# Patient Record
Sex: MALE | Race: WHITE | NOT HISPANIC OR LATINO | Employment: OTHER | URBAN - METROPOLITAN AREA
[De-identification: names, ages, dates, MRNs, and addresses within clinical notes are randomized per-mention and may not be internally consistent; named-entity substitution may affect disease eponyms.]

---

## 2023-08-31 ENCOUNTER — HOSPITAL ENCOUNTER (INPATIENT)
Facility: CLINIC | Age: 68
LOS: 1 days | Discharge: ANOTHER HOSPITAL | DRG: 841 | End: 2023-09-01
Attending: HOSPITALIST | Admitting: INTERNAL MEDICINE
Payer: COMMERCIAL

## 2023-08-31 DIAGNOSIS — D61.818 PANCYTOPENIA (H): Primary | ICD-10-CM

## 2023-08-31 DIAGNOSIS — D69.6 THROMBOCYTOPENIA (H): ICD-10-CM

## 2023-08-31 LAB
ABO/RH TYPE: NORMAL
ANTIBODY SCREEN: NEGATIVE
ERYTHROCYTE [DISTWIDTH] IN BLOOD BY AUTOMATED COUNT: 14.8 % (ref 10–15)
FERRITIN SERPL-MCNC: 703 NG/ML (ref 31–409)
FOLATE SERPL-MCNC: 10.8 NG/ML (ref 4.6–34.8)
HCT VFR BLD AUTO: 26.3 % (ref 40–53)
HGB BLD-MCNC: 7.7 G/DL (ref 13.3–17.7)
IRON BINDING CAPACITY (ROCHE): 241 UG/DL (ref 240–430)
IRON SATN MFR SERPL: 32 % (ref 15–46)
IRON SERPL-MCNC: 76 UG/DL (ref 61–157)
LDH SERPL L TO P-CCNC: 262 U/L (ref 0–250)
MCH RBC QN AUTO: 27.5 PG (ref 26.5–33)
MCHC RBC AUTO-ENTMCNC: 29.3 G/DL (ref 31.5–36.5)
MCV RBC AUTO: 94 FL (ref 78–100)
PLATELET # BLD AUTO: 16 10E3/UL (ref 150–450)
RBC # BLD AUTO: 2.8 10E6/UL (ref 4.4–5.9)
RETIC HEMOGLOBIN: 22.1 PG (ref 28.2–35.7)
RETICS # AUTO: 0.19 10E6/UL (ref 0.03–0.1)
RETICS # AUTO: 0.19 10E6/UL (ref 0.03–0.1)
RETICS/RBC NFR AUTO: 6.5 % (ref 0.5–2)
RETICS/RBC NFR AUTO: 6.8 % (ref 0.5–2)
SPECIMEN EXPIRATION DATE: NORMAL
SPECIMEN EXPIRATION DATE: NORMAL
TOTAL PROTEIN SERUM FOR ELP: 7.4 G/DL (ref 6.4–8.3)
URATE SERPL-MCNC: 5.1 MG/DL (ref 3.4–7)
VIT B12 SERPL-MCNC: >4000 PG/ML (ref 232–1245)
WBC # BLD AUTO: 2.9 10E3/UL (ref 4–11)

## 2023-08-31 PROCEDURE — 86334 IMMUNOFIX E-PHORESIS SERUM: CPT | Performed by: PATHOLOGY

## 2023-08-31 PROCEDURE — 86803 HEPATITIS C AB TEST: CPT | Performed by: INTERNAL MEDICINE

## 2023-08-31 PROCEDURE — 83550 IRON BINDING TEST: CPT | Performed by: INTERNAL MEDICINE

## 2023-08-31 PROCEDURE — 87040 BLOOD CULTURE FOR BACTERIA: CPT | Performed by: INTERNAL MEDICINE

## 2023-08-31 PROCEDURE — 81403 MOPATH PROCEDURE LEVEL 4: CPT | Performed by: HOSPITALIST

## 2023-08-31 PROCEDURE — 84155 ASSAY OF PROTEIN SERUM: CPT | Performed by: INTERNAL MEDICINE

## 2023-08-31 PROCEDURE — 258N000003 HC RX IP 258 OP 636: Performed by: INTERNAL MEDICINE

## 2023-08-31 PROCEDURE — 82607 VITAMIN B-12: CPT | Performed by: INTERNAL MEDICINE

## 2023-08-31 PROCEDURE — 250N000011 HC RX IP 250 OP 636: Performed by: INTERNAL MEDICINE

## 2023-08-31 PROCEDURE — 99223 1ST HOSP IP/OBS HIGH 75: CPT | Performed by: INTERNAL MEDICINE

## 2023-08-31 PROCEDURE — 85045 AUTOMATED RETICULOCYTE COUNT: CPT | Performed by: INTERNAL MEDICINE

## 2023-08-31 PROCEDURE — 86431 RHEUMATOID FACTOR QUANT: CPT | Performed by: INTERNAL MEDICINE

## 2023-08-31 PROCEDURE — 86704 HEP B CORE ANTIBODY TOTAL: CPT | Performed by: INTERNAL MEDICINE

## 2023-08-31 PROCEDURE — 36415 COLL VENOUS BLD VENIPUNCTURE: CPT | Performed by: HOSPITALIST

## 2023-08-31 PROCEDURE — 86850 RBC ANTIBODY SCREEN: CPT | Performed by: INTERNAL MEDICINE

## 2023-08-31 PROCEDURE — 84999 UNLISTED CHEMISTRY PROCEDURE: CPT | Performed by: HOSPITALIST

## 2023-08-31 PROCEDURE — 86901 BLOOD TYPING SEROLOGIC RH(D): CPT | Performed by: INTERNAL MEDICINE

## 2023-08-31 PROCEDURE — 86038 ANTINUCLEAR ANTIBODIES: CPT | Performed by: INTERNAL MEDICINE

## 2023-08-31 PROCEDURE — 83521 IG LIGHT CHAINS FREE EACH: CPT | Performed by: INTERNAL MEDICINE

## 2023-08-31 PROCEDURE — 86900 BLOOD TYPING SEROLOGIC ABO: CPT | Performed by: HOSPITALIST

## 2023-08-31 PROCEDURE — 36415 COLL VENOUS BLD VENIPUNCTURE: CPT | Performed by: INTERNAL MEDICINE

## 2023-08-31 PROCEDURE — 83615 LACTATE (LD) (LDH) ENZYME: CPT | Performed by: INTERNAL MEDICINE

## 2023-08-31 PROCEDURE — 82746 ASSAY OF FOLIC ACID SERUM: CPT | Performed by: INTERNAL MEDICINE

## 2023-08-31 PROCEDURE — 85046 RETICYTE/HGB CONCENTRATE: CPT | Performed by: INTERNAL MEDICINE

## 2023-08-31 PROCEDURE — 85007 BL SMEAR W/DIFF WBC COUNT: CPT | Performed by: INTERNAL MEDICINE

## 2023-08-31 PROCEDURE — 85027 COMPLETE CBC AUTOMATED: CPT | Performed by: INTERNAL MEDICINE

## 2023-08-31 PROCEDURE — 86901 BLOOD TYPING SEROLOGIC RH(D): CPT | Performed by: HOSPITALIST

## 2023-08-31 PROCEDURE — 84550 ASSAY OF BLOOD/URIC ACID: CPT | Performed by: INTERNAL MEDICINE

## 2023-08-31 PROCEDURE — 120N000001 HC R&B MED SURG/OB

## 2023-08-31 PROCEDURE — 87389 HIV-1 AG W/HIV-1&-2 AB AG IA: CPT | Performed by: INTERNAL MEDICINE

## 2023-08-31 PROCEDURE — 82728 ASSAY OF FERRITIN: CPT | Performed by: INTERNAL MEDICINE

## 2023-08-31 PROCEDURE — 86334 IMMUNOFIX E-PHORESIS SERUM: CPT | Mod: 26

## 2023-08-31 PROCEDURE — 82668 ASSAY OF ERYTHROPOIETIN: CPT | Performed by: INTERNAL MEDICINE

## 2023-08-31 PROCEDURE — 84165 PROTEIN E-PHORESIS SERUM: CPT | Mod: TC | Performed by: PATHOLOGY

## 2023-08-31 PROCEDURE — 84165 PROTEIN E-PHORESIS SERUM: CPT | Mod: 26

## 2023-08-31 PROCEDURE — 86706 HEP B SURFACE ANTIBODY: CPT | Performed by: INTERNAL MEDICINE

## 2023-08-31 PROCEDURE — 250N000013 HC RX MED GY IP 250 OP 250 PS 637: Performed by: INTERNAL MEDICINE

## 2023-08-31 RX ORDER — LIDOCAINE 40 MG/G
CREAM TOPICAL
Status: DISCONTINUED | OUTPATIENT
Start: 2023-08-31 | End: 2023-09-01 | Stop reason: HOSPADM

## 2023-08-31 RX ORDER — ACETAMINOPHEN 325 MG/1
325-650 TABLET ORAL EVERY 6 HOURS PRN
Status: ON HOLD | COMMUNITY
End: 2023-09-01

## 2023-08-31 RX ORDER — ONDANSETRON 2 MG/ML
4 INJECTION INTRAMUSCULAR; INTRAVENOUS EVERY 6 HOURS PRN
Status: DISCONTINUED | OUTPATIENT
Start: 2023-08-31 | End: 2023-09-01 | Stop reason: HOSPADM

## 2023-08-31 RX ORDER — ONDANSETRON 4 MG/1
4 TABLET, ORALLY DISINTEGRATING ORAL EVERY 6 HOURS PRN
Status: DISCONTINUED | OUTPATIENT
Start: 2023-08-31 | End: 2023-09-01 | Stop reason: HOSPADM

## 2023-08-31 RX ORDER — AMOXICILLIN 250 MG
1 CAPSULE ORAL 2 TIMES DAILY PRN
Status: DISCONTINUED | OUTPATIENT
Start: 2023-08-31 | End: 2023-09-01 | Stop reason: HOSPADM

## 2023-08-31 RX ORDER — BISACODYL 10 MG
10 SUPPOSITORY, RECTAL RECTAL DAILY PRN
Status: DISCONTINUED | OUTPATIENT
Start: 2023-08-31 | End: 2023-09-01 | Stop reason: HOSPADM

## 2023-08-31 RX ORDER — AMOXICILLIN 250 MG
2 CAPSULE ORAL 2 TIMES DAILY PRN
Status: DISCONTINUED | OUTPATIENT
Start: 2023-08-31 | End: 2023-09-01 | Stop reason: HOSPADM

## 2023-08-31 RX ORDER — POLYETHYLENE GLYCOL 3350 17 G/17G
17 POWDER, FOR SOLUTION ORAL DAILY PRN
Status: DISCONTINUED | OUTPATIENT
Start: 2023-08-31 | End: 2023-09-01 | Stop reason: HOSPADM

## 2023-08-31 RX ORDER — ACETAMINOPHEN 325 MG/1
650 TABLET ORAL EVERY 6 HOURS PRN
Status: DISCONTINUED | OUTPATIENT
Start: 2023-08-31 | End: 2023-09-01 | Stop reason: HOSPADM

## 2023-08-31 RX ORDER — SODIUM CHLORIDE 9 MG/ML
INJECTION, SOLUTION INTRAVENOUS CONTINUOUS
Status: DISCONTINUED | OUTPATIENT
Start: 2023-08-31 | End: 2023-09-01 | Stop reason: HOSPADM

## 2023-08-31 RX ORDER — ACETAMINOPHEN 650 MG/1
650 SUPPOSITORY RECTAL EVERY 6 HOURS PRN
Status: DISCONTINUED | OUTPATIENT
Start: 2023-08-31 | End: 2023-09-01 | Stop reason: HOSPADM

## 2023-08-31 RX ADMIN — SODIUM CHLORIDE: 9 INJECTION, SOLUTION INTRAVENOUS at 20:17

## 2023-08-31 RX ADMIN — SODIUM CHLORIDE 1000 MG: 9 INJECTION, SOLUTION INTRAVENOUS at 17:51

## 2023-08-31 RX ADMIN — ACETAMINOPHEN 650 MG: 325 TABLET, FILM COATED ORAL at 19:26

## 2023-08-31 ASSESSMENT — ACTIVITIES OF DAILY LIVING (ADL)
ADLS_ACUITY_SCORE: 22
CHANGE_IN_FUNCTIONAL_STATUS_SINCE_ONSET_OF_CURRENT_ILLNESS/INJURY: NO
ADLS_ACUITY_SCORE: 22
ADLS_ACUITY_SCORE: 22
ADLS_ACUITY_SCORE: 23
ADLS_ACUITY_SCORE: 22
CONCENTRATING,_REMEMBERING_OR_MAKING_DECISIONS_DIFFICULTY: NO

## 2023-08-31 NOTE — PLAN OF CARE
Shift Note 1400 - 1930    Orientation: A&Ox4  Activity: SBA  Diet/BS Checks: Regular - NPO at midnight  Tele: N/A  IV Access/Drains: PIV SL  Pain Management:   Abnormal VS/Results: Plt 16, ANC 0.2  Bowel/Bladder: Continent, urinary frequency. Bladder scan PVR 31cc this shift  Skin/Wounds: Scattered bruising and petechiae   Consults: Hem/onc  D/C Disposition: Pending  Other Info: Received 1 unit of platelets prior to transfer from [place]. Blood consent completed this shift with MD. Currently no plan for transfusions this evening. Will have bone marrow biopsy done 9/1.

## 2023-08-31 NOTE — H&P
[facility]    History and Physical - Hospitalist Service       Date of Admission:  8/31/2023    Assessment & Plan      Artie Fine is a 68 year old male admitted on 8/31/2023. He was transferred here from [facility] in [facility] for Oncology evaluation of pancytopenia.  History of brain abscess about 2 years ago, prior diagnosis of HTN and BPH, for which she is no longer taking medications.    He presented as he has he has noted petechiae and easy bruisability starting in June with episodes of mucosal bleeding in the mouth.  He is also felt headache very similar to his previous brain abscess that is generalized and going down the back of his neck.  He is also noted he feels feverish in the afternoon without documented fevers.    Head CT done in outside hospital revealed no intracranial hemorrhage midline shift or mass effect.  Postsurgical changes noted.  Previous area of infection demonstrates trace amount of encephalomalacia malacia but no mass effect or inflammatory changes to suggest new or acute infection.  Otherwise unremarkable.    Outside labs reviewed:  WBC 2.7 (ANC 0.4), Hgb 8.1 (MCV 92.5), PLT 1.   Lyme serology pending  , LY LELE normal, CR 0.97.  Liver panel unremarkable.  Protein and albumin normal globulin mildly elevated at 3.5.  INR 1.3, prothrombin 14.1 (both mildly elevated  APTT normal  CRP 1.46  Pro-Armand normal at 0.1    Other than headache, no history suggest suggest localized acute infection. No fever documented. On no new medications only takes Tylenol.  Denies any significant toxin exposure.  Only occasionally sprays trees and otherwise organic .     Pancytopenia with profound thrombocytopenia.   Associated fatigue, subjective fevers and headache.   Mildly Elevated INR, PTT    S/p platelets in the ED  Discussed with Dr. Urena suspects for ITP given how profoundly low his platelet count is.  Per Dr. Urena if platelets are not responding to  transfusion would not continue to transfuse as this is likely ITP and transfuse platelets will just be destroyed by auto-antibodies. Therefore further transfusions per hematology or if actively bleeding  IV Solu-Medrol ordered as empiric therapy for ITP  Peripheral smear, retic count, erythropoietin, flow cytometry, SPEP, UPEP with kappa and lambda light chains, iron panel, ferritin, B12 folate  Blood cultures x2  HIV HBV HCV serology sent  RF and KIM  Bone marrow ordered for tomorrow.     Blasts seen on smear, likely leukemia or high risk MDS   Dr. Urena called the [facility], no beds available tonight. Hopeful transfer tomorrow to [facility] for acute treatment. Will start fluids, and blood transfusion consent signed but otherwise Dr. Urena recommended no further acute treatment.   I returned to discussed with patient.  He states he is actually feeling much better after IV steroids.  He expressed understanding diagnosis of possible leukemia, with plan to transfer to the [facility] for treatment. States his wife (who is a nurse) will be here tomorrow with more questions.  He was thankful for our attention.     Headaches, subjective fevers at home  H/o brain abscess in 2021 (hospitalized in [place], attributed to occult oral infection)  Neutropenic  * Head CT unremarkable.  Pro-Armand normal CRP mildly elevated    Monitor headache.  If not resolving would get MRI prior to discharge.  Monitor for fevers, signs of infection.    Not a candidate for lumbar puncture at this point given low platelets  Blood cultures x2 drawn above as well as hepatitis and HIV serology and RF, KIM.     Hypertension noted during prior hospitalization for brain abscess in 2021.  Previously on lisinopril.  No longer taking  Noted    BPH notes difficulty with complete emptying of bladder.  Previously on Flomax but states it did nothing.  He therefore stopped it.  He does note frequent urination and difficulty emptying his bladder  Postvoid residuals  "ordered.  Notify MD if over 300 and start straight caths       Diet:  Regular  DVT Prophylaxis: ambulate  Huizar Catheter: Not present  Lines: None     Cardiac Monitoring: None  Code Status:   Full code.    Clinically Significant Risk Factors Present on Admission                       # Overweight: Estimated body mass index is 28.12 kg/m  as calculated from the following:    Height as of this encounter: 1.753 m (5' 9\").    Weight as of this encounter: 86.4 kg (190 lb 6.4 oz).              Disposition Plan   Admit as inpatient anticipate he will be here more than 2 days until he has improvement in his blood counts and symptoms, and evaluation is complete, treatment started.        Enma Nguyen MD  Hospitalist Service  [facility]  Securely message with LP Amina (more info)  Text page via Tango Card Paging/Directory     ______________________________________________________________________    Chief Complaint   Easy bruising, fatigue, headaches    History is obtained from the patient    History of Present Illness   Patient states that around June he started noting spots on his legs.  In the about 2 weeks ago he had had multiple red spots on his scrotum and noted blood in his in his underwear when he woke up.  He is also noted more fatigue.  He states that they have 5 gardens at home and when he walks with the holds across the garden he can usually do this without any difficulty.  However over the last 2 weeks has been very difficult for him just due to profound fatigue and some shortness of breath.  He is also noted bruising on his shoulder where the hose was.  He has a chronic cough denies any change in this is nonproductive no recent blood.  He has noted some blood blisters in his mouth which have blood in the morning.  This is occurred over the last 2 weeks.  He had no black or bloody stools.  He has not noted any lymphadenopathy.  He has felt feverish in the afternoons he states he has a mild " "headache when he wakes up and this progresses throughout the day until he feels more feverish and his wife has noted that he is \"burning up\".  No documented fever.  He describes his headache is similar to the one that he had with brain abscess its in the back of his head and goes down his neck.  It is mild in the morning and then progresses throughout the day.         Outside labs reviewed:  WBC 2.7 (ANC 0.4), Hgb 8.1 (MCV 92.5), PLT 1.   Lyme serology pending  , LY LELE normal, CR 0.97.  Liver panel unremarkable.  Protein and albumin normal globulin mildly elevated at 3.5.  INR 1.3, prothrombin 14.1 (both mildly elevated  APTT normal  CRP 1.46  Pro-Armand normal at 0.1    Past Medical History    H/oh brain abscess hospitalized in [place] in    HTN, on lisinopril  GERD  BPH  Actinic keratosis, with history of skin cancer  Bunion of right foot    Past Surgical History   S/p craniotomy, with I and D of brain abscess on 2021  S/p P rotator cuff repair on     Prior to Admission Medications   None      Per Care Everywhere:  Acetaminophen  Ibuprofen  Lisinopril 20 mg daily  Omeprazole  Tamsulosin  Social History   I have reviewed this patient's social history and updated it with pertinent information if needed.      with 3 children  He grew up working his whole life and believes in the value of hard work.  He believes less is more and therefore, avoids doctors as much as possible.   He and his wife currently do a lot of organic gardening. They have 5 guardians.  He rarely sprays with pesticides that he gets at the garden store.  Minimal exposure history.  He states he is currently at home .  Wife is a nurse  Quit smoking in .  But now he continues to smoke about 2 to 3 cigarettes a day   He drinks about 1-2 brandies for to 5 times a week.    Family History     There is a lot of cancer in the family.  His brother had lung cancer.  His sister had colon cancer and then  of a brain " tumor.  His other sister had breast cancer.  He has a brother who  of a heart attack at age 42.  Another brother recently had a heart attack in his 60s.         Physical Exam   Vital Signs:     BP: 132/84 Pulse: 99   Resp: 16 SpO2: 99 % O2 Device: None (Room air)    Weight: 190 lbs 6.4 oz    Constitutional:   awake, alert, cooperative, no apparent distress, and appears stated age     Eyes:   Lids and lashes normal, extra ocular muscles intact, sclera clear, conjunctiva normal     ENT:   Normocephalic, without obvious abnormality, atramatic, oral mucosa reveals one blood blister R buccal mucosa.       Neck:   Supple, symmetrical, trachea midline, no adenopathy, thyroid symmetric, not enlarged and no tenderness, skin normal     Lungs:   No increased work of breathing, good air exchange, clear to auscultation bilaterally, no crackles or wheezing     Cardiovascular:   Regular rate and rhythm, normal S1 and S2, no S3 or S4, and no murmur noted. Extremities are warm. No edema.      Abdomen:   Normal bowel sounds, soft, non-distended, non-tender, no masses palpated, no hepatosplenomegally     Musculoskeletal:   There is no redness, warmth, or swelling of the joints. Normal build     Neurologic:   Awake, alert, oriented to name, place and time.    Speech intact. Follows commands  Face symmetric   Horizontal gaze normal.  Motor strength equal and intact in all 4 extremities.  Finger-nose-finger and heel shin normal bilaterally.     Neuropsychiatric:   General: normal, calm and normal eye contact     Skin:   Petechiae is noted over the lower extremities.  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 No bleeding, redness, warmth, or swelling and no rashes         Medical Decision Making       Over 75 MINUTES SPENT BY ME on the date of service doing chart review, history, exam, documentation & further activities per the note.      Data     I have personally reviewed the following data over the  past 24 hrs:    2.9 (L)  \   7.7 (L)   / N/A     N/A N/A N/A /  N/A   N/A N/A N/A \     Ferritin:  N/A % Retic:  6.8 (H) LDH:  N/A       Imaging results reviewed over the past 24 hrs:   Recent Results (from the past 24 hour(s))   CT Head w/o Contrast    Narrative    -------------------------------- Original Report  -------------------------------    EXAM:  CT BRAIN WITHOUT CONTRAST    CLINICAL: Age: 68 years; Gender: Male; Indication: Patient states that headache  is similar to previous inflammatory and intracranial infection.  Currently  pancytopenic.  Past history includes brain abscess, this was well imaged on CT  from 8/3/2021 and MRI from 10/4/2021 demonstrating abscess in the right temporal  lobe.  -          Information from request: Requested Date/Time: 08/31/2023 08:29 CT HEAD  ROUTINE WITHOUT IV CONTRAST  Reason For Study: head ache hx of brain abscesses   .  -   TECHNIQUE: Volumetric imaging of the head was obtained with routine axial,  sagittal and coronal reconstructions submitted to workstation for review.      COMPARISON: None Available.    INTERPRETATION:  No hemorrhage, midline shift or mass effect.   Postsurgical changes are seen along the right temporal bone with surgical  hardware in place repairing calvarium defect.  No inflammatory changes around  the hardware.  Minimal encephalomalacia in the right temporal lobe is seen at the site of  previous infection.  No current intracranial hemorrhage, or mass effect.    Cerebrum has no other focal lesions.    Normal gray-white matter differentiation.    Basal ganglia are unremarkable.    Cerebellum and posterior fossa are unremarkable.   No tonsillar herniation.   Pituitary and brainstem are normal.    Meningeal structures are unremarkable.     The calvarium is intact with the exception of the postsurgical changes described  above.  No fractures or lesions.  Visualized paranasal sinuses and mastoid air cells are clear  Bilateral orbits and optic  pathways are unremarkable.   No abnormal soft tissue findings.    IMPRESSION:   1. No intracranial hemorrhage, midline shift or mass effect.   2. Postsurgical changes at the right temporal lobe.  Previous area of infection  demonstrates trace amount of encephalomalacia but no mass effect or inflammatory  changes to suggest new or acute infection.  3.  Remainder the exam is unremarkable.    [facility] is committed to maintaining high-quality CT images while improving  patient safety.  We minimize radiation dose by adjusting the mA and/or kV  according to each patient's size.    The results were discussed with the office of Darwin Chaves M.D. on  8/31/2023 9:15 AM CDT and have been documented in [employer]Loop. Message ID  [id number].  Read by: Armando Elizabeth M.D.  Reviewed and Electronically Signed by: Armando Elizabeth M.D.

## 2023-08-31 NOTE — PHARMACY-ADMISSION MEDICATION HISTORY
Pharmacist Admission Medication History    Admission medication history is complete. The information provided in this note is only as accurate as the sources available at the time of the update.    Medication reconciliation/reorder completed by provider prior to medication history? No    Information Source(s): Patient via in-person      Changes made to PTA medication list:  Added: All entries  Deleted: None  Changed: None           Allergies reviewed with patient and updates made in EHR: yes    Medication History Completed By: Sanjeev Carrera [facility] 8/31/2023 4:31 PM    Prior to Admission medications    Medication Sig Last Dose Taking? Auth Provider Long Term End Date   acetaminophen (TYLENOL) 325 MG tablet Take 325-650 mg by mouth every 6 hours as needed for mild pain  at PRN Yes Unknown, Entered By History     diphenhydrAMINE-acetaminophen (TYLENOL PM)  MG tablet Take 1 tablet by mouth nightly as needed for sleep Past Week at PRN Yes Unknown, Entered By History

## 2023-08-31 NOTE — CONSULTS
[facility]    Hematology / Oncology Consultation     Date of Admission:  8/31/2023  Date of Consult (When I saw the patient): 08/31/23    Assessment & Plan   Artie Fine is a 68 year old male who was admitted on 8/31/2023. I was asked to see the patient for pancytopenia.    - severe thrombocytopenia with platelet of 1  - moderate to severe anemia with hemoglobin of 8.1 g/dl, ANC of 0.4  - homa Alva was alone in the room. He has noticed petechial rash in his legs for the last couple of months.  He gets a ecchymotic rash whenever he carries the garden hose.  He has some blood in his stools and bleeding in his mouth.    He had a brain abscess about a year or 2 ago.  He is always worried about it.  He has headaches and is concerned that this could be a brain abscess.  He does have mild to moderate fatigue.  He denies any weight loss.  He denies any pain at this time.  He feels febrile at that time but does not have definitive fever.  He denies weight loss.    I reviewed the differential diagnosis.  We are concerned about primary hematologic malignancy given the pancytopenia.  However the severely low platelet counts are usually associated with ITP.  I will initiate treatment for ITP and plan to get a bone marrow biopsy done to further evaluate his severe pancytopenia and rule out other primary hematology conditions.    I reviewed a number of causes of thrombocytopenia including allergic reaction to new medications, infections, alcohol abuse, liver disease with hypersplenism, deficiencies of vitamin B12/folate, rheumatologic disorders like SLE, RA, primary hematologic disorder and consumptive coagulopathy as in DIC/TTP.  Occasionally this could be spurious thrombocytopenia with an erroneous measurement of platelet due to platelet clumping because of EDTA anticoagulant. Most of the above circumstances do not apply for him.   His baseline platelet count runs over 200k and this has been  the first drop per charts in Care Everywhere.    The list of medications that can cause thrombocytopenia is long and virtually most have been associated with low PLT. We reviewed the most common and serious offending agent- heparin.   I reviewed infections as a cause for low platelet count. Often viral infections can lead to low PLT count. The most notorious of these are the HIV, hepatitis C, EBV, H pylori, sepsis. Melissa not have any known liver disease.He denies any risk factors for HIV or hepatitis.   I have extensively reviewed the procedure for bone marrow biopsy withhim. This is performed as an outpatient under sedation and with local anaesethetic. Bone marrow is the soft tissue inside bones that helps form blood cells. It is found in the hollow part of most bones. The procedure involves collection of bone marrow and a small amount of marrow fluid aspirate for analysis with a biopsy/aspirate needle. Only the finished cells are released in the circulation and bone marrow biopsy/aspirate helps assess precursor cells for abnormalities. It helps establish primary hematologic disorders like myelodysplastic/ myeloproliferative disorders, leukemia, lymphoma and multiple myeloma.   Recommendations:  -Monitor CBC after platelet transfusion  -I have initiated work-up for pancytopenia  -I will have a stat peripheral smear read done today  -I will give him 1 dose of Solu-Medrol 1 g intravenous today  -I will keep him n.p.o. past midnight for possible bone marrow biopsy in the a.m.    Over 90 min spent on day of visit including review of tests, obtaining/reviewing separately obtained history/physical exam, counseling patient, ordering medications/tests/procedures, communicating with PCP/consultants, and documenting in electronic medical record.    Misael Urena  Hematologist and Medical Oncologist  [facility]     Misael Urena MD, MD    Code Status    Full Code    Reason for Consult   Reason for consult: I was  asked by Dr. Nguyen to evaluate this patient for pancytopenia.    Primary Care Physician   No primary care provider on file.    Chief Complaint   Headaches, fatigue, petechial rash, bleeding.     History is obtained from the patient    History of Present Illness   Artie Fine is a 68 year old male who presents with Headaches, fatigue, petechial rash, bleeding as above.     He is a minimalist and does not seek medical care until he has to.  He has noticed petechial rash in both his legs for the last couple of months.  He has noticed increasing fatigue over the past few weeks.  He has had blood in his stools and some bleeding in his mouth.     Past Medical History   I have reviewed this patient's medical history and updated it with pertinent information if needed.   No past medical history on file.    Past Surgical History   I have reviewed this patient's surgical history and updated it with pertinent information if needed.  No past surgical history on file.    Prior to Admission Medications   None     Allergies   Not on File    Social History   I have reviewed this patient's social history and updated it with pertinent information if needed. Artie Fine   He is  and lives with his wife who is a nurse.  He is a home .  He has multiple gardens that he tends to all during the day.    Family History   I have reviewed this patient's family history and updated it with pertinent information if needed.   He is 1 of 8 siblings.  3 of his siblings have had cancer.  His sister had breast cancer, another sister had colon cancer.  Brother with lung cancer.  One of his brother  early at age of 42 with coronary artery disease.  Both of his parents were heavy smokers.    He smokes about 3 to 4 cigarettes a day.  He denies any alcohol or drug abuse.  He has not changed his diet in the recent past.    Review of Systems   The 10 point Review of Systems is negative other than noted in the HPI or here.     Physical  Exam       BP: 132/84 Pulse: 99   Resp: 16 SpO2: 99 % O2 Device: None (Room air)    Vital Signs with Ranges  Pulse:  [99] 99  Resp:  [16] 16  BP: (132)/(84) 132/84  SpO2:  [99 %] 99 %  190 lbs 6.4 oz    Data

## 2023-09-01 ENCOUNTER — DOCUMENTATION ONLY (OUTPATIENT)
Dept: ONCOLOGY | Facility: CLINIC | Age: 68
End: 2023-09-01

## 2023-09-01 ENCOUNTER — HOSPITAL ENCOUNTER (INPATIENT)
Facility: CLINIC | Age: 68
LOS: 32 days | Discharge: HOME | DRG: 835 | End: 2023-10-03
Attending: INTERNAL MEDICINE | Admitting: INTERNAL MEDICINE
Payer: COMMERCIAL

## 2023-09-01 VITALS
RESPIRATION RATE: 16 BRPM | HEART RATE: 89 BPM | DIASTOLIC BLOOD PRESSURE: 79 MMHG | WEIGHT: 190.1 LBS | HEIGHT: 69 IN | OXYGEN SATURATION: 96 % | TEMPERATURE: 97.4 F | SYSTOLIC BLOOD PRESSURE: 136 MMHG | BODY MASS INDEX: 28.16 KG/M2

## 2023-09-01 DIAGNOSIS — K22.89 ESOPHAGEAL THICKENING: ICD-10-CM

## 2023-09-01 DIAGNOSIS — L27.0 DRUG RASH: ICD-10-CM

## 2023-09-01 DIAGNOSIS — R53.81 PHYSICAL DECONDITIONING: ICD-10-CM

## 2023-09-01 DIAGNOSIS — C95.00 ACUTE LEUKEMIA NOT HAVING ACHIEVED REMISSION (H): Primary | ICD-10-CM

## 2023-09-01 LAB
ABO/RH TYPE: NORMAL
ALBUMIN SERPL BCG-MCNC: 4.6 G/DL (ref 3.5–5.2)
ALBUMIN SERPL ELPH-MCNC: 4.1 G/DL (ref 3.7–5.1)
ALP SERPL-CCNC: 68 U/L (ref 40–129)
ALPHA1 GLOB SERPL ELPH-MCNC: 0.4 G/DL (ref 0.2–0.4)
ALPHA2 GLOB SERPL ELPH-MCNC: 0.6 G/DL (ref 0.5–0.9)
ALT SERPL W P-5'-P-CCNC: 8 U/L (ref 0–70)
ANA PAT SER IF-IMP: ABNORMAL
ANA SER QL IF: POSITIVE
ANA TITR SER IF: ABNORMAL {TITER}
ANION GAP SERPL CALCULATED.3IONS-SCNC: 14 MMOL/L (ref 7–15)
ANION GAP SERPL CALCULATED.3IONS-SCNC: 14 MMOL/L (ref 7–15)
ANTIBODY SCREEN: NEGATIVE
APTT PPP: 26 SECONDS (ref 22–38)
AST SERPL W P-5'-P-CCNC: 19 U/L (ref 0–45)
B-GLOBULIN SERPL ELPH-MCNC: 0.9 G/DL (ref 0.6–1)
BILIRUB SERPL-MCNC: 0.9 MG/DL
BUN SERPL-MCNC: 13.4 MG/DL (ref 8–23)
BUN SERPL-MCNC: 14.7 MG/DL (ref 8–23)
CALCIUM SERPL-MCNC: 8.9 MG/DL (ref 8.8–10.2)
CALCIUM SERPL-MCNC: 9 MG/DL (ref 8.8–10.2)
CHLORIDE SERPL-SCNC: 105 MMOL/L (ref 98–107)
CHLORIDE SERPL-SCNC: 107 MMOL/L (ref 98–107)
CREAT SERPL-MCNC: 0.73 MG/DL (ref 0.67–1.17)
CREAT SERPL-MCNC: 0.79 MG/DL (ref 0.67–1.17)
DEPRECATED HCO3 PLAS-SCNC: 18 MMOL/L (ref 22–29)
DEPRECATED HCO3 PLAS-SCNC: 18 MMOL/L (ref 22–29)
EPO SERPL-ACNC: 64 MU/ML
ERYTHROCYTE [DISTWIDTH] IN BLOOD BY AUTOMATED COUNT: 14.8 % (ref 10–15)
ERYTHROCYTE [DISTWIDTH] IN BLOOD BY AUTOMATED COUNT: 15.8 % (ref 10–15)
FIBRINOGEN PPP-MCNC: 445 MG/DL (ref 170–490)
GAMMA GLOB SERPL ELPH-MCNC: 1.4 G/DL (ref 0.7–1.6)
GFR SERPL CREATININE-BSD FRML MDRD: >90 ML/MIN/1.73M2
GFR SERPL CREATININE-BSD FRML MDRD: >90 ML/MIN/1.73M2
GLUCOSE SERPL-MCNC: 111 MG/DL (ref 70–99)
GLUCOSE SERPL-MCNC: 149 MG/DL (ref 70–99)
HBV CORE AB SERPL QL IA: NONREACTIVE
HBV SURFACE AB SERPL IA-ACNC: 48.37 M[IU]/ML
HBV SURFACE AB SERPL IA-ACNC: REACTIVE M[IU]/ML
HCT VFR BLD AUTO: 26 % (ref 40–53)
HCT VFR BLD AUTO: 27 % (ref 40–53)
HCV AB SERPL QL IA: NONREACTIVE
HGB BLD-MCNC: 7.6 G/DL (ref 13.3–17.7)
HGB BLD-MCNC: 7.8 G/DL (ref 13.3–17.7)
HIV 1+2 AB+HIV1 P24 AG SERPL QL IA: NONREACTIVE
INR PPP: 1.17 (ref 0.85–1.15)
INR PPP: 1.18 (ref 0.85–1.15)
INTERPRETATION: NORMAL
KAPPA LC FREE SER-MCNC: 3.86 MG/DL (ref 0.33–1.94)
KAPPA LC FREE/LAMBDA FREE SER NEPH: 1.51 {RATIO} (ref 0.26–1.65)
LAB DIRECTOR COMMENTS: NORMAL
LAB DIRECTOR DISCLAIMER: NORMAL
LAB DIRECTOR INTERPRETATION: NORMAL
LAB DIRECTOR METHODOLOGY: NORMAL
LAB DIRECTOR RESULTS: NORMAL
LAMBDA LC FREE SERPL-MCNC: 2.56 MG/DL (ref 0.57–2.63)
LDH SERPL L TO P-CCNC: 289 U/L (ref 0–250)
M PROTEIN SERPL ELPH-MCNC: 0 G/DL
MAGNESIUM SERPL-MCNC: 2.3 MG/DL (ref 1.7–2.3)
MCH RBC QN AUTO: 26.9 PG (ref 26.5–33)
MCH RBC QN AUTO: 27.5 PG (ref 26.5–33)
MCHC RBC AUTO-ENTMCNC: 28.9 G/DL (ref 31.5–36.5)
MCHC RBC AUTO-ENTMCNC: 29.2 G/DL (ref 31.5–36.5)
MCV RBC AUTO: 93 FL (ref 78–100)
MCV RBC AUTO: 94 FL (ref 78–100)
PATH REPORT.COMMENTS IMP SPEC: ABNORMAL
PATH REPORT.COMMENTS IMP SPEC: NORMAL
PATH REPORT.COMMENTS IMP SPEC: NORMAL
PATH REPORT.COMMENTS IMP SPEC: YES
PATH REPORT.FINAL DX SPEC: ABNORMAL
PATH REPORT.FINAL DX SPEC: NORMAL
PATH REPORT.MICROSCOPIC SPEC OTHER STN: ABNORMAL
PATH REPORT.MICROSCOPIC SPEC OTHER STN: NORMAL
PATH REPORT.MICROSCOPIC SPEC OTHER STN: NORMAL
PATH REPORT.RELEVANT HX SPEC: ABNORMAL
PHOSPHATE SERPL-MCNC: 3.5 MG/DL (ref 2.5–4.5)
PLATELET # BLD AUTO: 10 10E3/UL (ref 150–450)
PLATELET # BLD AUTO: 16 10E3/UL (ref 150–450)
POTASSIUM SERPL-SCNC: 3.6 MMOL/L (ref 3.4–5.3)
POTASSIUM SERPL-SCNC: 3.9 MMOL/L (ref 3.4–5.3)
PROT PATTERN SERPL ELPH-IMP: NORMAL
PROT PATTERN SERPL IFE-IMP: NORMAL
PROT SERPL-MCNC: 8 G/DL (ref 6.4–8.3)
RBC # BLD AUTO: 2.76 10E6/UL (ref 4.4–5.9)
RBC # BLD AUTO: 2.9 10E6/UL (ref 4.4–5.9)
RHEUMATOID FACT SER NEPH-ACNC: 27 IU/ML
SIGNIFICANT RESULTS: NORMAL
SODIUM SERPL-SCNC: 137 MMOL/L (ref 136–145)
SODIUM SERPL-SCNC: 139 MMOL/L (ref 136–145)
SPECIMEN DESCRIPTION: NORMAL
SPECIMEN DESCRIPTION: NORMAL
SPECIMEN EXPIRATION DATE: NORMAL
SPECIMEN EXPIRATION DATE: NORMAL
TEST DETAILS, MDL: NORMAL
URATE SERPL-MCNC: 5.2 MG/DL (ref 3.4–7)
WBC # BLD AUTO: 2.2 10E3/UL (ref 4–11)
WBC # BLD AUTO: 3.4 10E3/UL (ref 4–11)

## 2023-09-01 PROCEDURE — 86850 RBC ANTIBODY SCREEN: CPT | Performed by: PHYSICIAN ASSISTANT

## 2023-09-01 PROCEDURE — 86665 EPSTEIN-BARR CAPSID VCA: CPT | Performed by: PHYSICIAN ASSISTANT

## 2023-09-01 PROCEDURE — 83615 LACTATE (LD) (LDH) ENZYME: CPT | Performed by: PHYSICIAN ASSISTANT

## 2023-09-01 PROCEDURE — 85060 BLOOD SMEAR INTERPRETATION: CPT | Mod: GC | Performed by: STUDENT IN AN ORGANIZED HEALTH CARE EDUCATION/TRAINING PROGRAM

## 2023-09-01 PROCEDURE — G0452 MOLECULAR PATHOLOGY INTERPR: HCPCS | Mod: 26 | Performed by: STUDENT IN AN ORGANIZED HEALTH CARE EDUCATION/TRAINING PROGRAM

## 2023-09-01 PROCEDURE — 84550 ASSAY OF BLOOD/URIC ACID: CPT | Performed by: PHYSICIAN ASSISTANT

## 2023-09-01 PROCEDURE — 85027 COMPLETE CBC AUTOMATED: CPT | Performed by: INTERNAL MEDICINE

## 2023-09-01 PROCEDURE — 88313 SPECIAL STAINS GROUP 2: CPT | Mod: 26 | Performed by: STUDENT IN AN ORGANIZED HEALTH CARE EDUCATION/TRAINING PROGRAM

## 2023-09-01 PROCEDURE — 88271 CYTOGENETICS DNA PROBE: CPT | Performed by: PHYSICIAN ASSISTANT

## 2023-09-01 PROCEDURE — 85060 BLOOD SMEAR INTERPRETATION: CPT | Performed by: PATHOLOGY

## 2023-09-01 PROCEDURE — 86901 BLOOD TYPING SEROLOGIC RH(D): CPT | Performed by: PHYSICIAN ASSISTANT

## 2023-09-01 PROCEDURE — 88184 FLOWCYTOMETRY/ TC 1 MARKER: CPT | Performed by: STUDENT IN AN ORGANIZED HEALTH CARE EDUCATION/TRAINING PROGRAM

## 2023-09-01 PROCEDURE — 81382 HLA II TYPING 1 LOC HR: CPT | Performed by: PHYSICIAN ASSISTANT

## 2023-09-01 PROCEDURE — 88184 FLOWCYTOMETRY/ TC 1 MARKER: CPT | Performed by: PHYSICIAN ASSISTANT

## 2023-09-01 PROCEDURE — 85610 PROTHROMBIN TIME: CPT | Performed by: PHYSICIAN ASSISTANT

## 2023-09-01 PROCEDURE — 120N000002 HC R&B MED SURG/OB UMMC

## 2023-09-01 PROCEDURE — 84100 ASSAY OF PHOSPHORUS: CPT | Performed by: PHYSICIAN ASSISTANT

## 2023-09-01 PROCEDURE — 88342 IMHCHEM/IMCYTCHM 1ST ANTB: CPT | Mod: 26 | Performed by: STUDENT IN AN ORGANIZED HEALTH CARE EDUCATION/TRAINING PROGRAM

## 2023-09-01 PROCEDURE — 86644 CMV ANTIBODY: CPT | Performed by: PHYSICIAN ASSISTANT

## 2023-09-01 PROCEDURE — 258N000003 HC RX IP 258 OP 636: Performed by: PHYSICIAN ASSISTANT

## 2023-09-01 PROCEDURE — 87340 HEPATITIS B SURFACE AG IA: CPT | Performed by: PHYSICIAN ASSISTANT

## 2023-09-01 PROCEDURE — 250N000013 HC RX MED GY IP 250 OP 250 PS 637: Performed by: PHYSICIAN ASSISTANT

## 2023-09-01 PROCEDURE — 38222 DX BONE MARROW BX & ASPIR: CPT | Performed by: PHYSICIAN ASSISTANT

## 2023-09-01 PROCEDURE — 85097 BONE MARROW INTERPRETATION: CPT | Mod: GC | Performed by: STUDENT IN AN ORGANIZED HEALTH CARE EDUCATION/TRAINING PROGRAM

## 2023-09-01 PROCEDURE — 88368 INSITU HYBRIDIZATION MANUAL: CPT | Mod: 26 | Performed by: MEDICAL GENETICS

## 2023-09-01 PROCEDURE — 85730 THROMBOPLASTIN TIME PARTIAL: CPT | Performed by: INTERNAL MEDICINE

## 2023-09-01 PROCEDURE — 88291 CYTO/MOLECULAR REPORT: CPT | Performed by: MEDICAL GENETICS

## 2023-09-01 PROCEDURE — 85007 BL SMEAR W/DIFF WBC COUNT: CPT | Performed by: PHYSICIAN ASSISTANT

## 2023-09-01 PROCEDURE — 82310 ASSAY OF CALCIUM: CPT | Performed by: INTERNAL MEDICINE

## 2023-09-01 PROCEDURE — 86920 COMPATIBILITY TEST SPIN: CPT

## 2023-09-01 PROCEDURE — 88319 ENZYME HISTOCHEMISTRY: CPT | Mod: 26 | Performed by: STUDENT IN AN ORGANIZED HEALTH CARE EDUCATION/TRAINING PROGRAM

## 2023-09-01 PROCEDURE — 88311 DECALCIFY TISSUE: CPT | Mod: 26 | Performed by: STUDENT IN AN ORGANIZED HEALTH CARE EDUCATION/TRAINING PROGRAM

## 2023-09-01 PROCEDURE — 07DR3ZX EXTRACTION OF ILIAC BONE MARROW, PERCUTANEOUS APPROACH, DIAGNOSTIC: ICD-10-PCS | Performed by: PHYSICIAN ASSISTANT

## 2023-09-01 PROCEDURE — 250N000011 HC RX IP 250 OP 636: Mod: JZ | Performed by: PHYSICIAN ASSISTANT

## 2023-09-01 PROCEDURE — 86696 HERPES SIMPLEX TYPE 2 TEST: CPT | Performed by: PHYSICIAN ASSISTANT

## 2023-09-01 PROCEDURE — 88369 M/PHMTRC ALYSISHQUANT/SEMIQ: CPT | Mod: 26 | Performed by: MEDICAL GENETICS

## 2023-09-01 PROCEDURE — 81450 HL NEO GSAP 5-50DNA/DNA&RNA: CPT | Performed by: PHYSICIAN ASSISTANT

## 2023-09-01 PROCEDURE — 88313 SPECIAL STAINS GROUP 2: CPT | Mod: TC | Performed by: PHYSICIAN ASSISTANT

## 2023-09-01 PROCEDURE — 99223 1ST HOSP IP/OBS HIGH 75: CPT | Mod: 25 | Performed by: PHYSICIAN ASSISTANT

## 2023-09-01 PROCEDURE — 85384 FIBRINOGEN ACTIVITY: CPT | Performed by: PHYSICIAN ASSISTANT

## 2023-09-01 PROCEDURE — 88264 CHROMOSOME ANALYSIS 20-25: CPT | Performed by: PHYSICIAN ASSISTANT

## 2023-09-01 PROCEDURE — 36415 COLL VENOUS BLD VENIPUNCTURE: CPT | Performed by: PHYSICIAN ASSISTANT

## 2023-09-01 PROCEDURE — G0452 MOLECULAR PATHOLOGY INTERPR: HCPCS | Mod: 26 | Performed by: PATHOLOGY

## 2023-09-01 PROCEDURE — 88305 TISSUE EXAM BY PATHOLOGIST: CPT | Mod: 26 | Performed by: STUDENT IN AN ORGANIZED HEALTH CARE EDUCATION/TRAINING PROGRAM

## 2023-09-01 PROCEDURE — 85610 PROTHROMBIN TIME: CPT | Performed by: INTERNAL MEDICINE

## 2023-09-01 PROCEDURE — 85027 COMPLETE CBC AUTOMATED: CPT | Performed by: PHYSICIAN ASSISTANT

## 2023-09-01 PROCEDURE — 88341 IMHCHEM/IMCYTCHM EA ADD ANTB: CPT | Mod: 26 | Performed by: STUDENT IN AN ORGANIZED HEALTH CARE EDUCATION/TRAINING PROGRAM

## 2023-09-01 PROCEDURE — 36415 COLL VENOUS BLD VENIPUNCTURE: CPT | Performed by: INTERNAL MEDICINE

## 2023-09-01 PROCEDURE — 83735 ASSAY OF MAGNESIUM: CPT | Performed by: PHYSICIAN ASSISTANT

## 2023-09-01 PROCEDURE — 88319 ENZYME HISTOCHEMISTRY: CPT | Mod: TC | Performed by: PHYSICIAN ASSISTANT

## 2023-09-01 PROCEDURE — 88237 TISSUE CULTURE BONE MARROW: CPT | Performed by: PHYSICIAN ASSISTANT

## 2023-09-01 PROCEDURE — 88185 FLOWCYTOMETRY/TC ADD-ON: CPT | Performed by: PHYSICIAN ASSISTANT

## 2023-09-01 PROCEDURE — 88189 FLOWCYTOMETRY/READ 16 & >: CPT | Performed by: STUDENT IN AN ORGANIZED HEALTH CARE EDUCATION/TRAINING PROGRAM

## 2023-09-01 PROCEDURE — 258N000003 HC RX IP 258 OP 636: Performed by: INTERNAL MEDICINE

## 2023-09-01 RX ORDER — ACETAMINOPHEN 325 MG/1
650 TABLET ORAL EVERY 4 HOURS PRN
Status: DISCONTINUED | OUTPATIENT
Start: 2023-09-01 | End: 2023-10-03 | Stop reason: HOSPADM

## 2023-09-01 RX ORDER — LANOLIN ALCOHOL/MO/W.PET/CERES
3-6 CREAM (GRAM) TOPICAL
Status: DISCONTINUED | OUTPATIENT
Start: 2023-09-01 | End: 2023-10-03 | Stop reason: HOSPADM

## 2023-09-01 RX ORDER — LEVOFLOXACIN 250 MG/1
250 TABLET, FILM COATED ORAL DAILY
Status: DISCONTINUED | OUTPATIENT
Start: 2023-09-01 | End: 2023-10-03 | Stop reason: HOSPADM

## 2023-09-01 RX ORDER — ALLOPURINOL 300 MG/1
300 TABLET ORAL DAILY
Status: DISCONTINUED | OUTPATIENT
Start: 2023-09-01 | End: 2023-09-10

## 2023-09-01 RX ORDER — AMOXICILLIN 250 MG
1 CAPSULE ORAL 2 TIMES DAILY PRN
Status: DISCONTINUED | OUTPATIENT
Start: 2023-09-01 | End: 2023-10-03 | Stop reason: HOSPADM

## 2023-09-01 RX ORDER — CALCIUM CARBONATE 500 MG/1
500-1000 TABLET, CHEWABLE ORAL 3 TIMES DAILY PRN
Status: DISCONTINUED | OUTPATIENT
Start: 2023-09-01 | End: 2023-10-03 | Stop reason: HOSPADM

## 2023-09-01 RX ORDER — ONDANSETRON 4 MG/1
4 TABLET, FILM COATED ORAL EVERY 8 HOURS PRN
Status: DISCONTINUED | OUTPATIENT
Start: 2023-09-01 | End: 2023-10-03 | Stop reason: HOSPADM

## 2023-09-01 RX ORDER — PROCHLORPERAZINE MALEATE 5 MG
5 TABLET ORAL EVERY 6 HOURS PRN
Status: DISCONTINUED | OUTPATIENT
Start: 2023-09-01 | End: 2023-10-03 | Stop reason: HOSPADM

## 2023-09-01 RX ORDER — AMOXICILLIN 250 MG
2 CAPSULE ORAL 2 TIMES DAILY PRN
Status: DISCONTINUED | OUTPATIENT
Start: 2023-09-01 | End: 2023-10-03 | Stop reason: HOSPADM

## 2023-09-01 RX ORDER — ACYCLOVIR 400 MG/1
400 TABLET ORAL 2 TIMES DAILY
Status: DISCONTINUED | OUTPATIENT
Start: 2023-09-01 | End: 2023-10-03 | Stop reason: HOSPADM

## 2023-09-01 RX ORDER — POLYETHYLENE GLYCOL 3350 17 G/17G
17 POWDER, FOR SOLUTION ORAL DAILY PRN
Status: DISCONTINUED | OUTPATIENT
Start: 2023-09-01 | End: 2023-10-03 | Stop reason: HOSPADM

## 2023-09-01 RX ORDER — ONDANSETRON 4 MG/1
4 TABLET, ORALLY DISINTEGRATING ORAL EVERY 8 HOURS PRN
Status: DISCONTINUED | OUTPATIENT
Start: 2023-09-01 | End: 2023-10-03 | Stop reason: HOSPADM

## 2023-09-01 RX ORDER — ONDANSETRON 2 MG/ML
4 INJECTION INTRAMUSCULAR; INTRAVENOUS EVERY 8 HOURS PRN
Status: DISCONTINUED | OUTPATIENT
Start: 2023-09-01 | End: 2023-10-03 | Stop reason: HOSPADM

## 2023-09-01 RX ADMIN — LEVOFLOXACIN 250 MG: 250 TABLET, FILM COATED ORAL at 17:11

## 2023-09-01 RX ADMIN — ALLOPURINOL 300 MG: 300 TABLET ORAL at 17:17

## 2023-09-01 RX ADMIN — MICAFUNGIN SODIUM 50 MG: 50 INJECTION, POWDER, LYOPHILIZED, FOR SOLUTION INTRAVENOUS at 20:25

## 2023-09-01 RX ADMIN — ACYCLOVIR 400 MG: 400 TABLET ORAL at 20:26

## 2023-09-01 RX ADMIN — MIDAZOLAM HYDROCHLORIDE 2 MG: 1 INJECTION, SOLUTION INTRAMUSCULAR; INTRAVENOUS at 15:48

## 2023-09-01 RX ADMIN — SODIUM CHLORIDE: 9 INJECTION, SOLUTION INTRAVENOUS at 07:39

## 2023-09-01 ASSESSMENT — ACTIVITIES OF DAILY LIVING (ADL)
ADLS_ACUITY_SCORE: 22
ADLS_ACUITY_SCORE: 35
ADLS_ACUITY_SCORE: 22

## 2023-09-01 NOTE — PHARMACY-ADMISSION MEDICATION HISTORY
Pharmacist Admission Medication History    Admission medication history is complete. The information provided in this note is only as accurate as the sources available at the time of the update.    Medication reconciliation/reorder completed by provider prior to medication history? No    Information Source(s): Patient transferred from [facility]. Please see Medication History completed by Pharmacy at [facility] on 8/31 prior to transfer.     Changes made to PTA medication list:  Added: None  Deleted: None  Changed: None    Prior to Admission medications    Not on File     Medication History Completed By: aMtthew Serrano RPH 9/1/2023 2:23 PM

## 2023-09-01 NOTE — PLAN OF CARE
Orientation: A&Ox4, very pleasant  Activity: SBA  Diet/BS Checks: Regular - NPO ex meds and ice chips  Tele: N/A  IV Access/Drains: PIV infusing NS @ 100m/hr  Pain Management: c/o of mild headache, PRN Tylenol requested and administered x1  Abnormal VS/Results: VSS on RA, no fevers this shift. Plt 16, ANC 0.2 hgb 7.7, blood cultures pending  Bowel/Bladder: Continent of B/B, Pt reports urinary frequency. Bladder scan  cc this shift, pt voiding adequately  Skin/Wounds: Scattered bruising and petechiae on back, shoulder, BLE.  Consults: Hem/onc following  D/C Disposition: possible transfer to the [facility] TODAY if beds available  Other Info: Received 1 unit of platelets prior to transfer from [place]. Blood consent form completed with MD. Pt's ABO unknown, type and screen results pending from [facility] blood centers. Blast seen on smear, likely leukemia or high risk MDS per MD note, oncology recommended no further acute treatment overnight, plan to transfer for [facility] for acute treatment. NPO status initiated at midnight, Plan for bone marrow biopsy today.

## 2023-09-01 NOTE — CONSULTS
SPIRITUAL HEALTH SERVICES Consult Note   Advance Care Planning  [facility]. [address] oncology    Responded to a consult order for Advance Care Planning (ACP) education for Artie Fine.      Artie clarified his interest is more about planning a will; declined ACP discussion or paperwork. RN arrived to discuss transfer to [facility].   No other needs indicated at this time.    Plan: This author and other chaplains remain available per pt's request.    Izabela Hall M.Div.  Staff     [facility] routine referrals *[phone]  [facility] available 24/7 for emergent requests/referrals, either by paging the on-call  or by entering an ASAP/STAT consult in Epic (this will also page the on-call ).

## 2023-09-01 NOTE — PROGRESS NOTES
Prior Authorization Approval    Medication: POSACONAZOLE 100 MG PO TBEC  PA Initiated: 9/1/2023  PA Type: Clinical    Insurance: [employer] Non-Specialty PA's - Phone [phone] Fax [phone]  [facility] / Reference #: [id number] / [id number]   Authorization Effective Dates: 8/2/2023 - 8/31/2024    Expected CoPay: 8416.74     CoPay Card Eligible: No      Filling Pharmacy: [facility] [place], [place] - [address] [place] [address]  Pharmacy Notified: Yes  Patient Notified: Yes      Tawana Jansen  [facility] Liaison  Ph: [phone] Pager: [phone]   Securely message with the Vocera Web Console (learn more here)

## 2023-09-01 NOTE — DISCHARGE SUMMARY
"[facility]    Discharge Summary  Hospitalist    Date of Admission:  8/31/2023  Date of Discharge:  9/1/23 transfer to [facility]  Discharging Provider: Fermin Melissa MD, MD    Discharge Diagnoses   Pancytopenia with profound thrombocytopenia.   Associated fatigue, subjective fevers and headache.   Mildly Elevated INR, PTT   Blasts seen on smear, likely leukemia or high risk MDS      History of Present Illness     Patient states that around June he started noting spots on his legs.  In the about 2 weeks ago he had had multiple red spots on his scrotum and noted blood in his in his underwear when he woke up.  He is also noted more fatigue.  He states that they have 5 gardens at home and when he walks with the holds across the garden he can usually do this without any difficulty.  However over the last 2 weeks has been very difficult for him just due to profound fatigue and some shortness of breath.  He is also noted bruising on his shoulder where the hose was.  He has a chronic cough denies any change in this is nonproductive no recent blood.  He has noted some blood blisters in his mouth which have blood in the morning.  This is occurred over the last 2 weeks.  He had no black or bloody stools.  He has not noted any lymphadenopathy.  He has felt feverish in the afternoons he states he has a mild headache when he wakes up and this progresses throughout the day until he feels more feverish and his wife has noted that he is \"burning up\".  No documented fever.  He describes his headache is similar to the one that he had with brain abscess its in the back of his head and goes down his neck.  It is mild in the morning and then progresses throughout the day.            Outside labs reviewed:  WBC 2.7 (ANC 0.4), Hgb 8.1 (MCV 92.5), PLT 1.   Lyme serology pending  , LY LELE normal, CR 0.97.  Liver panel unremarkable.  Protein and albumin normal globulin mildly elevated at 3.5.  INR " 1.3, prothrombin 14.1 (both mildly elevated  APTT normal  CRP 1.46  Pro-Armand normal at 0.1     Hospital Course   Artie Fine was admitted on 8/31/2023.  The following problems were addressed during his hospitalization:    Principal Problem:    Pancytopenia (H)  Date of Admission:  8/31/2023        Assessment & Plan  Artie Fine is a 68 year old male admitted on 8/31/2023. He was transferred here from [facility] in [facility] for Oncology evaluation of pancytopenia.  History of brain abscess about 2 years ago, prior diagnosis of HTN and BPH, for which she is no longer taking medications.     He presented as he has he has noted petechiae and easy bruisability starting in June with episodes of mucosal bleeding in the mouth.  He is also felt headache very similar to his previous brain abscess that is generalized and going down the back of his neck.  He is also noted he feels feverish in the afternoon without documented fevers.     Head CT done in outside hospital revealed no intracranial hemorrhage midline shift or mass effect.  Postsurgical changes noted.  Previous area of infection demonstrates trace amount of encephalomalacia malacia but no mass effect or inflammatory changes to suggest new or acute infection.  Otherwise unremarkable.     Outside labs reviewed:  WBC 2.7 (ANC 0.4), Hgb 8.1 (MCV 92.5), PLT 1.   Lyme serology pending  , LY LELE normal, CR 0.97.  Liver panel unremarkable.  Protein and albumin normal globulin mildly elevated at 3.5.  INR 1.3, prothrombin 14.1 (both mildly elevated  APTT normal  CRP 1.46  Pro-Armand normal at 0.1     Other than headache, no history suggest suggest localized acute infection. No fever documented. On no new medications only takes Tylenol.  Denies any significant toxin exposure.  Only occasionally sprays trees and otherwise organic .      Pancytopenia with profound thrombocytopenia.   Associated fatigue, subjective fevers and headache.   Mildly Elevated INR,  PTT     S/p platelets in the ED  Discussed with Dr. Urena suspects for ITP given how profoundly low his platelet count is.  Per Dr. Urena if platelets are not responding to transfusion would not continue to transfuse as this is likely ITP and transfuse platelets will just be destroyed by auto-antibodies. Therefore further transfusions per hematology or if actively bleeding  IV Solu-Medrol ordered as empiric therapy for ITP  Peripheral smear, retic count, erythropoietin, flow cytometry, SPEP, UPEP with kappa and lambda light chains, iron panel, ferritin, B12 folate  Blood cultures x2- ngtd  HIV HBV HCV serology sent and pending  RF and KIM  Bone marrow ordered for tomorrow.      -Blasts seen on smear, likely leukemia or high risk MDS   Dr. Urena of oncology following. Spoke with [facility] oncology service and transfer to [facility] for acute treatment arranged for 9/1. No beds on night of admission.   overnight start fluids, and blood transfusion consent signed but otherwise Dr. Urena recommended no further acute treatment.   Pt stated feeling much better after IV steroids on night of admisison   pt expressed understanding diagnosis of possible leukemia,    -NS overnight.   -wbc 2.2 this am. Hb stable 7 range. Plat ct 16---> 10 following plat transfussion on admission. No bleeding. INR 1.1  -feels well this am. No complaints. Oral blood blister seems resolved.   -nonfocal exam.       Plan;   transfer to the [facility] for treatment. Pt agrees. Oncology here spoke with [facility] oncology per charge RN  -bone marrow bx at noon at [facility] hospital     Headaches, subjective fevers at home  H/o brain abscess in 2021 (hospitalized in [place], attributed to occult oral infection)  Neutropenic  * Head CT unremarkable.  Pro-Armand normal CRP mildly elevated  -nonfocal neuro exam.   -bld cx ngtd     Monitor headache.  If not resolving would get MRI prior to discharge.  Monitor for fevers, signs of infection.    Not a candidate for lumbar puncture  "at this point given low platelets  Blood cultures x2 drawn above as well as hepatitis and HIV serology and RF, KIM.      Hypertension noted during prior hospitalization for brain abscess in 2021.  Previously on lisinopril.  No longer taking  Noted  Sbp wnl overnight.      BPH notes difficulty with complete emptying of bladder.  Previously on Flomax but states it did nothing.  He therefore stopped it.  He does note frequent urination and difficulty emptying his bladder  Postvoid residuals ordere--> have been nl. Good UO at time of transfer  Notify MD if over 300 and start straight caths           Diet:  Regular  DVT Prophylaxis: ambulate  Huizar Catheter: Not present  Lines: None     Cardiac Monitoring: None  Code Status:   Full code.        Clinically Significant Risk Factors Present on Admission []Expand by Default                       # Overweight: Estimated body mass index is 28.12 kg/m  as calculated from the following:    Height as of this encounter: 1.753 m (5' 9\").    Weight as of this encounter: 86.4 kg (190 lb 6.4 oz).                  Disposition Plan-transfer to [facility] in patient oncology service    Fermin Melissa MD, MD    Significant Results and Procedures   See hospital course    Pending Results   These results will be followed up by oncology  Unresulted Labs Ordered in the Past 30 Days of this Admission       Date and Time Order Name Status Description    9/1/2023 12:03 AM Partial thromboplastin time In process     9/1/2023 12:03 AM INR In process     8/31/2023  8:21 PM Other Laboratory; [facility] Blood [facility]; Reference Lab Workup (Laboratory Miscellaneous Order) In process     8/31/2023  5:15 PM Manual Differential In process     8/31/2023  4:02 PM Protein Electrophoresis, Serum In process     8/31/2023  4:02 PM Bld morphology pathology review In process     8/31/2023  3:55 PM Protein Immunofixation Serum In process     8/31/2023  3:55 PM Erythropoietin In process     8/31/2023  3:55 PM " Flow Cytometry Blood In process     8/31/2023  3:55 PM Anti Nuclear Arlen IgG by IFA with Reflex In process     8/31/2023  3:55 PM HIV Antigen Antibody Combo Cascade In process     8/31/2023  3:55 PM Hepatitis B core antibody In process     8/31/2023  3:55 PM Hepatitis C antibody In process     8/31/2023  3:55 PM Hepatitis B Surface Antibody In process     8/31/2023  2:51 PM Blood Culture Hand, Right Preliminary     8/31/2023  2:51 PM Blood Culture Hand, Left Preliminary             Code Status   Full Code       Primary Care Physician   No primary care provider on file.    Physical Exam   Temp: 97.4  F (36.3  C) Temp src: Oral BP: 136/79 Pulse: 89   Resp: 16 SpO2: 96 % O2 Device: None (Room air)    Vitals:    08/31/23 1400 09/01/23 0635   Weight: 86.4 kg (190 lb 6.4 oz) 86.2 kg (190 lb 1.6 oz)     Vital Signs with Ranges  Temp:  [97.4  F (36.3  C)-98.8  F (37.1  C)] 97.4  F (36.3  C)  Pulse:  [80-99] 89  Resp:  [16-18] 16  BP: (123-140)/(74-84) 136/79  SpO2:  [95 %-99 %] 96 %  I/O last 3 completed shifts:  In: 600 [P.O.:600]  Out: 1450 [Urine:1450]    Constitutional: nad, up in bed, nontoxic appearing   Eyes: nl sclera  ENT: nl op  Respiratory: CTAB  Cardiovascular: RRR no r/g/m  GI: soft, nt, nd  Lymph/Hematologic: nl cervical lad  Skin: petechiae BLE, no edema  No clear  blood blister in buccal mucosa on right noted on admission  Musculoskeletal: no focal jt swelling or redness  Neurologic: nl speech and mentation, alert fully oriented, grossly nonfocal  Neuropsychiatric: nl affect    Discharge Disposition   Transferred to [facility] oncology unit  Condition at discharge: Guarded    Consultations This Hospital Stay   ADVANCE DIRECTIVE IP CONSULT  HEMATOLOGY & ONCOLOGY IP CONSULT    Time Spent on this Encounter   I, Fermin Melissa MD, personally saw the patient today and spent greater than 30 minutes discharging this patient.    Discharge Orders   - no discharge meds at time of transfer , holding PTA prn  tylenol and benadryl  - transfer to [facility] Oncology unit  - full code  -     Discharge Medications       Allergies   Allergies   Allergen Reactions    Iodinated Contrast Media Rash    Ragweeds Other (See Comments)     Congestion      Data   Most Recent 3 CBC's:  Recent Labs   Lab Test 09/01/23  0803 08/31/23  1603   WBC 2.2* 2.9*   HGB 7.8* 7.7*   MCV 93 94   PLT 10* 16*      Most Recent 3 BMP's:  Recent Labs   Lab Test 09/01/23  0803      POTASSIUM 3.9   CHLORIDE 105   CO2 18*   BUN 13.4   CR 0.73   ANIONGAP 14   VENANCIO 8.9   *     Most Recent 2 LFT's:No lab results found.  Most Recent INR's and Anticoagulation Dosing History:  Anticoagulation Dose History           No data to display              Most Recent 3 Troponin's:No lab results found.  Most Recent Cholesterol Panel:No lab results found.  Most Recent 6 Bacteria Isolates From Any Culture (See EPIC Reports for Culture Details):No lab results found.  Most Recent TSH, T4 and A1c Labs:No lab results found.  No results found for this or any previous visit.

## 2023-09-01 NOTE — PROGRESS NOTES
Prior Authorization Approval    Medication: VORICONAZOLE 200 MG PO TABS  PA Initiated: 9/1/2023  PA Type: Clinical    Insurance: [employer] Non-Specialty PA's - Phone [phone] Fax [phone]  [facility] Key / Reference #: [id number] / [id number]   Authorization Effective Dates: 8/2/2023 - 8/31/2024    Expected CoPay: 447.29     CoPay Card Eligible: No      Filling Pharmacy: [facility] [address]  Pharmacy Notified: Yes  Patient Notified: Yes      Tawana Jansen  [facility] Pharmacy Liaison  Ph: [phone] Pager: [phone]   Securely message with the Vocera Web Console (learn more here)

## 2023-09-01 NOTE — PROCEDURES
Bone Marrow Biopsy Procedure Note  Patient's Name: Artie Fine  Date of Procedure: 9/1/23     PROCEDURE:  Unilateral bone marrow biopsy and unilateral aspirate      INDICATION:  Concern for acute leukemia       PERFORMED BY:  Yolis Sherman PA-C     CONSENT:  Informed consent was obtained from the patient. The risks and benefits of the procedure were explained. The patient agreed to undergo the procedure. The consent form was signed and placed in the paper chart.      PREMEDICATION:  Versed 2 mg IV     PROCEDURE SUMMARY:  The patient's identification was positively verified by ID band. Patient was laid in the prone position. Premedication with Versed 2 mg IV. The left posterior iliac crest (LPIC) was prepped and draped in a sterile manner. The skin, deeper tissues, and periosteum of the LPIC were anesthetized with approximately 10 mL 1% lidocaine. Following this, a 3mm incision was made. The trephine needle was advanced into the LPIC bone cavity and a 20 mm core biopsy was obtained. Next, bone marrow aspirates were obtained from the LPIC; approximately 25 ml of marrow were aspirated. Following the procedure, a sterile pressure dressing was applied to the bone marrow biopsy site.      COMPLICATIONS:  None. The patient tolerated the procedure very well with minimal discomfort.      RECOMMENDATIONS:  The patient was placed in the supine position to maintain pressure on the biopsy site.   The patient was instructed to lie flat for 45-60 minutes and not to remove the dressing or get it wet for 24 hours post-procedure.      TESTS ORDERED:  Morphology  Flow cytometry  Chromosomes  FISH   Molecular ([facility])    Yolis Sherman PA-C  Hematology/Oncology  Pager: [phone]

## 2023-09-01 NOTE — PLAN OF CARE
Goal Outcome Evaluation:         Orientation: A&Ox4  Activity: Independent, SBA due to IV pole  Diet/BS Checks: NPO for bone marrow biopsy  Tele: N/A  IV Access/Drains: R PIV SL, discharging to [facility] with PIV in place  Pain Management: Denies  Abnormal VS/Results: Plts 10, INR 1.17  Bowel/Bladder: Continent of B/B  Skin/Wounds: Scattered petechiae  Consults: Hem/Onc  D/C Disposition: Pt discharging to [facility] by transport service  Other Info: Called report to staff at [facility], transport to be here between [phone]

## 2023-09-01 NOTE — H&P
[facility]    History and Physical  Hematology / Oncology     Date of Admission: 9/1/23  Date of Service (when I saw the patient): 9/1/23    Assessment & Plan   Artie Fine is a 68 year old male with a past medical history of brain abscess (2021), HTN, and BPH. He was transferred from [facility] in [place], [place] to [facility] for work up of pancytopenia. At [facility], was noted to have circulating blasts concerning for acute leukemia. He was transferred to [facility] for work up of acute leukemia.     HEME/ONC  # Pancytopenia with circulating blasts concerning for acute leukemia  Patient initially presented to [facility] in [place], [place] as he noted progressive symptoms of easy bruising/bleeding, weakness, fatigue, loss of appetite, and night sweats. He also noted headache similar to his previous brain abscess. CBC noted WBC 2.7 (), Hgb 8.1, and plt 1k. He was transferred to [facility] for work up of pancytopenia with profound thrombocytopenia. He was seen by Dr. Urena at [facility], who initiated work up, during which he was found to have 11% circulating myeloid blasts. He was transferred to [facility] for work up of acute leukemia.   - Transfuse if Hgb <7 and plt <10k  - Viral serologies: HepB/C-, HIV-. HSV, EBV, CMV ordered.   - HLA typing has not been seen, await diagnosis  - At [facility], received IV SoluMedrol for treatment of ITP, now discontinued  - Hold on PICC placement awaiting chemo plan.  - Echo ordered   - Peripheral flow pending  - BMBx performed 9/1. Morph, flow, cytogenetics, molecular pending.     # Risk for TLS/DIC  - Monitor TLS/DIC labs daily  - Allopurinol 300 mg daily  - For TLS:   - If e/o TLS, bolus + start mIVF   - If uric acid >8 despite IVF, give rasburicase 6 mg x1   - If phos persistently >5, start PhosLo TID   - For DIC: Transfuse cryo if fibrinogen <100 and FFP if INR >1.8    ID  # PPX  -  mg BID  - Levofloxacin 250 mg  "daily  - Micafungin 50 mg IV daily     # H/o brain abscess (2021)  # Headache  Treated in [place]. S/p craniotomy for drainage of abscess July 2021. It is unclear what culture speciated to, but he completed a course of antibiotics with ceftriaxone then meropenem with resolution on imaging. Head CT 8/31 at OSH revealed no intracranial hemorrhage midline shift or mass effect. Postsurgical changes noted. Previous area of infection demonstrates trace amount of encephalomalacia malacia but no mass effect or inflammatory changes to suggest new or acute infection. Otherwise unremarkable.  - Tylenol PRN for headache  - Pending course, consider LP for work up     CHRONIC  # Hypertension   Noted during prior hospitalization for brain abscess in 2021. Previously on lisinopril, which he is no longer taking.     # BPH  Notes difficulty with complete emptying of bladder. Previously on Flomax but states it did nothing, so he stopped it. He does note frequent urination at baseline, unchanged from baseline.  - Monitor clinically    MISC  # Weakness  2/2 new diagnosis of malignancy. Overall mild, still independent.   - Plan for PT/OT consults this admission    # Loss of appetite  - RD consult to evaluate for malnutrition    Clinically Significant Risk Factors Present on Admission               # Coagulation Defect: INR = 1.17 (Ref range: 0.85 - 1.15) and/or PTT = 26 Seconds (Ref range: 22 - 38 Seconds), will monitor for bleeding  # Thrombocytopenia: Lowest platelets = 10 in last 2 days, will monitor for bleeding        # Overweight: Estimated body mass index is 28.07 kg/m  as calculated from the following:    Height as of 8/31/23: 1.753 m (5' 9\").    Weight as of an earlier encounter on 9/1/23: 86.2 kg (190 lb 1.6 oz).            FEN  Diet: Regular Diet Adult   IVF: Bolus PRN   Lytes: Replete per protocol    PPX  VTE: None given thrombocytopenia  PUD: Not currently indicated  Bowels: Senna/MiraLax PRN    MISC  Code Status: Full " Code   Lines/Drains: PIV  Dispo: Timing TBD pending results of work up    Patient was staffed with attending physician, Dr. Byrd.    I spent 90 minutes in the care of this patient today, which included time necessary for review of interval events, obtaining history and physical exam, ordering medications/tests/procedures as medically indicated, review of pertinent medical literature, counseling of the patient, coordination of care, and documentation time. Over 50% of time was spent counseling the patient and/or coordinating care.    Yolis Sherman PA-C  Hematology/Oncology   Pager: [phone]    Code Status   Full Code    History of Present Illness   Artie Fine is a 68 year old male with a past medical history of brain abscess (2021), HTN, and BPH. He was transferred from [facility] in [place], [place] to [facility] for work up of pancytopenia. At [facility], was noted to have circulating blasts concerning for acute leukemia. He was transferred to [facility] for work up of acute leukemia.      Artie reports a 1-2 month progressive history of easy bruising/bleeding, petechiae, weakness/fatigue, loss of appetite, and night sweats. At one point he woke up in the middle of the night and was bleeding from scratching his scrotum. He has also had a headache that was reminiscent of when he had the brain abscesses.     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 [place]. They have three kids - a son and twin daughters. He expresses a strong Ep[employer] tatiana.     Past Medical History    I have reviewed this patient's medical history and updated it with pertinent information if  needed.   No past medical history on file.    Past Surgical History   I have reviewed this patient's surgical history and updated it with pertinent information if needed.  No past surgical history on file.    Prior to Admission Medications   Cannot display prior to admission medications because the patient has not been admitted in this contact.     Allergies   Allergies   Allergen Reactions    Iodinated Contrast Media Rash    Ragweeds Other (See Comments)     Congestion      Social History   I have reviewed this patient's social history and updated it with pertinent information if needed. Artie Fine      Family History   I have reviewed this patient's family history and updated it with pertinent information if needed.   No family history on file.    Review of Systems   See above    Vital Signs with Ranges  Temp:  [97.4  F (36.3  C)-98.8  F (37.1  C)] 97.4  F (36.3  C)  Pulse:  [80-99] 89  Resp:  [16-18] 16  BP: (123-140)/(74-84) 136/79  SpO2:  [95 %-99 %] 96 %  0 lbs 0 oz    General: Sitting up in bed, alert, NAD. Pleasant and conversational.  Skin: No concerning lesions, rash, jaundice, cyanosis, erythema, or ecchymoses on exposed surfaces.   HEENT: NCAT. Anicteric sclera. MMM.   Respiratory: Non-labored breathing, good air exchange, lungs clear to auscultation bilaterally.  Cardiovascular: RRR. No murmur or rub.   Gastrointestinal: Normoactive BS. Abdomen soft, ND, NT. No palpable masses.  Extremities: No LE edema.   Neurologic: A&O x 3, speech normal, no deficits grossly.    Data   Results for orders placed or performed during the hospital encounter of 08/31/23 (from the past 24 hour(s))   CBC with platelets   Result Value Ref Range    WBC Count 2.2 (L) 4.0 - 11.0 10e3/uL    RBC Count 2.90 (L) 4.40 - 5.90 10e6/uL    Hemoglobin 7.8 (L) 13.3 - 17.7 g/dL    Hematocrit 27.0 (L) 40.0 - 53.0 %    MCV 93 78 - 100 fL    MCH 26.9 26.5 - 33.0 pg    MCHC 28.9 (L) 31.5 - 36.5 g/dL    RDW 14.8 10.0 - 15.0 %    Platelet  Count 10 (LL) 150 - 450 10e3/uL   Basic metabolic panel   Result Value Ref Range    Sodium 137 136 - 145 mmol/L    Potassium 3.9 3.4 - 5.3 mmol/L    Chloride 105 98 - 107 mmol/L    Carbon Dioxide (CO2) 18 (L) 22 - 29 mmol/L    Anion Gap 14 7 - 15 mmol/L    Urea Nitrogen 13.4 8.0 - 23.0 mg/dL    Creatinine 0.73 0.67 - 1.17 mg/dL    Calcium 8.9 8.8 - 10.2 mg/dL    Glucose 149 (H) 70 - 99 mg/dL    GFR Estimate >90 >60 mL/min/1.73m2   INR   Result Value Ref Range    INR 1.17 (H) 0.85 - 1.15   Partial thromboplastin time   Result Value Ref Range    aPTT 26 22 - 38 Seconds

## 2023-09-01 NOTE — CONSULTS
Discharge Pharmacy Test Claim    Posaconazole and voriconazole require prior authorization through patient's [employer] Medicare advantage plan. Pharmacy liaison submitted PA requests.    Addendum 3:49 pm: posaconazole PA approved with a copay of $1695.74. Voriconazole still pending.    Addendum 9/5: voriconazole prior authorization approved with a copay of $447.29.    Test Claim Copay   posaconazole 1695.74   voriconazole 447.29     Tawana Jansen  [facility] Pharmacy Liaison  Ph: [phone] Pager: [phone]   Securely message with the Vocera Web Console (learn more here)

## 2023-09-02 ENCOUNTER — APPOINTMENT (OUTPATIENT)
Dept: CARDIOLOGY | Facility: CLINIC | Age: 68
DRG: 835 | End: 2023-09-02
Attending: PHYSICIAN ASSISTANT
Payer: COMMERCIAL

## 2023-09-02 ENCOUNTER — APPOINTMENT (OUTPATIENT)
Dept: GENERAL RADIOLOGY | Facility: CLINIC | Age: 68
DRG: 835 | End: 2023-09-02
Attending: STUDENT IN AN ORGANIZED HEALTH CARE EDUCATION/TRAINING PROGRAM
Payer: COMMERCIAL

## 2023-09-02 LAB
3D LVEF ECHO: NORMAL
ABO/RH(D): NORMAL
ALBUMIN SERPL BCG-MCNC: 4.3 G/DL (ref 3.5–5.2)
ALBUMIN SERPL BCG-MCNC: 4.3 G/DL (ref 3.5–5.2)
ALP SERPL-CCNC: 62 U/L (ref 40–129)
ALP SERPL-CCNC: 64 U/L (ref 40–129)
ALT SERPL W P-5'-P-CCNC: <5 U/L (ref 0–70)
ALT SERPL W P-5'-P-CCNC: <5 U/L (ref 0–70)
ANION GAP SERPL CALCULATED.3IONS-SCNC: 11 MMOL/L (ref 7–15)
ANION GAP SERPL CALCULATED.3IONS-SCNC: 12 MMOL/L (ref 7–15)
ANION GAP SERPL CALCULATED.3IONS-SCNC: 12 MMOL/L (ref 7–15)
ANTIBODY SCREEN: NEGATIVE
APTT PPP: 22 SECONDS (ref 22–38)
AST SERPL W P-5'-P-CCNC: 20 U/L (ref 0–45)
AST SERPL W P-5'-P-CCNC: 21 U/L (ref 0–45)
BILIRUB SERPL-MCNC: 0.5 MG/DL
BILIRUB SERPL-MCNC: 0.6 MG/DL
BLD PROD TYP BPU: NORMAL
BLOOD COMPONENT TYPE: NORMAL
BUN SERPL-MCNC: 14.5 MG/DL (ref 8–23)
BUN SERPL-MCNC: 14.5 MG/DL (ref 8–23)
BUN SERPL-MCNC: 16.1 MG/DL (ref 8–23)
CALCIUM SERPL-MCNC: 8.6 MG/DL (ref 8.8–10.2)
CALCIUM SERPL-MCNC: 8.7 MG/DL (ref 8.8–10.2)
CALCIUM SERPL-MCNC: 8.7 MG/DL (ref 8.8–10.2)
CHLORIDE SERPL-SCNC: 102 MMOL/L (ref 98–107)
CHLORIDE SERPL-SCNC: 102 MMOL/L (ref 98–107)
CHLORIDE SERPL-SCNC: 106 MMOL/L (ref 98–107)
CODING SYSTEM: NORMAL
CREAT SERPL-MCNC: 0.86 MG/DL (ref 0.67–1.17)
CREAT SERPL-MCNC: 0.86 MG/DL (ref 0.67–1.17)
CREAT SERPL-MCNC: 0.88 MG/DL (ref 0.67–1.17)
CROSSMATCH: NORMAL
CROSSMATCH: NORMAL
DEPRECATED HCO3 PLAS-SCNC: 20 MMOL/L (ref 22–29)
DEPRECATED HCO3 PLAS-SCNC: 20 MMOL/L (ref 22–29)
DEPRECATED HCO3 PLAS-SCNC: 21 MMOL/L (ref 22–29)
FIBRINOGEN PPP-MCNC: 354 MG/DL (ref 170–490)
FIBRINOGEN PPP-MCNC: 364 MG/DL (ref 170–490)
FIBRINOGEN PPP-MCNC: 378 MG/DL (ref 170–490)
GFR SERPL CREATININE-BSD FRML MDRD: >90 ML/MIN/1.73M2
GLUCOSE SERPL-MCNC: 104 MG/DL (ref 70–99)
GLUCOSE SERPL-MCNC: 104 MG/DL (ref 70–99)
GLUCOSE SERPL-MCNC: 99 MG/DL (ref 70–99)
HBV SURFACE AG SERPL QL IA: NONREACTIVE
INR PPP: 1.17 (ref 0.85–1.15)
INR PPP: 1.18 (ref 0.85–1.15)
INR PPP: 1.19 (ref 0.85–1.15)
ISSUE DATE AND TIME: NORMAL
LDH SERPL L TO P-CCNC: 292 U/L (ref 0–250)
LVEF ECHO: NORMAL
MAGNESIUM SERPL-MCNC: 2.3 MG/DL (ref 1.7–2.3)
PHOSPHATE SERPL-MCNC: 2.2 MG/DL (ref 2.5–4.5)
PHOSPHATE SERPL-MCNC: 3.2 MG/DL (ref 2.5–4.5)
POTASSIUM SERPL-SCNC: 3.7 MMOL/L (ref 3.4–5.3)
POTASSIUM SERPL-SCNC: 3.7 MMOL/L (ref 3.4–5.3)
POTASSIUM SERPL-SCNC: 3.8 MMOL/L (ref 3.4–5.3)
PROT SERPL-MCNC: 7.3 G/DL (ref 6.4–8.3)
PROT SERPL-MCNC: 7.5 G/DL (ref 6.4–8.3)
SODIUM SERPL-SCNC: 134 MMOL/L (ref 136–145)
SODIUM SERPL-SCNC: 134 MMOL/L (ref 136–145)
SODIUM SERPL-SCNC: 138 MMOL/L (ref 136–145)
SPECIMEN EXPIRATION DATE: NORMAL
UNIT ABO/RH: NORMAL
UNIT NUMBER: NORMAL
UNIT STATUS: NORMAL
UNIT TYPE ISBT: 5100
URATE SERPL-MCNC: 4 MG/DL (ref 3.4–7)
URATE SERPL-MCNC: 4.5 MG/DL (ref 3.4–7)

## 2023-09-02 PROCEDURE — 86923 COMPATIBILITY TEST ELECTRIC: CPT | Performed by: PHYSICIAN ASSISTANT

## 2023-09-02 PROCEDURE — P9016 RBC LEUKOCYTES REDUCED: HCPCS

## 2023-09-02 PROCEDURE — P9037 PLATE PHERES LEUKOREDU IRRAD: HCPCS

## 2023-09-02 PROCEDURE — 85007 BL SMEAR W/DIFF WBC COUNT: CPT | Performed by: PHYSICIAN ASSISTANT

## 2023-09-02 PROCEDURE — 83735 ASSAY OF MAGNESIUM: CPT | Performed by: INTERNAL MEDICINE

## 2023-09-02 PROCEDURE — 86696 HERPES SIMPLEX TYPE 2 TEST: CPT | Performed by: INTERNAL MEDICINE

## 2023-09-02 PROCEDURE — 86644 CMV ANTIBODY: CPT | Performed by: INTERNAL MEDICINE

## 2023-09-02 PROCEDURE — 86920 COMPATIBILITY TEST SPIN: CPT | Performed by: PHYSICIAN ASSISTANT

## 2023-09-02 PROCEDURE — 36415 COLL VENOUS BLD VENIPUNCTURE: CPT | Performed by: PHYSICIAN ASSISTANT

## 2023-09-02 PROCEDURE — 85027 COMPLETE CBC AUTOMATED: CPT | Performed by: INTERNAL MEDICINE

## 2023-09-02 PROCEDURE — 80053 COMPREHEN METABOLIC PANEL: CPT | Performed by: INTERNAL MEDICINE

## 2023-09-02 PROCEDURE — 250N000009 HC RX 250: Performed by: STUDENT IN AN ORGANIZED HEALTH CARE EDUCATION/TRAINING PROGRAM

## 2023-09-02 PROCEDURE — 250N000013 HC RX MED GY IP 250 OP 250 PS 637: Performed by: INTERNAL MEDICINE

## 2023-09-02 PROCEDURE — 258N000003 HC RX IP 258 OP 636: Performed by: PHYSICIAN ASSISTANT

## 2023-09-02 PROCEDURE — 258N000003 HC RX IP 258 OP 636: Performed by: STUDENT IN AN ORGANIZED HEALTH CARE EDUCATION/TRAINING PROGRAM

## 2023-09-02 PROCEDURE — 85610 PROTHROMBIN TIME: CPT | Performed by: STUDENT IN AN ORGANIZED HEALTH CARE EDUCATION/TRAINING PROGRAM

## 2023-09-02 PROCEDURE — 84550 ASSAY OF BLOOD/URIC ACID: CPT | Performed by: STUDENT IN AN ORGANIZED HEALTH CARE EDUCATION/TRAINING PROGRAM

## 2023-09-02 PROCEDURE — 86850 RBC ANTIBODY SCREEN: CPT | Performed by: INTERNAL MEDICINE

## 2023-09-02 PROCEDURE — 120N000002 HC R&B MED SURG/OB UMMC

## 2023-09-02 PROCEDURE — 258N000003 HC RX IP 258 OP 636: Performed by: INTERNAL MEDICINE

## 2023-09-02 PROCEDURE — 250N000012 HC RX MED GY IP 250 OP 636 PS 637: Performed by: INTERNAL MEDICINE

## 2023-09-02 PROCEDURE — 85730 THROMBOPLASTIN TIME PARTIAL: CPT | Performed by: INTERNAL MEDICINE

## 2023-09-02 PROCEDURE — 250N000013 HC RX MED GY IP 250 OP 250 PS 637: Performed by: PHYSICIAN ASSISTANT

## 2023-09-02 PROCEDURE — 84100 ASSAY OF PHOSPHORUS: CPT | Performed by: INTERNAL MEDICINE

## 2023-09-02 PROCEDURE — 250N000011 HC RX IP 250 OP 636: Mod: JZ | Performed by: PHYSICIAN ASSISTANT

## 2023-09-02 PROCEDURE — 85027 COMPLETE CBC AUTOMATED: CPT | Performed by: PHYSICIAN ASSISTANT

## 2023-09-02 PROCEDURE — 71045 X-RAY EXAM CHEST 1 VIEW: CPT | Mod: 26 | Performed by: RADIOLOGY

## 2023-09-02 PROCEDURE — 999N000065 XR CHEST PORT 1 VIEW

## 2023-09-02 PROCEDURE — 85007 BL SMEAR W/DIFF WBC COUNT: CPT | Performed by: INTERNAL MEDICINE

## 2023-09-02 PROCEDURE — 85610 PROTHROMBIN TIME: CPT | Performed by: PHYSICIAN ASSISTANT

## 2023-09-02 PROCEDURE — 76376 3D RENDER W/INTRP POSTPROCES: CPT | Mod: 26 | Performed by: INTERNAL MEDICINE

## 2023-09-02 PROCEDURE — 85384 FIBRINOGEN ACTIVITY: CPT | Performed by: STUDENT IN AN ORGANIZED HEALTH CARE EDUCATION/TRAINING PROGRAM

## 2023-09-02 PROCEDURE — 250N000011 HC RX IP 250 OP 636: Performed by: INTERNAL MEDICINE

## 2023-09-02 PROCEDURE — 3E04305 INTRODUCTION OF OTHER ANTINEOPLASTIC INTO CENTRAL VEIN, PERCUTANEOUS APPROACH: ICD-10-PCS | Performed by: INTERNAL MEDICINE

## 2023-09-02 PROCEDURE — 36569 INSJ PICC 5 YR+ W/O IMAGING: CPT

## 2023-09-02 PROCEDURE — 85384 FIBRINOGEN ACTIVITY: CPT | Performed by: PHYSICIAN ASSISTANT

## 2023-09-02 PROCEDURE — 86901 BLOOD TYPING SEROLOGIC RH(D): CPT | Performed by: INTERNAL MEDICINE

## 2023-09-02 PROCEDURE — 99233 SBSQ HOSP IP/OBS HIGH 50: CPT | Mod: GC | Performed by: INTERNAL MEDICINE

## 2023-09-02 PROCEDURE — 93306 TTE W/DOPPLER COMPLETE: CPT

## 2023-09-02 PROCEDURE — 83615 LACTATE (LD) (LDH) ENZYME: CPT | Performed by: INTERNAL MEDICINE

## 2023-09-02 PROCEDURE — 84550 ASSAY OF BLOOD/URIC ACID: CPT | Performed by: PHYSICIAN ASSISTANT

## 2023-09-02 PROCEDURE — 272N000451 HC KIT SHRLOCK 5FR POWER PICC DOUBLE LUMEN

## 2023-09-02 PROCEDURE — 80053 COMPREHEN METABOLIC PANEL: CPT | Performed by: PHYSICIAN ASSISTANT

## 2023-09-02 PROCEDURE — 93306 TTE W/DOPPLER COMPLETE: CPT | Mod: 26 | Performed by: INTERNAL MEDICINE

## 2023-09-02 RX ORDER — PROCHLORPERAZINE MALEATE 5 MG
5-10 TABLET ORAL EVERY 6 HOURS PRN
Status: DISCONTINUED | OUTPATIENT
Start: 2023-09-02 | End: 2023-09-10

## 2023-09-02 RX ORDER — LORAZEPAM 2 MG/ML
.5-1 INJECTION INTRAMUSCULAR EVERY 6 HOURS PRN
Status: DISCONTINUED | OUTPATIENT
Start: 2023-09-02 | End: 2023-10-03 | Stop reason: HOSPADM

## 2023-09-02 RX ORDER — SODIUM CHLORIDE 9 MG/ML
INJECTION, SOLUTION INTRAVENOUS CONTINUOUS
Status: ACTIVE | OUTPATIENT
Start: 2023-09-02 | End: 2023-09-04

## 2023-09-02 RX ORDER — DEXAMETHASONE 4 MG/1
12 TABLET ORAL EVERY 24 HOURS
Status: COMPLETED | OUTPATIENT
Start: 2023-09-02 | End: 2023-09-04

## 2023-09-02 RX ORDER — ALBUTEROL SULFATE 90 UG/1
1-2 AEROSOL, METERED RESPIRATORY (INHALATION)
Status: DISCONTINUED | OUTPATIENT
Start: 2023-09-02 | End: 2023-09-11

## 2023-09-02 RX ORDER — ALBUTEROL SULFATE 0.83 MG/ML
2.5 SOLUTION RESPIRATORY (INHALATION)
Status: DISCONTINUED | OUTPATIENT
Start: 2023-09-02 | End: 2023-09-11

## 2023-09-02 RX ORDER — DIPHENHYDRAMINE HYDROCHLORIDE 50 MG/ML
50 INJECTION INTRAMUSCULAR; INTRAVENOUS
Status: DISCONTINUED | OUTPATIENT
Start: 2023-09-02 | End: 2023-09-11

## 2023-09-02 RX ORDER — METHYLPREDNISOLONE SODIUM SUCCINATE 125 MG/2ML
125 INJECTION, POWDER, LYOPHILIZED, FOR SOLUTION INTRAMUSCULAR; INTRAVENOUS
Status: DISCONTINUED | OUTPATIENT
Start: 2023-09-02 | End: 2023-09-11

## 2023-09-02 RX ORDER — ALLOPURINOL 300 MG/1
300 TABLET ORAL DAILY
Status: DISCONTINUED | OUTPATIENT
Start: 2023-09-02 | End: 2023-09-02

## 2023-09-02 RX ORDER — ONDANSETRON 8 MG/1
8 TABLET, FILM COATED ORAL EVERY 12 HOURS
Status: COMPLETED | OUTPATIENT
Start: 2023-09-02 | End: 2023-09-09

## 2023-09-02 RX ORDER — LIDOCAINE 40 MG/G
CREAM TOPICAL
Status: ACTIVE | OUTPATIENT
Start: 2023-09-02 | End: 2023-09-05

## 2023-09-02 RX ORDER — EPINEPHRINE 1 MG/ML
0.3 INJECTION, SOLUTION, CONCENTRATE INTRAVENOUS EVERY 5 MIN PRN
Status: DISCONTINUED | OUTPATIENT
Start: 2023-09-02 | End: 2023-09-11

## 2023-09-02 RX ORDER — MEPERIDINE HYDROCHLORIDE 25 MG/ML
25 INJECTION INTRAMUSCULAR; INTRAVENOUS; SUBCUTANEOUS EVERY 30 MIN PRN
Status: DISCONTINUED | OUTPATIENT
Start: 2023-09-02 | End: 2023-09-11

## 2023-09-02 RX ORDER — BUTALBITAL, ACETAMINOPHEN AND CAFFEINE 50; 325; 40 MG/1; MG/1; MG/1
1 TABLET ORAL EVERY 4 HOURS PRN
Status: DISCONTINUED | OUTPATIENT
Start: 2023-09-02 | End: 2023-10-03 | Stop reason: HOSPADM

## 2023-09-02 RX ORDER — LORAZEPAM 0.5 MG/1
.5-1 TABLET ORAL EVERY 6 HOURS PRN
Status: DISCONTINUED | OUTPATIENT
Start: 2023-09-02 | End: 2023-10-03 | Stop reason: HOSPADM

## 2023-09-02 RX ADMIN — LIDOCAINE HYDROCHLORIDE ANHYDROUS 3 ML: 10 INJECTION, SOLUTION INFILTRATION at 12:09

## 2023-09-02 RX ADMIN — ACYCLOVIR 400 MG: 400 TABLET ORAL at 20:35

## 2023-09-02 RX ADMIN — CYTARABINE 200 MG: 100 INJECTION, SOLUTION INTRATHECAL; INTRAVENOUS; SUBCUTANEOUS at 19:00

## 2023-09-02 RX ADMIN — DEXAMETHASONE 12 MG: 4 TABLET ORAL at 17:24

## 2023-09-02 RX ADMIN — ALLOPURINOL 300 MG: 300 TABLET ORAL at 08:45

## 2023-09-02 RX ADMIN — LEVOFLOXACIN 250 MG: 250 TABLET, FILM COATED ORAL at 08:45

## 2023-09-02 RX ADMIN — POTASSIUM & SODIUM PHOSPHATES POWDER PACK 280-160-250 MG 1 PACKET: 280-160-250 PACK at 13:01

## 2023-09-02 RX ADMIN — POTASSIUM & SODIUM PHOSPHATES POWDER PACK 280-160-250 MG 1 PACKET: 280-160-250 PACK at 08:46

## 2023-09-02 RX ADMIN — ONDANSETRON 8 MG: 8 TABLET, FILM COATED ORAL at 17:24

## 2023-09-02 RX ADMIN — SODIUM CHLORIDE 120 MG: 9 INJECTION, SOLUTION INTRAVENOUS at 18:24

## 2023-09-02 RX ADMIN — SODIUM CHLORIDE: 9 INJECTION, SOLUTION INTRAVENOUS at 14:29

## 2023-09-02 RX ADMIN — ACYCLOVIR 400 MG: 400 TABLET ORAL at 08:45

## 2023-09-02 RX ADMIN — MICAFUNGIN SODIUM 50 MG: 50 INJECTION, POWDER, LYOPHILIZED, FOR SOLUTION INTRAVENOUS at 20:36

## 2023-09-02 RX ADMIN — POTASSIUM & SODIUM PHOSPHATES POWDER PACK 280-160-250 MG 1 PACKET: 280-160-250 PACK at 17:24

## 2023-09-02 ASSESSMENT — ACTIVITIES OF DAILY LIVING (ADL)
ADLS_ACUITY_SCORE: 22

## 2023-09-02 NOTE — PROCEDURES
[facility]    Double Lumen PICC Placement    Date/Time: 9/2/2023 12:24 PM    Performed by: RANGEL Savage  Authorized by: Yolis Sherman PA-C  Indications: vascular access and central pressure monitoring      UNIVERSAL PROTOCOL   Site Marked: Yes  Prior Images Obtained and Reviewed:  Yes  Required items: Required blood products, implants, devices and special equipment available    Patient identity confirmed:  Arm band, provided demographic data, hospital-assigned identification number and anonymous protocol, patient vented/unresponsive  Patient was reevaluated immediately before administering moderate or deep sedation or anesthesia  Confirmation Checklist:  Patient's identity using two indicators, relevant allergies, procedure was appropriate and matched the consent or emergent situation and correct equipment/implants were available  Time out: Immediately prior to the procedure a time out was called    Universal Protocol: the Joint Commission Universal Protocol was followed    Preparation: Patient was prepped and draped in usual sterile fashion       ANESTHESIA    Anesthesia:  See MAR for details  Local Anesthetic:  Lidocaine 1% without epinephrine  Anesthetic Total (mL):  3      SEDATION    Patient Sedated: No        Preparation: skin prepped with ChloraPrep  Skin prep agent: skin prep agent completely dried prior to procedure  Sterile barriers: maximum sterile barriers were used: cap, mask, sterile gown, sterile gloves, and large sterile sheet  Hand hygiene: hand hygiene performed prior to central venous catheter insertion  Type of line used: PICC  Catheter type: double lumen  Lumen type: non-valved  Lumen Identification: Purple and Red  Catheter size: 5 Fr  Brand: Bard  Lot number: [id number]  Placement method: venipuncture, MST, ultrasound and tip navigation system  Number of attempts: 1  Difficulty threading catheter: no  Successful placement:  yes  Orientation: right  Catheter to Vein (%): 42  Location: basilic vein  Tip Location: SVC  Arm circumference: adults 10 cm  Extremity circumference: 33  Visible catheter length: 1  Total catheter length: 46  Dressing and securement: chlorhexidine patch applied, dressing applied, line secured, statlock, sterile dressing applied and transparent securement dressing  Post procedure assessment: blood return through all ports, free fluid flow and placement verified by x-ray  PROCEDURE   Patient Tolerance:  Patient tolerated the procedure well with no immediate complicationsDescribe Procedure: Picc ok to use  Disposal: sharps and needle count correct at the end of procedure, needles and guidewire disposed in sharps container

## 2023-09-02 NOTE — PROGRESS NOTES
Hematology  Daily Progress Note   Date of Service: 09/02/2023    Patient: Artie Fine  MRN: [id number]  Admission Date: 9/1/2023  Hospital Day # 1    Assessment & Plan:   Artie Fine is a 68 year old male with a past medical history of brain abscess (2021), HTN, and BPH. He was transferred from [facility] in [place], [place] to [facility] for work up of pancytopenia. At [facility], was noted to have circulating blasts concerning for acute leukemia, transferred to [facility]. 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     HEME/ONC  # Pancytopenia with circulating blasts, flow cytometry confirmed AML  Patient initially presented to [facility] in [place], [place] as he noted progressive symptoms of easy bruising/bleeding, weakness, fatigue, loss of appetite, and night sweats since 4-6 weeks. He also noted headache similar to his previous brain abscess. CBC noted WBC 2.7 (), Hgb 8.1, and plt 1k. He was transferred to [facility] for work up of pancytopenia with profound thrombocytopenia. He was seen by Dr. Urena at [facility], who initiated work up, during which he was found to have 11% circulating myeloid blasts. He was transferred to [facility] for work up of acute leukemia.   - BMBx performed 9/1. Morph,cytogenetics, molecular pending. 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 1U platelets ordered today due to plt 11, minor nosebleed (resolved)  - Viral serologies: HepB/C-, HIV-. HSV, EBV, CMV ordered (pending).   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  We discussed possible side effects and toxicities including but not limited to fatigue, alopecia, bone marrow suppression, allergic reaction, GI issues, infection, bleeding, damage to heart/lung/liver/kidneys, rash, and while complications can be life threatening, the greatest risk is the AML  We discussed the risk of no therapy vs therapy for this disease. We also discussed the possibility of poor response or relapse of disease despite the planned therapy.  They asked good questions and agree with the plan.      # Risk for TLS/DIC  - Monitor TLS/DIC labs q8h  - Allopurinol 300 mg daily  - Started on IV fluids NS 75 ml/hr for 2 days for prevention  - For TLS:               - If e/o TLS, bolus + start mIVF               - If uric acid >8 despite IVF, give rasburicase 6 mg x1               - If phos persistently >5, start PhosLo TID   - For DIC: Transfuse cryo if fibrinogen <100 and FFP if INR >1.8     ID  # PPX  -  mg BID  - Levofloxacin 250 mg daily  - Micafungin 50 mg IV daily     # H/o brain abscess (2021)  # Headache  Treated in [place]. S/p craniotomy for drainage of abscess July 2021. It is unclear what culture speciated to, but he completed a course of antibiotics with ceftriaxone then meropenem with resolution on imaging. Head CT 8/31 at OSH revealed no intracranial hemorrhage midline shift or mass effect. Postsurgical changes noted. Previous area of infection demonstrates trace amount of encephalomalacia but no mass effect or inflammatory changes to suggest new or acute infection. Otherwise unremarkable.  - Tylenol PRN for headache  - Pending course, consider LP for work up      CHRONIC  # Hypertension   Noted during prior hospitalization for brain abscess in 2021. Previously on lisinopril, which  "he is no longer taking. Currently normotensive     # BPH  Notes difficulty with complete emptying of bladder. Previously on Flomax but states it did nothing, so he stopped it. He does note frequent urination at baseline, unchanged from baseline.  - Monitor clinically     MISC  # Weakness  2/2 new diagnosis of malignancy. Overall mild, still independent.   - Plan for PT/OT consults this admission     # Loss of appetite  - RD consult to evaluate for malnutrition    Patient was seen and plan of care discussed with Dr. Carson Neville MD  Hematology fellow, PGY4  Pager: [phone]  ___________________________________________________________________    Subjective & Interval History:    No acute events noted overnight. He reports having slept well. Denies any complaints. Headache has significantly improved.   On further questioning, he reports minimal nosebleed on blowing his nose hard earlier today. No hematochezia, melena, hemoptysis, hematemesis or gum bleeding. No dyspnea, chest pain.     Physical Exam:    /61 (BP Location: Left arm)   Pulse 83   Temp 98.1  F (36.7  C) (Oral)   Resp 18   Ht 1.74 m (5' 8.5\")   Wt 86.8 kg (191 lb 4.8 oz)   SpO2 99%   BMI 28.66 kg/m    Gen: Well appearing, in NAD  HEENT: EOMI, PERRL, mmm, oropharynx clear, no lesions. No blood around nose  CV: Normal rate, regular rhythm. No m/r/g  Pulm: CTAB, no wheezing, normal work of breathing  Abd: Soft, nt/nd, no rebound/guarding. No palpable organomegaly  Ext: Warm and well perfused. No lower extremity edema  Skin: scattered petechiae on arms and legs  Neuro: Alert and answering questions appropriately. No focal deficits    Labs & Studies: I personally reviewed the following studies:  ROUTINE LABS   CMP  Recent Labs   Lab 09/02/23  0616 09/01/23  1646 09/01/23  0803    139 137   POTASSIUM 3.8 3.6 3.9   CHLORIDE 106 107 105   CO2 21* 18* 18*   ANIONGAP 11 14 14   GLC 99 111* 149*   BUN 16.1 14.7 13.4   CR " 0.88 0.79 0.73   GFRESTIMATED >90 >90 >90   VENANCIO 8.6* 9.0 8.9   MAG 2.3 2.3  --    PHOS 2.2* 3.5  --    PROTTOTAL 7.3 8.0  --    ALBUMIN 4.3 4.6  --    BILITOTAL 0.5 0.9  --    ALKPHOS 62 68  --    AST 20 19  --    ALT <5 8  --      CBC  Recent Labs   Lab 09/02/23  0616 09/01/23  1646 09/01/23  0803 08/31/23  1603   WBC 4.1 3.4* 2.2* 2.9*   RBC 2.67* 2.76* 2.90* 2.80*   HGB 7.2* 7.6* 7.8* 7.7*   HCT 25.7* 26.0* 27.0* 26.3*   MCV 96 94 93 94   MCH 27.0 27.5 26.9 27.5   MCHC 28.0* 29.2* 28.9* 29.3*   RDW 16.1* 15.8* 14.8 14.8   PLT 11* 16* 10* 16*      Latest Reference Range & Units 09/01/23 08:03 09/01/23 16:46 09/02/23 06:16   INR 0.85 - 1.15  1.17 (H) 1.18 (H) 1.17 (H)   PTT 22 - 38 Seconds 26     Fibrinogen 170 - 490 mg/dL  445 378        Latest Reference Range & Units 09/01/23 16:46 09/02/23 06:16   Lactate Dehydrogenase 0 - 250 U/L 289 (H)    Uric Acid 3.4 - 7.0 mg/dL 5.2 4.5      Latest Reference Range & Units 08/31/23 16:03 09/01/23 16:46   Hep B Surface Agn Nonreactive   Nonreactive   Hepatitis B Core Arlen Nonreactive  Nonreactive    Hepatitis B Surface Antibody Instrument Value <8.00 m[IU]/mL 48.37    Hepatitis B Surface Antibody  Reactive    Hepatitis C Antibody Nonreactive  Nonreactive       Latest Reference Range & Units 08/31/23 16:03   HIV Antigen Antibody Combo Nonreactive  Nonreactive     No M protein on serum DARCY    Flow cytometry bm aspirate 9/1  A. Iliac Crest, Bone Marrow Aspirate, Left:  -Increased, abnormal myeloid blast population (47%)  The immunophenotypic findings support acute myeloid leukemia. A blast count by flow cytometry may differ from a morphologic blast count for various reasons including lysis of erythroid precursors, the presence of other cells in the blast gate, and sampling differences. For classification and clinical decision making purposes, the morphologic blast count should be used. Note - a marrow aspirate smear was reviewed alongside this flow cytometry case and demonstrated  greater than 20% blasts. Final diagnosis and classification requires correlation with the results of other ancillary studies and the morphologic and clinical features.     Medications list for reference:  Current Facility-Administered Medications   Medication    0.9% sodium chloride BOLUS    acetaminophen (TYLENOL) tablet 650 mg    acyclovir (ZOVIRAX) tablet 400 mg    albuterol (PROVENTIL HFA/VENTOLIN HFA) inhaler    albuterol (PROVENTIL) neb solution 2.5 mg    allopurinol (ZYLOPRIM) tablet 300 mg    butalbital-acetaminophen-caffeine (ESGIC) per tablet 1 tablet    calcium carbonate (TUMS) chewable tablet 500-1,000 mg    Chemotherapy Infusing-Continuous Infusion    cytarabine (PF) (CYTOSAR) 200 mg in D5W 1,052 mL infusion    DAUNOrubicin HCl (CERUBIDINE) 120 mg in sodium chloride 0.9 % 79 mL infusion    dexAMETHasone (DECADRON) tablet 12 mg    diphenhydrAMINE (BENADRYL) injection 50 mg    EPINEPHrine PF (ADRENALIN) injection 0.3 mg    famotidine (PEPCID) injection 20 mg    levofloxacin (LEVAQUIN) tablet 250 mg    lidocaine (LMX4) cream    lidocaine 1 % 0.1-5 mL    LORazepam (ATIVAN) injection 0.5-1 mg    LORazepam (ATIVAN) tablet 0.5-1 mg    melatonin tablet 3-6 mg    meperidine (DEMEROL) injection 25 mg    methylPREDNISolone sodium succinate (solu-MEDROL) injection 125 mg    micafungin (MYCAMINE) 50 mg in sodium chloride 0.9 % 100 mL intermittent infusion    No rectal suppositories if WBC less than 1000/microliters or platelets less than 50,000/ L    ondansetron (ZOFRAN) injection 4 mg    Or    ondansetron (ZOFRAN ODT) ODT tab 4 mg    Or    ondansetron (ZOFRAN) tablet 4 mg    ondansetron (ZOFRAN) tablet 8 mg    polyethylene glycol (MIRALAX) Packet 17 g    potassium & sodium phosphates (NEUTRA-PHOS) Packet 1 packet    prochlorperazine (COMPAZINE) tablet 5 mg    Or    prochlorperazine (COMPAZINE) injection 5 mg    prochlorperazine (COMPAZINE) injection 5-10 mg    prochlorperazine (COMPAZINE) tablet 5-10 mg     senna-docusate (SENOKOT-S/PERICOLACE) 8.6-50 MG per tablet 1 tablet    Or    senna-docusate (SENOKOT-S/PERICOLACE) 8.6-50 MG per tablet 2 tablet    sodium chloride 0.9% infusion

## 2023-09-02 NOTE — PROGRESS NOTES
"CLINICAL NUTRITION SERVICES - ASSESSMENT NOTE     Nutrition Prescription    Malnutrition Status:    Patient does not meet two of the established criteria necessary for diagnosing malnutrition but is at risk for malnutrition    Recommendations already ordered by Registered Dietitian (RD):  Recommend small frequent meals/snacks, encourage consumption of high protein foods first at meal times. Reviewed and encouraged protein-containing foods with pt.   --> Setting up high protein snack once daily between meals. Pt declines supplements at this time   2 PM snack: 1% milk every day (protein source) + alternate 2 chocolate chip cookies E/O day or vicenta ice cream    Future/Additional Recommendations:  Monitor nutrition-related findings and follow pt per protocol  Monitor PO intake trends, meal frequency, weight trends, snack intake     REASON FOR ASSESSMENT  Artie Fine is a/an 68 year old male assessed by the dietitian for Provider Order - \"Work up for new leukemia. Recent loss of appetite\"    CLINICAL HISTORY  Hx of brain abscess, HTN, and BPH, admitted to [facility] from OSH for work-up of pancytopenia, concern for acute leukemia.      NUTRITION HISTORY  Pt reports having a decline to his appetite/intake for ~1 month PTA. Used to have a robust appetite with an intake of 3 large meals per day. Likes to graze on snacks throughout the day as well with foods such as ice cream. Breakfast used to consist of eggs, hash browns, toast or pancakes, etc, and now consists of a bowl of cereal with milk. Lunch is typically a sandwich (still is); dinner used to be a large meal prepared by his wife, or grilled by pt but now eating ~half of foods provided. Pt reports losing ~5 lbs in the past month. Less physically active than he used to be, and pt thinks this is contributing to his decrease in appetite.     Artie  reports that his appetite is starting to return, since admission to the hospital. He still is interested in food and is planning " "to order meals regularly. Provided education regarding the importance of adequate protein with each meal/snack in order to prevent losses of muscle mass. Pt verbalized an understanding to this. Reviewed high protein foods with pt. Encouraged pt to set up a high protein snack between meals, reviewed options. Also reviewed ONS options, if preferred. Pt is not interested in supplements at this time. Has a sweet tooth and would like cookies and ice cream sent with a carton of milk for the protein source. Will set up as requested. Encourage intake and consumption of high protein foods first at meal times.     CURRENT NUTRITION ORDERS  Diet: Regular  Supplements: None at present   Intake/Tolerance: 100% intake x1 meal documented so far per I/O. Monitor trends as available.     GI  No BM documented since admission (LOS day 1)    LABS:  Reviewed      MEDICATIONS  Chemotherapy noted  Decadron  Zofran  Abx  Neutra-Phos Q4hrs   IVF - NS at 75 mL/hr     SKIN  No deficits noted per RN documentation      ANTHROPOMETRICS  Height: 174 cm (5' 8.5\")  Most Recent Weight: 86.8 kg (191 lb 4.8 oz)  IBW: 71.4 kg  122%IBW   BMI: Overweight BMI 25-29.9    Weight History:   No significant losses noted, but limited recent measures.   Wt Readings from Last 15 Encounters:   09/02/23 86.8 kg (191 lb 4.8 oz)   09/01/23 86.2 kg (190 lb 1.6 oz)   Care Everywhere:   Weight 84.9 kg (187 lb 2.7 oz) 07/16/2021 6:16 AM CDT     Weight 92.7 kg (204 lb 4.8 oz) 05/26/2021 9:41 AM CDT       Dosing Weight: 75 kg (Adj per admit wt and IBW 71.4 kg)     ASSESSED NUTRITION NEEDS  Estimated Energy Needs: 6385-2370 kcals/day (25 - 30 kcals/kg)  Justification: Maintenance  Estimated Protein Needs:  grams protein/day (1.2 - 1.5 grams of pro/kg)  Justification: Lean body mass preservation   Estimated Fluid Needs: (1 mL/kcal)   Justification: Maintenance    PHYSICAL FINDINGS  See malnutrition section below.    MALNUTRITION  % Intake: < 75% for >/= 1 month " (moderate)  % Weight Loss: Weight loss does not meet criteria  Subcutaneous Fat Loss: None observed  Muscle Loss: None observed  Fluid Accumulation/Edema: None noted  Malnutrition Diagnosis: Patient does not meet two of the established criteria necessary for diagnosing malnutrition but is at risk for malnutrition    NUTRITION DIAGNOSIS  Inadequate oral intake related to decreased appetite as evidenced by pt self-report/diet hx      INTERVENTIONS  Implementation  Nutrition education for recommended modifications   Modify composition of meals/snacks - high protein snack between meals    Goals  Patient to consume % of nutritionally adequate meal trays TID, or the equivalent with supplements/snacks.     Monitoring/Evaluation  Progress toward goals will be monitored and evaluated per protocol.  Thomas Bailey RDN, LD, [facility]  Weekend/Holiday RD pager [phone]

## 2023-09-02 NOTE — PLAN OF CARE
Goal Outcome Evaluation: [id number]      Plan of Care Reviewed With: patient    Overall Patient Progress: no change    Outcome Evaluation: VSS on RA. A&Ox4. Denies pain, N/V, SOB. Pt had epistaxis x1, 1unit plt transfusing for bleeding. Will need 1unit of blood for Hgb 7. DL PICC placed, good blood return. Phos was replaced for level of 2.2, one more dose due at 1600 and recheck in AM. 2 RN skin assessment was completed, second RN was April STEWARD. Findings include generalized petechiae and bruising on right upper arm. Plan to begin chemo tonight. Pt in good spirits and hopeful about treatment. Continue w/ POC.

## 2023-09-02 NOTE — PLAN OF CARE
Goal Outcome Evaluation:      Plan of Care Reviewed With: patient               Goal Outcome Evaluation    Time: 0585-3422    Reason for admission: Admitted this afternoon, new acute leukemia dx  Activity: UAL  Pain: denies  Neuro: word finding noted often, pt states this is baseline. Otherwise WDL  Cardiac: WDL  Respiratory: WDL  GI/: WDL  Diet: reg diet  Lines: PIV x1  Labs/imaging: Reviewed, no replacements indicated this shift.   Vitals: WDL  Max Temp: 97.9      New changes this shift: No acute events this shift.       Continue to follow POC..

## 2023-09-02 NOTE — PLAN OF CARE
Reason for admission: Here from [facility] for new leukemia workup.  Patient received bone marrow biopsy this afternoon.  NEURO: A&Ox4.  RESPIRATORY: Lungs clear. O2 sats mid-90s on RA.  CARDIO: VSS, HR 59 on admission. Afebrile.  GI/: No BM today. Urine output adequate.  SKIN: 2-nurse skin assessment needed. No deficits noted.  ACTIVITY: Up independently in room.  PAIN: Denies  LINES: L PIV SL'd.  Labs: Lab reports that differential will be delayed, being sent to pathology.  See Epic for other lab results.  Platelets 16 at 1646 this afternoon. Patient did receive platelets at [facility] this morning.  PLAN: Continue plan of care. 2-nurse skin assessment still needed. Notify team with changes/concerns.

## 2023-09-03 LAB
ALBUMIN SERPL BCG-MCNC: 4.3 G/DL (ref 3.5–5.2)
ALP SERPL-CCNC: 66 U/L (ref 40–129)
ALT SERPL W P-5'-P-CCNC: 8 U/L (ref 0–70)
ANION GAP SERPL CALCULATED.3IONS-SCNC: 11 MMOL/L (ref 7–15)
ANION GAP SERPL CALCULATED.3IONS-SCNC: 12 MMOL/L (ref 7–15)
ANION GAP SERPL CALCULATED.3IONS-SCNC: 12 MMOL/L (ref 7–15)
ANION GAP SERPL CALCULATED.3IONS-SCNC: 14 MMOL/L (ref 7–15)
ANION GAP SERPL CALCULATED.3IONS-SCNC: 9 MMOL/L (ref 7–15)
AST SERPL W P-5'-P-CCNC: 19 U/L (ref 0–45)
BILIRUB SERPL-MCNC: 0.8 MG/DL
BUN SERPL-MCNC: 14.7 MG/DL (ref 8–23)
BUN SERPL-MCNC: 15.9 MG/DL (ref 8–23)
BUN SERPL-MCNC: 16.1 MG/DL (ref 8–23)
BUN SERPL-MCNC: 21 MG/DL (ref 8–23)
BUN SERPL-MCNC: 21.3 MG/DL (ref 8–23)
CALCIUM SERPL-MCNC: 8.3 MG/DL (ref 8.8–10.2)
CALCIUM SERPL-MCNC: 8.5 MG/DL (ref 8.8–10.2)
CALCIUM SERPL-MCNC: 8.6 MG/DL (ref 8.8–10.2)
CALCIUM SERPL-MCNC: 8.6 MG/DL (ref 8.8–10.2)
CALCIUM SERPL-MCNC: 8.8 MG/DL (ref 8.8–10.2)
CHLORIDE SERPL-SCNC: 105 MMOL/L (ref 98–107)
CHLORIDE SERPL-SCNC: 105 MMOL/L (ref 98–107)
CHLORIDE SERPL-SCNC: 106 MMOL/L (ref 98–107)
CHLORIDE SERPL-SCNC: 106 MMOL/L (ref 98–107)
CHLORIDE SERPL-SCNC: 108 MMOL/L (ref 98–107)
CREAT SERPL-MCNC: 0.74 MG/DL (ref 0.67–1.17)
CREAT SERPL-MCNC: 0.75 MG/DL (ref 0.67–1.17)
CREAT SERPL-MCNC: 0.8 MG/DL (ref 0.67–1.17)
CREAT SERPL-MCNC: 0.81 MG/DL (ref 0.67–1.17)
CREAT SERPL-MCNC: 0.94 MG/DL (ref 0.67–1.17)
DEPRECATED HCO3 PLAS-SCNC: 19 MMOL/L (ref 22–29)
DEPRECATED HCO3 PLAS-SCNC: 19 MMOL/L (ref 22–29)
DEPRECATED HCO3 PLAS-SCNC: 20 MMOL/L (ref 22–29)
DEPRECATED HCO3 PLAS-SCNC: 20 MMOL/L (ref 22–29)
DEPRECATED HCO3 PLAS-SCNC: 21 MMOL/L (ref 22–29)
FIBRINOGEN PPP-MCNC: 286 MG/DL (ref 170–490)
FIBRINOGEN PPP-MCNC: 296 MG/DL (ref 170–490)
FIBRINOGEN PPP-MCNC: 369 MG/DL (ref 170–490)
GFR SERPL CREATININE-BSD FRML MDRD: 88 ML/MIN/1.73M2
GFR SERPL CREATININE-BSD FRML MDRD: >90 ML/MIN/1.73M2
GLUCOSE SERPL-MCNC: 117 MG/DL (ref 70–99)
GLUCOSE SERPL-MCNC: 117 MG/DL (ref 70–99)
GLUCOSE SERPL-MCNC: 120 MG/DL (ref 70–99)
GLUCOSE SERPL-MCNC: 124 MG/DL (ref 70–99)
GLUCOSE SERPL-MCNC: 129 MG/DL (ref 70–99)
INR PPP: 1.19 (ref 0.85–1.15)
INR PPP: 1.21 (ref 0.85–1.15)
INR PPP: 1.22 (ref 0.85–1.15)
LDH SERPL L TO P-CCNC: 246 U/L (ref 0–250)
LDH SERPL L TO P-CCNC: 273 U/L (ref 0–250)
MAGNESIUM SERPL-MCNC: 2.4 MG/DL (ref 1.7–2.3)
PHOSPHATE SERPL-MCNC: 4.4 MG/DL (ref 2.5–4.5)
POTASSIUM SERPL-SCNC: 3.5 MMOL/L (ref 3.4–5.3)
POTASSIUM SERPL-SCNC: 3.9 MMOL/L (ref 3.4–5.3)
POTASSIUM SERPL-SCNC: 4.4 MMOL/L (ref 3.4–5.3)
PROT SERPL-MCNC: 7.3 G/DL (ref 6.4–8.3)
SODIUM SERPL-SCNC: 136 MMOL/L (ref 136–145)
SODIUM SERPL-SCNC: 137 MMOL/L (ref 136–145)
SODIUM SERPL-SCNC: 138 MMOL/L (ref 136–145)
URATE SERPL-MCNC: 4 MG/DL (ref 3.4–7)
URATE SERPL-MCNC: 4.1 MG/DL (ref 3.4–7)
URATE SERPL-MCNC: 4.2 MG/DL (ref 3.4–7)
URATE SERPL-MCNC: 4.3 MG/DL (ref 3.4–7)

## 2023-09-03 PROCEDURE — 83735 ASSAY OF MAGNESIUM: CPT | Performed by: INTERNAL MEDICINE

## 2023-09-03 PROCEDURE — 85384 FIBRINOGEN ACTIVITY: CPT | Performed by: STUDENT IN AN ORGANIZED HEALTH CARE EDUCATION/TRAINING PROGRAM

## 2023-09-03 PROCEDURE — 258N000003 HC RX IP 258 OP 636: Performed by: PHYSICIAN ASSISTANT

## 2023-09-03 PROCEDURE — 250N000011 HC RX IP 250 OP 636: Mod: JZ | Performed by: PHYSICIAN ASSISTANT

## 2023-09-03 PROCEDURE — 250N000013 HC RX MED GY IP 250 OP 250 PS 637: Performed by: PHYSICIAN ASSISTANT

## 2023-09-03 PROCEDURE — 120N000002 HC R&B MED SURG/OB UMMC

## 2023-09-03 PROCEDURE — 85027 COMPLETE CBC AUTOMATED: CPT | Performed by: PHYSICIAN ASSISTANT

## 2023-09-03 PROCEDURE — 84550 ASSAY OF BLOOD/URIC ACID: CPT | Performed by: STUDENT IN AN ORGANIZED HEALTH CARE EDUCATION/TRAINING PROGRAM

## 2023-09-03 PROCEDURE — 80053 COMPREHEN METABOLIC PANEL: CPT | Performed by: STUDENT IN AN ORGANIZED HEALTH CARE EDUCATION/TRAINING PROGRAM

## 2023-09-03 PROCEDURE — 99233 SBSQ HOSP IP/OBS HIGH 50: CPT | Mod: GC | Performed by: INTERNAL MEDICINE

## 2023-09-03 PROCEDURE — 250N000011 HC RX IP 250 OP 636: Performed by: INTERNAL MEDICINE

## 2023-09-03 PROCEDURE — 258N000003 HC RX IP 258 OP 636: Performed by: STUDENT IN AN ORGANIZED HEALTH CARE EDUCATION/TRAINING PROGRAM

## 2023-09-03 PROCEDURE — 250N000013 HC RX MED GY IP 250 OP 250 PS 637: Performed by: STUDENT IN AN ORGANIZED HEALTH CARE EDUCATION/TRAINING PROGRAM

## 2023-09-03 PROCEDURE — 83615 LACTATE (LD) (LDH) ENZYME: CPT | Performed by: STUDENT IN AN ORGANIZED HEALTH CARE EDUCATION/TRAINING PROGRAM

## 2023-09-03 PROCEDURE — 85610 PROTHROMBIN TIME: CPT | Performed by: STUDENT IN AN ORGANIZED HEALTH CARE EDUCATION/TRAINING PROGRAM

## 2023-09-03 PROCEDURE — 84100 ASSAY OF PHOSPHORUS: CPT | Performed by: INTERNAL MEDICINE

## 2023-09-03 PROCEDURE — 258N000003 HC RX IP 258 OP 636: Performed by: INTERNAL MEDICINE

## 2023-09-03 PROCEDURE — 250N000012 HC RX MED GY IP 250 OP 636 PS 637: Performed by: INTERNAL MEDICINE

## 2023-09-03 PROCEDURE — 85007 BL SMEAR W/DIFF WBC COUNT: CPT | Performed by: PHYSICIAN ASSISTANT

## 2023-09-03 RX ADMIN — DEXAMETHASONE 12 MG: 4 TABLET ORAL at 17:44

## 2023-09-03 RX ADMIN — LEVOFLOXACIN 250 MG: 250 TABLET, FILM COATED ORAL at 08:07

## 2023-09-03 RX ADMIN — SODIUM CHLORIDE 120 MG: 9 INJECTION, SOLUTION INTRAVENOUS at 18:21

## 2023-09-03 RX ADMIN — ONDANSETRON 8 MG: 8 TABLET, FILM COATED ORAL at 05:29

## 2023-09-03 RX ADMIN — ACYCLOVIR 400 MG: 400 TABLET ORAL at 08:07

## 2023-09-03 RX ADMIN — POLYETHYLENE GLYCOL 3350 17 G: 17 POWDER, FOR SOLUTION ORAL at 12:47

## 2023-09-03 RX ADMIN — MICAFUNGIN SODIUM 50 MG: 50 INJECTION, POWDER, LYOPHILIZED, FOR SOLUTION INTRAVENOUS at 16:34

## 2023-09-03 RX ADMIN — SODIUM CHLORIDE 75 ML/HR: 9 INJECTION, SOLUTION INTRAVENOUS at 05:28

## 2023-09-03 RX ADMIN — ONDANSETRON 8 MG: 8 TABLET, FILM COATED ORAL at 17:44

## 2023-09-03 RX ADMIN — SALINE NASAL SPRAY 1 SPRAY: 1.5 SOLUTION NASAL at 17:47

## 2023-09-03 RX ADMIN — SALINE NASAL SPRAY 1 SPRAY: 1.5 SOLUTION NASAL at 14:51

## 2023-09-03 RX ADMIN — SODIUM CHLORIDE: 9 INJECTION, SOLUTION INTRAVENOUS at 18:18

## 2023-09-03 RX ADMIN — CYTARABINE 200 MG: 100 INJECTION, SOLUTION INTRATHECAL; INTRAVENOUS; SUBCUTANEOUS at 18:20

## 2023-09-03 RX ADMIN — ALLOPURINOL 300 MG: 300 TABLET ORAL at 08:07

## 2023-09-03 RX ADMIN — ACYCLOVIR 400 MG: 400 TABLET ORAL at 20:10

## 2023-09-03 ASSESSMENT — ACTIVITIES OF DAILY LIVING (ADL)
ADLS_ACUITY_SCORE: 22

## 2023-09-03 NOTE — PROGRESS NOTES
Nursing Focus: Chemotherapy  D: Positive brisk bright red blood return via PICC. Insertion site is clean/dry/intact, dressing intact with no complaints of pain.  Urine output is recorded in intake Doc Flowsheet.    I: Gave premedications given per order (see electronic medical administration record). Dose #2 of Daunorubicin started to infuse over 15 minutes. Dose#2 Cytarabine started to infuse over 24 hour. Reviewed pt teaching on chemotherapy side effects.  Pt denies need for further teaching. Chemotherapy double checked per protocol by two chemotherapy competent RN's.   A: Tolerating chemo well. Denies nausea.   P: Continue to monitor urine output and symptoms of nausea. Screen for symptoms of toxicity.

## 2023-09-03 NOTE — PROGRESS NOTES
Hematology  Daily Progress Note   Date of Service: 09/03/2023    Patient: Artie Fine  MRN: [id number]  Admission Date: 9/1/2023  Hospital Day # 2    Assessment & Plan:   Artie Fine is a 68 year old male with a past medical history of brain abscess (2021), HTN, and BPH. He was transferred from [facility] in [place], [place] to [facility] for work up of pancytopenia. At [facility], was noted to have circulating blasts concerning for acute leukemia, transferred to [facility]. 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     Today  Bowel regimen for constipation  Saline nose spray for dry nose  TLS and DIC labs q8h    HEME/ONC  # Pancytopenia with circulating blasts, flow cytometry confirmed AML  Patient initially presented to [facility] in [place], [place] as he noted progressive symptoms of easy bruising/bleeding, weakness, fatigue, loss of appetite, and night sweats since 4-6 weeks. He also noted headache similar to his previous brain abscess. CBC noted WBC 2.7 (), Hgb 8.1, and plt 1k. He was transferred to [facility] for work up of pancytopenia with profound thrombocytopenia. He was seen by Dr. Urena at [facility], who initiated work up, during which he was found to have 11% circulating myeloid blasts. He was transferred to [facility] for work up of acute leukemia.   - BMBx performed 9/1. Morph, cytogenetics, molecular pending. 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 1U platelets ordered on 9/2 due to plt 11, minor nosebleed (resolved)  - Viral serologies: HepB/C-, HIV-. HSV, EBV, CMV ordered (pending).   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, now discontinued  - Echo showed normal left ventricular function  - PICC placed 9/1 and started on induction chemotherapy with 7+3 (Cytarabine+Daunorubicin)  C1D1 9/2/23        Treatment Plan: 7+3 (C1D1=9/2/23)               - Cytarabine 100 mg/m2 D1-D7               - Daunorubicin 60 mg/m2 D1-D3               - Supportive meds: Decadron 12 mg tab D1-D3, Zofran and Compazine, Ativan     # Risk for TLS/DIC  - Monitor TLS/DIC labs q8h  - Allopurinol 300 mg daily  - Started on IV fluids NS 75 ml/hr for 2 days for prevention  - For TLS:               - If e/o TLS, bolus + start mIVF               - If uric acid >8 despite IVF, give rasburicase 6 mg x1               - If phos persistently >5, start PhosLo TID   - For DIC: Transfuse cryo if fibrinogen <100 and FFP if INR >1.8     ID  # PPX  -  mg BID  - Levofloxacin 250 mg daily  - Micafungin 50 mg IV daily     # H/o brain abscess (2021)  # Headache  Treated in [place]. S/p craniotomy for drainage of abscess July 2021. It is unclear what culture speciated to, but he completed a course of antibiotics with ceftriaxone then meropenem with resolution on imaging. Head CT 8/31 at OSH revealed no intracranial hemorrhage midline shift or mass effect. Postsurgical changes noted. Previous area of infection demonstrates trace amount of encephalomalacia but no mass effect or inflammatory changes to suggest new or acute infection. Otherwise unremarkable.  - Tylenol PRN for headache  - Pending course, consider LP for work up      CHRONIC  # Hypertension   Noted during prior hospitalization for brain abscess in 2021. Previously on lisinopril, which he is no longer taking. Currently normotensive     # BPH  Notes difficulty with complete emptying of bladder. Previously on Flomax but states it did nothing, so he stopped it. He does note frequent urination at baseline, unchanged from baseline.  - Monitor clinically     MISC  # Weakness  2/2 new diagnosis of malignancy. Overall mild, still independent.   - Plan for PT/OT consults this admission     # Loss of appetite- improved per pt  - RD consult to evaluate for malnutrition    Patient  "was seen and plan of care discussed with Dr. Carson Neville MD  Hematology fellow, PGY4  Pager: [phone]  ___________________________________________________________________    Subjective & Interval History:    No acute events noted overnight. He reports having slept well, walked around 3-4 times with no difficulty. Uses walker/IV pole for support due to baseline balance issues. Dry nose, no more nosebleeds. No headache. No BM since 2 days, will take bowel regimen.   No hematochezia, melena, hemoptysis, hematemesis or gum bleeding. No dyspnea, chest pain.     Physical Exam:    BP (!) 149/74 (BP Location: Left arm)   Pulse 79   Temp 97.5  F (36.4  C) (Oral)   Resp 20   Ht 1.74 m (5' 8.5\")   Wt 87.5 kg (193 lb)   SpO2 97%   BMI 28.92 kg/m    Gen: Well appearing, in NAD  HEENT: EOMI, PERRL, mmm, oropharynx clear, no lesions. No blood around nose  CV: Normal rate, regular rhythm. No m/r/g  Pulm: CTAB, no wheezing, normal work of breathing  Abd: Soft, nt/nd, no rebound/guarding. No palpable organomegaly  Ext: Warm and well perfused. No lower extremity edema  Skin: scattered petechiae on arms and legs  Neuro: Alert and answering questions appropriately. No focal deficits    Labs & Studies: I personally reviewed the following studies:  ROUTINE LABS   CMP  Recent Labs   Lab 09/03/23  1443 09/03/23  0511 09/02/23  2210 09/02/23  1420 09/02/23  0616 09/01/23  1646    138  137 138 134*  134* 138 139   POTASSIUM 3.5 3.9  3.9 3.9 3.7  3.7 3.8 3.6   CHLORIDE 105 106  106 105 102  102 106 107   CO2 19* 20*  20* 19* 20*  20* 21* 18*   ANIONGAP 12 12  11 14 12  12 11 14   * 117*  117* 120* 104*  104* 99 111*   BUN 21.0 15.9  16.1 14.7 14.5  14.5 16.1 14.7   CR 0.94 0.74  0.75 0.80 0.86  0.86 0.88 0.79   GFRESTIMATED 88 >90  >90 >90 >90  >90 >90 >90   VENANCIO 8.6* 8.6*  8.5* 8.8 8.7*  8.7* 8.6* 9.0   MAG  --  2.4*  --   --  2.3 2.3   PHOS  --  4.4  --  3.2 2.2* 3.5 "   PROTTOTAL  --  7.3  --  7.5 7.3 8.0   ALBUMIN  --  4.3  --  4.3 4.3 4.6   BILITOTAL  --  0.8  --  0.6 0.5 0.9   ALKPHOS  --  66  --  64 62 68   AST  --  19  --  21 20 19   ALT  --  8  --  <5 <5 8       CBC  Recent Labs   Lab 09/03/23  0511 09/02/23  1420 09/02/23  0616 09/01/23  1646   WBC 1.6* 3.7* 4.1 3.4*   RBC 2.62* 2.63* 2.67* 2.76*   HGB 7.2* 7.0* 7.2* 7.6*   HCT 24.8* 25.5* 25.7* 26.0*   MCV 95 97 96 94   MCH 27.5 26.6 27.0 27.5   MCHC 29.0* 27.5* 28.0* 29.2*   RDW 15.6* 16.2* 16.1* 15.8*   PLT 38* 8* 11* 16*        Latest Reference Range & Units 09/02/23 14:20 09/02/23 22:10 09/03/23 05:11 09/03/23 14:43   INR 0.85 - 1.15  1.19 (H) 1.18 (H) 1.19 (H) 1.21 (H)   PTT 22 - 38 Seconds 22      Fibrinogen 170 - 490 mg/dL 354 364 369 296          Latest Reference Range & Units 09/01/23 16:46 09/02/23 06:16 09/02/23 14:20 09/02/23 22:10 09/03/23 05:11 09/03/23 14:43   Lactate Dehydrogenase 0 - 250 U/L 289 (H)  292 (H)      Uric Acid 3.4 - 7.0 mg/dL 5.2 4.5 4.0 4.0 4.2 4.3        Latest Reference Range & Units 08/31/23 16:03 09/01/23 16:46   Hep B Surface Agn Nonreactive   Nonreactive   Hepatitis B Core Arlen Nonreactive  Nonreactive    Hepatitis B Surface Antibody Instrument Value <8.00 m[IU]/mL 48.37    Hepatitis B Surface Antibody  Reactive    Hepatitis C Antibody Nonreactive  Nonreactive       Latest Reference Range & Units 08/31/23 16:03   HIV Antigen Antibody Combo Nonreactive  Nonreactive     No M protein on serum DARCY    Flow cytometry bm aspirate 9/1  A. Iliac Crest, Bone Marrow Aspirate, Left:  -Increased, abnormal myeloid blast population (47%)  The immunophenotypic findings support acute myeloid leukemia. A blast count by flow cytometry may differ from a morphologic blast count for various reasons including lysis of erythroid precursors, the presence of other cells in the blast gate, and sampling differences. For classification and clinical decision making purposes, the morphologic blast count should be  used. Note - a marrow aspirate smear was reviewed alongside this flow cytometry case and demonstrated greater than 20% blasts. Final diagnosis and classification requires correlation with the results of other ancillary studies and the morphologic and clinical features.     Medications list for reference:  Current Facility-Administered Medications   Medication    0.9% sodium chloride BOLUS    acetaminophen (TYLENOL) tablet 650 mg    acyclovir (ZOVIRAX) tablet 400 mg    albuterol (PROVENTIL HFA/VENTOLIN HFA) inhaler    albuterol (PROVENTIL) neb solution 2.5 mg    allopurinol (ZYLOPRIM) tablet 300 mg    butalbital-acetaminophen-caffeine (ESGIC) per tablet 1 tablet    calcium carbonate (TUMS) chewable tablet 500-1,000 mg    Chemotherapy Infusing-Continuous Infusion    cytarabine (PF) (CYTOSAR) 200 mg in D5W 1,052 mL infusion    DAUNOrubicin HCl (CERUBIDINE) 120 mg in sodium chloride 0.9 % 79 mL infusion    dexAMETHasone (DECADRON) tablet 12 mg    diphenhydrAMINE (BENADRYL) injection 50 mg    EPINEPHrine PF (ADRENALIN) injection 0.3 mg    famotidine (PEPCID) injection 20 mg    levofloxacin (LEVAQUIN) tablet 250 mg    lidocaine (LMX4) cream    lidocaine 1 % 0.1-5 mL    LORazepam (ATIVAN) injection 0.5-1 mg    LORazepam (ATIVAN) tablet 0.5-1 mg    melatonin tablet 3-6 mg    meperidine (DEMEROL) injection 25 mg    methylPREDNISolone sodium succinate (solu-MEDROL) injection 125 mg    micafungin (MYCAMINE) 50 mg in sodium chloride 0.9 % 100 mL intermittent infusion    No rectal suppositories if WBC less than 1000/microliters or platelets less than 50,000/ L    ondansetron (ZOFRAN) injection 4 mg    Or    ondansetron (ZOFRAN ODT) ODT tab 4 mg    Or    ondansetron (ZOFRAN) tablet 4 mg    ondansetron (ZOFRAN) tablet 8 mg    polyethylene glycol (MIRALAX) Packet 17 g    prochlorperazine (COMPAZINE) tablet 5 mg    Or    prochlorperazine (COMPAZINE) injection 5 mg    prochlorperazine (COMPAZINE) injection 5-10 mg    prochlorperazine  (COMPAZINE) tablet 5-10 mg    senna-docusate (SENOKOT-S/PERICOLACE) 8.6-50 MG per tablet 1 tablet    Or    senna-docusate (SENOKOT-S/PERICOLACE) 8.6-50 MG per tablet 2 tablet    sodium chloride (OCEAN) 0.65 % nasal spray 1 spray    sodium chloride 0.9% infusion

## 2023-09-03 NOTE — PLAN OF CARE
Goal Outcome Evaluation:      Plan of Care Reviewed With: patient                     Time: 6007-5041     Reason for admission: new acute leukemia dx, C1D1 7+3  Activity: UAL  Pain: denies  Neuro: word finding noted often, pt states this is baseline. Otherwise WDL  Cardiac: WDL  Respiratory: WDL  GI/: WDL  Diet: reg diet  Lines: PIV x1, right PICC- infusing chemo and NS  Labs/imaging: Reviewed, no replacements indicated this shift.   Vitals: WDL  Max Temp: 97.8      New changes this shift: No acute events this shift.       Continue to follow POC..

## 2023-09-03 NOTE — PLAN OF CARE
"4934-0430  BP (!) 146/70   Pulse 62   Temp 97.9  F (36.6  C) (Oral)   Resp 16   Ht 1.74 m (5' 8.5\")   Wt 86.8 kg (191 lb 4.8 oz)   SpO2 96%   BMI 28.66 kg/m      Afebrile, VSS, No acute event.   Chemo 7+3 (Cytarabine and Daunorubicin) started today, pt tolerated well.   Plts transfused for plt level 8.     NEURO: Alert and oriented x4.      RESPIRATORY: Room Air, denies dizziness, and SOB. Lungs sound, clear, equal bilateral.     CARDIO: VSS, denies dizziness, and extremity pain.      GI/: void urine spontaneously without saving.       SKIN: Intact.      ACTIVITY: independent.      PAIN: Denies pain.    DLA: PICC double lumen, both infusing.     BG: No      PLAN:       Treatment Plan: 7+3 (C1D1=9/2/23)               - Cytarabine 100 mg/m2 D1-D7               - Daunorubicin 60 mg/m2 D1-D3               - Supportive meds: Decadron 12 mg tab D1-D3, Zofran and Compazine, Ativan    Continue monitoring.     * Italic, from provider's note.            "

## 2023-09-03 NOTE — PROGRESS NOTES
Nursing Focus: Chemotherapy  D: Positive blood return via PICC. Insertion site is clean/dry/intact, dressing intact with no complaints of pain.  Urine output is recorded in intake in Doc Flowsheet.    I: Premedications given per order (see electronic medical administration record). Dose #1 of Daunorubicin infused over 15 minutes, and Dose #1 of Cytarabine started to infuse over 24 hours.   Reviewed pt teaching on chemotherapy side effects.  Pt denies need for further teaching. Chemotherapy double checked per protocol by two chemotherapy competent RN's.   A: Tolerating procedure well. Denies nausea and or pain.   P: Continue to monitor urine output and symptoms of nausea. Screen for symptoms of toxicity.

## 2023-09-04 LAB
ALBUMIN SERPL BCG-MCNC: 3.9 G/DL (ref 3.5–5.2)
ALP SERPL-CCNC: 57 U/L (ref 40–129)
ALT SERPL W P-5'-P-CCNC: <5 U/L (ref 0–70)
ANION GAP SERPL CALCULATED.3IONS-SCNC: 10 MMOL/L (ref 7–15)
ANION GAP SERPL CALCULATED.3IONS-SCNC: 10 MMOL/L (ref 7–15)
ANION GAP SERPL CALCULATED.3IONS-SCNC: 11 MMOL/L (ref 7–15)
AST SERPL W P-5'-P-CCNC: 16 U/L (ref 0–45)
BILIRUB SERPL-MCNC: 1 MG/DL
BUN SERPL-MCNC: 19.4 MG/DL (ref 8–23)
BUN SERPL-MCNC: 19.5 MG/DL (ref 8–23)
BUN SERPL-MCNC: 19.7 MG/DL (ref 8–23)
CALCIUM SERPL-MCNC: 8.1 MG/DL (ref 8.8–10.2)
CALCIUM SERPL-MCNC: 8.2 MG/DL (ref 8.8–10.2)
CALCIUM SERPL-MCNC: 9.1 MG/DL (ref 8.8–10.2)
CHLORIDE SERPL-SCNC: 105 MMOL/L (ref 98–107)
CHLORIDE SERPL-SCNC: 107 MMOL/L (ref 98–107)
CHLORIDE SERPL-SCNC: 108 MMOL/L (ref 98–107)
CMV IGG SERPL IA-ACNC: 5.6 U/ML
CMV IGG SERPL IA-ACNC: ABNORMAL
CREAT SERPL-MCNC: 0.67 MG/DL (ref 0.67–1.17)
CREAT SERPL-MCNC: 0.68 MG/DL (ref 0.67–1.17)
CREAT SERPL-MCNC: 0.69 MG/DL (ref 0.67–1.17)
DEPRECATED HCO3 PLAS-SCNC: 19 MMOL/L (ref 22–29)
DEPRECATED HCO3 PLAS-SCNC: 19 MMOL/L (ref 22–29)
DEPRECATED HCO3 PLAS-SCNC: 21 MMOL/L (ref 22–29)
FIBRINOGEN PPP-MCNC: 275 MG/DL (ref 170–490)
GFR SERPL CREATININE-BSD FRML MDRD: >90 ML/MIN/1.73M2
GLUCOSE SERPL-MCNC: 105 MG/DL (ref 70–99)
GLUCOSE SERPL-MCNC: 135 MG/DL (ref 70–99)
GLUCOSE SERPL-MCNC: 136 MG/DL (ref 70–99)
HSV1 IGG SERPL QL IA: 22.3 INDEX
HSV1 IGG SERPL QL IA: ABNORMAL
HSV2 IGG SERPL QL IA: 8.39 INDEX
HSV2 IGG SERPL QL IA: ABNORMAL
INR PPP: 1.14 (ref 0.85–1.15)
LDH SERPL L TO P-CCNC: 252 U/L (ref 0–250)
MAGNESIUM SERPL-MCNC: 2.4 MG/DL (ref 1.7–2.3)
MAGNESIUM SERPL-MCNC: 2.4 MG/DL (ref 1.7–2.3)
PHOSPHATE SERPL-MCNC: 3.7 MG/DL (ref 2.5–4.5)
PHOSPHATE SERPL-MCNC: 3.7 MG/DL (ref 2.5–4.5)
POTASSIUM SERPL-SCNC: 3.9 MMOL/L (ref 3.4–5.3)
POTASSIUM SERPL-SCNC: 4.2 MMOL/L (ref 3.4–5.3)
POTASSIUM SERPL-SCNC: 4.2 MMOL/L (ref 3.4–5.3)
PROT SERPL-MCNC: 6.6 G/DL (ref 6.4–8.3)
SODIUM SERPL-SCNC: 136 MMOL/L (ref 136–145)
SODIUM SERPL-SCNC: 137 MMOL/L (ref 136–145)
SODIUM SERPL-SCNC: 137 MMOL/L (ref 136–145)
URATE SERPL-MCNC: 4 MG/DL (ref 3.4–7)
URATE SERPL-MCNC: 4.2 MG/DL (ref 3.4–7)

## 2023-09-04 PROCEDURE — 250N000012 HC RX MED GY IP 250 OP 636 PS 637: Performed by: INTERNAL MEDICINE

## 2023-09-04 PROCEDURE — 258N000003 HC RX IP 258 OP 636: Performed by: PHYSICIAN ASSISTANT

## 2023-09-04 PROCEDURE — 83735 ASSAY OF MAGNESIUM: CPT | Performed by: INTERNAL MEDICINE

## 2023-09-04 PROCEDURE — 93010 ELECTROCARDIOGRAM REPORT: CPT | Performed by: INTERNAL MEDICINE

## 2023-09-04 PROCEDURE — 84100 ASSAY OF PHOSPHORUS: CPT | Performed by: INTERNAL MEDICINE

## 2023-09-04 PROCEDURE — 93005 ELECTROCARDIOGRAM TRACING: CPT

## 2023-09-04 PROCEDURE — 250N000011 HC RX IP 250 OP 636: Mod: JZ | Performed by: INTERNAL MEDICINE

## 2023-09-04 PROCEDURE — 85027 COMPLETE CBC AUTOMATED: CPT | Performed by: PHYSICIAN ASSISTANT

## 2023-09-04 PROCEDURE — 99233 SBSQ HOSP IP/OBS HIGH 50: CPT | Mod: FS

## 2023-09-04 PROCEDURE — 80053 COMPREHEN METABOLIC PANEL: CPT | Performed by: PHYSICIAN ASSISTANT

## 2023-09-04 PROCEDURE — 85007 BL SMEAR W/DIFF WBC COUNT: CPT | Performed by: PHYSICIAN ASSISTANT

## 2023-09-04 PROCEDURE — 84550 ASSAY OF BLOOD/URIC ACID: CPT

## 2023-09-04 PROCEDURE — 250N000013 HC RX MED GY IP 250 OP 250 PS 637: Performed by: PHYSICIAN ASSISTANT

## 2023-09-04 PROCEDURE — 250N000011 HC RX IP 250 OP 636: Mod: JZ | Performed by: PHYSICIAN ASSISTANT

## 2023-09-04 PROCEDURE — 120N000002 HC R&B MED SURG/OB UMMC

## 2023-09-04 PROCEDURE — 83735 ASSAY OF MAGNESIUM: CPT

## 2023-09-04 PROCEDURE — 85384 FIBRINOGEN ACTIVITY: CPT | Performed by: STUDENT IN AN ORGANIZED HEALTH CARE EDUCATION/TRAINING PROGRAM

## 2023-09-04 PROCEDURE — 85610 PROTHROMBIN TIME: CPT | Performed by: STUDENT IN AN ORGANIZED HEALTH CARE EDUCATION/TRAINING PROGRAM

## 2023-09-04 PROCEDURE — 83615 LACTATE (LD) (LDH) ENZYME: CPT | Performed by: STUDENT IN AN ORGANIZED HEALTH CARE EDUCATION/TRAINING PROGRAM

## 2023-09-04 PROCEDURE — 258N000003 HC RX IP 258 OP 636: Performed by: INTERNAL MEDICINE

## 2023-09-04 PROCEDURE — 80048 BASIC METABOLIC PNL TOTAL CA: CPT

## 2023-09-04 PROCEDURE — 84550 ASSAY OF BLOOD/URIC ACID: CPT | Performed by: STUDENT IN AN ORGANIZED HEALTH CARE EDUCATION/TRAINING PROGRAM

## 2023-09-04 PROCEDURE — 84100 ASSAY OF PHOSPHORUS: CPT

## 2023-09-04 RX ORDER — HEPARIN SODIUM,PORCINE 10 UNIT/ML
5-20 VIAL (ML) INTRAVENOUS
Status: DISCONTINUED | OUTPATIENT
Start: 2023-09-04 | End: 2023-10-03 | Stop reason: HOSPADM

## 2023-09-04 RX ORDER — HEPARIN SODIUM,PORCINE 10 UNIT/ML
5-20 VIAL (ML) INTRAVENOUS EVERY 24 HOURS
Status: DISCONTINUED | OUTPATIENT
Start: 2023-09-04 | End: 2023-09-04

## 2023-09-04 RX ADMIN — CYTARABINE 200 MG: 100 INJECTION, SOLUTION INTRATHECAL; INTRAVENOUS; SUBCUTANEOUS at 18:07

## 2023-09-04 RX ADMIN — ONDANSETRON 8 MG: 8 TABLET, FILM COATED ORAL at 16:52

## 2023-09-04 RX ADMIN — DEXAMETHASONE 12 MG: 4 TABLET ORAL at 16:52

## 2023-09-04 RX ADMIN — ONDANSETRON 8 MG: 8 TABLET, FILM COATED ORAL at 06:33

## 2023-09-04 RX ADMIN — LEVOFLOXACIN 250 MG: 250 TABLET, FILM COATED ORAL at 10:03

## 2023-09-04 RX ADMIN — MICAFUNGIN SODIUM 50 MG: 50 INJECTION, POWDER, LYOPHILIZED, FOR SOLUTION INTRAVENOUS at 16:43

## 2023-09-04 RX ADMIN — SALINE NASAL SPRAY 1 SPRAY: 1.5 SOLUTION NASAL at 20:39

## 2023-09-04 RX ADMIN — ALLOPURINOL 300 MG: 300 TABLET ORAL at 10:03

## 2023-09-04 RX ADMIN — SODIUM CHLORIDE 120 MG: 9 INJECTION, SOLUTION INTRAVENOUS at 18:04

## 2023-09-04 RX ADMIN — SALINE NASAL SPRAY 1 SPRAY: 1.5 SOLUTION NASAL at 06:34

## 2023-09-04 RX ADMIN — SALINE NASAL SPRAY 1 SPRAY: 1.5 SOLUTION NASAL at 13:32

## 2023-09-04 RX ADMIN — POLYETHYLENE GLYCOL 3350 17 G: 17 POWDER, FOR SOLUTION ORAL at 06:44

## 2023-09-04 RX ADMIN — ACYCLOVIR 400 MG: 400 TABLET ORAL at 20:25

## 2023-09-04 RX ADMIN — ACYCLOVIR 400 MG: 400 TABLET ORAL at 10:03

## 2023-09-04 ASSESSMENT — ACTIVITIES OF DAILY LIVING (ADL)
ADLS_ACUITY_SCORE: 22

## 2023-09-04 NOTE — PLAN OF CARE
Goal Outcome Evaluation:  1105-0911  Denied pain or nausea. Afebrile. Day # 3 of chemotherapy hung per protocol without problems. Good blood return from PICC line. He had a hard stool today. Up independently.

## 2023-09-04 NOTE — PLAN OF CARE
Goal Outcome Evaluation:    8541-5748    VSS. Alert and oriented. Denies pain overnight. Tolerating chemo well, continue with POC.

## 2023-09-04 NOTE — PLAN OF CARE
"Goal Outcome Evaluation:      Plan of Care Reviewed With: patient    Overall Patient Progress: no changeOverall Patient Progress: no change    1500 - 2330:   BP (!) 149/74 (BP Location: Left arm)   Pulse 79   Temp 97.5  F (36.4  C) (Oral)   Resp 20   Ht 1.74 m (5' 8.5\")   Wt 87.5 kg (193 lb)   SpO2 97%   BMI 28.92 kg/m       A&O x 4, AVSS, denies pain/nausea/sob on RA.  Day#2 Cytarabine infusing via purple lumen with good blood return & been tolerating well thus far.  Up independently, ambulate in hallway this evening, voiding spontaneously, last bm 9/1, took miralax AM, offered senna but decline & want to wait tomorrow.  Continue with poc....        "

## 2023-09-04 NOTE — PROGRESS NOTES
[facility]    Progress Note  Hematology / Oncology     Date of Admission:  9/1/2023  Hospital Day #: 3   Date of Service (when I saw the patient): 09/04/2023    Assessment & Plan   Artie Fine is a 69yo M w/ a PMH of brain abscess (2021), HTN, and BPH. He was transferred from [facility] in [place], [place] to [facility] for work up of pancytopenia; was noted to have circulating blasts concerning for acute leukemia, and was subsequently transferred to [facility]. 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 He was started on induction with 7+3 C1D1 9/2/23.    Today: Day 3 of 7+3 & Midostaurin  - PT consult placed today to help assess patient baseline and in setting of reported imbalance issues when ambulating. RN notified patient expresses balance issues she will continue to observe, not requiring fall precautions at time as patient ambulated for 20 mins today. He denies needing to use his walker for assistance.  - RN to remove R P IV as patient has R PICC placed which is sufficient.  - CTM and provide best supportive cares.    HEME/ONC  # AML  Patient initially presented to [facility] in [place], [place] with 4-6 weeks of progressive bruising, weakness, fatigue, loss of appetite, and night sweats. He also noted a headache similar to his previous brain abscess. CBC notable for pancytopenia; WBC 2.7 (), Hgb 8.1, and plt 1k. He was transferred to [facility] found on peripheral flow w/ 11% circulating myeloid blasts. He was then transferred to [facility] for further work up and treatment of AML. Diagnostic BMBx performed 9/1 w/ morph, cytogenetics, molecular results pending. 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 47% blasts with the following immunophenotype: Positive for CD7, CD13, CD33, CD34, CD38, CD45 (dim), CD58, CD64 (partial), , HLA-DR, cytoplasmic myeloperoxidase, and nuclear terminal deoxynucleotidyl  transferase (TdT) (dim partial, predominantly negative). 98% of the blasts are positive for CD33 (clone P67.6 in APC-R700, relative to background lymphocytes).   - HLA typing ordered. Baseline TTE showing normal left ventricular function with EF of 55-60%. EKG ordered 9/4/23 results pending. PICC placed 9/1 upon admission to [facility] and induction chemotherapy of 7+3 Cytarabine+Daunorubicin was started; C1D1 9/2/23. Patient continues to tolerate this chemotherapy cycle well.  - Consider CNS leukemia work up when clinically appropriate as patient endorses headache upon admission for new AML diagnosis.         Treatment Plan: 7+3 (C1D1=9/2/23)               - Cytarabine 100 mg/m2 D1-D7               - Daunorubicin 60 mg/m2 D1-D3               - Supportive meds: Decadron 12 mg tab D1-D3, Zofran and Compazine, Ativan     # Risk for TLS/DIC  - Monitor TLS/DIC labs daily.  - Allopurinol 300 mg daily.  - Started on IV fluids NS 75 ml/hr for 2 days for prevention; will not continue mIVF after this as not clinically indicated at this time on 9/4/23.  - For TLS:               - If e/o TLS, bolus + start mIVF               - If uric acid >8 despite IVF, give rasburicase 6 mg x1               - If phos persistently >5, start PhosLo TID   - For DIC: Transfuse cryo if fibrinogen <100 and FFP if INR >1.8     ID  # Immunosuppressed  2/2 malignancy and chemotherapy  # ID PPX  - Viral serologies: HepB/C-, HIV-. HSV 1 & 2 IgG positive, EBV, CMV IgG positive  -  mg BID  - Levofloxacin 250 mg daily  - Micafungin 50 mg IV daily   - Need to assess for vori/posa coverage.     # H/o brain abscess (2021)  # Headache  Treated in [place]. S/p craniotomy for drainage of abscess July 2021. It is unclear what culture speciated to, but he completed a course of antibiotics with ceftriaxone then meropenem with resolution on imaging. Head CT 8/31 at OSH revealed no intracranial hemorrhage midline shift or mass effect. Postsurgical changes noted.  Previous area of infection demonstrates trace amount of encephalomalacia but no mass effect or inflammatory changes to suggest new or acute infection. Otherwise unremarkable with no acute inpatient requirements at this time.  - Tylenol PRN for headache  - If clinically indicated; consider LP to assess for CNS leukemia although risk with AML is low.     CARDS  # HTN  Noted during prior hospitalization for brain abscess in 2021. Previously on lisinopril, which he is no longer taking. Remains normotensive this admission.    URINARY  # BPH  Notes difficulty with complete emptying of bladder. He notes frequent urination at baseline.  He is not experiencing dysuria and there are no acute inpatient requirements at this time.     MISC  # Weakness  # Deconditioning  # Possible balance issues when ambulating  2/2 malignancy. Patient ambulates independently however endorses issues w/ balance after his brain abscess in 2021, exacerbated when ambulating.  - PT consult placed 9/4 to assess for baseline and help with dispo requirements.     # Loss of appetite  # Risk of malnutrition  RD consult placed upon admission; patient does not meet two of the established criteria necessary for diagnosing malnutrition but is at risk for malnutrition. Recc's are: small frequent meals/snacks, encourage consumption of high protein foods first at meal times, high protein snack once daily between meals. Pt declines supplements at this time. 2 PM snack: 1% milk every day (protein source) + alternate 2 chocolate chip cookies E/O day or vicenta ice cream.   % Intake: < 75% for >/= 1 month (moderate)  % Weight Loss: Weight loss does not meet criteria  Subcutaneous Fat Loss: None observed  Muscle Loss: None observed  Fluid Accumulation/Edema: None noted  Malnutrition Diagnosis: Patient does not meet two of the established criteria necessary for diagnosing malnutrition but is at risk for malnutrition.  - Monitor PO intake trends, meal frequency, weight  "trends, snack intake.      Clinically Significant Risk Factors          # Hypocalcemia: Lowest Ca = 8.1 mg/dL in last 2 days, will monitor and replace as appropriate         # Thrombocytopenia: Lowest platelets = 8 in last 2 days, will monitor for bleeding          # Overweight: Estimated body mass index is 29.32 kg/m  as calculated from the following:    Height as of this encounter: 1.74 m (5' 8.5\").    Weight as of this encounter: 88.8 kg (195 lb 11.2 oz)., PRESENT ON ADMISSION               Code Status: FULL  Disposition: Likely 2-4 weeks for new AML treatment and BMBx to assess for MRD.  Follow up: Will require new patient hematology and BMT referrals.      This plan of care has been discussed with the staff physician, Dr. Byrd.    Hilda Gamble PA-C  Hematology/Oncology  Pager: #[phone]    I spent 55 minutes in the care of this patient today, which included time necessary for review of interval events, obtaining history and physical exam, ordering medication(s)/test(s) as medically indicated, discussion with interdisciplinary/consult team(s), and documentation time. Over 50% of time was spent face-to-face and/or coordinating care.     Interval History   Thorough chart review and RN notes overnight showing no acute events, patient is afebrile, and hemodynamically stable. Upon my visit today patient is sitting down in his chair, in NAD, and conversational. He endorses intermittent night sweats that he has been experiencing for 4-6 weeks, and he states he does sometimes experience dizziness and balance issues when ambulating, which is his baseline ever since his hx of brain abscess in 2021. Otherwise he denies full ROS today such as N/V/D, F/C, headache, vision changes, muscle weakness, chest/abdominal pain, SOB, cough, lesions, rashes, or wounds. Patient was agreeable to a PT consult today given his continued balance issues. He denied using his walker when ambulating. He denied feeling light headed or that he " was going to fall. He reports a BM this morning on miralax and the saline nasal spray has worked well given his dry sinuses. He denied any further questions or concerns for the team today.    A comprehensive review of symptoms was performed and was negative except as detailed in the interval history above.    Physical Exam   Vital Signs with Ranges  Temp:  [97.4  F (36.3  C)-97.9  F (36.6  C)] 97.4  F (36.3  C)  Pulse:  [75-78] 78  Resp:  [18-20] 20  BP: (127-160)/(64-77) 160/77  SpO2:  [96 %-97 %] 97 %    I/O last 3 completed shifts:  In: 3118.75 [P.O.:720; I.V.:2398.75]  Out: -     Vitals:    09/02/23 0820 09/03/23 0827 09/04/23 0850   Weight: 86.8 kg (191 lb 4.8 oz) 87.5 kg (193 lb) 88.8 kg (195 lb 11.2 oz)       Constitutional: Pleasant and cooperative male sitting up, NAD, awake, alert, conversational.  HEENT: NC/AT, EOMI, sclera clear, conjunctiva normal, OP with MMM  Respiratory: 96% O2 on RA, w/ no increased work of breathing, CTAB w/ no crackles or wheezing.  Cardiovascular: RRR, no M/R/G. No BLE edema.  GI: Non distended, normoactive bowel sounds, soft, not TTP.  Skin: Warm, dry, well-perfused. No bruising, bleeding, rashes, or lesions on limited exam.  Neurologic: A&Ox3. Answers questions appropriately. Moves all extremities spontaneously.  Psych: Calm, appropriate affect.  Vascular access:  R PICC; CDI, non-tender, no surrounding erythema.    Medications    - MEDICATION INSTRUCTIONS -      sodium chloride 75 mL/hr at 09/03/23 1818        acyclovir  400 mg Oral BID    allopurinol  300 mg Oral Daily    Chemotherapy Infusing-Continuous Infusion   Does not apply Q8H    cytarabine (PF) (CYTOSAR) 200 mg in D5W 1,052 mL infusion  200 mg Intravenous Q24H    DAUNOrubicin  120 mg Intravenous Q24H    dexAMETHasone  12 mg Oral Q24H    levofloxacin  250 mg Oral Daily    micafungin  50 mg Intravenous Q24H    ondansetron  8 mg Oral Q12H       Antiinfectives  Anti-infectives (From now, onward)      Start     Dose/Rate  Route Frequency Ordered Stop    09/01/23 2000  acyclovir (ZOVIRAX) tablet 400 mg         400 mg Oral 2 TIMES DAILY 09/01/23 1513      09/01/23 1630  micafungin (MYCAMINE) 50 mg in sodium chloride 0.9 % 100 mL intermittent infusion         50 mg  100 mL/hr over 60 Minutes Intravenous EVERY 24 HOURS 09/01/23 1513      09/01/23 1530  levofloxacin (LEVAQUIN) tablet 250 mg         250 mg Oral DAILY 09/01/23 1513              Data   CBC   Recent Labs   Lab 09/04/23  0429 09/03/23  0511 09/02/23  1420 09/02/23  0616   WBC 1.0* 1.6* 3.7* 4.1   RBC 2.72* 2.62* 2.63* 2.67*   HGB 7.5* 7.2* 7.0* 7.2*   HCT 26.1* 24.8* 25.5* 25.7*   MCV 96 95 97 96   MCH 27.6 27.5 26.6 27.0   MCHC 28.7* 29.0* 27.5* 28.0*   RDW 15.3* 15.6* 16.2* 16.1*   PLT 22* 38* 8* 11*       CMP   Recent Labs   Lab 09/04/23  0429 09/03/23  2154 09/03/23  1443 09/03/23  0511 09/02/23  2210 09/02/23  1420 09/02/23  0616 09/01/23  1646     137 138 136 138  137   < > 134*  134* 138 139   POTASSIUM 4.2  4.2 4.4 3.5 3.9  3.9   < > 3.7  3.7 3.8 3.6   CHLORIDE 107  108* 108* 105 106  106   < > 102  102 106 107   CO2 19*  19* 21* 19* 20*  20*   < > 20*  20* 21* 18*   ANIONGAP 11  10 9 12 12  11   < > 12  12 11 14   *  135* 129* 124* 117*  117*   < > 104*  104* 99 111*   BUN 19.4  19.5 21.3 21.0 15.9  16.1   < > 14.5  14.5 16.1 14.7   CR 0.69  0.68 0.81 0.94 0.74  0.75   < > 0.86  0.86 0.88 0.79   GFRESTIMATED >90  >90 >90 88 >90  >90   < > >90  >90 >90 >90   VEANNCIO 8.1*  8.2* 8.3* 8.6* 8.6*  8.5*   < > 8.7*  8.7* 8.6* 9.0   MAG 2.4*  2.4*  --   --  2.4*  --   --  2.3 2.3   PHOS 3.7  3.7  --   --  4.4  --  3.2 2.2* 3.5   PROTTOTAL 6.6  --   --  7.3  --  7.5 7.3 8.0   ALBUMIN 3.9  --   --  4.3  --  4.3 4.3 4.6   BILITOTAL 1.0  --   --  0.8  --  0.6 0.5 0.9   ALKPHOS 57  --   --  66  --  64 62 68   AST 16  --   --  19  --  21 20 19   ALT <5  --   --  8  --  <5 <5 8    < > = values in this interval not displayed.       LFTs    Recent Labs   Lab 09/04/23  0429   PROTTOTAL 6.6   ALBUMIN 3.9   BILITOTAL 1.0   ALKPHOS 57   AST 16   ALT <5       Coagulation Studies   Recent Labs   Lab 09/04/23  0429 09/02/23  2210 09/02/23  1420   INR 1.14   < > 1.19*   PTT  --   --  22    < > = values in this interval not displayed.

## 2023-09-05 ENCOUNTER — APPOINTMENT (OUTPATIENT)
Dept: PHYSICAL THERAPY | Facility: CLINIC | Age: 68
DRG: 835 | End: 2023-09-05
Payer: COMMERCIAL

## 2023-09-05 LAB
ABO/RH TYPE: NORMAL
ALBUMIN SERPL BCG-MCNC: 4.1 G/DL (ref 3.5–5.2)
ALP SERPL-CCNC: 54 U/L (ref 40–129)
ALT SERPL W P-5'-P-CCNC: 9 U/L (ref 0–70)
ANION GAP SERPL CALCULATED.3IONS-SCNC: 12 MMOL/L (ref 7–15)
ANION GAP SERPL CALCULATED.3IONS-SCNC: 9 MMOL/L (ref 7–15)
APTT PPP: 23 SECONDS (ref 22–38)
AST SERPL W P-5'-P-CCNC: 16 U/L (ref 0–45)
ATRIAL RATE - MUSE: 81 BPM
BACTERIA BLD CULT: NO GROWTH
BACTERIA BLD CULT: NO GROWTH
BASOPHILS # BLD MANUAL: 0 10E3/UL (ref 0–0.2)
BASOPHILS NFR BLD MANUAL: 0 %
BILIRUB DIRECT SERPL-MCNC: 0.34 MG/DL (ref 0–0.3)
BILIRUB SERPL-MCNC: 1.2 MG/DL
BLD PROD TYP BPU: NORMAL
BLOOD COMPONENT TYPE: NORMAL
BUN SERPL-MCNC: 18.4 MG/DL (ref 8–23)
BUN SERPL-MCNC: 22.8 MG/DL (ref 8–23)
CALCIUM SERPL-MCNC: 8.8 MG/DL (ref 8.8–10.2)
CALCIUM SERPL-MCNC: 9.2 MG/DL (ref 8.8–10.2)
CHLORIDE SERPL-SCNC: 104 MMOL/L (ref 98–107)
CHLORIDE SERPL-SCNC: 107 MMOL/L (ref 98–107)
CMV IGG SERPL IA-ACNC: 6.5 U/ML
CMV IGG SERPL IA-ACNC: ABNORMAL
CODING SYSTEM: NORMAL
CREAT SERPL-MCNC: 0.63 MG/DL (ref 0.67–1.17)
CREAT SERPL-MCNC: 0.72 MG/DL (ref 0.67–1.17)
CROSSMATCH: NORMAL
CULTURE HARVEST COMPLETE DATE: NORMAL
DEPRECATED HCO3 PLAS-SCNC: 21 MMOL/L (ref 22–29)
DEPRECATED HCO3 PLAS-SCNC: 22 MMOL/L (ref 22–29)
DIASTOLIC BLOOD PRESSURE - MUSE: NORMAL MMHG
EBV VCA IGG SER IA-ACNC: 678 U/ML
EBV VCA IGG SER IA-ACNC: POSITIVE
EOSINOPHIL # BLD MANUAL: 0 10E3/UL (ref 0–0.7)
EOSINOPHIL NFR BLD MANUAL: 0 %
ERYTHROCYTE [DISTWIDTH] IN BLOOD BY AUTOMATED COUNT: 15 % (ref 10–15)
FIBRINOGEN PPP-MCNC: 259 MG/DL (ref 170–490)
GFR SERPL CREATININE-BSD FRML MDRD: >90 ML/MIN/1.73M2
GFR SERPL CREATININE-BSD FRML MDRD: >90 ML/MIN/1.73M2
GLUCOSE SERPL-MCNC: 115 MG/DL (ref 70–99)
GLUCOSE SERPL-MCNC: 88 MG/DL (ref 70–99)
HCT VFR BLD AUTO: 23.8 % (ref 40–53)
HGB BLD-MCNC: 7 G/DL (ref 13.3–17.7)
HSV1 IGG SERPL QL IA: 24.5 INDEX
HSV1 IGG SERPL QL IA: ABNORMAL
HSV2 IGG SERPL QL IA: 7.61 INDEX
HSV2 IGG SERPL QL IA: ABNORMAL
INR PPP: 1.15 (ref 0.85–1.15)
INTERPRETATION ECG - MUSE: NORMAL
ISSUE DATE AND TIME: NORMAL
LDH SERPL L TO P-CCNC: 234 U/L (ref 0–250)
LYMPHOCYTES # BLD MANUAL: 0.7 10E3/UL (ref 0.8–5.3)
LYMPHOCYTES NFR BLD MANUAL: 46 %
MAGNESIUM SERPL-MCNC: 2.4 MG/DL (ref 1.7–2.3)
MCH RBC QN AUTO: 27.7 PG (ref 26.5–33)
MCHC RBC AUTO-ENTMCNC: 29.4 G/DL (ref 31.5–36.5)
MCV RBC AUTO: 94 FL (ref 78–100)
MONOCYTES # BLD MANUAL: 0 10E3/UL (ref 0–1.3)
MONOCYTES NFR BLD MANUAL: 1 %
NEUTROPHILS # BLD MANUAL: 0.8 10E3/UL (ref 1.6–8.3)
NEUTROPHILS NFR BLD MANUAL: 53 %
NRBC # BLD AUTO: 0 10E3/UL
NRBC BLD MANUAL-RTO: 1 %
P AXIS - MUSE: 57 DEGREES
PHOSPHATE SERPL-MCNC: 3.7 MG/DL (ref 2.5–4.5)
PLAT MORPH BLD: ABNORMAL
PLATELET # BLD AUTO: 11 10E3/UL (ref 150–450)
POTASSIUM SERPL-SCNC: 3.6 MMOL/L (ref 3.4–5.3)
POTASSIUM SERPL-SCNC: 4.1 MMOL/L (ref 3.4–5.3)
PR INTERVAL - MUSE: 202 MS
PROT SERPL-MCNC: 6.6 G/DL (ref 6.4–8.3)
QRS DURATION - MUSE: 106 MS
QT - MUSE: 374 MS
QTC - MUSE: 434 MS
R AXIS - MUSE: -28 DEGREES
RBC # BLD AUTO: 2.53 10E6/UL (ref 4.4–5.9)
RBC MORPH BLD: ABNORMAL
SODIUM SERPL-SCNC: 137 MMOL/L (ref 136–145)
SODIUM SERPL-SCNC: 138 MMOL/L (ref 136–145)
SPECIMEN EXPIRATION DATE: NORMAL
SYSTOLIC BLOOD PRESSURE - MUSE: NORMAL MMHG
T AXIS - MUSE: -1 DEGREES
UNIT ABO/RH: NORMAL
UNIT NUMBER: NORMAL
UNIT STATUS: NORMAL
UNIT TYPE ISBT: 5100
URATE SERPL-MCNC: 4.2 MG/DL (ref 3.4–7)
URATE SERPL-MCNC: 4.2 MG/DL (ref 3.4–7)
VENTRICULAR RATE- MUSE: 81 BPM
WBC # BLD AUTO: 1.5 10E3/UL (ref 4–11)

## 2023-09-05 PROCEDURE — 258N000003 HC RX IP 258 OP 636: Performed by: INTERNAL MEDICINE

## 2023-09-05 PROCEDURE — P9016 RBC LEUKOCYTES REDUCED: HCPCS | Performed by: PHYSICIAN ASSISTANT

## 2023-09-05 PROCEDURE — 85007 BL SMEAR W/DIFF WBC COUNT: CPT | Performed by: PHYSICIAN ASSISTANT

## 2023-09-05 PROCEDURE — 84100 ASSAY OF PHOSPHORUS: CPT

## 2023-09-05 PROCEDURE — 97110 THERAPEUTIC EXERCISES: CPT | Mod: GP

## 2023-09-05 PROCEDURE — 120N000002 HC R&B MED SURG/OB UMMC

## 2023-09-05 PROCEDURE — 85610 PROTHROMBIN TIME: CPT

## 2023-09-05 PROCEDURE — 82310 ASSAY OF CALCIUM: CPT

## 2023-09-05 PROCEDURE — 250N000013 HC RX MED GY IP 250 OP 250 PS 637: Performed by: PHYSICIAN ASSISTANT

## 2023-09-05 PROCEDURE — 250N000011 HC RX IP 250 OP 636: Mod: JZ

## 2023-09-05 PROCEDURE — 85018 HEMOGLOBIN: CPT | Performed by: PHYSICIAN ASSISTANT

## 2023-09-05 PROCEDURE — 250N000011 HC RX IP 250 OP 636: Mod: JZ | Performed by: INTERNAL MEDICINE

## 2023-09-05 PROCEDURE — 97116 GAIT TRAINING THERAPY: CPT | Mod: GP

## 2023-09-05 PROCEDURE — 85384 FIBRINOGEN ACTIVITY: CPT

## 2023-09-05 PROCEDURE — 83615 LACTATE (LD) (LDH) ENZYME: CPT

## 2023-09-05 PROCEDURE — 97161 PT EVAL LOW COMPLEX 20 MIN: CPT | Mod: GP

## 2023-09-05 PROCEDURE — 97530 THERAPEUTIC ACTIVITIES: CPT | Mod: GP

## 2023-09-05 PROCEDURE — 250N000011 HC RX IP 250 OP 636: Mod: JZ | Performed by: PHYSICIAN ASSISTANT

## 2023-09-05 PROCEDURE — 99233 SBSQ HOSP IP/OBS HIGH 50: CPT | Mod: FS

## 2023-09-05 PROCEDURE — 82248 BILIRUBIN DIRECT: CPT

## 2023-09-05 PROCEDURE — 84550 ASSAY OF BLOOD/URIC ACID: CPT

## 2023-09-05 PROCEDURE — 80053 COMPREHEN METABOLIC PANEL: CPT

## 2023-09-05 PROCEDURE — 258N000003 HC RX IP 258 OP 636: Performed by: PHYSICIAN ASSISTANT

## 2023-09-05 PROCEDURE — 85730 THROMBOPLASTIN TIME PARTIAL: CPT

## 2023-09-05 PROCEDURE — 83735 ASSAY OF MAGNESIUM: CPT

## 2023-09-05 RX ORDER — FUROSEMIDE 10 MG/ML
20 INJECTION INTRAMUSCULAR; INTRAVENOUS ONCE
Status: DISCONTINUED | OUTPATIENT
Start: 2023-09-05 | End: 2023-09-05

## 2023-09-05 RX ORDER — FUROSEMIDE 10 MG/ML
20 INJECTION INTRAMUSCULAR; INTRAVENOUS DAILY
Status: DISCONTINUED | OUTPATIENT
Start: 2023-09-05 | End: 2023-09-07

## 2023-09-05 RX ADMIN — FUROSEMIDE 20 MG: 10 INJECTION, SOLUTION INTRAMUSCULAR; INTRAVENOUS at 13:48

## 2023-09-05 RX ADMIN — SALINE NASAL SPRAY 1 SPRAY: 1.5 SOLUTION NASAL at 00:48

## 2023-09-05 RX ADMIN — ONDANSETRON 8 MG: 8 TABLET, FILM COATED ORAL at 17:52

## 2023-09-05 RX ADMIN — SODIUM CHLORIDE, PRESERVATIVE FREE 5 ML: 5 INJECTION INTRAVENOUS at 13:49

## 2023-09-05 RX ADMIN — ONDANSETRON 8 MG: 8 TABLET, FILM COATED ORAL at 05:28

## 2023-09-05 RX ADMIN — ALLOPURINOL 300 MG: 300 TABLET ORAL at 07:35

## 2023-09-05 RX ADMIN — MICAFUNGIN SODIUM 50 MG: 50 INJECTION, POWDER, LYOPHILIZED, FOR SOLUTION INTRAVENOUS at 16:36

## 2023-09-05 RX ADMIN — SODIUM CHLORIDE, PRESERVATIVE FREE 5 ML: 5 INJECTION INTRAVENOUS at 18:24

## 2023-09-05 RX ADMIN — LEVOFLOXACIN 250 MG: 250 TABLET, FILM COATED ORAL at 07:34

## 2023-09-05 RX ADMIN — ACYCLOVIR 400 MG: 400 TABLET ORAL at 07:35

## 2023-09-05 RX ADMIN — SALINE NASAL SPRAY 1 SPRAY: 1.5 SOLUTION NASAL at 13:49

## 2023-09-05 RX ADMIN — SODIUM CHLORIDE, PRESERVATIVE FREE 5 ML: 5 INJECTION INTRAVENOUS at 05:28

## 2023-09-05 RX ADMIN — CYTARABINE 200 MG: 100 INJECTION, SOLUTION INTRATHECAL; INTRAVENOUS; SUBCUTANEOUS at 17:52

## 2023-09-05 RX ADMIN — POLYETHYLENE GLYCOL 3350 17 G: 17 POWDER, FOR SOLUTION ORAL at 07:34

## 2023-09-05 RX ADMIN — ACYCLOVIR 400 MG: 400 TABLET ORAL at 19:30

## 2023-09-05 ASSESSMENT — ACTIVITIES OF DAILY LIVING (ADL)
ADLS_ACUITY_SCORE: 22

## 2023-09-05 NOTE — PROGRESS NOTES
09/05/23 0911   Appointment Info   Signing Clinician's Name / Credentials (PT) Karuna Mcknight, PT, DPT   Living Environment   People in Home spouse   Current Living Arrangements house   Home Accessibility stairs to enter home;stairs within home   Number of Stairs, Main Entrance 4   Stair Railings, Main Entrance railings safe and in good condition;railings on both sides of stairs   Number of Stairs, Within Home, Primary greater than 10 stairs  (15)   Stair Railings, Within Home, Primary railings safe and in good condition;railings on both sides of stairs   Transportation Anticipated family or friend will provide   Living Environment Comments Pt reports he lives with his spouse in a multi-level home 4 [address] through garage with bilateral railings, all needs met on first floor with ~ 15 stairs down to basement (bilateral railings) where he does various activities/projects. Pt denies AD/AE at home, reports standard height bed/toilet and shower tub with no grab bars though he plans to install grab bars at discharge. Pt's wife works full time an is available to assist before/after work. Pt drives but plans to have his wife transport to/from hospital.   Self-Care   Usual Activity Tolerance moderate   Current Activity Tolerance moderate   Regular Exercise Yes   Activity/Exercise Type walking   Exercise Amount/Frequency daily   Equipment Currently Used at Home none   Fall history within last six months no   Activity/Exercise/Self-Care Comment Pt reports IND at baseline with all functional mobility, ADLs, and IADLs. Does not use AD for mobility. Pt is retired, drives, works on work throughout house and yard. Enjoyed hiking, kayaking, walking, and taking care of land he lives on but has recently been limited to yard/housework with frequent breaks. Reports he has been in/out of hospital for various things including hernias, brain absecess, and now with new diagnosis of AML on chemotherapy leading to increased fatigue and  "decreased activity tolerance limiting ability to participate in IADLs. Also reports proximal muscle weakness impairing muscular endurance.   General Information   Onset of Illness/Injury or Date of Surgery 09/01/23   Referring Physician Hilda Gamble PA-C   Patient/Family Therapy Goals Statement (PT) return to PLOF, increase strength and endurance for yardwork, walking/running, and normal ADLs/IADLs   Pertinent History of Current Problem (include personal factors and/or comorbidities that impact the POC) Per EMR: \"Artie Fine is a 67yo M w/ a PMH of brain abscess (2021), HTN, and BPH. He was transferred from [facility] in [place], [place] to [facility] for work up of pancytopenia; was noted to have circulating blasts concerning for acute leukemia, and was subsequently transferred to [facility]. 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 He was started on induction with 7+3 C1D1 9/2/23.\"   Existing Precautions/Restrictions immunosuppressed   Weight-Bearing Status - LUE full weight-bearing   Weight-Bearing Status - RUE full weight-bearing   Weight-Bearing Status - LLE full weight-bearing   Weight-Bearing Status - RLE full weight-bearing   General Observations Activity orders: nursing to determine, per RN ok for mobility/ambulation.   Cognition   Affect/Mental Status (Cognition) WNL   Orientation Status (Cognition) oriented x 4   Follows Commands (Cognition) WNL   Pain Assessment   Patient Currently in Pain No   Integumentary/Edema   Integumentary/Edema no deficits were identifed   Posture    Posture Protracted shoulders   Range of Motion (ROM)   Range of Motion ROM is WFL   Strength (Manual Muscle Testing)   Strength (Manual Muscle Testing) Deficits observed during functional mobility   Strength Comments generalized deconditioning and weakness, grossly > 3+/5 BLE strength per functional assessment   Bed Mobility   Comment, (Bed Mobility) IND   Transfers "   Comment, (Transfers) IND   Gait/Stairs (Locomotion)   Comment, (Gait/Stairs) IND   Balance   Balance other (describe)   Balance Comments grossly intact though pt reports balance impairments at baseline primarily d/t weakness, mildly unsteady during dynamic gait though no gross LOB   Sensory Examination   Sensory Perception patient reports no sensory changes   Coordination   Coordination no deficits were identified   Muscle Tone   Muscle Tone no deficits were identified   Clinical Impression   Criteria for Skilled Therapeutic Intervention Yes, treatment indicated   PT Diagnosis (PT) at risk for deconditioning 2/2 course of treatment and prolonged hospitalization; decreased IND with functional mobility due to impaired muscular endurance and activity tolerance   Influenced by the following impairments generalized weakness and deconditioning, balance   Functional limitations due to impairments gait, balance, activity tolerance   Clinical Presentation (PT Evaluation Complexity) Evolving/Changing   Clinical Presentation Rationale clinical judgement, diagnosis, PMH   Clinical Decision Making (Complexity) low complexity   Planned Therapy Interventions (PT) balance training;bed mobility training;gait training;home exercise program;lumbar stabilization;neuromuscular re-education;patient/family education;postural re-education;ROM (range of motion);stair training;strengthening;stretching;transfer training;progressive activity/exercise;risk factor education;home program guidelines   Anticipated Equipment Needs at Discharge (PT)   (tbd)   Risk & Benefits of therapy have been explained evaluation/treatment results reviewed;care plan/treatment goals reviewed;risks/benefits reviewed;current/potential barriers reviewed;participants voiced agreement with care plan;participants included;patient   PT Total Evaluation Time   PT [place], Low Complexity Minutes (55842) 8   Physical Therapy Goals   PT Frequency 3x/week   PT Predicted  Duration/Target Date for Goal Attainment 10/03/23   PT Goals Stairs;Aerobic Activity;PT Goal 1;PT Goal 2;PT Goal 3   PT: Stairs Modified independent;Greater than 10 stairs;Rail on both sides  (15 per home set-up)   PT: Perform aerobic activity with stable cardiovascular response intermittent activity;30 minutes;continuous activity;20 minutes;ambulation;NuStep   PT: Goal 1 Pt will be IND with LE and UE strengthening/ROM HEP for daily performance to increase proximal strength, decrease LUE pain, and increase stability during transfers and ambulation.   PT: Goal 2 Pt will be IND with lab value education -platelets, hemoglobin and white blood cells including the role of lab values in the body, normal lab value parameters and implications of low values on exercise and activity.   PT: Goal 3 Pt will demonstrate low falls risk with a balance assessment score of < 13.5 seconds on TUG,  > 19/24 on Dynamic Gait Index, > 9/12 on 4-Item Dynamic Gait Index, >22/30 on Functional Gait Assessment, or > 45/56 on Og Balance Assessment.      PT Discharge Planning   PT Plan review proximal strengthening HEP, initiate UE ROM/strength, dynamic gait, review lab values   PT Discharge Recommendation (DC Rec) home with assist;home with outpatient physical therapy  (OP cancer rehab)   PT Rationale for DC Rec Pt near baseline level of IND, currently transferring and ambulating IND. At baseline pt has balance deficits and decreased muscular strength/endurance impairing participation in ADLs/IADLs and higher level mobility. At this time pt safe for discharge home once medically stable with prn assist from spouse. Recommend OP cancer rehab to promote return to PLOF. Pt will benefit from continued PT during IP stay to prevent deconditioning and promote proximal strengthening and balance to decrease risk for falls.   PT Brief overview of current status IND   Total Session Time   Timed Code Treatment Minutes 55   Total Session Time (sum of timed  and untimed services) 63

## 2023-09-05 NOTE — PLAN OF CARE
Goal Outcome Evaluation:      Plan of Care Reviewed With: patient    Overall Patient Progress: no change         Pt A&Ox4, VSS on RA. Denies pain/N/V. Good intake with dinner despite poor appetite. CIVI cytarabine continued via PICC. UAL, walked x1 in halls. Good UOP, soft BM this shift. No acute events overnight. Sleep/rest promoted overnight with clustered cares. Continue to monitor and with POC.

## 2023-09-05 NOTE — PROGRESS NOTES
Nursing Focus: Chemotherapy  D: Positive blood return via PICC. Insertion site is clean/dry/intact, dressing intact with no complaints of pain. Urine output is recorded in intake in Doc Flowsheet.    I: Premedications given per order (see electronic medical administration record). Dose #4 of Cytarabine started to infuse over 24 hours. Reviewed pt teaching on chemotherapy side effects. Pt denies need for further teaching. Chemotherapy double checked per protocol by two chemotherapy competent RN's.   A: Tolerating procedure well. Denies nausea and or pain.   P: Continue to monitor urine output and symptoms of nausea. Screen for symptoms of toxicity.

## 2023-09-05 NOTE — PLAN OF CARE
Goal Outcome Evaluation:  Afebrile. Denied pain or nausea. Given red blood cells without problems. Chemotherapy infusing without problems. Good blood return from PICC line. Given Lasix. Up independently.

## 2023-09-05 NOTE — PROGRESS NOTES
[facility]    Progress Note  Hematology / Oncology     Date of Admission:  9/1/2023  Hospital Day #: 4   Date of Service (when I saw the patient): 09/05/2023    Assessment & Plan   Artie Fine is a 67yo M w/ a PMH of brain abscess (2021), HTN, and BPH. He was transferred from [facility] in [place], [place] to [facility] for work up of pancytopenia; was noted to have circulating blasts concerning for acute leukemia, and was subsequently transferred to [facility]. 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 He was started on induction with 7+3 C1D1 9/2/23. NGS, FISH, molecular, and morph results pending.    Today: Day 4 of 7+3 & [place]  - Inpatient PT eval today; personally saw patient ambulating and do not see obvious imbalance or ambulatory issues at this time.  - Pharmacy liaison assessing for vori/posa coverage. Vori PA pending.  - Requested new heme/onc patient appointment.  - Will pursue CNS leukemia work up (MRI and diagnostic LP) as patient endorses headache upon admission w/ new AML diagnosis.  - 20 mg IV lasix given today, continue daily weights, and I&O's  - Will require D14 BMBx to assess for MRD.  - CTM and provide best supportive cares.    HEME/ONC  # AML  Patient initially presented to [facility] in [place], [place] with 4-6 weeks of progressive bruising, weakness, fatigue, loss of appetite, and night sweats. He also noted a headache similar to his previous brain abscess. CBC notable for pancytopenia; WBC 2.7 (), Hgb 8.1, and plt 1k. He was transferred to [facility] found on peripheral flow w/ 11% circulating myeloid blasts. He was then transferred to [facility] for further work up and treatment of AML. Diagnostic BMBx performed 9/1 w/ morph, cytogenetics, molecular results pending. 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 47% blasts with the following immunophenotype: Positive for CD7, CD13,  CD33, CD34, CD38, CD45 (dim), CD58, CD64 (partial), , HLA-DR, cytoplasmic myeloperoxidase, and nuclear terminal deoxynucleotidyl transferase (TdT) (dim partial, predominantly negative). 98% of the blasts are positive for CD33 (clone P67.6 in APC-R700, relative to background lymphocytes).   - HLA typing ordered. Baseline TTE showing normal left ventricular function with EF of 55-60%. EKG ordered 9/4/23 results pending. PICC placed 9/1 upon admission to [facility] and induction chemotherapy of 7+3 Cytarabine+Daunorubicin was started; C1D1 9/2/23. Patient continues to tolerate this chemotherapy cycle well.  - Will pursue MRI and diagnostic LP for CNS leukemia work up as patient endorses headache upon admission w/ new AML diagnosis.         Treatment Plan: 7+3 (C1D1=9/2/23)               - Cytarabine 100 mg/m2 D1-D7               - Daunorubicin 60 mg/m2 D1-D3               - Supportive meds: Decadron 12 mg tab D1-D3, Zofran and Compazine, Ativan     # Risk for TLS/DIC  - Monitor TLS/DIC labs daily.  - Allopurinol 300 mg daily  - S/p mIVF NS 75 ml/hr for 2 days for prevention  - For TLS:               - If e/o TLS, bolus + start mIVF               - If uric acid >8 despite IVF, give rasburicase 6 mg x1               - If phos persistently >5, start PhosLo TID      ID  # Immunosuppressed  2/2 malignancy and chemotherapy  # ID PPX  - Viral serologies: HepB/C-, HIV-. HSV 1 & 2 IgG positive, EBV, CMV IgG positive  -  mg BID  - Levofloxacin 250 mg daily  - Micafungin 50 mg IV daily   - PA for voriconazole pending.     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 S/p craniotomy for drainage of abscess July 2021. It is unclear what culture speciated to, but he completed a course of antibiotics with ceftriaxone then meropenem with resolution on imaging. Head CT 8/31 at OSH revealed no intracranial hemorrhage midline shift or mass effect. Postsurgical changes noted.  Previous area of infection demonstrates trace amount of encephalomalacia but no mass effect or inflammatory changes to suggest new or acute infection. Otherwise unremarkable with no acute inpatient requirements at this time. Will pursue CNS AML work up of MRI and diagnostic LP this week.  - Tylenol PRN for headache     CARDS  # HTN  Noted during prior hospitalization for brain abscess in 2021. Previously on lisinopril, which he is no longer taking. Remains normotensive this admission, will CTM BP's.    URINARY  # BPH  Notes difficulty with complete emptying of bladder. He notes frequent urination at baseline.  He is not experiencing dysuria and there are no acute inpatient requirements at this time.     MISC  # Weakness  # Deconditioning  # Possible balance issues when ambulating  2/2 malignancy. Patient ambulates independently however endorses issues w/ balance after his brain abscess in 2021, exacerbated when ambulating.  - PT consult placed 9/4 to assess for baseline and help with dispo requirements. Inpatient PT eval on 9/5/23. PT notes: generalized deconditioning and weakness w/ decreased strength BLE, decreased IND w/ functional mobility d/t impaired muscular endurance and activity tolerance. Anticipated equipment needs at discharge TBD. PT will work with patient 3x weekly and [facility]'s home with assist; home with outpatient physical therapy (OP cancer rehab).    # Increased weight from admission  # Risk of hypervolemia  Patient is receiving IVF's w/ chemotherapy administration and is +5lbs from admission. S/p mIVF for TLS nephrotoxicity prevention. As such, diuresed with 20 mg IV lasix 9/5/23, will continue to assess volume status and diurese daily PRN. If patient appears refractory to lasix will rotate to different diuretic.    # Loss of appetite  # Risk of malnutrition  RD consult placed upon admission; patient does not meet two of the established criteria necessary for diagnosing malnutrition but is at risk  "for malnutrition. Recc's are: small frequent meals/snacks, encourage consumption of high protein foods first at meal times, high protein snack once daily between meals. Pt declines supplements at this time. 2 PM snack: 1% milk every day (protein source) + alternate 2 chocolate chip cookies E/O day or vicenta ice cream.   % Intake: < 75% for >/= 1 month (moderate)  % Weight Loss: Weight loss does not meet criteria  Subcutaneous Fat Loss: None observed  Muscle Loss: None observed  Fluid Accumulation/Edema: None noted  Malnutrition Diagnosis: Patient does not meet two of the established criteria necessary for diagnosing malnutrition but is at risk for malnutrition.  - Monitor PO intake trends, meal frequency, weight trends, snack intake.      Clinically Significant Risk Factors          # Hypocalcemia: Lowest Ca = 8.1 mg/dL in last 2 days, will monitor and replace as appropriate         # Thrombocytopenia: Lowest platelets = 11 in last 2 days, will monitor for bleeding          # Overweight: Estimated body mass index is 29.08 kg/m  as calculated from the following:    Height as of this encounter: 1.74 m (5' 8.5\").    Weight as of this encounter: 88 kg (194 lb 1.6 oz).                  Code Status: FULL  Disposition: Likely 2-4 weeks for new AML treatment and BMBx to assess for MRD.  Follow up: Will require an appointment to establish w/ primary hematologist and BMT consult. Will require D14 BMBx.    This plan of care has been discussed with the staff physician, Dr. Byrd.    Hilda Gamble PA-C  Hematology/Oncology  Pager: #[phone]    I spent 50 minutes in the care of this patient today, which included time necessary for review of interval events, obtaining history and physical exam, ordering medication(s)/test(s) as medically indicated, discussion with interdisciplinary/consult team(s), and documentation time. Over 50% of time was spent face-to-face and/or coordinating care.     Interval History   Thorough chart review " and RN notes overnight showing no acute events, patient remains afebrile, and hemodynamically stable. Upon my visit today patient is sitting down in his chair, in NAD, and conversational. He reports he is looking forward to his wife Aundrea visiting this weekend. His last BM was this morning and he is going to work with PT today during his initial eval. Overall he reports he is feeling well today, and had the best sleep so far last night d/t clustered cares. He has had decreased appetite in which RD is following, he is at risk for malnutrition. We discussed continuing to choose protein rich foods. Apart from those described above patient denies full ROS today such as N/V/D, F/C/NS, headache, vision changes, muscle weakness, chest/abdominal pain, SOB, cough, lesions, rashes, or wounds. He denied having any questions or concerns for the team today. He continues to tolerate this chemotherapy cycle well. He appears euvolemic today however given that he has increased 5lbs from admission and is receiving fluids w/ his chemotherapy, started IV lasix 20 mg today, will CTM closesly.  A comprehensive review of symptoms was performed and was negative except as detailed in the interval history above.    Physical Exam   Vital Signs with Ranges  Temp:  [97.3  F (36.3  C)-97.9  F (36.6  C)] 97.3  F (36.3  C)  Pulse:  [73-89] 89  Resp:  [16-18] 16  BP: (121-142)/(67-83) 135/71  SpO2:  [95 %-99 %] 97 %    I/O last 3 completed shifts:  In: 1189.8 [I.V.:110; IV Piggyback:779.8]  Out: 2900 [Urine:2900]    Vitals:    09/03/23 0827 09/04/23 0850 09/05/23 0820   Weight: 87.5 kg (193 lb) 88.8 kg (195 lb 11.2 oz) 88 kg (194 lb 1.6 oz)     Constitutional: Pleasant and cooperative male sitting up in his chair, NAD, awake, alert, conversational, ambulating hallway at end of appointment.  HEENT: Wearing a mask, otherwise NC/AT, EOMI, sclera clear, conjunctiva normal, unable to assess oral MM.  Respiratory: 96% O2 breathing comfortably on RA, w/ no  increased work of breathing, CTAB w/ no crackles or wheezing.  Cardiovascular: RRR, no M/R/G. No BLE edema.  GI: No rashes/bruises/obvious abnormalities upon inspection, not distended, normoactive bowel sounds, soft, not-TTP.  Skin: No obvious lesions, wounds, abnormalities, Warm, dry, well-perfused. No bruising, bleeding, rashes, or lesions on limited exam.  Neurologic: A&Ox3. Answers questions appropriately. Moves all extremities spontaneously.  Psych: Calm, appropriate affect.  Vascular access:  R PICC; CDI, non-tender, no surrounding erythema.    Medications    - MEDICATION INSTRUCTIONS -          acyclovir  400 mg Oral BID    allopurinol  300 mg Oral Daily    Chemotherapy Infusing-Continuous Infusion   Does not apply Q8H    cytarabine (PF) (CYTOSAR) 200 mg in D5W 1,052 mL infusion  200 mg Intravenous Q24H    furosemide  20 mg Intravenous Daily    levofloxacin  250 mg Oral Daily    micafungin  50 mg Intravenous Q24H    ondansetron  8 mg Oral Q12H       Antiinfectives  Anti-infectives (From now, onward)      Start     Dose/Rate Route Frequency Ordered Stop    09/01/23 2000  acyclovir (ZOVIRAX) tablet 400 mg         400 mg Oral 2 TIMES DAILY 09/01/23 1513      09/01/23 1630  micafungin (MYCAMINE) 50 mg in sodium chloride 0.9 % 100 mL intermittent infusion         50 mg  100 mL/hr over 60 Minutes Intravenous EVERY 24 HOURS 09/01/23 1513      09/01/23 1530  levofloxacin (LEVAQUIN) tablet 250 mg         250 mg Oral DAILY 09/01/23 1513              Data   CBC   Recent Labs   Lab 09/05/23  0529 09/04/23  0429 09/03/23  0511 09/02/23  1420   WBC 1.5* 1.0* 1.6* 3.7*   RBC 2.53* 2.72* 2.62* 2.63*   HGB 7.0* 7.5* 7.2* 7.0*   HCT 23.8* 26.1* 24.8* 25.5*   MCV 94 96 95 97   MCH 27.7 27.6 27.5 26.6   MCHC 29.4* 28.7* 29.0* 27.5*   RDW 15.0 15.3* 15.6* 16.2*   PLT 11* 22* 38* 8*       CMP   Recent Labs   Lab 09/05/23  0529 09/04/23  1643 09/04/23  0429 09/03/23  2154 09/03/23  1443 09/03/23  0511 09/02/23  2210 09/02/23  1420  09/02/23  0616    136 137  137 138   < > 138  137   < > 134*  134* 138   POTASSIUM 4.1 3.9 4.2  4.2 4.4   < > 3.9  3.9   < > 3.7  3.7 3.8   CHLORIDE 107 105 107  108* 108*   < > 106  106   < > 102  102 106   CO2 21* 21* 19*  19* 21*   < > 20*  20*   < > 20*  20* 21*   ANIONGAP 9 10 11  10 9   < > 12  11   < > 12  12 11   * 105* 136*  135* 129*   < > 117*  117*   < > 104*  104* 99   BUN 18.4 19.7 19.4  19.5 21.3   < > 15.9  16.1   < > 14.5  14.5 16.1   CR 0.63* 0.67 0.69  0.68 0.81   < > 0.74  0.75   < > 0.86  0.86 0.88   GFRESTIMATED >90 >90 >90  >90 >90   < > >90  >90   < > >90  >90 >90   VENANCIO 8.8 9.1 8.1*  8.2* 8.3*   < > 8.6*  8.5*   < > 8.7*  8.7* 8.6*   MAG 2.4*  --  2.4*  2.4*  --   --  2.4*  --   --  2.3   PHOS 3.7  --  3.7  3.7  --   --  4.4  --  3.2 2.2*   PROTTOTAL 6.6  --  6.6  --   --  7.3  --  7.5 7.3   ALBUMIN 4.1  --  3.9  --   --  4.3  --  4.3 4.3   BILITOTAL 1.2  --  1.0  --   --  0.8  --  0.6 0.5   ALKPHOS 54  --  57  --   --  66  --  64 62   AST 16  --  16  --   --  19  --  21 20   ALT 9  --  <5  --   --  8  --  <5 <5    < > = values in this interval not displayed.       LFTs   Recent Labs   Lab 09/05/23  0529   PROTTOTAL 6.6   ALBUMIN 4.1   BILITOTAL 1.2   ALKPHOS 54   AST 16   ALT 9       Coagulation Studies   Recent Labs   Lab 09/05/23  0529   INR 1.15   PTT 23

## 2023-09-06 LAB
ABO/RH TYPE: NORMAL
ALBUMIN SERPL BCG-MCNC: 4.2 G/DL (ref 3.5–5.2)
ALP SERPL-CCNC: 52 U/L (ref 40–129)
ALT SERPL W P-5'-P-CCNC: 9 U/L (ref 0–70)
ANION GAP SERPL CALCULATED.3IONS-SCNC: 10 MMOL/L (ref 7–15)
ANTIBODY SCREEN: NEGATIVE
APTT PPP: 24 SECONDS (ref 22–38)
AST SERPL W P-5'-P-CCNC: 16 U/L (ref 0–45)
BASOPHILS # BLD MANUAL: 0 10E3/UL (ref 0–0.2)
BASOPHILS NFR BLD MANUAL: 0 %
BILIRUB DIRECT SERPL-MCNC: 0.38 MG/DL (ref 0–0.3)
BILIRUB SERPL-MCNC: 1.5 MG/DL
BLASTS # BLD MANUAL: 0.1 10E3/UL
BLASTS # BLD MANUAL: 0.2 10E3/UL
BLASTS # BLD MANUAL: 0.4 10E3/UL
BLASTS # BLD MANUAL: 0.6 10E3/UL
BLASTS NFR BLD MANUAL: 10 %
BLASTS NFR BLD MANUAL: 14 %
BLASTS NFR BLD MANUAL: 17 %
BLASTS NFR BLD MANUAL: 7 %
BLD PROD TYP BPU: NORMAL
BLOOD COMPONENT TYPE: NORMAL
BUN SERPL-MCNC: 17.8 MG/DL (ref 8–23)
CALCIUM SERPL-MCNC: 8.7 MG/DL (ref 8.8–10.2)
CHLORIDE SERPL-SCNC: 105 MMOL/L (ref 98–107)
CODING SYSTEM: NORMAL
CREAT SERPL-MCNC: 0.72 MG/DL (ref 0.67–1.17)
DACRYOCYTES BLD QL SMEAR: SLIGHT
DEPRECATED HCO3 PLAS-SCNC: 24 MMOL/L (ref 22–29)
EOSINOPHIL # BLD MANUAL: 0 10E3/UL (ref 0–0.7)
EOSINOPHIL NFR BLD MANUAL: 0 %
ERYTHROCYTE [DISTWIDTH] IN BLOOD BY AUTOMATED COUNT: 14.8 % (ref 10–15)
ERYTHROCYTE [DISTWIDTH] IN BLOOD BY AUTOMATED COUNT: 15.3 % (ref 10–15)
ERYTHROCYTE [DISTWIDTH] IN BLOOD BY AUTOMATED COUNT: 15.6 % (ref 10–15)
ERYTHROCYTE [DISTWIDTH] IN BLOOD BY AUTOMATED COUNT: 16.1 % (ref 10–15)
ERYTHROCYTE [DISTWIDTH] IN BLOOD BY AUTOMATED COUNT: 16.2 % (ref 10–15)
FIBRINOGEN PPP-MCNC: 255 MG/DL (ref 170–490)
GFR SERPL CREATININE-BSD FRML MDRD: >90 ML/MIN/1.73M2
GLUCOSE SERPL-MCNC: 84 MG/DL (ref 70–99)
HCT VFR BLD AUTO: 24.8 % (ref 40–53)
HCT VFR BLD AUTO: 25.5 % (ref 40–53)
HCT VFR BLD AUTO: 25.7 % (ref 40–53)
HCT VFR BLD AUTO: 26.1 % (ref 40–53)
HCT VFR BLD AUTO: 26.6 % (ref 40–53)
HGB BLD-MCNC: 7 G/DL (ref 13.3–17.7)
HGB BLD-MCNC: 7.2 G/DL (ref 13.3–17.7)
HGB BLD-MCNC: 7.2 G/DL (ref 13.3–17.7)
HGB BLD-MCNC: 7.5 G/DL (ref 13.3–17.7)
HGB BLD-MCNC: 7.9 G/DL (ref 13.3–17.7)
INR PPP: 1.19 (ref 0.85–1.15)
INTERPRETATION: NORMAL
INTERPRETATION: NORMAL
ISCN: NORMAL
ISSUE DATE AND TIME: NORMAL
LDH SERPL L TO P-CCNC: 235 U/L (ref 0–250)
LYMPHOCYTES # BLD MANUAL: 0.3 10E3/UL (ref 0.8–5.3)
LYMPHOCYTES # BLD MANUAL: 0.6 10E3/UL (ref 0.8–5.3)
LYMPHOCYTES # BLD MANUAL: 0.9 10E3/UL (ref 0.8–5.3)
LYMPHOCYTES # BLD MANUAL: 1.5 10E3/UL (ref 0.8–5.3)
LYMPHOCYTES # BLD MANUAL: 1.7 10E3/UL (ref 0.8–5.3)
LYMPHOCYTES # BLD MANUAL: 2 10E3/UL (ref 0.8–5.3)
LYMPHOCYTES NFR BLD MANUAL: 25 %
LYMPHOCYTES NFR BLD MANUAL: 33 %
LYMPHOCYTES NFR BLD MANUAL: 36 %
LYMPHOCYTES NFR BLD MANUAL: 46 %
LYMPHOCYTES NFR BLD MANUAL: 49 %
LYMPHOCYTES NFR BLD MANUAL: 77 %
MAGNESIUM SERPL-MCNC: 2.3 MG/DL (ref 1.7–2.3)
MCH RBC QN AUTO: 26.6 PG (ref 26.5–33)
MCH RBC QN AUTO: 27 PG (ref 26.5–33)
MCH RBC QN AUTO: 27.5 PG (ref 26.5–33)
MCH RBC QN AUTO: 27.6 PG (ref 26.5–33)
MCH RBC QN AUTO: 27.9 PG (ref 26.5–33)
MCHC RBC AUTO-ENTMCNC: 27.5 G/DL (ref 31.5–36.5)
MCHC RBC AUTO-ENTMCNC: 28 G/DL (ref 31.5–36.5)
MCHC RBC AUTO-ENTMCNC: 28.7 G/DL (ref 31.5–36.5)
MCHC RBC AUTO-ENTMCNC: 29 G/DL (ref 31.5–36.5)
MCHC RBC AUTO-ENTMCNC: 29.7 G/DL (ref 31.5–36.5)
MCV RBC AUTO: 94 FL (ref 78–100)
MCV RBC AUTO: 95 FL (ref 78–100)
MCV RBC AUTO: 96 FL (ref 78–100)
MCV RBC AUTO: 96 FL (ref 78–100)
MCV RBC AUTO: 97 FL (ref 78–100)
METHODS: NORMAL
MONOCYTES # BLD MANUAL: 0 10E3/UL (ref 0–1.3)
MONOCYTES # BLD MANUAL: 0.2 10E3/UL (ref 0–1.3)
MONOCYTES # BLD MANUAL: 0.2 10E3/UL (ref 0–1.3)
MONOCYTES # BLD MANUAL: 0.3 10E3/UL (ref 0–1.3)
MONOCYTES NFR BLD MANUAL: 0 %
MONOCYTES NFR BLD MANUAL: 3 %
MONOCYTES NFR BLD MANUAL: 3 %
MONOCYTES NFR BLD MANUAL: 6 %
MONOCYTES NFR BLD MANUAL: 7 %
MONOCYTES NFR BLD MANUAL: 9 %
MYELOCYTES # BLD MANUAL: 0 10E3/UL
MYELOCYTES NFR BLD MANUAL: 1 %
NEUTROPHILS # BLD MANUAL: 0.5 10E3/UL (ref 1.6–8.3)
NEUTROPHILS # BLD MANUAL: 0.6 10E3/UL (ref 1.6–8.3)
NEUTROPHILS # BLD MANUAL: 0.8 10E3/UL (ref 1.6–8.3)
NEUTROPHILS # BLD MANUAL: 1 10E3/UL (ref 1.6–8.3)
NEUTROPHILS # BLD MANUAL: 1.4 10E3/UL (ref 1.6–8.3)
NEUTROPHILS # BLD MANUAL: 2 10E3/UL (ref 1.6–8.3)
NEUTROPHILS NFR BLD MANUAL: 23 %
NEUTROPHILS NFR BLD MANUAL: 28 %
NEUTROPHILS NFR BLD MANUAL: 34 %
NEUTROPHILS NFR BLD MANUAL: 47 %
NEUTROPHILS NFR BLD MANUAL: 57 %
NEUTROPHILS NFR BLD MANUAL: 59 %
NRBC # BLD AUTO: 0 10E3/UL
NRBC BLD MANUAL-RTO: 1 %
OTHER CELLS # BLD MANUAL: 0.3 10E3/UL
OTHER CELLS NFR BLD MANUAL: 9 %
PATH REV: ABNORMAL
PHOSPHATE SERPL-MCNC: 3.8 MG/DL (ref 2.5–4.5)
PLAT MORPH BLD: ABNORMAL
PLATELET # BLD AUTO: 11 10E3/UL (ref 150–450)
PLATELET # BLD AUTO: 22 10E3/UL (ref 150–450)
PLATELET # BLD AUTO: 38 10E3/UL (ref 150–450)
PLATELET # BLD AUTO: 8 10E3/UL (ref 150–450)
PLATELET # BLD AUTO: 9 10E3/UL (ref 150–450)
POLYCHROMASIA BLD QL SMEAR: ABNORMAL
POLYCHROMASIA BLD QL SMEAR: SLIGHT
POTASSIUM SERPL-SCNC: 3.6 MMOL/L (ref 3.4–5.3)
PROT SERPL-MCNC: 6.8 G/DL (ref 6.4–8.3)
RBC # BLD AUTO: 2.62 10E6/UL (ref 4.4–5.9)
RBC # BLD AUTO: 2.63 10E6/UL (ref 4.4–5.9)
RBC # BLD AUTO: 2.67 10E6/UL (ref 4.4–5.9)
RBC # BLD AUTO: 2.72 10E6/UL (ref 4.4–5.9)
RBC # BLD AUTO: 2.83 10E6/UL (ref 4.4–5.9)
RBC MORPH BLD: ABNORMAL
SODIUM SERPL-SCNC: 139 MMOL/L (ref 136–145)
SPECIMEN EXPIRATION DATE: NORMAL
SPECIMEN EXPIRATION DATE: NORMAL
UNIT ABO/RH: NORMAL
UNIT NUMBER: NORMAL
UNIT STATUS: NORMAL
UNIT TYPE ISBT: 8400
URATE SERPL-MCNC: 4.8 MG/DL (ref 3.4–7)
WBC # BLD AUTO: 1 10E3/UL (ref 4–11)
WBC # BLD AUTO: 1.6 10E3/UL (ref 4–11)
WBC # BLD AUTO: 2 10E3/UL (ref 4–11)
WBC # BLD AUTO: 3.7 10E3/UL (ref 4–11)
WBC # BLD AUTO: 4.1 10E3/UL (ref 4–11)

## 2023-09-06 PROCEDURE — 85610 PROTHROMBIN TIME: CPT

## 2023-09-06 PROCEDURE — 82248 BILIRUBIN DIRECT: CPT

## 2023-09-06 PROCEDURE — 80053 COMPREHEN METABOLIC PANEL: CPT

## 2023-09-06 PROCEDURE — 84100 ASSAY OF PHOSPHORUS: CPT

## 2023-09-06 PROCEDURE — 250N000011 HC RX IP 250 OP 636: Performed by: INTERNAL MEDICINE

## 2023-09-06 PROCEDURE — 85007 BL SMEAR W/DIFF WBC COUNT: CPT | Performed by: PHYSICIAN ASSISTANT

## 2023-09-06 PROCEDURE — 83615 LACTATE (LD) (LDH) ENZYME: CPT

## 2023-09-06 PROCEDURE — 83735 ASSAY OF MAGNESIUM: CPT

## 2023-09-06 PROCEDURE — 86901 BLOOD TYPING SEROLOGIC RH(D): CPT | Performed by: PHYSICIAN ASSISTANT

## 2023-09-06 PROCEDURE — 86920 COMPATIBILITY TEST SPIN: CPT | Performed by: PHYSICIAN ASSISTANT

## 2023-09-06 PROCEDURE — 85730 THROMBOPLASTIN TIME PARTIAL: CPT

## 2023-09-06 PROCEDURE — 84550 ASSAY OF BLOOD/URIC ACID: CPT

## 2023-09-06 PROCEDURE — P9037 PLATE PHERES LEUKOREDU IRRAD: HCPCS | Performed by: PHYSICIAN ASSISTANT

## 2023-09-06 PROCEDURE — 258N000003 HC RX IP 258 OP 636: Performed by: INTERNAL MEDICINE

## 2023-09-06 PROCEDURE — 250N000011 HC RX IP 250 OP 636: Mod: JZ

## 2023-09-06 PROCEDURE — 85384 FIBRINOGEN ACTIVITY: CPT

## 2023-09-06 PROCEDURE — 85027 COMPLETE CBC AUTOMATED: CPT | Performed by: PHYSICIAN ASSISTANT

## 2023-09-06 PROCEDURE — 99233 SBSQ HOSP IP/OBS HIGH 50: CPT | Mod: FS | Performed by: STUDENT IN AN ORGANIZED HEALTH CARE EDUCATION/TRAINING PROGRAM

## 2023-09-06 PROCEDURE — 250N000011 HC RX IP 250 OP 636: Mod: JZ | Performed by: PHYSICIAN ASSISTANT

## 2023-09-06 PROCEDURE — 258N000003 HC RX IP 258 OP 636: Performed by: PHYSICIAN ASSISTANT

## 2023-09-06 PROCEDURE — 120N000002 HC R&B MED SURG/OB UMMC

## 2023-09-06 PROCEDURE — 86850 RBC ANTIBODY SCREEN: CPT | Performed by: PHYSICIAN ASSISTANT

## 2023-09-06 PROCEDURE — 99418 PROLNG IP/OBS E/M EA 15 MIN: CPT | Performed by: STUDENT IN AN ORGANIZED HEALTH CARE EDUCATION/TRAINING PROGRAM

## 2023-09-06 PROCEDURE — 250N000013 HC RX MED GY IP 250 OP 250 PS 637: Performed by: PHYSICIAN ASSISTANT

## 2023-09-06 RX ADMIN — ACYCLOVIR 400 MG: 400 TABLET ORAL at 09:20

## 2023-09-06 RX ADMIN — MICAFUNGIN SODIUM 50 MG: 50 INJECTION, POWDER, LYOPHILIZED, FOR SOLUTION INTRAVENOUS at 16:08

## 2023-09-06 RX ADMIN — CYTARABINE 200 MG: 100 INJECTION, SOLUTION INTRATHECAL; INTRAVENOUS; SUBCUTANEOUS at 18:19

## 2023-09-06 RX ADMIN — ONDANSETRON 8 MG: 8 TABLET, FILM COATED ORAL at 06:17

## 2023-09-06 RX ADMIN — ALLOPURINOL 300 MG: 300 TABLET ORAL at 09:21

## 2023-09-06 RX ADMIN — FUROSEMIDE 20 MG: 10 INJECTION, SOLUTION INTRAMUSCULAR; INTRAVENOUS at 12:11

## 2023-09-06 RX ADMIN — SODIUM CHLORIDE, PRESERVATIVE FREE 5 ML: 5 INJECTION INTRAVENOUS at 12:11

## 2023-09-06 RX ADMIN — POLYETHYLENE GLYCOL 3350 17 G: 17 POWDER, FOR SOLUTION ORAL at 09:20

## 2023-09-06 RX ADMIN — ACETAMINOPHEN 650 MG: 325 TABLET, FILM COATED ORAL at 09:21

## 2023-09-06 RX ADMIN — SODIUM CHLORIDE, PRESERVATIVE FREE 5 ML: 5 INJECTION INTRAVENOUS at 18:14

## 2023-09-06 RX ADMIN — ACYCLOVIR 400 MG: 400 TABLET ORAL at 19:55

## 2023-09-06 RX ADMIN — LEVOFLOXACIN 250 MG: 250 TABLET, FILM COATED ORAL at 09:20

## 2023-09-06 RX ADMIN — ONDANSETRON 8 MG: 8 TABLET, FILM COATED ORAL at 18:07

## 2023-09-06 ASSESSMENT — ACTIVITIES OF DAILY LIVING (ADL)
ADLS_ACUITY_SCORE: 22

## 2023-09-06 NOTE — PROGRESS NOTES
Pt expressing that he is very upset with his care here. He reports that upon working with PT today, he returned to his bed to find his phone missing which was never found. He reports being suspicious of somebody taking his phone. He also expressed frustration with beeping pumps and that because of this he lacks confidence in the care he is receiving here. He reports wanting to go to a different hospital. This writer actively listened to patient concerns, educated on reasons why pumps may beep, provided emotional support and passed along patient relations phone number as an outlet for any type of feedback or frustrations he may have with his care here. Pt declined offer to have provider come to speak with him at this time and reported that he will be speaking to the doctors tomorrow in regards to his concerns.

## 2023-09-06 NOTE — PROGRESS NOTES
"Care Management Follow Up    Length of Stay (days): 5    Expected Discharge Date: 09/29/2023     Concerns to be Addressed:     [facility] request  Patient plan of care discussed at interdisciplinary rounds: Yes    Anticipated Discharge Disposition:  home     Anticipated Discharge Services:  TBD  Anticipated Discharge DME:  TBD    Patient/family educated on Medicare website which has current facility and service quality ratings:  n/a  Education Provided on the Discharge Plan:  n/a  Patient/Family in Agreement with the Plan:  n/a    Referrals Placed by CM/SW:  n/a  Private pay costs discussed: Not applicable    Additional Information:  MARIEL Gamble, asked SW to complete a [facility] request for pt's wife to stay this weekend. MARIEL also asked if SW could talk with pt for awhile. She said that pt is feeling down since his diagnosis and isolated. His phone went missing yesterday and he was unable to call his wife.   SW met with pt at bedside. SW introduced self and explained the reason for the visit. Pt agreed to speak with SW  During the visit SW provided emotional support via active and reflective listening. Pt talked about his diagnosis and that he told his wife yesterday that she could tell their kids \"dad has cancer\". He states he doesn't want them to come to the hospital, He wants to spend time with them during the holidays not here. They line in the [facility]. Pt talked about staying positive because that is how he will get through this. SW  responded to feelings; normalized and validated feelings and experiences; provided  positive feedback and encouragement; provided a supportive, non-anxious, and non-judgmental presence; and encouraged ongoing self-care and continued attention to self-care. No additional questions or concerns were reported. SW provided availability and contact information and excused self from pt's bedside.     SW to follow and assist with any other discharge needs that may arise.  Kirsty Lowery, " GUY   5B    Phone: [phone]  Pager: [phone]

## 2023-09-06 NOTE — PLAN OF CARE
Goal Outcome Evaluation:  6705-1255  Pt is admitted on induction with 7+3 C1D1 9/2/23   AVSS, alert and oriented x 4, denies pain/nausea/sob.Today is Dose # 5 of Cytarabine is infusing via purple lumen @ 43.8 ml/hr.Tolerating chemo good, good blood return.  Up independent, voiding adequately, LBM 9/6 Pt needs cluster care.  Continue to monitor care.

## 2023-09-06 NOTE — PLAN OF CARE
Goal Outcome Evaluation:  Denied pain or nausea. Afebrile. Given platelets without problems. Given Lasix. Up independently.

## 2023-09-06 NOTE — PLAN OF CARE
Goal Outcome Evaluation:      Plan of Care Reviewed With: patient    Overall Patient Progress: no changeOverall Patient Progress: no change         Pt A&Ox4, VSS on RA. Denies pain/N/V. CIVI chemo via PICC continued. Sleep/rest promoted with clustered cares and minimized interruptions. Continue to monitor and with POC.

## 2023-09-06 NOTE — PLAN OF CARE
Goal Outcome Evaluation:  9831-4511  Afebrile, BP max 167/79 and pt is very frustrated.Alert and oriented x 4, denies pain/nausea/sob  Pt is very frustrated due to phone lost, interruption in room and some point pt wants to go to different hospital. Pt is going to talk to his primary team tomorrow morning. Nurses are trying to clustered care. Pt ambulate in [address] once for the shift.Up independently, voiding adequately.  Dose #4 of Cytarabine is infusing via PICC, tolerating good, and have good blood return.  Continue to monitor care.

## 2023-09-06 NOTE — PROGRESS NOTES
[facility]    Progress Note  Hematology / Oncology     Date of Admission:  9/1/2023  Hospital Day #: 5   Date of Service (when I saw the patient): 09/06/2023    Assessment & Plan   Artie Fine is a 69yo M w/ a PMH of brain abscess (2021), HTN, and BPH. He was transferred from [facility] in [place], [place] to [facility] for work up of pancytopenia; was noted to have circulating blasts concerning for acute leukemia, and was subsequently transferred to [facility]. 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 He was started on induction with 7+3 C1D1 9/2/23. NGS, FISH, molecular, and morph results pending.     Today: Day 5 of 7+3  - Talked with the molecular lab today; they are actively working on the AML NGS and FLT3 PCR; results should update today or tomorrow; will assess for the need to add midostaurin by day 8. This patient is not currently taking midostaurin.  - Patient expressed frustrations with cares today. His cell phone is missing from his room as of 9/5/23, he reports not feeling safe, and that staff has been entering his room without a mask. S/p validation given and discussion with the charge nurse today.  - Patient requested referral to [facility] for his wife Aundrea that will be visiting this weekend,  Kirsty notified and she is working on submitting the application today. We will notify patient by end of day on status.  - Voriconazole approved 9/5/23 will start vori after chemotherapy cycle is completed.  - Will pursue CNS leukemia work up (MRI and diagnostic LP) as patient endorses headache upon admission w/ new AML diagnosis.  - Patient appears euvolemic today, he is +1lb from his admission weight today. S/p 20 mg IV lasix on 9/5 that patient responded well too w/ good UOP.  - Will require D14 BMBx to assess for MRD; orders not yet placed.  - CTM and provide best supportive  cares.    HEME/ONC  # AML  Patient initially presented to [facility] in [place], [place] with 4-6 weeks of progressive bruising, weakness, fatigue, loss of appetite, and night sweats. He also noted a headache similar to his previous brain abscess. CBC notable for pancytopenia; WBC 2.7 (), Hgb 8.1, and plt 1k. He was transferred to [facility] found on peripheral flow w/ 11% circulating myeloid blasts. He was then transferred to [facility] for further work up and treatment of AML. Diagnostic BMBx performed 9/1 w/ morph, cytogenetics, molecular results pending. 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 47% blasts with the following immunophenotype: Positive for CD7, CD13, CD33, CD34, CD38, CD45 (dim), CD58, CD64 (partial), , HLA-DR, cytoplasmic myeloperoxidase, and nuclear terminal deoxynucleotidyl transferase (TdT) (dim partial, predominantly negative). 98% of the blasts are positive for CD33 (clone P67.6 in APC-R700, relative to background lymphocytes).   - HLA typing ordered. Baseline TTE showing normal left ventricular function with EF of 55-60%. EKG ordered 9/4/23 results pending. PICC placed 9/1 upon admission to [facility] and induction chemotherapy of 7+3 Cytarabine+Daunorubicin was started; C1D1 9/2/23. Patient continues to tolerate this chemotherapy cycle well.  - Will pursue MRI and diagnostic LP for CNS leukemia work up as patient endorses headache upon admission w/ new AML diagnosis.  - Talked with the molecular lab 9/6/23; they are actively working on the AML NGS and FLT3 PCR; results should update today or tomorrow; will assess for the need to add midostaurin by day 8. This patient is not currently taking midostaurin.         Treatment Plan: 7+3 (C1D1=9/2/23)               - Cytarabine 100 mg/m2 D1-D7               - Daunorubicin 60 mg/m2 D1-D3               - Supportive meds: Decadron 12 mg tab D1-D3, Zofran and Compazine, Ativan     # Low risk for TLS/DIC  - Monitoring TLS/DIC labs daily as TLS  risk decreased at this time.  - Allopurinol 300 mg daily  - S/p mIVF NS 75 ml/hr for 2 days for prevention.  - For TLS:               - If e/o TLS, bolus + start mIVF               - If uric acid >8 despite IVF, give rasburicase 6 mg x1               - If phos persistently >5, start PhosLo TID      ID  # Immunosuppressed  2/2 malignancy and chemotherapy  # ID PPX  - Viral serologies: HepB/C-, HIV-. HSV 1 & 2 IgG positive, EBV, CMV IgG positive  -  mg BID  - Levofloxacin 250 mg daily  - Micafungin 50 mg IV daily   - PA for voriconazole approved, will rotate to vori upon completion of chemotherapy.     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 S/p craniotomy for drainage of abscess July 2021. It is unclear what culture speciated to, but he completed a course of antibiotics with ceftriaxone then meropenem with resolution on imaging. Head CT 8/31 at OSH revealed no intracranial hemorrhage midline shift or mass effect. Postsurgical changes noted. Previous area of infection demonstrates trace amount of encephalomalacia but no mass effect or inflammatory changes to suggest new or acute infection. Otherwise unremarkable with no acute inpatient requirements at this time. Will pursue CNS AML work up of MRI and diagnostic LP this week.  - Tylenol PRN for headache     CARDS  # HTN  Noted during prior hospitalization for brain abscess in 2021. Previously on lisinopril, which he is no longer taking. Remains normotensive this admission, will CTM BP's daily.    URINARY  # BPH  Patient's baseline is he experiences difficulty completely emptying bladder. He notes frequent urination. No dysuria; no acute inpatient requirements at this time.     MISC  # Weakness  # Deconditioning  # Possible balance issues when ambulating  2/2 malignancy. Patient ambulates independently however endorses issues w/ balance after his brain abscess in 2021, exacerbated when ambulating.  - PT  consult placed 9/4 to assess for baseline and help with dispo requirements. Inpatient PT eval on 9/5/23. PT notes: generalized deconditioning and weakness w/ decreased strength BLE, decreased IND w/ functional mobility d/t impaired muscular endurance and activity tolerance. Anticipated equipment needs at discharge TBD. PT will work with patient 3x weekly and recc's home with assist; home with outpatient physical therapy ([facility] cancer rehab).    # Increased weight from admission, improving  # Risk of hypervolemia, improving  On 9/5/23, patient +5lbs 2/2 mIVF's and fluids w/ chemotherapy administration. S/p diuresed with 20 mg IV lasix, will continue to assess volume status and diurese daily PRN. Patient responded well to 20 mg IV lasix and on 9/6 appears euvolemic with 4lb weight decrease from 9/5. Will not diurese today.    # Loss of appetite  # Risk of malnutrition  RD consult placed upon admission; patient does not meet two of the established criteria necessary for diagnosing malnutrition but is at risk for malnutrition. Recc's are: small frequent meals/snacks, encourage consumption of high protein foods first at meal times, high protein snack once daily between meals. Pt declines supplements at this time. 2 PM snack: 1% milk every day (protein source) + alternate 2 chocolate chip cookies E/O day or vicenta ice cream.   % Intake: < 75% for >/= 1 month (moderate)  % Weight Loss: Weight loss does not meet criteria  Subcutaneous Fat Loss: None observed  Muscle Loss: None observed  Fluid Accumulation/Edema: None noted  Malnutrition Diagnosis: Patient does not meet two of the established criteria necessary for diagnosing malnutrition but is at risk for malnutrition.  - Monitor PO intake trends, meal frequency, weight trends, snack intake.      Clinically Significant Risk Factors               # Coagulation Defect: INR = 1.19 (Ref range: 0.85 - 1.15) and/or PTT = 24 Seconds (Ref range: 22 - 38 Seconds), will monitor for  "bleeding    # Thrombocytopenia: Lowest platelets = 9 in last 2 days, will monitor for bleeding          # Overweight: Estimated body mass index is 28.74 kg/m  as calculated from the following:    Height as of this encounter: 1.74 m (5' 8.5\").    Weight as of this encounter: 87 kg (191 lb 12.8 oz).                Code Status: FULL  Disposition: Likely 2-4 weeks for new AML treatment and BMBx to assess for MRD.  Follow up: Will require an appointment to establish w/ primary hematologist and BMT consult. Will require D14 BMBx.    This plan of care has been discussed with the staff physician, Dr. Velázquez.    Hilda Gamble PA-C  Hematology/Oncology  Pager: #[phone]    I spent 55 minutes in the care of this patient today, which included time necessary for review of interval events, obtaining history and physical exam, ordering medication(s)/test(s) as medically indicated, discussion with interdisciplinary/consult team(s), and documentation time. Over 50% of time was spent face-to-face and/or coordinating care.     Interval History   Thorough chart review and RN notes overnight showing patient is expressing frustration with his care on 9/5/23 and his phone went missing from his bed while he was ambulating with PT, otherwise no acute medical events and patient remains afebrile, & hemodynamically stable. Upon my visit today patient is sitting up in his chair, in mild emotional distress, alert, and conversational. He expresses frustration with a RN overnight that he reports told him his IV pump was bad and had a tone of voice that he felt was challenging him and makes him not feel safe. Much empathetic listening was given today and acknowledgement. Patient also reports staff entering his room with masks not on their face and/or masks on their chin not covering mouth and nose, this is frustrating to him as he does not want them to bring in flu or COVID. He is also upset as his cell phone was sitting on his bed and after he came " "back from ambulating with PT his cell phone was gone and his linens on his bed were changed. He believes the phone was taken with the linens and now he has no access to the out side world. He reports feeling like \"all my control is taken away\". And he expresses missing his wife Aundrea and his home/scenery very much. 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 He does report sleeping better the past two nights when cares are clustered, he stated before this he was getting woken up every hour and describes it as \"this is harrassment\". Otherwise he reports feeling fine today and denies full ROS today such as N/V/D, F/C/NS, headache, vision changes, muscle weakness, chest/abdominal pain, SOB, cough, lesions, rashes, or wounds.  Not experiencing any difficulties ambulating or with imbalance. He had a BM today of regular consistency. He denied having any questions or concerns for the team today.       A comprehensive review of symptoms was performed and was negative except as detailed in the interval history above.    Physical Exam   Vital Signs with Ranges  Temp:  [97.3  F (36.3  C)-97.7  F (36.5  C)] 97.6  F (36.4  C)  Pulse:  [70-89] 74  Resp:  [16-18] 18  BP: (123-167)/(63-79) 123/63  SpO2:  [96 %-99 %] 98 %    I/O last 3 completed shifts:  In: 1351.2 [IV Piggyback:1051.2]  Out: 3925 [Urine:3925]    Vitals:    09/04/23 0850 09/05/23 0820 09/06/23 0754   Weight: 88.8 kg (195 lb 11.2 oz) 88 kg (194 lb 1.6 oz) 87 kg (191 lb 12.8 oz)     Constitutional: Mildly frustrated, cooperative male sitting up in his chair, NAD, awake, alert, conversational.  HEENT: NC/AT, EOMI, sclera clear, conjunctiva normal, MMM.  Respiratory: 98% O2 breathing comfortably on RA, w/ no increased work of breathing, CTAB w/ no crackles or wheezing.  Cardiovascular: RRR, no M/R/G. " Euvolemic.  GI: No rashes/bruises/obvious abnormalities upon inspection, not distended, normoactive bowel sounds, soft, not-TTP.  Skin: No obvious lesions, wounds, abnormalities, Warm, dry, well-perfused. No bruising, bleeding, rashes, or lesions on limited exam.  Neurologic: A&Ox3. Answers questions appropriately. Moves all extremities spontaneously.  Psych: Normal speech pattern. Appropriate affect.  Vascular access:  R PICC; CDI, non-tender, no surrounding erythema.    Medications    - MEDICATION INSTRUCTIONS -          acyclovir  400 mg Oral BID    allopurinol  300 mg Oral Daily    Chemotherapy Infusing-Continuous Infusion   Does not apply Q8H    cytarabine (PF) (CYTOSAR) 200 mg in D5W 1,052 mL infusion  200 mg Intravenous Q24H    furosemide  20 mg Intravenous Daily    levofloxacin  250 mg Oral Daily    micafungin  50 mg Intravenous Q24H    ondansetron  8 mg Oral Q12H       Antiinfectives  Anti-infectives (From now, onward)      Start     Dose/Rate Route Frequency Ordered Stop    09/01/23 2000  acyclovir (ZOVIRAX) tablet 400 mg         400 mg Oral 2 TIMES DAILY 09/01/23 1513      09/01/23 1630  micafungin (MYCAMINE) 50 mg in sodium chloride 0.9 % 100 mL intermittent infusion         50 mg  100 mL/hr over 60 Minutes Intravenous EVERY 24 HOURS 09/01/23 1513      09/01/23 1530  levofloxacin (LEVAQUIN) tablet 250 mg         250 mg Oral DAILY 09/01/23 1513              Data   CBC   Recent Labs   Lab 09/06/23 0618 09/05/23  0529 09/04/23  0429 09/03/23  0511   WBC 2.0* 1.5* 1.0* 1.6*   RBC 2.83* 2.53* 2.72* 2.62*   HGB 7.9* 7.0* 7.5* 7.2*   HCT 26.6* 23.8* 26.1* 24.8*   MCV 94 94 96 95   MCH 27.9 27.7 27.6 27.5   MCHC 29.7* 29.4* 28.7* 29.0*   RDW 14.8 15.0 15.3* 15.6*   PLT 9* 11* 22* 38*       CMP   Recent Labs   Lab 09/06/23 0618 09/05/23  1824 09/05/23  0529 09/04/23  1643 09/04/23  0429 09/03/23  1443 09/03/23  0511    138 137 136 137  137   < > 138  137   POTASSIUM 3.6 3.6 4.1 3.9 4.2  4.2   < > 3.9   3.9   CHLORIDE 105 104 107 105 107  108*   < > 106  106   CO2 24 22 21* 21* 19*  19*   < > 20*  20*   ANIONGAP 10 12 9 10 11  10   < > 12  11   GLC 84 88 115* 105* 136*  135*   < > 117*  117*   BUN 17.8 22.8 18.4 19.7 19.4  19.5   < > 15.9  16.1   CR 0.72 0.72 0.63* 0.67 0.69  0.68   < > 0.74  0.75   GFRESTIMATED >90 >90 >90 >90 >90  >90   < > >90  >90   VENANCIO 8.7* 9.2 8.8 9.1 8.1*  8.2*   < > 8.6*  8.5*   MAG 2.3  --  2.4*  --  2.4*  2.4*  --  2.4*   PHOS 3.8  --  3.7  --  3.7  3.7  --  4.4   PROTTOTAL 6.8  --  6.6  --  6.6  --  7.3   ALBUMIN 4.2  --  4.1  --  3.9  --  4.3   BILITOTAL 1.5*  --  1.2  --  1.0  --  0.8   ALKPHOS 52  --  54  --  57  --  66   AST 16  --  16  --  16  --  19   ALT 9  --  9  --  <5  --  8    < > = values in this interval not displayed.       LFTs   Recent Labs   Lab 09/06/23 0618   PROTTOTAL 6.8   ALBUMIN 4.2   BILITOTAL 1.5*   ALKPHOS 52   AST 16   ALT 9       Coagulation Studies   Recent Labs   Lab 09/06/23 0618   INR 1.19*   PTT 24

## 2023-09-06 NOTE — PLAN OF CARE
Nursing Focus: Chemotherapy  D: Positive blood return via PICC. Insertion site is clean/dry/intact, dressing intact with no complaints of pain.  Urine output is recorded in intake in Doc Flowsheet.    I: Premedications given per order (see electronic medical administration record). Dose # 5 of Cytarabine started to infuse over 24 hours/minutes. Reviewed pt teaching on chemotherapy side effects.  Pt denies need for further teaching. Chemotherapy double checked per protocol by two chemotherapy competent RN's.   A: Tolerating procedure well. Denies nausea and or pain.   P: Continue to monitor urine output and symptoms of nausea. Screen for symptoms of toxicity.

## 2023-09-07 ENCOUNTER — APPOINTMENT (OUTPATIENT)
Dept: PHYSICAL THERAPY | Facility: CLINIC | Age: 68
DRG: 835 | End: 2023-09-07
Attending: INTERNAL MEDICINE
Payer: COMMERCIAL

## 2023-09-07 LAB
ALBUMIN SERPL BCG-MCNC: 3.9 G/DL (ref 3.5–5.2)
ALP SERPL-CCNC: 49 U/L (ref 40–129)
ALT SERPL W P-5'-P-CCNC: 7 U/L (ref 0–70)
ANION GAP SERPL CALCULATED.3IONS-SCNC: 9 MMOL/L (ref 7–15)
APTT PPP: 25 SECONDS (ref 22–38)
AST SERPL W P-5'-P-CCNC: 16 U/L (ref 0–45)
BASOPHILS # BLD MANUAL: 0 10E3/UL (ref 0–0.2)
BASOPHILS # BLD MANUAL: 0 10E3/UL (ref 0–0.2)
BASOPHILS NFR BLD MANUAL: 0 %
BASOPHILS NFR BLD MANUAL: 0 %
BILIRUB DIRECT SERPL-MCNC: 0.27 MG/DL (ref 0–0.3)
BILIRUB SERPL-MCNC: 0.9 MG/DL
BLASTS # BLD MANUAL: 0.3 10E3/UL
BLASTS NFR BLD MANUAL: 11 %
BLD PROD TYP BPU: NORMAL
BLOOD COMPONENT TYPE: NORMAL
BUN SERPL-MCNC: 15.3 MG/DL (ref 8–23)
CALCIUM SERPL-MCNC: 8.7 MG/DL (ref 8.8–10.2)
CHLORIDE SERPL-SCNC: 104 MMOL/L (ref 98–107)
CODING SYSTEM: NORMAL
CREAT SERPL-MCNC: 0.68 MG/DL (ref 0.67–1.17)
CROSSMATCH: NORMAL
DEPRECATED HCO3 PLAS-SCNC: 24 MMOL/L (ref 22–29)
EGFRCR SERPLBLD CKD-EPI 2021: >90 ML/MIN/1.73M2
EOSINOPHIL # BLD MANUAL: 0 10E3/UL (ref 0–0.7)
EOSINOPHIL # BLD MANUAL: 0.1 10E3/UL (ref 0–0.7)
EOSINOPHIL NFR BLD MANUAL: 0 %
EOSINOPHIL NFR BLD MANUAL: 5 %
ERYTHROCYTE [DISTWIDTH] IN BLOOD BY AUTOMATED COUNT: 14.5 % (ref 10–15)
FIBRINOGEN PPP-MCNC: 250 MG/DL (ref 170–490)
GLUCOSE SERPL-MCNC: 89 MG/DL (ref 70–99)
HCT VFR BLD AUTO: 23.5 % (ref 40–53)
HGB BLD-MCNC: 7 G/DL (ref 13.3–17.7)
INR PPP: 1.15 (ref 0.85–1.15)
ISSUE DATE AND TIME: NORMAL
LDH SERPL L TO P-CCNC: 208 U/L (ref 0–250)
LYMPHOCYTES # BLD MANUAL: 0.9 10E3/UL (ref 0.8–5.3)
LYMPHOCYTES # BLD MANUAL: 1.9 10E3/UL (ref 0.8–5.3)
LYMPHOCYTES NFR BLD MANUAL: 61 %
LYMPHOCYTES NFR BLD MANUAL: 65 %
MAGNESIUM SERPL-MCNC: 2.2 MG/DL (ref 1.7–2.3)
MCH RBC QN AUTO: 28 PG (ref 26.5–33)
MCHC RBC AUTO-ENTMCNC: 29.8 G/DL (ref 31.5–36.5)
MCV RBC AUTO: 94 FL (ref 78–100)
MONOCYTES # BLD MANUAL: 0 10E3/UL (ref 0–1.3)
MONOCYTES # BLD MANUAL: 0.3 10E3/UL (ref 0–1.3)
MONOCYTES NFR BLD MANUAL: 11 %
MONOCYTES NFR BLD MANUAL: 3 %
NEUTROPHILS # BLD MANUAL: 0.2 10E3/UL (ref 1.6–8.3)
NEUTROPHILS # BLD MANUAL: 0.5 10E3/UL (ref 1.6–8.3)
NEUTROPHILS NFR BLD MANUAL: 36 %
NEUTROPHILS NFR BLD MANUAL: 8 %
PHOSPHATE SERPL-MCNC: 3.7 MG/DL (ref 2.5–4.5)
PLAT MORPH BLD: ABNORMAL
PLAT MORPH BLD: ABNORMAL
PLATELET # BLD AUTO: 31 10E3/UL (ref 150–450)
POLYCHROMASIA BLD QL SMEAR: SLIGHT
POTASSIUM SERPL-SCNC: 3.8 MMOL/L (ref 3.4–5.3)
PROT SERPL-MCNC: 6.4 G/DL (ref 6.4–8.3)
RBC # BLD AUTO: 2.5 10E6/UL (ref 4.4–5.9)
RBC MORPH BLD: ABNORMAL
RBC MORPH BLD: ABNORMAL
SODIUM SERPL-SCNC: 137 MMOL/L (ref 136–145)
UNIT ABO/RH: NORMAL
UNIT NUMBER: NORMAL
UNIT STATUS: NORMAL
UNIT TYPE ISBT: 9500
URATE SERPL-MCNC: 4.6 MG/DL (ref 3.4–7)
WBC # BLD AUTO: 1.4 10E3/UL (ref 4–11)

## 2023-09-07 PROCEDURE — 258N000003 HC RX IP 258 OP 636: Performed by: INTERNAL MEDICINE

## 2023-09-07 PROCEDURE — 85014 HEMATOCRIT: CPT | Performed by: PHYSICIAN ASSISTANT

## 2023-09-07 PROCEDURE — 85730 THROMBOPLASTIN TIME PARTIAL: CPT

## 2023-09-07 PROCEDURE — 83735 ASSAY OF MAGNESIUM: CPT

## 2023-09-07 PROCEDURE — 258N000003 HC RX IP 258 OP 636: Performed by: PHYSICIAN ASSISTANT

## 2023-09-07 PROCEDURE — 250N000013 HC RX MED GY IP 250 OP 250 PS 637: Performed by: PHYSICIAN ASSISTANT

## 2023-09-07 PROCEDURE — 84550 ASSAY OF BLOOD/URIC ACID: CPT

## 2023-09-07 PROCEDURE — 82248 BILIRUBIN DIRECT: CPT

## 2023-09-07 PROCEDURE — 84100 ASSAY OF PHOSPHORUS: CPT

## 2023-09-07 PROCEDURE — 250N000011 HC RX IP 250 OP 636: Mod: JZ

## 2023-09-07 PROCEDURE — 99233 SBSQ HOSP IP/OBS HIGH 50: CPT | Mod: FS | Performed by: STUDENT IN AN ORGANIZED HEALTH CARE EDUCATION/TRAINING PROGRAM

## 2023-09-07 PROCEDURE — 250N000011 HC RX IP 250 OP 636: Mod: JZ | Performed by: INTERNAL MEDICINE

## 2023-09-07 PROCEDURE — 97116 GAIT TRAINING THERAPY: CPT | Mod: GP

## 2023-09-07 PROCEDURE — 83615 LACTATE (LD) (LDH) ENZYME: CPT

## 2023-09-07 PROCEDURE — 250N000011 HC RX IP 250 OP 636: Mod: JZ | Performed by: PHYSICIAN ASSISTANT

## 2023-09-07 PROCEDURE — 120N000002 HC R&B MED SURG/OB UMMC

## 2023-09-07 PROCEDURE — 85610 PROTHROMBIN TIME: CPT

## 2023-09-07 PROCEDURE — 97750 PHYSICAL PERFORMANCE TEST: CPT | Mod: GP

## 2023-09-07 PROCEDURE — 85007 BL SMEAR W/DIFF WBC COUNT: CPT | Performed by: PHYSICIAN ASSISTANT

## 2023-09-07 PROCEDURE — P9016 RBC LEUKOCYTES REDUCED: HCPCS | Performed by: PHYSICIAN ASSISTANT

## 2023-09-07 PROCEDURE — 85384 FIBRINOGEN ACTIVITY: CPT

## 2023-09-07 RX ADMIN — SODIUM CHLORIDE, PRESERVATIVE FREE 5 ML: 5 INJECTION INTRAVENOUS at 13:22

## 2023-09-07 RX ADMIN — MICAFUNGIN SODIUM 50 MG: 50 INJECTION, POWDER, LYOPHILIZED, FOR SOLUTION INTRAVENOUS at 15:36

## 2023-09-07 RX ADMIN — SODIUM CHLORIDE, PRESERVATIVE FREE 5 ML: 5 INJECTION INTRAVENOUS at 17:51

## 2023-09-07 RX ADMIN — POLYETHYLENE GLYCOL 3350 17 G: 17 POWDER, FOR SOLUTION ORAL at 08:12

## 2023-09-07 RX ADMIN — ONDANSETRON 8 MG: 8 TABLET, FILM COATED ORAL at 05:38

## 2023-09-07 RX ADMIN — ALLOPURINOL 300 MG: 300 TABLET ORAL at 08:08

## 2023-09-07 RX ADMIN — ACYCLOVIR 400 MG: 400 TABLET ORAL at 08:08

## 2023-09-07 RX ADMIN — LEVOFLOXACIN 250 MG: 250 TABLET, FILM COATED ORAL at 08:08

## 2023-09-07 RX ADMIN — SODIUM CHLORIDE, PRESERVATIVE FREE 5 ML: 5 INJECTION INTRAVENOUS at 05:38

## 2023-09-07 RX ADMIN — CYTARABINE 200 MG: 100 INJECTION, SOLUTION INTRATHECAL; INTRAVENOUS; SUBCUTANEOUS at 19:33

## 2023-09-07 RX ADMIN — ACYCLOVIR 400 MG: 400 TABLET ORAL at 22:48

## 2023-09-07 RX ADMIN — ONDANSETRON 8 MG: 8 TABLET, FILM COATED ORAL at 17:51

## 2023-09-07 ASSESSMENT — ACTIVITIES OF DAILY LIVING (ADL)
ADLS_ACUITY_SCORE: 22

## 2023-09-07 NOTE — PROGRESS NOTES
[facility]    Progress Note  Hematology / Oncology     Date of Admission:  9/1/2023  Hospital Day #: 6   Date of Service (when I saw the patient): 09/07/2023    Assessment & Plan   Artie Fine is a 69yo M w/ a PMH of brain abscess (2021), HTN, and BPH. He was transferred from [facility] in [place], [place] to [facility] for work up of pancytopenia; was noted to have circulating blasts concerning for acute leukemia, and was subsequently transferred to [facility]. 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 He was started on induction with 7+3 C1D1 9/2/23. NGS, FISH, molecular, and morph results pending.     Today: Day 6 of 7+3  - Talked with the molecular lab today; verbal confirmation FLT3 PCR negative, report to publish today.  - [facility]erral to [facility] for his wife Aundrea was approved for this weekend, information given to patient.  - Voriconazole approved, will start vori after chemotherapy cycle is completed.  - Will pursue CNS leukemia work up this week (MRI and diagnostic LP) as patient endorses new headache upon admission.  - Patient appears euvolemic today, he remains +1lb from his admission weight today. S/p short course 20 mg IV lasix, patient experiencing mild dizziness requested not to diurese today. Lasix discontinued. Can re-eval another course if clinically indicated as patient is at risk for hypervolemia with chemotherapy fluids.  - Will require D14 BMBx to assess for MRD; orders not yet placed.  - CTM and provide best supportive cares.    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 Normal karyotype. Patient initially presented to [facility] in [place], [place] with 4-6 weeks of progressive bruising, weakness, fatigue, loss of appetite, and night sweats. He also noted a headache similar to his previous brain abscess. CBC notable for pancytopenia; WBC 2.7 (), Hgb 8.1, and plt 1k. He was transferred to  [facility] found on peripheral flow w/ 11% circulating myeloid blasts. He was then transferred to [facility] for further work up and treatment of AML. Diagnostic BMBx performed 9/1 w/ morph, cytogenetics, molecular results pending. 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 47% blasts with the following immunophenotype: Positive for CD7, CD13, CD33, CD34, CD38, CD45 (dim), CD58, CD64 (partial), , HLA-DR, cytoplasmic myeloperoxidase, and nuclear terminal deoxynucleotidyl transferase (TdT) (dim partial, predominantly negative). 98% of the blasts are positive for CD33 (clone P67.6 in APC-R700, relative to background lymphocytes).   - HLA typing ordered. Baseline TTE showing normal left ventricular function with EF of 55-60%. EKG ordered 9/4/23 results pending. PICC placed 9/1 upon admission to [facility] and induction chemotherapy of 7+3 Cytarabine+Daunorubicin was started; C1D1 9/2/23. Patient continues to tolerate this chemotherapy cycle well.  - Will pursue MRI and diagnostic LP for CNS leukemia work up as patient endorses headache upon admission w/ new AML diagnosis.  - Talked with the molecular lab 9/7/23; they are actively working on the AML NGS and FLT3 PCR; verbal confirmation given that FLT3 ITD negative, report will publish today.         Treatment Plan: 7+3 (C1D1=9/2/23)               - Cytarabine 100 mg/m2 D1-D7               - Daunorubicin 60 mg/m2 D1-D3               - Supportive meds: Decadron 12 mg tab D1-D3, Zofran and Compazine, Ativan     # Low risk for TLS/DIC  - Monitoring TLS/DIC labs daily as TLS risk decreased at this time.  - Allopurinol 300 mg daily  - For TLS:               - If e/o TLS, bolus + start mIVF               - If uric acid >8 despite IVF, give rasburicase 6 mg x1               - If phos persistently >5, start PhosLo TID      ID  # Immunosuppressed  2/2 malignancy and chemotherapy  # ID PPX  - Viral serologies: HepB/C-, HIV-. HSV 1 & 2 IgG positive, EBV, CMV IgG positive  -  mg  BID  - Levofloxacin 250 mg daily  - Micafungin 50 mg IV daily   - PA for voriconazole approved, will rotate to vori upon completion of chemotherapy.     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 S/p craniotomy for drainage of abscess July 2021. It is unclear what culture speciated to, but he completed a course of antibiotics with ceftriaxone then meropenem with resolution on imaging. Head CT 8/31 at OSH revealed no intracranial hemorrhage midline shift or mass effect. Postsurgical changes noted. Previous area of infection demonstrates trace amount of encephalomalacia but no mass effect or inflammatory changes to suggest new or acute infection. Otherwise unremarkable with no acute inpatient requirements at this time. Will pursue CNS AML work up of MRI and diagnostic LP this week.  - Tylenol PRN for headache     CARDS  # HTN  Noted during prior hospitalization for brain abscess in 2021. Previously on lisinopril, which he is no longer taking. Remains normotensive this admission, will CTM BP's daily.    URINARY  # BPH  Patient's baseline is he experiences difficulty completely emptying bladder. He notes frequent urination. No dysuria; no acute inpatient requirements at this time.     MISC  # Weakness  # Deconditioning  # Possible balance issues when ambulating  2/2 malignancy. Patient ambulates independently however endorses issues w/ balance after his brain abscess in 2021, exacerbated when ambulating.  - PT consult placed 9/4 to assess for baseline and help with dispo requirements. Inpatient PT eval on 9/5/23. PT notes: generalized deconditioning and weakness w/ decreased strength BLE, decreased IND w/ functional mobility d/t impaired muscular endurance and activity tolerance. Anticipated equipment needs at discharge TBD. PT will work with patient 3x weekly and recc's home with assist; home with outpatient physical therapy ([facility] cancer rehab).    # Increased weight  "from admission, improving  # Risk of hypervolemia, improving  On 9/5/23, patient +5lbs 2/2 mIVF's and fluids w/ chemotherapy administration. S/p diuresed with 20 mg IV lasix, will continue to assess volume status and diurese daily PRN. Patient responded well to 20 mg IV lasix and on 9/6 appears euvolemic with 4lb weight decrease from 9/5. Diuretic discontinued on 9/7 per patients request as he feels mildly dizzy. He is +1lb up from admission. Will continue to monitor volume status daily.    # Loss of appetite  # Risk of malnutrition  RD consult placed upon admission; patient does not meet two of the established criteria necessary for diagnosing malnutrition but is at risk for malnutrition. Recc's are: small frequent meals/snacks, encourage consumption of high protein foods first at meal times, high protein snack once daily between meals. Pt declines supplements at this time. 2 PM snack: 1% milk every day (protein source) + alternate 2 chocolate chip cookies E/O day or vicenta ice cream.   % Intake: < 75% for >/= 1 month (moderate)  % Weight Loss: Weight loss does not meet criteria  Subcutaneous Fat Loss: None observed  Muscle Loss: None observed  Fluid Accumulation/Edema: None noted  Malnutrition Diagnosis: Patient does not meet two of the established criteria necessary for diagnosing malnutrition but is at risk for malnutrition.  - Monitor PO intake trends, meal frequency, weight trends, snack intake.      Clinically Significant Risk Factors                  # Thrombocytopenia: Lowest platelets = 9 in last 2 days, will monitor for bleeding          # Overweight: Estimated body mass index is 28.75 kg/m  as calculated from the following:    Height as of this encounter: 1.74 m (5' 8.5\").    Weight as of this encounter: 87 kg (191 lb 14.4 oz).                Code Status: FULL  Disposition: Likely 2-4 weeks for new AML treatment and BMBx to assess for MRD.  Follow up: Will require D14 BMBx to assess for MRD.  - Patient " will follow with Dr. Garcia, appointment scheduled for end of September.    This plan of care has been discussed with the staff physician, Dr. Velázquez.    Hilda Gamble PA-C  Hematology/Oncology  Pager: #[phone]    I spent 55 minutes in the care of this patient today, which included time necessary for review of interval events, obtaining history and physical exam, ordering medication(s)/test(s) as medically indicated, discussion with interdisciplinary/consult team(s), and documentation time. Over 50% of time was spent face-to-face and/or coordinating care.     Interval History   Thorough chart review and RN notes overnight show no acute events, patient remains afebrile and hemodynamically stable. He reports he is feeling well overall and denied full ROS today such as N/V/D, F/C/NS, headache, vision changes, muscle weakness, chest/abdominal pain, SOB, cough, lesions, rashes, or wounds.  He is still not experiencing any difficulties ambulating or with imbalance. He has been having regular BM's. He remains upset by his feeling of isolation here in the hospital exacerbated when his phone was taken from his room on 9/5 and remains to be missing. He is looking forward to his afternoon call with his wife and was happy to hear [facility] visit this weekend. He is very much looking forward to seeing her. We discussed the need for CNS leukemia work up which consists of an MRI and and diagnostic LP, given his headaches and dizziness during admission. He will also need a unit of RBC's today and platelets overnight in preparation of the LP. Patient agreeable to this plan and inquired when he may possibly be able to go home. He is missing being home and much empathetic listening and reassurance was provided today. He lives near [place] and requested that future appointments be done as close to that location as possible. He denied any further questions or concerns.    A comprehensive review of  symptoms was performed and was negative except as detailed in the interval history above.    Physical Exam   Vital Signs with Ranges  Temp:  [97.2  F (36.2  C)-97.9  F (36.6  C)] 97.8  F (36.6  C)  Pulse:  [77-90] 87  Resp:  [16-18] 16  BP: (105-127)/(59-69) 123/69  SpO2:  [96 %-99 %] 99 %    I/O last 3 completed shifts:  In: 300   Out: 2760 [Urine:2760]    Vitals:    09/05/23 0820 09/06/23 0754 09/07/23 0841   Weight: 88 kg (194 lb 1.6 oz) 87 kg (191 lb 12.8 oz) 87 kg (191 lb 14.4 oz)     Constitutional: Cooperative male, sitting up in his chair, NAD, awake, alert, conversational.  HEENT: NC/AT, EOMI, sclera clear, conjunctiva normal, MMM.  Respiratory: 98% O2 breathing comfortably on RA, w/ no increased work of breathing, CTAB w/ no crackles or wheezing.  Cardiovascular: RRR, no M/R/G. Euvolemic.  GI: No rashes/bruises/obvious abnormalities upon inspection, not distended, normoactive bowel sounds, soft, not-TTP.  Skin: No obvious lesions, wounds, abnormalities, Warm, dry, well-perfused. No bruising, bleeding, rashes, or lesions on limited exam.  Neurologic: A&Ox3. Answers questions appropriately. Moves all extremities spontaneously.  Psych: Normal speech pattern. Appropriate affect.  Vascular access:  R PICC; CDI, non-tender, no surrounding erythema.    Medications    - MEDICATION INSTRUCTIONS -          acyclovir  400 mg Oral BID    allopurinol  300 mg Oral Daily    Chemotherapy Infusing-Continuous Infusion   Does not apply Q8H    cytarabine (PF) (CYTOSAR) 200 mg in D5W 1,052 mL infusion  200 mg Intravenous Q24H    levofloxacin  250 mg Oral Daily    micafungin  50 mg Intravenous Q24H    ondansetron  8 mg Oral Q12H       Antiinfectives  Anti-infectives (From now, onward)      Start     Dose/Rate Route Frequency Ordered Stop    09/01/23 2000  acyclovir (ZOVIRAX) tablet 400 mg         400 mg Oral 2 TIMES DAILY 09/01/23 1513      09/01/23 1630  micafungin (MYCAMINE) 50 mg in sodium chloride 0.9 % 100 mL intermittent  infusion         50 mg  100 mL/hr over 60 Minutes Intravenous EVERY 24 HOURS 09/01/23 1513      09/01/23 1530  levofloxacin (LEVAQUIN) tablet 250 mg         250 mg Oral DAILY 09/01/23 1513              Data   CBC   Recent Labs   Lab 09/07/23 0536 09/06/23 0618 09/05/23 0529 09/04/23  0429   WBC 1.4* 2.0* 1.5* 1.0*   RBC 2.50* 2.83* 2.53* 2.72*   HGB 7.0* 7.9* 7.0* 7.5*   HCT 23.5* 26.6* 23.8* 26.1*   MCV 94 94 94 96   MCH 28.0 27.9 27.7 27.6   MCHC 29.8* 29.7* 29.4* 28.7*   RDW 14.5 14.8 15.0 15.3*   PLT 31* 9* 11* 22*       CMP   Recent Labs   Lab 09/07/23 0536 09/06/23 0618 09/05/23  1824 09/05/23 0529 09/04/23  1643 09/04/23  0429    139 138 137   < > 137  137   POTASSIUM 3.8 3.6 3.6 4.1   < > 4.2  4.2   CHLORIDE 104 105 104 107   < > 107  108*   CO2 24 24 22 21*   < > 19*  19*   ANIONGAP 9 10 12 9   < > 11  10   GLC 89 84 88 115*   < > 136*  135*   BUN 15.3 17.8 22.8 18.4   < > 19.4  19.5   CR 0.68 0.72 0.72 0.63*   < > 0.69  0.68   GFRESTIMATED >90 >90 >90 >90   < > >90  >90   VENANCIO 8.7* 8.7* 9.2 8.8   < > 8.1*  8.2*   MAG 2.2 2.3  --  2.4*  --  2.4*  2.4*   PHOS 3.7 3.8  --  3.7  --  3.7  3.7   PROTTOTAL 6.4 6.8  --  6.6  --  6.6   ALBUMIN 3.9 4.2  --  4.1  --  3.9   BILITOTAL 0.9 1.5*  --  1.2  --  1.0   ALKPHOS 49 52  --  54  --  57   AST 16 16  --  16  --  16   ALT 7 9  --  9  --  <5    < > = values in this interval not displayed.       LFTs   Recent Labs   Lab 09/07/23  0536   PROTTOTAL 6.4   ALBUMIN 3.9   BILITOTAL 0.9   ALKPHOS 49   AST 16   ALT 7       Coagulation Studies   Recent Labs   Lab 09/07/23  0536   INR 1.15   PTT 25

## 2023-09-07 NOTE — PLAN OF CARE
Goal Outcome Evaluation:  1900-2330: Pt A&Ox4 with VSS on RA. No complaints of pain, N/V or SOB. CIVI Cytarabine infusing with good blood return via PICC. Clustered cares per pt request. Able to make needs known. Continue with clustered care and with POC.

## 2023-09-07 NOTE — PLAN OF CARE
Goal Outcome Evaluation:      Plan of Care Reviewed With: patient      6923-7725:     Afebrile, VSS. 1 unit of PRBC's transfused for HGB of 7.0. Day # 5 of Cytarabine infusing via PICC with excellent blood return. LP planned for tomorrow. 1 dose of platelets ordered to start at 0200 9/8/23 and then re-check level with am labs.

## 2023-09-07 NOTE — PROGRESS NOTES
Care Management Follow Up    Length of Stay (days): 6    Expected Discharge Date: 09/29/2023     Concerns to be Addressed: Community resources      Patient plan of care discussed at interdisciplinary rounds: Yes    Anticipated Discharge Disposition: n/a     Anticipated Discharge Services:  (TBD)    Anticipated Discharge DME:  (TBD)    Patient/family educated on Medicare website which has current facility and service quality ratings: n/a    Education Provided on the Discharge Plan: Yes    Patient/Family in Agreement with the Plan:  (n/a)    Referrals Placed by CM/SW:  (N/A)    Private pay costs discussed: Not applicable    Additional Information: SW spoke with Kandice at [facility] (P: [phone]) who reported there is a room assignment for pt's wife to stay at [facility]. Arrival date is 9/9/23, and departure date is 9/11/23.    KATY Lloyd  [facility]   Pager: [phone]

## 2023-09-07 NOTE — PLAN OF CARE
"Goal Outcome Evaluation:  Time 5347-6333    /59 (BP Location: Left arm)   Pulse 77   Temp 97.9  F (36.6  C) (Oral)   Resp 16   Ht 1.74 m (5' 8.5\")   Wt 87 kg (191 lb 12.8 oz)   SpO2 96%   BMI 28.74 kg/m      Reason for admission: AML  Activity: UAL  Pain: denies  Neuro: WNL  Cardiac: WNL  Respiratory: WNL  GI/: LBM 9/6. Voiding large amounts after IV lasix  Diet: Regular  Lines: RUE double-lumen PICC - chemo running in purple, red - HL  Wounds: skin intact  Labs/imaging: Electrolytes WDL - next AM draw. Hgb 7 - 1 unit prepared to transfuse      New changes this shift: No complaints. Pt stable. Reports restful sleep overnight.     Plan: Fall prevention. Pain control. Ongoing treatment      Continue to monitor and follow POC      Plan of Care Reviewed With: patient    Overall Patient Progress: improvingOverall Patient Progress: improving    Outcome Evaluation: Pt reports adequate sleep d/t clustered cares overnight. Pleasant and appreciative to staff      "

## 2023-09-07 NOTE — PROGRESS NOTES
09/07/23 1130   Signing Clinician's Name / Credentials   Signing clinician's name / credentials Sharath Knapp DPT   Functional Gait Assessment (NEHEMIAH Caceres, WILLY Dill, et al. (2004))   1. GAIT LEVEL SURFACE 2   2. CHANGE IN GAIT SPEED 3   3. GAIT WITH HORIZONTAL HEAD TURNS 3   4. GAIT WITH VERTICAL HEAD TURNS 3   5. GAIT AND PIVOT TURN 2   6. STEP OVER OBSTACLE 2   7. GAIT WITH NARROW BASE OF SUPPORT 2   8. GAIT WITH EYES CLOSED 2   9. AMBULATING BACKWARDS 3   10. STEPS 2   Total Functional Gait Assessment Score   TOTAL SCORE: (MAXIMUM SCORE 30) 24     Functional Gait Assessment (FGA): The FGA assesses postural stability during various walking tasks.   Gait assistive device used: None    Scores of <22 /30 have been correlated with predicting falls in community-dwelling older adults according to Nicki & Judson 2010.   Scores of <18 /30 have been correlated with increased risk for falls in patients with Parkinsons Disease according to Catarino Nunez Zhou et al 2014.  Minimal Detectable Change for patients with acute/chronic stroke = 4.2 according to Thiaaron & Ritschel 2009  Minimal Detectable Change for patients with vestibular disorder = 8 according to Nicki & Judson 2010    Assessment (rationale for performing, application to patient s function & care plan): FGA performed to assess pt's current balance function and risk for falls. Pt currently at no increased risk for falls evidenced by score of 24/30, but still below baseline and would continue to benefit from skilled balance training to improve safety. Edu provided on scoring and safety.  (Minutes billed as physical performance test):  23

## 2023-09-08 LAB
ALBUMIN SERPL BCG-MCNC: 4.3 G/DL (ref 3.5–5.2)
ALP SERPL-CCNC: 56 U/L (ref 40–129)
ALT SERPL W P-5'-P-CCNC: 9 U/L (ref 0–70)
ANION GAP SERPL CALCULATED.3IONS-SCNC: 9 MMOL/L (ref 7–15)
APTT PPP: 24 SECONDS (ref 22–38)
AST SERPL W P-5'-P-CCNC: 15 U/L (ref 0–45)
BASOPHILS # BLD MANUAL: 0 10E3/UL (ref 0–0.2)
BASOPHILS NFR BLD MANUAL: 0 %
BILIRUB DIRECT SERPL-MCNC: 0.38 MG/DL (ref 0–0.3)
BILIRUB SERPL-MCNC: 1.3 MG/DL
BLASTS # BLD MANUAL: 0.1 10E3/UL
BLASTS NFR BLD MANUAL: 4 %
BLD PROD TYP BPU: NORMAL
BLOOD COMPONENT TYPE: NORMAL
BUN SERPL-MCNC: 13.1 MG/DL (ref 8–23)
CALCIUM SERPL-MCNC: 8.8 MG/DL (ref 8.8–10.2)
CHLORIDE SERPL-SCNC: 103 MMOL/L (ref 98–107)
CODING SYSTEM: NORMAL
CREAT SERPL-MCNC: 0.66 MG/DL (ref 0.67–1.17)
DEPRECATED HCO3 PLAS-SCNC: 25 MMOL/L (ref 22–29)
EGFRCR SERPLBLD CKD-EPI 2021: >90 ML/MIN/1.73M2
EOSINOPHIL # BLD MANUAL: 0 10E3/UL (ref 0–0.7)
EOSINOPHIL NFR BLD MANUAL: 0 %
ERYTHROCYTE [DISTWIDTH] IN BLOOD BY AUTOMATED COUNT: 14.5 % (ref 10–15)
FIBRINOGEN PPP-MCNC: 310 MG/DL (ref 170–490)
GLUCOSE CSF-MCNC: 51 MG/DL (ref 40–70)
GLUCOSE SERPL-MCNC: 92 MG/DL (ref 70–99)
HCT VFR BLD AUTO: 25.6 % (ref 40–53)
HGB BLD-MCNC: 7.8 G/DL (ref 13.3–17.7)
HOLD SPECIMEN: NORMAL
HOLD SPECIMEN: NORMAL
INR PPP: 1.09 (ref 0.85–1.15)
ISSUE DATE AND TIME: NORMAL
LDH SERPL L TO P-CCNC: 226 U/L (ref 0–250)
LYMPHOCYTES # BLD MANUAL: 0.9 10E3/UL (ref 0.8–5.3)
LYMPHOCYTES NFR BLD MANUAL: 60 %
MAGNESIUM SERPL-MCNC: 2.3 MG/DL (ref 1.7–2.3)
MCH RBC QN AUTO: 28.1 PG (ref 26.5–33)
MCHC RBC AUTO-ENTMCNC: 30.5 G/DL (ref 31.5–36.5)
MCV RBC AUTO: 92 FL (ref 78–100)
MONOCYTES # BLD MANUAL: 0 10E3/UL (ref 0–1.3)
MONOCYTES NFR BLD MANUAL: 1 %
NEUTROPHILS # BLD MANUAL: 0.5 10E3/UL (ref 1.6–8.3)
NEUTROPHILS NFR BLD MANUAL: 35 %
PHOSPHATE SERPL-MCNC: 3 MG/DL (ref 2.5–4.5)
PLAT MORPH BLD: ABNORMAL
PLATELET # BLD AUTO: 51 10E3/UL (ref 150–450)
POTASSIUM SERPL-SCNC: 3.9 MMOL/L (ref 3.4–5.3)
PROT CSF-MCNC: 46.4 MG/DL (ref 15–45)
PROT SERPL-MCNC: 6.9 G/DL (ref 6.4–8.3)
RBC # BLD AUTO: 2.78 10E6/UL (ref 4.4–5.9)
RBC MORPH BLD: ABNORMAL
SODIUM SERPL-SCNC: 137 MMOL/L (ref 136–145)
UNIT ABO/RH: NORMAL
UNIT NUMBER: NORMAL
UNIT STATUS: NORMAL
UNIT TYPE ISBT: 8400
URATE SERPL-MCNC: 3.9 MG/DL (ref 3.4–7)
WBC # BLD AUTO: 1.5 10E3/UL (ref 4–11)

## 2023-09-08 PROCEDURE — 85730 THROMBOPLASTIN TIME PARTIAL: CPT

## 2023-09-08 PROCEDURE — 258N000003 HC RX IP 258 OP 636: Performed by: INTERNAL MEDICINE

## 2023-09-08 PROCEDURE — 82248 BILIRUBIN DIRECT: CPT

## 2023-09-08 PROCEDURE — 99418 PROLNG IP/OBS E/M EA 15 MIN: CPT | Performed by: STUDENT IN AN ORGANIZED HEALTH CARE EDUCATION/TRAINING PROGRAM

## 2023-09-08 PROCEDURE — 87015 SPECIMEN INFECT AGNT CONCNTJ: CPT

## 2023-09-08 PROCEDURE — 83615 LACTATE (LD) (LDH) ENZYME: CPT

## 2023-09-08 PROCEDURE — 250N000011 HC RX IP 250 OP 636: Mod: JZ | Performed by: PHYSICIAN ASSISTANT

## 2023-09-08 PROCEDURE — P9037 PLATE PHERES LEUKOREDU IRRAD: HCPCS | Performed by: PHYSICIAN ASSISTANT

## 2023-09-08 PROCEDURE — 62270 DX LMBR SPI PNXR: CPT | Performed by: INTERNAL MEDICINE

## 2023-09-08 PROCEDURE — 84100 ASSAY OF PHOSPHORUS: CPT

## 2023-09-08 PROCEDURE — 88188 FLOWCYTOMETRY/READ 9-15: CPT | Mod: GC | Performed by: STUDENT IN AN ORGANIZED HEALTH CARE EDUCATION/TRAINING PROGRAM

## 2023-09-08 PROCEDURE — 85007 BL SMEAR W/DIFF WBC COUNT: CPT | Performed by: PHYSICIAN ASSISTANT

## 2023-09-08 PROCEDURE — 83735 ASSAY OF MAGNESIUM: CPT

## 2023-09-08 PROCEDURE — 89050 BODY FLUID CELL COUNT: CPT

## 2023-09-08 PROCEDURE — 84550 ASSAY OF BLOOD/URIC ACID: CPT

## 2023-09-08 PROCEDURE — 84157 ASSAY OF PROTEIN OTHER: CPT

## 2023-09-08 PROCEDURE — 85384 FIBRINOGEN ACTIVITY: CPT

## 2023-09-08 PROCEDURE — 82945 GLUCOSE OTHER FLUID: CPT

## 2023-09-08 PROCEDURE — 85610 PROTHROMBIN TIME: CPT

## 2023-09-08 PROCEDURE — 258N000003 HC RX IP 258 OP 636: Performed by: PHYSICIAN ASSISTANT

## 2023-09-08 PROCEDURE — 120N000002 HC R&B MED SURG/OB UMMC

## 2023-09-08 PROCEDURE — 250N000011 HC RX IP 250 OP 636: Performed by: INTERNAL MEDICINE

## 2023-09-08 PROCEDURE — 250N000011 HC RX IP 250 OP 636: Mod: JZ | Performed by: STUDENT IN AN ORGANIZED HEALTH CARE EDUCATION/TRAINING PROGRAM

## 2023-09-08 PROCEDURE — 250N000011 HC RX IP 250 OP 636: Mod: JZ

## 2023-09-08 PROCEDURE — 009U3ZX DRAINAGE OF SPINAL CANAL, PERCUTANEOUS APPROACH, DIAGNOSTIC: ICD-10-PCS | Performed by: INTERNAL MEDICINE

## 2023-09-08 PROCEDURE — 99233 SBSQ HOSP IP/OBS HIGH 50: CPT | Mod: FS | Performed by: STUDENT IN AN ORGANIZED HEALTH CARE EDUCATION/TRAINING PROGRAM

## 2023-09-08 PROCEDURE — 85014 HEMATOCRIT: CPT | Performed by: PHYSICIAN ASSISTANT

## 2023-09-08 PROCEDURE — 250N000013 HC RX MED GY IP 250 OP 250 PS 637: Performed by: PHYSICIAN ASSISTANT

## 2023-09-08 PROCEDURE — 88185 FLOWCYTOMETRY/TC ADD-ON: CPT

## 2023-09-08 RX ADMIN — ALLOPURINOL 300 MG: 300 TABLET ORAL at 08:39

## 2023-09-08 RX ADMIN — LEVOFLOXACIN 250 MG: 250 TABLET, FILM COATED ORAL at 08:39

## 2023-09-08 RX ADMIN — CYTARABINE 200 MG: 100 INJECTION, SOLUTION INTRATHECAL; INTRAVENOUS; SUBCUTANEOUS at 21:14

## 2023-09-08 RX ADMIN — MICAFUNGIN SODIUM 50 MG: 50 INJECTION, POWDER, LYOPHILIZED, FOR SOLUTION INTRAVENOUS at 16:59

## 2023-09-08 RX ADMIN — SODIUM CHLORIDE, PRESERVATIVE FREE 5 ML: 5 INJECTION INTRAVENOUS at 04:21

## 2023-09-08 RX ADMIN — ACYCLOVIR 400 MG: 400 TABLET ORAL at 08:39

## 2023-09-08 RX ADMIN — ONDANSETRON 8 MG: 8 TABLET, FILM COATED ORAL at 05:30

## 2023-09-08 RX ADMIN — ACYCLOVIR 400 MG: 400 TABLET ORAL at 21:13

## 2023-09-08 RX ADMIN — ALTEPLASE 2 MG: 2.2 INJECTION, POWDER, LYOPHILIZED, FOR SOLUTION INTRAVENOUS at 23:55

## 2023-09-08 RX ADMIN — ONDANSETRON 8 MG: 8 TABLET, FILM COATED ORAL at 16:59

## 2023-09-08 ASSESSMENT — ACTIVITIES OF DAILY LIVING (ADL)
ADLS_ACUITY_SCORE: 22

## 2023-09-08 NOTE — PROGRESS NOTES
CLINICAL NUTRITION SERVICES - REASSESSMENT NOTE     Nutrition Prescription    RECOMMENDATIONS FOR MDs/PROVIDERS TO ORDER:   None at this time.     Malnutrition Status:   Moderate malnutrition in the context of acute illness.     Recommendations already ordered by Registered Dietitian (RD):   Provided snacks/supplements list  placed PRN snacks/supplements order    Future/Additional Recommendations:   Monitor weight and intake trends.      EVALUATION OF THE PROGRESS TOWARD GOALS   Diet: Regular    Nutrition Support: 2 pm snack of either 1% milk + 2 Chocolate Chips OR 1% milk + chocolate ice cream.     Intake: Per flowsheets, intake documentation likely missing some meals/snacks, but on average 100% of meals consumed. Per 6 day average, pt is ordering 1470 kcal and ~60 g protein per [employer].      NEW FINDINGS   Visited with patient in room. Pt reports having an improving appetite and feels that he finally enjoys eating again, rather than forcing himself to eat. Pt open to trialing nutrition supplements. Provided snacks/supplements list and placed PRN snacks/supplements order. Pt reports anticipation that wife will be bringing snacks/meals from home and is excited to have foods that he enjoys more.     Skin: Trace edema noted in flowsheets. Crow score 22 with nutrition marked as adequate.     LABS   Labs Reviewed   09/07/23 05:36 09/08/23 05:23   Sodium 137 137   Potassium 3.8 3.9   Creatinine 0.68 0.66 (L)   GFR Estimate >90 >90   Calcium 8.7 (L) 8.8   Magnesium 2.2 2.3   Phosphorus 3.7 3.0   Glucose 89 92   WBC 1.4 (L) 1.5 (L)   Hemoglobin 7.0 (L) 7.8 (L)   MCV 94 92     MEDICATIONS   Medications Reviewed  - Mycamine   - Zofran   - Miralax PRN    ANTHROPOMETRICS   Weight Trends:   Pt with 2.6 lb (1.4 %) weight loss over 1 week.      Weight trends this admission:   09/08/23 85.3 kg (188 lb) Standing scale   09/07/23 87 kg (191 lb 14.4 oz) Standing scale   09/06/23 87 kg (191 lb 12.8 oz) --   09/05/23 88 kg (194  lb 1.6 oz) Standing scale   09/04/23 88.8 kg (195 lb 11.2 oz) Standing scale   09/03/23 87.5 kg (193 lb) Standing scale   09/02/23 86.8 kg (191 lb 4.8 oz) Standing scale   09/01/23 86.5 kg (190 lb 9.6 oz) Standing scale     Wt Readings from Last Encounters:   09/08/23 85.3 kg (188 lb)   09/01/23 86.2 kg (190 lb 1.6 oz)     MALNUTRITION   % Intake: </=75% for >/= 1 month (severe)  % Weight Loss: 1-2% in 1 week (moderate)  Subcutaneous Fat Loss: None observed  Muscle Loss: Thoracic region (clavicle, acromium bone, deltoid, trapezius, pectoral): Mild  Fluid Accumulation/Edema: Does not meet criteria - Trace  Malnutrition Diagnosis: Moderate malnutrition in the context of acute illness.     Previous Goals   Patient to consume % of nutritionally adequate meal trays TID, or the equivalent with supplements/snacks.  Evaluation: Not met    Previous Nutrition Diagnosis   Inadequate oral intake related to decreased appetite as evidenced by pt self-report/diet hx   Evaluation: No change    CURRENT NUTRITION DIAGNOSIS   Inadequate oral intake related to decreased appetite as evidenced by pt self-report/diet hx     INTERVENTIONS   Implementation   Provided snacks/supplements list  placed PRN snacks/supplements order    Goals   Patient to consume % of nutritionally adequate meal trays TID, or the equivalent with supplements/snacks.    Monitoring/Evaluation   Progress toward goals will be monitored and evaluated per protocol.    Swati Wall RD, LD   5B/6A pager [phone]  Weekend pager [phone]

## 2023-09-08 NOTE — PROCEDURES
[facility]  CAPS PROCEDURE NOTE  Date of Admission:  9/1/2023  Consult Requested by: Heme/onc  Reason for Consult: diagnostic lumbar puncture    Indication/HPI: headache, dizziness, in setting of new AML dx    Pre-Procedure Diagnosis: Headache    Post-Procedure Diagnosis: Headache    Risk Assessment: Higher bleeding risk. Plat 31 yesterday, transfusion this am, 51 pre-procedure    Procedure Outcome:  Successful lumbar puncture at L5 - S1 for 10 mL of CSF. Clear, sample sent to lab  See additional procedure details below.    The primary covering service should follow up and address any lab results as appropriate.    Madeline Dennis MD  [facility]  Securely message with [email or web address] (more info)  Text page via [employer] Paging/Directory   See signed in provider for up to date coverage information      [facility]    Lumbar puncture    Date/Time: 9/8/2023 12:37 PM    Performed by: Madeline Dennis MD  Authorized by: Madeline Dennis MD  Indications: eval for CNS involvement of AML.  Preparation: Patient was prepped and draped in the usual sterile fashion.      UNIVERSAL PROTOCOL   Site Marked: Yes  Prior Images Obtained and Reviewed:  Yes  Required items: Required blood products, implants, devices and special equipment available    Patient identity confirmed:  Arm band  Patient was reevaluated immediately before administering moderate or deep sedation or anesthesia  Confirmation Checklist:  Patient's identity using two indicators  Time out: Immediately prior to the procedure a time out was called    Universal Protocol: the Joint Commission Universal Protocol was followed    Preparation: Patient was prepped and draped in usual sterile fashion       ANESTHESIA    Anesthesia:  Local infiltration  Local Anesthetic:  Lidocaine 1% without epinephrine      SEDATION    Patient Sedated: No       PROCEDURE DETAILS  Lumbar space: L4-L5 interspace  Patient's position: left lateral decubitus  Needle gauge: 22  Needle type: spinal needle - Quincke tip  Needle length: 3.5 in  Number of attempts: 1  Fluid appearance: clear  Post-procedure: site cleaned      PROCEDURE  Describe Procedure: 10 mL removed and sent to lab  Patient Tolerance:  Patient tolerated the procedure well with no immediate complications  Length of time physician/provider present for 1:1 monitoring during sedation: 0        No results found for this or any previous visit from the past 1 day.

## 2023-09-08 NOTE — PROGRESS NOTES
Nursing Focus: Chemotherapy  D: Positive blood return via PICC. Insertion site is clean/dry/intact, dressing intact with no complaints of pain.  Urine output is recorded in intake in Doc Flowsheet.    I: Premedications given per order (see electronic medical administration record). Dose #6 of Cytarabine started to infuse over 24 hours. Reviewed pt teaching on chemotherapy side effects.  Pt denies need for further teaching. Chemotherapy double checked per protocol by two chemotherapy competent RN's.   A: Tolerating procedure well. Denies nausea and or pain.   P: Continue to monitor urine output and symptoms of nausea. Screen for symptoms of toxicity.

## 2023-09-08 NOTE — PLAN OF CARE
"Goal Outcome Evaluation:      Plan of Care Reviewed With: patient    Overall Patient Progress: no changeOverall Patient Progress: no change       Goal Outcome Evaluation    Time: 7332-6167    Reason for admission: AML  Activity: UAL  Pain: denies  Neuro: WDL  Cardiac: WDL  Respiratory: WDL  GI/: WDL, LBM 9/8/2023  Diet: regular diet  Lines: right PICC- infusing chemo and HL  Labs/imaging: Reviewed, no replacements indicated this shift.   Vitals: elevated BP at 147/83 and 143/86. This was post an angry rant about nursing aids \"not wearing masks\".       New changes this shift: Plts x1 given for LP in the morning. Pt expressed anger over \"certain nursing assistants not wearing masks\" in his room. Nurse reassured pt it will be escalated to the charge nurse.       Continue to follow POC..      "

## 2023-09-08 NOTE — PROGRESS NOTES
[facility]    Progress Note  Hematology / Oncology     Date of Admission:  9/1/2023  Hospital Day #: 7   Date of Service (when I saw the patient): 09/08/2023    Assessment & Plan   Artie Fine is a 69yo M w/ a PMH of brain abscess (2021), HTN, and BPH. He was transferred from [facility] in [place], [place] to [facility] for work up of pancytopenia; was noted to have circulating blasts concerning for acute leukemia, and was subsequently transferred to [facility]. He underwent a diagnostic bone marrow biopsy on 9/1/23 w/ flow showing 47% blasts and morph showing hypercellular marrow (70-80% cellularity) with dysplastic granulocytes, atypical megakaryocytes, and 30% blasts. He was started on induction with 7+3 C1D1 9/2/23 and is tolerating this regimen very well.    Today: Day 7 of 7+3  - Referral to [facility] for his wife Aundrea was approved for this weekend, information given to patient.  - Voriconazole approved, will start vori after chemotherapy cycle is completed.  - CNS leukemia work up this week s/p diagnostic LP today, will order MRI after cytarabine course is completed.  - Patient appears euvolemic today; s/p short course 20 mg IV lasix. Can re-eval another course if clinically indicated as patient is at risk for hypervolemia with chemotherapy fluids.  - Will remain inpatient through D14 BMBx at which point we will assess for MRD; orders not yet placed.  - CTM and provide best supportive cares.    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 Normal karyotype. FISH shows no evidence of MLLT10, NUP98, or KMT2A rearrangement.   - Patient initially presented to [facility] in [place], [place] with 4-6 weeks of progressive bruising, weakness, fatigue, loss of appetite, and night sweats. He also noted a headache similar to his previous brain abscess. CBC notable for pancytopenia; WBC 2.7 (), Hgb 8.1, and plt 1k. He was transferred to [facility] on peripheral flow  w/ 11% circulating myeloid blasts. He was then transferred to [facility] for further work up and treatment of AML. Diagnostic BMBx performed 9/1:  - NGS results pending.   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 47% blasts with the following immunophenotype: Positive for CD7, CD13, CD33, CD34, CD38, CD45 (dim), CD58, CD64 (partial), , HLA-DR, cytoplasmic myeloperoxidase, and nuclear terminal deoxynucleotidyl transferase (TdT) (dim partial, predominantly negative). 98% of the blasts are positive for CD33 (clone P67.6 in APC-R700, relative to background lymphocytes). Morph showing hypercellular marrow (70-80% cellularity) with dysplastic granulocytes, atypical megakaryocytes, and 30% blasts.  - HLA typing ordered. Baseline TTE showing normal left ventricular function with EF of 55-60%. EKG ordered 9/4 showing NSR and Qtc of 434. PICC placed 9/1 upon admission to [facility] and induction chemotherapy of 7+3 (Daunorubicin) was started; C1D1 9/2/23. Patient continues to tolerate this chemotherapy cycle well.  - S/p diagnostic LP 9/8/23 for CNS leukemia work up as patient endorses headache upon admission w/ new AML diagnosis. Will obtain brain MRI w/ w/o contrast after chemotherapy completion.         Treatment Plan: 7+3 (C1D1=9/2/23)               - Cytarabine 100 mg/m2 D1-D7               - Daunorubicin 60 mg/m2 D1-D3               - Supportive meds: Decadron 12 mg tab D1-D3, Zofran and Compazine, Ativan     # Low risk for TLS/DIC  - Monitoring TLS/DIC labs daily as TLS risk decreased at this time.  - Allopurinol 300 mg daily  - For TLS:               - If e/o TLS, bolus + start mIVF               - If uric acid >8 despite IVF, give rasburicase 6 mg x1               - If phos persistently >5, start PhosLo TID      ID  # Immunosuppressed  2/2 malignancy and chemotherapy  # ID PPX  - Viral serologies: HepB/C-, HIV-. HSV 1 & 2 IgG positive, EBV, CMV IgG positive  -  mg BID  - Levofloxacin 250 mg daily  - Micafungin  50 mg IV daily   - PA for voriconazole approved, will rotate to vori upon completion of chemotherapy.     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. S/p craniotomy for drainage of abscess July 2021. It is unclear what culture speciated to, but he completed a course of antibiotics with ceftriaxone then meropenem with resolution on imaging. Head CT 8/31 at OSH revealed no intracranial hemorrhage midline shift or mass effect. Postsurgical changes noted. Previous area of infection demonstrates trace amount of encephalomalacia but no mass effect or inflammatory changes to suggest new or acute infection. Otherwise unremarkable with no acute inpatient requirements at this time. S/p diagnostic LP on 9/8/23, will pursue brain MRI after chemotherapy course is completed.  - Tylenol PRN for headache     CARDS  # HTN  Noted during prior hospitalization for brain abscess in 2021. Previously on lisinopril, which he is no longer taking. Remains normotensive this admission, will CTM BP's daily.    URINARY  # BPH  Patient's baseline is he experiences difficulty completely emptying bladder. He notes frequent urination. No dysuria; no acute inpatient requirements at this time.     MISC  # Weakness  # Deconditioning  # Possible balance issues when ambulating  2/2 malignancy. Patient ambulates independently however endorses issues w/ balance after his brain abscess in 2021, exacerbated when ambulating.  - PT consult placed 9/4 to assess for baseline and help with dispo requirements. Inpatient PT eval on 9/5/23. PT notes: generalized deconditioning and weakness w/ decreased strength BLE, decreased IND w/ functional mobility d/t impaired muscular endurance and activity tolerance. Anticipated equipment needs at discharge TBD. PT will work with patient 3x weekly and recc's home with assist; home with outpatient physical therapy ([facility] cancer rehab).    # Increased weight from admission,  "improving  # Risk of hypervolemia, improving  On 9/5/23, patient +5lbs 2/2 mIVF's and fluids w/ chemotherapy administration. S/p diuresed with 20 mg IV lasix, will continue to assess volume status and diurese daily PRN. Patient responded well to 20 mg IV lasix and on 9/6 appears euvolemic with 4lb weight decrease from 9/5. Diuretic discontinued on 9/7 per patients request as he feels mildly dizzy. Will continue to monitor volume status daily and diurese as required.    # Loss of appetite  # Risk of malnutrition  RD consult placed upon admission; patient does not meet two of the established criteria necessary for diagnosing malnutrition but is at risk for malnutrition. Recc's are: small frequent meals/snacks, encourage consumption of high protein foods first at meal times, high protein snack once daily between meals. Pt declines supplements at this time. 2 PM snack: 1% milk every day (protein source) + alternate 2 chocolate chip cookies E/O day or vicenta ice cream.   % Intake: < 75% for >/= 1 month (moderate)  % Weight Loss: Weight loss does not meet criteria  Subcutaneous Fat Loss: None observed  Muscle Loss: None observed  Fluid Accumulation/Edema: None noted  Malnutrition Diagnosis: Patient does not meet two of the established criteria necessary for diagnosing malnutrition but is at risk for malnutrition.  - Monitor PO intake trends, meal frequency, weight trends, snack intake.      Clinically Significant Risk Factors                  # Thrombocytopenia: Lowest platelets = 31 in last 2 days, will monitor for bleeding          # Overweight: Estimated body mass index is 28.17 kg/m  as calculated from the following:    Height as of this encounter: 1.74 m (5' 8.5\").    Weight as of this encounter: 85.3 kg (188 lb).     # Moderate Malnutrition: based on nutrition assessment            Code Status: FULL  Disposition: Likely 2-4 weeks for new AML treatment and BMBx to assess for MRD.  Follow up: Will require D14 BMBx to " assess for MRD.  - Patient will follow with Dr. Garcia, appointment scheduled for end of September.    This plan of care has been discussed with the staff physician, Dr. Velázquez.    Hilda Gamble PA-C  Hematology/Oncology  Pager: #[phone]    I spent 60 minutes in the care of this patient today, which included time necessary for review of interval events, obtaining history and physical exam, ordering medication(s)/test(s) as medically indicated, discussion with interdisciplinary/consult team(s), and documentation time. Over 50% of time was spent face-to-face and/or coordinating care.     Interval History   Thorough chart review and RN notes overnight show no acute events, patient remains afebrile and hemodynamically stable. Patient reports that he didn't sleep well last night d/t multiple staff coming into his room overnight. He remains frustrated because staff entered his room overnight not wearing masks. He reports feeling unsafe and disrespected. I provided empathetic listening today and acknowledgement. I again talked with staff today about trying to improve in this area. Otherwise patient reports he is doing well and feeling good today. He denied full ROS such as N/V/D, F/C/NS, headache, vision changes, muscle weakness, chest/abdominal pain, SOB, cough, lesions, rashes, or wounds. We discussed his lumbar puncture scheduled for today and that his platelets were above 50,000 so he would be able to get this procedure done today. He was also happy to hear that his [id number] was negative so we would not be pursuing the other chemotherapy medication. He was inquisitive about how long he would be admitted, we talked about the length of stay would be at least another week or so. We need the D14 bone marrow biopsy to assess for MRD. We will also be pursuing brain MRI after he finishes this chemotherapy course. Patient was agreeable to plan and expressed understanding. He denied any further questions or concerns for the team  today.    A comprehensive review of symptoms was performed and was negative except as detailed in the interval history above.    Physical Exam   Vital Signs with Ranges  Temp:  [97.6  F (36.4  C)-98.3  F (36.8  C)] 97.6  F (36.4  C)  Pulse:  [75-84] 77  Resp:  [16-18] 16  BP: (112-147)/(70-86) 137/72  SpO2:  [96 %-100 %] 98 %    I/O last 3 completed shifts:  In: 750 [P.O.:450]  Out: -     Vitals:    09/06/23 0754 09/07/23 0841 09/08/23 0857   Weight: 87 kg (191 lb 12.8 oz) 87 kg (191 lb 14.4 oz) 85.3 kg (188 lb)     Constitutional: Cooperative male, sitting up in his chair, NAD, awake, alert, conversational.  HEENT: NC/AT, EOMI, sclera clear, conjunctiva normal, MMM.  Respiratory: 98% O2 breathing comfortably on RA, w/ no increased work of breathing, CTAB w/ no crackles or wheezing.  Cardiovascular: RRR, no M/R/G. Euvolemic.  GI: No rashes/bruises/obvious abnormalities upon inspection, not distended, normoactive bowel sounds, soft, not-TTP.  Skin: No obvious lesions, wounds, abnormalities, Warm, dry, well-perfused. No bruising, bleeding, rashes, or lesions on limited exam.  Neurologic: A&Ox3. Answers questions appropriately. Moves all extremities spontaneously.  Psych: Normal speech pattern. Appropriate affect.  Vascular access:  R PICC; CDI, non-tender, no surrounding erythema.    Medications    - MEDICATION INSTRUCTIONS -          acyclovir  400 mg Oral BID    allopurinol  300 mg Oral Daily    Chemotherapy Infusing-Continuous Infusion   Does not apply Q8H    cytarabine (PF) (CYTOSAR) 200 mg in D5W 1,052 mL infusion  200 mg Intravenous Q24H    levofloxacin  250 mg Oral Daily    micafungin  50 mg Intravenous Q24H    ondansetron  8 mg Oral Q12H       Antiinfectives  Anti-infectives (From now, onward)      Start     Dose/Rate Route Frequency Ordered Stop    09/01/23 2000  acyclovir (ZOVIRAX) tablet 400 mg         400 mg Oral 2 TIMES DAILY 09/01/23 1513      09/01/23 1630  micafungin (MYCAMINE) 50 mg in sodium chloride  0.9 % 100 mL intermittent infusion         50 mg  100 mL/hr over 60 Minutes Intravenous EVERY 24 HOURS 09/01/23 1513      09/01/23 1530  levofloxacin (LEVAQUIN) tablet 250 mg         250 mg Oral DAILY 09/01/23 1513              Data   CBC   Recent Labs   Lab 09/08/23 0523 09/07/23  0536 09/06/23  0618 09/05/23  0529   WBC 1.5* 1.4* 2.0* 1.5*   RBC 2.78* 2.50* 2.83* 2.53*   HGB 7.8* 7.0* 7.9* 7.0*   HCT 25.6* 23.5* 26.6* 23.8*   MCV 92 94 94 94   MCH 28.1 28.0 27.9 27.7   MCHC 30.5* 29.8* 29.7* 29.4*   RDW 14.5 14.5 14.8 15.0   PLT 51* 31* 9* 11*       CMP   Recent Labs   Lab 09/08/23 0523 09/07/23  0536 09/06/23 0618 09/05/23  1824 09/05/23  0529    137 139 138 137   POTASSIUM 3.9 3.8 3.6 3.6 4.1   CHLORIDE 103 104 105 104 107   CO2 25 24 24 22 21*   ANIONGAP 9 9 10 12 9   GLC 92 89 84 88 115*   BUN 13.1 15.3 17.8 22.8 18.4   CR 0.66* 0.68 0.72 0.72 0.63*   GFRESTIMATED >90 >90 >90 >90 >90   VENANCIO 8.8 8.7* 8.7* 9.2 8.8   MAG 2.3 2.2 2.3  --  2.4*   PHOS 3.0 3.7 3.8  --  3.7   PROTTOTAL 6.9 6.4 6.8  --  6.6   ALBUMIN 4.3 3.9 4.2  --  4.1   BILITOTAL 1.3* 0.9 1.5*  --  1.2   ALKPHOS 56 49 52  --  54   AST 15 16 16  --  16   ALT 9 7 9  --  9       LFTs   Recent Labs   Lab 09/08/23 0523   PROTTOTAL 6.9   ALBUMIN 4.3   BILITOTAL 1.3*   ALKPHOS 56   AST 15   ALT 9       Coagulation Studies   Recent Labs   Lab 09/08/23  0523   INR 1.09   PTT 24

## 2023-09-08 NOTE — PLAN OF CARE
"[id number]    /72 (BP Location: Left arm)   Pulse 77   Temp 97.6  F (36.4  C) (Oral)   Resp 16   Ht 1.74 m (5' 8.5\")   Wt 85.3 kg (188 lb)   SpO2 98%   BMI 28.17 kg/m      Day 6 CIVI Cytarabine infusing via PICC, good blood return. Day 7 due this evening.    Afebrile. Denies pain, nausea and SOB. Bedside LP performed, site intact, band-aid covering. Appetite fair, nutritional supplements started today. Trace edema noted in ankles, but improving, pillow provided to elevate. Voiding without saving, report good UOP. Able to make needs and concerns known. Patient would like to sleep by 2000, but aware nurses will come in for 0000 and 0400 VS/checks. Continue with POC.     "

## 2023-09-09 LAB
ALBUMIN SERPL BCG-MCNC: 4 G/DL (ref 3.5–5.2)
ALP SERPL-CCNC: 58 U/L (ref 40–129)
ALT SERPL W P-5'-P-CCNC: 6 U/L (ref 0–70)
ANION GAP SERPL CALCULATED.3IONS-SCNC: 8 MMOL/L (ref 7–15)
APTT PPP: 32 SECONDS (ref 22–38)
AST SERPL W P-5'-P-CCNC: 14 U/L (ref 0–45)
BASOPHILS # BLD MANUAL: 0 10E3/UL (ref 0–0.2)
BASOPHILS NFR BLD MANUAL: 0 %
BILIRUB DIRECT SERPL-MCNC: 0.22 MG/DL (ref 0–0.3)
BILIRUB SERPL-MCNC: 0.6 MG/DL
BLASTS # BLD MANUAL: 0 10E3/UL
BLASTS NFR BLD MANUAL: 2 %
BUN SERPL-MCNC: 15.7 MG/DL (ref 8–23)
CALCIUM SERPL-MCNC: 9 MG/DL (ref 8.8–10.2)
CHLORIDE SERPL-SCNC: 105 MMOL/L (ref 98–107)
CREAT SERPL-MCNC: 0.7 MG/DL (ref 0.67–1.17)
DEPRECATED HCO3 PLAS-SCNC: 24 MMOL/L (ref 22–29)
EGFRCR SERPLBLD CKD-EPI 2021: >90 ML/MIN/1.73M2
EOSINOPHIL # BLD MANUAL: 0 10E3/UL (ref 0–0.7)
EOSINOPHIL NFR BLD MANUAL: 0 %
ERYTHROCYTE [DISTWIDTH] IN BLOOD BY AUTOMATED COUNT: 14.1 % (ref 10–15)
FIBRINOGEN PPP-MCNC: 312 MG/DL (ref 170–490)
GLUCOSE SERPL-MCNC: 93 MG/DL (ref 70–99)
HCT VFR BLD AUTO: 24.7 % (ref 40–53)
HGB BLD-MCNC: 7.5 G/DL (ref 13.3–17.7)
INR PPP: 1.11 (ref 0.85–1.15)
LDH SERPL L TO P-CCNC: 204 U/L (ref 0–250)
LYMPHOCYTES # BLD MANUAL: 0.8 10E3/UL (ref 0.8–5.3)
LYMPHOCYTES NFR BLD MANUAL: 88 %
MAGNESIUM SERPL-MCNC: 2.3 MG/DL (ref 1.7–2.3)
MCH RBC QN AUTO: 28 PG (ref 26.5–33)
MCHC RBC AUTO-ENTMCNC: 30.4 G/DL (ref 31.5–36.5)
MCV RBC AUTO: 92 FL (ref 78–100)
MONOCYTES # BLD MANUAL: 0 10E3/UL (ref 0–1.3)
MONOCYTES NFR BLD MANUAL: 0 %
NEUTROPHILS # BLD MANUAL: 0.1 10E3/UL (ref 1.6–8.3)
NEUTROPHILS NFR BLD MANUAL: 10 %
PHOSPHATE SERPL-MCNC: 2.9 MG/DL (ref 2.5–4.5)
PLAT MORPH BLD: ABNORMAL
PLATELET # BLD AUTO: 33 10E3/UL (ref 150–450)
POTASSIUM SERPL-SCNC: 4.1 MMOL/L (ref 3.4–5.3)
PROT SERPL-MCNC: 6.8 G/DL (ref 6.4–8.3)
RBC # BLD AUTO: 2.68 10E6/UL (ref 4.4–5.9)
RBC MORPH BLD: ABNORMAL
SODIUM SERPL-SCNC: 137 MMOL/L (ref 136–145)
URATE SERPL-MCNC: 3.4 MG/DL (ref 3.4–7)
WBC # BLD AUTO: 0.9 10E3/UL (ref 4–11)

## 2023-09-09 PROCEDURE — 85610 PROTHROMBIN TIME: CPT

## 2023-09-09 PROCEDURE — 250N000013 HC RX MED GY IP 250 OP 250 PS 637: Performed by: PHYSICIAN ASSISTANT

## 2023-09-09 PROCEDURE — 99233 SBSQ HOSP IP/OBS HIGH 50: CPT | Mod: FS | Performed by: STUDENT IN AN ORGANIZED HEALTH CARE EDUCATION/TRAINING PROGRAM

## 2023-09-09 PROCEDURE — 120N000002 HC R&B MED SURG/OB UMMC

## 2023-09-09 PROCEDURE — 82248 BILIRUBIN DIRECT: CPT

## 2023-09-09 PROCEDURE — 85384 FIBRINOGEN ACTIVITY: CPT

## 2023-09-09 PROCEDURE — 83735 ASSAY OF MAGNESIUM: CPT

## 2023-09-09 PROCEDURE — 250N000011 HC RX IP 250 OP 636: Mod: JZ

## 2023-09-09 PROCEDURE — 85007 BL SMEAR W/DIFF WBC COUNT: CPT | Performed by: PHYSICIAN ASSISTANT

## 2023-09-09 PROCEDURE — 83615 LACTATE (LD) (LDH) ENZYME: CPT

## 2023-09-09 PROCEDURE — 250N000011 HC RX IP 250 OP 636: Mod: JZ | Performed by: PHYSICIAN ASSISTANT

## 2023-09-09 PROCEDURE — 99418 PROLNG IP/OBS E/M EA 15 MIN: CPT | Performed by: STUDENT IN AN ORGANIZED HEALTH CARE EDUCATION/TRAINING PROGRAM

## 2023-09-09 PROCEDURE — 999N000044 HC STATISTIC CVC DRESSING CHANGE

## 2023-09-09 PROCEDURE — 250N000011 HC RX IP 250 OP 636: Performed by: INTERNAL MEDICINE

## 2023-09-09 PROCEDURE — 84550 ASSAY OF BLOOD/URIC ACID: CPT

## 2023-09-09 PROCEDURE — 85014 HEMATOCRIT: CPT | Performed by: PHYSICIAN ASSISTANT

## 2023-09-09 PROCEDURE — 84100 ASSAY OF PHOSPHORUS: CPT

## 2023-09-09 PROCEDURE — 258N000003 HC RX IP 258 OP 636: Performed by: PHYSICIAN ASSISTANT

## 2023-09-09 PROCEDURE — 85730 THROMBOPLASTIN TIME PARTIAL: CPT

## 2023-09-09 RX ADMIN — SODIUM CHLORIDE, PRESERVATIVE FREE 5 ML: 5 INJECTION INTRAVENOUS at 17:41

## 2023-09-09 RX ADMIN — SODIUM CHLORIDE, PRESERVATIVE FREE 5 ML: 5 INJECTION INTRAVENOUS at 04:52

## 2023-09-09 RX ADMIN — ONDANSETRON 8 MG: 8 TABLET, FILM COATED ORAL at 04:43

## 2023-09-09 RX ADMIN — MICAFUNGIN SODIUM 50 MG: 50 INJECTION, POWDER, LYOPHILIZED, FOR SOLUTION INTRAVENOUS at 16:05

## 2023-09-09 RX ADMIN — SODIUM CHLORIDE, PRESERVATIVE FREE 5 ML: 5 INJECTION INTRAVENOUS at 23:55

## 2023-09-09 RX ADMIN — ACYCLOVIR 400 MG: 400 TABLET ORAL at 20:08

## 2023-09-09 RX ADMIN — ACYCLOVIR 400 MG: 400 TABLET ORAL at 08:18

## 2023-09-09 RX ADMIN — LEVOFLOXACIN 250 MG: 250 TABLET, FILM COATED ORAL at 08:18

## 2023-09-09 RX ADMIN — ALLOPURINOL 300 MG: 300 TABLET ORAL at 08:18

## 2023-09-09 ASSESSMENT — ACTIVITIES OF DAILY LIVING (ADL)
ADLS_ACUITY_SCORE: 22

## 2023-09-09 NOTE — PROGRESS NOTES
Nursing Focus: Chemotherapy    D: Positive blood return via PICC. Insertion site is clean/dry/intact, dressing intact with no complaints of pain.  Urine output is recorded in intake in Doc Flowsheet.      I: Premedications given per order (see electronic medical administration record). Dose #7 of cytarabine started to infuse over 24 hours. Reviewed pt teaching on chemotherapy side effects.  Pt denies need for further teaching. Chemotherapy double checked per protocol by two chemotherapy competent RN's.     A: Tolerating infusion well. Denies nausea and or pain.     P: Continue to monitor urine output and symptoms of nausea. Screen for symptoms of toxicity.

## 2023-09-09 NOTE — PROGRESS NOTES
[facility]    Progress Note  Hematology / Oncology     Date of Admission:  9/1/2023  Hospital Day #: 8   Date of Service (when I saw the patient): 09/09/2023    Assessment & Plan   Artie Fine is a 67yo M w/ a PMH of brain abscess (2021), HTN, and BPH. He was transferred from [facility] in [place], [place] to [facility] for work up of pancytopenia; was noted to have circulating blasts concerning for acute leukemia, and was subsequently transferred to [facility]. He underwent a diagnostic bone marrow biopsy on 9/1/23 w/ flow showing 47% blasts and morph showing hypercellular marrow (70-80% cellularity) with dysplastic granulocytes, atypical megakaryocytes, and 30% blasts. He was started on induction with 7+3 C1D1 9/2/23 and is tolerating this regimen very well.    Today: Day 8 of 7+3  - Referral to [facility] for his wife Aundrea was approved for this weekend, patient's wife Aundrea to arrive today.  - Voriconazole approved, will start vori after chemotherapy cycle is completed.  - CNS leukemia work up this week s/p diagnostic LP which patient tolerated very well; on Sunday will order MRI (waited until completion of cytarabine).  - Patient appears euvolemic today; s/p short course 20 mg IV lasix last week which patient responded to well. Can re-eval another course if clinically indicated as patient is at risk for hypervolemia with chemotherapy fluids however will finish chemotherapy course this weekend.  - Will remain inpatient through D14 BMBx at which point we will assess for MRD; orders not yet placed. Schedule BMBx for D14 which is 9/15/23.  - CTM and provide best supportive cares.    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 Normal karyotype. FISH shows no evidence of MLLT10, NUP98, or KMT2A rearrangement.   - Patient initially presented to [facility] in [place], [place] with 4-6 weeks of progressive bruising, weakness, fatigue, loss of appetite, and night sweats. He also  noted a headache similar to his previous brain abscess. CBC notable for pancytopenia; WBC 2.7 (), Hgb 8.1, and plt 1k. He was transferred to [facility] found on peripheral flow w/ 11% circulating myeloid blasts. He was then transferred to [facility] for further work up and treatment of AML. Diagnostic BMBx performed 9/1:  - NGS results pending.   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 47% blasts with the following immunophenotype: Positive for CD7, CD13, CD33, CD34, CD38, CD45 (dim), CD58, CD64 (partial), , HLA-DR, cytoplasmic myeloperoxidase, and nuclear terminal deoxynucleotidyl transferase (TdT) (dim partial, predominantly negative). 98% of the blasts are positive for CD33 (clone P67.6 in APC-R700, relative to background lymphocytes). Morph showing hypercellular marrow (70-80% cellularity) with dysplastic granulocytes, atypical megakaryocytes, and 30% blasts.  - HLA typing ordered. Baseline TTE showing normal left ventricular function with EF of 55-60%. EKG ordered 9/4 showing NSR and Qtc of 434. PICC placed 9/1 upon admission to [facility] and induction chemotherapy of 7+3 (Daunorubicin) was started; C1D1 9/2/23. Patient continues to tolerate this chemotherapy cycle well.  - S/p diagnostic LP 9/8/23 for CNS leukemia work up as patient endorses headache upon admission w/ new AML diagnosis. Will obtain brain MRI w/ w/o contrast after chemotherapy completion.         Treatment Plan: 7+3 (C1D1=9/2/23)               - Cytarabine 100 mg/m2 D1-D7               - Daunorubicin 60 mg/m2 D1-D3               - Supportive meds: Decadron 12 mg tab D1-D3, Zofran and Compazine, Ativan     # Low risk for TLS/DIC  - Monitoring TLS/DIC labs daily as TLS risk decreased at this time.  - Allopurinol 300 mg daily  - For TLS:               - If e/o TLS, bolus + start mIVF               - If uric acid >8 despite IVF, give rasburicase 6 mg x1               - If phos persistently >5, start PhosLo TID      ID  #  Immunosuppressed  2/2 malignancy and chemotherapy  # ID PPX  - Viral serologies: HepB/C-, HIV-. HSV 1 & 2 IgG positive, EBV, CMV IgG positive  -  mg BID  - Levofloxacin 250 mg daily  - Micafungin 50 mg IV daily   - PA for voriconazole approved, will rotate to vori upon completion of chemotherapy.     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 S/p craniotomy for drainage of abscess July 2021. It is unclear what culture speciated to, but he completed a course of antibiotics with ceftriaxone then meropenem with resolution on imaging. Head CT 8/31 at OSH revealed no intracranial hemorrhage midline shift or mass effect. Postsurgical changes noted. Previous area of infection demonstrates trace amount of encephalomalacia but no mass effect or inflammatory changes to suggest new or acute infection. Otherwise unremarkable with no acute inpatient requirements at this time. S/p diagnostic LP on 9/8/23, will pursue brain MRI after chemotherapy course is completed.  - Tylenol PRN for headache     CARDS  # HTN  Noted during prior hospitalization for brain abscess in 2021. Previously on lisinopril, which he is no longer taking. Remains normotensive this admission, will CTM BP's daily.    URINARY  # BPH  Patient's baseline is he experiences difficulty completely emptying bladder. He notes frequent urination. No dysuria; no acute inpatient requirements at this time.     MISC  # Weakness  # Deconditioning  # Possible balance issues when ambulating  2/2 malignancy. Patient ambulates independently however endorses issues w/ balance after his brain abscess in 2021, exacerbated when ambulating.  - PT consult placed 9/4 to assess for baseline and help with dispo requirements. Inpatient PT eval on 9/5/23. PT notes: generalized deconditioning and weakness w/ decreased strength BLE, decreased IND w/ functional mobility d/t impaired muscular endurance and activity tolerance.  "Anticipated equipment needs at discharge TBD. PT will work with patient 3x weekly and recc's home with assist; home with outpatient physical therapy ([facility] cancer rehab).    # Increased weight from admission, rersolved  # Risk of hypervolemia, improving  On 9/5/23, patient +5lbs 2/2 mIVF's and fluids w/ chemotherapy administration. S/p diuresed with 20 mg IV lasix, will continue to assess volume status and diurese daily PRN. Patient responded well to 20 mg IV lasix and on 9/6 appears euvolemic with 4lb weight decrease from 9/5. Diuretic discontinued on 9/7 per patients request as he feels mildly dizzy. Will continue to monitor volume status daily and diurese as required.    # Weight loss  # Loss of appetite  # Moderate malnutrition  2/2 malignancy  RD consult placed upon admission and following.  % Intake: </=75% for >/= 1 month (severe)  % Weight Loss: 1-2% in 1 week (moderate)  Subcutaneous Fat Loss: None observed  Muscle Loss: Thoracic region (clavicle, acromium bone, deltoid, trapezius, pectoral): Mild  Fluid Accumulation/Edema: Does not meet criteria - Trace  Malnutrition Diagnosis: Moderate malnutrition in the context of acute illness  - Plan to continue nutrition support and supplementation.   - 2 pm snack of either 1% milk + 2 Chocolate chip cookies? OR 1% milk + chocolate ice cream     Clinically Significant Risk Factors                  # Thrombocytopenia: Lowest platelets = 33 in last 2 days, will monitor for bleeding          # Overweight: Estimated body mass index is 27.99 kg/m  as calculated from the following:    Height as of this encounter: 1.74 m (5' 8.5\").    Weight as of this encounter: 84.7 kg (186 lb 12.8 oz).     # Moderate Malnutrition: based on nutrition assessment              Code Status: FULL  Disposition: Likely 2-4 weeks for new AML treatment and BMBx to assess for MRD.  Follow up: Will require D14 BMBx to assess for MRD.  - Patient will follow with Dr. Garcia, appointment scheduled for " end of September.    This plan of care has been discussed with the staff physician, Dr. Velázquez.    Hilda Gamble PA-C  Hematology/Oncology  Pager: #[phone]    I spent 60 minutes in the care of this patient today, which included time necessary for review of interval events, obtaining history and physical exam, ordering medication(s)/test(s) as medically indicated, discussion with interdisciplinary/consult team(s), and documentation time. Over 50% of time was spent face-to-face and/or coordinating care.     Interval History   Chart and nursing notes overnight reviewed, showing no acute events, patient remains afebrile, and hemodynamically stable.Upon my visit today patient is sitting up in his chair, he is conversational and in NAD. He reports sleeping well last night and he is looking forward to a visit today from his wife Aundrea and her sister Stephy. He denies having any symptoms today. Specifically denied full ROS such as N/V/D, F/C/NS, headache, vision changes, muscle weakness, chest/abdominal pain, SOB, cough, lesions, rashes, or wounds. We discussed that his lumbar puncture flow results have not yet come back and that I will follow up with him once they are back. We also discussed that he will remain inpatient through D14 as we will need a BMBx and to assess for MRD.  Patient was agreeable to plan and expressed understanding. He denied any further questions or concerns for the team today. We did discuss things he can do to keep himself busy during the next week. His wife will be bringing him a new phone (as his went missing from his room) and also bringing an Ipad for him. He ambulates in the halls daily.    A comprehensive review of symptoms was performed and was negative except as detailed in the interval history above.    Physical Exam   Vital Signs with Ranges  Temp:  [97.5  F (36.4  C)-98.4  F (36.9  C)] 97.5  F (36.4  C)  Pulse:  [74-87] 87  Resp:  [16-20] 20  BP: ()/(58-72) 118/67  SpO2:  [96 %-100 %]  97 %    I/O last 3 completed shifts:  In: 1215.6 [P.O.:690; IV Piggyback:525.6]  Out: -     Vitals:    09/07/23 0841 09/08/23 0857 09/09/23 0826   Weight: 87 kg (191 lb 14.4 oz) 85.3 kg (188 lb) 84.7 kg (186 lb 12.8 oz)     Constitutional: Cooperative male, sitting up in his chair, NAD, awake, alert, conversational.  HEENT: NC/AT, EOMI, sclera clear, conjunctiva normal, oral MMM w/ limited exam.  Respiratory: 97% O2 breathing comfortably on RA, w/ no increased work of breathing, CTAB w/ no crackles or wheezing.  Cardiovascular: RRR, no M/R/G. Euvolemic appearing.  GI: No rashes/bruises/obvious abnormalities upon inspection, not distended, normoactive bowel sounds, soft, not-TTP.  Skin: No obvious lesions, wounds, abnormalities, Warm, dry, well-perfused. No bruising, bleeding, rashes, or lesions on limited exam.  Neurologic: A&Ox3. Answers questions appropriately. Moves all extremities spontaneously.  Psych: Normal speech pattern. Appropriate affect.  Vascular access:  R PICC; CDI, non-tender, no surrounding erythema.    Medications    - MEDICATION INSTRUCTIONS -          acyclovir  400 mg Oral BID    allopurinol  300 mg Oral Daily    cytarabine (PF) (CYTOSAR) 200 mg in D5W 1,052 mL infusion  200 mg Intravenous Q24H    levofloxacin  250 mg Oral Daily    micafungin  50 mg Intravenous Q24H       Antiinfectives  Anti-infectives (From now, onward)      Start     Dose/Rate Route Frequency Ordered Stop    09/01/23 2000  acyclovir (ZOVIRAX) tablet 400 mg         400 mg Oral 2 TIMES DAILY 09/01/23 1513      09/01/23 1630  micafungin (MYCAMINE) 50 mg in sodium chloride 0.9 % 100 mL intermittent infusion         50 mg  100 mL/hr over 60 Minutes Intravenous EVERY 24 HOURS 09/01/23 1513      09/01/23 1530  levofloxacin (LEVAQUIN) tablet 250 mg         250 mg Oral DAILY 09/01/23 1513              Data   CBC   Recent Labs   Lab 09/09/23  0445 09/08/23  0523 09/07/23  0536 09/06/23  0618   WBC 0.9* 1.5* 1.4* 2.0*   RBC 2.68* 2.78*  2.50* 2.83*   HGB 7.5* 7.8* 7.0* 7.9*   HCT 24.7* 25.6* 23.5* 26.6*   MCV 92 92 94 94   MCH 28.0 28.1 28.0 27.9   MCHC 30.4* 30.5* 29.8* 29.7*   RDW 14.1 14.5 14.5 14.8   PLT 33* 51* 31* 9*         CMP   Recent Labs   Lab 09/09/23  0445 09/08/23  0523 09/07/23  0536 09/06/23  0618    137 137 139   POTASSIUM 4.1 3.9 3.8 3.6   CHLORIDE 105 103 104 105   CO2 24 25 24 24   ANIONGAP 8 9 9 10   GLC 93 92 89 84   BUN 15.7 13.1 15.3 17.8   CR 0.70 0.66* 0.68 0.72   GFRESTIMATED >90 >90 >90 >90   VENANCIO 9.0 8.8 8.7* 8.7*   MAG 2.3 2.3 2.2 2.3   PHOS 2.9 3.0 3.7 3.8   PROTTOTAL 6.8 6.9 6.4 6.8   ALBUMIN 4.0 4.3 3.9 4.2   BILITOTAL 0.6 1.3* 0.9 1.5*   ALKPHOS 58 56 49 52   AST 14 15 16 16   ALT 6 9 7 9         LFTs   Recent Labs   Lab 09/09/23 0445   PROTTOTAL 6.8   ALBUMIN 4.0   BILITOTAL 0.6   ALKPHOS 58   AST 14   ALT 6         Coagulation Studies   Recent Labs   Lab 09/09/23 0445   INR 1.11   PTT 32

## 2023-09-09 NOTE — PLAN OF CARE
[id number]  Goal Outcome Evaluation:    Plan of Care Reviewed With: patient. Overall Patient Progress: no change. Outcome Evaluation: Last bag chemo hung, tolerating well. No pain/nausea.     Soft BP 99/63, asymptomatic. Denies pain/nausea/sob. Independent. Last BM 9/8. Cares clustered as much as possible, only RN's in room overnight. Pt reported being able to sleep well overnight between cares and was very appreciative of care given. Calm/ cooperative overnight. Unable to get blood return from purple lumen, TPA ordered and given w/ success after 2 hrs. Bag 7 of cytarabine hung at 2100, running in red lumen w/ good blood return. Plan for D14 bone marrow.

## 2023-09-09 NOTE — PROVIDER NOTIFICATION
Provider Brad Jonas notified via [employer] [id number].     Unable to get blood return from purple lumen in PICC, meeting resistance w/ flushing. Can you order TPA for line?     Jelly MULTANI     Plan: TPA ordered

## 2023-09-09 NOTE — PLAN OF CARE
Goal Outcome Evaluation:  [id number]    Pt is admitted for  induction with 7+3 C1D1 9/2/23.  AVSS, alert and oriented x  4, denies pain/nausea/sob.On chemo cytarabine is infusing via PICC at 43.8 ml/hr, its the last dose.  Up independent, voiding adequately, LBM 9/8 and walked in the hallway once.  Did LP yesterday, and planned for MRI after Cytarabine is done.  Continue to monitor care.

## 2023-09-10 LAB
ABO/RH TYPE: NORMAL
ALBUMIN SERPL BCG-MCNC: 4.4 G/DL (ref 3.5–5.2)
ALP SERPL-CCNC: 64 U/L (ref 40–129)
ALT SERPL W P-5'-P-CCNC: 8 U/L (ref 0–70)
ANION GAP SERPL CALCULATED.3IONS-SCNC: 11 MMOL/L (ref 7–15)
ANTIBODY SCREEN: NEGATIVE
APTT PPP: 28 SECONDS (ref 22–38)
AST SERPL W P-5'-P-CCNC: 16 U/L (ref 0–45)
BASOPHILS # BLD MANUAL: 0 10E3/UL (ref 0–0.2)
BASOPHILS NFR BLD MANUAL: 0 %
BILIRUB DIRECT SERPL-MCNC: 0.21 MG/DL (ref 0–0.3)
BILIRUB SERPL-MCNC: 0.6 MG/DL
BLASTS # BLD MANUAL: 0 10E3/UL
BLASTS NFR BLD MANUAL: 3 %
BUN SERPL-MCNC: 15.5 MG/DL (ref 8–23)
CALCIUM SERPL-MCNC: 9 MG/DL (ref 8.8–10.2)
CHLORIDE SERPL-SCNC: 103 MMOL/L (ref 98–107)
CREAT SERPL-MCNC: 0.73 MG/DL (ref 0.67–1.17)
DEPRECATED HCO3 PLAS-SCNC: 23 MMOL/L (ref 22–29)
EGFRCR SERPLBLD CKD-EPI 2021: >90 ML/MIN/1.73M2
EOSINOPHIL # BLD MANUAL: 0 10E3/UL (ref 0–0.7)
EOSINOPHIL NFR BLD MANUAL: 0 %
ERYTHROCYTE [DISTWIDTH] IN BLOOD BY AUTOMATED COUNT: 13.6 % (ref 10–15)
FIBRINOGEN PPP-MCNC: 376 MG/DL (ref 170–490)
GLUCOSE SERPL-MCNC: 103 MG/DL (ref 70–99)
HCT VFR BLD AUTO: 25.6 % (ref 40–53)
HGB BLD-MCNC: 8.1 G/DL (ref 13.3–17.7)
INR PPP: 1.05 (ref 0.85–1.15)
LDH SERPL L TO P-CCNC: 211 U/L (ref 0–250)
LYMPHOCYTES # BLD MANUAL: 0.7 10E3/UL (ref 0.8–5.3)
LYMPHOCYTES NFR BLD MANUAL: 91 %
MAGNESIUM SERPL-MCNC: 2.2 MG/DL (ref 1.7–2.3)
MCH RBC QN AUTO: 28 PG (ref 26.5–33)
MCHC RBC AUTO-ENTMCNC: 31.6 G/DL (ref 31.5–36.5)
MCV RBC AUTO: 89 FL (ref 78–100)
MONOCYTES # BLD MANUAL: 0 10E3/UL (ref 0–1.3)
MONOCYTES NFR BLD MANUAL: 1 %
NEUTROPHILS # BLD MANUAL: 0 10E3/UL (ref 1.6–8.3)
NEUTROPHILS NFR BLD MANUAL: 5 %
PHOSPHATE SERPL-MCNC: 3.3 MG/DL (ref 2.5–4.5)
PLAT MORPH BLD: ABNORMAL
PLATELET # BLD AUTO: 26 10E3/UL (ref 150–450)
POTASSIUM SERPL-SCNC: 3.9 MMOL/L (ref 3.4–5.3)
PROT SERPL-MCNC: 7.3 G/DL (ref 6.4–8.3)
RBC # BLD AUTO: 2.89 10E6/UL (ref 4.4–5.9)
RBC MORPH BLD: ABNORMAL
SODIUM SERPL-SCNC: 137 MMOL/L (ref 136–145)
SPECIMEN EXPIRATION DATE: NORMAL
SPECIMEN EXPIRATION DATE: NORMAL
URATE SERPL-MCNC: 3.7 MG/DL (ref 3.4–7)
WBC # BLD AUTO: 0.8 10E3/UL (ref 4–11)

## 2023-09-10 PROCEDURE — 99418 PROLNG IP/OBS E/M EA 15 MIN: CPT | Performed by: STUDENT IN AN ORGANIZED HEALTH CARE EDUCATION/TRAINING PROGRAM

## 2023-09-10 PROCEDURE — 83735 ASSAY OF MAGNESIUM: CPT

## 2023-09-10 PROCEDURE — 80053 COMPREHEN METABOLIC PANEL: CPT

## 2023-09-10 PROCEDURE — 250N000012 HC RX MED GY IP 250 OP 636 PS 637

## 2023-09-10 PROCEDURE — 86850 RBC ANTIBODY SCREEN: CPT | Performed by: PHYSICIAN ASSISTANT

## 2023-09-10 PROCEDURE — 250N000013 HC RX MED GY IP 250 OP 250 PS 637

## 2023-09-10 PROCEDURE — 85384 FIBRINOGEN ACTIVITY: CPT

## 2023-09-10 PROCEDURE — 85730 THROMBOPLASTIN TIME PARTIAL: CPT

## 2023-09-10 PROCEDURE — 85610 PROTHROMBIN TIME: CPT

## 2023-09-10 PROCEDURE — 85007 BL SMEAR W/DIFF WBC COUNT: CPT | Performed by: PHYSICIAN ASSISTANT

## 2023-09-10 PROCEDURE — 83615 LACTATE (LD) (LDH) ENZYME: CPT

## 2023-09-10 PROCEDURE — 120N000002 HC R&B MED SURG/OB UMMC

## 2023-09-10 PROCEDURE — 84100 ASSAY OF PHOSPHORUS: CPT

## 2023-09-10 PROCEDURE — 250N000013 HC RX MED GY IP 250 OP 250 PS 637: Performed by: PHYSICIAN ASSISTANT

## 2023-09-10 PROCEDURE — 84550 ASSAY OF BLOOD/URIC ACID: CPT

## 2023-09-10 PROCEDURE — 86901 BLOOD TYPING SEROLOGIC RH(D): CPT | Performed by: PHYSICIAN ASSISTANT

## 2023-09-10 PROCEDURE — 82248 BILIRUBIN DIRECT: CPT

## 2023-09-10 PROCEDURE — 99233 SBSQ HOSP IP/OBS HIGH 50: CPT | Mod: FS | Performed by: STUDENT IN AN ORGANIZED HEALTH CARE EDUCATION/TRAINING PROGRAM

## 2023-09-10 PROCEDURE — 85027 COMPLETE CBC AUTOMATED: CPT | Performed by: PHYSICIAN ASSISTANT

## 2023-09-10 RX ORDER — HYDROXYZINE HYDROCHLORIDE 25 MG/1
50 TABLET, FILM COATED ORAL AT BEDTIME
Status: DISCONTINUED | OUTPATIENT
Start: 2023-09-10 | End: 2023-09-10

## 2023-09-10 RX ORDER — HYDROXYZINE HYDROCHLORIDE 25 MG/1
50 TABLET, FILM COATED ORAL EVERY 6 HOURS PRN
Status: DISCONTINUED | OUTPATIENT
Start: 2023-09-10 | End: 2023-10-03 | Stop reason: HOSPADM

## 2023-09-10 RX ORDER — METHYLPREDNISOLONE 32 MG/1
32 TABLET ORAL ONCE
Status: COMPLETED | OUTPATIENT
Start: 2023-09-11 | End: 2023-09-11

## 2023-09-10 RX ORDER — HYDROXYZINE HYDROCHLORIDE 25 MG/1
50 TABLET, FILM COATED ORAL AT BEDTIME
Status: DISCONTINUED | OUTPATIENT
Start: 2023-09-11 | End: 2023-10-03 | Stop reason: HOSPADM

## 2023-09-10 RX ORDER — METHYLPREDNISOLONE 32 MG/1
32 TABLET ORAL ONCE
Status: COMPLETED | OUTPATIENT
Start: 2023-09-10 | End: 2023-09-10

## 2023-09-10 RX ORDER — HYDROXYZINE HYDROCHLORIDE 25 MG/1
25 TABLET, FILM COATED ORAL EVERY 6 HOURS PRN
Status: DISCONTINUED | OUTPATIENT
Start: 2023-09-10 | End: 2023-10-03 | Stop reason: HOSPADM

## 2023-09-10 RX ORDER — METHYLPREDNISOLONE 32 MG/1
32 TABLET ORAL ONCE
Status: CANCELLED | OUTPATIENT
Start: 2023-09-11

## 2023-09-10 RX ORDER — PIPERACILLIN SODIUM, TAZOBACTAM SODIUM 3; .375 G/15ML; G/15ML
3.38 INJECTION, POWDER, LYOPHILIZED, FOR SOLUTION INTRAVENOUS EVERY 6 HOURS
Status: DISCONTINUED | OUTPATIENT
Start: 2023-09-10 | End: 2023-09-10

## 2023-09-10 RX ORDER — METHYLPREDNISOLONE 32 MG/1
32 TABLET ORAL ONCE
Status: CANCELLED | OUTPATIENT
Start: 2023-09-10

## 2023-09-10 RX ORDER — HYDROXYZINE HYDROCHLORIDE 25 MG/1
25 TABLET, FILM COATED ORAL ONCE
Status: COMPLETED | OUTPATIENT
Start: 2023-09-10 | End: 2023-09-10

## 2023-09-10 RX ORDER — VORICONAZOLE 200 MG/1
200 TABLET, FILM COATED ORAL EVERY 12 HOURS SCHEDULED
Status: DISCONTINUED | OUTPATIENT
Start: 2023-09-10 | End: 2023-10-03 | Stop reason: HOSPADM

## 2023-09-10 RX ADMIN — METHYLPREDNISOLONE 32 MG: 32 TABLET ORAL at 21:15

## 2023-09-10 RX ADMIN — ACYCLOVIR 400 MG: 400 TABLET ORAL at 19:47

## 2023-09-10 RX ADMIN — LEVOFLOXACIN 250 MG: 250 TABLET, FILM COATED ORAL at 08:05

## 2023-09-10 RX ADMIN — HYDROXYZINE HYDROCHLORIDE 25 MG: 25 TABLET, FILM COATED ORAL at 21:15

## 2023-09-10 RX ADMIN — VORICONAZOLE 200 MG: 200 TABLET ORAL at 19:47

## 2023-09-10 RX ADMIN — VORICONAZOLE 200 MG: 200 TABLET ORAL at 08:09

## 2023-09-10 RX ADMIN — ACYCLOVIR 400 MG: 400 TABLET ORAL at 08:05

## 2023-09-10 ASSESSMENT — ACTIVITIES OF DAILY LIVING (ADL)
ADLS_ACUITY_SCORE: 22

## 2023-09-10 NOTE — PLAN OF CARE
"Goal Outcome Evaluation:      Plan of Care Reviewed With: patient, spouse    Overall Patient Progress: no changeOverall Patient Progress: no change    0700 - 1930:   /73 (BP Location: Left arm)   Pulse 98   Temp 97.5  F (36.4  C) (Oral)   Resp 16   Ht 1.74 m (5' 8.5\")   Wt 84.2 kg (185 lb 9.6 oz)   SpO2 95%   BMI 27.81 kg/m         A&O, calm & pleasant, AVSS, denies pain/headache/dizziness/nausea/sob on RA.  PICC on HL.  Appetite good, ate 100%.  Pt wants to try prn atarax at 8 PM per pt request.  Up independently, ambulated in hallway with his wife, voiding spontaneously, had a bm this AM per pt & showered.  Plan to do MRI tomorrow afternoon after second dose of methylprednisolone. 1st dose tonight at 2100 d/t allergic reaction from contrast dye.  Continue with poc...      "

## 2023-09-10 NOTE — PROGRESS NOTES
"  Assumed care of patient at 1500.  VSS. Blood pressure (P) 134/68, pulse (P) 88, temperature (P) 98.3  F (36.8  C), temperature source (P) Oral, resp. rate (P) 18, height 1.74 m (5' 8.5\"), weight 84.7 kg (186 lb 12.8 oz), SpO2 (P) 97 %.        REASON FOR ADMISSION:  Workup of pancytopenia, diagnostic BMBx 9/1/23 showing new AML.  Induction therapy 7+3 C1D1 9/2/23.     NEURO: Alert and oriented x4.     RESPIRATORY: On room air, lungs sounds clear.       CARDIO: VSS, denies dizziness, lightheadedness, and extremity pain.      GI/: Denies nausea, vomiting, constipation or abdominal sxs.  Voiding spontaneously.  LBM today.    INTAKE:  Reg diet, good appetite.      SKIN: Intact. Petechiae to BUE resolved.     ACTIVITY: Pt up indep.     PAIN: Denies pain.       IV ACCESS: Double lumen PICC; red lumen with chemo infusing and purple lumen heparin locked.     LABS/ REPLACEMENTS:  None needed.     BG: N.A,     SAFETY:Cont with POC.    Please continue to cluster cares.      "

## 2023-09-10 NOTE — PLAN OF CARE
"[id number]  Vitals stable. Denies pain/nausea/sob. Independent. Denies pain/nausea/sob. No dizziness or headaches noted. Last bag of cytarabine finished infusing. Plan for MRI today. LP results still pending.     At midnight, NST (with mask ON) went into pt room to grab vitals even though sign on door stated check w/ RN. Pt asked NST to leave and then called RN in. Pt voiced anger over this situation stating \"these incompetent people should not be coming in here, they don't know what they are doing.\" Pt continued to voice that this hospital is run poorly and others would not have people waking them up during the night. RN apologized for inconvenience but educated pt on need for NST's in all hospital settings and that RN's do typically rely on NST's to assist in completing tasks. Total patient care by RN's is not always possible however RN reassured pt that we are aware his situation is different and we will continue to try to have only RN's in his room as we are able. RN relayed that the NST would be re-educated regarding not entering the room tonight. Pt appeared assured by this and RN left room.     Around 0400 2 RNs entered room to take vitals and labs, pt was awake and stated that he has not been able to sleep due to the incident that happened earlier. Continued to voice anger over the incompetence of the NST's stating they are unskilled and uneducated. Pt stated he will have a  present in morning and would like the doctors to address the situation. Stated this hospital is not giving him proper care and he will be leaving. After RN apologized again and tried to talk through situation again w/ pt, he became verbally aggressive to both nurses and continued to make inappropriate comments. Conversation was stopped by bedside nurse and RN stated no longer being comfortable having this conversation. Charge nurse was then sent in. Things continued to escalate and ANS was notified and came to talk to pt as well. " Issues were not resolved.  Plan for  to have conversation w/ pt on Monday regarding hospital roles as well as expectations of patient while in the hospital.

## 2023-09-10 NOTE — PROGRESS NOTES
"Patient requested to speak with charge nurse (writer) after conversation with staff RNs. Patient verbalized staff NSTs are \"uneducated\" and \"incompetent\". He then asked if the NSTs are \"routinely drug tested\". Writer addressed his frustrations, but expressed that the patient cannot be disrespectful towards staff members and that his behavior will not be tolerated going forward. Patient was told that NSTs will need to be part of his care throughout his hospital stay. Patient verbalized wanting to leave AMA, writer explained this can happen if he chooses. He declined at this time. Patient then requested to speak with ANS (nursing supervisor). ANS and charge nurse went into patient's room. ANS reiterated that disrespectful language from patient will not be tolerated. ANS also agreed with writer that NSTs need to be included in cares going forward. Patient verbalized he would \"let the entire hospital know\" if a staff member entered his room without a mask. Writer informed patient the conversation was not productive, and that the plan going forward would be to include NSTs in patient care. Writer and all staff overnight wore face masks in patient's room.   "

## 2023-09-11 ENCOUNTER — APPOINTMENT (OUTPATIENT)
Dept: MRI IMAGING | Facility: CLINIC | Age: 68
DRG: 835 | End: 2023-09-11
Payer: COMMERCIAL

## 2023-09-11 ENCOUNTER — TELEPHONE (OUTPATIENT)
Dept: TRANSPLANT | Facility: CLINIC | Age: 68
End: 2023-09-11

## 2023-09-11 ENCOUNTER — APPOINTMENT (OUTPATIENT)
Dept: PHYSICAL THERAPY | Facility: CLINIC | Age: 68
DRG: 835 | End: 2023-09-11
Attending: INTERNAL MEDICINE
Payer: COMMERCIAL

## 2023-09-11 DIAGNOSIS — C92.00 AML (ACUTE MYELOGENOUS LEUKEMIA) (H): Primary | ICD-10-CM

## 2023-09-11 LAB
A*LOCUS SEROLOGIC EQUIVALENT: 2
A*LOCUS: NORMAL
ABO/RH TYPE: NORMAL
ABTEST METHOD: NORMAL
ALBUMIN SERPL BCG-MCNC: 4.5 G/DL (ref 3.5–5.2)
ALP SERPL-CCNC: 68 U/L (ref 40–129)
ALT SERPL W P-5'-P-CCNC: <5 U/L (ref 0–70)
ANION GAP SERPL CALCULATED.3IONS-SCNC: 14 MMOL/L (ref 7–15)
ANTIBODY SCREEN: NEGATIVE
APPEARANCE CSF: CLEAR
APTT PPP: 31 SECONDS (ref 22–38)
AST SERPL W P-5'-P-CCNC: 17 U/L (ref 0–45)
B*: NORMAL
B*LOCUS SEROLOGIC EQUIVALENT: 7
B*LOCUS: NORMAL
B*SEROLOGIC EQUIVALENT: 60
BILIRUB DIRECT SERPL-MCNC: <0.2 MG/DL (ref 0–0.3)
BILIRUB SERPL-MCNC: 0.5 MG/DL
BUN SERPL-MCNC: 16.5 MG/DL (ref 8–23)
BW-1: NORMAL
C*: NORMAL
C*LOCUS SEROLOGIC EQUIVALENT: 10
C*LOCUS: NORMAL
C*SEROLOGIC EQUIVALENT: 7
CALCIUM SERPL-MCNC: 9.4 MG/DL (ref 8.8–10.2)
CHLORIDE SERPL-SCNC: 104 MMOL/L (ref 98–107)
COLOR CSF: COLORLESS
CREAT SERPL-MCNC: 0.73 MG/DL (ref 0.67–1.17)
DEPRECATED HCO3 PLAS-SCNC: 20 MMOL/L (ref 22–29)
DPA1*: NORMAL
DPA1*LOCUS: NORMAL
DPB1*: NORMAL
DPB1*LOCUS NMDP: NORMAL
DPB1*LOCUS: NORMAL
DPB1*NMDP: NORMAL
DQA1*: NORMAL
DQA1*LOCUS: NORMAL
DQB1*: NORMAL
DQB1*LOCUS SEROLOGIC EQUIVALENT: 9
DQB1*LOCUS: NORMAL
DQB1*SEROLOGIC EQUIVALENT: 6
DRB1*: NORMAL
DRB1*LOCUS SEROLOGIC EQUIVALENT: 7
DRB1*LOCUS: NORMAL
DRB1*SEROLOGIC EQUIVALENT: 15
DRB4*LOCUS SEROLOGIC EQUIVALENT: NORMAL
DRB4*LOCUS: NORMAL
DRB5*: NORMAL
DRB5*SEROLOGIC EQUIVALENT: 51
DRSSO TEST METHOD: NORMAL
EGFRCR SERPLBLD CKD-EPI 2021: >90 ML/MIN/1.73M2
ERYTHROCYTE [DISTWIDTH] IN BLOOD BY AUTOMATED COUNT: 13.4 % (ref 10–15)
FIBRINOGEN PPP-MCNC: 359 MG/DL (ref 170–490)
GLUCOSE SERPL-MCNC: 136 MG/DL (ref 70–99)
HCT VFR BLD AUTO: 25.7 % (ref 40–53)
HGB BLD-MCNC: 8 G/DL (ref 13.3–17.7)
INR PPP: 1.15 (ref 0.85–1.15)
LDH SERPL L TO P-CCNC: 216 U/L (ref 0–250)
MAGNESIUM SERPL-MCNC: 2.2 MG/DL (ref 1.7–2.3)
MCH RBC QN AUTO: 27.9 PG (ref 26.5–33)
MCHC RBC AUTO-ENTMCNC: 31.1 G/DL (ref 31.5–36.5)
MCV RBC AUTO: 90 FL (ref 78–100)
PATH REPORT.COMMENTS IMP SPEC: NORMAL
PATH REPORT.FINAL DX SPEC: NORMAL
PATH REPORT.MICROSCOPIC SPEC OTHER STN: NORMAL
PATH REPORT.RELEVANT HX SPEC: NORMAL
PATH REV: NORMAL
PHOSPHATE SERPL-MCNC: 3 MG/DL (ref 2.5–4.5)
PLAT MORPH BLD: NORMAL
PLATELET # BLD AUTO: 18 10E3/UL (ref 150–450)
POTASSIUM SERPL-SCNC: 3.9 MMOL/L (ref 3.4–5.3)
PROT SERPL-MCNC: 7.5 G/DL (ref 6.4–8.3)
RBC # BLD AUTO: 2.87 10E6/UL (ref 4.4–5.9)
RBC # CSF MANUAL: 1 /UL (ref 0–2)
RBC MORPH BLD: NORMAL
SODIUM SERPL-SCNC: 138 MMOL/L (ref 136–145)
SPECIMEN EXPIRATION DATE: NORMAL
SPECIMEN EXPIRATION DATE: NORMAL
TUBE # CSF: 4
URATE SERPL-MCNC: 4.6 MG/DL (ref 3.4–7)
WBC # BLD AUTO: 0.3 10E3/UL (ref 4–11)
WBC # CSF MANUAL: 5 /UL (ref 0–5)

## 2023-09-11 PROCEDURE — 85610 PROTHROMBIN TIME: CPT

## 2023-09-11 PROCEDURE — 80053 COMPREHEN METABOLIC PANEL: CPT

## 2023-09-11 PROCEDURE — 85384 FIBRINOGEN ACTIVITY: CPT

## 2023-09-11 PROCEDURE — 250N000011 HC RX IP 250 OP 636

## 2023-09-11 PROCEDURE — 70553 MRI BRAIN STEM W/O & W/DYE: CPT

## 2023-09-11 PROCEDURE — 250N000013 HC RX MED GY IP 250 OP 250 PS 637: Performed by: PHYSICIAN ASSISTANT

## 2023-09-11 PROCEDURE — 120N000002 HC R&B MED SURG/OB UMMC

## 2023-09-11 PROCEDURE — 86900 BLOOD TYPING SEROLOGIC ABO: CPT | Performed by: PHYSICIAN ASSISTANT

## 2023-09-11 PROCEDURE — 250N000012 HC RX MED GY IP 250 OP 636 PS 637

## 2023-09-11 PROCEDURE — A9585 GADOBUTROL INJECTION: HCPCS | Mod: JZ | Performed by: STUDENT IN AN ORGANIZED HEALTH CARE EDUCATION/TRAINING PROGRAM

## 2023-09-11 PROCEDURE — 86850 RBC ANTIBODY SCREEN: CPT | Performed by: PHYSICIAN ASSISTANT

## 2023-09-11 PROCEDURE — 99418 PROLNG IP/OBS E/M EA 15 MIN: CPT | Performed by: STUDENT IN AN ORGANIZED HEALTH CARE EDUCATION/TRAINING PROGRAM

## 2023-09-11 PROCEDURE — 83735 ASSAY OF MAGNESIUM: CPT

## 2023-09-11 PROCEDURE — 82248 BILIRUBIN DIRECT: CPT

## 2023-09-11 PROCEDURE — 83615 LACTATE (LD) (LDH) ENZYME: CPT

## 2023-09-11 PROCEDURE — 84100 ASSAY OF PHOSPHORUS: CPT

## 2023-09-11 PROCEDURE — 84550 ASSAY OF BLOOD/URIC ACID: CPT

## 2023-09-11 PROCEDURE — 250N000013 HC RX MED GY IP 250 OP 250 PS 637

## 2023-09-11 PROCEDURE — 85018 HEMOGLOBIN: CPT | Performed by: PHYSICIAN ASSISTANT

## 2023-09-11 PROCEDURE — 85730 THROMBOPLASTIN TIME PARTIAL: CPT

## 2023-09-11 PROCEDURE — 99233 SBSQ HOSP IP/OBS HIGH 50: CPT | Mod: FS | Performed by: STUDENT IN AN ORGANIZED HEALTH CARE EDUCATION/TRAINING PROGRAM

## 2023-09-11 PROCEDURE — 250N000013 HC RX MED GY IP 250 OP 250 PS 637: Performed by: HOSPITALIST

## 2023-09-11 PROCEDURE — 70553 MRI BRAIN STEM W/O & W/DYE: CPT | Mod: 26 | Performed by: RADIOLOGY

## 2023-09-11 PROCEDURE — 255N000002 HC RX 255 OP 636: Mod: JZ | Performed by: STUDENT IN AN ORGANIZED HEALTH CARE EDUCATION/TRAINING PROGRAM

## 2023-09-11 PROCEDURE — 97530 THERAPEUTIC ACTIVITIES: CPT | Mod: GP

## 2023-09-11 RX ORDER — GADOBUTROL 604.72 MG/ML
8 INJECTION INTRAVENOUS ONCE
Status: COMPLETED | OUTPATIENT
Start: 2023-09-11 | End: 2023-09-11

## 2023-09-11 RX ADMIN — VORICONAZOLE 200 MG: 200 TABLET ORAL at 08:10

## 2023-09-11 RX ADMIN — ACYCLOVIR 400 MG: 400 TABLET ORAL at 20:04

## 2023-09-11 RX ADMIN — ACYCLOVIR 400 MG: 400 TABLET ORAL at 08:10

## 2023-09-11 RX ADMIN — METHYLPREDNISOLONE 32 MG: 32 TABLET ORAL at 08:37

## 2023-09-11 RX ADMIN — HYDROXYZINE HYDROCHLORIDE 50 MG: 25 TABLET, FILM COATED ORAL at 20:04

## 2023-09-11 RX ADMIN — GADOBUTROL 8 ML: 604.72 INJECTION INTRAVENOUS at 09:53

## 2023-09-11 RX ADMIN — VORICONAZOLE 200 MG: 200 TABLET ORAL at 20:04

## 2023-09-11 RX ADMIN — SODIUM CHLORIDE, PRESERVATIVE FREE 5 ML: 5 INJECTION INTRAVENOUS at 06:55

## 2023-09-11 RX ADMIN — LEVOFLOXACIN 250 MG: 250 TABLET, FILM COATED ORAL at 08:10

## 2023-09-11 ASSESSMENT — ACTIVITIES OF DAILY LIVING (ADL)
ADLS_ACUITY_SCORE: 22

## 2023-09-11 NOTE — PROGRESS NOTES
[facility]    Hematology / Oncology Progress Note    Patient: Artie Fnie  MRN: [id number]  Admission Date: 9/1/2023  Date of Service (when I saw the patient): 09/11/2023  Hospital Day # 10     Assessment & Plan   Artie Fine is a 68 year old male with a history of brain abscess (2021), HTN, and BPH. He was transferred from [facility] in [place], [place] to [facility] for work up of pancytopenia; he was noted to have circulating blasts concerning for acute leukemia, and was subsequently transferred to [facility]. He underwent a diagnostic bone marrow biopsy on 9/1/23 which confirmed a new diagnosis of AML. He was started on induction with 7+3 (C1D1=9/2/23) which has been well-tolerated so far.      Today:  - Day 10 of 7+3; tolerating well  - MRI brain read and CSF flow for CNS leukemia work up pending  - Scheduled day 14 BMBx for Friday 9/15 at 1100  -  consult to assist wife in stay in [place] this weekend Friday through Monday      HEME/ONC  # AML  Negative FLT3 ITD. Normal karyotype. FISH shows no evidence of MLLT10, NUP98, or KMT2A rearrangement. Patient is likely in intermediate risk given the information available at this time, however can reassess when NGS results.  Patient initially presented to [facility] in [place], [place] with 4-6 weeks of progressive bruising, weakness, fatigue, loss of appetite, and night sweats. He also noted a headache similar to his previous brain abscess. CBC notable for pancytopenia; WBC 2.7 (), Hgb 8.1, and plt 1k. He was transferred to [facility] and was found on peripheral flow w/ 11% circulating myeloid blasts. He was then transferred to [facility] for further work up and treatment of AML. Diagnostic BMBx performed 9/1 which showed AML with 30% blasts by morph and 47% by flow. P53 negative. FISH negative for MLLT10, NUP98, and KMT2A. Normal karyotype. Started 7+3 (C1D1=9/2/23).   - Diagnostic NGS pending  - HLA typing  ordered. BMT team messaged re: NT visit,   - Baseline TTE showing normal LVEF of 55-60%. Baseline EKG showed NSR and QTc of 434.   - Viral serologies: HepB/C-, HIV-. HSV1/2+, EBV IgG+, CMV IgG+  - PICC placed 9/1  - S/p diagnostic LP 9/8/23 for CNS leukemia work up as patient endorses headache upon admission w/ new AML diagnosis. CSF flow pending. MRI brain obtained 9/11, read pending.   - Scheduled for D14 BMBx on Friday 9/15 at 1100. Morph, flow, and process&hold ordered.                  Treatment Plan: 7+3 (C1D1=9/2/23)                - Cytarabine 100 mg/m2 D1-D7               - Daunorubicin 60 mg/m2 D1-D3               - Supportive meds: Decadron 12 mg tab D1-D3, Zofran     # Low risk for TLS/DIC  - Discontinued allopurinol  - Montior TLS/DIC labs twice weekly     ID  # Immunosuppressed 2/2 malignancy and chemotherapy  # ID PPX  -  mg BID  - Levofloxacin 250 mg daily  - Voriconazole 200 mg BID x9/10     # H/o brain abscess (2021)  # Headache, resolved  # Dizziness, resolved  Treated in [place]. S/p craniotomy for drainage of abscess July 2021. It is unclear what culture speciated to, but he completed a course of antibiotics with ceftriaxone then meropenem with resolution on imaging. Head CT 8/31 at OSH revealed no intracranial hemorrhage midline shift or mass effect. Postsurgical changes noted. Previous area of infection demonstrates trace amount of encephalomalacia but no mass effect or inflammatory changes to suggest new or acute infection. Otherwise unremarkable. Patient presented initially with headache and dizziness, which has now resolved.   - See work up above for CNS leukemia     CHRONIC  # HTN  Noted during prior hospitalization for brain abscess in 2021. Previously on lisinopril, which he is no longer taking.  - Patient BP's have fluctuated this admission, w/ occasionally elevated BPs. Continue to monitor.     # BPH  Notes difficulty with complete emptying of bladder. Previously on Flomax but  "states it did nothing, so he stopped it. He does note frequent urination at baseline, unchanged from baseline.  - Monitor clinically     MISC  # Weakness  # Deconditioning  Pt reports issues w/ balance after his brain abscess in 2021, exacerbated when ambulating. Deconditioning acute-on-chronic upon admission since 2021.   - PT consulted, seeing 3x/wk    # Weight loss  # Loss of appetite  # Moderate malnutrition in the context of acute illness   Patient presented with loss of appetite 2/2 new malignancy. He has been gradually losing weight throughout admission, although PO intake is overall sufficient.   - RD consulted  - Supplements ordered: Ensure BID between meals and PRN    Clinically Significant Risk Factors                # Thrombocytopenia: Lowest platelets = 18 in last 2 days, will monitor for bleeding          # Overweight: Estimated body mass index is 27.84 kg/m  as calculated from the following:    Height as of this encounter: 1.74 m (5' 8.5\").    Weight as of this encounter: 84.3 kg (185 lb 12.8 oz).   # Moderate Malnutrition: based on nutrition assessment         FEN  Diet: Regular Diet Adult   IVF: Bolus PRN   Lytes: Replete per protocol    PPX  VTE: None given thrombocytopenia  Bowel: Senna/MiraLax PRN  GI/PUD: Non currently indicated    MISC  Code Status: Full Code   Lines/Drains: PICC  Dispo: Anticipate 4-6 week admission for induction chemotherapy and count recovery  Follow Up: Pt will follow with Dr. Garcia, appointment scheduled for end of September    Patient was seen and plan of care was discussed with attending physician Dr. Velázquez.    I spent 75 minutes in the care of this patient today, which included time necessary for review of interval events, obtaining history and physical exam, ordering medications/tests/procedures as medically indicated, review of pertinent medical literature, counseling of the patient, coordination of care, and documentation time. Over 50% of time was spent " counseling the patient and/or coordinating care.    Yolis Sherman PA-C   Hematology/Oncology   Pager: [phone]  Desk phone: [phone]    Interval History   No acute events overnight. Patient is feeling well today. He slept much bbetter overnight with dose of Atarax, thus is in a better mood. No symptomatic complaints. Denies nausea, headache, dizziness, weakness. Has been ambulating, going on walks. Overall decent PO intake per pt but reports losing about 1 lb per day. Discuss nutrition supplements, pt will try Ensures BID. MRI went well. Updated wife at bedside.     Vital Signs with Ranges  Temp:  [97.5  F (36.4  C)-98.2  F (36.8  C)] 97.6  F (36.4  C)  Pulse:  [] 100  Resp:  [16-17] 16  BP: (112-138)/(66-78) 112/72  SpO2:  [95 %-99 %] 99 %  I/O last 3 completed shifts:  In: 480 [P.O.:480]  Out: -     Physical Exam   General: Sitting up in chair, alert, NAD. Pleasant and conversational.  Skin: No concerning lesions, rash, jaundice, cyanosis, erythema, or ecchymoses on exposed surfaces.   HEENT: NCAT. Anicteric sclera. Moist mucous membranes.  Respiratory: Non-labored breathing on room air, good air exchange, lungs clear to auscultation bilaterally.  Cardiovascular: RRR. No murmur or rub.   Gastrointestinal: Normoactive BS. Abdomen soft, ND, NT. No palpable masses.  Extremities: No LE edema.   Neurologic: A&O x 3, speech normal, no deficits grossly.    Medications    - MEDICATION INSTRUCTIONS -        acyclovir  400 mg Oral BID    hydrOXYzine  50 mg Oral At Bedtime    levofloxacin  250 mg Oral Daily    voriconazole  200 mg Oral Q12H [facility] (08/20)     Data   Results for orders placed or performed during the hospital encounter of 09/01/23 (from the past 24 hour(s))   ABO/Rh type and screen    Narrative    The following orders were created for panel order ABO/Rh type and screen.  Procedure                               Abnormality         Status                     ---------                               -----------          ------                     Adult Type and Screen[[id number]]                            Final result                 Please view results for these tests on the individual orders.   CBC with platelets differential    Narrative    The following orders were created for panel order CBC with platelets differential.  Procedure                               Abnormality         Status                     ---------                               -----------         ------                     CBC with platelets and d...[[id number]]  Abnormal            Final result               RBC and Platelet Morphology[[id number]]                      Final result                 Please view results for these tests on the individual orders.   Basic metabolic panel   Result Value Ref Range    Sodium 138 136 - 145 mmol/L    Potassium 3.9 3.4 - 5.3 mmol/L    Chloride 104 98 - 107 mmol/L    Carbon Dioxide (CO2) 20 (L) 22 - 29 mmol/L    Anion Gap 14 7 - 15 mmol/L    Urea Nitrogen 16.5 8.0 - 23.0 mg/dL    Creatinine 0.73 0.67 - 1.17 mg/dL    Calcium 9.4 8.8 - 10.2 mg/dL    Glucose 136 (H) 70 - 99 mg/dL    GFR Estimate >90 >60 mL/min/1.73m2   Fibrinogen activity   Result Value Ref Range    Fibrinogen Activity 359 170 - 490 mg/dL   Hepatic panel   Result Value Ref Range    Protein Total 7.5 6.4 - 8.3 g/dL    Albumin 4.5 3.5 - 5.2 g/dL    Bilirubin Total 0.5 <=1.2 mg/dL    Alkaline Phosphatase 68 40 - 129 U/L    AST 17 0 - 45 U/L    ALT <5 0 - 70 U/L    Bilirubin Direct <0.20 0.00 - 0.30 mg/dL   INR   Result Value Ref Range    INR 1.15 0.85 - 1.15   Lactate Dehydrogenase   Result Value Ref Range    Lactate Dehydrogenase 216 0 - 250 U/L   Magnesium   Result Value Ref Range    Magnesium 2.2 1.7 - 2.3 mg/dL   Partial thromboplastin time   Result Value Ref Range    aPTT 31 22 - 38 Seconds   Phosphorus   Result Value Ref Range    Phosphorus 3.0 2.5 - 4.5 mg/dL   Uric acid   Result Value Ref Range    Uric Acid 4.6 3.4 - 7.0 mg/dL   Adult Type and  Screen   Result Value Ref Range    Antibody Screen Negative Negative    SPECIMEN EXPIRATION DATE [id number]    CBC with platelets and differential   Result Value Ref Range    WBC Count 0.3 (LL) 4.0 - 11.0 10e3/uL    RBC Count 2.87 (L) 4.40 - 5.90 10e6/uL    Hemoglobin 8.0 (L) 13.3 - 17.7 g/dL    Hematocrit 25.7 (L) 40.0 - 53.0 %    MCV 90 78 - 100 fL    MCH 27.9 26.5 - 33.0 pg    MCHC 31.1 (L) 31.5 - 36.5 g/dL    RDW 13.4 10.0 - 15.0 %    Platelet Count 18 (LL) 150 - 450 10e3/uL   RBC and Platelet Morphology   Result Value Ref Range    Platelet Assessment  Automated Count Confirmed. Platelet morphology is normal.     Automated Count Confirmed. Platelet morphology is normal.    RBC Morphology Confirmed RBC Indices    ABO/RH Type & Screen   Result Value Ref Range    SPECIMEN EXPIRATION DATE [id number]     ABORH ABO Unknown Rh POS

## 2023-09-11 NOTE — PLAN OF CARE
"Goal Outcome Evaluation:      Plan of Care Reviewed With: patient    Overall Patient Progress: no changeOverall Patient Progress: no change     0700 - 1530:   /71 (BP Location: Left arm)   Pulse 108   Temp 98  F (36.7  C) (Oral)   Resp 18   Ht 1.74 m (5' 8.5\")   Wt 84.3 kg (185 lb 12.8 oz)   SpO2 96%   BMI 27.84 kg/m      A&O x 4, AVSS, no c/o pain/nausea/sob on RA.  Pt had his MRI this morning after second dose of Methylprednisolone 32 mg & tolerate mri well. MRI came back negative for CNS involved.   D14 Bmbx scheduled on Friday 9/15 at 11 AM.  Good appetite, ate 100%, up independently, voiding spontaneously, last bm 9/10.  Continue with poc...      "

## 2023-09-11 NOTE — PLAN OF CARE
Goal Outcome Evaluation:      Plan of Care Reviewed With: patient    Overall Patient Progress: no changeOverall Patient Progress: no change         No acute events this shift. No prns requested/ given. Continue POC.

## 2023-09-11 NOTE — PROGRESS NOTES
Care Management Follow Up    Length of Stay (days): 10    Expected Discharge Date: 09/29/2023     Concerns to be Addressed:     [facility]  Additional Information:  Lane FLOYD asked SW to put in a request to [facility] for pt's wife to stay 9/15-18  SW emailed request at 12:30pm    SW to follow and assist with any other discharge needs that may arise.  GUY Rajput   [address]    Phone: [phone]  Pager: [phone]

## 2023-09-11 NOTE — PLAN OF CARE
Goal Outcome Evaluation:  [id number]: Pt A&Ox4. VSS. No complaints overnight. Cares clustered. Sleeping between cares. Able to make needs known. Continue with POC.

## 2023-09-12 LAB
ANION GAP SERPL CALCULATED.3IONS-SCNC: 15 MMOL/L (ref 7–15)
BUN SERPL-MCNC: 22.2 MG/DL (ref 8–23)
CALCIUM SERPL-MCNC: 9.7 MG/DL (ref 8.8–10.2)
CHLORIDE SERPL-SCNC: 103 MMOL/L (ref 98–107)
CREAT SERPL-MCNC: 0.82 MG/DL (ref 0.67–1.17)
DEPRECATED HCO3 PLAS-SCNC: 21 MMOL/L (ref 22–29)
EGFRCR SERPLBLD CKD-EPI 2021: >90 ML/MIN/1.73M2
ERYTHROCYTE [DISTWIDTH] IN BLOOD BY AUTOMATED COUNT: 13.3 % (ref 10–15)
GLUCOSE SERPL-MCNC: 123 MG/DL (ref 70–99)
HCT VFR BLD AUTO: 25.8 % (ref 40–53)
HGB BLD-MCNC: 8.2 G/DL (ref 13.3–17.7)
LDH SERPL L TO P-CCNC: 212 U/L (ref 0–250)
MAGNESIUM SERPL-MCNC: 2.3 MG/DL (ref 1.7–2.3)
MCH RBC QN AUTO: 28 PG (ref 26.5–33)
MCHC RBC AUTO-ENTMCNC: 31.8 G/DL (ref 31.5–36.5)
MCV RBC AUTO: 88 FL (ref 78–100)
PHOSPHATE SERPL-MCNC: 4.7 MG/DL (ref 2.5–4.5)
PLAT MORPH BLD: NORMAL
PLATELET # BLD AUTO: 14 10E3/UL (ref 150–450)
POTASSIUM SERPL-SCNC: 3.9 MMOL/L (ref 3.4–5.3)
RBC # BLD AUTO: 2.93 10E6/UL (ref 4.4–5.9)
RBC MORPH BLD: NORMAL
SODIUM SERPL-SCNC: 139 MMOL/L (ref 136–145)
URATE SERPL-MCNC: 4.3 MG/DL (ref 3.4–7)
WBC # BLD AUTO: 0.4 10E3/UL (ref 4–11)

## 2023-09-12 PROCEDURE — 83735 ASSAY OF MAGNESIUM: CPT | Performed by: STUDENT IN AN ORGANIZED HEALTH CARE EDUCATION/TRAINING PROGRAM

## 2023-09-12 PROCEDURE — 85027 COMPLETE CBC AUTOMATED: CPT | Performed by: PHYSICIAN ASSISTANT

## 2023-09-12 PROCEDURE — 84520 ASSAY OF UREA NITROGEN: CPT

## 2023-09-12 PROCEDURE — 250N000013 HC RX MED GY IP 250 OP 250 PS 637: Performed by: PHYSICIAN ASSISTANT

## 2023-09-12 PROCEDURE — 99418 PROLNG IP/OBS E/M EA 15 MIN: CPT | Performed by: STUDENT IN AN ORGANIZED HEALTH CARE EDUCATION/TRAINING PROGRAM

## 2023-09-12 PROCEDURE — 250N000013 HC RX MED GY IP 250 OP 250 PS 637

## 2023-09-12 PROCEDURE — 84100 ASSAY OF PHOSPHORUS: CPT | Performed by: STUDENT IN AN ORGANIZED HEALTH CARE EDUCATION/TRAINING PROGRAM

## 2023-09-12 PROCEDURE — 99233 SBSQ HOSP IP/OBS HIGH 50: CPT | Mod: FS | Performed by: STUDENT IN AN ORGANIZED HEALTH CARE EDUCATION/TRAINING PROGRAM

## 2023-09-12 PROCEDURE — 250N000013 HC RX MED GY IP 250 OP 250 PS 637: Performed by: HOSPITALIST

## 2023-09-12 PROCEDURE — 120N000002 HC R&B MED SURG/OB UMMC

## 2023-09-12 PROCEDURE — 84550 ASSAY OF BLOOD/URIC ACID: CPT | Performed by: PHYSICIAN ASSISTANT

## 2023-09-12 PROCEDURE — 83615 LACTATE (LD) (LDH) ENZYME: CPT | Performed by: PHYSICIAN ASSISTANT

## 2023-09-12 RX ADMIN — HYDROXYZINE HYDROCHLORIDE 50 MG: 25 TABLET, FILM COATED ORAL at 19:54

## 2023-09-12 RX ADMIN — VORICONAZOLE 200 MG: 200 TABLET ORAL at 19:54

## 2023-09-12 RX ADMIN — ACYCLOVIR 400 MG: 400 TABLET ORAL at 08:00

## 2023-09-12 RX ADMIN — ACYCLOVIR 400 MG: 400 TABLET ORAL at 19:54

## 2023-09-12 RX ADMIN — POLYETHYLENE GLYCOL 3350 17 G: 17 POWDER, FOR SOLUTION ORAL at 11:15

## 2023-09-12 RX ADMIN — LEVOFLOXACIN 250 MG: 250 TABLET, FILM COATED ORAL at 07:59

## 2023-09-12 RX ADMIN — VORICONAZOLE 200 MG: 200 TABLET ORAL at 07:59

## 2023-09-12 ASSESSMENT — ACTIVITIES OF DAILY LIVING (ADL)
ADLS_ACUITY_SCORE: 22

## 2023-09-12 NOTE — PLAN OF CARE
"Goal Outcome Evaluation:      Plan of Care Reviewed With: patient    Overall Patient Progress: no changeOverall Patient Progress: no change     0700 - 1530:   /75 (BP Location: Left arm)   Pulse 108   Temp 97.8  F (36.6  C) (Oral)   Resp 18   Ht 1.74 m (5' 8.5\")   Wt 83.7 kg (184 lb 8 oz)   SpO2 96%   BMI 27.65 kg/m      A&O x 4, AVSS, denies pain/headache/dizziness/nausea/sob on RA.  Good appetite, ate 100% meals, ambulated in hallway.  PICC caps changed & on HL.  Day#14 Bmbx scheduled for Friday 9/15 at 11 AM.  Up independently, voiding spontaneously, had a small bm this morning, gave miralax per pt request.  Continue with poc....      "

## 2023-09-12 NOTE — PROGRESS NOTES
[facility]    Hematology / Oncology Progress Note    Patient: Artie Fine  MRN: [id number]  Admission Date: 9/1/2023  Date of Service (when I saw the patient): 09/12/2023  Hospital Day # 11     Assessment & Plan   Artie Fine is a 68 year old male with a history of brain abscess (2021), HTN, and BPH. He was transferred from [facility] in [place], [place] to [facility] for work up of pancytopenia; he was noted to have circulating blasts concerning for acute leukemia, and was subsequently transferred to [facility]. He underwent a diagnostic bone marrow biopsy on 9/1/23 which confirmed a new diagnosis of AML. He was started on induction with 7+3 (C1D1=9/2/23) which has been well-tolerated so far.      Today:  - Day 11 of 7+3; tolerating well  - MRI brain and CSF flow both negative for CNS leukemia  - Scheduled day 14 BMBx for Friday 9/15 at 1100    HEME/ONC  # AML  Negative FLT3 ITD. Normal karyotype. FISH shows no evidence of MLLT10, NUP98, or KMT2A rearrangement. Patient is likely in intermediate risk given the information available at this time, however can reassess when NGS results.  Patient initially presented to [facility] in [place], [place] with 4-6 weeks of progressive bruising, weakness, fatigue, loss of appetite, and night sweats. He also noted a headache similar to his previous brain abscess. CBC notable for pancytopenia; WBC 2.7 (), Hgb 8.1, and plt 1k. He was transferred to [facility] and was found on peripheral flow w/ 11% circulating myeloid blasts. He was then transferred to [facility] for further work up and treatment of AML. Diagnostic BMBx performed 9/1 which showed AML with 30% blasts by morph and 47% by flow. P53 negative. FISH negative for MLLT10, NUP98, and KMT2A. Normal karyotype. NGS showed ASXL1, CEBPA, SRSF2, and STAG2 mutations. Started 7+3 (C1D1=9/2/23).   - HLA typing ordered. Plan for BMT NT ~9/20-9/22, requested 9/22 afternoon so  wife can be present in person. Of note, pt voiced hesitation about the idea of BMT, but encouraged him to gather information during BMT NT visit.   - Baseline TTE showing normal LVEF of 55-60%. Baseline EKG showed NSR and QTc of 434.   - Viral serologies: HepB/C-, HIV-. HSV1/2+, EBV IgG+, CMV IgG+  - PICC placed 9/1  - S/p diagnostic LP 9/8/23 for CNS leukemia work up as patient endorses headache upon admission w/ new AML diagnosis. CSF flow negative. MRI brain 9/11 negative, only showing chronic changes from h/o brain abscess.   - Scheduled for D14 BMBx on Friday 9/15 at 1100. Morph, flow, and process&hold ordered.                  Treatment Plan: 7+3 (C1D1=9/2/23)                - Cytarabine 100 mg/m2 D1-D7               - Daunorubicin 60 mg/m2 D1-D3               - Supportive meds: Decadron 12 mg tab D1-D3, Zofran     # Low risk for TLS/DIC  - Discontinued allopurinol  - Montior TLS/DIC labs twice weekly     ID  # Immunosuppressed 2/2 malignancy and chemotherapy  # ID PPX  -  mg BID  - Levofloxacin 250 mg daily  - Voriconazole 200 mg BID x9/10     # H/o brain abscess (2021)  # Headache, resolved  # Dizziness, resolved  Treated in [place]. S/p craniotomy for drainage of abscess July 2021. It is unclear what culture speciated to, but he completed a course of antibiotics with ceftriaxone then meropenem with resolution on imaging. Head CT 8/31 at OSH revealed no intracranial hemorrhage midline shift or mass effect. Postsurgical changes noted. Previous area of infection demonstrates trace amount of encephalomalacia but no mass effect or inflammatory changes to suggest new or acute infection. Otherwise unremarkable. Patient presented initially with headache and dizziness, which has now resolved.   - See work up above for CNS leukemia work-up, which was negative     CHRONIC  # HTN  Noted during prior hospitalization for brain abscess in 2021. Previously on lisinopril, which he is no longer taking.  - Patient  "BP's have fluctuated this admission, w/ occasionally elevated BPs. Continue to monitor.     # BPH  Notes difficulty with complete emptying of bladder. Previously on Flomax but states it did nothing, so he stopped it. He does note frequent urination at baseline, unchanged from baseline.  - Monitor clinically     MISC  # Weakness  # Deconditioning  Pt reports issues w/ balance after his brain abscess in 2021, exacerbated when ambulating. Deconditioning acute-on-chronic upon admission since 2021.   - PT consulted, seeing 3x/wk    # Weight loss  # Loss of appetite  # Moderate malnutrition in the context of acute illness   Patient presented with loss of appetite 2/2 new malignancy. He has been gradually losing weight throughout admission, although PO intake is overall sufficient.   - RD consulted  - Supplements ordered: Ensure BID between meals and PRN    # Hyperphosphatemia  Mild, noted 9/12. Other TLS labs no suggestive of lysis process.   - Continue to monitor    Clinically Significant Risk Factors                # Thrombocytopenia: Lowest platelets = 14 in last 2 days, will monitor for bleeding            # Overweight: Estimated body mass index is 27.84 kg/m  as calculated from the following:    Height as of this encounter: 1.74 m (5' 8.5\").    Weight as of this encounter: 84.3 kg (185 lb 12.8 oz).       # Moderate Malnutrition: based on nutrition assessment         FEN  Diet: Regular Diet Adult   IVF: Bolus PRN   Lytes: Replete per protocol    PPX  VTE: None given thrombocytopenia  Bowel: Senna/MiraLax PRN  GI/PUD: None currently indicated    MISC  Code Status: Full Code   Lines/Drains: PICC  Dispo: Anticipate 4-6 week admission for induction chemotherapy and count recovery  Follow Up: Pt will follow with Dr. Garcia, appointment scheduled for end of September    Patient was seen and plan of care was discussed with attending physician Dr. Velázquez.    I spent 65 minutes in the care of this patient today, which " "included time necessary for review of interval events, obtaining history and physical exam, ordering medications/tests/procedures as medically indicated, review of pertinent medical literature, counseling of the patient, coordination of care, and documentation time. Over 50% of time was spent counseling the patient and/or coordinating care.    Yolis Sherman PA-C   Hematology/Oncology   Pager: [phone]  Desk phone: [phone]    Interval History   No acute events overnight. Patient is feeling well today. He has been up since 0500. He did not sleep as well as the previous night, but still sleeping better with the Atarax. He is feeling \"perky\" and energetic this morning. Denies nausea, headache, or any other acute symptoms.     I informed him that we are setting up a BMT NT visit, which launched us in to a discussion on the prolonged course of leukemia treatment. He was unaware of the potential longevity of his disease course, and expressed uncertainty about whether it would be in line with his goals of care to proceed with something like BMT. I acknowledged his insight into his goals, and encouraged him to visit with the BMT team to equip him and his family with all the information they will need to make complicated decisions. He expressed understanding, but seemed to be discouraged about the long and complicated road ahead.     Vital Signs with Ranges  Temp:  [97.5  F (36.4  C)-98  F (36.7  C)] 97.6  F (36.4  C)  Pulse:  [] 89  Resp:  [16-18] 16  BP: (104-140)/(71-87) 133/87  SpO2:  [96 %-98 %] 96 %  I/O last 3 completed shifts:  In: 480 [P.O.:480]  Out: -     Physical Exam   General: Sitting up in bed, alert, NAD. Pleasant and conversational.  Skin: No concerning lesions, rash, jaundice, cyanosis, erythema, or ecchymoses on exposed surfaces.   HEENT: NCAT. Anicteric sclera. Moist mucous membranes.  Respiratory: Non-labored breathing on room air, good air exchange, lungs clear to auscultation " bilaterally.  Cardiovascular: RRR. No murmur or rub.   Gastrointestinal: Normoactive BS. Abdomen soft, ND, NT. No palpable masses.  Extremities: No LE edema.   Neurologic: A&O x 3, speech normal, no deficits grossly.    Medications    - MEDICATION INSTRUCTIONS -        acyclovir  400 mg Oral BID    hydrOXYzine  50 mg Oral At Bedtime    levofloxacin  250 mg Oral Daily    voriconazole  200 mg Oral Q12H [facility] (08/20)     Data   Results for orders placed or performed during the hospital encounter of 09/01/23 (from the past 24 hour(s))   MR Brain w/o & w Contrast    Narrative    EXAM: MR BRAIN W/O & W CONTRAST  9/11/2023 10:30 AM     HISTORY: 68y.o. M with new diagnosis of AML, experiencing headache and  dizziness upon diagnosis, now behavioral shifts, please eval for  possible CNS leukemia involvement.    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    COMPARISON: None    TECHNIQUE: Sagittal and axial T1-weighted, axial diffusion,  multiplanar T2 FLAIR with fat saturation images were obtained without  intravenous contrast. Following intravenous gadolinium-based contrast  administration, axial T2-weighted, axial susceptibility, and  T1-weighted images (in multiple planes) were obtained.    CONTRAST: 8 ml Gadavist.    FINDINGS:  There is no mass effect, midline shift, or intracranial hemorrhage.  The ventricles are proportionate to the cerebral sulci. Diffusion  weighted images are negative for acute/focal abnormality. Major  intracranial vascular structures are within normal limits.  Periventricular and subcortical scattered T2 hyperintensities, likely  due to chronic small vessel ischemic disease. Postoperative changes of  right temporal craniotomy for drainage of abscess, including focus of  vertebral encephalomalacia and T2 hyperintensity without associated  enhancement. Patchy areas of T2 hyperintensity overlying the right  frontotemporal  lobes, measuring up to 4 mm in thickness over the  frontal lobe, and over the right parietal lobe, measuring 2 mm in  thickness. Leptomeningeal enhancement over the posterior right frontal  lobe, most evident image 21 of series 16.    No suspicious abnormality of the skull marrow signal. Clear paranasal  sinuses. Mastoid air cells are clear. No focal abnormality of the  pituitary gland, sella, skull base and upper cervical spinal  structures on sagittal images. The orbits are normal.      Impression    IMPRESSION: Postoperative changes of prior right temporal craniotomy  for drainage of a right temporal abscess. Areas of extra-axial T2  hyperintensity and enhancement over the right frontotemporal and right  parietal lobes are presumed to be chronic changes with dural  thickening and possible small chronic collections. Predominantly  posterior frontal leptomeningeal enhancement is also probably  secondary to the suspected chronic changes No definite findings to  suggest CNS lymphoma, although comparison with prior imaging would  likely be of benefit.    I have personally reviewed the examination and initial interpretation  and I agree with the findings.    LACIE CHAIDEZ MD         SYSTEM ID:  [id number]   CBC with platelets differential    Narrative    The following orders were created for panel order CBC with platelets differential.  Procedure                               Abnormality         Status                     ---------                               -----------         ------                     CBC with platelets and d...[[id number]]  Abnormal            Final result               RBC and Platelet Morphology[[id number]]                      Final result                 Please view results for these tests on the individual orders.   Basic metabolic panel   Result Value Ref Range    Sodium 139 136 - 145 mmol/L    Potassium 3.9 3.4 - 5.3 mmol/L    Chloride 103 98 - 107 mmol/L    Carbon Dioxide (CO2) 21 (L) 22 -  29 mmol/L    Anion Gap 15 7 - 15 mmol/L    Urea Nitrogen 22.2 8.0 - 23.0 mg/dL    Creatinine 0.82 0.67 - 1.17 mg/dL    Calcium 9.7 8.8 - 10.2 mg/dL    Glucose 123 (H) 70 - 99 mg/dL    GFR Estimate >90 >60 mL/min/1.73m2   Magnesium   Result Value Ref Range    Magnesium 2.3 1.7 - 2.3 mg/dL   Phosphorus   Result Value Ref Range    Phosphorus 4.7 (H) 2.5 - 4.5 mg/dL   CBC with platelets and differential   Result Value Ref Range    WBC Count 0.4 (LL) 4.0 - 11.0 10e3/uL    RBC Count 2.93 (L) 4.40 - 5.90 10e6/uL    Hemoglobin 8.2 (L) 13.3 - 17.7 g/dL    Hematocrit 25.8 (L) 40.0 - 53.0 %    MCV 88 78 - 100 fL    MCH 28.0 26.5 - 33.0 pg    MCHC 31.8 31.5 - 36.5 g/dL    RDW 13.3 10.0 - 15.0 %    Platelet Count 14 (LL) 150 - 450 10e3/uL   RBC and Platelet Morphology   Result Value Ref Range    Platelet Assessment  Automated Count Confirmed. Platelet morphology is normal.     Automated Count Confirmed. Platelet morphology is normal.    RBC Morphology Confirmed RBC Indices    Uric acid   Result Value Ref Range    Uric Acid 4.3 3.4 - 7.0 mg/dL   Lactate Dehydrogenase   Result Value Ref Range    Lactate Dehydrogenase 212 0 - 250 U/L

## 2023-09-12 NOTE — PLAN OF CARE
"Goal Outcome Evaluation:      Plan of Care Reviewed With: patient    Overall Patient Progress: no changeOverall Patient Progress: no change         Time: 4987-7593     Reason for admission: AML  Activity: UAL  Pain: denies  Neuro: WDL  Cardiac: WDL  Respiratory: WDL  GI/: WDL, LBM 9/11/2023  Diet: regular diet  Lines: right PICC- HL  Labs/imaging: Reviewed, no replacements indicated this shift.   Vitals: WDL      New changes this shift: No acute events this shift. Patient was very happy this shift d/t the news there is no CNS involvement. No PRNs requested/given. Pt states he \"slept well\".       Continue to follow POC..    "

## 2023-09-13 LAB
ABO/RH TYPE: NORMAL
ALBUMIN SERPL BCG-MCNC: 3.9 G/DL (ref 3.5–5.2)
ALP SERPL-CCNC: 57 U/L (ref 40–129)
ALT SERPL W P-5'-P-CCNC: 9 U/L (ref 0–70)
ANION GAP SERPL CALCULATED.3IONS-SCNC: 11 MMOL/L (ref 7–15)
ANTIBODY SCREEN: NEGATIVE
AST SERPL W P-5'-P-CCNC: 12 U/L (ref 0–45)
BACTERIA CSF CULT: NO GROWTH
BILIRUB SERPL-MCNC: 0.4 MG/DL
BLD PROD TYP BPU: NORMAL
BLD PROD TYP BPU: NORMAL
BLOOD COMPONENT TYPE: NORMAL
BLOOD COMPONENT TYPE: NORMAL
BUN SERPL-MCNC: 28.4 MG/DL (ref 8–23)
CALCIUM SERPL-MCNC: 9.2 MG/DL (ref 8.8–10.2)
CHLORIDE SERPL-SCNC: 106 MMOL/L (ref 98–107)
CODING SYSTEM: NORMAL
CODING SYSTEM: NORMAL
CREAT SERPL-MCNC: 0.88 MG/DL (ref 0.67–1.17)
CROSSMATCH: NORMAL
DEPRECATED HCO3 PLAS-SCNC: 24 MMOL/L (ref 22–29)
EGFRCR SERPLBLD CKD-EPI 2021: >90 ML/MIN/1.73M2
ERYTHROCYTE [DISTWIDTH] IN BLOOD BY AUTOMATED COUNT: 13.2 % (ref 10–15)
GLUCOSE SERPL-MCNC: 84 MG/DL (ref 70–99)
GRAM STAIN RESULT: NORMAL
GRAM STAIN RESULT: NORMAL
HCT VFR BLD AUTO: 20.2 % (ref 40–53)
HGB BLD-MCNC: 6.5 G/DL (ref 13.3–17.7)
ISSUE DATE AND TIME: NORMAL
ISSUE DATE AND TIME: NORMAL
MAGNESIUM SERPL-MCNC: 2.1 MG/DL (ref 1.7–2.3)
MCH RBC QN AUTO: 28.1 PG (ref 26.5–33)
MCHC RBC AUTO-ENTMCNC: 32.2 G/DL (ref 31.5–36.5)
MCV RBC AUTO: 87 FL (ref 78–100)
PATH REPORT.ADDENDUM SPEC: ABNORMAL
PATH REPORT.ADDENDUM SPEC: ABNORMAL
PATH REPORT.COMMENTS IMP SPEC: ABNORMAL
PATH REPORT.COMMENTS IMP SPEC: ABNORMAL
PATH REPORT.COMMENTS IMP SPEC: YES
PATH REPORT.FINAL DX SPEC: ABNORMAL
PATH REPORT.GROSS SPEC: ABNORMAL
PATH REPORT.MICROSCOPIC SPEC OTHER STN: ABNORMAL
PATH REPORT.MICROSCOPIC SPEC OTHER STN: ABNORMAL
PATH REPORT.RELEVANT HX SPEC: ABNORMAL
PHOSPHATE SERPL-MCNC: 4.1 MG/DL (ref 2.5–4.5)
PLAT MORPH BLD: NORMAL
PLATELET # BLD AUTO: 6 10E3/UL (ref 150–450)
POTASSIUM SERPL-SCNC: 4 MMOL/L (ref 3.4–5.3)
PROT SERPL-MCNC: 6.4 G/DL (ref 6.4–8.3)
RBC # BLD AUTO: 2.31 10E6/UL (ref 4.4–5.9)
RBC MORPH BLD: NORMAL
SODIUM SERPL-SCNC: 141 MMOL/L (ref 136–145)
SPECIMEN EXPIRATION DATE: NORMAL
SPECIMEN EXPIRATION DATE: NORMAL
UNIT ABO/RH: NORMAL
UNIT ABO/RH: NORMAL
UNIT NUMBER: NORMAL
UNIT NUMBER: NORMAL
UNIT STATUS: NORMAL
UNIT STATUS: NORMAL
UNIT TYPE ISBT: 5100
UNIT TYPE ISBT: 8400
WBC # BLD AUTO: 0.3 10E3/UL (ref 4–11)

## 2023-09-13 PROCEDURE — 86901 BLOOD TYPING SEROLOGIC RH(D): CPT | Performed by: PHYSICIAN ASSISTANT

## 2023-09-13 PROCEDURE — P9016 RBC LEUKOCYTES REDUCED: HCPCS | Performed by: PHYSICIAN ASSISTANT

## 2023-09-13 PROCEDURE — 250N000013 HC RX MED GY IP 250 OP 250 PS 637: Performed by: HOSPITALIST

## 2023-09-13 PROCEDURE — 250N000013 HC RX MED GY IP 250 OP 250 PS 637

## 2023-09-13 PROCEDURE — 120N000002 HC R&B MED SURG/OB UMMC

## 2023-09-13 PROCEDURE — 84100 ASSAY OF PHOSPHORUS: CPT | Performed by: STUDENT IN AN ORGANIZED HEALTH CARE EDUCATION/TRAINING PROGRAM

## 2023-09-13 PROCEDURE — 99418 PROLNG IP/OBS E/M EA 15 MIN: CPT | Performed by: STUDENT IN AN ORGANIZED HEALTH CARE EDUCATION/TRAINING PROGRAM

## 2023-09-13 PROCEDURE — 86920 COMPATIBILITY TEST SPIN: CPT | Performed by: PHYSICIAN ASSISTANT

## 2023-09-13 PROCEDURE — 99233 SBSQ HOSP IP/OBS HIGH 50: CPT | Mod: FS | Performed by: STUDENT IN AN ORGANIZED HEALTH CARE EDUCATION/TRAINING PROGRAM

## 2023-09-13 PROCEDURE — 83735 ASSAY OF MAGNESIUM: CPT | Performed by: STUDENT IN AN ORGANIZED HEALTH CARE EDUCATION/TRAINING PROGRAM

## 2023-09-13 PROCEDURE — 86850 RBC ANTIBODY SCREEN: CPT | Performed by: PHYSICIAN ASSISTANT

## 2023-09-13 PROCEDURE — 80053 COMPREHEN METABOLIC PANEL: CPT | Performed by: PHYSICIAN ASSISTANT

## 2023-09-13 PROCEDURE — 250N000011 HC RX IP 250 OP 636: Mod: JZ

## 2023-09-13 PROCEDURE — 250N000013 HC RX MED GY IP 250 OP 250 PS 637: Performed by: PHYSICIAN ASSISTANT

## 2023-09-13 PROCEDURE — 85027 COMPLETE CBC AUTOMATED: CPT | Performed by: PHYSICIAN ASSISTANT

## 2023-09-13 PROCEDURE — P9037 PLATE PHERES LEUKOREDU IRRAD: HCPCS | Performed by: PHYSICIAN ASSISTANT

## 2023-09-13 RX ADMIN — VORICONAZOLE 200 MG: 200 TABLET ORAL at 19:36

## 2023-09-13 RX ADMIN — ACYCLOVIR 400 MG: 400 TABLET ORAL at 19:36

## 2023-09-13 RX ADMIN — HYDROXYZINE HYDROCHLORIDE 50 MG: 25 TABLET, FILM COATED ORAL at 19:36

## 2023-09-13 RX ADMIN — SODIUM CHLORIDE, PRESERVATIVE FREE 5 ML: 5 INJECTION INTRAVENOUS at 10:32

## 2023-09-13 RX ADMIN — SODIUM CHLORIDE, PRESERVATIVE FREE 5 ML: 5 INJECTION INTRAVENOUS at 08:09

## 2023-09-13 RX ADMIN — LEVOFLOXACIN 250 MG: 250 TABLET, FILM COATED ORAL at 08:10

## 2023-09-13 RX ADMIN — VORICONAZOLE 200 MG: 200 TABLET ORAL at 08:10

## 2023-09-13 RX ADMIN — SODIUM CHLORIDE, PRESERVATIVE FREE 5 ML: 5 INJECTION INTRAVENOUS at 04:17

## 2023-09-13 RX ADMIN — ACYCLOVIR 400 MG: 400 TABLET ORAL at 08:10

## 2023-09-13 ASSESSMENT — ACTIVITIES OF DAILY LIVING (ADL)
ADLS_ACUITY_SCORE: 22

## 2023-09-13 NOTE — PLAN OF CARE
Goal Outcome Evaluation:      Plan of Care Reviewed With: patient    Overall Patient Progress: no changeOverall Patient Progress: no change         Time: 9708-4290     Reason for admission: AML  Activity: UAL  Pain: denies  Neuro: WDL  Cardiac: WDL  Respiratory: WDL  GI/: WDL, LBM 9/12/2023  Diet: regular diet  Lines: right PICC- HL  Labs/imaging: Platelets replaced this shift per protocol.   Vitals: WDL      New changes this shift: No acute events this shift. No PRNs requested/given.       Continue to follow POC..

## 2023-09-13 NOTE — PLAN OF CARE
"[id number]    /74 (BP Location: Left arm)   Pulse 107   Temp 98.2  F (36.8  C) (Oral)   Resp 18   Ht 1.74 m (5' 8.5\")   Wt 83.7 kg (184 lb 8 oz)   SpO2 96%   BMI 27.65 kg/m      Hypotensive within parameters. Afebrile, OVSS on RA. Denied pain, N/V, and SOB. BMBX scheduled for 9/15 @ 11am. UAL, walked hallways this shift. Good PO intake. No difficulties urinating, not saving. LBM 9/12. Continue with POC.     "

## 2023-09-13 NOTE — PROGRESS NOTES
[facility]    Hematology / Oncology Progress Note    Patient: Artie Fine  MRN: [id number]  Admission Date: 9/1/2023  Date of Service (when I saw the patient): 09/13/2023  Hospital Day # 12     Assessment & Plan   Artie Fine is a 68 year old male with a history of brain abscess (2021), HTN, and BPH. He was transferred from [facility] in [place], [place] to [facility] for work up of pancytopenia; he was noted to have circulating blasts concerning for acute leukemia, and was subsequently transferred to [facility]. He underwent a diagnostic bone marrow biopsy on 9/1/23 which confirmed a new diagnosis of AML. He was started on induction with 7+3 (C1D1=9/2/23) which has been well-tolerated so far.      Today:  - Day 12 of 7+3; tolerating well  - 1 unit pRBCs for Hgb 6.5 and 1 unit plt for plt count 6k  - Scheduled day 14 BMBx for Friday 9/15 at 1100    HEME/ONC  # AML, adverse risk  Negative FLT3 ITD. Normal karyotype. FISH shows no evidence of MLLT10, NUP98, or KMT2A rearrangement. Patient is likely in intermediate risk given the information available at this time, however can reassess when NGS results.  Patient initially presented to [facility] in [place], [place] with 4-6 weeks of progressive bruising, weakness, fatigue, loss of appetite, and night sweats. He also noted a headache similar to his previous brain abscess. CBC notable for pancytopenia; WBC 2.7 (), Hgb 8.1, and plt 1k. He was transferred to [facility] and was found on peripheral flow w/ 11% circulating myeloid blasts. He was then transferred to [facility] for further work up and treatment of AML. Diagnostic BMBx performed 9/1 which showed AML with 30% blasts by morph and 47% by flow. P53 negative. FISH negative for MLLT10, NUP98, and KMT2A. Normal karyotype. NGS showed ASXL1, CEBPA, SRSF2, and STAG2 mutations. Started 7+3 (C1D1=9/2/23).   - HLA typing ordered. Plan for BMT NT ~9/20-9/22, requested 9/22  afternoon so wife can be present in person.   - Baseline TTE showing normal LVEF of 55-60%. Baseline EKG showed NSR and QTc of 434.   - Viral serologies: HepB/C-, HIV-. HSV1/2+, EBV IgG+, CMV IgG+  - PICC placed 9/1  - S/p diagnostic LP 9/8/23 for CNS leukemia work up as patient endorses headache upon admission w/ new AML diagnosis. CSF flow negative. MRI brain 9/11 negative, only showing chronic changes from h/o brain abscess.   - Scheduled for D14 BMBx on Friday 9/15 at 1100. Morph, flow, and process&hold ordered.                  Treatment Plan: 7+3 (C1D1=9/2/23)                - Cytarabine 100 mg/m2 D1-D7               - Daunorubicin 60 mg/m2 D1-D3               - Supportive meds: Decadron 12 mg tab D1-D3, Zofran     # Low risk for TLS/DIC  - Discontinued allopurinol  - Montior TLS/DIC labs twice weekly     ID  # Immunosuppressed 2/2 malignancy and chemotherapy  # ID PPX  -  mg BID  - Levofloxacin 250 mg daily  - Voriconazole 200 mg BID x9/10     # H/o brain abscess (2021)  # Headache, resolved  # Dizziness, resolved  Treated in [place]. S/p craniotomy for drainage of abscess July 2021. It is unclear what culture speciated to, but he completed a course of antibiotics with ceftriaxone then meropenem with resolution on imaging. Head CT 8/31 at OSH revealed no intracranial hemorrhage midline shift or mass effect. Postsurgical changes noted. Previous area of infection demonstrates trace amount of encephalomalacia but no mass effect or inflammatory changes to suggest new or acute infection. Otherwise unremarkable. Patient presented initially with headache and dizziness, which has now resolved.   - See work up above for CNS leukemia work-up, which was negative     CHRONIC  # HTN  Noted during prior hospitalization for brain abscess in 2021. Previously on lisinopril, which he is no longer taking.  - Patient BP's have fluctuated this admission, w/ occasionally elevated BPs. Continue to monitor.     # BPH  Notes  "difficulty with complete emptying of bladder. Previously on Flomax but states it did nothing, so he stopped it. He does note frequent urination at baseline, unchanged from baseline.  - Monitor clinically     MISC  # Weakness, improved  # Deconditioning, improved  Pt reports issues w/ balance after his brain abscess in 2021, exacerbated when ambulating. Deconditioning acute-on-chronic upon admission since 2021.   - PT consulted    # Weight loss  # Loss of appetite  # Moderate malnutrition in the context of acute illness   Patient presented with loss of appetite 2/2 new malignancy. He has been gradually losing weight throughout admission, although PO intake is overall sufficient.   - RD consulted  - Supplements ordered: Ensure BID between meals and PRN    Clinically Significant Risk Factors                # Thrombocytopenia: Lowest platelets = 6 in last 2 days, will monitor for bleeding            # Overweight: Estimated body mass index is 28.24 kg/m  as calculated from the following:    Height as of this encounter: 1.74 m (5' 8.5\").    Weight as of this encounter: 85.5 kg (188 lb 8 oz).       # Moderate Malnutrition: based on nutrition assessment         FEN  Diet: Regular Diet Adult   IVF: Bolus PRN   Lytes: Replete per protocol    PPX  VTE: None given thrombocytopenia  Bowel: Senna/MiraLax PRN  GI/PUD: None currently indicated    MISC  Code Status: Full Code   Lines/Drains: PICC  Dispo: Anticipate 4-6 week admission for induction chemotherapy and count recovery  Follow Up: Pt will follow with Dr. Garcia, appointment scheduled for end of September    Patient was seen and plan of care was discussed with attending physician Dr. Velázquez.    I spent 60 minutes in the care of this patient today, which included time necessary for review of interval events, obtaining history and physical exam, ordering medications/tests/procedures as medically indicated, review of pertinent medical literature, counseling of the patient, " coordination of care, and documentation time. Over 50% of time was spent counseling the patient and/or coordinating care.    Yolis Sherman PA-C   Hematology/Oncology   Pager: [phone]  Desk phone: [phone]    Interval History   No acute events overnight. Patient is feeling well today. He is moving a little slower. He is still going on several walks per day, but these walks are shorter. He noted mild post-nasal drip overnight with mild sore throat, that is feeling better after breakfast and coffee. He has been using moisturizing nasal spray to combat dry hospital air. Slept ok last night. Ate well yesterday.     We further discussed the future of his leukemia care. He seems to have wrapped his mind around the idea of BMT more. We briefly discussed that his AML is adverse risk per NGS that resulted yesterday. We specifically discussed that his leukemia will not be able to be cured with chemo alone.     Vital Signs with Ranges  Temp:  [97.5  F (36.4  C)-98.2  F (36.8  C)] 97.9  F (36.6  C)  Pulse:  [] 88  Resp:  [16-18] 18  BP: ()/(55-75) 115/55  SpO2:  [96 %-100 %] 99 %  I/O last 3 completed shifts:  In: 352 [P.O.:352]  Out: -     Physical Exam   General: Sitting up in bed, alert, NAD. Pleasant and conversational.  Skin: No concerning lesions, rash, jaundice, cyanosis, erythema, or ecchymoses on exposed surfaces.   HEENT: NCAT. Anicteric sclera. Moist mucous membranes.  Respiratory: Non-labored breathing on room air, good air exchange, lungs clear to auscultation bilaterally.  Cardiovascular: RRR. No murmur or rub.   Gastrointestinal: Normoactive BS. Abdomen soft, ND, NT. No palpable masses.  Extremities: No LE edema.   Neurologic: A&O x 3, speech normal, no deficits grossly.    Medications    - MEDICATION INSTRUCTIONS -        acyclovir  400 mg Oral BID    hydrOXYzine  50 mg Oral At Bedtime    levofloxacin  250 mg Oral Daily    voriconazole  200 mg Oral Q12H [facility] (08/20)     Data   Results for orders placed  or performed during the hospital encounter of 09/01/23 (from the past 24 hour(s))   ABO/Rh type and screen    Narrative    The following orders were created for panel order ABO/Rh type and screen.  Procedure                               Abnormality         Status                     ---------                               -----------         ------                     Adult Type and Screen[[id number]]                            Final result                 Please view results for these tests on the individual orders.   CBC with platelets differential    Narrative    The following orders were created for panel order CBC with platelets differential.  Procedure                               Abnormality         Status                     ---------                               -----------         ------                     CBC with platelets and d...[[id number]]  Abnormal            Final result               RBC and Platelet Morphology[[id number]]                      Final result                 Please view results for these tests on the individual orders.   Comprehensive metabolic panel   Result Value Ref Range    Sodium 141 136 - 145 mmol/L    Potassium 4.0 3.4 - 5.3 mmol/L    Chloride 106 98 - 107 mmol/L    Carbon Dioxide (CO2) 24 22 - 29 mmol/L    Anion Gap 11 7 - 15 mmol/L    Urea Nitrogen 28.4 (H) 8.0 - 23.0 mg/dL    Creatinine 0.88 0.67 - 1.17 mg/dL    Calcium 9.2 8.8 - 10.2 mg/dL    Glucose 84 70 - 99 mg/dL    Alkaline Phosphatase 57 40 - 129 U/L    AST 12 0 - 45 U/L    ALT 9 0 - 70 U/L    Protein Total 6.4 6.4 - 8.3 g/dL    Albumin 3.9 3.5 - 5.2 g/dL    Bilirubin Total 0.4 <=1.2 mg/dL    GFR Estimate >90 >60 mL/min/1.73m2   Adult Type and Screen   Result Value Ref Range    Antibody Screen Negative Negative    SPECIMEN EXPIRATION DATE [id number]    CBC with platelets and differential   Result Value Ref Range    WBC Count 0.3 (LL) 4.0 - 11.0 10e3/uL    RBC Count 2.31 (L) 4.40 - 5.90 10e6/uL    Hemoglobin 6.5  (LL) 13.3 - 17.7 g/dL    Hematocrit 20.2 (L) 40.0 - 53.0 %    MCV 87 78 - 100 fL    MCH 28.1 26.5 - 33.0 pg    MCHC 32.2 31.5 - 36.5 g/dL    RDW 13.2 10.0 - 15.0 %    Platelet Count 6 (LL) 150 - 450 10e3/uL   Magnesium   Result Value Ref Range    Magnesium 2.1 1.7 - 2.3 mg/dL   Phosphorus   Result Value Ref Range    Phosphorus 4.1 2.5 - 4.5 mg/dL   RBC and Platelet Morphology   Result Value Ref Range    Platelet Assessment  Automated Count Confirmed. Platelet morphology is normal.     Automated Count Confirmed. Platelet morphology is normal.    RBC Morphology Confirmed RBC Indices    ABO/RH Type & Screen   Result Value Ref Range    SPECIMEN EXPIRATION DATE [id number]     ABORH ABO Unknown Rh POS    CONDITIONAL Prepare pheresed platelets (unit)   Result Value Ref Range    Blood Component Type Platelets     Product Code [id number]     Unit Status Transfused     Unit Number [id number]     CODING SYSTEM [id number]     ISSUE DATE AND TIME [id number]     UNIT ABO/RH AB+     UNIT TYPE ISBT 8400    CONDITIONAL Prepare red blood cells (unit)   Result Value Ref Range    ISSUE DATE AND TIME [id number]     Blood Component Type Red Blood Cells     Product Code [id number]     Unit Status Transfused     Unit Number [id number]     UNIT ABO/RH O+     CROSSMATCH COMPATIBLE     CODING SYSTEM [id number]     UNIT TYPE ISBT 5100

## 2023-09-13 NOTE — PLAN OF CARE
[id number]     A&Ox4. VSS on RA. Denies pain, nausea, and SOB. Plts finished infusing this shift. Hgb 6.5, pt received 1 unit RBCs. Good appetite. Voids spontaneously without saving, pt reports good urine output. Up independently. Ambulates in room and hallways. Plan for BMBx Friday. Continue with plan of care.

## 2023-09-14 LAB
ALBUMIN SERPL BCG-MCNC: 3.8 G/DL (ref 3.5–5.2)
ALP SERPL-CCNC: 65 U/L (ref 40–129)
ALT SERPL W P-5'-P-CCNC: 15 U/L (ref 0–70)
ANION GAP SERPL CALCULATED.3IONS-SCNC: 10 MMOL/L (ref 7–15)
APTT PPP: 29 SECONDS (ref 22–38)
AST SERPL W P-5'-P-CCNC: 18 U/L (ref 0–45)
BILIRUB SERPL-MCNC: 0.5 MG/DL
BLD PROD TYP BPU: NORMAL
BLOOD COMPONENT TYPE: NORMAL
BUN SERPL-MCNC: 19.2 MG/DL (ref 8–23)
CALCIUM SERPL-MCNC: 9 MG/DL (ref 8.8–10.2)
CHLORIDE SERPL-SCNC: 103 MMOL/L (ref 98–107)
CODING SYSTEM: NORMAL
CREAT SERPL-MCNC: 0.76 MG/DL (ref 0.67–1.17)
CROSSMATCH: NORMAL
DEPRECATED HCO3 PLAS-SCNC: 25 MMOL/L (ref 22–29)
EGFRCR SERPLBLD CKD-EPI 2021: >90 ML/MIN/1.73M2
ERYTHROCYTE [DISTWIDTH] IN BLOOD BY AUTOMATED COUNT: 12.9 % (ref 10–15)
FIBRINOGEN PPP-MCNC: 371 MG/DL (ref 170–490)
GLUCOSE SERPL-MCNC: 85 MG/DL (ref 70–99)
HCT VFR BLD AUTO: 21.3 % (ref 40–53)
HGB BLD-MCNC: 6.8 G/DL (ref 13.3–17.7)
INR PPP: 1.09 (ref 0.85–1.15)
ISSUE DATE AND TIME: NORMAL
LDH SERPL L TO P-CCNC: 151 U/L (ref 0–250)
MAGNESIUM SERPL-MCNC: 2 MG/DL (ref 1.7–2.3)
MCH RBC QN AUTO: 28.2 PG (ref 26.5–33)
MCHC RBC AUTO-ENTMCNC: 31.9 G/DL (ref 31.5–36.5)
MCV RBC AUTO: 88 FL (ref 78–100)
PHOSPHATE SERPL-MCNC: 4.1 MG/DL (ref 2.5–4.5)
PLAT MORPH BLD: NORMAL
PLATELET # BLD AUTO: 15 10E3/UL (ref 150–450)
POTASSIUM SERPL-SCNC: 3.9 MMOL/L (ref 3.4–5.3)
PROT SERPL-MCNC: 6.5 G/DL (ref 6.4–8.3)
RBC # BLD AUTO: 2.41 10E6/UL (ref 4.4–5.9)
RBC MORPH BLD: NORMAL
SODIUM SERPL-SCNC: 138 MMOL/L (ref 136–145)
UNIT ABO/RH: NORMAL
UNIT NUMBER: NORMAL
UNIT STATUS: NORMAL
UNIT TYPE ISBT: 5100
URATE SERPL-MCNC: 4.2 MG/DL (ref 3.4–7)
WBC # BLD AUTO: 0.3 10E3/UL (ref 4–11)

## 2023-09-14 PROCEDURE — 250N000013 HC RX MED GY IP 250 OP 250 PS 637

## 2023-09-14 PROCEDURE — 250N000013 HC RX MED GY IP 250 OP 250 PS 637: Performed by: PHYSICIAN ASSISTANT

## 2023-09-14 PROCEDURE — 99233 SBSQ HOSP IP/OBS HIGH 50: CPT | Mod: FS | Performed by: INTERNAL MEDICINE

## 2023-09-14 PROCEDURE — 250N000011 HC RX IP 250 OP 636: Mod: JZ

## 2023-09-14 PROCEDURE — 84550 ASSAY OF BLOOD/URIC ACID: CPT

## 2023-09-14 PROCEDURE — 83615 LACTATE (LD) (LDH) ENZYME: CPT

## 2023-09-14 PROCEDURE — 85384 FIBRINOGEN ACTIVITY: CPT

## 2023-09-14 PROCEDURE — 85730 THROMBOPLASTIN TIME PARTIAL: CPT

## 2023-09-14 PROCEDURE — P9016 RBC LEUKOCYTES REDUCED: HCPCS | Performed by: PHYSICIAN ASSISTANT

## 2023-09-14 PROCEDURE — 83735 ASSAY OF MAGNESIUM: CPT

## 2023-09-14 PROCEDURE — 85610 PROTHROMBIN TIME: CPT

## 2023-09-14 PROCEDURE — 80053 COMPREHEN METABOLIC PANEL: CPT | Performed by: PHYSICIAN ASSISTANT

## 2023-09-14 PROCEDURE — 250N000013 HC RX MED GY IP 250 OP 250 PS 637: Performed by: HOSPITALIST

## 2023-09-14 PROCEDURE — 84100 ASSAY OF PHOSPHORUS: CPT

## 2023-09-14 PROCEDURE — 85027 COMPLETE CBC AUTOMATED: CPT | Performed by: PHYSICIAN ASSISTANT

## 2023-09-14 PROCEDURE — 120N000002 HC R&B MED SURG/OB UMMC

## 2023-09-14 RX ADMIN — ACYCLOVIR 400 MG: 400 TABLET ORAL at 20:07

## 2023-09-14 RX ADMIN — SODIUM CHLORIDE, PRESERVATIVE FREE 5 ML: 5 INJECTION INTRAVENOUS at 04:20

## 2023-09-14 RX ADMIN — VORICONAZOLE 200 MG: 200 TABLET ORAL at 08:32

## 2023-09-14 RX ADMIN — ACYCLOVIR 400 MG: 400 TABLET ORAL at 08:32

## 2023-09-14 RX ADMIN — VORICONAZOLE 200 MG: 200 TABLET ORAL at 20:07

## 2023-09-14 RX ADMIN — SODIUM CHLORIDE, PRESERVATIVE FREE 5 ML: 5 INJECTION INTRAVENOUS at 10:03

## 2023-09-14 RX ADMIN — LEVOFLOXACIN 250 MG: 250 TABLET, FILM COATED ORAL at 08:32

## 2023-09-14 RX ADMIN — HYDROXYZINE HYDROCHLORIDE 50 MG: 25 TABLET, FILM COATED ORAL at 20:07

## 2023-09-14 ASSESSMENT — ACTIVITIES OF DAILY LIVING (ADL)
ADLS_ACUITY_SCORE: 22
ADLS_ACUITY_SCORE: 24
ADLS_ACUITY_SCORE: 24
ADLS_ACUITY_SCORE: 22
ADLS_ACUITY_SCORE: 24
ADLS_ACUITY_SCORE: 22
ADLS_ACUITY_SCORE: 24

## 2023-09-14 NOTE — CONSULTS
Care Management Follow Up    Length of Stay (days): 13    Expected Discharge Date: 09/29/2023     Concerns to be Addressed: Community resources        Patient plan of care discussed at interdisciplinary rounds: Yes    Anticipated Discharge Disposition: n/a     Anticipated Discharge Services:  (TBD)    Anticipated Discharge DME:  (TBD)    Patient/family educated on Medicare website which has current facility and service quality ratings: n/a    Education Provided on the Discharge Plan: Yes    Patient/Family in Agreement with the Plan:  (n/a)    Referrals Placed by CM/SW:  (N/A)    Private pay costs discussed: Not applicable    Additional Information: JAVID spoke with Cleo at [facility] (P: [phone]) who confirmed that they have a room for Artie' spouse all lined up for arrival day 9/15/23 and departure day 9/18/23. Cleo shared that she already spoke with Aundrea to inform her of the reservation.    Elisa Ortega [facility]   Pager: [phone]

## 2023-09-14 NOTE — PLAN OF CARE
"Goal Outcome Evaluation:    Time    /62 (BP Location: Left arm)   Pulse 90   Temp 98.5  F (36.9  C) (Oral)   Resp 18   Ht 1.74 m (5' 8.5\")   Wt 85.5 kg (188 lb 8 oz)   SpO2 96%   BMI 28.24 kg/m      Reason for admission: induction with 7+3   Activity: UAL, calls appropriately. Requested cares to be clustered.   Pain: denies pain  Neuro: Alert and oriented, denies dizziness.  Cardiac: WNL, denies chest pain  Respiratory: denies SOB, no distress observed, on RA  GI/:LBM 9/13, voids sp  Diet: regular   Lines: R PICC HL cdi  Wounds:   Labs/imaging: please review lab in the chart      New changes this shift:     Plan: possible RBC replacement for 6.8      Continue to monitor and follow POC    "

## 2023-09-14 NOTE — PROVIDER NOTIFICATION
"Provider Notification     Sheree Chowdhury paged via [employer] at [phone]:   \"[id number] [address]UM[address].ANNA GARRIDO pt just reported some new blurry vision from this morning. States it improves when he is not near bright lights. Thanks!\"         Response: Continue to monitor patient and notify if vision changes worsen.   "

## 2023-09-14 NOTE — PROGRESS NOTES
"[facility]    Hematology / Oncology Progress Note    Patient: Artie Fine  MRN: [id number]  Admission Date: 9/1/2023  Date of Service (when I saw the patient): 09/14/2023  Hospital Day # 13     Assessment & Plan   Arite Fine is a 68 year old male with a history of brain abscess (2021), HTN, and BPH. He was transferred from [facility] in [place], [place] to [facility] for work up of pancytopenia; he was noted to have circulating blasts concerning for acute leukemia, and was subsequently transferred to [facility]. He underwent a diagnostic bone marrow biopsy on 9/1/23 which confirmed a new diagnosis of AML. He was started on induction with 7+3 (C1D1=9/2/23) which has been well-tolerated so far.      Today:  - Day 13 of 7+3; tolerating well  - Today notes \"lights seem brighter than usual outside\" and \"maybe a little blurry when looking at brightness\" but otherwise denies vision changes including persisting blurry vision, double vision, or floaters in vision, headache, dizziness, photophobia, nausea, vomiting. No eye pain, itching, or irritation. Recent MRI and LP done (9/11, 9/8 respectively), unremarkable. Will continue to monitor.   - 1 unit pRBCs for Hgb 6.8  - Scheduled for day 14 BMBx for Friday 9/15 at 1100    HEME/ONC  # AML, adverse risk  Negative FLT3 ITD. Normal karyotype. FISH shows no evidence of MLLT10, NUP98, or KMT2A rearrangement. Patient is likely in intermediate risk given the information available at this time, however can reassess when NGS results.  Patient initially presented to [facility] in [place], [place] with 4-6 weeks of progressive bruising, weakness, fatigue, loss of appetite, and night sweats. He also noted a headache similar to his previous brain abscess. CBC notable for pancytopenia; WBC 2.7 (), Hgb 8.1, and plt 1k. He was transferred to [facility] and was found on peripheral flow w/ 11% circulating myeloid blasts. He was then " transferred to [facility] for further work up and treatment of AML. Diagnostic BMBx performed 9/1 which showed AML with 30% blasts by morph and 47% by flow. P53 negative. FISH negative for MLLT10, NUP98, and KMT2A. Normal karyotype. NGS showed ASXL1, CEBPA, SRSF2, and STAG2 mutations. Started 7+3 (C1D1=9/2/23).   - HLA typing ordered. Plan for BMT NT ~9/20-9/22, requested 9/22 afternoon so wife can be present in person.   - Baseline TTE showing normal LVEF of 55-60%. Baseline EKG showed NSR and QTc of 434.   - Viral serologies: HepB/C-, HIV-. HSV1/2+, EBV IgG+, CMV IgG+  - PICC placed 9/1  - S/p diagnostic LP 9/8/23 for CNS leukemia work up as patient endorses headache upon admission w/ new AML diagnosis. CSF flow negative. MRI brain 9/11 negative, only showing chronic changes from h/o brain abscess.   - Scheduled for D14 BMBx on Friday 9/15 at 1100. Morph, flow, and process&hold ordered.                  Treatment Plan: 7+3 (C1D1=9/2/23)                - Cytarabine 100 mg/m2 D1-D7               - Daunorubicin 60 mg/m2 D1-D3               - Supportive meds: Decadron 12 mg tab D1-D3, Zofran     # Low risk for TLS/DIC  - Discontinued allopurinol  - Montior TLS/DIC labs twice weekly     ID  # Immunosuppressed 2/2 malignancy and chemotherapy  # ID PPX  -  mg BID  - Levofloxacin 250 mg daily  - Voriconazole 200 mg BID x9/10     # H/o brain abscess (2021)  # Headache, resolved  # Dizziness, resolved  Treated in [place]. S/p craniotomy for drainage of abscess July 2021. It is unclear what culture speciated to, but he completed a course of antibiotics with ceftriaxone then meropenem with resolution on imaging. Head CT 8/31 at OSH revealed no intracranial hemorrhage midline shift or mass effect. Postsurgical changes noted. Previous area of infection demonstrates trace amount of encephalomalacia but no mass effect or inflammatory changes to suggest new or acute infection. Otherwise unremarkable. Patient presented initially  "with headache and dizziness, which has now resolved.   - See work up above for CNS leukemia work-up, which was negative     CHRONIC  # HTN  Noted during prior hospitalization for brain abscess in 2021. Previously on lisinopril, which he is no longer taking.  - Patient BP's have fluctuated this admission, w/ occasionally elevated BPs. Continue to monitor.     # BPH  Notes difficulty with complete emptying of bladder. Previously on Flomax but states it did nothing, so he stopped it. He does note frequent urination at baseline, unchanged from baseline.  - Monitor clinically     MISC  # Weakness, improved  # Deconditioning, improved  Pt reports issues w/ balance after his brain abscess in 2021, exacerbated when ambulating. Deconditioning acute-on-chronic upon admission since 2021.   - PT consulted    # Weight loss  # Loss of appetite  # Moderate malnutrition in the context of acute illness   Patient presented with loss of appetite 2/2 new malignancy. He has been gradually losing weight throughout admission, although PO intake is overall sufficient.   - RD consulted  - Supplements ordered: Ensure BID between meals and PRN    Clinically Significant Risk Factors                # Thrombocytopenia: Lowest platelets = 6 in last 2 days, will monitor for bleeding            # Overweight: Estimated body mass index is 28.69 kg/m  as calculated from the following:    Height as of this encounter: 1.74 m (5' 8.5\").    Weight as of this encounter: 86.9 kg (191 lb 8 oz).       # Moderate Malnutrition: based on nutrition assessment         FEN  Diet: Regular Diet Adult   IVF: Bolus PRN   Lytes: Replete per protocol    PPX  VTE: None given thrombocytopenia  Bowel: Senna/MiraLax PRN  GI/PUD: None currently indicated    MISC  Code Status: Full Code   Lines/Drains: PICC  Dispo: Anticipate 4-6 week admission for induction chemotherapy and count recovery  Follow Up: Pt will follow with Dr. Garcia, appointment scheduled for end of " "September    Patient was seen and plan of care was discussed with attending physician Dr. Galicia.    I spent 55 minutes in the care of this patient today, which included time necessary for review of interval events, obtaining history and physical exam, ordering medications/tests/procedures as medically indicated, review of pertinent medical literature, counseling of the patient, coordination of care, and documentation time. Over 50% of time was spent counseling the patient and/or coordinating care.    Sheree Chowdhury PA-C  Hematology/Oncology  Pager #[phone]      Interval History   Nursing notes reviewed, no acute events overnight.    Patient is feeling well today.  He notes he is more tired today but feels he slept okay last night.  He also notes the \"lights are brighter than usual outside\" but denies vision changes including blurry, double, or floater in vision, headache, dizziness, photophobia, nausea, vomiting.  He wears glasses and no recent changes.  He denies itching, swelling of the eyes. We will continue to monitor.    He notes his nose has been dry due to change in season, and using nasal spray with improvement.  No nosebleeds.  He otherwise is feeling well denies cough, abdominal pain, nausea, vomiting, diarrhea.  Bowel movements have been normal without diarrhea or constipation.  He has been eating and drinking fine.  He notes his wife is coming later in the afternoon.  We discussed ongoing plan include reviewing morning labs, plan for bone marrow biopsy tomorrow for further information and care and management of leukemia.  Plan for bone marrow transplant consult next week.  He is agreeable.  Questions addressed at bedside.      Vital Signs with Ranges  Temp:  [97.6  F (36.4  C)-98.5  F (36.9  C)] 98.1  F (36.7  C)  Pulse:  [] 84  Resp:  [16-20] 16  BP: ()/(58-66) 124/65  SpO2:  [96 %-99 %] 99 %  I/O last 3 completed shifts:  In: 1486 [P.O.:960]  Out: -     Physical Exam   Constitutional: Awake and " conversational. Non- toxic appearing. No acute distress.   HEENT: Normocephalic. Moist mucus membranes without lesions, thrush, or exudates appreciated  Lymph: Neck supple, no ridigity. No significant adenopathy noted.   Respiratory: Breathing comfortably on room air with no accessory muscle use. Speaking in full sentences, no evidence of respiratory distress. Lungs CTAB without stridor, wheeze, rhonchi, or rales.   Cardiovascular: Regular rate and rhythm. 2+ radial pulses bilaterally. No peripheral edema.    GI: Abdomen with normoactive bowel sounds, soft and non-tender throughout.   Skin: Skin is clean, dry, intact. No jaundice or significant rashes appreciated on exposed areas.   Neurologic: alert with normal speech. Grossly nonfocal.  Moves extremities spontaneously.  Memory and thought process preserved.   Neuropsychiatric: Calm, affect congruent to situation. Appropriate mood and affect. Good judgment and insight.  Vascular access: PICC on RUE CDI        Medications    - MEDICATION INSTRUCTIONS -        acyclovir  400 mg Oral BID    hydrOXYzine  50 mg Oral At Bedtime    levofloxacin  250 mg Oral Daily    voriconazole  200 mg Oral Q12H [facility] (08/20)     Data   Results for orders placed or performed during the hospital encounter of 09/01/23 (from the past 24 hour(s))   CBC with platelets differential    Narrative    The following orders were created for panel order CBC with platelets differential.  Procedure                               Abnormality         Status                     ---------                               -----------         ------                     CBC with platelets and d...[[id number]]  Abnormal            Final result               RBC and Platelet Morphology[[id number]]                      Final result                 Please view results for these tests on the individual orders.   Fibrinogen activity   Result Value Ref Range    Fibrinogen Activity 371 170 - 490 mg/dL   INR   Result Value  Ref Range    INR 1.09 0.85 - 1.15   Lactate Dehydrogenase   Result Value Ref Range    Lactate Dehydrogenase 151 0 - 250 U/L   Magnesium   Result Value Ref Range    Magnesium 2.0 1.7 - 2.3 mg/dL   Partial thromboplastin time   Result Value Ref Range    aPTT 29 22 - 38 Seconds   Phosphorus   Result Value Ref Range    Phosphorus 4.1 2.5 - 4.5 mg/dL   Uric acid   Result Value Ref Range    Uric Acid 4.2 3.4 - 7.0 mg/dL   Comprehensive metabolic panel   Result Value Ref Range    Sodium 138 136 - 145 mmol/L    Potassium 3.9 3.4 - 5.3 mmol/L    Chloride 103 98 - 107 mmol/L    Carbon Dioxide (CO2) 25 22 - 29 mmol/L    Anion Gap 10 7 - 15 mmol/L    Urea Nitrogen 19.2 8.0 - 23.0 mg/dL    Creatinine 0.76 0.67 - 1.17 mg/dL    Calcium 9.0 8.8 - 10.2 mg/dL    Glucose 85 70 - 99 mg/dL    Alkaline Phosphatase 65 40 - 129 U/L    AST 18 0 - 45 U/L    ALT 15 0 - 70 U/L    Protein Total 6.5 6.4 - 8.3 g/dL    Albumin 3.8 3.5 - 5.2 g/dL    Bilirubin Total 0.5 <=1.2 mg/dL    GFR Estimate >90 >60 mL/min/1.73m2   CBC with platelets and differential   Result Value Ref Range    WBC Count 0.3 (LL) 4.0 - 11.0 10e3/uL    RBC Count 2.41 (L) 4.40 - 5.90 10e6/uL    Hemoglobin 6.8 (LL) 13.3 - 17.7 g/dL    Hematocrit 21.3 (L) 40.0 - 53.0 %    MCV 88 78 - 100 fL    MCH 28.2 26.5 - 33.0 pg    MCHC 31.9 31.5 - 36.5 g/dL    RDW 12.9 10.0 - 15.0 %    Platelet Count 15 (LL) 150 - 450 10e3/uL   RBC and Platelet Morphology   Result Value Ref Range    Platelet Assessment  Automated Count Confirmed. Platelet morphology is normal.     Automated Count Confirmed. Platelet morphology is normal.    RBC Morphology Confirmed RBC Indices    CONDITIONAL Prepare red blood cells (unit)   Result Value Ref Range    Blood Component Type Red Blood Cells     Product Code [id number]     Unit Status Transfused     Unit Number [id number]     CROSSMATCH COMPATIBLE     CODING SYSTEM [id number]     ISSUE DATE AND TIME [id number]     UNIT ABO/RH O+     UNIT TYPE ISBT 5100

## 2023-09-14 NOTE — PLAN OF CARE
"[id number]    A&Ox4. VSS on RA. Denies pain, nausea, and SOB. Pt endorses feeling more weak and tired today. Pt reports \"feeling woozy\" when walking for too long. Fall education reinforced, pt agreeable and calls appropriately. Pt reports new blurred vision this morning that is worse with bright lights, team aware and continuing to monitor. No interventions at this time. Hgb 6.8, received 1 unit RBCs. Good appetite. Voids spontaneously without saving, reports good urine output. Up independently. Ambulating in room. Continue with plan of care.     "

## 2023-09-14 NOTE — PROGRESS NOTES
CLINICAL NUTRITION SERVICES - REASSESSMENT NOTE     Nutrition Prescription    RECOMMENDATIONS FOR MDs/PROVIDERS TO ORDER:   None at this time.     Malnutrition Status:   Patient does not meet two of the established criteria necessary for diagnosing malnutrition but is at risk for malnutrition    Recommendations already ordered by Registered Dietitian (RD):   No changes at this time.     Future/Additional Recommendations:   Monitor weight/intake trends.      EVALUATION OF THE PROGRESS TOWARD GOALS   Diet: Regular  Nutrition Support:   2 pm snack of either 1% milk + 2 Chocolate Chips OR 1% milk + chocolate ice cream.    Ensure Enlive between meals - provider ordered.     Intake: Per flowsheets, intake documentation likely missing some meals/snacks, but on average 100% of meals consumed.      NEW FINDINGS   Visited with Artie in room. Pt states that his appetite is decreasing, but he is still forcing himself to eat as he does not want to risk losing weight. Pt states that although he does not like the Ensure Enlive, he does tolerate them and would like them to continue. He stated that his weight has been stable/up since starting the Ensure Enlive. Pt also reported that he is receiving his other scheduled snacks and would like that to continue as well. Artie reports that his wife is planning to bring a casserole with fresh home grown veggies this weekend and that he is excited for it.     Skin: Crow score 21 with nutrition marked as adequate per flowsheets. Trace edema noted in flowsheets.     LABS   Labs Reviewed   09/14/23 04:20   Sodium 138   Potassium 3.9   Creatinine 0.76   GFR Estimate >90   Magnesium 2.0   Phosphorus 4.1   Glucose 85   WBC 0.3 (LL)   Hemoglobin 6.8 (LL)     MEDICATIONS   Medications Reviewed  - Vfend  - Miralax PRN    ANTHROPOMETRICS   Weight Trends:   Weight back up to admission weight.     Weight trends this admission:   09/14/23 86.9 kg (191 lb 8 oz) Standing scale   09/13/23 85.5 kg (188 lb  8 oz) Standing scale   09/12/23 83.7 kg (184 lb 8 oz) Standing scale   09/11/23 84.3 kg (185 lb 12.8 oz) Standing scale   09/10/23 84.2 kg (185 lb 9.6 oz) Standing scale   09/09/23 84.7 kg (186 lb 12.8 oz) Standing scale   09/08/23 85.3 kg (188 lb) Standing scale   09/07/23 87 kg (191 lb 14.4 oz) Standing scale   09/06/23 87 kg (191 lb 12.8 oz) --   09/05/23 88 kg (194 lb 1.6 oz) Standing scale   09/04/23 88.8 kg (195 lb 11.2 oz) Standing scale   09/03/23 87.5 kg (193 lb) Standing scale   09/02/23 86.8 kg (191 lb 4.8 oz) Standing scale   09/01/23 86.5 kg (190 lb 9.6 oz) Standing scale     Wt Readings from Last Encounters:   09/14/23 86.9 kg (191 lb 8 oz)   09/01/23 86.2 kg (190 lb 1.6 oz)     MALNUTRITION   % Intake: No decreased intake noted  % Weight Loss: Weight loss does not meet criteria  Subcutaneous Fat Loss: None observed  Muscle Loss: Thoracic region (clavicle, acromium bone, deltoid, trapezius, pectoral): Mild  Fluid Accumulation/Edema: Does not meet criteria - Trace  Malnutrition Diagnosis: Patient does not meet two of the established criteria necessary for diagnosing malnutrition but is at risk for malnutrition    Previous Goals   Patient to consume % of nutritionally adequate meal trays TID, or the equivalent with supplements/snacks.   Evaluation: Met    Previous Nutrition Diagnosis   Inadequate oral intake related to decreased appetite as evidenced by pt self-report/diet hx    Evaluation: Improving    CURRENT NUTRITION DIAGNOSIS   Inadequate oral intake related to decreased appetite as evidenced by pt self-report    INTERVENTIONS   Implementation   No changes at this time.     Goals   Patient to consume % of nutritionally adequate meal trays TID, or the equivalent with supplements/snacks.    Monitoring/Evaluation   Progress toward goals will be monitored and evaluated per protocol.    Swati Wall RD, LD   Available on Yappn and Pager  5B/6A pager [phone]  Weekend pager [phone]

## 2023-09-15 ENCOUNTER — APPOINTMENT (OUTPATIENT)
Dept: PHYSICAL THERAPY | Facility: CLINIC | Age: 68
DRG: 835 | End: 2023-09-15
Attending: INTERNAL MEDICINE
Payer: COMMERCIAL

## 2023-09-15 LAB
ABO/RH TYPE: NORMAL
ALBUMIN SERPL BCG-MCNC: 3.9 G/DL (ref 3.5–5.2)
ALP SERPL-CCNC: 62 U/L (ref 40–129)
ALT SERPL W P-5'-P-CCNC: 14 U/L (ref 0–70)
ANION GAP SERPL CALCULATED.3IONS-SCNC: 10 MMOL/L (ref 7–15)
ANTIBODY SCREEN: NEGATIVE
AST SERPL W P-5'-P-CCNC: 16 U/L (ref 0–45)
BILIRUB SERPL-MCNC: 0.8 MG/DL
BLD PROD TYP BPU: NORMAL
BLOOD COMPONENT TYPE: NORMAL
BUN SERPL-MCNC: 15.8 MG/DL (ref 8–23)
CALCIUM SERPL-MCNC: 8.8 MG/DL (ref 8.8–10.2)
CHLORIDE SERPL-SCNC: 101 MMOL/L (ref 98–107)
CODING SYSTEM: NORMAL
CREAT SERPL-MCNC: 0.82 MG/DL (ref 0.67–1.17)
DEPRECATED HCO3 PLAS-SCNC: 23 MMOL/L (ref 22–29)
EGFRCR SERPLBLD CKD-EPI 2021: >90 ML/MIN/1.73M2
ERYTHROCYTE [DISTWIDTH] IN BLOOD BY AUTOMATED COUNT: 13.6 % (ref 10–15)
GLUCOSE SERPL-MCNC: 91 MG/DL (ref 70–99)
HCT VFR BLD AUTO: 23.1 % (ref 40–53)
HGB BLD-MCNC: 7.5 G/DL (ref 13.3–17.7)
ISSUE DATE AND TIME: NORMAL
MCH RBC QN AUTO: 28.4 PG (ref 26.5–33)
MCHC RBC AUTO-ENTMCNC: 32.5 G/DL (ref 31.5–36.5)
MCV RBC AUTO: 88 FL (ref 78–100)
PLAT MORPH BLD: NORMAL
PLATELET # BLD AUTO: 8 10E3/UL (ref 150–450)
POTASSIUM SERPL-SCNC: 3.9 MMOL/L (ref 3.4–5.3)
PROT SERPL-MCNC: 6.7 G/DL (ref 6.4–8.3)
RBC # BLD AUTO: 2.64 10E6/UL (ref 4.4–5.9)
RBC MORPH BLD: NORMAL
SODIUM SERPL-SCNC: 134 MMOL/L (ref 136–145)
SPECIMEN EXPIRATION DATE: NORMAL
SPECIMEN EXPIRATION DATE: NORMAL
UNIT ABO/RH: NORMAL
UNIT NUMBER: NORMAL
UNIT STATUS: NORMAL
UNIT TYPE ISBT: 7300
WBC # BLD AUTO: 0.3 10E3/UL (ref 4–11)

## 2023-09-15 PROCEDURE — 88342 IMHCHEM/IMCYTCHM 1ST ANTB: CPT | Mod: TC | Performed by: PHYSICIAN ASSISTANT

## 2023-09-15 PROCEDURE — 88184 FLOWCYTOMETRY/ TC 1 MARKER: CPT | Performed by: STUDENT IN AN ORGANIZED HEALTH CARE EDUCATION/TRAINING PROGRAM

## 2023-09-15 PROCEDURE — 88341 IMHCHEM/IMCYTCHM EA ADD ANTB: CPT | Mod: 26 | Performed by: STUDENT IN AN ORGANIZED HEALTH CARE EDUCATION/TRAINING PROGRAM

## 2023-09-15 PROCEDURE — 88189 FLOWCYTOMETRY/READ 16 & >: CPT | Mod: GC | Performed by: STUDENT IN AN ORGANIZED HEALTH CARE EDUCATION/TRAINING PROGRAM

## 2023-09-15 PROCEDURE — 85027 COMPLETE CBC AUTOMATED: CPT | Performed by: PHYSICIAN ASSISTANT

## 2023-09-15 PROCEDURE — 88305 TISSUE EXAM BY PATHOLOGIST: CPT | Mod: 26 | Performed by: STUDENT IN AN ORGANIZED HEALTH CARE EDUCATION/TRAINING PROGRAM

## 2023-09-15 PROCEDURE — 80053 COMPREHEN METABOLIC PANEL: CPT | Performed by: PHYSICIAN ASSISTANT

## 2023-09-15 PROCEDURE — 250N000013 HC RX MED GY IP 250 OP 250 PS 637: Performed by: PHYSICIAN ASSISTANT

## 2023-09-15 PROCEDURE — 88341 IMHCHEM/IMCYTCHM EA ADD ANTB: CPT | Mod: TC | Performed by: PHYSICIAN ASSISTANT

## 2023-09-15 PROCEDURE — 250N000013 HC RX MED GY IP 250 OP 250 PS 637: Performed by: HOSPITALIST

## 2023-09-15 PROCEDURE — 86850 RBC ANTIBODY SCREEN: CPT | Performed by: PHYSICIAN ASSISTANT

## 2023-09-15 PROCEDURE — 85097 BONE MARROW INTERPRETATION: CPT | Performed by: STUDENT IN AN ORGANIZED HEALTH CARE EDUCATION/TRAINING PROGRAM

## 2023-09-15 PROCEDURE — 88311 DECALCIFY TISSUE: CPT | Mod: 26 | Performed by: STUDENT IN AN ORGANIZED HEALTH CARE EDUCATION/TRAINING PROGRAM

## 2023-09-15 PROCEDURE — 250N000011 HC RX IP 250 OP 636: Mod: JZ

## 2023-09-15 PROCEDURE — 97110 THERAPEUTIC EXERCISES: CPT | Mod: GP

## 2023-09-15 PROCEDURE — 120N000002 HC R&B MED SURG/OB UMMC

## 2023-09-15 PROCEDURE — 88184 FLOWCYTOMETRY/ TC 1 MARKER: CPT | Performed by: PHYSICIAN ASSISTANT

## 2023-09-15 PROCEDURE — 250N000013 HC RX MED GY IP 250 OP 250 PS 637

## 2023-09-15 PROCEDURE — 88185 FLOWCYTOMETRY/TC ADD-ON: CPT | Performed by: PHYSICIAN ASSISTANT

## 2023-09-15 PROCEDURE — 88342 IMHCHEM/IMCYTCHM 1ST ANTB: CPT | Mod: 26 | Performed by: STUDENT IN AN ORGANIZED HEALTH CARE EDUCATION/TRAINING PROGRAM

## 2023-09-15 PROCEDURE — 86901 BLOOD TYPING SEROLOGIC RH(D): CPT | Performed by: PHYSICIAN ASSISTANT

## 2023-09-15 PROCEDURE — 07DR3ZX EXTRACTION OF ILIAC BONE MARROW, PERCUTANEOUS APPROACH, DIAGNOSTIC: ICD-10-PCS | Performed by: PHYSICIAN ASSISTANT

## 2023-09-15 PROCEDURE — 99233 SBSQ HOSP IP/OBS HIGH 50: CPT | Mod: 25 | Performed by: INTERNAL MEDICINE

## 2023-09-15 PROCEDURE — P9037 PLATE PHERES LEUKOREDU IRRAD: HCPCS | Performed by: PHYSICIAN ASSISTANT

## 2023-09-15 PROCEDURE — 85060 BLOOD SMEAR INTERPRETATION: CPT | Performed by: STUDENT IN AN ORGANIZED HEALTH CARE EDUCATION/TRAINING PROGRAM

## 2023-09-15 PROCEDURE — 38222 DX BONE MARROW BX & ASPIR: CPT | Performed by: PHYSICIAN ASSISTANT

## 2023-09-15 PROCEDURE — 250N000011 HC RX IP 250 OP 636: Performed by: PHYSICIAN ASSISTANT

## 2023-09-15 RX ORDER — GUAIFENESIN AND DEXTROMETHORPHAN HYDROBROMIDE 600; 30 MG/1; MG/1
1 TABLET, EXTENDED RELEASE ORAL 2 TIMES DAILY PRN
Status: DISCONTINUED | OUTPATIENT
Start: 2023-09-15 | End: 2023-10-03 | Stop reason: HOSPADM

## 2023-09-15 RX ORDER — CETIRIZINE HYDROCHLORIDE 10 MG/1
10 TABLET ORAL DAILY
Status: DISCONTINUED | OUTPATIENT
Start: 2023-09-15 | End: 2023-10-03 | Stop reason: HOSPADM

## 2023-09-15 RX ORDER — BENZONATATE 100 MG/1
100 CAPSULE ORAL 3 TIMES DAILY PRN
Status: DISCONTINUED | OUTPATIENT
Start: 2023-09-15 | End: 2023-10-03 | Stop reason: HOSPADM

## 2023-09-15 RX ADMIN — ACYCLOVIR 400 MG: 400 TABLET ORAL at 20:05

## 2023-09-15 RX ADMIN — VORICONAZOLE 200 MG: 200 TABLET ORAL at 07:51

## 2023-09-15 RX ADMIN — LEVOFLOXACIN 250 MG: 250 TABLET, FILM COATED ORAL at 07:50

## 2023-09-15 RX ADMIN — HYDROXYZINE HYDROCHLORIDE 50 MG: 25 TABLET, FILM COATED ORAL at 20:05

## 2023-09-15 RX ADMIN — VORICONAZOLE 200 MG: 200 TABLET ORAL at 20:05

## 2023-09-15 RX ADMIN — SODIUM CHLORIDE, PRESERVATIVE FREE 5 ML: 5 INJECTION INTRAVENOUS at 04:24

## 2023-09-15 RX ADMIN — CETIRIZINE HYDROCHLORIDE 10 MG: 10 TABLET, FILM COATED ORAL at 13:06

## 2023-09-15 RX ADMIN — MIDAZOLAM HYDROCHLORIDE 2 MG: 1 INJECTION, SOLUTION INTRAMUSCULAR; INTRAVENOUS at 11:12

## 2023-09-15 RX ADMIN — SODIUM CHLORIDE, PRESERVATIVE FREE 5 ML: 5 INJECTION INTRAVENOUS at 10:39

## 2023-09-15 RX ADMIN — ACYCLOVIR 400 MG: 400 TABLET ORAL at 07:50

## 2023-09-15 ASSESSMENT — ACTIVITIES OF DAILY LIVING (ADL)
ADLS_ACUITY_SCORE: 24

## 2023-09-15 NOTE — PLAN OF CARE
Goal Outcome Evaluation:  [id number]  AVSS, alert ad oriented x  4, denies pain/nausea/sob. Plt is 8, replace plt before the Bmbx, tolerated good. Bmbx done today at 11 am at bedtime, the site dressing looks nice/dry/intact. CHG bath done.  Up independent, voiding adequately, LBM 9/15  Continue to monitor care.

## 2023-09-15 NOTE — PLAN OF CARE
"Goal Outcome Evaluation:     Time    /64 (BP Location: Left arm)   Pulse 90   Temp 98.2  F (36.8  C) (Oral)   Resp 18   Ht 1.74 m (5' 8.5\")   Wt 86.9 kg (191 lb 8 oz)   SpO2 96%   BMI 28.69 kg/m      Reason for admission: induction with 7+3   Activity: UAL. Pt report feeling weak today, educated on the need to call for help when unable to ambulate, to prevent a fall.   Pain: denies pain  Neuro: Alert and oriented. Needs to be monitored for any changes in vision, as he earlier reported blurry visions.   Cardiac: WNL, denies chest pain  Respiratory: denies SOB, no distress observed, on RA  GI/: LBM 9/14, voids sp.  Diet: regular  Lines: DL PICC cdi, HL  Wounds:   Labs/imaging:       New changes this shift:     Plan: possible PLT replacement      Continue to monitor and follow POC    "

## 2023-09-15 NOTE — PROGRESS NOTES
[facility]    Hematology / Oncology Progress Note    Patient: Artie Fine  MRN: [id number]  Admission Date: 9/1/2023  Date of Service (when I saw the patient): 09/15/2023  Hospital Day # 14     Assessment & Plan   Artie Fine is a 68 year old male with a history of brain abscess (2021), HTN, and BPH. He was transferred from [facility] in [place], [place] to [facility] for work up of pancytopenia; he was noted to have circulating blasts concerning for acute leukemia, and was subsequently transferred to [facility]. He underwent a diagnostic bone marrow biopsy on 9/1/23 which confirmed a new diagnosis of AML. He was started on induction with 7+3 (C1D1=9/2/23) which has been well-tolerated so far.      Today:  - Day 14 of 7+3; tolerating well  - BMBx done today, tolerated well without complication. Pathology/morphology, flow cytometry, and molecular process/hold in process  - Visual symptoms noted yesterday completely resolved without intervention and denies recurrence  - Nasal congestion and post nasal drip this morning upon waking. Able to spit out nasal drainage and noting clear in color. Symptoms resolved after getting up. No persisting runny nose, denies sinus congestion, sore throat, cough, fevers, chills.  Reports history of seasonal allergies does not take any medications for this.  Agreeable to trying cetirizine (notes loratadine/Claritin has been ineffective in the past).  - Notes fatigue, otherwise feeling well  - 1 unit plts for plt 8K      HEME/ONC  # AML, adverse risk  Negative FLT3 ITD. Normal karyotype. FISH shows no evidence of MLLT10, NUP98, or KMT2A rearrangement. Patient is likely in intermediate risk given the information available at this time, however can reassess when NGS results.  Patient initially presented to [facility] in [place], [place] with 4-6 weeks of progressive bruising, weakness, fatigue, loss of appetite, and night sweats. He also  noted a headache similar to his previous brain abscess. CBC notable for pancytopenia; WBC 2.7 (), Hgb 8.1, and plt 1k. He was transferred to [facility] and was found on peripheral flow w/ 11% circulating myeloid blasts. He was then transferred to [facility] for further work up and treatment of AML. Diagnostic BMBx performed 9/1 which showed AML with 30% blasts by morph and 47% by flow. P53 negative. FISH negative for MLLT10, NUP98, and KMT2A. Normal karyotype. NGS showed ASXL1, CEBPA, SRSF2, and STAG2 mutations. Started 7+3 (C1D1=9/2/23).   - HLA typing ordered. Plan for BMT NT ~9/20-9/22, requested 9/22 afternoon so wife can be present in person.   - Baseline TTE showing normal LVEF of 55-60%. Baseline EKG showed NSR and QTc of 434.   - Viral serologies: HepB/C-, HIV-. HSV1/2+, EBV IgG+, CMV IgG+  - PICC placed 9/1  - S/p diagnostic LP 9/8/23 for CNS leukemia work up as patient endorses headache upon admission w/ new AML diagnosis. CSF flow negative. MRI brain 9/11 negative, only showing chronic changes from h/o brain abscess.   - D14 BMBx performed 9/15; morph, flow, and process&hold pending                 Treatment Plan: 7+3 (C1D1=9/2/23)                - Cytarabine 100 mg/m2 D1-D7               - Daunorubicin 60 mg/m2 D1-D3               - Supportive meds: Decadron 12 mg tab D1-D3, Zofran     # Low risk for TLS/DIC  - Discontinued allopurinol  - Montior TLS/DIC labs twice weekly     ID  # Immunosuppressed 2/2 malignancy and chemotherapy  # ID PPX  -  mg BID  - Levofloxacin 250 mg daily  - Voriconazole 200 mg BID x9/10     # H/o brain abscess (2021)  # Headache, resolved  # Dizziness, resolved  Treated in [place]. S/p craniotomy for drainage of abscess July 2021. It is unclear what culture speciated to, but he completed a course of antibiotics with ceftriaxone then meropenem with resolution on imaging. Head CT 8/31 at OSH revealed no intracranial hemorrhage midline shift or mass effect. Postsurgical  changes noted. Previous area of infection demonstrates trace amount of encephalomalacia but no mass effect or inflammatory changes to suggest new or acute infection. Otherwise unremarkable. Patient presented initially with headache and dizziness, which has now resolved.   - See work up above for CNS leukemia work-up, which was negative     CHRONIC  # HTN  Noted during prior hospitalization for brain abscess in 2021. Previously on lisinopril, which he is no longer taking.  - Patient BP's have fluctuated this admission, w/ occasionally elevated BPs. Continue to monitor.     # BPH  Notes difficulty with complete emptying of bladder. Previously on Flomax but states it did nothing, so he stopped it. He does note frequent urination at baseline, unchanged from baseline.  - Monitor clinically     MISC  # Weakness, improved  # Deconditioning, improved  Pt reports issues w/ balance after his brain abscess in 2021, exacerbated when ambulating. Deconditioning acute-on-chronic upon admission since 2021.   - PT consulted    # Weight loss  # Loss of appetite  # Moderate malnutrition in the context of acute illness   Patient presented with loss of appetite 2/2 new malignancy. He has been gradually losing weight throughout admission, although PO intake is overall sufficient.   - RD consulted  - Supplements ordered: Ensure BID between meals and PRN    # Post nasal drip  Nasal congestion and post nasal drip 9/15 morning upon waking. Able to spit out nasal drainage and noting clear in color. Symptoms resolved after getting up. No persisting runny nose, denies sinus congestion, sore throat, cough, fevers, chills.  Reports history of seasonal allergies does not take any medications for this.  Agreeable to trying cetirizine (notes loratadine/Claritin has been ineffective in the past).    Clinically Significant Risk Factors                # Thrombocytopenia: Lowest platelets = 8 in last 2 days, will monitor for bleeding            #  "Overweight: Estimated body mass index is 28.08 kg/m  as calculated from the following:    Height as of this encounter: 1.74 m (5' 8.5\").    Weight as of this encounter: 85 kg (187 lb 6.3 oz).       # Moderate Malnutrition: based on nutrition assessment         FEN  Diet: Regular Diet Adult   IVF: Bolus PRN   Lytes: Replete per protocol    PPX  VTE: None given thrombocytopenia  Bowel: Senna/MiraLax PRN  GI/PUD: None currently indicated    MISC  Code Status: Full Code   Lines/Drains: PICC  Dispo: Anticipate 4-6 week admission for induction chemotherapy and count recovery  Follow Up: Pt will follow with Dr. Garcia, appointment scheduled for end of September    Patient was seen and plan of care was discussed with attending physician Dr. Galicia.    I spent 65 minutes in the care of this patient today, which included time necessary for review of interval events, obtaining history and physical exam, ordering medications/tests/procedures as medically indicated, review of pertinent medical literature, counseling of the patient, coordination of care, and documentation time. Over 50% of time was spent counseling the patient and/or coordinating care.    Sheree Chowdhury PA-C  Hematology/Oncology  Pager #[phone]      Interval History   Nursing notes reviewed, no acute events overnight.    Julio is feeling okay today.  He notes he is still feeling tired and wonders if this is attributed to his low blood counts as well as chemotherapy.  He also notes some nasal congestion and postnasal drip in the morning.  He does have seasonal allergies does not take any medications for this.  He is agreeable to trying cetirizine as he notes Claritin has not been helpful in the past.  Denies fevers, chills, headache, persisting nasal congestion, sore throat, cough, shortness of breath, chest pain, abdominal pain, nausea, vomiting, diarrhea, skin rashes, mouth sores, numbness tingling or weakness.  He reports his last bowel movement was yesterday and is " taking MiraLAX for bowel regimen cares.  We discussed ongoing plan including reviewed morning labs, bone marrow biopsy today with consent obtained.  He is agreeable. Questions addressed at bedside.      Vital Signs with Ranges  Temp:  [98.1  F (36.7  C)-98.5  F (36.9  C)] 98.1  F (36.7  C)  Pulse:  [] 92  Resp:  [18] 18  BP: (104-137)/(64-74) 128/74  SpO2:  [96 %-99 %] 99 %  I/O last 3 completed shifts:  In: 1353 [P.O.:1080]  Out: -     Physical Exam   Constitutional: Awake and conversational. Non- toxic appearing. No acute distress.   HEENT: Normocephalic. Moist mucus membranes without lesions, thrush, or exudates appreciated  Lymph: Neck supple, no ridigity. No significant adenopathy noted.   Respiratory: Breathing comfortably on room air with no accessory muscle use. Speaking in full sentences, no evidence of respiratory distress. Lungs CTAB without stridor, wheeze, rhonchi, or rales.   Cardiovascular: Regular rate and rhythm. 2+ radial pulses bilaterally. No peripheral edema.    GI: Abdomen with normoactive bowel sounds, soft and non-tender throughout.   Skin: Skin is clean, dry, intact. No jaundice or significant rashes appreciated on exposed areas.   Neurologic: alert with normal speech. Grossly nonfocal.  Moves extremities spontaneously.  Memory and thought process preserved.   Neuropsychiatric: Calm, affect congruent to situation. Appropriate mood and affect. Good judgment and insight.  Vascular access: PICC on RUE CDI        Medications    - MEDICATION INSTRUCTIONS -        acyclovir  400 mg Oral BID    cetirizine  10 mg Oral Daily    hydrOXYzine  50 mg Oral At Bedtime    levofloxacin  250 mg Oral Daily    voriconazole  200 mg Oral Q12H [facility] (08/20)     Data   Results for orders placed or performed during the hospital encounter of 09/01/23 (from the past 24 hour(s))   ABO/Rh type and screen    Narrative    The following orders were created for panel order ABO/Rh type and screen.  Procedure                                Abnormality         Status                     ---------                               -----------         ------                     Adult Type and Screen[[id number]]                            Final result                 Please view results for these tests on the individual orders.   CBC with platelets differential    Narrative    The following orders were created for panel order CBC with platelets differential.  Procedure                               Abnormality         Status                     ---------                               -----------         ------                     CBC with platelets and d...[[id number]]  Abnormal            Final result               RBC and Platelet Morphology[[id number]]                      Final result                 Please view results for these tests on the individual orders.   Comprehensive metabolic panel   Result Value Ref Range    Sodium 134 (L) 136 - 145 mmol/L    Potassium 3.9 3.4 - 5.3 mmol/L    Chloride 101 98 - 107 mmol/L    Carbon Dioxide (CO2) 23 22 - 29 mmol/L    Anion Gap 10 7 - 15 mmol/L    Urea Nitrogen 15.8 8.0 - 23.0 mg/dL    Creatinine 0.82 0.67 - 1.17 mg/dL    Calcium 8.8 8.8 - 10.2 mg/dL    Glucose 91 70 - 99 mg/dL    Alkaline Phosphatase 62 40 - 129 U/L    AST 16 0 - 45 U/L    ALT 14 0 - 70 U/L    Protein Total 6.7 6.4 - 8.3 g/dL    Albumin 3.9 3.5 - 5.2 g/dL    Bilirubin Total 0.8 <=1.2 mg/dL    GFR Estimate >90 >60 mL/min/1.73m2   Adult Type and Screen   Result Value Ref Range    Antibody Screen Negative Negative    SPECIMEN EXPIRATION DATE [id number]    CBC with platelets and differential   Result Value Ref Range    WBC Count 0.3 (LL) 4.0 - 11.0 10e3/uL    RBC Count 2.64 (L) 4.40 - 5.90 10e6/uL    Hemoglobin 7.5 (L) 13.3 - 17.7 g/dL    Hematocrit 23.1 (L) 40.0 - 53.0 %    MCV 88 78 - 100 fL    MCH 28.4 26.5 - 33.0 pg    MCHC 32.5 31.5 - 36.5 g/dL    RDW 13.6 10.0 - 15.0 %    Platelet Count 8 (LL) 150 - 450 10e3/uL   ABO/RH Type  & Screen   Result Value Ref Range    SPECIMEN EXPIRATION DATE [id number]     ABORH ABO Unknown Rh POS    RBC and Platelet Morphology   Result Value Ref Range    Platelet Assessment  Automated Count Confirmed. Platelet morphology is normal.     Automated Count Confirmed. Platelet morphology is normal.    RBC Morphology Confirmed RBC Indices    CONDITIONAL Prepare pheresed platelets (unit)   Result Value Ref Range    Blood Component Type Platelets     Product Code E0001A31     Unit Status Transfused     Unit Number [id number]     CODING SYSTEM TGFW501     ISSUE DATE AND TIME [id number]     UNIT ABO/RH B+     UNIT TYPE ISBT 7300

## 2023-09-15 NOTE — PROCEDURES
Bone Marrow Biopsy Procedure Note  Artie Fine  September 15, 2023    PROCEDURE:  Unilateral Bone Marrow Biopsy and Unilateral Aspirate    INDICATION:  Acute myeloid leukemia, day 14 BMBx to evaluate for disease status    PERFORMED BY:  Sheree Chowdhury PA-C  Supervised by Clare Smyth PA-C     CONSENT:  Informed consent was obtained from the patient. The risks and benefits of the procedure were explained. The patient agreed to undergo the procedure. The consent form was signed and placed in the chart.    PREMEDICATION:  2 mg Versed    PROCEDURE SUMMARY:  The patient's identification was positively verified by verbal identification. Following the administration of 2 mg versed for comfort, the patient was laid in the prone position. The right posterior iliac crest was prepped and draped in a sterile manner. The skin, deeper tissues, and periosteum of the RPIC were anesthetized with approximately 10mL 1% lidocaine. Following this, a 3mm incision was made. The Jamshidi needle was advanced into the RPIC bone cavity and a 3 mm core biopsy was taken. Bone marrow aspirates were also obtained from the RPIC; approximately 15 mL of marrow were aspirated. Following the procedure, a sterile pressure dressing was applied to the bone marrow biopsy site.     COMPLICATIONS:  None. The patient tolerated the procedure well with minimal discomfort.      RECOMMENDATIONS:   The patient was placed in the supine position to maintain pressure on the biopsy site.   The patient was instructed to lie flat for 60 minutes and not remove dressing or bathe/shower for 24 hours post-procedure.     TESTS ORDERED:  Bone marrow biopsy pathology/morphology  Flow cytometry  Molecular process and hold      Sheree Chowdhury PA-C  Hematology/Oncology  Pager #[phone]

## 2023-09-16 ENCOUNTER — APPOINTMENT (OUTPATIENT)
Dept: GENERAL RADIOLOGY | Facility: CLINIC | Age: 68
DRG: 835 | End: 2023-09-16
Attending: PHYSICIAN ASSISTANT
Payer: COMMERCIAL

## 2023-09-16 LAB
ABO/RH TYPE: NORMAL
ALBUMIN SERPL BCG-MCNC: 3.9 G/DL (ref 3.5–5.2)
ALP SERPL-CCNC: 62 U/L (ref 40–129)
ALT SERPL W P-5'-P-CCNC: 13 U/L (ref 0–70)
ANION GAP SERPL CALCULATED.3IONS-SCNC: 9 MMOL/L (ref 7–15)
ANTIBODY SCREEN: NEGATIVE
AST SERPL W P-5'-P-CCNC: 14 U/L (ref 0–45)
BACTERIA SPT CULT: NORMAL
BILIRUB SERPL-MCNC: 0.4 MG/DL
BUN SERPL-MCNC: 15.9 MG/DL (ref 8–23)
C PNEUM DNA SPEC QL NAA+PROBE: NOT DETECTED
CALCIUM SERPL-MCNC: 8.7 MG/DL (ref 8.8–10.2)
CHLORIDE SERPL-SCNC: 103 MMOL/L (ref 98–107)
CREAT SERPL-MCNC: 0.86 MG/DL (ref 0.67–1.17)
DEPRECATED HCO3 PLAS-SCNC: 21 MMOL/L (ref 22–29)
EGFRCR SERPLBLD CKD-EPI 2021: >90 ML/MIN/1.73M2
ERYTHROCYTE [DISTWIDTH] IN BLOOD BY AUTOMATED COUNT: 13.2 % (ref 10–15)
FLUAV H1 2009 PAND RNA SPEC QL NAA+PROBE: NOT DETECTED
FLUAV H1 RNA SPEC QL NAA+PROBE: NOT DETECTED
FLUAV H3 RNA SPEC QL NAA+PROBE: NOT DETECTED
FLUAV RNA SPEC QL NAA+PROBE: NEGATIVE
FLUAV RNA SPEC QL NAA+PROBE: NOT DETECTED
FLUBV RNA RESP QL NAA+PROBE: NEGATIVE
FLUBV RNA SPEC QL NAA+PROBE: NOT DETECTED
GLUCOSE SERPL-MCNC: 99 MG/DL (ref 70–99)
GRAM STAIN RESULT: NORMAL
HADV DNA SPEC QL NAA+PROBE: NOT DETECTED
HCOV PNL SPEC NAA+PROBE: NOT DETECTED
HCT VFR BLD AUTO: 22.8 % (ref 40–53)
HGB BLD-MCNC: 7.4 G/DL (ref 13.3–17.7)
HMPV RNA SPEC QL NAA+PROBE: NOT DETECTED
HPIV1 RNA SPEC QL NAA+PROBE: NOT DETECTED
HPIV2 RNA SPEC QL NAA+PROBE: NOT DETECTED
HPIV3 RNA SPEC QL NAA+PROBE: NOT DETECTED
HPIV4 RNA SPEC QL NAA+PROBE: NOT DETECTED
M PNEUMO DNA SPEC QL NAA+PROBE: NOT DETECTED
MAGNESIUM SERPL-MCNC: 2.1 MG/DL (ref 1.7–2.3)
MCH RBC QN AUTO: 28.2 PG (ref 26.5–33)
MCHC RBC AUTO-ENTMCNC: 32.5 G/DL (ref 31.5–36.5)
MCV RBC AUTO: 87 FL (ref 78–100)
PHOSPHATE SERPL-MCNC: 2.9 MG/DL (ref 2.5–4.5)
PLAT MORPH BLD: NORMAL
PLATELET # BLD AUTO: 27 10E3/UL (ref 150–450)
POTASSIUM SERPL-SCNC: 4.3 MMOL/L (ref 3.4–5.3)
PROT SERPL-MCNC: 6.9 G/DL (ref 6.4–8.3)
RBC # BLD AUTO: 2.62 10E6/UL (ref 4.4–5.9)
RBC MORPH BLD: NORMAL
RSV RNA SPEC NAA+PROBE: NEGATIVE
RSV RNA SPEC QL NAA+PROBE: NOT DETECTED
RSV RNA SPEC QL NAA+PROBE: NOT DETECTED
RV+EV RNA SPEC QL NAA+PROBE: DETECTED
SARS-COV-2 RNA RESP QL NAA+PROBE: NEGATIVE
SODIUM SERPL-SCNC: 133 MMOL/L (ref 136–145)
SPECIMEN EXPIRATION DATE: NORMAL
SPECIMEN EXPIRATION DATE: NORMAL
WBC # BLD AUTO: 0.4 10E3/UL (ref 4–11)

## 2023-09-16 PROCEDURE — 250N000013 HC RX MED GY IP 250 OP 250 PS 637: Performed by: PHYSICIAN ASSISTANT

## 2023-09-16 PROCEDURE — 999N000044 HC STATISTIC CVC DRESSING CHANGE

## 2023-09-16 PROCEDURE — 71045 X-RAY EXAM CHEST 1 VIEW: CPT

## 2023-09-16 PROCEDURE — 250N000013 HC RX MED GY IP 250 OP 250 PS 637

## 2023-09-16 PROCEDURE — 71045 X-RAY EXAM CHEST 1 VIEW: CPT | Mod: 26 | Performed by: STUDENT IN AN ORGANIZED HEALTH CARE EDUCATION/TRAINING PROGRAM

## 2023-09-16 PROCEDURE — 120N000002 HC R&B MED SURG/OB UMMC

## 2023-09-16 PROCEDURE — 83735 ASSAY OF MAGNESIUM: CPT | Performed by: STUDENT IN AN ORGANIZED HEALTH CARE EDUCATION/TRAINING PROGRAM

## 2023-09-16 PROCEDURE — 87633 RESP VIRUS 12-25 TARGETS: CPT | Performed by: PHYSICIAN ASSISTANT

## 2023-09-16 PROCEDURE — 87070 CULTURE OTHR SPECIMN AEROBIC: CPT | Performed by: PHYSICIAN ASSISTANT

## 2023-09-16 PROCEDURE — 87486 CHLMYD PNEUM DNA AMP PROBE: CPT | Performed by: PHYSICIAN ASSISTANT

## 2023-09-16 PROCEDURE — 80053 COMPREHEN METABOLIC PANEL: CPT | Performed by: PHYSICIAN ASSISTANT

## 2023-09-16 PROCEDURE — 99232 SBSQ HOSP IP/OBS MODERATE 35: CPT | Mod: FS | Performed by: PHYSICIAN ASSISTANT

## 2023-09-16 PROCEDURE — 84100 ASSAY OF PHOSPHORUS: CPT | Performed by: STUDENT IN AN ORGANIZED HEALTH CARE EDUCATION/TRAINING PROGRAM

## 2023-09-16 PROCEDURE — 87205 SMEAR GRAM STAIN: CPT | Performed by: STUDENT IN AN ORGANIZED HEALTH CARE EDUCATION/TRAINING PROGRAM

## 2023-09-16 PROCEDURE — 250N000013 HC RX MED GY IP 250 OP 250 PS 637: Performed by: STUDENT IN AN ORGANIZED HEALTH CARE EDUCATION/TRAINING PROGRAM

## 2023-09-16 PROCEDURE — 86901 BLOOD TYPING SEROLOGIC RH(D): CPT | Performed by: PHYSICIAN ASSISTANT

## 2023-09-16 PROCEDURE — 250N000013 HC RX MED GY IP 250 OP 250 PS 637: Performed by: HOSPITALIST

## 2023-09-16 PROCEDURE — 85027 COMPLETE CBC AUTOMATED: CPT | Performed by: PHYSICIAN ASSISTANT

## 2023-09-16 PROCEDURE — 86850 RBC ANTIBODY SCREEN: CPT | Performed by: PHYSICIAN ASSISTANT

## 2023-09-16 PROCEDURE — 250N000011 HC RX IP 250 OP 636: Mod: JZ

## 2023-09-16 PROCEDURE — 86920 COMPATIBILITY TEST SPIN: CPT | Performed by: PHYSICIAN ASSISTANT

## 2023-09-16 PROCEDURE — 87637 SARSCOV2&INF A&B&RSV AMP PRB: CPT | Performed by: PHYSICIAN ASSISTANT

## 2023-09-16 RX ADMIN — BENZONATATE 100 MG: 100 CAPSULE ORAL at 07:58

## 2023-09-16 RX ADMIN — ACYCLOVIR 400 MG: 400 TABLET ORAL at 07:58

## 2023-09-16 RX ADMIN — GUAIFENESIN AND DEXTROMETHORPHAN HYDROBROMIDE 1 TABLET: 600; 30 TABLET, EXTENDED RELEASE ORAL at 19:45

## 2023-09-16 RX ADMIN — SODIUM CHLORIDE, PRESERVATIVE FREE 5 ML: 5 INJECTION INTRAVENOUS at 05:04

## 2023-09-16 RX ADMIN — POLYETHYLENE GLYCOL 3350 17 G: 17 POWDER, FOR SOLUTION ORAL at 07:58

## 2023-09-16 RX ADMIN — BENZONATATE 100 MG: 100 CAPSULE ORAL at 14:22

## 2023-09-16 RX ADMIN — VORICONAZOLE 200 MG: 200 TABLET ORAL at 07:57

## 2023-09-16 RX ADMIN — ACYCLOVIR 400 MG: 400 TABLET ORAL at 19:45

## 2023-09-16 RX ADMIN — HYDROXYZINE HYDROCHLORIDE 50 MG: 25 TABLET, FILM COATED ORAL at 19:46

## 2023-09-16 RX ADMIN — VORICONAZOLE 200 MG: 200 TABLET ORAL at 19:45

## 2023-09-16 RX ADMIN — BENZONATATE 100 MG: 100 CAPSULE ORAL at 00:01

## 2023-09-16 RX ADMIN — CETIRIZINE HYDROCHLORIDE 10 MG: 10 TABLET, FILM COATED ORAL at 07:58

## 2023-09-16 RX ADMIN — GUAIFENESIN AND DEXTROMETHORPHAN HYDROBROMIDE 1 TABLET: 600; 30 TABLET, EXTENDED RELEASE ORAL at 10:07

## 2023-09-16 RX ADMIN — LEVOFLOXACIN 250 MG: 250 TABLET, FILM COATED ORAL at 07:58

## 2023-09-16 RX ADMIN — SODIUM CHLORIDE, PRESERVATIVE FREE 5 ML: 5 INJECTION INTRAVENOUS at 05:03

## 2023-09-16 ASSESSMENT — ACTIVITIES OF DAILY LIVING (ADL)
ADLS_ACUITY_SCORE: 24

## 2023-09-16 NOTE — PROGRESS NOTES
"[id number]    Blood pressure 126/68, pulse 95, temperature 98.7  F (37.1  C), temperature source Oral, resp. rate 18, height 1.74 m (5' 8.5\"), weight 85 kg (187 lb 6.3 oz), SpO2 98 %.      Vitals: VSS. Tmax 100.2; pt reports feeling hot but declined cold packs.  Reason for admission: New Leukemia workup and treatment. Started on induction with 7+3  Activity: UAL  Pain: Denies  Neuro: AOX4  Cardiac: WDL  Respiratory: Nagging dry cough, draining sinus. X1 tessalon given with effectiveness.   GI/: Denies nausea. Voiding spontaneously. X1 BM this shift  Diet: Regular  Lines: PICC hep locked  Wounds: No new skin issues.   Labs/imaging: Reviewed. No replacements given. AM platelet recheck.       No acute changes this shift. Pt sleeping between cares. Cares clustered to promote rest.       Continue to monitor and follow POC    "

## 2023-09-16 NOTE — PROGRESS NOTES
[facility]    Hematology / Oncology Progress Note    Patient: Artie Fine  MRN: [id number]  Admission Date: 9/1/2023  Date of Service (when I saw the patient): 09/16/2023  Hospital Day # 15     Assessment & Plan   Artie Fine is a 68 year old male with a history of brain abscess (2021), HTN, and BPH. He was transferred from [facility] in [place], [place] to [facility] for work up of pancytopenia; he was noted to have circulating blasts concerning for acute leukemia, and was subsequently transferred to [facility]. He underwent a diagnostic bone marrow biopsy on 9/1/23 which confirmed a new diagnosis of AML. He was started on induction with 7+3 (C1D1=9/2/23) which has been well-tolerated so far.      Today:  - Day 15 from 7+3. Await D14 BMBx results from 9/15/23, pending.  - Interval development of productive cough and worsening nasal congestion/PND. Low-grade temp spike overnight (100.2), concerning for possible developing infection. Will obtain CXR, Flu/COVID/RSV, RVP, and sputum culture to evaluate further. Defer antibiotics for now, pending CXR result; would start broad-spectrum IV antibiotics with fever spike or identification of infiltrate on CXR.  - Continue ppx antimicrobials and best supportive cares.    HEME/ONC  # AML, adverse risk  Negative FLT3 ITD. Normal karyotype. FISH shows no evidence of MLLT10, NUP98, or KMT2A rearrangement. Patient is likely in intermediate risk given the information available at this time, however can reassess when NGS results.  Patient initially presented to [facility] in [place], [place] with 4-6 weeks of progressive bruising, weakness, fatigue, loss of appetite, and night sweats. He also noted a headache similar to his previous brain abscess. CBC notable for pancytopenia; WBC 2.7 (), Hgb 8.1, and plt 1k. He was transferred to [facility] and was found on peripheral flow w/ 11% circulating myeloid blasts. He was then  transferred to [facility] for further work up and treatment of AML. Diagnostic BMBx performed 9/1 which showed AML with 30% blasts by morph and 47% by flow. P53 negative. FISH negative for MLLT10, NUP98, and KMT2A. Normal karyotype. NGS showed ASXL1, CEBPA, SRSF2, and STAG2 mutations. Started 7+3 (C1D1=9/2/23).   - HLA typing ordered. Plan for BMT NT ~9/20-9/22, requested 9/22 afternoon so wife can be present in person.   - Baseline TTE showing normal LVEF of 55-60%. Baseline EKG showed NSR and QTc of 434.   - Viral serologies: HepB/C-, HIV-. HSV1/2+, EBV IgG+, CMV IgG+  - PICC placed 9/1  - S/p diagnostic LP 9/8/23 for CNS leukemia work up as patient endorses headache upon admission w/ new AML diagnosis. CSF flow negative. MRI brain 9/11 negative, only showing chronic changes from h/o brain abscess.   - D14 BMBx performed 9/15; morph, flow, and process&hold pending                 Treatment Plan: 7+3 (C1D1=9/2/23)                - Cytarabine 100 mg/m2 D1-D7               - Daunorubicin 60 mg/m2 D1-D3               - Supportive meds: Decadron 12 mg tab D1-D3, Zofran     # Low risk for TLS/DIC  - Discontinued allopurinol  - Montior TLS/DIC labs twice weekly     ID  # Immunosuppressed 2/2 malignancy and chemotherapy  # ID PPX  -  mg BID  - Levofloxacin 250 mg daily  - Voriconazole 200 mg BID x9/10     # H/o brain abscess (2021)  # Headache, resolved  # Dizziness, resolved  Treated in [place]. S/p craniotomy for drainage of abscess July 2021. It is unclear what culture speciated to, but he completed a course of antibiotics with ceftriaxone then meropenem with resolution on imaging. Head CT 8/31 at OSH revealed no intracranial hemorrhage midline shift or mass effect. Postsurgical changes noted. Previous area of infection demonstrates trace amount of encephalomalacia but no mass effect or inflammatory changes to suggest new or acute infection. Otherwise unremarkable. Patient presented initially with headache and  "dizziness, which has now resolved.   - See work up above for CNS leukemia work-up, which was negative     CHRONIC  # HTN  Noted during prior hospitalization for brain abscess in 2021. Previously on lisinopril, which he is no longer taking.  - Patient BP's have fluctuated this admission, w/ occasionally elevated BPs. Continue to monitor.     # BPH  Notes difficulty with complete emptying of bladder. Previously on Flomax but states it did nothing, so he stopped it. He does note frequent urination at baseline, unchanged from baseline.  - Monitor clinically     MISC  # Weakness, improved  # Deconditioning, improved  Pt reports issues w/ balance after his brain abscess in 2021, exacerbated when ambulating. Deconditioning acute-on-chronic upon admission since 2021.   - PT consulted    # Weight loss  # Loss of appetite  # Moderate malnutrition in the context of acute illness   Patient presented with loss of appetite 2/2 new malignancy. He has been gradually losing weight throughout admission, although PO intake is overall sufficient.   - RD consulted  - Supplements ordered: Ensure BID between meals and PRN    # Post nasal drip  Nasal congestion and post nasal drip 9/15 morning upon waking. Able to spit out nasal drainage and noting clear in color. Symptoms resolved after getting up. No persisting runny nose, denies sinus congestion, sore throat, cough, fevers, chills.  Reports history of seasonal allergies does not take any medications for this.  Agreeable to trying cetirizine (notes loratadine/Claritin has been ineffective in the past).    Clinically Significant Risk Factors                # Thrombocytopenia: Lowest platelets = 8 in last 2 days, will monitor for bleeding            # Overweight: Estimated body mass index is 28.08 kg/m  as calculated from the following:    Height as of this encounter: 1.74 m (5' 8.5\").    Weight as of this encounter: 85 kg (187 lb 6.4 oz).       # Moderate Malnutrition: based on nutrition " assessment         FEN  Diet: Regular Diet Adult   IVF: Bolus PRN   Lytes: Replete per protocol    PPX  VTE: None given thrombocytopenia  Bowel: Senna/MiraLax PRN  GI/PUD: None currently indicated    MISC  Code Status: Full Code   Lines/Drains: PICC  Dispo: Anticipate 4-6 week admission for induction chemotherapy and count recovery  Follow Up: Pt will follow with Dr. Garcia, appointment scheduled for end of September    Patient was seen and plan of care was discussed with attending physician Dr. Galicia.    I spent 35 minutes in the care of this patient today, which included time necessary for review of interval events, obtaining history and physical exam, ordering medications/tests/procedures as medically indicated, review of pertinent medical literature, counseling of the patient, coordination of care, and documentation time. Over 50% of time was spent counseling the patient and/or coordinating care.    Renetta Smyth PA-C  Hematology/Oncology  Pager: #[phone]     Interval History   Nursing notes reviewed, no acute events overnight.    Julio is feeling okay today.  Tmax 100.2. Noted interval development of a cough overnight, productive of whitish sputum. Had difficulty sleeping due to paroxysms of cough and could not get comfortable until almost midnight. Has discomfort with coughing fits, but no chest pain or pain with inspiration. No hemoptysis. Reports concurrent nasal drainage/congestion, as well as PND. Denies sore throat/mouth sores. No headache, neck pain/stiffness, N/V/D, abdominal pain, leg swelling, calf tenderness, or other concerns. Discussed plans for CXR and viral swabs and he and his wife were agreeable. Reviewed interval lab results and plan of care for the day. All questions and concerns addressed at bedside.      Vital Signs with Ranges  Temp:  [97.9  F (36.6  C)-100.2  F (37.9  C)] 100  F (37.8  C)  Pulse:  [] 100  Resp:  [18] 18  BP: (115-137)/(61-98) 137/73  SpO2:  [96 %-99 %] 97 %  I/O  last 3 completed shifts:  In: 450 [P.O.:450]  Out: -     Physical Exam   Constitutional: Awake and conversational. Non- toxic appearing. No acute distress.   HEENT: Normocephalic. Moist mucus membranes without lesions, thrush, or exudates appreciated  Lymph: Neck supple, no ridigity. No significant adenopathy noted.   Respiratory: Breathing comfortably on room air with no accessory muscle use. Speaking in full sentences, no evidence of respiratory distress. Lungs sounds diminished to the bases bilaterally, no crackles or wheezing. Frequent paroxysms of cough with some whitish sputum produced.  Cardiovascular: Regular rate and rhythm. 2+ radial pulses bilaterally. No peripheral edema.    GI: Abdomen with normoactive bowel sounds, soft and non-tender throughout.   Skin: Skin is clean, dry, intact. No jaundice or significant rashes appreciated on exposed areas.   Neurologic: alert with normal speech. Grossly nonfocal.  Moves extremities spontaneously.  Memory and thought process preserved.   Neuropsychiatric: Calm, affect congruent to situation. Appropriate mood and affect. Good judgment and insight.  Vascular access: PICC on RUE CDI        Medications    - MEDICATION INSTRUCTIONS -        acyclovir  400 mg Oral BID    cetirizine  10 mg Oral Daily    hydrOXYzine  50 mg Oral At Bedtime    levofloxacin  250 mg Oral Daily    voriconazole  200 mg Oral Q12H [facility] (08/20)     Data   Results for orders placed or performed during the hospital encounter of 09/01/23 (from the past 24 hour(s))   CBC with platelets differential    Narrative    The following orders were created for panel order CBC with platelets differential.  Procedure                               Abnormality         Status                     ---------                               -----------         ------                     CBC with platelets and d...[[id number]]  Abnormal            Final result               RBC and Platelet Morphology[[id number]]                       Final result                 Please view results for these tests on the individual orders.   ABO/Rh type and screen    Narrative    The following orders were created for panel order ABO/Rh type and screen.  Procedure                               Abnormality         Status                     ---------                               -----------         ------                     Adult Type and Screen[[id number]]                            Final result                 Please view results for these tests on the individual orders.   Comprehensive metabolic panel   Result Value Ref Range    Sodium 133 (L) 136 - 145 mmol/L    Potassium 4.3 3.4 - 5.3 mmol/L    Chloride 103 98 - 107 mmol/L    Carbon Dioxide (CO2) 21 (L) 22 - 29 mmol/L    Anion Gap 9 7 - 15 mmol/L    Urea Nitrogen 15.9 8.0 - 23.0 mg/dL    Creatinine 0.86 0.67 - 1.17 mg/dL    Calcium 8.7 (L) 8.8 - 10.2 mg/dL    Glucose 99 70 - 99 mg/dL    Alkaline Phosphatase 62 40 - 129 U/L    AST 14 0 - 45 U/L    ALT 13 0 - 70 U/L    Protein Total 6.9 6.4 - 8.3 g/dL    Albumin 3.9 3.5 - 5.2 g/dL    Bilirubin Total 0.4 <=1.2 mg/dL    GFR Estimate >90 >60 mL/min/1.73m2   CBC with platelets and differential   Result Value Ref Range    WBC Count 0.4 (LL) 4.0 - 11.0 10e3/uL    RBC Count 2.62 (L) 4.40 - 5.90 10e6/uL    Hemoglobin 7.4 (L) 13.3 - 17.7 g/dL    Hematocrit 22.8 (L) 40.0 - 53.0 %    MCV 87 78 - 100 fL    MCH 28.2 26.5 - 33.0 pg    MCHC 32.5 31.5 - 36.5 g/dL    RDW 13.2 10.0 - 15.0 %    Platelet Count 27 (LL) 150 - 450 10e3/uL   Adult Type and Screen   Result Value Ref Range    Antibody Screen Negative Negative    SPECIMEN EXPIRATION DATE [id number]    RBC and Platelet Morphology   Result Value Ref Range    Platelet Assessment  Automated Count Confirmed. Platelet morphology is normal.     Automated Count Confirmed. Platelet morphology is normal.    RBC Morphology Confirmed RBC Indices    ABO/RH Type & Screen   Result Value Ref Range    SPECIMEN EXPIRATION  DATE [id number]     ABORH ABO Unknown Rh POS    Magnesium   Result Value Ref Range    Magnesium 2.1 1.7 - 2.3 mg/dL   Phosphorus   Result Value Ref Range    Phosphorus 2.9 2.5 - 4.5 mg/dL   Respiratory Panel PCR    Specimen: Bronchus; Swab   Result Value Ref Range    Adenovirus Not Detected Not Detected    Coronavirus Not Detected Not Detected    Human Metapneumovirus Not Detected Not Detected    Human Rhin/Enterovirus Detected (A) Not Detected    Influenza A Not Detected Not Detected    Influenza A, H1 Not Detected Not Detected    Influenza A 2009 H1N1 Not Detected Not Detected    Influenza A, H3 Not Detected Not Detected    Influenza B Not Detected Not Detected    Parainfluenza Virus 1 Not Detected Not Detected    Parainfluenza Virus 2 Not Detected Not Detected    Parainfluenza Virus 3 Not Detected Not Detected    Parainfluenza Virus 4 Not Detected Not Detected    Respiratory Syncytial Virus A Not Detected Not Detected    Respiratory Syncytial Virus B Not Detected Not Detected    Chlamydia Pneumoniae Not Detected Not Detected    Mycoplasma Pneumoniae Not Detected Not Detected    Narrative    The [employer] Respiratory Panel is a qualitative nucleic acid, multiplex, in vitro diagnostic test for the simultaneous detection and identification of multiple respiratory viral and bacterial nucleic acids in nasopharyngeal swabs collected in viral transport media from individual exhibiting signs and symptoms of respiratory infection. The assay has received FDA approval for the testing of nasopharyngeal (NP) swabs only. The [facility] at [facility] has validated the performance characteristics for bronchial alveolar lavage specimens. This test is used for clinical purposes and should not be regarded as investigational or for research. This laboratory is certified under the Clinical Laboratory Improvement Amendments of 1988 (CLIA-88) as qualified to perform high complexity clinical  laboratory testing.    Symptomatic Influenza A/B, RSV, & SARS-CoV2 PCR (COVID-19) Nasopharyngeal    Specimen: Nasopharyngeal; Swab   Result Value Ref Range    Influenza A PCR Negative Negative    Influenza B PCR Negative Negative    RSV PCR Negative Negative    SARS CoV2 PCR Negative Negative    Narrative    Testing was performed using the Xpert Xpress CoV2/Flu/RSV Assay on the [employer] GeneXpert Instrument. This test should be ordered for the detection of SARS-CoV-2, influenza, and RSV viruses in individuals who meet clinical and/or epidemiological criteria. Test performance is unknown in asymptomatic patients. This test is for in vitro diagnostic use under the FDA EUA for laboratories certified under CLIA to perform high or moderate complexity testing. This test has not been FDA cleared or approved. A negative result does not rule out the presence of PCR inhibitors in the specimen or target RNA in concentration below the limit of detection for the assay. If only one viral target is positive but coinfection with multiple targets is suspected, the sample should be re-tested with another FDA cleared, approved, or authorized test, if coinfection would change clinical management. This test was validated by the [facility]. These laboratories are certified under the Clinical Laboratory Improvement Amendments of 1988 (CLIA-88) as qualified to perform high complexity laboratory testing.   Respiratory Aerobic Bacterial Culture with Gram Stain    Specimen: Expectorate; Sputum   Result Value Ref Range    Culture       >10 Squamous epithelial cells/low power field indicates oral contamination. Please recollect.    Gram Stain Result >10 Squamous epithelial cells/low power field     Gram Stain Result >25 PMNs/low power field     Gram Stain Result 3+ Mixed lucy    XR Chest Port 1 View    Narrative    Exam: XR CHEST PORT 1 VIEW, 9/16/2023 11:58 AM    Comparison: Radiograph 9/2/2023    History: H/o AML on  chemotherapy, productive cough, eval infiltrate    Findings:  Portable AP view of the chest. Right upper extremity PICC line tip  projects over the superior cavoatrial junction. Trachea is midline.  Cardiomediastinal silhouette is within normal limits. Minimally  increased streaky opacities in the left lung base. There is no  pneumothorax or pleural effusion. The upper abdomen is unremarkable.  No acute osseous abnormality.       Impression    Impression:   Minimally increased streaky opacities in the left lung base, may  represent atelectasis, although infection cannot fully be excluded.  Recommend continued attention on follow-up.    I have personally reviewed the examination and initial interpretation  and I agree with the findings.    PEDRO VILLASENOR MD         SYSTEM ID:  [id number]

## 2023-09-16 NOTE — PLAN OF CARE
"Goal Outcome Evaluation:      Plan of Care Reviewed With: patient, spouse    Overall Patient Progress: no changeOverall Patient Progress: no change     1500 - 1930:   /73 (BP Location: Right arm, Cuff Size: Adult Regular)   Pulse 100   Temp 98.5  F (36.9  C) (Oral)   Resp 18   Ht 1.74 m (5' 8.5\")   Wt 85 kg (187 lb 6.4 oz)   SpO2 97%   BMI 28.08 kg/m        A&O x 4, Tmax 100, recheck temp is 98.5, on droplet precaution for positive human rhinovirus.  Denies pain/nausea/sob on RA, having frequent dry cough, on prn tessalon & bid mucinex.  Bmbx dressing replaced with a band aide.  Sputum sample send for respiratory aerobic bacterial.   Up independently, voiding spontaneously, had a bm this morning.  Continue with poc...      "

## 2023-09-16 NOTE — PROVIDER NOTIFICATION
JESSE JUSTIN notified via web based paging # [phone] Pt requesting medication for dry bothersome cough that has persisted since this afternoon. Malika RN # [phone]   ambulatory

## 2023-09-16 NOTE — PLAN OF CARE
Goal Outcome Evaluation:  [id number]  A new diagnosis of AML, came for induction with 7+3 (C1D1=9/2/23)   AVSS, Alert and oriented x 4, nacho pain/nausea. Have dry nagging cough, very congested, given his schedule zyrtec, PRN tessalon x 2, PRN mucinex x 1. Did sputum test, covid swab and chest x-ray.Up independent, voiding adequately, good appetite, LBM 9/14 give miralex. Took shower today.Covid is negative and RSV negative. Now on droplet precaution.  Continue to monitor care.

## 2023-09-17 LAB
ALBUMIN SERPL BCG-MCNC: 3.7 G/DL (ref 3.5–5.2)
ALP SERPL-CCNC: 60 U/L (ref 40–129)
ALT SERPL W P-5'-P-CCNC: 13 U/L (ref 0–70)
ANION GAP SERPL CALCULATED.3IONS-SCNC: 10 MMOL/L (ref 7–15)
AST SERPL W P-5'-P-CCNC: 17 U/L (ref 0–45)
BASOPHILS # BLD MANUAL: 0 10E3/UL (ref 0–0.2)
BASOPHILS NFR BLD MANUAL: 0 %
BILIRUB SERPL-MCNC: 0.6 MG/DL
BLD PROD TYP BPU: NORMAL
BLOOD COMPONENT TYPE: NORMAL
BUN SERPL-MCNC: 15 MG/DL (ref 8–23)
CALCIUM SERPL-MCNC: 8.5 MG/DL (ref 8.8–10.2)
CHLORIDE SERPL-SCNC: 101 MMOL/L (ref 98–107)
CODING SYSTEM: NORMAL
CREAT SERPL-MCNC: 0.86 MG/DL (ref 0.67–1.17)
CROSSMATCH: NORMAL
DEPRECATED HCO3 PLAS-SCNC: 21 MMOL/L (ref 22–29)
EGFRCR SERPLBLD CKD-EPI 2021: >90 ML/MIN/1.73M2
EOSINOPHIL # BLD MANUAL: 0 10E3/UL (ref 0–0.7)
EOSINOPHIL NFR BLD MANUAL: 0 %
ERYTHROCYTE [DISTWIDTH] IN BLOOD BY AUTOMATED COUNT: 13.1 % (ref 10–15)
GLUCOSE SERPL-MCNC: 100 MG/DL (ref 70–99)
GROUP A STREP BY PCR: NOT DETECTED
HCT VFR BLD AUTO: 21.1 % (ref 40–53)
HGB BLD-MCNC: 6.9 G/DL (ref 13.3–17.7)
ISSUE DATE AND TIME: NORMAL
LYMPHOCYTES # BLD MANUAL: 0.5 10E3/UL (ref 0.8–5.3)
LYMPHOCYTES NFR BLD MANUAL: 100 %
MAGNESIUM SERPL-MCNC: 2 MG/DL (ref 1.7–2.3)
MCH RBC QN AUTO: 28.3 PG (ref 26.5–33)
MCHC RBC AUTO-ENTMCNC: 32.7 G/DL (ref 31.5–36.5)
MCV RBC AUTO: 87 FL (ref 78–100)
MONOCYTES # BLD MANUAL: 0 10E3/UL (ref 0–1.3)
MONOCYTES NFR BLD MANUAL: 0 %
NEUTROPHILS # BLD MANUAL: 0 10E3/UL (ref 1.6–8.3)
NEUTROPHILS NFR BLD MANUAL: 0 %
PHOSPHATE SERPL-MCNC: 2.1 MG/DL (ref 2.5–4.5)
PLAT MORPH BLD: ABNORMAL
PLATELET # BLD AUTO: 16 10E3/UL (ref 150–450)
POTASSIUM SERPL-SCNC: 4.1 MMOL/L (ref 3.4–5.3)
PROT SERPL-MCNC: 6.9 G/DL (ref 6.4–8.3)
RBC # BLD AUTO: 2.44 10E6/UL (ref 4.4–5.9)
RBC MORPH BLD: ABNORMAL
SODIUM SERPL-SCNC: 132 MMOL/L (ref 136–145)
UNIT ABO/RH: NORMAL
UNIT NUMBER: NORMAL
UNIT STATUS: NORMAL
UNIT TYPE ISBT: 5100
WBC # BLD AUTO: 0.5 10E3/UL (ref 4–11)

## 2023-09-17 PROCEDURE — 83735 ASSAY OF MAGNESIUM: CPT | Performed by: STUDENT IN AN ORGANIZED HEALTH CARE EDUCATION/TRAINING PROGRAM

## 2023-09-17 PROCEDURE — 85027 COMPLETE CBC AUTOMATED: CPT | Performed by: PHYSICIAN ASSISTANT

## 2023-09-17 PROCEDURE — 250N000013 HC RX MED GY IP 250 OP 250 PS 637: Performed by: PHYSICIAN ASSISTANT

## 2023-09-17 PROCEDURE — 250N000013 HC RX MED GY IP 250 OP 250 PS 637: Performed by: STUDENT IN AN ORGANIZED HEALTH CARE EDUCATION/TRAINING PROGRAM

## 2023-09-17 PROCEDURE — 80053 COMPREHEN METABOLIC PANEL: CPT | Performed by: PHYSICIAN ASSISTANT

## 2023-09-17 PROCEDURE — P9016 RBC LEUKOCYTES REDUCED: HCPCS | Performed by: PHYSICIAN ASSISTANT

## 2023-09-17 PROCEDURE — 84100 ASSAY OF PHOSPHORUS: CPT | Performed by: STUDENT IN AN ORGANIZED HEALTH CARE EDUCATION/TRAINING PROGRAM

## 2023-09-17 PROCEDURE — 120N000002 HC R&B MED SURG/OB UMMC

## 2023-09-17 PROCEDURE — 250N000013 HC RX MED GY IP 250 OP 250 PS 637

## 2023-09-17 PROCEDURE — 87651 STREP A DNA AMP PROBE: CPT | Performed by: STUDENT IN AN ORGANIZED HEALTH CARE EDUCATION/TRAINING PROGRAM

## 2023-09-17 PROCEDURE — 250N000013 HC RX MED GY IP 250 OP 250 PS 637: Performed by: HOSPITALIST

## 2023-09-17 PROCEDURE — 250N000009 HC RX 250: Performed by: STUDENT IN AN ORGANIZED HEALTH CARE EDUCATION/TRAINING PROGRAM

## 2023-09-17 PROCEDURE — 99232 SBSQ HOSP IP/OBS MODERATE 35: CPT | Mod: FS | Performed by: PHYSICIAN ASSISTANT

## 2023-09-17 PROCEDURE — 250N000011 HC RX IP 250 OP 636: Mod: JZ

## 2023-09-17 PROCEDURE — 85007 BL SMEAR W/DIFF WBC COUNT: CPT | Performed by: PHYSICIAN ASSISTANT

## 2023-09-17 RX ADMIN — SODIUM CHLORIDE, PRESERVATIVE FREE 5 ML: 5 INJECTION INTRAVENOUS at 18:23

## 2023-09-17 RX ADMIN — SODIUM CHLORIDE, PRESERVATIVE FREE 5 ML: 5 INJECTION INTRAVENOUS at 04:35

## 2023-09-17 RX ADMIN — HYDROXYZINE HYDROCHLORIDE 50 MG: 25 TABLET, FILM COATED ORAL at 19:59

## 2023-09-17 RX ADMIN — TOPICAL ANESTHETIC 1 ML: 200 SPRAY DENTAL; PERIODONTAL at 16:53

## 2023-09-17 RX ADMIN — GUAIFENESIN AND DEXTROMETHORPHAN HYDROBROMIDE 1 TABLET: 600; 30 TABLET, EXTENDED RELEASE ORAL at 19:59

## 2023-09-17 RX ADMIN — ACYCLOVIR 400 MG: 400 TABLET ORAL at 08:20

## 2023-09-17 RX ADMIN — VORICONAZOLE 200 MG: 200 TABLET ORAL at 19:59

## 2023-09-17 RX ADMIN — POTASSIUM & SODIUM PHOSPHATES POWDER PACK 280-160-250 MG 1 PACKET: 280-160-250 PACK at 23:59

## 2023-09-17 RX ADMIN — BENZONATATE 100 MG: 100 CAPSULE ORAL at 09:38

## 2023-09-17 RX ADMIN — CETIRIZINE HYDROCHLORIDE 10 MG: 10 TABLET, FILM COATED ORAL at 08:20

## 2023-09-17 RX ADMIN — VORICONAZOLE 200 MG: 200 TABLET ORAL at 08:20

## 2023-09-17 RX ADMIN — SODIUM CHLORIDE, PRESERVATIVE FREE 5 ML: 5 INJECTION INTRAVENOUS at 04:36

## 2023-09-17 RX ADMIN — SODIUM CHLORIDE, PRESERVATIVE FREE 5 ML: 5 INJECTION INTRAVENOUS at 09:24

## 2023-09-17 RX ADMIN — GUAIFENESIN AND DEXTROMETHORPHAN HYDROBROMIDE 1 TABLET: 600; 30 TABLET, EXTENDED RELEASE ORAL at 14:01

## 2023-09-17 RX ADMIN — LEVOFLOXACIN 250 MG: 250 TABLET, FILM COATED ORAL at 08:20

## 2023-09-17 RX ADMIN — ACYCLOVIR 400 MG: 400 TABLET ORAL at 19:59

## 2023-09-17 RX ADMIN — BENZONATATE 100 MG: 100 CAPSULE ORAL at 15:36

## 2023-09-17 RX ADMIN — POTASSIUM & SODIUM PHOSPHATES POWDER PACK 280-160-250 MG 1 PACKET: 280-160-250 PACK at 19:59

## 2023-09-17 ASSESSMENT — ACTIVITIES OF DAILY LIVING (ADL)
ADLS_ACUITY_SCORE: 24

## 2023-09-17 NOTE — PLAN OF CARE
Goal Outcome Evaluation:  [id number]  AVSS, alert and oriented 4, denies pain/nausea/sob.Hemoglobin is 6.9 today and transfusion 1 unit of RBC with no transfusion reaction. For congestion and cough, given PRN tessalon x 1, and PRN mucinex x 1. Pt is complaining of sore throat and pain. Given hurricane spray x 1. Strep test is done and its negative. Up independent, voiding adequately, LBM 9/17  Continue to monitor care.

## 2023-09-17 NOTE — PROGRESS NOTES
"[id number]    Blood pressure 115/62, pulse 90, temperature 98.4  F (36.9  C), temperature source Oral, resp. rate 18, height 1.74 m (5' 8.5\"), weight 85 kg (187 lb 6.4 oz), SpO2 98 %.    Reason for admission: 9/1 for new AML workup and treatment.   Activity: UAL  Pain: Denies  Neuro: AOX4. Denies dizziness.  Cardiac: WDL  Respiratory: RA. Cough persists, x1 prn mucinex given with effectiveness.   GI/: Denies nausea. Voiding spontaneously. LBM 9/16  Diet: Regular. Good appetite  Lines: DL PICC hep locked  Wounds: BMBx site CDI.   Labs/imaging: Reviewed. No replacements given. AM Hgb 6.9. Needs transfusion. Prepared      No new changes this shift. Pt sleeping between cares.       Continue to monitor and follow POC    "

## 2023-09-17 NOTE — PROGRESS NOTES
[facility]    Hematology / Oncology Progress Note    Patient: Artie Fine  MRN: [id number]  Admission Date: 9/1/2023  Date of Service (when I saw the patient): 09/17/2023  Hospital Day # 16     Assessment & Plan   Artie Fine is a 68 year old male with a history of brain abscess (2021), HTN, and BPH. He was transferred from [facility] in [place], [place] to [facility] for work up of pancytopenia; he was noted to have circulating blasts concerning for acute leukemia, and was subsequently transferred to [facility]. He underwent a diagnostic bone marrow biopsy on 9/1/23 which confirmed a new diagnosis of AML. He was started on induction with 7+3 (C1D1=9/2/23). Hospital course has been relatively uncomplicated, notable primarily for rhinovirus infection.     Today:  - Day 16 from 7+3. Await D14 BMBx results from 9/15/23, pending.  - Continue supportive cares for rhino/enterovirus seen on RVP from 9/16. Flu/COVID/RSV negative. CXR without clear infiltrate and no true fever spike; will defer IV antibiotics for now.  - Continue ppx antimicrobials and best supportive cares.    HEME/ONC  # AML, adverse risk  Negative FLT3 ITD. Normal karyotype. FISH shows no evidence of MLLT10, NUP98, or KMT2A rearrangement. Patient is likely in intermediate risk given the information available at this time, however can reassess when NGS results.  Patient initially presented to [facility] in [place], [place] with 4-6 weeks of progressive bruising, weakness, fatigue, loss of appetite, and night sweats. He also noted a headache similar to his previous brain abscess. CBC notable for pancytopenia; WBC 2.7 (), Hgb 8.1, and plt 1k. He was transferred to [facility] and was found on peripheral flow w/ 11% circulating myeloid blasts. He was then transferred to [facility] for further work up and treatment of AML. Diagnostic BMBx performed 9/1 which showed AML with 30% blasts by morph and 47% by  flow. P53 negative. FISH negative for MLLT10, NUP98, and KMT2A. Normal karyotype. NGS showed ASXL1, CEBPA, SRSF2, and STAG2 mutations. Started 7+3 (C1D1=9/2/23).   - HLA typing ordered. Plan for BMT NT ~9/20-9/22, requested 9/22 afternoon so wife can be present in person.   - Baseline TTE showing normal LVEF of 55-60%. Baseline EKG showed NSR and QTc of 434.   - Viral serologies: HepB/C-, HIV-. HSV1/2+, EBV IgG+, CMV IgG+  - PICC placed 9/1  - S/p diagnostic LP 9/8/23 for CNS leukemia work up as patient endorses headache upon admission w/ new AML diagnosis. CSF flow negative. MRI brain 9/11 negative, only showing chronic changes from h/o brain abscess.   - D14 BMBx performed 9/15; morph, flow, and process&hold pending                 Treatment Plan: 7+3 (C1D1=9/2/23)                - Cytarabine 100 mg/m2 D1-D7               - Daunorubicin 60 mg/m2 D1-D3               - Supportive meds: Decadron 12 mg tab D1-D3, Zofran     # Low risk for TLS/DIC  - Discontinued allopurinol  - Montior TLS/DIC labs twice weekly     ID  # Immunosuppressed 2/2 malignancy and chemotherapy  # ID PPX  -  mg BID  - Levofloxacin 250 mg daily  - Voriconazole 200 mg BID x9/10     # H/o brain abscess (2021)  # Headache, resolved  # Dizziness, resolved  Treated in [place]. S/p craniotomy for drainage of abscess July 2021. It is unclear what culture speciated to, but he completed a course of antibiotics with ceftriaxone then meropenem with resolution on imaging. Head CT 8/31 at OSH revealed no intracranial hemorrhage midline shift or mass effect. Postsurgical changes noted. Previous area of infection demonstrates trace amount of encephalomalacia but no mass effect or inflammatory changes to suggest new or acute infection. Otherwise unremarkable. Patient presented initially with headache and dizziness, which has now resolved.   - See work up above for CNS leukemia work-up, which was negative     CHRONIC  # HTN  Noted during prior  "hospitalization for brain abscess in 2021. Previously on lisinopril, which he is no longer taking.  - Patient BP's have fluctuated this admission, w/ occasionally elevated BPs. Continue to monitor.     # BPH  Notes difficulty with complete emptying of bladder. Previously on Flomax but states it did nothing, so he stopped it. He does note frequent urination at baseline, unchanged from baseline.  - Monitor clinically     MISC  # Weakness, improved  # Deconditioning, improved  Pt reports issues w/ balance after his brain abscess in 2021, exacerbated when ambulating. Deconditioning acute-on-chronic upon admission since 2021.   - PT consulted    # Weight loss  # Loss of appetite  # Moderate malnutrition in the context of acute illness   Patient presented with loss of appetite 2/2 new malignancy. He has been gradually losing weight throughout admission, although PO intake is overall sufficient.   - RD consulted  - Supplements ordered: Ensure BID between meals and PRN    # Post nasal drip  Nasal congestion and post nasal drip 9/15 morning upon waking. Able to spit out nasal drainage and noting clear in color. Symptoms resolved after getting up. No persisting runny nose, denies sinus congestion, sore throat, cough, fevers, chills.  Reports history of seasonal allergies does not take any medications for this.  Agreeable to trying cetirizine (notes loratadine/Claritin has been ineffective in the past).    Clinically Significant Risk Factors                # Thrombocytopenia: Lowest platelets = 16 in last 2 days, will monitor for bleeding            # Overweight: Estimated body mass index is 28.26 kg/m  as calculated from the following:    Height as of this encounter: 1.74 m (5' 8.5\").    Weight as of this encounter: 85.5 kg (188 lb 9.6 oz).       # Moderate Malnutrition: based on nutrition assessment         FEN  Diet: Regular Diet Adult   IVF: Bolus PRN   Lytes: Replete per protocol    PPX  VTE: None given " thrombocytopenia  Bowel: Senna/MiraLax PRN  GI/PUD: None currently indicated    MISC  Code Status: Full Code   Lines/Drains: PICC  Dispo: Anticipate 4-6 week admission for induction chemotherapy and count recovery  Follow Up: Pt will follow with Dr. Garcia, appointment scheduled for end of September    Patient was seen and plan of care was discussed with attending physician Dr. Galicia.    I spent 35 minutes in the care of this patient today, which included time necessary for review of interval events, obtaining history and physical exam, ordering medications/tests/procedures as medically indicated, review of pertinent medical literature, counseling of the patient, coordination of care, and documentation time. Over 50% of time was spent counseling the patient and/or coordinating care.    Renetta Smyth PA-C  Hematology/Oncology  Pager: #[phone]     Interval History   Nursing notes reviewed, no acute events overnight. Julio is feeling better today. No fevers or chills. Cough is much better. Rhinorrhea is improved as well. No chest pain or dyspnea. Slept well overnight. Appetite is poor, but he is forcing himself to eat. No N/V/D. No edema. In good spirits. Wife at bedside, supportive. Patiently awaiting BMBx results. Reviewed interval lab results and plan of care for the day. All questions and concerns addressed at bedside.      Vital Signs with Ranges  Temp:  [97.8  F (36.6  C)-99.7  F (37.6  C)] 97.8  F (36.6  C)  Pulse:  [83-93] 83  Resp:  [18-20] 20  BP: (109-128)/(56-69) 114/65  SpO2:  [94 %-98 %] 98 %  I/O last 3 completed shifts:  In: 1230 [P.O.:930]  Out: -     Physical Exam   Constitutional: Awake and conversational. Non- toxic appearing. No acute distress.   HEENT: Normocephalic. Moist mucus membranes without lesions, thrush, or exudates appreciated. No sinus TTP.  Lymph: Neck supple, no ridigity. No significant adenopathy noted.   Respiratory: Breathing comfortably on room air with no accessory muscle use.  Speaking in full sentences, no evidence of respiratory distress. Lungs sounds clear to auscultation bilaterally. Infrequent cough, non-productive.  Cardiovascular: Regular rate and rhythm. 2+ radial pulses bilaterally. No peripheral edema.    GI: Abdomen with normoactive bowel sounds, soft and non-tender throughout.   Skin: Skin is clean, dry, intact. No jaundice or significant rashes appreciated on exposed areas.   Neurologic: alert with normal speech. Grossly nonfocal.  Moves extremities spontaneously.  Memory and thought process preserved.   Neuropsychiatric: Calm, affect congruent to situation. Appropriate mood and affect. Good judgment and insight.  Vascular access: PICC on RUE CDI        Medications    - MEDICATION INSTRUCTIONS -        acyclovir  400 mg Oral BID    cetirizine  10 mg Oral Daily    hydrOXYzine  50 mg Oral At Bedtime    levofloxacin  250 mg Oral Daily    voriconazole  200 mg Oral Q12H [facility] (08/20)     Data   Results for orders placed or performed during the hospital encounter of 09/01/23 (from the past 24 hour(s))   Respiratory Aerobic Bacterial Culture with Gram Stain    Specimen: Expectorate; Sputum   Result Value Ref Range    Culture Culture in progress     Culture 1+ Normal lucy to date     Gram Stain Result <10 Squamous epithelial cells/low power field     Gram Stain Result >25 PMNs/low power field     Gram Stain Result 3+ Mixed lucy    CBC with platelets differential    Narrative    The following orders were created for panel order CBC with platelets differential.  Procedure                               Abnormality         Status                     ---------                               -----------         ------                     CBC with platelets and d...[[id number]]  Abnormal            Final result               Manual Differential[[id number]]          Abnormal            Final result                 Please view results for these tests on the individual orders.   Comprehensive  metabolic panel   Result Value Ref Range    Sodium 132 (L) 136 - 145 mmol/L    Potassium 4.1 3.4 - 5.3 mmol/L    Chloride 101 98 - 107 mmol/L    Carbon Dioxide (CO2) 21 (L) 22 - 29 mmol/L    Anion Gap 10 7 - 15 mmol/L    Urea Nitrogen 15.0 8.0 - 23.0 mg/dL    Creatinine 0.86 0.67 - 1.17 mg/dL    Calcium 8.5 (L) 8.8 - 10.2 mg/dL    Glucose 100 (H) 70 - 99 mg/dL    Alkaline Phosphatase 60 40 - 129 U/L    AST 17 0 - 45 U/L    ALT 13 0 - 70 U/L    Protein Total 6.9 6.4 - 8.3 g/dL    Albumin 3.7 3.5 - 5.2 g/dL    Bilirubin Total 0.6 <=1.2 mg/dL    GFR Estimate >90 >60 mL/min/1.73m2   CBC with platelets and differential   Result Value Ref Range    WBC Count 0.5 (LL) 4.0 - 11.0 10e3/uL    RBC Count 2.44 (L) 4.40 - 5.90 10e6/uL    Hemoglobin 6.9 (LL) 13.3 - 17.7 g/dL    Hematocrit 21.1 (L) 40.0 - 53.0 %    MCV 87 78 - 100 fL    MCH 28.3 26.5 - 33.0 pg    MCHC 32.7 31.5 - 36.5 g/dL    RDW 13.1 10.0 - 15.0 %    Platelet Count 16 (LL) 150 - 450 10e3/uL   Manual Differential   Result Value Ref Range    % Neutrophils 0 %    % Lymphocytes 100 %    % Monocytes 0 %    % Eosinophils 0 %    % Basophils 0 %    Absolute Neutrophils 0.0 (LL) 1.6 - 8.3 10e3/uL    Absolute Lymphocytes 0.5 (L) 0.8 - 5.3 10e3/uL    Absolute Monocytes 0.0 0.0 - 1.3 10e3/uL    Absolute Eosinophils 0.0 0.0 - 0.7 10e3/uL    Absolute Basophils 0.0 0.0 - 0.2 10e3/uL    RBC Morphology Confirmed RBC Indices     Platelet Assessment  Automated Count Confirmed. Platelet morphology is normal.     Automated Count Confirmed. Platelet morphology is normal.   CONDITIONAL Prepare red blood cells (unit)   Result Value Ref Range    ISSUE DATE AND TIME [id number]     Blood Component Type Red Blood Cells     Product Code A7394O77     Unit Status Transfused     Unit Number [id number]     UNIT ABO/RH O+     CROSSMATCH COMPATIBLE     CODING SYSTEM [id number]     UNIT TYPE ISBT 5100

## 2023-09-18 LAB
ABO/RH TYPE: NORMAL
ALBUMIN SERPL BCG-MCNC: 3.7 G/DL (ref 3.5–5.2)
ALP SERPL-CCNC: 60 U/L (ref 40–129)
ALT SERPL W P-5'-P-CCNC: 20 U/L (ref 0–70)
ANION GAP SERPL CALCULATED.3IONS-SCNC: 11 MMOL/L (ref 7–15)
ANTIBODY SCREEN: NEGATIVE
APTT PPP: 35 SECONDS (ref 22–38)
AST SERPL W P-5'-P-CCNC: 22 U/L (ref 0–45)
BACTERIA SPT CULT: NORMAL
BILIRUB SERPL-MCNC: 0.7 MG/DL
BLD PROD TYP BPU: NORMAL
BLOOD COMPONENT TYPE: NORMAL
BUN SERPL-MCNC: 13.8 MG/DL (ref 8–23)
CALCIUM SERPL-MCNC: 8.4 MG/DL (ref 8.8–10.2)
CHLORIDE SERPL-SCNC: 99 MMOL/L (ref 98–107)
CODING SYSTEM: NORMAL
CREAT SERPL-MCNC: 0.9 MG/DL (ref 0.67–1.17)
DEPRECATED HCO3 PLAS-SCNC: 21 MMOL/L (ref 22–29)
EGFRCR SERPLBLD CKD-EPI 2021: >90 ML/MIN/1.73M2
ERYTHROCYTE [DISTWIDTH] IN BLOOD BY AUTOMATED COUNT: 12.9 % (ref 10–15)
FIBRINOGEN PPP-MCNC: 636 MG/DL (ref 170–490)
GLUCOSE SERPL-MCNC: 105 MG/DL (ref 70–99)
GRAM STAIN RESULT: NORMAL
HCT VFR BLD AUTO: 22.6 % (ref 40–53)
HGB BLD-MCNC: 7.6 G/DL (ref 13.3–17.7)
INR PPP: 1.13 (ref 0.85–1.15)
ISSUE DATE AND TIME: NORMAL
LDH SERPL L TO P-CCNC: 164 U/L (ref 0–250)
MAGNESIUM SERPL-MCNC: 2.1 MG/DL (ref 1.7–2.3)
MCH RBC QN AUTO: 28.8 PG (ref 26.5–33)
MCHC RBC AUTO-ENTMCNC: 33.6 G/DL (ref 31.5–36.5)
MCV RBC AUTO: 86 FL (ref 78–100)
PATH REPORT.COMMENTS IMP SPEC: NORMAL
PATH REPORT.FINAL DX SPEC: NORMAL
PATH REPORT.MICROSCOPIC SPEC OTHER STN: NORMAL
PATH REPORT.RELEVANT HX SPEC: NORMAL
PHOSPHATE SERPL-MCNC: 2.5 MG/DL (ref 2.5–4.5)
PLAT MORPH BLD: NORMAL
PLATELET # BLD AUTO: 6 10E3/UL (ref 150–450)
POTASSIUM SERPL-SCNC: 3.8 MMOL/L (ref 3.4–5.3)
PROT SERPL-MCNC: 7 G/DL (ref 6.4–8.3)
RBC # BLD AUTO: 2.64 10E6/UL (ref 4.4–5.9)
RBC MORPH BLD: NORMAL
SCANNED LAB RESULT: NORMAL
SODIUM SERPL-SCNC: 131 MMOL/L (ref 136–145)
SPECIMEN EXPIRATION DATE: NORMAL
SPECIMEN EXPIRATION DATE: NORMAL
UNIT ABO/RH: NORMAL
UNIT NUMBER: NORMAL
UNIT STATUS: NORMAL
UNIT TYPE ISBT: 5100
URATE SERPL-MCNC: 2.9 MG/DL (ref 3.4–7)
WBC # BLD AUTO: 0.4 10E3/UL (ref 4–11)

## 2023-09-18 PROCEDURE — P9037 PLATE PHERES LEUKOREDU IRRAD: HCPCS | Performed by: PHYSICIAN ASSISTANT

## 2023-09-18 PROCEDURE — 250N000013 HC RX MED GY IP 250 OP 250 PS 637: Performed by: PHYSICIAN ASSISTANT

## 2023-09-18 PROCEDURE — 86901 BLOOD TYPING SEROLOGIC RH(D): CPT | Performed by: PHYSICIAN ASSISTANT

## 2023-09-18 PROCEDURE — 83735 ASSAY OF MAGNESIUM: CPT

## 2023-09-18 PROCEDURE — 80053 COMPREHEN METABOLIC PANEL: CPT | Performed by: PHYSICIAN ASSISTANT

## 2023-09-18 PROCEDURE — 84100 ASSAY OF PHOSPHORUS: CPT

## 2023-09-18 PROCEDURE — 85730 THROMBOPLASTIN TIME PARTIAL: CPT

## 2023-09-18 PROCEDURE — 250N000013 HC RX MED GY IP 250 OP 250 PS 637

## 2023-09-18 PROCEDURE — 85610 PROTHROMBIN TIME: CPT

## 2023-09-18 PROCEDURE — 250N000013 HC RX MED GY IP 250 OP 250 PS 637: Performed by: STUDENT IN AN ORGANIZED HEALTH CARE EDUCATION/TRAINING PROGRAM

## 2023-09-18 PROCEDURE — 85384 FIBRINOGEN ACTIVITY: CPT

## 2023-09-18 PROCEDURE — 250N000013 HC RX MED GY IP 250 OP 250 PS 637: Performed by: HOSPITALIST

## 2023-09-18 PROCEDURE — 86850 RBC ANTIBODY SCREEN: CPT | Performed by: PHYSICIAN ASSISTANT

## 2023-09-18 PROCEDURE — 84550 ASSAY OF BLOOD/URIC ACID: CPT

## 2023-09-18 PROCEDURE — 83615 LACTATE (LD) (LDH) ENZYME: CPT

## 2023-09-18 PROCEDURE — 85027 COMPLETE CBC AUTOMATED: CPT | Performed by: PHYSICIAN ASSISTANT

## 2023-09-18 PROCEDURE — 99233 SBSQ HOSP IP/OBS HIGH 50: CPT | Mod: FS | Performed by: INTERNAL MEDICINE

## 2023-09-18 PROCEDURE — 120N000002 HC R&B MED SURG/OB UMMC

## 2023-09-18 PROCEDURE — 250N000011 HC RX IP 250 OP 636: Mod: JZ

## 2023-09-18 RX ADMIN — ACYCLOVIR 400 MG: 400 TABLET ORAL at 19:38

## 2023-09-18 RX ADMIN — POTASSIUM & SODIUM PHOSPHATES POWDER PACK 280-160-250 MG 1 PACKET: 280-160-250 PACK at 04:21

## 2023-09-18 RX ADMIN — SODIUM CHLORIDE, PRESERVATIVE FREE 5 ML: 5 INJECTION INTRAVENOUS at 04:23

## 2023-09-18 RX ADMIN — GUAIFENESIN AND DEXTROMETHORPHAN HYDROBROMIDE 1 TABLET: 600; 30 TABLET, EXTENDED RELEASE ORAL at 19:46

## 2023-09-18 RX ADMIN — ACYCLOVIR 400 MG: 400 TABLET ORAL at 08:59

## 2023-09-18 RX ADMIN — CETIRIZINE HYDROCHLORIDE 10 MG: 10 TABLET, FILM COATED ORAL at 08:59

## 2023-09-18 RX ADMIN — BENZONATATE 100 MG: 100 CAPSULE ORAL at 00:03

## 2023-09-18 RX ADMIN — HYDROXYZINE HYDROCHLORIDE 50 MG: 25 TABLET, FILM COATED ORAL at 19:38

## 2023-09-18 RX ADMIN — LEVOFLOXACIN 250 MG: 250 TABLET, FILM COATED ORAL at 08:59

## 2023-09-18 RX ADMIN — POLYETHYLENE GLYCOL 3350 17 G: 17 POWDER, FOR SOLUTION ORAL at 09:30

## 2023-09-18 RX ADMIN — SODIUM CHLORIDE, PRESERVATIVE FREE 5 ML: 5 INJECTION INTRAVENOUS at 04:22

## 2023-09-18 RX ADMIN — VORICONAZOLE 200 MG: 200 TABLET ORAL at 19:38

## 2023-09-18 RX ADMIN — VORICONAZOLE 200 MG: 200 TABLET ORAL at 08:59

## 2023-09-18 ASSESSMENT — ACTIVITIES OF DAILY LIVING (ADL)
ADLS_ACUITY_SCORE: 24

## 2023-09-18 NOTE — PROGRESS NOTES
"[id number]    Blood pressure 123/64, pulse 90, temperature 98.4  F (36.9  C), temperature source Oral, resp. rate 16, height 1.74 m (5' 8.5\"), weight 85.5 kg (188 lb 9.6 oz), SpO2 96 %.      Reason for admission: 9/1 for new AML workup and treatment  Activity: UAL  Pain: Denies  Neuro: AOX4. Pleasant  Cardiac: WDL  Respiratory: RA. No accessory muscle use. X1 prn tessalon given for cough with effectiveness. Pt reports they were able to sleep better.  GI/: Denies nausea. Voiding spontaneously. No BM this shift  Diet: Regular  Lines: DL PICC hep locked  Wounds: No new skin issues  Labs/imaging: Reviewed. Phos replaced. AM plt 6, needs transfusion.       No acute changes this shift. Pt sleeping between cares.      Continue to monitor and follow POC    "

## 2023-09-18 NOTE — PROGRESS NOTES
[facility]    Hematology / Oncology Progress Note    Patient: Artie Fine  MRN: [id number]  Admission Date: 9/1/2023  Date of Service (when I saw the patient): 09/18/2023  Hospital Day # 17     Assessment & Plan   Artie Fine is a 68 year old male with a history of brain abscess (2021), HTN, and BPH. He was transferred from [facility] in [place], [place] to [facility] for work up of pancytopenia; he was noted to have circulating blasts concerning for acute leukemia, and was subsequently transferred to [facility]. He underwent a diagnostic bone marrow biopsy on 9/1/23 which confirmed a new diagnosis of AML. He was started on induction with 7+3 (C1D1=9/2/23). Hospital course has been relatively uncomplicated, notable primarily for rhinovirus infection.     Today: Day 17 from 7+3.   - D14 BMBx results pending for morph. Flow showing no increase in myeloid blasts/no abnormal myeloid blast population. Sample appears hypocellular and may be hemodilute and final interpretation requires correlation w/ morph results.  - Continue supportive cares for rhino/enterovirus seen on RVP from 9/16. Flu/COVID/RSV negative. CXR without clear infiltrate and no true fever spike; not on treatment dose antibiotics. Will continue ppx levofloxacin.   - Will continue to monitor daily and provide best supportive cares.    HEME/ONC  # AML, adverse risk  Negative FLT3 ITD. Normal karyotype. FISH shows no evidence of MLLT10, NUP98, or KMT2A rearrangement. Patient is likely in intermediate risk given the information available at this time, however can reassess when NGS results.  Patient initially presented to [facility] in [place], [place] with 4-6 weeks of progressive bruising, weakness, fatigue, loss of appetite, and night sweats. He also noted a headache similar to his previous brain abscess. CBC notable for pancytopenia; WBC 2.7 (), Hgb 8.1, and plt 1k. He was transferred to  [place] and was found on peripheral flow w/ 11% circulating myeloid blasts. He was then transferred to [facility] for further work up and treatment of AML. Diagnostic BMBx performed 9/1 which showed AML with 30% blasts by morph and 47% by flow. P53 negative. FISH negative for MLLT10, NUP98, and KMT2A. Normal karyotype. NGS showed ASXL1, CEBPA, SRSF2, and STAG2 mutations. Started induction chemotherapy of 7+3 on C1D1=9/2/23.   - HLA typing ordered. Plan for BMT NT ~9/20-9/22, requested 9/22 afternoon so wife can be present in person.   - Baseline TTE showing normal LVEF of 55-60%. Baseline EKG showed NSR and QTc of 434.   - Viral serologies: HepB/C-, HIV-. HSV1/2+, EBV IgG+, CMV IgG+  - PICC placed 9/1  - S/p diagnostic LP 9/8/23 for CNS leukemia work up as patient endorses headache upon admission w/ new AML diagnosis. CSF flow negative. MRI brain 9/11 negative, only showing chronic changes from h/o brain abscess.   - D14 BMBx performed 9/15; results pending for morph. Flow showing no increase in myeloid blasts/no abnormal myeloid blast population. Sample appears hypocelular and may be hemodilute and final interpretation requires correlation w/ morph results. 1.1% cells in the blast gate (CD45 dim and low side scatter blast gate). Non-blast cells are falling in the blast gate. There is no aberrant myeloid blast population.   CD34 positive blasts are rare to absent.   - Process & Hold also ordered                 Treatment Plan: 7+3 (C1D1=9/2/23)                - Cytarabine 100 mg/m2 D1-D7               - Daunorubicin 60 mg/m2 D1-D3               - Supportive meds: Decadron 12 mg tab D1-D3, Zofran     # Low risk for TLS/DIC  - Discontinued allopurinol  - Montior TLS/DIC labs twice weekly     ID  # Immunosuppressed 2/2 malignancy and chemotherapy  # ID PPX  -  mg BID  - Levofloxacin 250 mg daily  - Voriconazole 200 mg BID x9/10     # H/o brain abscess (2021)  # Headache, resolved  # Dizziness, resolved  Treated in  [place]. S/p craniotomy for drainage of abscess July 2021. It is unclear what culture speciated to, but he completed a course of antibiotics with ceftriaxone then meropenem with resolution on imaging. Head CT 8/31 at OSH revealed no intracranial hemorrhage midline shift or mass effect. Postsurgical changes noted. Previous area of infection demonstrates trace amount of encephalomalacia but no mass effect or inflammatory changes to suggest new or acute infection. Otherwise unremarkable. Patient presented initially with headache and dizziness, which has now resolved.   - See work up above for CNS leukemia work-up, which was negative     CHRONIC  # HTN  Noted during prior hospitalization for brain abscess in 2021. Previously on lisinopril, which he is no longer taking.  - Patient BP's have fluctuated this admission, w/ occasionally elevated BPs. Continue to monitor.     # BPH  Notes difficulty with complete emptying of bladder. Previously on Flomax but states it did nothing, so he stopped it. He does note frequent urination at baseline, currently urination remains unchanged from baseline.  - Monitor clinically     MISC  # Weakness, improved  # Deconditioning, improved  Pt reports issues w/ balance after his brain abscess in 2021, exacerbated when ambulating. Deconditioning acute-on-chronic upon admission since 2021.   - PT consulted    # Weight loss  # Loss of appetite  # Moderate malnutrition in the context of acute illness   Patient presented with loss of appetite 2/2 malignancy. He has been gradually losing weight throughout admission, although PO intake is overall sufficient.   - RD consulted  - Supplements ordered: Ensure BID between meals and PRN    # Post nasal drip  Nasal congestion and post nasal drip 9/15 morning upon waking. Able to spit out nasal drainage and noting clear in color. Symptoms resolved after getting up. No persisting runny nose, denies sinus congestion, sore throat, cough, fevers, chills.   "Reports history of seasonal allergies does not take any medications for this.  Continue cetirizine 10 mg (notes loratadine/Claritin has been ineffective in the past).  - Patient reported blood tinged sputum, possibly post nasal drip on 9/18/23 to bedside RN who reported to PA. However upon visit with PA patient did not endorse this. Will continue to closely monitor. Patients platelets dropped to 6 and is scheduled to receive 1 unit platelet's today.    Clinically Significant Risk Factors          # Hypocalcemia: Lowest Ca = 8.4 mg/dL in last 2 days, will monitor and replace as appropriate       # Thrombocytopenia: Lowest platelets = 6 in last 2 days, will monitor for bleeding            # Overweight: Estimated body mass index is 28.26 kg/m  as calculated from the following:    Height as of this encounter: 1.74 m (5' 8.5\").    Weight as of this encounter: 85.5 kg (188 lb 9.6 oz).       # Moderate Malnutrition: based on nutrition assessment         FEN  Diet: Regular Diet Adult   IVF: Bolus PRN   Lytes: Replete per protocol    PPX  VTE: None given thrombocytopenia  Bowel: Senna/MiraLax PRN  GI/PUD: None currently indicated    MISC  Code Status: Full Code   Lines/Drains: PICC  Dispo: Anticipate 4-6 week admission for induction chemotherapy and count recovery  Follow Up: Pt will follow with Dr. Garcia, appointment scheduled for end of September.    This patient care plan was thoroughly discussed with the attending, Dr. Galicia.    I spent 50 minutes face-to-face or coordinating care of Artie Fine. Over 50% of our time on the unit was spent counseling the patient and coordinating care.    Hilda Gamble PA-C  Hematology/Oncology  Pager: [phone]  Phone: [phone]      Interval History   Charting and nursing notes were thoroughly reviewed overnight showing no acute events. Patient is afebrile and hemodynamically stable. 96% oxygen on RA. Upon my visit today patient is sitting up in his chair. He is pleasant and conversational. I " appreciate intermittent dry cough while in the room, however, patient reports this is improving. He remains feeling tired and fatigued. Otherwise he denies full ROS such as F/C/NS, headache, dizziness, vision changes, chest or abdominal pain, SOB, skin rashes or swelling. We discussed that we are still awaiting his bone marrow biopsy results to come back. The plan for today is transfusion on platelets d/t his drop in platelets to 6, patient expressed understanding. Otherwise we will continue best supportive cares and daily monitoring. I let patient know he is hyponatremic, possibly dilutional as patient expresses he drinks multiple pink pitchers of water per day. He will cut back some today after discussion, but otherwise reports eating and drinking well. He reports having normal daily bowel movements; although more so on the firmer side, he expressed he will request miralax from his bedside RN which is prescribed PRN. He denied having any questions or concerns for the team today.    All other ROS were negative except those discussed above.    Vital Signs with Ranges  Temp:  [97.8  F (36.6  C)-99.4  F (37.4  C)] 98.4  F (36.9  C)  Pulse:  [83-98] 90  Resp:  [16-20] 16  BP: (108-132)/(56-69) 123/64  SpO2:  [95 %-98 %] 96 %  I/O last 3 completed shifts:  In: 1120 [P.O.:820]  Out: -     Physical Exam   Constitutional: Patient is sitting up in his chair, in NAD, awake and conversational.  HEENT: Normocephalic. Moist mucus membranes w/out lesions, thrush, or exudates.  Respiratory: 96% O2 on RA. Breathing comfortably, CTAB w/ no wheezes or crackles. Infrequent dry cough.  Cardiovascular: RRR w/ no M/R/G's. No peripheral edema.    GI: Abdomen non-distended w/ normoactive bowel sounds, soft and non-TTP.   Skin: Skin is clean, dry, intact. No jaundice or rashes appreciated on exposed areas.   Neurologic: A&Ox3. No focal deficits appreciated on limited exam.  Psych: Calm, affect congruent to situation.   Vascular access:  PICC on RUE; CDI w/ no erythema.       Medications    - MEDICATION INSTRUCTIONS -        acyclovir  400 mg Oral BID    cetirizine  10 mg Oral Daily    hydrOXYzine  50 mg Oral At Bedtime    levofloxacin  250 mg Oral Daily    voriconazole  200 mg Oral Q12H [facility] (08/20)     Data   Results for orders placed or performed during the hospital encounter of 09/01/23 (from the past 24 hour(s))   Group A Streptococcus PCR Throat Swab    Specimen: Throat; Swab   Result Value Ref Range    Group A strep by PCR Not Detected Not Detected    Narrative    The Xpert Xpress Strep A test, performed on the [employer], is a rapid, qualitative in vitro diagnostic test for the detection of Streptococcus pyogenes (Group A ß-hemolytic Streptococcus, Strep A) in throat swab specimens from patients with signs and symptoms of pharyngitis. The Xpert Xpress Strep A test can be used as an aid in the diagnosis of Group A Streptococcal pharyngitis. The assay is not intended to monitor treatment for Group A Streptococcus infections. The Xpert Xpress Strep A test utilizes an automated real-time polymerase chain reaction (PCR) to detect Streptococcus pyogenes DNA.   Magnesium   Result Value Ref Range    Magnesium 2.0 1.7 - 2.3 mg/dL   Phosphorus   Result Value Ref Range    Phosphorus 2.1 (L) 2.5 - 4.5 mg/dL   ABO/Rh type and screen    Narrative    The following orders were created for panel order ABO/Rh type and screen.  Procedure                               Abnormality         Status                     ---------                               -----------         ------                     Adult Type and Screen[[id number]]                            Final result                 Please view results for these tests on the individual orders.   CBC with platelets differential    Narrative    The following orders were created for panel order CBC with platelets differential.  Procedure                               Abnormality          Status                     ---------                               -----------         ------                     CBC with platelets and d...[[id number]]  Abnormal            Final result               RBC and Platelet Morphology[[id number]]                      Final result                 Please view results for these tests on the individual orders.   Fibrinogen activity   Result Value Ref Range    Fibrinogen Activity 636 (H) 170 - 490 mg/dL   INR   Result Value Ref Range    INR 1.13 0.85 - 1.15   Lactate Dehydrogenase   Result Value Ref Range    Lactate Dehydrogenase 164 0 - 250 U/L   Magnesium   Result Value Ref Range    Magnesium 2.1 1.7 - 2.3 mg/dL   Partial thromboplastin time   Result Value Ref Range    aPTT 35 22 - 38 Seconds   Phosphorus   Result Value Ref Range    Phosphorus 2.5 2.5 - 4.5 mg/dL   Uric acid   Result Value Ref Range    Uric Acid 2.9 (L) 3.4 - 7.0 mg/dL   Comprehensive metabolic panel   Result Value Ref Range    Sodium 131 (L) 136 - 145 mmol/L    Potassium 3.8 3.4 - 5.3 mmol/L    Chloride 99 98 - 107 mmol/L    Carbon Dioxide (CO2) 21 (L) 22 - 29 mmol/L    Anion Gap 11 7 - 15 mmol/L    Urea Nitrogen 13.8 8.0 - 23.0 mg/dL    Creatinine 0.90 0.67 - 1.17 mg/dL    Calcium 8.4 (L) 8.8 - 10.2 mg/dL    Glucose 105 (H) 70 - 99 mg/dL    Alkaline Phosphatase 60 40 - 129 U/L    AST 22 0 - 45 U/L    ALT 20 0 - 70 U/L    Protein Total 7.0 6.4 - 8.3 g/dL    Albumin 3.7 3.5 - 5.2 g/dL    Bilirubin Total 0.7 <=1.2 mg/dL    GFR Estimate >90 >60 mL/min/1.73m2   Adult Type and Screen   Result Value Ref Range    Antibody Screen Negative Negative    SPECIMEN EXPIRATION DATE [id number]    CBC with platelets and differential   Result Value Ref Range    WBC Count 0.4 (LL) 4.0 - 11.0 10e3/uL    RBC Count 2.64 (L) 4.40 - 5.90 10e6/uL    Hemoglobin 7.6 (L) 13.3 - 17.7 g/dL    Hematocrit 22.6 (L) 40.0 - 53.0 %    MCV 86 78 - 100 fL    MCH 28.8 26.5 - 33.0 pg    MCHC 33.6 31.5 - 36.5 g/dL    RDW 12.9 10.0 - 15.0 %     Platelet Count 6 (LL) 150 - 450 10e3/uL   ABO/RH Type & Screen   Result Value Ref Range    SPECIMEN EXPIRATION DATE [id number]     ABORH ABO Unknown Rh POS    RBC and Platelet Morphology   Result Value Ref Range    Platelet Assessment  Automated Count Confirmed. Platelet morphology is normal.     Automated Count Confirmed. Platelet morphology is normal.    RBC Morphology Confirmed RBC Indices

## 2023-09-18 NOTE — PLAN OF CARE
Goal Outcome Evaluation:  [id number]  AVSS, alert and oriented x 4, denies pain/nausea/sob. Plt is 6, replace plt with no transfusion reaction. Pt cough is getting better. Up independent, voiding adequately, LBM 9/17, given merilex.  Continue to monitor care.

## 2023-09-18 NOTE — PLAN OF CARE
Goal Outcome Evaluation:      Plan of Care Reviewed With: patient    Overall Patient Progress: no change    A&Ox4, afebrile, BP 120s/60s, OVSS on RA. Denies pain/N/V. PICC remains heparin locked. Mucinex x1. Cares clustered to facilitate sleep/rest per plan with pt. Continue to monitor and with POC.

## 2023-09-18 NOTE — PLAN OF CARE
Goal Outcome Evaluation:       Plan of Care Reviewed With: patient     Overall Patient Progress: no change     A&Ox4, afebrile, BP 120s/60s, OVSS on RA. Denies pain/N/V. PICC remains heparin locked. Mucinex x1. Actively getting phos replacement PO. Cares clustered to facilitate sleep/rest per plan with pt. Continue to monitor and with POC.

## 2023-09-19 LAB
ALBUMIN SERPL BCG-MCNC: 3.7 G/DL (ref 3.5–5.2)
ALP SERPL-CCNC: 62 U/L (ref 40–129)
ALT SERPL W P-5'-P-CCNC: 24 U/L (ref 0–70)
ANION GAP SERPL CALCULATED.3IONS-SCNC: 9 MMOL/L (ref 7–15)
AST SERPL W P-5'-P-CCNC: 23 U/L (ref 0–45)
BILIRUB SERPL-MCNC: 0.5 MG/DL
BUN SERPL-MCNC: 14.6 MG/DL (ref 8–23)
CALCIUM SERPL-MCNC: 8.4 MG/DL (ref 8.8–10.2)
CHLORIDE SERPL-SCNC: 100 MMOL/L (ref 98–107)
CREAT SERPL-MCNC: 0.89 MG/DL (ref 0.67–1.17)
DEPRECATED HCO3 PLAS-SCNC: 23 MMOL/L (ref 22–29)
EGFRCR SERPLBLD CKD-EPI 2021: >90 ML/MIN/1.73M2
ERYTHROCYTE [DISTWIDTH] IN BLOOD BY AUTOMATED COUNT: 12.9 % (ref 10–15)
GLUCOSE SERPL-MCNC: 107 MG/DL (ref 70–99)
HCT VFR BLD AUTO: 21 % (ref 40–53)
HGB BLD-MCNC: 7.2 G/DL (ref 13.3–17.7)
MAGNESIUM SERPL-MCNC: 2.2 MG/DL (ref 1.7–2.3)
MCH RBC QN AUTO: 28.9 PG (ref 26.5–33)
MCHC RBC AUTO-ENTMCNC: 34.3 G/DL (ref 31.5–36.5)
MCV RBC AUTO: 84 FL (ref 78–100)
PATH REPORT.COMMENTS IMP SPEC: NORMAL
PATH REPORT.COMMENTS IMP SPEC: NORMAL
PATH REPORT.FINAL DX SPEC: NORMAL
PATH REPORT.GROSS SPEC: NORMAL
PATH REPORT.MICROSCOPIC SPEC OTHER STN: NORMAL
PATH REPORT.MICROSCOPIC SPEC OTHER STN: NORMAL
PATH REPORT.RELEVANT HX SPEC: NORMAL
PHOSPHATE SERPL-MCNC: 2.3 MG/DL (ref 2.5–4.5)
PLAT MORPH BLD: NORMAL
PLATELET # BLD AUTO: 24 10E3/UL (ref 150–450)
POTASSIUM SERPL-SCNC: 4.1 MMOL/L (ref 3.4–5.3)
PROT SERPL-MCNC: 7.2 G/DL (ref 6.4–8.3)
RBC # BLD AUTO: 2.49 10E6/UL (ref 4.4–5.9)
RBC MORPH BLD: NORMAL
SODIUM SERPL-SCNC: 132 MMOL/L (ref 136–145)
WBC # BLD AUTO: 0.4 10E3/UL (ref 4–11)

## 2023-09-19 PROCEDURE — 250N000013 HC RX MED GY IP 250 OP 250 PS 637

## 2023-09-19 PROCEDURE — 83735 ASSAY OF MAGNESIUM: CPT | Performed by: STUDENT IN AN ORGANIZED HEALTH CARE EDUCATION/TRAINING PROGRAM

## 2023-09-19 PROCEDURE — 250N000013 HC RX MED GY IP 250 OP 250 PS 637: Performed by: INTERNAL MEDICINE

## 2023-09-19 PROCEDURE — 99233 SBSQ HOSP IP/OBS HIGH 50: CPT | Mod: FS | Performed by: INTERNAL MEDICINE

## 2023-09-19 PROCEDURE — 250N000013 HC RX MED GY IP 250 OP 250 PS 637: Performed by: STUDENT IN AN ORGANIZED HEALTH CARE EDUCATION/TRAINING PROGRAM

## 2023-09-19 PROCEDURE — 85027 COMPLETE CBC AUTOMATED: CPT | Performed by: PHYSICIAN ASSISTANT

## 2023-09-19 PROCEDURE — 120N000002 HC R&B MED SURG/OB UMMC

## 2023-09-19 PROCEDURE — 84100 ASSAY OF PHOSPHORUS: CPT | Performed by: STUDENT IN AN ORGANIZED HEALTH CARE EDUCATION/TRAINING PROGRAM

## 2023-09-19 PROCEDURE — 250N000013 HC RX MED GY IP 250 OP 250 PS 637: Performed by: HOSPITALIST

## 2023-09-19 PROCEDURE — 250N000011 HC RX IP 250 OP 636: Mod: JZ

## 2023-09-19 PROCEDURE — 250N000013 HC RX MED GY IP 250 OP 250 PS 637: Performed by: PHYSICIAN ASSISTANT

## 2023-09-19 PROCEDURE — 80053 COMPREHEN METABOLIC PANEL: CPT | Performed by: PHYSICIAN ASSISTANT

## 2023-09-19 RX ORDER — SALIVA STIMULANT COMB. NO.3
2 SPRAY, NON-AEROSOL (ML) MUCOUS MEMBRANE 4 TIMES DAILY PRN
Status: DISCONTINUED | OUTPATIENT
Start: 2023-09-19 | End: 2023-10-03 | Stop reason: HOSPADM

## 2023-09-19 RX ADMIN — POTASSIUM & SODIUM PHOSPHATES POWDER PACK 280-160-250 MG 1 PACKET: 280-160-250 PACK at 08:47

## 2023-09-19 RX ADMIN — POTASSIUM & SODIUM PHOSPHATES POWDER PACK 280-160-250 MG 1 PACKET: 280-160-250 PACK at 16:18

## 2023-09-19 RX ADMIN — POTASSIUM & SODIUM PHOSPHATES POWDER PACK 280-160-250 MG 1 PACKET: 280-160-250 PACK at 11:45

## 2023-09-19 RX ADMIN — CETIRIZINE HYDROCHLORIDE 10 MG: 10 TABLET, FILM COATED ORAL at 07:58

## 2023-09-19 RX ADMIN — VORICONAZOLE 200 MG: 200 TABLET ORAL at 07:58

## 2023-09-19 RX ADMIN — ACYCLOVIR 400 MG: 400 TABLET ORAL at 07:58

## 2023-09-19 RX ADMIN — ACYCLOVIR 400 MG: 400 TABLET ORAL at 20:30

## 2023-09-19 RX ADMIN — VORICONAZOLE 200 MG: 200 TABLET ORAL at 20:31

## 2023-09-19 RX ADMIN — SODIUM CHLORIDE, PRESERVATIVE FREE 5 ML: 5 INJECTION INTRAVENOUS at 04:42

## 2023-09-19 RX ADMIN — GUAIFENESIN AND DEXTROMETHORPHAN HYDROBROMIDE 1 TABLET: 600; 30 TABLET, EXTENDED RELEASE ORAL at 20:30

## 2023-09-19 RX ADMIN — HYDROXYZINE HYDROCHLORIDE 50 MG: 25 TABLET, FILM COATED ORAL at 20:30

## 2023-09-19 RX ADMIN — LEVOFLOXACIN 250 MG: 250 TABLET, FILM COATED ORAL at 07:58

## 2023-09-19 RX ADMIN — POLYETHYLENE GLYCOL 3350 17 G: 17 POWDER, FOR SOLUTION ORAL at 20:31

## 2023-09-19 RX ADMIN — Medication 2 SPRAY: at 07:58

## 2023-09-19 RX ADMIN — BENZONATATE 100 MG: 100 CAPSULE ORAL at 20:30

## 2023-09-19 ASSESSMENT — ACTIVITIES OF DAILY LIVING (ADL)
ADLS_ACUITY_SCORE: 24

## 2023-09-19 NOTE — PLAN OF CARE
[id number]  Goal Outcome Evaluation:     Overall Patient Progress: no change. Outcome Evaluation: BMBx results pending, waiting count recovery.    Vitals stable. Denies pain/nausea/sob. Independent. Calm and cooperative overnight. Mucinex given for cough w/ relief. Bone marrow results pending.

## 2023-09-19 NOTE — PLAN OF CARE
Goal Outcome Evaluation:      Plan of Care Reviewed With: patient    Overall Patient Progress: no changeOverall Patient Progress: no change  Pt afebrile with stable vs. Continues with occasional cough, denies need for cough suppressant. No blood products required today. Phos level 2.3 being replaced orally, redraw tomorrow. Good po intake Adequate urine output. Having regular stools. UAL in room independently

## 2023-09-19 NOTE — PROGRESS NOTES
Care Management Follow Up    Length of Stay (days): 18    Expected Discharge Date: 09/29/2023     Concerns to be Addressed:   [facility] request    Additional Information:  SW received a call from Hilda CHAPMAN, asking SW to complete another [facility] request for this weekend. SW completed request for 9/22-25/23.     SW to follow and assist with any other discharge needs that may arise.  GUY Rajput   5B    Phone: [phone]  Pager: [phone]

## 2023-09-19 NOTE — PROGRESS NOTES
[facility]    Hematology / Oncology Progress Note    Patient: Artie Fine  MRN: [id number]  Admission Date: 9/1/2023  Date of Service (when I saw the patient): 09/19/2023  Hospital Day # 18     Assessment & Plan   Artie Fine is a 68 year old male with a history of brain abscess (2021), HTN, and BPH. He was transferred from [facility] in [place], [place] to [facility] for work up of pancytopenia; he was noted to have circulating blasts concerning for acute leukemia, and was subsequently transferred to [facility]. He underwent a diagnostic bone marrow biopsy on 9/1/23 which confirmed a new diagnosis of AML. He was started on induction with 7+3 (C1D1=9/2/23). Hospital course has been relatively uncomplicated, notable primarily for rhinovirus infection.     Today: Day 18 from 7+3.   - D14 BMBx morph shows hypocellular bone marrow with no morphologic or immunophenotypic evidence of acute myeloid leukemia. Flow showing no increase in myeloid blasts/no abnormal myeloid blast population. Sample appears hypocellular and may be hemodilute and final interpretation requires correlation w/ morph results.  - Dry cough continues to improve, will continue supportive cares for rhino/enterovirus seen on RVP from 9/16. Flu/COVID/RSV negative. CXR without clear infiltrate and no true fever spike; not on treatment dose antibiotics. Will continue ppx levofloxacin.   - Patient requested  to place referral [facility] for his wife, Aundrea, to stay three nights Fri-Sun. Notified SW.  - BMT called patient on 9/18 to schedule new consult patient told them he didn't want to discuss the BMT NT appointment at that time because he's not feeling well. Messaged with Carlos Eduardo Martines from BMT to let him know I discussed this with patient and he would now like to proceed with his BMT NT consult if they could give him another call to schedule.  - Will continue to monitor daily and provide  best supportive cares.    HEME/ONC  # AML, adverse risk  Negative FLT3 ITD. Normal karyotype. FISH shows no evidence of MLLT10, NUP98, or KMT2A rearrangement. Patient is likely in intermediate risk given the information available at this time, however can reassess when NGS results.  Patient initially presented to [facility] in [place], [place] with 4-6 weeks of progressive bruising, weakness, fatigue, loss of appetite, and night sweats. He also noted a headache similar to his previous brain abscess. CBC notable for pancytopenia; WBC 2.7 (), Hgb 8.1, and plt 1k. He was transferred to [facility] and was found on peripheral flow w/ 11% circulating myeloid blasts. He was then transferred to [facility] for further work up and treatment of AML. Diagnostic BMBx performed 9/1 which showed AML with 30% blasts by morph and 47% by flow. P53 negative. FISH negative for MLLT10, NUP98, and KMT2A. Normal karyotype. NGS showed ASXL1, CEBPA, SRSF2, and STAG2 mutations. Started induction chemotherapy of 7+3 on C1D1=9/2/23.   - Baseline TTE showing normal LVEF of 55-60%. Baseline EKG showed NSR and QTc of 434.   - Viral serologies: HepB/C-, HIV-. HSV1/2+, EBV IgG+, CMV IgG+  - PICC placed 9/1  - S/p diagnostic LP 9/8/23 for CNS leukemia work up as patient endorses headache upon admission w/ new AML diagnosis. CSF flow negative. MRI brain 9/11 negative, only showing chronic changes from h/o brain abscess.   - D14 BMBx performed 9/15; morph shows hypocellular bone marrow with no morphologic or immunophenotypic evidence of acute myeloid leukemia. Flow showing no increase in myeloid blasts/no abnormal myeloid blast population. Sample appears hypocelular and may be hemodilute, final interpretation requires correlation w/ morph results. 1.1% cells in the blast gate (CD45 dim and low side scatter blast gate). Non-blast cells are falling in the blast gate. There is no aberrant myeloid blast population. CD34 positive blasts are rare to  absent. Process & Hold also ordered  - HLA typing ordered. Plan for BMT NT ~9/20-9/22, requested 9/22 afternoon so wife can be present in person.    - BMT called patient on 9/18 to schedule new consult patient told them he didn't want to discuss the BMT NT appointment at that time because he's not feeling well. Messaged with Carlos Eduardo Martines from BMT to let him know I discussed this with patient and he would now like to proceed with his BMT NT consult if they could give him another call to schedule.                 Treatment Plan: 7+3 (C1D1=9/2/23)                - Cytarabine 100 mg/m2 D1-D7               - Daunorubicin 60 mg/m2 D1-D3               - Supportive meds: Decadron 12 mg tab D1-D3, Zofran     # Low risk for TLS/DIC  - Discontinued allopurinol  - Montior TLS/DIC labs twice weekly     ID  # Immunosuppressed 2/2 malignancy and chemotherapy  # ID PPX  -  mg BID  - Levofloxacin 250 mg daily  - Voriconazole 200 mg BID x9/10     # H/o brain abscess (2021)  # Headache, resolved  # Dizziness, resolved  Treated in [place]. S/p craniotomy for drainage of abscess July 2021. It is unclear what culture speciated to, but he completed a course of antibiotics with ceftriaxone then meropenem with resolution on imaging. Head CT 8/31 at OSH revealed no intracranial hemorrhage midline shift or mass effect. Postsurgical changes noted. Previous area of infection demonstrates trace amount of encephalomalacia but no mass effect or inflammatory changes to suggest new or acute infection. Otherwise unremarkable. Patient presented initially with headache and dizziness, which has now resolved.   - See work up above for CNS leukemia work-up, which was negative     CHRONIC  # HTN  Noted during prior hospitalization for brain abscess in 2021. Previously on lisinopril, which he is no longer taking.  - Patient BP's have fluctuated this admission, w/ occasionally elevated BPs. Continue to monitor.     # BPH  Notes difficulty with  complete emptying of bladder. Previously on Flomax but states it did nothing, so he stopped it. He does note frequent urination at baseline, currently urination remains unchanged from baseline.  - Monitor clinically     MISC  # Weakness, improved  # Deconditioning, improved  Pt reports issues w/ balance after his brain abscess in 2021, exacerbated when ambulating. Deconditioning acute-on-chronic upon admission since 2021.   - PT consulted    # Weight loss  # Loss of appetite  # Moderate malnutrition in the context of acute illness   Patient presented with loss of appetite 2/2 malignancy. He has been gradually losing weight throughout admission, although PO intake is overall sufficient.   - RD consulted  - Supplements ordered: Ensure BID between meals and PRN    # Post nasal drip  Nasal congestion and post nasal drip 9/15 morning upon waking. Able to spit out nasal drainage and noting clear in color. Symptoms resolved after getting up. No persisting runny nose, denies sinus congestion, sore throat, cough, fevers, chills.  Reports history of seasonal allergies does not take any medications for this.  Continue cetirizine 10 mg (notes loratadine/Claritin has been ineffective in the past).  - Patient reported blood tinged sputum, possibly post nasal drip on 9/18/23 to bedside RN who reported to PA. However upon visit with PA, patient did not endorse this. Will continue to closely monitor. Patients platelets dropped to 6 and is scheduled to receive 1 unit platelet's today.   - Patient denies any issues w/ sputum or sinus drainage today, denies that either had blood tinge yesterday and he has no concerns on 9/19. Endorses cough dry cough and congestion improving.    Clinically Significant Risk Factors          # Hypocalcemia: Lowest Ca = 8.4 mg/dL in last 2 days, will monitor and replace as appropriate       # Thrombocytopenia: Lowest platelets = 6 in last 2 days, will monitor for bleeding            # Overweight:  "Estimated body mass index is 28.18 kg/m  as calculated from the following:    Height as of this encounter: 1.74 m (5' 8.5\").    Weight as of this encounter: 85.3 kg (188 lb 1.6 oz).       # Moderate Malnutrition: based on nutrition assessment         FEN  Diet: Regular Diet Adult   IVF: Bolus PRN   Lytes: Replete per protocol    PPX  VTE: None given thrombocytopenia  Bowel: Senna/MiraLax PRN  GI/PUD: None currently indicated    MISC  Code Status: Full Code   Lines/Drains: PICC  Dispo: Will remain inpatient through count recovery with safe discharge plan outpatient. Patient reports he is okay with follow-up with Dr. Garcia but would prefer labs and blood transfusions done closer to home in [facility].  Follow Up: Pt will follow with Dr. Garcia, appointment scheduled for end of September.    This patient care plan was thoroughly discussed with the attending, Dr. aGlicia.    I spent 50 minutes face-to-face or coordinating care of Artie Fine. Over 50% of our time on the unit was spent counseling the patient and coordinating care.    Hilda Gamble PA-C  Hematology/Oncology  Pager: [phone]  Phone: [phone]      Interval History   Charting and nursing notes from overnight were thoroughly reviewed showing no acute events. Patient remains afebrile and hemodynamically stable, satting 96% oxygen on RA. Upon my visit today patient is sitting up in his bed, he reports feeling fatigued today. But otherwise denies any symptoms today such as F/C/NS, headache, dizziness, vision changes, chest or abdominal pain, SOB, skin rashes or swelling. He denied having any questions or concerns for the team today. He reports that his cough is improving. We discussed his location preferences for follow up upon discharge, and again discussed he will be here until stable to discharge home with close lab monitoring and blood transfusions will be required given the results of his CBC outpatient. We also discussed that his flow is negative for blasts " but his morph results are still pending and will need to correlate the results. We discussed that his sodium mildly improved to 132 but still remains low. He endorses that his mouth feels dry but the biotene is working well, he has cut back on his water intake. He endorses normal urine and BM's daily. He denied having any further questions or concerns for the team today.    All other ROS were negative except those discussed above.    Vital Signs with Ranges  Temp:  [98.1  F (36.7  C)-99.4  F (37.4  C)] 98.4  F (36.9  C)  Pulse:  [89-98] 94  Resp:  [15-18] 15  BP: (106-122)/(61-66) 116/63  SpO2:  [96 %-97 %] 96 %  I/O last 3 completed shifts:  In: 1940 [P.O.:1380]  Out: -     Physical Exam     Constitutional: Patient is sitting up in his bed, in NAD, awake, and conversational.  HEENT: Normocephalic. Moist mucus membranes w/out lesions, thrush on limited oral exam.  Respiratory: 96% O2 on RA. Breathing comfortably, CTAB w/ no wheezes or crackles. Infrequent dry cough.  Cardiovascular: RRR w/ no M/R/G's. No peripheral edema.    GI: Abdomen non-distended w/ normoactive bowel sounds, soft and non-TTP.   Skin: Skin is clean, dry, intact. No jaundice or rashes appreciated on exposed areas.   Neurologic: A&Ox3. No focal deficits appreciated on limited neuro exam.  Psych: Calm, affect congruent to situation.   Vascular access: PICC on RUE; CDI w/ no erythema.     _______________________________________________________________________________________  Pertinent medications and labs I personally reviewed below:     Medications    - MEDICATION INSTRUCTIONS -        acyclovir  400 mg Oral BID    cetirizine  10 mg Oral Daily    hydrOXYzine  50 mg Oral At Bedtime    levofloxacin  250 mg Oral Daily    voriconazole  200 mg Oral Q12H [facility] (08/20)     Data   Results for orders placed or performed during the hospital encounter of 09/01/23 (from the past 24 hour(s))   CONDITIONAL Prepare pheresed platelets (unit)   Result Value Ref Range     ISSUE DATE AND TIME [id number]     Blood Component Type Platelets     Product Code [id number]     Unit Status Transfused     Unit Number [id number]     UNIT ABO/RH O+     CODING SYSTEM [id number]     UNIT TYPE ISBT 5100    CBC with platelets differential    Narrative    The following orders were created for panel order CBC with platelets differential.  Procedure                               Abnormality         Status                     ---------                               -----------         ------                     CBC with platelets and d...[[id number]]  Abnormal            Final result               RBC and Platelet Morphology[[id number]]                      Final result                 Please view results for these tests on the individual orders.   ABO/Rh type and screen *Canceled*    Narrative    The following orders were created for panel order ABO/Rh type and screen.  Procedure                               Abnormality         Status                     ---------                               -----------         ------                     Adult Type and Screen[[id number]]                                                         Please view results for these tests on the individual orders.   Comprehensive metabolic panel   Result Value Ref Range    Sodium 132 (L) 136 - 145 mmol/L    Potassium 4.1 3.4 - 5.3 mmol/L    Chloride 100 98 - 107 mmol/L    Carbon Dioxide (CO2) 23 22 - 29 mmol/L    Anion Gap 9 7 - 15 mmol/L    Urea Nitrogen 14.6 8.0 - 23.0 mg/dL    Creatinine 0.89 0.67 - 1.17 mg/dL    Calcium 8.4 (L) 8.8 - 10.2 mg/dL    Glucose 107 (H) 70 - 99 mg/dL    Alkaline Phosphatase 62 40 - 129 U/L    AST 23 0 - 45 U/L    ALT 24 0 - 70 U/L    Protein Total 7.2 6.4 - 8.3 g/dL    Albumin 3.7 3.5 - 5.2 g/dL    Bilirubin Total 0.5 <=1.2 mg/dL    GFR Estimate >90 >60 mL/min/1.73m2   CBC with platelets and differential   Result Value Ref Range    WBC Count 0.4 (LL) 4.0 - 11.0 10e3/uL    RBC Count 2.49 (L) 4.40  - 5.90 10e6/uL    Hemoglobin 7.2 (L) 13.3 - 17.7 g/dL    Hematocrit 21.0 (L) 40.0 - 53.0 %    MCV 84 78 - 100 fL    MCH 28.9 26.5 - 33.0 pg    MCHC 34.3 31.5 - 36.5 g/dL    RDW 12.9 10.0 - 15.0 %    Platelet Count 24 (LL) 150 - 450 10e3/uL   Magnesium   Result Value Ref Range    Magnesium 2.2 1.7 - 2.3 mg/dL   Phosphorus   Result Value Ref Range    Phosphorus 2.3 (L) 2.5 - 4.5 mg/dL   RBC and Platelet Morphology   Result Value Ref Range    Platelet Assessment  Automated Count Confirmed. Platelet morphology is normal.     Automated Count Confirmed. Platelet morphology is normal.    RBC Morphology Confirmed RBC Indices

## 2023-09-19 NOTE — PLAN OF CARE
Goal Outcome Evaluation:  [id number]: Pt A&Ox4 with VSS on RA. No complaints of N/V or SOB. 3/10 HA complained of, no intervention needed. Sleeping between cares and able to make needs known. Continue with POC.

## 2023-09-19 NOTE — PROVIDER NOTIFICATION
Paged Shin Lindsay via [employer] @ [phone]    Pt is complaining of dry mouth, can we get PRN biotene spray ordered? Thanks.     Plan: biotene ordered.

## 2023-09-20 ENCOUNTER — APPOINTMENT (OUTPATIENT)
Dept: PHYSICAL THERAPY | Facility: CLINIC | Age: 68
DRG: 835 | End: 2023-09-20
Attending: INTERNAL MEDICINE
Payer: COMMERCIAL

## 2023-09-20 ENCOUNTER — APPOINTMENT (OUTPATIENT)
Dept: GENERAL RADIOLOGY | Facility: CLINIC | Age: 68
DRG: 835 | End: 2023-09-20
Attending: PEDIATRICS
Payer: COMMERCIAL

## 2023-09-20 LAB
ABO/RH TYPE: NORMAL
ALBUMIN SERPL BCG-MCNC: 3.5 G/DL (ref 3.5–5.2)
ALP SERPL-CCNC: 59 U/L (ref 40–129)
ALT SERPL W P-5'-P-CCNC: 31 U/L (ref 0–70)
ANION GAP SERPL CALCULATED.3IONS-SCNC: 9 MMOL/L (ref 7–15)
ANTIBODY SCREEN: NEGATIVE
AST SERPL W P-5'-P-CCNC: 29 U/L (ref 0–45)
BILIRUB SERPL-MCNC: 0.6 MG/DL
BLD PROD TYP BPU: NORMAL
BLOOD COMPONENT TYPE: NORMAL
BUN SERPL-MCNC: 13.5 MG/DL (ref 8–23)
CALCIUM SERPL-MCNC: 8.2 MG/DL (ref 8.8–10.2)
CHLORIDE SERPL-SCNC: 102 MMOL/L (ref 98–107)
CODING SYSTEM: NORMAL
CREAT SERPL-MCNC: 0.84 MG/DL (ref 0.67–1.17)
CROSSMATCH: NORMAL
DEPRECATED HCO3 PLAS-SCNC: 22 MMOL/L (ref 22–29)
EGFRCR SERPLBLD CKD-EPI 2021: >90 ML/MIN/1.73M2
ERYTHROCYTE [DISTWIDTH] IN BLOOD BY AUTOMATED COUNT: 12.9 % (ref 10–15)
GLUCOSE SERPL-MCNC: 106 MG/DL (ref 70–99)
HCT VFR BLD AUTO: 20.1 % (ref 40–53)
HGB BLD-MCNC: 6.8 G/DL (ref 13.3–17.7)
ISSUE DATE AND TIME: NORMAL
LACTATE SERPL-SCNC: 0.9 MMOL/L (ref 0.7–2)
MAGNESIUM SERPL-MCNC: 2.2 MG/DL (ref 1.7–2.3)
MCH RBC QN AUTO: 28.7 PG (ref 26.5–33)
MCHC RBC AUTO-ENTMCNC: 33.8 G/DL (ref 31.5–36.5)
MCV RBC AUTO: 85 FL (ref 78–100)
PHOSPHATE SERPL-MCNC: 2.4 MG/DL (ref 2.5–4.5)
PLAT MORPH BLD: NORMAL
PLATELET # BLD AUTO: 12 10E3/UL (ref 150–450)
POTASSIUM SERPL-SCNC: 4 MMOL/L (ref 3.4–5.3)
PROT SERPL-MCNC: 6.8 G/DL (ref 6.4–8.3)
RBC # BLD AUTO: 2.37 10E6/UL (ref 4.4–5.9)
RBC MORPH BLD: NORMAL
SODIUM SERPL-SCNC: 133 MMOL/L (ref 136–145)
SPECIMEN EXPIRATION DATE: NORMAL
SPECIMEN EXPIRATION DATE: NORMAL
UNIT ABO/RH: NORMAL
UNIT NUMBER: NORMAL
UNIT STATUS: NORMAL
UNIT TYPE ISBT: 9500
WBC # BLD AUTO: 0.3 10E3/UL (ref 4–11)

## 2023-09-20 PROCEDURE — 250N000013 HC RX MED GY IP 250 OP 250 PS 637: Performed by: STUDENT IN AN ORGANIZED HEALTH CARE EDUCATION/TRAINING PROGRAM

## 2023-09-20 PROCEDURE — 250N000013 HC RX MED GY IP 250 OP 250 PS 637: Performed by: HOSPITALIST

## 2023-09-20 PROCEDURE — 86920 COMPATIBILITY TEST SPIN: CPT | Performed by: PHYSICIAN ASSISTANT

## 2023-09-20 PROCEDURE — 36415 COLL VENOUS BLD VENIPUNCTURE: CPT | Performed by: PHYSICIAN ASSISTANT

## 2023-09-20 PROCEDURE — 85027 COMPLETE CBC AUTOMATED: CPT | Performed by: PHYSICIAN ASSISTANT

## 2023-09-20 PROCEDURE — 36415 COLL VENOUS BLD VENIPUNCTURE: CPT | Performed by: PEDIATRICS

## 2023-09-20 PROCEDURE — 80053 COMPREHEN METABOLIC PANEL: CPT | Performed by: PHYSICIAN ASSISTANT

## 2023-09-20 PROCEDURE — 250N000013 HC RX MED GY IP 250 OP 250 PS 637: Performed by: PHYSICIAN ASSISTANT

## 2023-09-20 PROCEDURE — 84100 ASSAY OF PHOSPHORUS: CPT | Performed by: INTERNAL MEDICINE

## 2023-09-20 PROCEDURE — 99207 PR NO CHARGE LOS: CPT | Performed by: PEDIATRICS

## 2023-09-20 PROCEDURE — P9016 RBC LEUKOCYTES REDUCED: HCPCS | Performed by: PHYSICIAN ASSISTANT

## 2023-09-20 PROCEDURE — 97110 THERAPEUTIC EXERCISES: CPT | Mod: GP | Performed by: PHYSICAL THERAPIST

## 2023-09-20 PROCEDURE — 250N000011 HC RX IP 250 OP 636: Mod: JZ

## 2023-09-20 PROCEDURE — 86850 RBC ANTIBODY SCREEN: CPT | Performed by: PHYSICIAN ASSISTANT

## 2023-09-20 PROCEDURE — 87077 CULTURE AEROBIC IDENTIFY: CPT | Performed by: PHYSICIAN ASSISTANT

## 2023-09-20 PROCEDURE — 83735 ASSAY OF MAGNESIUM: CPT | Performed by: INTERNAL MEDICINE

## 2023-09-20 PROCEDURE — 71045 X-RAY EXAM CHEST 1 VIEW: CPT | Mod: 26 | Performed by: RADIOLOGY

## 2023-09-20 PROCEDURE — 250N000011 HC RX IP 250 OP 636: Performed by: PEDIATRICS

## 2023-09-20 PROCEDURE — 250N000013 HC RX MED GY IP 250 OP 250 PS 637

## 2023-09-20 PROCEDURE — 86901 BLOOD TYPING SEROLOGIC RH(D): CPT | Performed by: PHYSICIAN ASSISTANT

## 2023-09-20 PROCEDURE — 71045 X-RAY EXAM CHEST 1 VIEW: CPT

## 2023-09-20 PROCEDURE — 99233 SBSQ HOSP IP/OBS HIGH 50: CPT | Mod: FS | Performed by: INTERNAL MEDICINE

## 2023-09-20 PROCEDURE — 87149 DNA/RNA DIRECT PROBE: CPT | Performed by: PHYSICIAN ASSISTANT

## 2023-09-20 PROCEDURE — 83605 ASSAY OF LACTIC ACID: CPT | Performed by: PEDIATRICS

## 2023-09-20 PROCEDURE — 120N000002 HC R&B MED SURG/OB UMMC

## 2023-09-20 RX ORDER — CEFEPIME HYDROCHLORIDE 2 G/1
2 INJECTION, POWDER, FOR SOLUTION INTRAVENOUS EVERY 8 HOURS
Status: DISCONTINUED | OUTPATIENT
Start: 2023-09-20 | End: 2023-09-26

## 2023-09-20 RX ADMIN — ACYCLOVIR 400 MG: 400 TABLET ORAL at 20:00

## 2023-09-20 RX ADMIN — BENZONATATE 100 MG: 100 CAPSULE ORAL at 16:11

## 2023-09-20 RX ADMIN — ACETAMINOPHEN 650 MG: 325 TABLET, FILM COATED ORAL at 21:45

## 2023-09-20 RX ADMIN — SODIUM CHLORIDE, PRESERVATIVE FREE 5 ML: 5 INJECTION INTRAVENOUS at 12:09

## 2023-09-20 RX ADMIN — VORICONAZOLE 200 MG: 200 TABLET ORAL at 20:00

## 2023-09-20 RX ADMIN — CEFEPIME HYDROCHLORIDE 2 G: 2 INJECTION, POWDER, FOR SOLUTION INTRAVENOUS at 21:45

## 2023-09-20 RX ADMIN — ACETAMINOPHEN 650 MG: 325 TABLET, FILM COATED ORAL at 10:08

## 2023-09-20 RX ADMIN — ACYCLOVIR 400 MG: 400 TABLET ORAL at 08:18

## 2023-09-20 RX ADMIN — VORICONAZOLE 200 MG: 200 TABLET ORAL at 08:18

## 2023-09-20 RX ADMIN — POLYETHYLENE GLYCOL 3350 17 G: 17 POWDER, FOR SOLUTION ORAL at 13:33

## 2023-09-20 RX ADMIN — GUAIFENESIN AND DEXTROMETHORPHAN HYDROBROMIDE 1 TABLET: 600; 30 TABLET, EXTENDED RELEASE ORAL at 08:22

## 2023-09-20 RX ADMIN — CETIRIZINE HYDROCHLORIDE 10 MG: 10 TABLET, FILM COATED ORAL at 08:18

## 2023-09-20 RX ADMIN — HYDROXYZINE HYDROCHLORIDE 50 MG: 25 TABLET, FILM COATED ORAL at 20:00

## 2023-09-20 RX ADMIN — POTASSIUM & SODIUM PHOSPHATES POWDER PACK 280-160-250 MG 1 PACKET: 280-160-250 PACK at 11:23

## 2023-09-20 RX ADMIN — GUAIFENESIN AND DEXTROMETHORPHAN HYDROBROMIDE 1 TABLET: 600; 30 TABLET, EXTENDED RELEASE ORAL at 20:00

## 2023-09-20 RX ADMIN — LEVOFLOXACIN 250 MG: 250 TABLET, FILM COATED ORAL at 08:18

## 2023-09-20 RX ADMIN — POTASSIUM & SODIUM PHOSPHATES POWDER PACK 280-160-250 MG 1 PACKET: 280-160-250 PACK at 16:08

## 2023-09-20 RX ADMIN — POTASSIUM & SODIUM PHOSPHATES POWDER PACK 280-160-250 MG 1 PACKET: 280-160-250 PACK at 08:18

## 2023-09-20 ASSESSMENT — ACTIVITIES OF DAILY LIVING (ADL)
ADLS_ACUITY_SCORE: 24

## 2023-09-20 NOTE — PROGRESS NOTES
[facility]    Hematology / Oncology Progress Note    Patient: Artie Fine  MRN: [id number]  Admission Date: 9/1/2023  Date of Service (when I saw the patient): 09/20/2023  Hospital Day # 19     Assessment & Plan   Artie Fine is a 68 year old male with a history of brain abscess (2021), HTN, and BPH. He was transferred from [facility] in [place], [place] to [facility] for work up of pancytopenia; he was noted to have circulating blasts concerning for acute leukemia, and was subsequently transferred to [facility]. He underwent a diagnostic bone marrow biopsy on 9/1/23 which confirmed a new diagnosis of AML. He was started on induction with 7+3 (C1D1=9/2/23). Hospital course has been relatively uncomplicated, notable primarily for rhinovirus infection.     Today:   - Day 19 of 7+3  - 1 unit(s) pRBCs for Hgb 6.8   - Continue supportive cares for rhinovirus symptoms  - BMT NT consult on Friday 9/22 at 1400 with Dr. Cam Edward     HEME/ONC  # AML, adverse risk  Patient initially presented to [facility] in [place], [place] with 4-6 weeks of progressive bruising, weakness, fatigue, loss of appetite, and night sweats. He also noted a headache similar to his previous brain abscess. CBC notable for pancytopenia; WBC 2.7 (), Hgb 8.1, and plt 1k. He was transferred to [facility] and was found on peripheral flow w/ 11% circulating myeloid blasts. He was then transferred to [facility] for further work up and treatment of AML. Diagnostic BMBx performed 9/1 which showed AML with 30% blasts by morph and 47% by flow. P53 negative. FISH negative for MLLT10, NUP98, and KMT2A. Normal karyotype. NGS showed ASXL1, CEBPA, SRSF2, and STAG2 mutations. Started 7+3 (C1D1=9/2/23).   - Baseline TTE showing normal LVEF of 55-60%. Baseline EKG showed NSR and QTc of 434.   - Viral serologies: HepB/C-, HIV-. 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 CSF flow negative. MRI brain 9/11 negative, only showing chronic changes from h/o brain abscess.   - D14 BMBx w/o evidence of leukemia.   - HLA typing ordered. Plan for BMT NT 9/22 at 1400 with Dr. Cam Edward.                   Treatment Plan: 7+3 (C1D1=9/2/23)                - Cytarabine 100 mg/m2 - D1-D7               - Daunorubicin 60 mg/m2 - D1-D3               - Supportive meds: Decadron 12 mg D1-D3, Zofran     # Low risk for TLS/DIC  - Discontinued allopurinol  - Montior TLS/DIC labs twice weekly     ID  # Immunosuppressed 2/2 malignancy and chemotherapy  # ID PPX  -  mg BID  - Levofloxacin 250 mg daily  - Voriconazole 200 mg BID x9/10    # Rhinovirus  # Post nasal drip  # Cough, improving  Nasal congestion and post nasal drip 9/15 morning upon waking. H/o seasonal allergies. On 9/16, noted development of productive cough and worsening nasal congestion and post-nasal drip. One episode of blood-tinged sputum, now resolved. COVID/flu/RSV negative, but RVP positive for rhinovirus. Pt endorses profound fatigue  - Continue PTA cetirizine  - Continue ppx levofloxacin  - Supportive: Tessalon Perles, Mucinex DM, Hurricaine spray     # H/o brain abscess (2021)  # Headache, resolved  # Dizziness, resolved  Treated in [place]. S/p craniotomy for drainage of abscess July 2021. It is unclear what culture speciated to, but he completed a course of antibiotics with ceftriaxone then meropenem with resolution on imaging. Head CT 8/31 at OSH revealed no intracranial hemorrhage midline shift or mass effect. Postsurgical changes noted. Previous area of infection demonstrates trace amount of encephalomalacia but no mass effect or inflammatory changes to suggest new or acute infection. Otherwise unremarkable. Patient presented initially with headache and dizziness, which has now resolved.   - See work up above for CNS leukemia work-up, which was  "negative     CHRONIC  # HTN  Noted during prior hospitalization for brain abscess in 2021. Previously on lisinopril, which he is no longer taking.  - Patient BP's have fluctuated this admission, w/ occasionally elevated BPs. Continue to monitor.     # BPH  Notes difficulty with complete emptying of bladder. Previously on Flomax but states it did nothing, so he stopped it. He does note frequent urination at baseline, currently urination remains unchanged from baseline.  - Monitor clinically     MISC  # Weakness, improved  # Deconditioning, improved  Pt reports issues w/ balance after his brain abscess in 2021, exacerbated when ambulating. Deconditioning acute-on-chronic upon admission since 2021.   - PT consulted     # Weight loss  # Loss of appetite  # Moderate malnutrition in the context of acute illness   Patient presented with loss of appetite 2/2 malignancy. He has been gradually losing weight throughout admission, although PO intake is overall sufficient.   - RD consulted  - Ensure BID between meals and PRN     Clinically Significant Risk Factors          # Hypocalcemia: Lowest Ca = 8.2 mg/dL in last 2 days, will monitor and replace as appropriate       # Thrombocytopenia: Lowest platelets = 12 in last 2 days, will monitor for bleeding          # Overweight: Estimated body mass index is 28.03 kg/m  as calculated from the following:    Height as of this encounter: 1.74 m (5' 8.5\").    Weight as of this encounter: 84.9 kg (187 lb 1.6 oz).   # Moderate Malnutrition: based on nutrition assessment           FEN  Diet: Regular Diet Adult   IVF: Bolus PRN   Lytes: Replete per protocol     PPX  VTE: None given thrombocytopenia  Bowel: Senna/MiraLax PRN  GI/PUD: None currently indicated     MISC  Code Status: Full Code   Lines/Drains: PICC  Dispo: Will remain inpatient through count recovery with safe discharge plan outpatient.   Follow Up: Pt will follow with Dr. Garcia, appointment scheduled for end of September. Pt " would prefer labs and blood transfusions done closer to home in [facility].    Patient was seen and plan of care was discussed with attending physician Dr. Levine.    I spent 70 minutes in the care of this patient today, which included time necessary for review of interval events, obtaining history and physical exam, ordering medications/tests/procedures as medically indicated, review of pertinent medical literature, counseling of the patient, coordination of care, and documentation time. Over 50% of time was spent counseling the patient and/or coordinating care.    Yolis Sherman PA-C   Hematology/Oncology   Pager: [phone]  Desk phone: [phone]    Interval History   No acute events overnight. Patient is feeling a little better today. He is slowing improving from his rhinovirus symptoms. He has been feeling very fatigued and malaised. He has not been walking as much, but is trying to take small walks around room. He is getting a little dizzy with ambulation. Ongoing sinus drainage but denies sinus pain/pressure, tenderness, erythema, edema. Mouth is dry and throat is sore, using cough drops for this. Cough is improving. Appetite isn't great, but he is forcing himself to eat. He is drinking lots of fluids. No new or acute symptoms.     Vital Signs with Ranges  Temp:  [98.3  F (36.8  C)-99.8  F (37.7  C)] 98.5  F (36.9  C)  Pulse:  [83-98] 83  Resp:  [16-19] 18  BP: (101-131)/(48-71) 101/59  SpO2:  [95 %-98 %] 97 %  I/O last 3 completed shifts:  In: 1680 [P.O.:1680]  Out: -     Physical Exam   General: Sitting up in chair. , alert, NAD. Pleasant and conversational.  Skin: No concerning lesions, rash, jaundice, cyanosis, erythema, or ecchymoses on exposed surfaces.   HEENT: NCAT. Anicteric sclera. Moist mucous membranes.  Respiratory: Non-labored breathing on room air, good air exchange, lungs clear to auscultation bilaterally.  Cardiovascular: RRR. No murmur or rub.   Gastrointestinal: Normoactive BS. Abdomen  soft, ND, NT. No palpable masses.  Extremities: No LE edema.   Neurologic: A&O x 3, speech normal, no deficits grossly.    Medications    - MEDICATION INSTRUCTIONS -        acyclovir  400 mg Oral BID    cetirizine  10 mg Oral Daily    hydrOXYzine  50 mg Oral At Bedtime    levofloxacin  250 mg Oral Daily    potassium & sodium phosphates  1 packet Oral or Feeding Tube Q4H    voriconazole  200 mg Oral Q12H [facility] (08/20)     Data   Results for orders placed or performed during the hospital encounter of 09/01/23 (from the past 24 hour(s))   ABO/Rh type and screen    Narrative    The following orders were created for panel order ABO/Rh type and screen.  Procedure                               Abnormality         Status                     ---------                               -----------         ------                     Adult Type and Screen[[id number]]                            Final result                 Please view results for these tests on the individual orders.   CBC with platelets differential    Narrative    The following orders were created for panel order CBC with platelets differential.  Procedure                               Abnormality         Status                     ---------                               -----------         ------                     CBC with platelets and d...[[id number]]  Abnormal            Final result               RBC and Platelet Morphology[[id number]]                      Final result                 Please view results for these tests on the individual orders.   Comprehensive metabolic panel   Result Value Ref Range    Sodium 133 (L) 136 - 145 mmol/L    Potassium 4.0 3.4 - 5.3 mmol/L    Chloride 102 98 - 107 mmol/L    Carbon Dioxide (CO2) 22 22 - 29 mmol/L    Anion Gap 9 7 - 15 mmol/L    Urea Nitrogen 13.5 8.0 - 23.0 mg/dL    Creatinine 0.84 0.67 - 1.17 mg/dL    Calcium 8.2 (L) 8.8 - 10.2 mg/dL    Glucose 106 (H) 70 - 99 mg/dL    Alkaline Phosphatase 59 40 - 129 U/L    AST 29  0 - 45 U/L    ALT 31 0 - 70 U/L    Protein Total 6.8 6.4 - 8.3 g/dL    Albumin 3.5 3.5 - 5.2 g/dL    Bilirubin Total 0.6 <=1.2 mg/dL    GFR Estimate >90 >60 mL/min/1.73m2   Magnesium   Result Value Ref Range    Magnesium 2.2 1.7 - 2.3 mg/dL   Phosphorus   Result Value Ref Range    Phosphorus 2.4 (L) 2.5 - 4.5 mg/dL   Adult Type and Screen   Result Value Ref Range    Antibody Screen Negative Negative    SPECIMEN EXPIRATION DATE [id number]    CBC with platelets and differential   Result Value Ref Range    WBC Count 0.3 (LL) 4.0 - 11.0 10e3/uL    RBC Count 2.37 (L) 4.40 - 5.90 10e6/uL    Hemoglobin 6.8 (LL) 13.3 - 17.7 g/dL    Hematocrit 20.1 (L) 40.0 - 53.0 %    MCV 85 78 - 100 fL    MCH 28.7 26.5 - 33.0 pg    MCHC 33.8 31.5 - 36.5 g/dL    RDW 12.9 10.0 - 15.0 %    Platelet Count 12 (LL) 150 - 450 10e3/uL   RBC and Platelet Morphology   Result Value Ref Range    Platelet Assessment  Automated Count Confirmed. Platelet morphology is normal.     Automated Count Confirmed. Platelet morphology is normal.    RBC Morphology Confirmed RBC Indices    ABO/RH Type & Screen   Result Value Ref Range    SPECIMEN EXPIRATION DATE [id number]     ABORH ABO Unknown Rh POS    CONDITIONAL Prepare red blood cells (unit)   Result Value Ref Range    ISSUE DATE AND TIME [id number]     Blood Component Type Red Blood Cells     Product Code [id number]     Unit Status Transfused     Unit Number [id number]     UNIT ABO/RH O-     CROSSMATCH COMPATIBLE     CODING SYSTEM [id number]     UNIT TYPE ISBT 9500

## 2023-09-20 NOTE — PLAN OF CARE
Goal Outcome Evaluation:  [id number]: Pt A&Ox4 with VSS on RA, tmax overnight 99.7. No complaints. Sleeping between cares. Able to make needs known. Continue with POC.

## 2023-09-20 NOTE — PLAN OF CARE
Goal Outcome Evaluation:      Plan of Care Reviewed With: patient    Overall Patient Progress: no changeOverall Patient Progress: no change  Temp max 99.8 with stable vs. Continues with occasional cough, received mucinex with good relief. Received 1 unit PRBCs for hgb 6.8. Phos level 2.4 being replaced orally, redraw tomorrow. Good po intake Adequate urine output. No BM yet today, received miralax. UAL in room independently.

## 2023-09-20 NOTE — PLAN OF CARE
Goal Outcome Evaluation:      Plan of Care Reviewed With: patient      AVSS, alert and oriented x 4. Denies any pain, nausea, and dizziness. Cough became more frequent at 2000, Tessalon and Mucinex given once with some relief. BM x 1 today, no problems voiding. Up independently, ate 100% of dinner with a fair appetite. No acute events this shift, continue with care as planned.

## 2023-09-21 ENCOUNTER — TELEPHONE (OUTPATIENT)
Dept: ONCOLOGY | Facility: CLINIC | Age: 68
End: 2023-09-21
Payer: COMMERCIAL

## 2023-09-21 LAB
ALBUMIN SERPL BCG-MCNC: 3.6 G/DL (ref 3.5–5.2)
ALBUMIN UR-MCNC: 30 MG/DL
ALP SERPL-CCNC: 58 U/L (ref 40–129)
ALT SERPL W P-5'-P-CCNC: 30 U/L (ref 0–70)
ANION GAP SERPL CALCULATED.3IONS-SCNC: 10 MMOL/L (ref 7–15)
APPEARANCE UR: CLEAR
APTT PPP: 38 SECONDS (ref 22–38)
AST SERPL W P-5'-P-CCNC: 28 U/L (ref 0–45)
BILIRUB SERPL-MCNC: 0.8 MG/DL
BILIRUB UR QL STRIP: NEGATIVE
BLD PROD TYP BPU: NORMAL
BLOOD COMPONENT TYPE: NORMAL
BUN SERPL-MCNC: 14.1 MG/DL (ref 8–23)
CA-I BLD-MCNC: 4.3 MG/DL (ref 4.4–5.2)
CALCIUM SERPL-MCNC: 8.3 MG/DL (ref 8.8–10.2)
CHLORIDE SERPL-SCNC: 103 MMOL/L (ref 98–107)
CODING SYSTEM: NORMAL
COLOR UR AUTO: ABNORMAL
CREAT SERPL-MCNC: 0.87 MG/DL (ref 0.67–1.17)
DEPRECATED HCO3 PLAS-SCNC: 21 MMOL/L (ref 22–29)
EGFRCR SERPLBLD CKD-EPI 2021: >90 ML/MIN/1.73M2
ENTEROCOCCUS FAECALIS: NOT DETECTED
ENTEROCOCCUS FAECIUM: NOT DETECTED
ERYTHROCYTE [DISTWIDTH] IN BLOOD BY AUTOMATED COUNT: 13.2 % (ref 10–15)
FIBRINOGEN PPP-MCNC: 688 MG/DL (ref 170–490)
GLUCOSE SERPL-MCNC: 112 MG/DL (ref 70–99)
GLUCOSE UR STRIP-MCNC: NEGATIVE MG/DL
HCT VFR BLD AUTO: 22.2 % (ref 40–53)
HGB BLD-MCNC: 7.7 G/DL (ref 13.3–17.7)
HGB UR QL STRIP: NEGATIVE
INR PPP: 1.25 (ref 0.85–1.15)
INTERPRETATION: NORMAL
ISSUE DATE AND TIME: NORMAL
KETONES UR STRIP-MCNC: NEGATIVE MG/DL
LACTATE SERPL-SCNC: 0.5 MMOL/L (ref 0.7–2)
LDH SERPL L TO P-CCNC: 191 U/L (ref 0–250)
LEUKOCYTE ESTERASE UR QL STRIP: NEGATIVE
LISTERIA SPECIES (DETECTED/NOT DETECTED): NOT DETECTED
MAGNESIUM SERPL-MCNC: 2.2 MG/DL (ref 1.7–2.3)
MCH RBC QN AUTO: 29.1 PG (ref 26.5–33)
MCHC RBC AUTO-ENTMCNC: 34.7 G/DL (ref 31.5–36.5)
MCV RBC AUTO: 84 FL (ref 78–100)
MUCOUS THREADS #/AREA URNS LPF: PRESENT /LPF
NITRATE UR QL: NEGATIVE
PH UR STRIP: 6.5 [PH] (ref 5–7)
PHOSPHATE SERPL-MCNC: 2.6 MG/DL (ref 2.5–4.5)
PLATELET # BLD AUTO: 7 10E3/UL (ref 150–450)
POTASSIUM SERPL-SCNC: 3.9 MMOL/L (ref 3.4–5.3)
PROT SERPL-MCNC: 7 G/DL (ref 6.4–8.3)
RBC # BLD AUTO: 2.65 10E6/UL (ref 4.4–5.9)
RBC URINE: 2 /HPF
SODIUM SERPL-SCNC: 134 MMOL/L (ref 136–145)
SP GR UR STRIP: 1.02 (ref 1–1.03)
STAPHYLOCOCCUS AUREUS: NOT DETECTED
STAPHYLOCOCCUS EPIDERMIDIS: NOT DETECTED
STAPHYLOCOCCUS LUGDUNENSIS: NOT DETECTED
STAPHYLOCOCCUS SPECIES: NOT DETECTED
STREPTOCOCCUS AGALACTIAE: NOT DETECTED
STREPTOCOCCUS ANGINOSUS GROUP: NOT DETECTED
STREPTOCOCCUS PNEUMONIAE: NOT DETECTED
STREPTOCOCCUS PYOGENES: NOT DETECTED
STREPTOCOCCUS SPECIES: DETECTED
UNIT ABO/RH: NORMAL
UNIT NUMBER: NORMAL
UNIT STATUS: NORMAL
UNIT TYPE ISBT: 8400
URATE SERPL-MCNC: 2.5 MG/DL (ref 3.4–7)
UROBILINOGEN UR STRIP-MCNC: NORMAL MG/DL
WBC # BLD AUTO: 0.3 10E3/UL (ref 4–11)
WBC URINE: <1 /HPF

## 2023-09-21 PROCEDURE — 250N000013 HC RX MED GY IP 250 OP 250 PS 637: Performed by: STUDENT IN AN ORGANIZED HEALTH CARE EDUCATION/TRAINING PROGRAM

## 2023-09-21 PROCEDURE — 87088 URINE BACTERIA CULTURE: CPT | Performed by: PEDIATRICS

## 2023-09-21 PROCEDURE — P9037 PLATE PHERES LEUKOREDU IRRAD: HCPCS | Performed by: PHYSICIAN ASSISTANT

## 2023-09-21 PROCEDURE — 250N000013 HC RX MED GY IP 250 OP 250 PS 637: Performed by: PHYSICIAN ASSISTANT

## 2023-09-21 PROCEDURE — 250N000013 HC RX MED GY IP 250 OP 250 PS 637

## 2023-09-21 PROCEDURE — 85730 THROMBOPLASTIN TIME PARTIAL: CPT

## 2023-09-21 PROCEDURE — 120N000002 HC R&B MED SURG/OB UMMC

## 2023-09-21 PROCEDURE — 83615 LACTATE (LD) (LDH) ENZYME: CPT

## 2023-09-21 PROCEDURE — 99223 1ST HOSP IP/OBS HIGH 75: CPT | Performed by: INTERNAL MEDICINE

## 2023-09-21 PROCEDURE — 99233 SBSQ HOSP IP/OBS HIGH 50: CPT | Mod: FS | Performed by: PHYSICIAN ASSISTANT

## 2023-09-21 PROCEDURE — 84550 ASSAY OF BLOOD/URIC ACID: CPT

## 2023-09-21 PROCEDURE — 83605 ASSAY OF LACTIC ACID: CPT | Performed by: INTERNAL MEDICINE

## 2023-09-21 PROCEDURE — 250N000013 HC RX MED GY IP 250 OP 250 PS 637: Performed by: HOSPITALIST

## 2023-09-21 PROCEDURE — 250N000011 HC RX IP 250 OP 636: Mod: JZ

## 2023-09-21 PROCEDURE — 82330 ASSAY OF CALCIUM: CPT | Performed by: PHYSICIAN ASSISTANT

## 2023-09-21 PROCEDURE — 80053 COMPREHEN METABOLIC PANEL: CPT | Performed by: PHYSICIAN ASSISTANT

## 2023-09-21 PROCEDURE — 85610 PROTHROMBIN TIME: CPT

## 2023-09-21 PROCEDURE — 36415 COLL VENOUS BLD VENIPUNCTURE: CPT | Performed by: PHYSICIAN ASSISTANT

## 2023-09-21 PROCEDURE — 83735 ASSAY OF MAGNESIUM: CPT

## 2023-09-21 PROCEDURE — 85027 COMPLETE CBC AUTOMATED: CPT | Performed by: PHYSICIAN ASSISTANT

## 2023-09-21 PROCEDURE — 87040 BLOOD CULTURE FOR BACTERIA: CPT | Performed by: PHYSICIAN ASSISTANT

## 2023-09-21 PROCEDURE — 81001 URINALYSIS AUTO W/SCOPE: CPT | Performed by: PEDIATRICS

## 2023-09-21 PROCEDURE — 250N000011 HC RX IP 250 OP 636: Performed by: PEDIATRICS

## 2023-09-21 PROCEDURE — 84100 ASSAY OF PHOSPHORUS: CPT

## 2023-09-21 PROCEDURE — 85384 FIBRINOGEN ACTIVITY: CPT

## 2023-09-21 RX ADMIN — CETIRIZINE HYDROCHLORIDE 10 MG: 10 TABLET, FILM COATED ORAL at 08:19

## 2023-09-21 RX ADMIN — ACETAMINOPHEN 650 MG: 325 TABLET, FILM COATED ORAL at 13:00

## 2023-09-21 RX ADMIN — ACETAMINOPHEN 650 MG: 325 TABLET, FILM COATED ORAL at 02:02

## 2023-09-21 RX ADMIN — VORICONAZOLE 200 MG: 200 TABLET ORAL at 08:19

## 2023-09-21 RX ADMIN — HYDROXYZINE HYDROCHLORIDE 50 MG: 25 TABLET, FILM COATED ORAL at 21:55

## 2023-09-21 RX ADMIN — ACYCLOVIR 400 MG: 400 TABLET ORAL at 08:19

## 2023-09-21 RX ADMIN — GUAIFENESIN AND DEXTROMETHORPHAN HYDROBROMIDE 1 TABLET: 600; 30 TABLET, EXTENDED RELEASE ORAL at 08:19

## 2023-09-21 RX ADMIN — CEFEPIME HYDROCHLORIDE 2 G: 2 INJECTION, POWDER, FOR SOLUTION INTRAVENOUS at 21:54

## 2023-09-21 RX ADMIN — CEFEPIME HYDROCHLORIDE 2 G: 2 INJECTION, POWDER, FOR SOLUTION INTRAVENOUS at 12:11

## 2023-09-21 RX ADMIN — VORICONAZOLE 200 MG: 200 TABLET ORAL at 21:55

## 2023-09-21 RX ADMIN — ACYCLOVIR 400 MG: 400 TABLET ORAL at 21:55

## 2023-09-21 RX ADMIN — SODIUM CHLORIDE, PRESERVATIVE FREE 5 ML: 5 INJECTION INTRAVENOUS at 15:10

## 2023-09-21 RX ADMIN — CEFEPIME HYDROCHLORIDE 2 G: 2 INJECTION, POWDER, FOR SOLUTION INTRAVENOUS at 05:15

## 2023-09-21 RX ADMIN — SODIUM CHLORIDE, PRESERVATIVE FREE 5 ML: 5 INJECTION INTRAVENOUS at 14:01

## 2023-09-21 RX ADMIN — SODIUM CHLORIDE, PRESERVATIVE FREE 5 ML: 5 INJECTION INTRAVENOUS at 06:38

## 2023-09-21 RX ADMIN — ACETAMINOPHEN 650 MG: 325 TABLET, FILM COATED ORAL at 18:40

## 2023-09-21 ASSESSMENT — ACTIVITIES OF DAILY LIVING (ADL)
ADLS_ACUITY_SCORE: 24

## 2023-09-21 NOTE — PLAN OF CARE
Goal Outcome Evaluation:      Plan of Care Reviewed With: patient    Overall Patient Progress: no changeOverall Patient Progress: no change  Temp max 100.9 with stable vs. PA notified, blood cx already drawn within last 24hrs and therefore not repeated. Continues on IV cefepime. Decreased to 100.2 received tylenol. Continues with occasional cough, received  received mucinex with good relief. Received plt tx for plt count 7k. No electrolyte replacement required. Good po intake. Adequate urine output. Last BM today.    Addendum: Lab called to say that Pt had + blood cx from 9/20/23 2141pm, Gram + cocci in pairs and chains. Yolis CHAPMAN notified. Blood cx ordered. Then pt triggered sepsis. Yolis again notified. Lactic acid pending, vss.

## 2023-09-21 NOTE — PLAN OF CARE
"Goal Outcome Evaluation:      Plan of Care Reviewed With: patient    1560 - 0340:   /70 (BP Location: Left arm)   Pulse 100   Temp (!) 102.7  F (39.3  C) (Oral)   Resp 20   Ht 1.74 m (5' 8.5\")   Wt 84.9 kg (187 lb 1.6 oz)   SpO2 96%   BMI 28.03 kg/m      Pt trigered SIR d/t [id number], LA 0.9, blood culture collected, Provider notified & o rdered work up for fever.  Gave prn tessalon & mucinex for frequent cough.   UAL, voiding spontaneously, took miralax AM & had small bm x 2.  Continue with poc...                 "

## 2023-09-21 NOTE — PROGRESS NOTES
CLINICAL NUTRITION SERVICES - REASSESSMENT NOTE     Nutrition Prescription    RECOMMENDATIONS FOR MDs/PROVIDERS TO ORDER:   None at this time. May need to consider nutrition support if appetite does not improve in coming days.     Malnutrition Status:   Patient does not meet two of the established criteria necessary for diagnosing malnutrition but is at risk for malnutrition    Recommendations already ordered by Registered Dietitian (RD):   No changes at this time, pt would like to wait and monitor intake/appetite over coming days.     Future/Additional Recommendations:   Monitor weight/intake trends.   Monitor nutrition supplement tolerance.      EVALUATION OF THE PROGRESS TOWARD GOALS   Diet: Regular    Nutrition Support:   2 pm snack of either 1% milk + 2 Chocolate Chips OR 1% milk + chocolate ice cream.    Ensure Enlive between meals - provider ordered.     Intake: Per flowsheets, intake documentation likely missing some meals/snacks, but on average 100% of meals consumed. Per 7 day average, pt is ordering 2010 kcal and 77 g protein per [employer].      NEW FINDINGS   Visited with Artie in room. Pt states that his appetite was good until about 1-2 days ago. He states that he would like the supplements/snacks canceled. Pt was agreeable to continue the supplements over the weekend and to reevaluate if they should be stopped early next week.     Skin: Crow score 21 with nutrition marked as adequate per flowsheets.    LABS   Labs Reviewed   09/21/23 05:15   Sodium 134 (L)   Potassium 3.9   Creatinine 0.87   GFR Estimate >90   Calcium 8.3 (L)   Magnesium 2.2   Phosphorus 2.6   Albumin 3.6   Glucose 112 (H)   WBC 0.3 (LL)   Hemoglobin 7.7 (L)   MCV 84     MEDICATIONS   Medications Reviewed  - Maxipime  - Potassium & Sodium phosphates  - Vfend  - Mucinex PRN  - Miralax PRN    ANTHROPOMETRICS   Weight Trends: Weight with large fluctuations day to day. Unsure if related to fluid.    Weight trends this admission:    09/21/23 84.1 kg (185 lb 6.4 oz) Standing scale   09/20/23 84.9 kg (187 lb 1.6 oz) Standing scale   09/19/23 83.6 kg (184 lb 4.9 oz) Standing scale   09/18/23 85.3 kg (188 lb 1.6 oz) Standing scale   09/17/23 85.5 kg (188 lb 9.6 oz) Standing scale   09/16/23 85 kg (187 lb 6.4 oz) Standing scale   09/15/23 85 kg (187 lb 6.3 oz) Standing scale   09/14/23 86.9 kg (191 lb 8 oz) Standing scale   09/13/23 85.5 kg (188 lb 8 oz) Standing scale   09/12/23 83.7 kg (184 lb 8 oz) Standing scale   09/11/23 84.3 kg (185 lb 12.8 oz) Standing scale   09/10/23 84.2 kg (185 lb 9.6 oz) Standing scale   09/09/23 84.7 kg (186 lb 12.8 oz) Standing scale   09/08/23 85.3 kg (188 lb) Standing scale   09/07/23 87 kg (191 lb 14.4 oz) Standing scale   09/06/23 87 kg (191 lb 12.8 oz) --   09/05/23 88 kg (194 lb 1.6 oz) Standing scale   09/04/23 88.8 kg (195 lb 11.2 oz) Standing scale   09/03/23 87.5 kg (193 lb) Standing scale   09/02/23 86.8 kg (191 lb 4.8 oz) Standing scale   09/01/23 86.5 kg (190 lb 9.6 oz) Standing scale     Wt Readings from Last Encounters:   09/21/23 84.1 kg (185 lb 6.4 oz)   09/01/23 86.2 kg (190 lb 1.6 oz)     MALNUTRITION   % Intake: Decreased intake does not meet criteria  % Weight Loss: Unable to assess  Subcutaneous Fat Loss: None observed  Muscle Loss: Thoracic region (clavicle, acromium bone, deltoid, trapezius, pectoral): Mild  Fluid Accumulation/Edema: None noted  Malnutrition Diagnosis: Patient does not meet two of the established criteria necessary for diagnosing malnutrition but is at risk for malnutrition    Previous Goals   Patient to consume % of nutritionally adequate meal trays TID, or the equivalent with supplements/snacks.   Evaluation: Not met    Previous Nutrition Diagnosis   Inadequate oral intake related to decreased appetite as evidenced by pt self-report   Evaluation: No change    CURRENT NUTRITION DIAGNOSIS   Inadequate oral intake related to decreased appetite as evidenced by pt  self-report     INTERVENTIONS   Implementation   No changes at this time, pt would like to wait and monitor intake/appetite over coming days.     Goals   Patient to consume % of nutritionally adequate meal trays TID, or the equivalent with supplements/snacks.    Monitoring/Evaluation   Progress toward goals will be monitored and evaluated per protocol.    Swati Wall RD, LD   Available on Pegg[employer] and Pager  5B/6A pager [phone]  Weekend pager [phone]

## 2023-09-21 NOTE — PROGRESS NOTES
[facility]    Hematology / Oncology Progress Note    Patient: Artie Fine  MRN: [id number]  Admission Date: 9/1/2023  Date of Service (when I saw the patient): 09/21/2023  Hospital Day # 20     Assessment & Plan   Artie Fine is a 68 year old male with a history of brain abscess (2021), HTN, and BPH. He was transferred from [facility] in [place], [place] to [facility] for work up of pancytopenia; he was noted to have circulating blasts concerning for acute leukemia, and was subsequently transferred to [facility]. He underwent a diagnostic bone marrow biopsy on 9/1/23 which confirmed a new diagnosis of AML. He was started on induction with 7+3 (C1D1=9/2/23). Hospital course has been complicated by rhinovirus infection and neutropenic fever.     Today:   - Day 20 of 7+3  - New neutropenic fever overnight to Tmax 102.7. Started on cefepime. BCx drawn. UA bland, UCx pending. CXR unremarkable. Ongoing respiratory symptoms from rhinovirus that are gradually improving.   - 1 unit(s) plts for plt count 7k  - Continue supportive cares for rhinovirus symptoms  - BMT NT consult tomorrow 9/22 at 1400 with Dr. Cam Edward    ADDENDUM: BCx 9/20 1/2 positive for GPCs in pairs and chains. Speciation and Verigene pending. Obtain repeat set of BCx today.      HEME/ONC  # AML, adverse risk  Patient initially presented to [facility] in [place], [place] with 4-6 weeks of progressive bruising, weakness, fatigue, loss of appetite, and night sweats. He also noted a headache similar to his previous brain abscess. CBC notable for pancytopenia; WBC 2.7 (), Hgb 8.1, and plt 1k. He was transferred to [facility] and was found on peripheral flow w/ 11% circulating myeloid blasts. He was then transferred to [facility] for further work up and treatment of AML. Diagnostic BMBx performed 9/1 which showed AML with 30% blasts by morph and 47% by flow. P53 negative. FISH negative for MLLT10, NUP98, and  KMT2A. Normal karyotype. NGS showed ASXL1, CEBPA, SRSF2, and STAG2 mutations. Started 7+3 (C1D1=9/2/23).   - Baseline TTE showing normal LVEF of 55-60%. Baseline EKG showed NSR and QTc of 434.   - Viral serologies: HepB/C-, HIV-. HSV1/2+, EBV IgG+, CMV IgG+  - PICC placed 9/1  - S/p diagnostic LP 9/8/23 for CNS leukemia work up as patient endorses headache upon admission w/ new AML diagnosis. CSF flow negative. MRI brain 9/11 negative, only showing chronic changes from h/o brain abscess.   - D14 BMBx w/o evidence of leukemia.   - HLA typing ordered. Plan for BMT NT 9/22 at 1400 with Dr. Cam Edward.                   Treatment Plan: 7+3 (C1D1=9/2/23)                - Cytarabine 100 mg/m2 - D1-D7               - Daunorubicin 60 mg/m2 - D1-D3               - Supportive meds: Decadron 12 mg D1-D3, Zofran     # Low risk for TLS/DIC  - Discontinued allopurinol  - Montior TLS/DIC labs twice weekly     ID  # Neutropenic fever  Tmax 102.7 on 9/20 evening. Pt has been having cold symptoms as below in the setting of positive rhinovirus on 9/16. Cold symptoms on improving trajectory at time of new fever. Pt had diarrhea around time of fever onset, but had recently been getting MiraLax for constipation.   - Work up:   - BCx NGTD  - UA bland. UCx pending.   - CXR unremarkable  - Antibiotics:   - Cefepime 2 g q8hr x9/20 PM  - If persistently febrile, could consider CT chest/sinus, but will hold at this time giving improving cold symptoms and would not .     # Rhinovirus  # Post nasal drip  # Cough, improving  Nasal congestion and post nasal drip 9/15 morning upon waking. H/o seasonal allergies. On 9/16, noted development of productive cough and worsening nasal congestion and post-nasal drip. One episode of blood-tinged sputum, now resolved. COVID/flu/RSV negative, but RVP positive for rhinovirus. Pt endorses profound fatigue  - Continue PTA cetirizine  - Supportive: Tessalon Perles, Mucinex DM, Hurricaine spray    #  Immunosuppressed 2/2 malignancy and chemotherapy  # ID PPX  -  mg BID  - Levofloxacin 250 mg daily - on hold with treatment dose antibiotics as above  - Voriconazole 200 mg BID x9/10     # H/o brain abscess (2021)  # Headache, resolved  # Dizziness, resolved  Treated in [place]. S/p craniotomy for drainage of abscess July 2021. It is unclear what culture speciated to, but he completed a course of antibiotics with ceftriaxone then meropenem with resolution on imaging. Head CT 8/31 at OSH revealed no intracranial hemorrhage midline shift or mass effect. Postsurgical changes noted. Previous area of infection demonstrates trace amount of encephalomalacia but no mass effect or inflammatory changes to suggest new or acute infection. Otherwise unremarkable. Patient presented initially with headache and dizziness, which has now resolved.   - See work up above for CNS leukemia work-up, which was negative     CHRONIC  # HTN  Noted during prior hospitalization for brain abscess in 2021. Previously on lisinopril, which he is no longer taking.  - Patient BP's have fluctuated this admission, w/ occasionally elevated BPs. Continue to monitor.     # BPH  Notes difficulty with complete emptying of bladder. Previously on Flomax but states it did nothing, so he stopped it. He does note frequent urination at baseline, currently urination remains unchanged from baseline.  - Monitor clinically     MISC  # Weakness, improved  # Deconditioning, improved  Pt reports issues w/ balance after his brain abscess in 2021, exacerbated when ambulating. Deconditioning acute-on-chronic upon admission since 2021.   - PT consulted     # Weight loss  # Loss of appetite  # Moderate malnutrition in the context of acute illness   Patient presented with loss of appetite 2/2 malignancy. He has been gradually losing weight throughout admission, although PO intake is overall sufficient.   - RD consulted  - Ensure BID between meals and PRN    "  Clinically Significant Risk Factors          # Hypocalcemia: Lowest Ca = 8.2 mg/dL in last 2 days, will monitor and replace as appropriate      # Coagulation Defect: INR = 1.25 (Ref range: 0.85 - 1.15) and/or PTT = 38 Seconds (Ref range: 22 - 38 Seconds), will monitor for bleeding    # Thrombocytopenia: Lowest platelets = 7 in last 2 days, will monitor for bleeding          # Overweight: Estimated body mass index is 27.78 kg/m  as calculated from the following:    Height as of this encounter: 1.74 m (5' 8.5\").    Weight as of this encounter: 84.1 kg (185 lb 6.4 oz).       # Moderate Malnutrition: based on nutrition assessment           FEN  Diet: Regular Diet Adult   IVF: Bolus PRN   Lytes: Replete per protocol     PPX  VTE: None given thrombocytopenia  Bowel: Senna/MiraLax PRN  GI/PUD: None currently indicated     MISC  Code Status: Full Code   Lines/Drains: PICC  Dispo: Will remain inpatient through count recovery with safe discharge plan outpatient.   Follow Up: Pt will follow with Dr. Garcia, appointment scheduled for end of September. Pt would prefer labs and blood transfusions done closer to home in [facility].    Patient was seen and plan of care was discussed with attending physician Dr. Levine.    I spent 70 minutes in the care of this patient today, which included time necessary for review of interval events, obtaining history and physical exam, ordering medications/tests/procedures as medically indicated, review of pertinent medical literature, counseling of the patient, coordination of care, and documentation time. Over 50% of time was spent counseling the patient and/or coordinating care.    Yolis Sherman PA-C   Hematology/Oncology   Pager: [phone]  Desk phone: [phone]    Interval History   New neutropenic fever overnight to Tmax 102.7. Started on cefepime. BCx drawn. UA bland, UCx pending. CXR unremarkable.     This morning, patient is not feeling well. He had poor night of sleep with fever. " Noted night sweats. His cough and sinus drainage are improving. Denies sore throat. Got up and about room this morning. Felt a little winded after this. No SOB at rest. Denies chest pain, abdominal pain. Had episode of diarrhea after taking MiraLax for constipation. No other new/focal symptoms. Planning to have a day of rest.     Vital Signs with Ranges  Temp:  [98.1  F (36.7  C)-102.7  F (39.3  C)] 100.9  F (38.3  C)  Pulse:  [] 94  Resp:  [18-20] 20  BP: (101-134)/(54-70) 114/54  SpO2:  [95 %-99 %] 99 %  I/O last 3 completed shifts:  In: 2280 [P.O.:1880; I.V.:100]  Out: -     Physical Exam   General: Sitting up in bed, alert, NAD. Pleasant and conversational.  Skin: No concerning lesions, rash, jaundice, cyanosis, erythema, or ecchymoses on exposed surfaces.   HEENT: NCAT. Anicteric sclera. Moist mucous membranes.   Respiratory: Non-labored breathing on room air, good air exchange. Crackles at BL bases, otherwise, clear to auscultation bilaterally.  Cardiovascular: RRR. No murmur or rub.   Gastrointestinal: Normoactive BS. Abdomen soft, ND, NT. No palpable masses.  Extremities: No LE edema.   Neurologic: A&O x 3, speech normal, no deficits grossly.    Medications    - MEDICATION INSTRUCTIONS -        acyclovir  400 mg Oral BID    ceFEPIme  2 g Intravenous Q8H    cetirizine  10 mg Oral Daily    hydrOXYzine  50 mg Oral At Bedtime    [Held by provider] levofloxacin  250 mg Oral Daily    voriconazole  200 mg Oral Q12H [facility] (08/20)     Data   Results for orders placed or performed during the hospital encounter of 09/01/23 (from the past 24 hour(s))   XR Chest Port 1 View    Narrative    Portable chest    INDICATION: Febrile neutropenia    COMPARISON: 9/16/2023    FINDINGS: Right-sided PICC line tip in the SVC. Heart size normal.  Degenerative spurring in the thoracic spine. Lungs and pulmonary  vasculature appear unremarkable.      Impression    IMPRESSION: No evidence of consolidation.    AREN MAGALLON MD          SYSTEM ID:  [id number]   Blood Culture Arm, Right    Specimen: Arm, Right; Blood   Result Value Ref Range    Culture No growth after 12 hours    Lactic Acid STAT   Result Value Ref Range    Lactic Acid 0.9 0.7 - 2.0 mmol/L   Blood Culture Arm, Left    Specimen: Arm, Left; Blood   Result Value Ref Range    Culture No growth after 12 hours    UA with Microscopic   Result Value Ref Range    Color Urine Light Yellow Colorless, Straw, Light Yellow, Yellow    Appearance Urine Clear Clear    Glucose Urine Negative Negative mg/dL    Bilirubin Urine Negative Negative    Ketones Urine Negative Negative mg/dL    Specific Gravity Urine 1.020 1.003 - 1.035    Blood Urine Negative Negative    pH Urine 6.5 5.0 - 7.0    Protein Albumin Urine 30 (A) Negative mg/dL    Urobilinogen Urine Normal Normal, 2.0 mg/dL    Nitrite Urine Negative Negative    Leukocyte Esterase Urine Negative Negative    Mucus Urine Present (A) None Seen /LPF    RBC Urine 2 <=2 /HPF    WBC Urine <1 <=5 /HPF   CBC with platelets differential    Narrative    The following orders were created for panel order CBC with platelets differential.  Procedure                               Abnormality         Status                     ---------                               -----------         ------                     CBC with platelets and d...[[id number]]  Abnormal            Final result               Manual Differential[[id number]]                                                           Please view results for these tests on the individual orders.   Fibrinogen activity   Result Value Ref Range    Fibrinogen Activity 688 (H) 170 - 490 mg/dL   INR   Result Value Ref Range    INR 1.25 (H) 0.85 - 1.15   Lactate Dehydrogenase   Result Value Ref Range    Lactate Dehydrogenase 191 0 - 250 U/L   Magnesium   Result Value Ref Range    Magnesium 2.2 1.7 - 2.3 mg/dL   Partial thromboplastin time   Result Value Ref Range    aPTT 38 22 - 38 Seconds   Phosphorus   Result  Value Ref Range    Phosphorus 2.6 2.5 - 4.5 mg/dL   Uric acid   Result Value Ref Range    Uric Acid 2.5 (L) 3.4 - 7.0 mg/dL   Comprehensive metabolic panel   Result Value Ref Range    Sodium 134 (L) 136 - 145 mmol/L    Potassium 3.9 3.4 - 5.3 mmol/L    Chloride 103 98 - 107 mmol/L    Carbon Dioxide (CO2) 21 (L) 22 - 29 mmol/L    Anion Gap 10 7 - 15 mmol/L    Urea Nitrogen 14.1 8.0 - 23.0 mg/dL    Creatinine 0.87 0.67 - 1.17 mg/dL    Calcium 8.3 (L) 8.8 - 10.2 mg/dL    Glucose 112 (H) 70 - 99 mg/dL    Alkaline Phosphatase 58 40 - 129 U/L    AST 28 0 - 45 U/L    ALT 30 0 - 70 U/L    Protein Total 7.0 6.4 - 8.3 g/dL    Albumin 3.6 3.5 - 5.2 g/dL    Bilirubin Total 0.8 <=1.2 mg/dL    GFR Estimate >90 >60 mL/min/1.73m2   CBC with platelets and differential   Result Value Ref Range    WBC Count 0.3 (LL) 4.0 - 11.0 10e3/uL    RBC Count 2.65 (L) 4.40 - 5.90 10e6/uL    Hemoglobin 7.7 (L) 13.3 - 17.7 g/dL    Hematocrit 22.2 (L) 40.0 - 53.0 %    MCV 84 78 - 100 fL    MCH 29.1 26.5 - 33.0 pg    MCHC 34.7 31.5 - 36.5 g/dL    RDW 13.2 10.0 - 15.0 %    Platelet Count 7 (LL) 150 - 450 10e3/uL

## 2023-09-21 NOTE — CONSULTS
BMT / Cell Therapy Consultation      Artie Fine is a 68 year old male referred by Dr. Levine for AML.      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 MRI brain with no evidence of CNS involvement and LP negative for CSF leukemia.     Presentation: 68 year old male with a history of brain abscess (2021), HTN, and BPH. He was transferred from [facility] in [place], [place] with 4-6 weeks of progressive bruising, weakness, fatigue, loss of appetite, and night sweats. CBC notable for pancytopenia; WBC 2.7 (), Hgb 8.1, and plt 1k. He was transferred to [facility] and was found on peripheral flow w/ 11% circulating myeloid blasts. He was then transferred to [facility] for further work up and treatment of AML. Diagnostic BMBx performed 9/1 which showed AML with 30% blasts by morph and 47% by flow. P53 negative. FISH negative for MLLT10, NUP98, and KMT2A. Normal karyotype. NGS showed ASXL1, CEBPA, SRSF2, and STAG2 mutations. S/p diagnostic LP 9/8/23 for CNS leukemia work up as patient endorses headache upon admission w/ new AML diagnosis. CSF flow negative. MRI brain 9/11 negative, only showing chronic changes from h/o brain abscess.       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     Date Treatment Name Response Side Effects / Toxicities   C1D1 9/2/23 7+3 (cytarabine, daunorubicin 60mg/m2 D14 BMBx w/o evidence of leukemia neutropenic fever, rhinovirus, GPC bactremia                            HPI:  Please see my entry above for hematologic history.    Seen with his wife, up in chair, appears comfortable, discussed his course so far.      ASSESSMENT AND PLAN:  68 year old male, history of brain  abscess HTN, with new diagnosis of AML with normal karyotype and ASXL1, CEBPA (NOT in bZIP region), SRSF2, and STAG2 mutations, no known CNS disease on presentation, referred for consideration of consolidation with allogenic hematopoietic cell transplantation.     I discussed with the patient the implications and prognosis of his disease and the risks and benefits of proceeding with an allogeneic stem cell transplantation for AML that is also depending on disease risk and balancing the risk and benefit accordingly per ELN classification and risk stratification.     I discussed with the patient the timeline of an allogeneic bone marrow transplant procedure, rationale, risks and benefits. After discharge, patients are required to live near the transplant center for frequent visits, lab tests, and medication adjustments for many months.  Patients must have caregiver support at home to help with self care. Side effects of the preparative regimen can include fatigue, nausea, vomiting, mouth sores, diarrhea, lung damage, liver damage, hair loss, and death.  Most of these are reversible, but occasionally, there are permanent.  Patients are usually infertile after transplant. There are long term risks of increased secondary cancers, including blood cancers as well.  There is also the possibility of engraftment failure. We discussed graft vs host disease as a short and long term risk that can affect many different parts of the body, including the skin, gut, and liver leading to rashes, mouth sores, diarrhea, nausea, jaundice.  This can be an acute or chronic complication and some patients will develop chronic graft versus host disease, and can be fatal.  Patients have to take immunosuppressive therapies possibly for many months after transplant. There are also risks of infection that occur both with the transplant and with immunosuppression.  Medications are used to help reduce the risk of severe infection.  We discussed  "that overall risks of mortality 30-40% to be further determined based on workup testing results.  We discussed risk of acute GVHD and chronic GVHD, that additionally depends on donor source, and that on our protocol with posttransplant cyclophosphamide as kfiit-jgxenb-ohce disease prophylaxis the results of 10% risk of severe acute lxvhi-cioyph-zfos disease or immunosuppression requiring chronic jhbyl-bbuhtr-cjah disease.        Recommendations:   -Matched unrelated donor search   -Recommend JIMMY transplantation in CR1  -Discussed the timeline of transplantation, smoking cessation, optimizing PS, relocation needed    I spent 90 minutes in the care of this patient today, which included time necessary for preparation for the visit, obtaining history, ordering medications/tests/procedures as medically indicated, review of pertinent medical literature, counseling of the patient, communication of recommendations to the care team, and documentation time.    Brodie Edward MD            ROS:    10 point ROS neg other than the symptoms noted above in the HPI.        Past Surgical History:   Procedure Laterality Date    PICC Right 09/02/2023    Placed R basilic 46 cm 1 external  without problem       Allergies   Allergen Reactions    Iodinated Contrast Media Rash    Ragweeds Other (See Comments)     Congestion           Physical Exam:     Vital Signs: /52 (BP Location: Left arm, Cuff Size: Adult Regular)   Pulse 81   Temp 99.7  F (37.6  C) (Oral)   Resp 20   Ht 1.74 m (5' 8.5\")   Wt 84.1 kg (185 lb 6.4 oz)   SpO2 94%   BMI 27.78 kg/m      KPS:  60-70    Blood Counts       Recent Labs   Lab Test 09/21/23  0515 09/20/23  0542 09/19/23  0423 09/11/23  0436 09/10/23  0445 09/09/23  0445 09/08/23  0523   HGB 7.7* 6.8* 7.2*   < > 8.1* 7.5* 7.8*   HCT 22.2* 20.1* 21.0*   < > 25.6* 24.7* 25.6*   WBC 0.3* 0.3* 0.4*   < > 0.8* 0.9* 1.5*   ABLA  --   --   --   --  0.0 0.0 0.1*   PLT 7* 12* 24*   < > 26* 33* 51*    < > = " values in this interval not displayed.       ABO/RH    Recent Labs   Lab Test 09/20/23  0542   ABORH ABO Unknown Rh POS         Chemistries     Basic Panel  Recent Labs   Lab Test 09/21/23  0515 09/20/23  0542 09/19/23  0423   * 133* 132*   POTASSIUM 3.9 4.0 4.1   CHLORIDE 103 102 100   CO2 21* 22 23   BUN 14.1 13.5 14.6   CR 0.87 0.84 0.89   * 106* 107*        Calcium, Magnesium, Phosphorus  Recent Labs   Lab Test 09/21/23  0515 09/20/23  0542 09/19/23  0423   VENANCIO 8.3* 8.2* 8.4*   MAG 2.2 2.2 2.2   PHOS 2.6 2.4* 2.3*        LFTs  Recent Labs   Lab Test 09/21/23  0515 09/20/23  0542 09/19/23  0423   BILITOTAL 0.8 0.6 0.5   ALKPHOS 58 59 62   AST 28 29 23   ALT 30 31 24   ALBUMIN 3.6 3.5 3.7       LDH  Recent Labs   Lab Test 09/21/23  0515 09/18/23  0423 09/14/23  0420    164 151       Monocloncal Protein Studies     M spike    Recent Labs   Lab Test 08/31/23  1603   ELPM 0.0       [place] FLC    Recent Labs   Lab Test 08/31/23  1603   KFLCA 3.86*       [place] FLC    Recent Labs   Lab Test 08/31/23  1603   LFLCA 2.56       FLC Ratio    Recent Labs   Lab Test 08/31/23  1603   KLRA 1.51       Infectious Disease Markers     [facility] IDM    No lab results found.      Hepatitis and HIV    Recent Labs   Lab Test 09/01/23  1646 08/31/23  1603   HEPBANG Nonreactive  --    HBCAB  --  Nonreactive   AUSAB  --  48.37   HCVAB  --  Nonreactive   HIAGAB  --  Nonreactive         CMV  Recent Labs   Lab Test 09/02/23  1420   CMVIGG Positive, suggests recent or past exposure.*         EBV    Recent Labs   Lab Test 09/01/23  1646   EBVCAG Positive*       Bone Marrow Biopsy       Morphology    Results for orders placed or performed during the hospital encounter of 09/01/23 (from the past 8760 hour(s))   Bone marrow biopsy   Result Value    Final Diagnosis      Bone marrow, posterior iliac crest, right decalcified trephine biopsy and touch imprint; particle crush, direct aspirate smear, and concentrated  aspirate smear; and peripheral blood smear:    - Markedly hypocellular bone marrow (cellularity estimated at 5-10%) showing therapy-related changes, virtually absent trilineage hematopoiesis, and rare to absent blasts  - No morphologic or immunophenotypic evidence of acute myeloid leukemia  - Peripheral blood showing marked pancytopenia  - See comment      Comment      Flow cytometry ([id number]) showed no increase in myeloid blasts and no abnormal myeloid blast population; the specimen appeared hypocellular and possibly hemodilute. Please correlate these findings with the results of other ancillary studies and the clinical presentation.      The red blood cell morphology may not be representative for this patient due to recent red blood cell transfusion.        Clinical Information      From Epic electronic medical record; 68 year old male with a history of AML with ASXL1 E635fs, CEBPA E59*, SRSF2 P95H, and STAG Q656* mutations. They are now day 14 status post 7+3 and being evaluated for stem cell transplant.      Peripheral Hematologic Data      COMPLETE BLOOD COUNT (09/15/2023 05:39 AM CDT):      RESULT VALUE REF. RANGE UNITS    WBC Count    Hemoglobin    Hematocrit    Platelet Count    RBC Count   MCV  MCH  MCHC  RDW  0.3  ( LL )     7.5  ( L )      23.1  ( L )   8  ( LL )   2.64  ( L )       88 (NORMAL)     28.4 (NORMAL)     32.5 (NORMAL)     13.6 (NORMAL) 4.0-11.0  13.3-17.7  40.0-53.0  150-450  4.40-5.90    26.5-33.0  31.5-36.5  10.0-15.0 10e3/uL  g/dL  %  10e3/uL  10e6/uL  fL  pg  g/dL  %         Microscopic Description      The red cells appear normochromic with a subset of hypochromic cells.  Poikilocytosis is minimal. Polychromasia is not increased. Rouleaux formation is not increased. The morphology of the platelets is normal. The predominant leukocytes observed are lymphocytes, which appear polymorphous. Granulocytes and monocytes are rare to absent.    Bone marrow aspirates and touch imprints of  bone biopsy are reviewed.    BONE MARROW DIFFERENTIAL (500 cells on bone marrow biopsy touch imprints )    Percent (%) Cell Population Reference Range (%)   0.6 Blasts  (0 - 1)   0 Neutrophil promyelocytes (2 - 4)   0 Neutrophils and precursors (54 - 63)   1.0 Erythroid precursors (18 - 24)   0.4 Monocytes (1- 1.5)   0 Eosinophils (1 - 3)   0 Basophils (0 - 1)   94.0 Lymphocytes (8 - 12)   4.0 Plasma cells (0 - 1.5)     The aspirate smears and touch imprints are paucicellular.    Granulocytes are rare to absent.  Erythroid precursors are rare to absent.   Megakaryocytes are not identified.  Lymphocytes and plasma cells predominate; the lymphocytes appear polymorphous.    TREPHINE SECTIONS:  Hematoxylin and eosin stains are reviewed. The core biopsy is adequate.  The marrow cellularity is estimated at 5-10%. There is evidence of treatment effect, with hemorrhage and fibrinoid necrosis. The cellular composition reflects the touch imprint differential. Megakaryocytes are not identified.    IMMUNOHISTOCHEMISTRY  Immunohistochemical stains are performed on the paraffin-embedded trephine sections with appropriately reactive control tissues.    CD34 highlights endothelium; blast-like cells positive for CD34 are rare to absent.   labels rare scattered mast cells.  CD61 stains platelets; megakaryocytes are not identified.    Note: These immunohistochemical stains are deemed medically necessary. Some of the antigens may also be evaluated by flow cytometry. Concurrent evaluation by immunohistochemistry on clot and/or trephine sections is indicated in this case in order to correlate immunophenotype with cell morphology and determine extent of involvement, spatial pattern, and focality of potential disease distribution.      Gross Description      Procedure/Gross Description   Aspirate(s) and trephine(s) procured by ARI Modi    Specimen sent for Special Studies:         Flow Cytometry: right aspirate        Molecular  Diagnostics: right aspirate          Biopsy aspiration site: right posterior iliac crest                                                      (Reference Range)          Amount of aspirate           5.0   mL        Fat and P.V. cell layer        1    %               (1 - 3)        Particles                             2   %        Myeloid-erythroid layer    1    %               (5 - 8)          Clot Section: no    Trephine biopsy site: right posterior iliac crest    Designated right posterior iliac crest is 1 cylinder of gritty tissue, labeled with the patient's name and hospital number, obtained with 11 gauge needle and a length of 13 mm; entirely submitted in 1 cassette; acetic zinc formalin fixed, decalcified, processed, and stained for hematoxylin and eosin per laboratory protocol.        Performing Labs      The technical component of this testing was completed at [facility]               Chest CT without Contrast       No results found for this or any previous visit.            Results for orders placed during the hospital encounter of 23    ECHOCARDIOGRAM COMPLETE    Status: Normal 2023    Narrative  [id number]  [id number]  [id number]  [id number]^DOMO^FOREST^YOANDY    [facility]  Echocardiography Laboratory  [address] [place] [address]    Name: CHELSI CRAWFORD  MRN: [id number]  : 1955  Study Date: 2023 07:21 AM  Age: 68 yrs  Gender: Male  Patient Location: [facility]  Reason For Study: Chemo  Ordering Physician: FOREST OLIVEROS  Referring Physician: PATRICIO CHRISTIANSON  Performed By: Cleo Goode    BSA: 2.0 m2  Height: 69 in  Weight: 190 lb  ______________________________________________________________________________  Procedure  Complete Portable Echo Adult.  ______________________________________________________________________________  Interpretation Summary  Global and  regional left and right ventricular function is normal with an EF  of 55-60%. 3D LVEF volumetric analysis is 59%. Global peak LV longitudinal  strain is averaged at -21.6%. This is within reported normal limits (normal <-  18%).  No significant valvular abnormalities were noted.  No pericardial effusion is present.  Previous study not available for comparison.  ______________________________________________________________________________  Left Ventricle  Left ventricular size is normal. Left ventricular wall thickness is normal.  Global and regional left ventricular function is normal with an EF of 55-60%.  3D LVEF volumetric analysis is 59%. Left ventricular diastolic function is  normal. Diastolic Doppler findings (E/E' ratio and/or other parameters)  suggest left ventricular filling pressures are normal. Global peak LV  longitudinal strain is averaged at -21.6%. This is within reported normal  limits (normal <-18%).    Right Ventricle  Right ventricular function, chamber size, wall motion, and thickness are  normal.    Atria  The right atria appears normal. Mild left atrial enlargement is present.    Mitral Valve  Mild mitral annular calcification is present. Mild mitral insufficiency is  present.    Aortic Valve  Trileaflet aortic sclerosis without stenosis.    Tricuspid Valve  The tricuspid valve is normal. Trace tricuspid insufficiency is present.  Pulmonary artery systolic pressure is normal. The right ventricular systolic  pressure is approximated at 12.5 mmHg plus the right atrial pressure.    Pulmonic Valve  The valve leaflets are not well visualized. On Doppler interrogation, there is  no significant stenosis or regurgitation.    Vessels  The inferior vena cava was normal in size with preserved respiratory  variability. Dilated aortic root and proximal ascending aorta measuring 4.2 cm  (2.1 cm/m2) and 4 cm (2 cm/m2) respectively.    Pericardium  No pericardial effusion is present.    Compared to Previous  Study  Previous study not available for comparison.  ______________________________________________________________________________  MMode/2D Measurements & Calculations  IVSd: 1.1 cm  LVIDd: 5.2 cm  LVIDs: 3.4 cm  LVPWd: 1.0 cm  FS: 34.8 %  LV mass(C)d: 214.0 grams  LV mass(C)dI: 105.9 grams/m2  Ao root diam: 4.2 cm  asc Aorta Diam: 4.0 cm  LVOT diam: 2.5 cm  LVOT area: 5.1 cm2  Ao root diam Index (cm/m2): 2.1  asc Aorta Diam Index (cm/m2): 2.0  LA Volume (BP): 81.3 ml    LA Volume Index (BP): 40.2 ml/m2  RWT: 0.40    Doppler Measurements & Calculations  MV E max sarwat: 86.4 cm/sec  MV A max sarwat: 84.6 cm/sec  MV E/A: 1.0  MV dec time: 0.20 sec  TR max sarwat: 176.5 cm/sec  TR max P.5 mmHg  E/E' avg: 10.0  Lateral E/e': 8.4  Medial E/e': 11.7  RV S Sarwat: 13.5 cm/sec    ______________________________________________________________________________  Report approved by: Srinivasa Negron 2023 12:04 PM        PET Scan       No results found for this or any previous visit.         MRI Brain       Results for orders placed during the hospital encounter of 23    MR BRAIN W/O & W CONTRAST    Status: Normal 2023    Narrative  EXAM: MR BRAIN W/O & W CONTRAST  2023 10:30 AM    HISTORY: 68y.o. M with new diagnosis of AML, experiencing headache and  dizziness upon diagnosis, now behavioral shifts, please eval for  possible CNS leukemia involvement.  Additional information obtained from EMR: History of prior right  temporal lobe brain abscess status post drainage ().    COMPARISON: None    TECHNIQUE: Sagittal and axial T1-weighted, axial diffusion,  multiplanar T2 FLAIR with fat saturation images were obtained without  intravenous contrast. Following intravenous gadolinium-based contrast  administration, axial T2-weighted, axial susceptibility, and  T1-weighted images (in multiple planes) were obtained.    CONTRAST: 8 ml Gadavist.    FINDINGS:  There is no mass effect, midline shift, or intracranial  hemorrhage.  The ventricles are proportionate to the cerebral sulci. Diffusion  weighted images are negative for acute/focal abnormality. Major  intracranial vascular structures are within normal limits.  Periventricular and subcortical scattered T2 hyperintensities, likely  due to chronic small vessel ischemic disease. Postoperative changes of  right temporal craniotomy for drainage of abscess, including focus of  vertebral encephalomalacia and T2 hyperintensity without associated  enhancement. Patchy areas of T2 hyperintensity overlying the right  frontotemporal lobes, measuring up to 4 mm in thickness over the  frontal lobe, and over the right parietal lobe, measuring 2 mm in  thickness. Leptomeningeal enhancement over the posterior right frontal  lobe, most evident image 21 of series 16.    No suspicious abnormality of the skull marrow signal. Clear paranasal  sinuses. Mastoid air cells are clear. No focal abnormality of the  pituitary gland, sella, skull base and upper cervical spinal  structures on sagittal images. The orbits are normal.    Impression  IMPRESSION: Postoperative changes of prior right temporal craniotomy  for drainage of a right temporal abscess. Areas of extra-axial T2  hyperintensity and enhancement over the right frontotemporal and right  parietal lobes are presumed to be chronic changes with dural  thickening and possible small chronic collections. Predominantly  posterior frontal leptomeningeal enhancement is also probably  secondary to the suspected chronic changes No definite findings to  suggest CNS lymphoma, although comparison with prior imaging would  likely be of benefit.    I have personally reviewed the examination and initial interpretation  and I agree with the findings.    LACIE CHAIDEZ MD      SYSTEM ID:  [id number]         CSF Studies       Recent Labs   Lab Test 09/08/23  1159   CCOL Colorless   CAPP Clear   CWBC 5   CRBC 1   CCOM Peripheral blood elements present. Negative  for blasts.     Correlate with concurrent flow cytometry. ([id number])    Kvng Osman MD on 9/11/2023 at 3:50 PM  Reviewed by [id number] MD, Hematopathology Fellow       Recent Labs   Lab Test 09/08/23  1158   CGLU 51       Recent Labs   Lab Test 09/08/23  1158   CTP 46.4*       Artie understood the above assessment and recommendations.  Multiple questions answered.  No barriers to learning identified.       Known issues that I take into account for medical decisions, with salient changes to the plan considering these complexities noted above.    Patient Active Problem List   Diagnosis    Pancytopenia (H)    Acute leukemia (H)       ------------------------------------------------------------------------------------------------------------------------------------------------    Patient Care Team         Relationship Specialty Notifications [facility] - General   9/2/23     Phone: [phone] Fax: [phone] [address] [facility] [address]

## 2023-09-21 NOTE — PROGRESS NOTES
[facility] BMT and Cell Therapy Program  RN Coordinator Pre-Visit Documentation      Artie Fine is a 68 year old male who has been referred to the [facility] BMT and Cell Therapy Program for hematopoietic cell transplant or immune effector cell therapy.      Referring MD Name: inpatient    Reason for referral: allogeneic transplant    Link to BMT & CT Program Algorithms      For allos only:    Previous HLA typing? Yes    Previous formal donor search? No    PRA needed? Yes    CMV IGG needed? Yes    and ABO needed? Yes    Potential family donors to type? Unknown    Need URD consents? Yes    All relevant clinical notes, labs, imaging, and pathology may be reviewed in Epic Bookmarks under name: Nina Craft      Patient Care Team         Relationship Specialty Notifications [facility] PCP - General   9/2/23     Phone: [phone] Fax: [phone]         [address] [place] [address]          Nina Craft  BMT Nurse Coordinator

## 2023-09-21 NOTE — TELEPHONE ENCOUNTER
FMLA forms received via fax from [facility] .      Forms to be completed and put in folder for provider to approve.    Fax #:  [phone]  Claim: [id number]    Tatiana Martinez CMA ([facility])

## 2023-09-21 NOTE — PLAN OF CARE
Goal Outcome Evaluation: [id number]      Plan of Care Reviewed With: patient    Overall Patient Progress: improving    Outcome Evaluation: VSS on RA. Denies pain, N/V, SOB. Tylenol given x1. Diaphoretic overnight, shivering noticed by writer. Continues on IV cefepime. UAL. Cares clustered to promote rest. Pt calm and cooperative this shift. Continue w/ POC.

## 2023-09-22 ENCOUNTER — APPOINTMENT (OUTPATIENT)
Dept: PHYSICAL THERAPY | Facility: CLINIC | Age: 68
DRG: 835 | End: 2023-09-22
Attending: INTERNAL MEDICINE
Payer: COMMERCIAL

## 2023-09-22 ENCOUNTER — ALLIED HEALTH/NURSE VISIT (OUTPATIENT)
Dept: TRANSPLANT | Facility: CLINIC | Age: 68
End: 2023-09-22
Payer: COMMERCIAL

## 2023-09-22 DIAGNOSIS — Z71.9 VISIT FOR COUNSELING: Primary | ICD-10-CM

## 2023-09-22 LAB
ALBUMIN SERPL BCG-MCNC: 3.4 G/DL (ref 3.5–5.2)
ALP SERPL-CCNC: 55 U/L (ref 40–129)
ALT SERPL W P-5'-P-CCNC: 21 U/L (ref 0–70)
ANION GAP SERPL CALCULATED.3IONS-SCNC: 10 MMOL/L (ref 7–15)
ANTIBODY SCREEN: NEGATIVE
AST SERPL W P-5'-P-CCNC: 20 U/L (ref 0–45)
BILIRUB SERPL-MCNC: 0.5 MG/DL
BUN SERPL-MCNC: 15.6 MG/DL (ref 8–23)
C DIFF TOX B STL QL: NEGATIVE
CALCIUM SERPL-MCNC: 8.2 MG/DL (ref 8.8–10.2)
CHLORIDE SERPL-SCNC: 102 MMOL/L (ref 98–107)
CREAT SERPL-MCNC: 0.93 MG/DL (ref 0.67–1.17)
DEPRECATED HCO3 PLAS-SCNC: 20 MMOL/L (ref 22–29)
EGFRCR SERPLBLD CKD-EPI 2021: 89 ML/MIN/1.73M2
ERYTHROCYTE [DISTWIDTH] IN BLOOD BY AUTOMATED COUNT: 13.2 % (ref 10–15)
GLUCOSE SERPL-MCNC: 110 MG/DL (ref 70–99)
HCT VFR BLD AUTO: 21.2 % (ref 40–53)
HGB BLD-MCNC: 7.3 G/DL (ref 13.3–17.7)
LACTATE SERPL-SCNC: 1.3 MMOL/L (ref 0.7–2)
MAGNESIUM SERPL-MCNC: 2.3 MG/DL (ref 1.7–2.3)
MCH RBC QN AUTO: 28.9 PG (ref 26.5–33)
MCHC RBC AUTO-ENTMCNC: 34.4 G/DL (ref 31.5–36.5)
MCV RBC AUTO: 84 FL (ref 78–100)
PHOSPHATE SERPL-MCNC: 2.7 MG/DL (ref 2.5–4.5)
PLATELET # BLD AUTO: 23 10E3/UL (ref 150–450)
POTASSIUM SERPL-SCNC: 3.7 MMOL/L (ref 3.4–5.3)
PROT SERPL-MCNC: 6.7 G/DL (ref 6.4–8.3)
RBC # BLD AUTO: 2.53 10E6/UL (ref 4.4–5.9)
SODIUM SERPL-SCNC: 132 MMOL/L (ref 136–145)
SPECIMEN EXPIRATION DATE: NORMAL
WBC # BLD AUTO: 0.1 10E3/UL (ref 4–11)

## 2023-09-22 PROCEDURE — 84100 ASSAY OF PHOSPHORUS: CPT | Performed by: INTERNAL MEDICINE

## 2023-09-22 PROCEDURE — 258N000003 HC RX IP 258 OP 636: Performed by: PHYSICIAN ASSISTANT

## 2023-09-22 PROCEDURE — 120N000002 HC R&B MED SURG/OB UMMC

## 2023-09-22 PROCEDURE — 87493 C DIFF AMPLIFIED PROBE: CPT | Performed by: PHYSICIAN ASSISTANT

## 2023-09-22 PROCEDURE — 250N000011 HC RX IP 250 OP 636: Performed by: PEDIATRICS

## 2023-09-22 PROCEDURE — 250N000013 HC RX MED GY IP 250 OP 250 PS 637: Performed by: STUDENT IN AN ORGANIZED HEALTH CARE EDUCATION/TRAINING PROGRAM

## 2023-09-22 PROCEDURE — 250N000013 HC RX MED GY IP 250 OP 250 PS 637: Performed by: PHYSICIAN ASSISTANT

## 2023-09-22 PROCEDURE — 87040 BLOOD CULTURE FOR BACTERIA: CPT | Performed by: PHYSICIAN ASSISTANT

## 2023-09-22 PROCEDURE — 86923 COMPATIBILITY TEST ELECTRIC: CPT | Performed by: PHYSICIAN ASSISTANT

## 2023-09-22 PROCEDURE — 83605 ASSAY OF LACTIC ACID: CPT | Performed by: INTERNAL MEDICINE

## 2023-09-22 PROCEDURE — 99233 SBSQ HOSP IP/OBS HIGH 50: CPT | Mod: FS | Performed by: PHYSICIAN ASSISTANT

## 2023-09-22 PROCEDURE — 36415 COLL VENOUS BLD VENIPUNCTURE: CPT | Performed by: INTERNAL MEDICINE

## 2023-09-22 PROCEDURE — 250N000011 HC RX IP 250 OP 636: Performed by: INTERNAL MEDICINE

## 2023-09-22 PROCEDURE — 250N000013 HC RX MED GY IP 250 OP 250 PS 637: Performed by: HOSPITALIST

## 2023-09-22 PROCEDURE — 85027 COMPLETE CBC AUTOMATED: CPT | Performed by: PHYSICIAN ASSISTANT

## 2023-09-22 PROCEDURE — 80053 COMPREHEN METABOLIC PANEL: CPT | Performed by: PHYSICIAN ASSISTANT

## 2023-09-22 PROCEDURE — 86923 COMPATIBILITY TEST ELECTRIC: CPT

## 2023-09-22 PROCEDURE — 250N000013 HC RX MED GY IP 250 OP 250 PS 637

## 2023-09-22 PROCEDURE — 97530 THERAPEUTIC ACTIVITIES: CPT | Mod: GP

## 2023-09-22 PROCEDURE — 86850 RBC ANTIBODY SCREEN: CPT | Performed by: PHYSICIAN ASSISTANT

## 2023-09-22 PROCEDURE — 86828 HLA CLASS I&II ANTIBODY QUAL: CPT | Performed by: INTERNAL MEDICINE

## 2023-09-22 PROCEDURE — 83735 ASSAY OF MAGNESIUM: CPT | Performed by: INTERNAL MEDICINE

## 2023-09-22 PROCEDURE — 86833 HLA CLASS II HIGH DEFIN QUAL: CPT | Performed by: INTERNAL MEDICINE

## 2023-09-22 PROCEDURE — 86901 BLOOD TYPING SEROLOGIC RH(D): CPT | Performed by: PHYSICIAN ASSISTANT

## 2023-09-22 RX ADMIN — VORICONAZOLE 200 MG: 200 TABLET ORAL at 20:11

## 2023-09-22 RX ADMIN — CEFEPIME HYDROCHLORIDE 2 G: 2 INJECTION, POWDER, FOR SOLUTION INTRAVENOUS at 12:55

## 2023-09-22 RX ADMIN — ACYCLOVIR 400 MG: 400 TABLET ORAL at 20:11

## 2023-09-22 RX ADMIN — ACETAMINOPHEN 650 MG: 325 TABLET, FILM COATED ORAL at 12:55

## 2023-09-22 RX ADMIN — BENZONATATE 100 MG: 100 CAPSULE ORAL at 17:19

## 2023-09-22 RX ADMIN — VORICONAZOLE 200 MG: 200 TABLET ORAL at 08:22

## 2023-09-22 RX ADMIN — HYDROXYZINE HYDROCHLORIDE 50 MG: 25 TABLET, FILM COATED ORAL at 20:11

## 2023-09-22 RX ADMIN — CEFEPIME HYDROCHLORIDE 2 G: 2 INJECTION, POWDER, FOR SOLUTION INTRAVENOUS at 20:11

## 2023-09-22 RX ADMIN — CETIRIZINE HYDROCHLORIDE 10 MG: 10 TABLET, FILM COATED ORAL at 08:22

## 2023-09-22 RX ADMIN — ACYCLOVIR 400 MG: 400 TABLET ORAL at 08:22

## 2023-09-22 RX ADMIN — ACETAMINOPHEN 650 MG: 325 TABLET, FILM COATED ORAL at 01:29

## 2023-09-22 RX ADMIN — CEFEPIME HYDROCHLORIDE 2 G: 2 INJECTION, POWDER, FOR SOLUTION INTRAVENOUS at 05:32

## 2023-09-22 RX ADMIN — Medication 1250 MG: at 11:16

## 2023-09-22 RX ADMIN — SODIUM CHLORIDE 1000 ML: 9 INJECTION, SOLUTION INTRAVENOUS at 17:00

## 2023-09-22 RX ADMIN — ACETAMINOPHEN 650 MG: 325 TABLET, FILM COATED ORAL at 21:15

## 2023-09-22 ASSESSMENT — ACTIVITIES OF DAILY LIVING (ADL)
ADLS_ACUITY_SCORE: 24
ADLS_ACUITY_SCORE: 29
ADLS_ACUITY_SCORE: 29
ADLS_ACUITY_SCORE: 24
ADLS_ACUITY_SCORE: 24
ADLS_ACUITY_SCORE: 30
ADLS_ACUITY_SCORE: 29
ADLS_ACUITY_SCORE: 24
ADLS_ACUITY_SCORE: 24
ADLS_ACUITY_SCORE: 29
ADLS_ACUITY_SCORE: 24
ADLS_ACUITY_SCORE: 24

## 2023-09-22 NOTE — PROGRESS NOTES
Spoke with Artie and Aundrea , patient's Spouse, following new transplant visit with Dr. Cam Edward. Reviewed plan of care per NT conversation for Stem Cell Transplant. Explained role of the Nurse Coordinator throughout the BMT process as well as general time line and expectations for transplant. Discussed necessity of caregiver and program's proximity requirements. All questions were answered.     Plan: Allogeneic Transplant    Timeline Notes: work up week to possibly start within 6-8 weeks    Contact information provided for :  yes    Allo:  HLA typing drawn: Yes    PRA typing drawn:  Yes    CMV-IgG and ABO-Rh drawn or in record: Yes    Contact information provided for : Yes    Will sibling typing kits need to be sent? Yes    Financial Release for URD search obtained:  Yes    EOC Reason updated: yes     Nina Craft  BMT Nurse Coordinator

## 2023-09-22 NOTE — PLAN OF CARE
Goal Outcome Evaluation:      Plan of Care Reviewed With: patient, spouse    Overall Patient Progress: no changeOverall Patient Progress: no change  Temp max 100 with stable vs. Vanco added for +blood cx from yesterday. Blood cx redrawn today. Continues on IV cefepime. Received tylenol. Triggered sepsis protocol. PA notified. Lactic acid 1.3. No blood or electrolyte replacement required. Low urine output, PA notified, 1LNS ordered. Very small loose stool x1, send for cdiff testing

## 2023-09-22 NOTE — PHARMACY-CONSULT NOTE
Pharmacy Vancomycin Initial Note  Date of Service 2023  Patient's  1955  68 year old, male  Actual Body Weight: 84.1 kg    Indication: Bacteremia    Current estimated CrCl = Estimated Creatinine Clearance: 81.1 mL/min (based on SCr of 0.93 mg/dL).    Creatinine for last 3 days  2023:  5:42 AM Creatinine 0.84 mg/dL  2023:  5:15 AM Creatinine 0.87 mg/dL  2023:  5:31 AM Creatinine 0.93 mg/dL    Recent Vancomycin Level(s) for last 3 days  No results found for requested labs within last 3 days.      Vancomycin IV Administrations (past 72 hours)                     vancomycin (VANCOCIN) 1,250 mg in 0.9% NaCl 250 mL intermittent infusion (mg) 1,250 mg New Bag 23 1116                    Nephrotoxins and other renal medications (From now, onward)      Start     Dose/Rate Route Frequency Ordered Stop    23 1100  vancomycin (VANCOCIN) 1,250 mg in 0.9% NaCl 250 mL intermittent infusion         1,250 mg  over 90 Minutes Intravenous EVERY 18 HOURS 23 1014      23 2000  acyclovir (ZOVIRAX) tablet 400 mg         400 mg Oral 2 TIMES DAILY 23 1513              Contrast Orders - past 72 hours (72h ago, onward)      None            [employer] Prediction of Planned Initial Vancomycin Regimen  Regimen: 1250 mg IV every 18 hours.  Start time: 23:16 on 2023  Exposure target: AUC24 (range)400-600 mg/L.hr   AUC24,ss: 452 mg/L.hr  Probability of AUC24 > 400: 64 %  Ctrough,ss: 13.2 mg/L  Probability of Ctrough,ss > 20: 17 %  Probability of nephrotoxicity (Lodise CHRISSY ): 8 %          Plan:  Start vancomycin 1250 mg (~15 mg/kg) IV q18h.   Vancomycin monitoring method: AUC  Vancomycin therapeutic monitoring goal: 400-600 mg*h/L  Pharmacy will check vancomycin levels as appropriate in 1-3 Days.    Serum creatinine levels will be ordered daily for the first week of therapy and at least twice weekly for subsequent weeks.      Maikel Elizondo, PharmD

## 2023-09-22 NOTE — PROGRESS NOTES
Blood and Marrow Transplant   New Transplant Visit with   Clinical      JAVID BMT consult completed w/ Pt at bedside. Please see Allied Health Encounter for additional details.    Assessment completed on 2023 in the BMT clinic of living situation, support system, financial status, functional status, coping, stressors, need for resources and social work intervention provided as needed. Information for this assessment was provided by pt and pt's spouse's report, consultation with medical team, and medical chart review.      Present:  Patient: Artie Fine  Spouse: Aundrea Fine  : POLO Louis, [facility]    Medical Team   Nurse Coordinator: Nina Craft RN  BMT Physician: Brodie Edward MD  Referring Physician: Donis Garcia MD    Diagnosis: AML  Diagnosis Date: 2023    Presenting Information:  Pt is a 68 year old male diagnosed with AML who is currently inpatient on [address] at the [facility]. Pt was diagnosed in 2023. Pt presents for Allogeneic stem cell transplant discussion.    Contact Information:  Cell/Home Phone: [phone]    Special Needs:   No needs identified at this time.     Relocation Requirement:   Pt lives in [place], [place] (approximately 190 minutes from [facility]). Pt will need to relocate and will need local lodging. SW discussed relocation and explained in detail the different lodging options. Pt and spouse are in agreement with relocating. Spouse is currently residing at [facility].    Living Situation:   Pt lives in [place], [place] with spouse Aundrea.    Family Information:   Spouse: Aundrea Fine  Parents:   Siblings: 2 sisters and 2 brothers (1 brother lives in [place])  Children: Cyril(39-[place]); Twin Daughters Tawana and Helga (36-[place])    Education/Employment:  Currently employed: No. Retired.  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    Spouse/Partner Employed: Yes.   Employer: [employer]  Occupation: N Nurse for  45 years    Insurance:   No insurance concerns identified at this time. SW provided information regarding the insurance authorization process and the role of the BMT Financial . SW provided contact info for the BMT Financial  and referred pt to them for future insurance questions.     Finances:   Pt's source of income is Social Security [employer] and spouse's salary/wages. No financial concerns identified at this time. SW discussed marisel options and asked pt to let SW know if they would like to apply in the future.     Caregiver:   SW discussed with the pt and pts spouse the caregiver role and expectation at length. Pt is agreeable to having a full time caregiver for the minimum of 100 days until cleared by the BMT Physician. Pt's identified caregivers are spouse Aundrea and family as backup. Caregiver education and information provided. No caregiver concerns identified.     Healthcare Directive:  No. SW provided education and forms. SW encouraged pt to have discussions with their family regarding their health care wishes.  In the absence of a healthcare document, SW discussed the [employer] Policy on who would make decisions on pts behalf if he did not have the capacity to make healthcare decisions.  They will plan to complete and bring to the hospital when admitted.       Resources Provided:  -BMT Information Book  -BMT Resources Packet  -Healthcare Directive  -Honoring Choices - Your Rights: Making Your Own Health Care Treatment Decisions  -Caregiver Contract/Description  -Transplant Unit Description and Information   -Lodging Resources    Identified Concerns:  Pt and spouse will need local lodging.  Spouse is currently residing at [facility] and is familiar with the process.  They expressed a desire to reside at [facility] post-transplant.     Summary:  Pt presents to [facility] regarding an Allogeneic stem cell transplant. Pt and pt's spouse asked  good/appropriate questions regarding psychosocial factors related to BMT; all questions were addressed. Pt presented as pleasant. Pt's affect was appropriate. Family's affect was appropriate.     Plan:   SW provided contact information and encouraged pt to contact SW with any additional questions, concerns, resources and/or for support. SW will continue to follow pt to provide support and guidance with resources as needed.     POLO Louis, LGJAVID  [facility]  Adult Blood & Marrow Transplant   Phone: [phone]  Pager: [phone]

## 2023-09-22 NOTE — PLAN OF CARE
Goal Outcome Evaluation:      Plan of Care Reviewed With: patient    Overall Patient Progress: no changeOverall Patient Progress: no change         VSS,afebrile tmax 99.7  Prn tyl x1 or chills but no fever   Lab called with positive BC collected 9/20 R arm streptococcus species - continue iv cefepime   Very tired today slept between cares   Continue to monitor and follow poc

## 2023-09-22 NOTE — PROGRESS NOTES
[facility]    Hematology / Oncology Progress Note    Patient: Artie Fine  MRN: [id number]  Admission Date: 9/1/2023  Date of Service (when I saw the patient): 09/22/2023  Hospital Day # 21     Assessment & Plan   Artie Fine is a 68 year old male with a history of brain abscess (2021), HTN, and BPH. He was transferred from [facility] in [place], [place] to [facility] for work up of pancytopenia; he was noted to have circulating blasts concerning for acute leukemia, and was subsequently transferred to [facility]. He underwent a diagnostic bone marrow biopsy on 9/1/23 which confirmed a new diagnosis of AML. He was started on induction with 7+3 (C1D1=9/2/23). Hospital course has been complicated by rhinovirus infection and neutropenic fever with positive blood cultures.     Today:   - Day 21 of 7+3  - Afebrile overnight on cefepime. BCx 9/20 1/2 positive for strep species, awaiting speciation and susceptibilities. Repeat BCx 9/21 and 9/22. Added vanc while awaiting further testing.   - Pt has had several episodes of loose stools over the last 24 hours. Ordered cdiff.   - BMT consult this afternoon with Dr. Cam Edward, moved up to 1300.     HEME/ONC  # AML, adverse risk  Patient initially presented to [facility] in [place], [place] with 4-6 weeks of progressive bruising, weakness, fatigue, loss of appetite, and night sweats. He also noted a headache similar to his previous brain abscess. CBC notable for pancytopenia; WBC 2.7 (), Hgb 8.1, and plt 1k. He was transferred to [facility] and was found on peripheral flow w/ 11% circulating myeloid blasts. He was then transferred to [facility] for further work up and treatment of AML. Diagnostic BMBx performed 9/1 which showed AML with 30% blasts by morph and 47% by flow. P53 negative. FISH negative for MLLT10, NUP98, and KMT2A. Normal karyotype. NGS showed ASXL1, CEBPA, SRSF2, and STAG2 mutations. Started 7+3 (C1D1=9/2/23).    - Baseline TTE showing normal LVEF of 55-60%. Baseline EKG showed NSR and QTc of 434.   - Viral serologies: HepB/C-, HIV-. HSV1/2+, EBV IgG+, CMV IgG+  - PICC placed 9/1  - S/p diagnostic LP 9/8/23 for CNS leukemia work up as patient endorses headache upon admission w/ new AML diagnosis. CSF flow negative. MRI brain 9/11 negative, only showing chronic changes from h/o brain abscess.   - D14 BMBx w/o evidence of leukemia.   - HLA typing ordered. Plan for BMT NT 9/22 at 1300 with Dr. Cam Edward.                   Treatment Plan: 7+3 (C1D1=9/2/23)                - Cytarabine 100 mg/m2 - D1-D7               - Daunorubicin 60 mg/m2 - D1-D3               - Supportive meds: Decadron 12 mg D1-D3, Zofran     # Low risk for TLS/DIC  - Discontinued allopurinol  - Montior TLS/DIC labs twice weekly     ID  # Neutropenic fever  # BCx 9/20 1/2 positive for strep species  Tmax 102.7 on 9/20 evening. Pt has been having cold symptoms as below in the setting of positive rhinovirus on 9/16. Cold symptoms on improving trajectory at time of new fever. Pt had diarrhea around time of fever onset, but had recently received MiraLax for constipation. Diarrhea persisted after MiraLax effect waned. Overnight 9/21-9/22, pt was too weak to return to bed from bathroom, called RN to assist.   - Work up:   - BCx 9/20 1/2 positive for strep species, 2/2 NGTD  - BCx 9/21 NGTD, 9/22 ordered. Follow daily until negative x3.  - UA bland. UCx NGTD.  - CXR unremarkable  - Cdiff ordered  - Antibiotics:   - Cefepime 2 g q8hr x9/20 PM  - Vancomycin x9/22 AM  - Awaiting speciation and susceptibilities of BCx 9/20    # Rhinovirus  # Post nasal drip, improving  # Cough, improving  Nasal congestion and post nasal drip 9/15 morning upon waking. H/o seasonal allergies. On 9/16, noted development of productive cough and worsening nasal congestion and post-nasal drip. One episode of blood-tinged sputum, now resolved. COVID/flu/RSV negative, but RVP positive for  rhinovirus. Although new NF 9/2, URI symptoms are improving.   - Continue PTA cetirizine  - Supportive: Tessalon Perles, Mucinex DM, Hurricaine spray    # Immunosuppressed 2/2 malignancy and chemotherapy  # ID PPX  -  mg BID  - Levofloxacin 250 mg daily - on hold with treatment dose antibiotics as above  - Voriconazole 200 mg BID x9/10     # H/o brain abscess (2021)  # Headache, resolved  # Dizziness, resolved  Treated in [place]. S/p craniotomy for drainage of abscess July 2021. It is unclear what culture speciated to, but he completed a course of antibiotics with ceftriaxone then meropenem with resolution on imaging. Head CT 8/31 at OSH revealed no intracranial hemorrhage midline shift or mass effect. Postsurgical changes noted. Previous area of infection demonstrates trace amount of encephalomalacia but no mass effect or inflammatory changes to suggest new or acute infection. Otherwise unremarkable. Patient presented initially with headache and dizziness, which has now resolved.   - See work up above for CNS leukemia work-up, which was negative     CHRONIC  # HTN  Noted during prior hospitalization for brain abscess in 2021. Previously on lisinopril, which he is no longer taking.  - Patient BP's have fluctuated this admission, w/ occasionally elevated BPs. Continue to monitor.     # BPH  Notes difficulty with complete emptying of bladder. Previously on Flomax but states it did nothing, so he stopped it. He does note frequent urination at baseline, currently urination remains unchanged from baseline.  - Monitor clinically     MISC  # Weakness, improved  # Deconditioning, improved  Pt reports issues w/ balance after his brain abscess in 2021, exacerbated when ambulating. Deconditioning acute-on-chronic upon admission since 2021.   - PT consulted     # Weight loss  # Loss of appetite  # Moderate malnutrition in the context of acute illness   Patient presented with loss of appetite 2/2 malignancy. He has  "been gradually losing weight throughout admission, although PO intake is overall sufficient.   - RD consulted  - Ensure BID between meals and PRN     Clinically Significant Risk Factors          # Hypocalcemia: Lowest Ca = 8.2 mg/dL in last 2 days, will monitor and replace as appropriate     # Hypoalbuminemia: Lowest albumin = 3.4 g/dL at 9/22/2023  5:31 AM, will monitor as appropriate  # Coagulation Defect: INR = 1.25 (Ref range: 0.85 - 1.15) and/or PTT = 38 Seconds (Ref range: 22 - 38 Seconds), will monitor for bleeding    # Thrombocytopenia: Lowest platelets = 7 in last 2 days, will monitor for bleeding          # Overweight: Estimated body mass index is 27.78 kg/m  as calculated from the following:    Height as of this encounter: 1.74 m (5' 8.5\").    Weight as of this encounter: 84.1 kg (185 lb 6.4 oz).       # Moderate Malnutrition: based on nutrition assessment           FEN  Diet: Regular Diet Adult   IVF: Bolus PRN   Lytes: Replete per protocol     PPX  VTE: None given thrombocytopenia  Bowel: Senna/MiraLax PRN  GI/PUD: None currently indicated     MISC  Code Status: Full Code   Lines/Drains: PICC  Dispo: Will remain inpatient through count recovery with safe discharge plan outpatient.   Follow Up: Pt will follow with Dr. Garcia, appointment scheduled for end of September. Pt would prefer labs and blood transfusions done closer to home in [facility].    Patient was seen and plan of care was discussed with attending physician Dr. Levine.    I spent 65 minutes in the care of this patient today, which included time necessary for review of interval events, obtaining history and physical exam, ordering medications/tests/procedures as medically indicated, review of pertinent medical literature, counseling of the patient, coordination of care, and documentation time. Over 50% of time was spent counseling the patient and/or coordinating care.    Yolis Sherman PA-C   Hematology/Oncology   Pager: " [phone]  Desk phone: [phone]    Interval History   Afebrile overnight, but continues to feel poor. He had episode last night where he went to the bathroom at 0245 but was too weak to get back to bed. Called RN and he was transported back to bed in wheelchair. Poor sleep.     This morning he continues to feel weak and malaised. Cognition feels a little foggy, but he is A&Ox4. URI symptoms (cough, sinus drainage) continue to improve. He continues to have diarrhea. When he asks how many episodes in last 24 hours, he says \"too many to count.\" He feels like he's \"covered\" in diarrhea. (Discussed with NST, who was already planning on AM bathing).     Vital Signs with Ranges  Temp:  [98.1  F (36.7  C)-100.6  F (38.1  C)] 98.1  F (36.7  C)  Pulse:  [81-93] 84  Resp:  [19-21] 20  BP: ()/(52-67) 110/60  SpO2:  [94 %-99 %] 99 %  I/O last 3 completed shifts:  In: 1850 [P.O.:1400; I.V.:200]  Out: -     Physical Exam   General: Sitting up in bed, alert, NAD. Pleasant and conversational.  Skin: No concerning lesions, rash, jaundice, cyanosis, erythema, or ecchymoses on exposed surfaces.   HEENT: NCAT. Anicteric sclera. Moist mucous membranes.   Respiratory: Non-labored breathing on room air, good air exchange. Clear to auscultation bilaterally.  Cardiovascular: RRR. No murmur or rub.   Gastrointestinal: Normoactive BS. Abdomen soft, ND, NT. No palpable masses.  Extremities: No LE edema.   Neurologic: A&O x 3, speech normal, no deficits grossly.    Medications    - MEDICATION INSTRUCTIONS -        acyclovir  400 mg Oral BID    ceFEPIme  2 g Intravenous Q8H    cetirizine  10 mg Oral Daily    hydrOXYzine  50 mg Oral At Bedtime    [Held by provider] levofloxacin  250 mg Oral Daily    voriconazole  200 mg Oral Q12H [facility] (08/20)     Data   Results for orders placed or performed during the hospital encounter of 09/01/23 (from the past 24 hour(s))   Ionized Calcium   Result Value Ref Range    Calcium Ionized Whole Blood 4.3 (L) 4.4 - 5.2 " mg/dL   Lactic Acid STAT   Result Value Ref Range    Lactic Acid 0.5 (L) 0.7 - 2.0 mmol/L   Blood Culture Line, venous    Specimen: Line, venous; Blood   Result Value Ref Range    Culture No growth after 12 hours    Blood Culture Hand, Left    Specimen: Hand, Left; Blood   Result Value Ref Range    Culture No growth after 12 hours    ABO/Rh type and screen    Narrative    The following orders were created for panel order ABO/Rh type and screen.  Procedure                               Abnormality         Status                     ---------                               -----------         ------                     Adult Type and Screen[[id number]]                            Final result                 Please view results for these tests on the individual orders.   CBC with platelets differential    Narrative    The following orders were created for panel order CBC with platelets differential.  Procedure                               Abnormality         Status                     ---------                               -----------         ------                     CBC with platelets and d...[[id number]]  Abnormal            Final result                 Please view results for these tests on the individual orders.   PRA BMT    Narrative    The following orders were created for panel order PRA BMT.  Procedure                               Abnormality         Status                     ---------                               -----------         ------                     PRA BMT[[id number]]                                          In process                   Please view results for these tests on the individual orders.   Comprehensive metabolic panel   Result Value Ref Range    Sodium 132 (L) 136 - 145 mmol/L    Potassium 3.7 3.4 - 5.3 mmol/L    Chloride 102 98 - 107 mmol/L    Carbon Dioxide (CO2) 20 (L) 22 - 29 mmol/L    Anion Gap 10 7 - 15 mmol/L    Urea Nitrogen 15.6 8.0 - 23.0 mg/dL    Creatinine 0.93 0.67 - 1.17  mg/dL    Calcium 8.2 (L) 8.8 - 10.2 mg/dL    Glucose 110 (H) 70 - 99 mg/dL    Alkaline Phosphatase 55 40 - 129 U/L    AST 20 0 - 45 U/L    ALT 21 0 - 70 U/L    Protein Total 6.7 6.4 - 8.3 g/dL    Albumin 3.4 (L) 3.5 - 5.2 g/dL    Bilirubin Total 0.5 <=1.2 mg/dL    GFR Estimate 89 >60 mL/min/1.73m2   Magnesium   Result Value Ref Range    Magnesium 2.3 1.7 - 2.3 mg/dL   Phosphorus   Result Value Ref Range    Phosphorus 2.7 2.5 - 4.5 mg/dL   Adult Type and Screen   Result Value Ref Range    Antibody Screen Negative Negative    SPECIMEN EXPIRATION DATE [id number]    CBC with platelets and differential   Result Value Ref Range    WBC Count 0.1 (LL) 4.0 - 11.0 10e3/uL    RBC Count 2.53 (L) 4.40 - 5.90 10e6/uL    Hemoglobin 7.3 (L) 13.3 - 17.7 g/dL    Hematocrit 21.2 (L) 40.0 - 53.0 %    MCV 84 78 - 100 fL    MCH 28.9 26.5 - 33.0 pg    MCHC 34.4 31.5 - 36.5 g/dL    RDW 13.2 10.0 - 15.0 %    Platelet Count 23 (LL) 150 - 450 10e3/uL   ABO/RH Type & Screen   Result Value Ref Range    SPECIMEN EXPIRATION DATE [id number]     ABORH A POS

## 2023-09-22 NOTE — PLAN OF CARE
"Goal Outcome Evaluation: 1614-2823      Plan of Care Reviewed With: patient    Outcome Evaluation: VSS on RA. A&Ox4. Tylenol given x1. At 0245 pt called from bathroom that he was unsteady, \"disoriented\" and did not feel that he could get back to bed safely. Pt was noticeably diaphoretic. Writer and second RN assisted pt to back to bed via wheelchair. Vitally stable and A&Ox4 at time of episode. Pt instructed to call for assistance before getting out of bed in the future, agreeable to plan and verbalized he \"does not want to get hurt.\" Cool washcloths applied to pt's forehead. Continues on IV cefepime. Denies pain, N/V, SOB. Able to make needs known. Continue w/ POC.    "

## 2023-09-23 LAB
ALBUMIN SERPL BCG-MCNC: 3.5 G/DL (ref 3.5–5.2)
ALP SERPL-CCNC: 54 U/L (ref 40–129)
ALT SERPL W P-5'-P-CCNC: 22 U/L (ref 0–70)
ANION GAP SERPL CALCULATED.3IONS-SCNC: 12 MMOL/L (ref 7–15)
AST SERPL W P-5'-P-CCNC: 22 U/L (ref 0–45)
BACTERIA BLD CULT: ABNORMAL
BACTERIA BLD CULT: ABNORMAL
BILIRUB SERPL-MCNC: 0.5 MG/DL
BUN SERPL-MCNC: 14.8 MG/DL (ref 8–23)
CALCIUM SERPL-MCNC: 8.2 MG/DL (ref 8.8–10.2)
CHLORIDE SERPL-SCNC: 101 MMOL/L (ref 98–107)
CREAT SERPL-MCNC: 0.87 MG/DL (ref 0.67–1.17)
DEPRECATED HCO3 PLAS-SCNC: 20 MMOL/L (ref 22–29)
EGFRCR SERPLBLD CKD-EPI 2021: >90 ML/MIN/1.73M2
ERYTHROCYTE [DISTWIDTH] IN BLOOD BY AUTOMATED COUNT: 13.4 % (ref 10–15)
GLUCOSE SERPL-MCNC: 110 MG/DL (ref 70–99)
HCT VFR BLD AUTO: 22 % (ref 40–53)
HGB BLD-MCNC: 7.4 G/DL (ref 13.3–17.7)
MAGNESIUM SERPL-MCNC: 2.3 MG/DL (ref 1.7–2.3)
MCH RBC QN AUTO: 28.2 PG (ref 26.5–33)
MCHC RBC AUTO-ENTMCNC: 33.6 G/DL (ref 31.5–36.5)
MCV RBC AUTO: 84 FL (ref 78–100)
PHOSPHATE SERPL-MCNC: 2.1 MG/DL (ref 2.5–4.5)
PLATELET # BLD AUTO: 12 10E3/UL (ref 150–450)
POTASSIUM SERPL-SCNC: 3.7 MMOL/L (ref 3.4–5.3)
PROT SERPL-MCNC: 6.8 G/DL (ref 6.4–8.3)
RBC # BLD AUTO: 2.62 10E6/UL (ref 4.4–5.9)
SODIUM SERPL-SCNC: 133 MMOL/L (ref 136–145)
WBC # BLD AUTO: 0.2 10E3/UL (ref 4–11)

## 2023-09-23 PROCEDURE — 83735 ASSAY OF MAGNESIUM: CPT | Performed by: INTERNAL MEDICINE

## 2023-09-23 PROCEDURE — 250N000009 HC RX 250: Performed by: INTERNAL MEDICINE

## 2023-09-23 PROCEDURE — 85027 COMPLETE CBC AUTOMATED: CPT | Performed by: PHYSICIAN ASSISTANT

## 2023-09-23 PROCEDURE — 250N000013 HC RX MED GY IP 250 OP 250 PS 637: Performed by: HOSPITALIST

## 2023-09-23 PROCEDURE — 250N000013 HC RX MED GY IP 250 OP 250 PS 637: Performed by: PHYSICIAN ASSISTANT

## 2023-09-23 PROCEDURE — 99233 SBSQ HOSP IP/OBS HIGH 50: CPT | Mod: FS | Performed by: PHYSICIAN ASSISTANT

## 2023-09-23 PROCEDURE — 250N000011 HC RX IP 250 OP 636: Mod: JZ

## 2023-09-23 PROCEDURE — 84100 ASSAY OF PHOSPHORUS: CPT | Performed by: INTERNAL MEDICINE

## 2023-09-23 PROCEDURE — 999N000044 HC STATISTIC CVC DRESSING CHANGE

## 2023-09-23 PROCEDURE — 87040 BLOOD CULTURE FOR BACTERIA: CPT | Performed by: PHYSICIAN ASSISTANT

## 2023-09-23 PROCEDURE — 250N000011 HC RX IP 250 OP 636: Performed by: INTERNAL MEDICINE

## 2023-09-23 PROCEDURE — 36415 COLL VENOUS BLD VENIPUNCTURE: CPT | Performed by: PHYSICIAN ASSISTANT

## 2023-09-23 PROCEDURE — 258N000003 HC RX IP 258 OP 636: Performed by: INTERNAL MEDICINE

## 2023-09-23 PROCEDURE — 80053 COMPREHEN METABOLIC PANEL: CPT | Performed by: PHYSICIAN ASSISTANT

## 2023-09-23 PROCEDURE — 250N000013 HC RX MED GY IP 250 OP 250 PS 637

## 2023-09-23 PROCEDURE — 250N000013 HC RX MED GY IP 250 OP 250 PS 637: Performed by: STUDENT IN AN ORGANIZED HEALTH CARE EDUCATION/TRAINING PROGRAM

## 2023-09-23 PROCEDURE — 250N000011 HC RX IP 250 OP 636: Performed by: PEDIATRICS

## 2023-09-23 PROCEDURE — 120N000002 HC R&B MED SURG/OB UMMC

## 2023-09-23 RX ORDER — POTASSIUM PHOS IN 0.9 % NACL 15MMOL/250
15 PLASTIC BAG, INJECTION (ML) INTRAVENOUS ONCE
Status: COMPLETED | OUTPATIENT
Start: 2023-09-23 | End: 2023-09-23

## 2023-09-23 RX ADMIN — ACYCLOVIR 400 MG: 400 TABLET ORAL at 09:00

## 2023-09-23 RX ADMIN — HYDROXYZINE HYDROCHLORIDE 50 MG: 25 TABLET, FILM COATED ORAL at 20:05

## 2023-09-23 RX ADMIN — GUAIFENESIN AND DEXTROMETHORPHAN HYDROBROMIDE 1 TABLET: 600; 30 TABLET, EXTENDED RELEASE ORAL at 17:04

## 2023-09-23 RX ADMIN — VORICONAZOLE 200 MG: 200 TABLET ORAL at 20:05

## 2023-09-23 RX ADMIN — CEFEPIME HYDROCHLORIDE 2 G: 2 INJECTION, POWDER, FOR SOLUTION INTRAVENOUS at 21:32

## 2023-09-23 RX ADMIN — CEFEPIME HYDROCHLORIDE 2 G: 2 INJECTION, POWDER, FOR SOLUTION INTRAVENOUS at 13:59

## 2023-09-23 RX ADMIN — SODIUM CHLORIDE, PRESERVATIVE FREE 5 ML: 5 INJECTION INTRAVENOUS at 22:30

## 2023-09-23 RX ADMIN — Medication 1250 MG: at 06:55

## 2023-09-23 RX ADMIN — CETIRIZINE HYDROCHLORIDE 10 MG: 10 TABLET, FILM COATED ORAL at 09:00

## 2023-09-23 RX ADMIN — VORICONAZOLE 200 MG: 200 TABLET ORAL at 09:00

## 2023-09-23 RX ADMIN — POTASSIUM PHOSPHATE, MONOBASIC POTASSIUM PHOSPHATE, DIBASIC 15 MMOL: 224; 236 INJECTION, SOLUTION, CONCENTRATE INTRAVENOUS at 09:00

## 2023-09-23 RX ADMIN — CEFEPIME HYDROCHLORIDE 2 G: 2 INJECTION, POWDER, FOR SOLUTION INTRAVENOUS at 04:31

## 2023-09-23 RX ADMIN — ACYCLOVIR 400 MG: 400 TABLET ORAL at 20:05

## 2023-09-23 ASSESSMENT — ACTIVITIES OF DAILY LIVING (ADL)
ADLS_ACUITY_SCORE: 30

## 2023-09-23 NOTE — PLAN OF CARE
Goal Outcome Evaluation:      Plan of Care Reviewed With: patient, spouse    Overall Patient Progress: no changeOverall Patient Progress: no change  Temp max 100.1 with stable vs. Pt has new rash on trunk and upper legs. Vanco d/c'd. Continues on IV cefepime. Blood cx redrawn today.  Phos level 2.1 replaced per protocol, redraw tomorrow. Pt reports feeling better today. Up in shower. Good urine output. Large formed BM x1.

## 2023-09-23 NOTE — PROGRESS NOTES
[facility]    Hematology / Oncology Progress Note    Patient: Artie Fine  MRN: [id number]  Admission Date: 9/1/2023  Date of Service (when I saw the patient): 09/23/2023  Hospital Day # 22     Assessment & Plan   Artie Fine is a 68 year old male with a history of brain abscess (2021), HTN, and BPH. He was transferred from [facility] in [place], [place] to [facility] for work up of pancytopenia; he was noted to have circulating blasts concerning for acute leukemia, and was subsequently transferred to [facility]. He underwent a diagnostic bone marrow biopsy on 9/1/23 which confirmed a new diagnosis of AML. He was started on induction with 7+3 (C1D1=9/2/23). Hospital course has been complicated by rhinovirus infection and neutropenic fever from S. mitis bacteremia.      Today:   - Day 22 of 7+3  - Afebrile overnight on cefepime and vanc. BCx 9/20 1/2 positive for strep mitis, pansensitive. Discontinued vancomycin. Repeat BCx x3d NGTD.  - New drug rash today on upper legs, trunk. Currently not pruritic. Will monitor for improvement now that vancomycin is discontinued. Although, if continues to worsen, will consider alternative to cefepime.   - Improvement in diarrhea without intervention. Cdiff negative.   - UCx 9/20 grew 10-50k staph epi in the setting of bland UA and no urinary symptoms. Suspect contaminant from skin.     HEME/ONC  # AML, adverse risk  Patient initially presented to [facility] in [place], [place] with 4-6 weeks of progressive bruising, weakness, fatigue, loss of appetite, and night sweats. He also noted a headache similar to his previous brain abscess. CBC notable for pancytopenia; WBC 2.7 (), Hgb 8.1, and plt 1k. He was transferred to [facility] and was found on peripheral flow w/ 11% circulating myeloid blasts. He was then transferred to [facility] for further work up and treatment of AML. Diagnostic BMBx performed 9/1 which showed AML with 30%  blasts by morph and 47% by flow. P53 negative. FISH negative for MLLT10, NUP98, and KMT2A. Normal karyotype. NGS showed ASXL1, CEBPA, SRSF2, and STAG2 mutations. Started 7+3 (C1D1=9/2/23).   - Baseline TTE showing normal LVEF of 55-60%. Baseline EKG showed NSR and QTc of 434.   - Viral serologies: HepB/C-, HIV-. HSV1/2+, EBV IgG+, CMV IgG+  - PICC placed 9/1  - S/p diagnostic LP 9/8/23 for CNS leukemia work up as patient endorses headache upon admission w/ new AML diagnosis. CSF flow negative. MRI brain 9/11 negative, only showing chronic changes from h/o brain abscess.   - D14 BMBx w/o evidence of leukemia.   - HLA typing ordered. S/p BMT NT 9/22 with Dr. Cam Edward.                   Treatment Plan: 7+3 (C1D1=9/2/23)                - Cytarabine 100 mg/m2 - D1-D7               - Daunorubicin 60 mg/m2 - D1-D3               - Supportive meds: Decadron 12 mg D1-D3, Zofran     # Low risk for TLS/DIC  - Discontinued allopurinol  - Montior TLS/DIC labs twice weekly     ID  # Neutropenic fever  # BCx 9/20 1/2 positive for strep species  Tmax 102.7 on 9/20 evening. Pt has been having cold symptoms as below in the setting of positive rhinovirus on 9/16. Cold symptoms on improving trajectory at time of new fever. Pt had diarrhea around time of fever onset, but had recently received MiraLax for constipation. Diarrhea persisted after MiraLax effect waned. Overnight 9/21-9/22, pt was too weak to return to bed from bathroom, called RN to assist.   - Work up:   - BCx 9/20 1/2 positive for strep mitis, pansensitive, 2/2 NGTD  - BCx 9/21-9/23 NGTD  - UCx 9/20 grew 10-50k staph epi in the setting of bland UA and no urinary symptoms. Suspect contaminant from skin.   - CXR unremarkable  - Cdiff negative  - Antibiotics:   - Cefepime 2 g q8hr x9/20 PM  - Vancomycin 9/22-9/23    # Rhinovirus  # Post nasal drip, improving  # Cough, improving  Nasal congestion and post nasal drip 9/15 morning upon waking. H/o seasonal allergies. On 9/16,  noted development of productive cough and worsening nasal congestion and post-nasal drip. One episode of blood-tinged sputum, now resolved. COVID/flu/RSV negative, but RVP positive for rhinovirus. Although new NF 9/2, URI symptoms are improving.   - Continue PTA cetirizine  - Supportive: Tessalon Perles, Mucinex DM, Hurricaine spray    # Immunosuppressed 2/2 malignancy and chemotherapy  # ID PPX  -  mg BID  - Levofloxacin 250 mg daily - on hold with treatment dose antibiotics as above  - Voriconazole 200 mg BID x9/10     # H/o brain abscess (2021)  # Headache, resolved  # Dizziness, resolved  Treated in [place]. S/p craniotomy for drainage of abscess July 2021. It is unclear what culture speciated to, but he completed a course of antibiotics with ceftriaxone then meropenem with resolution on imaging. Head CT 8/31 at OSH revealed no intracranial hemorrhage midline shift or mass effect. Postsurgical changes noted. Previous area of infection demonstrates trace amount of encephalomalacia but no mass effect or inflammatory changes to suggest new or acute infection. Otherwise unremarkable. Patient presented initially with headache and dizziness, which has now resolved.   - See work up above for CNS leukemia work-up, which was negative     CHRONIC  # HTN  Noted during prior hospitalization for brain abscess in 2021. Previously on lisinopril, which he is no longer taking.  - Patient BP's have fluctuated this admission, w/ occasionally elevated BPs. Continue to monitor.     # BPH  Notes difficulty with complete emptying of bladder. Previously on Flomax but states it did nothing, so he stopped it. He does note frequent urination at baseline, currently urination remains unchanged from baseline.  - Monitor clinically     MISC  # Drug rash  On 9/23 AM, pt noted to have drug rash on upper thighs and trunk. Denies pruritus. Denies angioedema, SOB.   - No supportive cares needed at this time  - Will monitor for improvement  "now that vancomycin is discontinued. Although, if continues to worsen, will consider alternative to cefepime.     # Weakness, improved  # Deconditioning, improved  Pt reports issues w/ balance after his brain abscess in 2021, exacerbated when ambulating. Deconditioning acute-on-chronic upon admission since 2021.   - PT consulted     # Weight loss  # Loss of appetite  # Moderate malnutrition in the context of acute illness   Patient presented with loss of appetite 2/2 malignancy. He has been gradually losing weight throughout admission, although PO intake is overall sufficient.   - RD consulted  - Ensure BID between meals and PRN    # Hyponatremia  # Hypophosphatemia  - Replete per protocol     Clinically Significant Risk Factors          # Hypocalcemia: Lowest Ca = 8.2 mg/dL in last 2 days, will monitor and replace as appropriate     # Hypoalbuminemia: Lowest albumin = 3.4 g/dL at 9/22/2023  5:31 AM, will monitor as appropriate  # Coagulation Defect: INR = 1.25 (Ref range: 0.85 - 1.15) and/or PTT = 38 Seconds (Ref range: 22 - 38 Seconds), will monitor for bleeding    # Thrombocytopenia: Lowest platelets = 12 in last 2 days, will monitor for bleeding          # Overweight: Estimated body mass index is 28.38 kg/m  as calculated from the following:    Height as of this encounter: 1.74 m (5' 8.5\").    Weight as of this encounter: 85.9 kg (189 lb 6.4 oz).       # Moderate Malnutrition: based on nutrition assessment           FEN  Diet: Regular Diet Adult   IVF: Bolus PRN   Lytes: Replete per protocol     PPX  VTE: None given thrombocytopenia  Bowel: Senna/MiraLax PRN  GI/PUD: None currently indicated     MISC  Code Status: Full Code   Lines/Drains: PICC  Dispo: Will remain inpatient through count recovery with safe discharge plan outpatient.   Follow Up: Pt will follow with Dr. Garcia, appointment scheduled for end of September. Pt would prefer labs and blood transfusions done closer to home in [facility]" "[place].    Patient was seen and plan of care was discussed with attending physician Dr. Levine.    I spent 65 minutes in the care of this patient today, which included time necessary for review of interval events, obtaining history and physical exam, ordering medications/tests/procedures as medically indicated, review of pertinent medical literature, counseling of the patient, coordination of care, and documentation time. Over 50% of time was spent counseling the patient and/or coordinating care.    Yolis Sherman PA-C   Hematology/Oncology   Pager: [phone]  Desk phone: [phone]    Interval History   Afebrile overnight. Patient is not feeling well today, although \"a pinch\" better than previous. He feels a little stronger being up about room by himself. Appetite is a little improved as well, was able to eat breakfast this morning. Cough/sinus drainage continue to improve. Denies dysuria. Slightly above normal UOP. New drug rash on trunk and thighs, non-pruritic.     Vital Signs with Ranges  Temp:  [97.6  F (36.4  C)-100.2  F (37.9  C)] 99.5  F (37.5  C)  Pulse:  [80-99] 94  Resp:  [16-22] 18  BP: (104-123)/(54-63) 104/54  SpO2:  [96 %-100 %] 96 %  I/O last 3 completed shifts:  In: 1870 [P.O.:1220; I.V.:650]  Out: 800 [Urine:800]    Physical Exam   General: Sitting up in bed, alert, NAD. Pleasant and conversational.  Skin: Erythematous maculopapular rash on upper thighs and trunk. No concerning lesions, jaundice, cyanosis, erythema, or ecchymoses on exposed surfaces.   HEENT: NCAT. Anicteric sclera. Moist mucous membranes.   Respiratory: Non-labored breathing on room air, good air exchange. Clear to auscultation bilaterally.  Cardiovascular: RRR. No murmur or rub.   Gastrointestinal: Normoactive BS. Abdomen soft, ND, NT. No palpable masses.  Extremities: No LE edema.   Neurologic: A&O x 3, speech normal, no deficits grossly.    Medications    - MEDICATION INSTRUCTIONS -        acyclovir  400 mg Oral BID    ceFEPIme  2 g " Intravenous Q8H    cetirizine  10 mg Oral Daily    hydrOXYzine  50 mg Oral At Bedtime    [Held by provider] levofloxacin  250 mg Oral Daily    sodium phosphate  15 mmol Intravenous Once    voriconazole  200 mg Oral Q12H [facility] (08/20)     Data   Results for orders placed or performed during the hospital encounter of 09/01/23 (from the past 24 hour(s))   Blood Culture Arm, Left    Specimen: Arm, Left; Blood   Result Value Ref Range    Culture No growth after 12 hours    Lactic Acid STAT   Result Value Ref Range    Lactic Acid 1.3 0.7 - 2.0 mmol/L   C. difficile Toxin B PCR with reflex to C. difficile Antigen and Toxins A/B EIA    Specimen: Per Rectum; Stool   Result Value Ref Range    C Difficile Toxin B by PCR Negative Negative    Narrative    The [employer] Xpert C. difficile Assay, performed on the [employer], is a qualitative in vitro diagnostic test for rapid detection of toxin B gene sequences from unformed (liquid or soft) stool specimens collected from patients suspected of having Clostridioides difficile infection (CDI). The test utilizes automated real-time polymerase chain reaction (PCR) to detect toxin gene sequences associated with toxin producing C. difficile. The Xpert C. difficile Assay is intended as an aid in the diagnosis of CDI.   CBC with platelets differential    Narrative    The following orders were created for panel order CBC with platelets differential.  Procedure                               Abnormality         Status                     ---------                               -----------         ------                     CBC with platelets and d...[[id number]]  Abnormal            Final result               Manual Differential[[id number]]                                                           Please view results for these tests on the individual orders.   Comprehensive metabolic panel   Result Value Ref Range    Sodium 133 (L) 136 - 145 mmol/L    Potassium 3.7 3.4 -  5.3 mmol/L    Chloride 101 98 - 107 mmol/L    Carbon Dioxide (CO2) 20 (L) 22 - 29 mmol/L    Anion Gap 12 7 - 15 mmol/L    Urea Nitrogen 14.8 8.0 - 23.0 mg/dL    Creatinine 0.87 0.67 - 1.17 mg/dL    Calcium 8.2 (L) 8.8 - 10.2 mg/dL    Glucose 110 (H) 70 - 99 mg/dL    Alkaline Phosphatase 54 40 - 129 U/L    AST 22 0 - 45 U/L    ALT 22 0 - 70 U/L    Protein Total 6.8 6.4 - 8.3 g/dL    Albumin 3.5 3.5 - 5.2 g/dL    Bilirubin Total 0.5 <=1.2 mg/dL    GFR Estimate >90 >60 mL/min/1.73m2   Magnesium   Result Value Ref Range    Magnesium 2.3 1.7 - 2.3 mg/dL   Phosphorus   Result Value Ref Range    Phosphorus 2.1 (L) 2.5 - 4.5 mg/dL   CBC with platelets and differential   Result Value Ref Range    WBC Count 0.2 (LL) 4.0 - 11.0 10e3/uL    RBC Count 2.62 (L) 4.40 - 5.90 10e6/uL    Hemoglobin 7.4 (L) 13.3 - 17.7 g/dL    Hematocrit 22.0 (L) 40.0 - 53.0 %    MCV 84 78 - 100 fL    MCH 28.2 26.5 - 33.0 pg    MCHC 33.6 31.5 - 36.5 g/dL    RDW 13.4 10.0 - 15.0 %    Platelet Count 12 (LL) 150 - 450 10e3/uL

## 2023-09-23 NOTE — PLAN OF CARE
Goal Outcome Evaluation:    [id number]  Status:68 year old male with a history of brain abscess (2021), HTN, and BPH. He was transferred from [facility] in [place], [place] to [facility] for work up of pancytopenia; he was noted to have circulating blasts concerning for acute leukemia, and was subsequently transferred to [facility].       Aox4. Patient is anxious and verbalizes being sad about hospital stay. Patient is Assist 1 states that he needs someone to be there when he gets up. Vancomycin infusing currently. Patient received cefepime. Tylenol given due to spikes in temp. Last known temp 99.0. Urine output has been good.    Hand off given to next nurse.

## 2023-09-23 NOTE — PLAN OF CARE
"Goal Outcome Evaluation:      Plan of Care Reviewed With: patient, spouse    Overall Patient Progress: no changeOverall Patient Progress: no change     1500 - 1930:   /53 (BP Location: Left arm)   Pulse 97   Temp 98.6  F (37  C) (Oral)   Resp 18   Ht 1.74 m (5' 8.5\")   Wt 85.9 kg (189 lb 6.4 oz)   SpO2 97%   BMI 28.38 kg/m      A&O x 4, afebrile, VSS, denies pain & sob on RA.   Pt cont's to have cough which induced emesis x 1 of undigested food.  Decline antiemetic since pt felt better after throwing up, gave prn mucinex for cough.  New Rash on trunk/back/abdomen & upper legs with no change, denies itching or burning.  Up sba, CHG shower done this morning, adequate UOP, had a bm this afternoon.  Continue with poc...    "

## 2023-09-24 ENCOUNTER — APPOINTMENT (OUTPATIENT)
Dept: CT IMAGING | Facility: CLINIC | Age: 68
DRG: 835 | End: 2023-09-24
Attending: PHYSICIAN ASSISTANT
Payer: COMMERCIAL

## 2023-09-24 LAB
ABO/RH TYPE: NORMAL
ALBUMIN SERPL BCG-MCNC: 3.3 G/DL (ref 3.5–5.2)
ALP SERPL-CCNC: 55 U/L (ref 40–129)
ALT SERPL W P-5'-P-CCNC: 30 U/L (ref 0–70)
ANION GAP SERPL CALCULATED.3IONS-SCNC: 15 MMOL/L (ref 7–15)
AST SERPL W P-5'-P-CCNC: 30 U/L (ref 0–45)
BACTERIA UR CULT: ABNORMAL
BILIRUB SERPL-MCNC: 0.6 MG/DL
BLD PROD TYP BPU: NORMAL
BLD PROD TYP BPU: NORMAL
BLOOD COMPONENT TYPE: NORMAL
BLOOD COMPONENT TYPE: NORMAL
BUN SERPL-MCNC: 13.8 MG/DL (ref 8–23)
CALCIUM SERPL-MCNC: 8 MG/DL (ref 8.8–10.2)
CHLORIDE SERPL-SCNC: 103 MMOL/L (ref 98–107)
CODING SYSTEM: NORMAL
CODING SYSTEM: NORMAL
CREAT SERPL-MCNC: 0.96 MG/DL (ref 0.67–1.17)
CROSSMATCH: NORMAL
DEPRECATED HCO3 PLAS-SCNC: 17 MMOL/L (ref 22–29)
EGFRCR SERPLBLD CKD-EPI 2021: 86 ML/MIN/1.73M2
ERYTHROCYTE [DISTWIDTH] IN BLOOD BY AUTOMATED COUNT: 13.2 % (ref 10–15)
GLUCOSE SERPL-MCNC: 118 MG/DL (ref 70–99)
HCT VFR BLD AUTO: 21.7 % (ref 40–53)
HGB BLD-MCNC: 7.4 G/DL (ref 13.3–17.7)
ISSUE DATE AND TIME: NORMAL
LACTATE SERPL-SCNC: 0.8 MMOL/L (ref 0.7–2)
LACTATE SERPL-SCNC: 1.4 MMOL/L (ref 0.7–2)
MAGNESIUM SERPL-MCNC: 2.3 MG/DL (ref 1.7–2.3)
MCH RBC QN AUTO: 28.9 PG (ref 26.5–33)
MCHC RBC AUTO-ENTMCNC: 34.1 G/DL (ref 31.5–36.5)
MCV RBC AUTO: 85 FL (ref 78–100)
PHOSPHATE SERPL-MCNC: 2.5 MG/DL (ref 2.5–4.5)
PLAT MORPH BLD: NORMAL
PLATELET # BLD AUTO: 9 10E3/UL (ref 150–450)
POTASSIUM SERPL-SCNC: 3.9 MMOL/L (ref 3.4–5.3)
PROT SERPL-MCNC: 7 G/DL (ref 6.4–8.3)
RBC # BLD AUTO: 2.56 10E6/UL (ref 4.4–5.9)
RBC MORPH BLD: NORMAL
SODIUM SERPL-SCNC: 135 MMOL/L (ref 136–145)
SPECIMEN EXPIRATION DATE: NORMAL
UNIT ABO/RH: NORMAL
UNIT ABO/RH: NORMAL
UNIT NUMBER: NORMAL
UNIT NUMBER: NORMAL
UNIT STATUS: NORMAL
UNIT STATUS: NORMAL
UNIT TYPE ISBT: 5100
UNIT TYPE ISBT: 6200
WBC # BLD AUTO: 0.3 10E3/UL (ref 4–11)

## 2023-09-24 PROCEDURE — 250N000011 HC RX IP 250 OP 636: Mod: JZ

## 2023-09-24 PROCEDURE — 250N000013 HC RX MED GY IP 250 OP 250 PS 637: Performed by: HOSPITALIST

## 2023-09-24 PROCEDURE — P9037 PLATE PHERES LEUKOREDU IRRAD: HCPCS | Performed by: PHYSICIAN ASSISTANT

## 2023-09-24 PROCEDURE — 71250 CT THORAX DX C-: CPT

## 2023-09-24 PROCEDURE — 83605 ASSAY OF LACTIC ACID: CPT | Performed by: STUDENT IN AN ORGANIZED HEALTH CARE EDUCATION/TRAINING PROGRAM

## 2023-09-24 PROCEDURE — 250N000013 HC RX MED GY IP 250 OP 250 PS 637: Performed by: PHYSICIAN ASSISTANT

## 2023-09-24 PROCEDURE — 120N000002 HC R&B MED SURG/OB UMMC

## 2023-09-24 PROCEDURE — 84100 ASSAY OF PHOSPHORUS: CPT | Performed by: INTERNAL MEDICINE

## 2023-09-24 PROCEDURE — 87529 HSV DNA AMP PROBE: CPT | Performed by: STUDENT IN AN ORGANIZED HEALTH CARE EDUCATION/TRAINING PROGRAM

## 2023-09-24 PROCEDURE — 83735 ASSAY OF MAGNESIUM: CPT | Performed by: INTERNAL MEDICINE

## 2023-09-24 PROCEDURE — 83605 ASSAY OF LACTIC ACID: CPT | Performed by: HOSPITALIST

## 2023-09-24 PROCEDURE — 71250 CT THORAX DX C-: CPT | Mod: 26 | Performed by: STUDENT IN AN ORGANIZED HEALTH CARE EDUCATION/TRAINING PROGRAM

## 2023-09-24 PROCEDURE — 80053 COMPREHEN METABOLIC PANEL: CPT | Performed by: PHYSICIAN ASSISTANT

## 2023-09-24 PROCEDURE — 250N000013 HC RX MED GY IP 250 OP 250 PS 637

## 2023-09-24 PROCEDURE — 85027 COMPLETE CBC AUTOMATED: CPT | Performed by: PHYSICIAN ASSISTANT

## 2023-09-24 PROCEDURE — 258N000003 HC RX IP 258 OP 636: Performed by: STUDENT IN AN ORGANIZED HEALTH CARE EDUCATION/TRAINING PROGRAM

## 2023-09-24 PROCEDURE — 250N000011 HC RX IP 250 OP 636: Performed by: PEDIATRICS

## 2023-09-24 PROCEDURE — 99207 PR NO BILLABLE SERVICE THIS VISIT: CPT | Performed by: STUDENT IN AN ORGANIZED HEALTH CARE EDUCATION/TRAINING PROGRAM

## 2023-09-24 PROCEDURE — 99233 SBSQ HOSP IP/OBS HIGH 50: CPT | Mod: FS | Performed by: INTERNAL MEDICINE

## 2023-09-24 PROCEDURE — 258N000003 HC RX IP 258 OP 636: Performed by: PHYSICIAN ASSISTANT

## 2023-09-24 PROCEDURE — 250N000013 HC RX MED GY IP 250 OP 250 PS 637: Performed by: STUDENT IN AN ORGANIZED HEALTH CARE EDUCATION/TRAINING PROGRAM

## 2023-09-24 RX ORDER — TRIAMCINOLONE ACETONIDE 1 MG/G
CREAM TOPICAL 4 TIMES DAILY PRN
Status: DISCONTINUED | OUTPATIENT
Start: 2023-09-24 | End: 2023-09-27

## 2023-09-24 RX ORDER — DOXYCYCLINE 100 MG/1
100 CAPSULE ORAL 2 TIMES DAILY
Status: DISCONTINUED | OUTPATIENT
Start: 2023-09-24 | End: 2023-09-26

## 2023-09-24 RX ORDER — DIPHENHYDRAMINE HCL 25 MG
25-50 CAPSULE ORAL EVERY 6 HOURS PRN
Status: DISCONTINUED | OUTPATIENT
Start: 2023-09-24 | End: 2023-10-03 | Stop reason: HOSPADM

## 2023-09-24 RX ADMIN — SODIUM CHLORIDE, PRESERVATIVE FREE 5 ML: 5 INJECTION INTRAVENOUS at 11:31

## 2023-09-24 RX ADMIN — ACETAMINOPHEN 650 MG: 325 TABLET, FILM COATED ORAL at 10:02

## 2023-09-24 RX ADMIN — CEFEPIME HYDROCHLORIDE 2 G: 2 INJECTION, POWDER, FOR SOLUTION INTRAVENOUS at 20:51

## 2023-09-24 RX ADMIN — BENZONATATE 100 MG: 100 CAPSULE ORAL at 21:01

## 2023-09-24 RX ADMIN — DIPHENHYDRAMINE HYDROCHLORIDE 25 MG: 25 CAPSULE ORAL at 00:48

## 2023-09-24 RX ADMIN — HYDROXYZINE HYDROCHLORIDE 50 MG: 25 TABLET, FILM COATED ORAL at 20:51

## 2023-09-24 RX ADMIN — SODIUM CHLORIDE 500 ML: 9 INJECTION, SOLUTION INTRAVENOUS at 23:47

## 2023-09-24 RX ADMIN — SODIUM CHLORIDE, PRESERVATIVE FREE 10 ML: 5 INJECTION INTRAVENOUS at 00:48

## 2023-09-24 RX ADMIN — VORICONAZOLE 200 MG: 200 TABLET ORAL at 08:54

## 2023-09-24 RX ADMIN — SODIUM CHLORIDE 1000 ML: 9 INJECTION, SOLUTION INTRAVENOUS at 13:41

## 2023-09-24 RX ADMIN — ACYCLOVIR 400 MG: 400 TABLET ORAL at 20:51

## 2023-09-24 RX ADMIN — VORICONAZOLE 200 MG: 200 TABLET ORAL at 20:51

## 2023-09-24 RX ADMIN — CEFEPIME HYDROCHLORIDE 2 G: 2 INJECTION, POWDER, FOR SOLUTION INTRAVENOUS at 05:02

## 2023-09-24 RX ADMIN — CETIRIZINE HYDROCHLORIDE 10 MG: 10 TABLET, FILM COATED ORAL at 08:53

## 2023-09-24 RX ADMIN — ACYCLOVIR 400 MG: 400 TABLET ORAL at 08:54

## 2023-09-24 RX ADMIN — CEFEPIME HYDROCHLORIDE 2 G: 2 INJECTION, POWDER, FOR SOLUTION INTRAVENOUS at 13:08

## 2023-09-24 RX ADMIN — DOXYCYCLINE HYCLATE 100 MG: 100 CAPSULE ORAL at 20:51

## 2023-09-24 RX ADMIN — ACETAMINOPHEN 650 MG: 325 TABLET, FILM COATED ORAL at 00:48

## 2023-09-24 RX ADMIN — DOXYCYCLINE HYCLATE 100 MG: 100 CAPSULE ORAL at 10:04

## 2023-09-24 ASSESSMENT — ACTIVITIES OF DAILY LIVING (ADL)
ADLS_ACUITY_SCORE: 30

## 2023-09-24 NOTE — PLAN OF CARE
"[id number]    /55 (BP Location: Left arm)   Pulse 92   Temp 98.1  F (36.7  C) (Oral)   Resp 20   Ht 1.74 m (5' 8.5\")   Wt 85.9 kg (189 lb 6.4 oz)   SpO2 97%   BMI 28.38 kg/m      Max temp of 102.7, given tylenol x1. Hypotensive within parameters, OVSS on RA. Triggered sepsis advisory, lactic acid came back 0.8. Platelets to be given on day shift. PRN benadryl given x1 for rash irritation. Denied SOB and N/V. SBA with activity. Fair PO intake. No difficulties urinating, LBM 9/23. Continue with POC.       "

## 2023-09-24 NOTE — PROGRESS NOTES
[facility]    Hematology / Oncology Progress Note    Patient: Artie Fine  MRN: [id number]  Admission Date: 9/1/2023  Date of Service (when I saw the patient): 09/24/2023  Hospital Day # 23     Assessment & Plan   Artie Fine is a 68 year old male with a history of brain abscess (2021), HTN, and BPH. He was transferred from [facility] in [place], [place] to [facility] for work up of pancytopenia; he was noted to have circulating blasts concerning for acute leukemia, and was subsequently transferred to [facility]. He underwent a diagnostic bone marrow biopsy on 9/1/23 which confirmed a new diagnosis of AML. He was started on induction with 7+3 (C1D1=9/2/23). Hospital course has been complicated by rhinovirus infection and neutropenic fever from S. mitis bacteremia.      Today:   - Day 23 of 7+3  - Recurrent fever overnight to Tmax 102.7. Vancomycin had been discontinued per BCx susceptibilities, and possibly caused drug rash. Continues on cefepime. Added PO doxycycline today to cover possible MRSE UTI and empirically cover atypical pneumonia. Ordered CT chest in the setting of persistent fevers and coughing fit that caused emesis yesterday.   - Pt had episode of dizziness today when up to bathroom, near fall. Ordered 1L bolus to help with soft pressures in the setting of poor PO intake.   - Continue to monitor drug rash. If persistent/worsening, may be due to cefepime rather than vancomycin.     HEME/ONC  # AML, adverse risk  Patient initially presented to [facility] in [place], [place] with 4-6 weeks of progressive bruising, weakness, fatigue, loss of appetite, and night sweats. He also noted a headache similar to his previous brain abscess. CBC notable for pancytopenia; WBC 2.7 (), Hgb 8.1, and plt 1k. He was transferred to [facility] and was found on peripheral flow w/ 11% circulating myeloid blasts. He was then transferred to [facility] for further work up and  treatment of AML. Diagnostic BMBx performed 9/1 which showed AML with 30% blasts by morph and 47% by flow. P53 negative. FISH negative for MLLT10, NUP98, and KMT2A. Normal karyotype. NGS showed ASXL1, CEBPA, SRSF2, and STAG2 mutations. Started 7+3 (C1D1=9/2/23).   - Baseline TTE showing normal LVEF of 55-60%. Baseline EKG showed NSR and QTc of 434.   - Viral serologies: HepB/C-, HIV-. HSV1/2+, EBV IgG+, CMV IgG+  - PICC placed 9/1  - S/p diagnostic LP 9/8/23 for CNS leukemia work up as patient endorses headache upon admission w/ new AML diagnosis. CSF flow negative. MRI brain 9/11 negative, only showing chronic changes from h/o brain abscess.   - D14 BMBx w/o evidence of leukemia.   - HLA typing ordered. S/p BMT NT 9/22 with Dr. Cam Edward. Starting donor search. Plan for JIMMY in CR1.                   Treatment Plan: 7+3 (C1D1=9/2/23)                - Cytarabine 100 mg/m2 - D1-D7               - Daunorubicin 60 mg/m2 - D1-D3               - Supportive meds: Decadron 12 mg D1-D3, Zofran     # Low risk for TLS/DIC  - Discontinued allopurinol  - Montior TLS/DIC labs twice weekly     ID  # Neutropenic fever  # BCx 9/20 1/2 positive for strep mitis  # UCx 9/20 with 10-50k MRSE  # Diarrhea, resolved  Tmax 102.7 on 9/20 evening. Pt has been having cold symptoms as below in the setting of positive rhinovirus on 9/16. Cold symptoms on improving trajectory at time of new fever. Pt had diarrhea around time of fever onset, but had recently received MiraLax for constipation. Diarrhea persisted after MiraLax effect waned. Overnight 9/21-9/22, pt was too weak to return to bed from bathroom, called RN to assist.   - Work up:   - BCx 9/20 1/2 positive for strep mitis, pansensitive, 2/2 NGTD  - BCx 9/21-9/23 NGTD  - UCx 9/20 grew 10-50k staph epi in the setting of bland UA and no urinary symptoms. Oxacillin resistant.   - CXR unremarkable  - Cdiff negative  - CT chest ordered 9/24   - Antibiotics:   - Cefepime 2 g q8hr x9/20 PM  - S/p  vancomycin 9/22-9/23, discontinued once BCx susceptibilities resulted  - Doxycycline 100 mg BID for possible MRSE UTI and empirically for atypical pneumonia  - Consider ID consulted pending fever curve    # Rhinovirus  # Post nasal drip, improving  # Cough, improving  Nasal congestion and post nasal drip 9/15 morning upon waking. H/o seasonal allergies. On 9/16, noted development of productive cough and worsening nasal congestion and post-nasal drip. One episode of blood-tinged sputum, now resolved. COVID/flu/RSV negative, but RVP positive for rhinovirus. Although new NF 9/2, URI symptoms are improving.   - Continue PTA cetirizine  - Supportive: Tessalon Perles, Mucinex DM, Hurricaine spray    # Immunosuppressed 2/2 malignancy and chemotherapy  # ID PPX  -  mg BID  - Levofloxacin 250 mg daily - on hold with treatment dose antibiotics as above  - Voriconazole 200 mg BID x9/10     # H/o brain abscess (2021)  # Headache, resolved  # Dizziness, intermittent  Treated in [place]. S/p craniotomy for drainage of abscess July 2021. It is unclear what culture speciated to, but he completed a course of antibiotics with ceftriaxone then meropenem with resolution on imaging. Head CT 8/31 at OSH revealed no intracranial hemorrhage midline shift or mass effect. Postsurgical changes noted. Previous area of infection demonstrates trace amount of encephalomalacia but no mass effect or inflammatory changes to suggest new or acute infection. Otherwise unremarkable. Patient presented initially with headache and dizziness, which has now resolved.   - See work up above for CNS leukemia work-up, which was negative     CHRONIC  # HTN  Noted during prior hospitalization for brain abscess in 2021. Previously on lisinopril, which he is no longer taking.  - Patient BP's have fluctuated this admission, w/occasionally elevated BPs. Continue to monitor.     # BPH  Notes difficulty with complete emptying of bladder. Previously on Flomax  "but states it did nothing, so he stopped it. He does note frequent urination at baseline, currently urination remains unchanged from baseline.  - Monitor clinically     MISC  # Drug rash  On 9/23 AM, pt noted to have drug rash on upper thighs and trunk. Denies pruritus. Denies angioedema, SOB.   - Benadryl PRN  - Will monitor for improvement now that vancomycin is discontinued. If continues to worsen, will consider alternative to cefepime.     # Weakness, improved  # Deconditioning, improved  Pt reports issues w/ balance after his brain abscess in 2021, exacerbated when ambulating. Deconditioning acute-on-chronic upon admission since 2021.   - PT consulted     # Weight loss  # Loss of appetite  # Moderate malnutrition in the context of acute illness   Patient presented with loss of appetite 2/2 malignancy. He has been gradually losing weight throughout admission, although PO intake is overall sufficient.   - RD consulted  - Ensure BID between meals and PRN    # Hyponatremia  # Hypophosphatemia  - Replete per protocol     Clinically Significant Risk Factors          # Hypocalcemia: Lowest Ca = 8 mg/dL in last 2 days, will monitor and replace as appropriate     # Hypoalbuminemia: Lowest albumin = 3.3 g/dL at 9/24/2023  4:47 AM, will monitor as appropriate     # Thrombocytopenia: Lowest platelets = 9 in last 2 days, will monitor for bleeding          # Overweight: Estimated body mass index is 28.38 kg/m  as calculated from the following:    Height as of this encounter: 1.74 m (5' 8.5\").    Weight as of this encounter: 85.9 kg (189 lb 6.4 oz).       # Moderate Malnutrition: based on nutrition assessment           FEN  Diet: Regular Diet Adult   IVF: Bolus PRN   Lytes: Replete per protocol     PPX  VTE: None given thrombocytopenia  Bowel: Senna/MiraLax PRN  GI/PUD: None currently indicated     MISC  Code Status: Full Code   Lines/Drains: PICC  Dispo: Will remain inpatient through count recovery with safe discharge plan " "outpatient.   Follow Up: Pt will follow with Dr. Garcia, appointment scheduled for end of September. Pt would prefer labs and blood transfusions done closer to [facility] in [facility].    Patient was seen and plan of care was discussed with attending physician Dr. Levine.    I spent 65 minutes in the care of this patient today, which included time necessary for review of interval events, obtaining history and physical exam, ordering medications/tests/procedures as medically indicated, review of pertinent medical literature, counseling of the patient, coordination of care, and documentation time. Over 50% of time was spent counseling the patient and/or coordinating care.    Yolis Sherman PA-C   Hematology/Oncology   Pager: [phone]  Desk phone: [phone]    Interval History   Yesterday afternoon, pt had coughing fit that resulted in emesis x1. Overnight, pt was febrile to 102.7. Continued to have poor night of sleep with fever and drenching sweats. This morning he feels \"zapped.\" Pt says that his cough is improved. He at first did not remember coughing fit yesterday that lead to emesis, but when RN reiterated the story, he could recall. Afnxdg-tm-au cough this morning. A&Ox4. He hasn't eaten anything solid since yesterday, just has had chicken broth today. Rash stable in spread but a little more erythematous. Not pruritic. Denies headache, chest pain, abdominal pain, nausea, diarrhea, dysuria.     Vital Signs with Ranges  Temp:  [98.1  F (36.7  C)-102.7  F (39.3  C)] 98.3  F (36.8  C)  Pulse:  [] 87  Resp:  [16-20] 16  BP: ()/(47-62) 97/51  SpO2:  [94 %-98 %] 96 %  I/O last 3 completed shifts:  In: 1360 [P.O.:960; I.V.:400]  Out: 1250 [Urine:1200; Emesis/NG output:50]    Physical Exam   General: Sitting up in bed, alert, NAD. Pleasant and conversational.  Skin: Erythematous maculopapular rash on upper thighs and trunk. No concerning lesions, jaundice, cyanosis, erythema, or ecchymoses on exposed surfaces. "   HEENT: NCAT. Anicteric sclera. Moist mucous membranes.   Respiratory: Non-labored breathing on room air, good air exchange. Clear to auscultation bilaterally.  Cardiovascular: RRR. No murmur or rub.   Gastrointestinal: Normoactive BS. Abdomen soft, ND, NT. No palpable masses.  Extremities: Trace edema in all 4 extremities  Neurologic: A&O x 4/4, speech normal, no deficits grossly. Sometimes demonstrates a little confusion.  Vascular: PICC c/d/i    Medications    - MEDICATION INSTRUCTIONS -        acyclovir  400 mg Oral BID    ceFEPIme  2 g Intravenous Q8H    cetirizine  10 mg Oral Daily    doxycycline hyclate  100 mg Oral BID    hydrOXYzine  50 mg Oral At Bedtime    [Held by provider] levofloxacin  250 mg Oral Daily    voriconazole  200 mg Oral Q12H [facility] (08/20)     Data   Results for orders placed or performed during the hospital encounter of 09/01/23 (from the past 24 hour(s))   Blood Culture Arm, Right    Specimen: Arm, Right; Blood   Result Value Ref Range    Culture No growth after 12 hours    Blood Culture Hand, Left    Specimen: Hand, Left; Blood   Result Value Ref Range    Culture No growth after 12 hours    Lactic Acid STAT   Result Value Ref Range    Lactic Acid 0.8 0.7 - 2.0 mmol/L   CBC with platelets differential    Narrative    The following orders were created for panel order CBC with platelets differential.  Procedure                               Abnormality         Status                     ---------                               -----------         ------                     CBC with platelets and d...[[id number]]  Abnormal            Final result               RBC and Platelet Morphology[[id number]]                      Final result                 Please view results for these tests on the individual orders.   Comprehensive metabolic panel   Result Value Ref Range    Sodium 135 (L) 136 - 145 mmol/L    Potassium 3.9 3.4 - 5.3 mmol/L    Chloride 103 98 - 107 mmol/L    Carbon Dioxide (CO2) 17 (L)  22 - 29 mmol/L    Anion Gap 15 7 - 15 mmol/L    Urea Nitrogen 13.8 8.0 - 23.0 mg/dL    Creatinine 0.96 0.67 - 1.17 mg/dL    Calcium 8.0 (L) 8.8 - 10.2 mg/dL    Glucose 118 (H) 70 - 99 mg/dL    Alkaline Phosphatase 55 40 - 129 U/L    AST 30 0 - 45 U/L    ALT 30 0 - 70 U/L    Protein Total 7.0 6.4 - 8.3 g/dL    Albumin 3.3 (L) 3.5 - 5.2 g/dL    Bilirubin Total 0.6 <=1.2 mg/dL    GFR Estimate 86 >60 mL/min/1.73m2   Magnesium   Result Value Ref Range    Magnesium 2.3 1.7 - 2.3 mg/dL   Phosphorus   Result Value Ref Range    Phosphorus 2.5 2.5 - 4.5 mg/dL   CBC with platelets and differential   Result Value Ref Range    WBC Count 0.3 (LL) 4.0 - 11.0 10e3/uL    RBC Count 2.56 (L) 4.40 - 5.90 10e6/uL    Hemoglobin 7.4 (L) 13.3 - 17.7 g/dL    Hematocrit 21.7 (L) 40.0 - 53.0 %    MCV 85 78 - 100 fL    MCH 28.9 26.5 - 33.0 pg    MCHC 34.1 31.5 - 36.5 g/dL    RDW 13.2 10.0 - 15.0 %    Platelet Count 9 (LL) 150 - 450 10e3/uL   RBC and Platelet Morphology   Result Value Ref Range    Platelet Assessment  Automated Count Confirmed. Platelet morphology is normal.     Automated Count Confirmed. Platelet morphology is normal.    RBC Morphology Confirmed RBC Indices    CONDITIONAL Prepare pheresed platelets (unit)   Result Value Ref Range    Blood Component Type Platelets     Product Code N7638B93     Unit Status Transfused     Unit Number [id number]     CODING SYSTEM CQHL097     ISSUE DATE AND TIME [id number]     UNIT ABO/RH A+     UNIT TYPE ISBT 6200    Prepare red blood cells (unit)   Result Value Ref Range    Blood Component Type Red Blood Cells     Product Code D5634K38     Unit Status Not used     Unit Number [id number]     CROSSMATCH Compatible     CODING SYSTEM [id number]     UNIT ABO/RH O+     UNIT TYPE ISBT 5103

## 2023-09-24 NOTE — PLAN OF CARE
Goal Outcome Evaluation:      Plan of Care Reviewed With: patient, spouse    Overall Patient Progress: no changeOverall Patient Progress: no change  Temp max 100.7 with stable vs. PA notified. Blood cx already drawn within 24 hours and not repeated. Chest CT and doxycycline ordered.  Continues on IV cefepime. Pt also fell today. Actually, he sat down on the floor because he got dizzy and thought he was going to pass out and instead chose to sit on the floor. He was in the BR with his wife assisting him and had finished using the toilet and was standing at the sink washing his hands and got dizzy and sat on the floor and they called for help. He never lost consciousness and did not hurt himself in anyway. PA was notified. Pt instructed to only get up with staff in attendance, not with assistance only from his wife. Bed alarm turned on. Received 1L NS over 2 hours. Rash on trunk and upper legs has progressed to arms. Left arm appears swollen. PA notified and examined pt and determination was made that all extremities are swollen and we will monitor. Received plt tx for plt count 9k. No electrolyte replacement required. Pt sleepy today and reports feeling worse than yesterday. Adequate urine output. LBM today.

## 2023-09-24 NOTE — PROVIDER NOTIFICATION
Yolis CHAPMAN notified of Temp 100.7. Blood cx already drawn within 4 hrs. CT scan and doxycycline ordered

## 2023-09-24 NOTE — PLAN OF CARE
"Goal Outcome Evaluation:      Plan of Care Reviewed With: patient, spouse    Overall Patient Progress: no changeOverall Patient Progress: no change     1500 - 1930:   /57 (BP Location: Left arm)   Pulse 99   Temp 98.8  F (37.1  C) (Oral)   Resp 18   Ht 1.74 m (5' 8.5\")   Wt 85.9 kg (189 lb 6.4 oz)   SpO2 98%   BMI 28.38 kg/m      A&O x 4, afebrile, VSS. C/o feeling tired & weak.  Pt trigerred SIR, LA is 1.4  HSV swab collected for L upper lip lesion, result in process.  Up assist x 1 with gait belt/walker, bed alarm on d/t assisted fall this afternoon with help of wife from bathroom.  Continue with poc...      "

## 2023-09-24 NOTE — PROVIDER NOTIFICATION
"Provider Notification     NeftaliShin chan (#[id number]) paged at [phone]:     \"[id number] [address].  Hi, pt is complaining that body rash is itchy. Can he have prn order for PO benadryl and kenalog cream? Also FYI pt  spiked temp of 102.7. Already receiving IV cefepime and not due for BCs.    Thanks, Yisel\"   "

## 2023-09-24 NOTE — PROGRESS NOTES
"Fall  D: Prior to fall patient was ambulating back to bed from bathroom, was feeling dizzy and weak so he sat on the ground. Patient currently experiencing fever (100.7). Wife was with patient and she called for help. NA & Bedside RN assisted him back to bed. No injury of trauma noted.   I: Physician notified (ARI Sherman).   A: Post-fall assessment revealed /47 (BP Location: Left arm)   Pulse 96   Temp 99.6  F (37.6  C) (Oral)   Resp 18   Ht 1.74 m (5' 8.5\")   Wt 85.9 kg (189 lb 6.4 oz)   SpO2 96%   BMI 28.38 kg/m    P: Falls precautions to be observed. Additional measures to prevent falls include GB/SBA and use of bed alarm.    "

## 2023-09-25 ENCOUNTER — APPOINTMENT (OUTPATIENT)
Dept: GENERAL RADIOLOGY | Facility: CLINIC | Age: 68
DRG: 835 | End: 2023-09-25
Attending: INTERNAL MEDICINE
Payer: COMMERCIAL

## 2023-09-25 LAB
ABO/RH TYPE: NORMAL
ALBUMIN SERPL BCG-MCNC: 2.9 G/DL (ref 3.5–5.2)
ALP SERPL-CCNC: 45 U/L (ref 40–129)
ALT SERPL W P-5'-P-CCNC: 29 U/L (ref 0–70)
ANION GAP SERPL CALCULATED.3IONS-SCNC: 11 MMOL/L (ref 7–15)
ANTIBODY SCREEN: NEGATIVE
APTT PPP: 37 SECONDS (ref 22–38)
AST SERPL W P-5'-P-CCNC: 32 U/L (ref 0–45)
BACTERIA BLD CULT: NO GROWTH
BILIRUB SERPL-MCNC: 0.5 MG/DL
BLD PROD TYP BPU: NORMAL
BLD PROD TYP BPU: NORMAL
BLOOD COMPONENT TYPE: NORMAL
BLOOD COMPONENT TYPE: NORMAL
BUN SERPL-MCNC: 11.4 MG/DL (ref 8–23)
CALCIUM SERPL-MCNC: 7.1 MG/DL (ref 8.8–10.2)
CHLORIDE SERPL-SCNC: 102 MMOL/L (ref 98–107)
CO COMPONENTS: NORMAL
CODING SYSTEM: NORMAL
CODING SYSTEM: NORMAL
CREAT SERPL-MCNC: 0.85 MG/DL (ref 0.67–1.17)
CROSSMATCH: NORMAL
CROSSMATCH: NORMAL
DEPRECATED HCO3 PLAS-SCNC: 19 MMOL/L (ref 22–29)
EGFRCR SERPLBLD CKD-EPI 2021: >90 ML/MIN/1.73M2
ERYTHROCYTE [DISTWIDTH] IN BLOOD BY AUTOMATED COUNT: 13.2 % (ref 10–15)
ERYTHROCYTE [DISTWIDTH] IN BLOOD BY AUTOMATED COUNT: 13.3 % (ref 10–15)
FIBRINOGEN PPP-MCNC: 542 MG/DL (ref 170–490)
GLUCOSE SERPL-MCNC: 109 MG/DL (ref 70–99)
GRAM/CULTURE INDICATED?: YES
HCT VFR BLD AUTO: 16.3 % (ref 40–53)
HCT VFR BLD AUTO: 19.3 % (ref 40–53)
HGB BLD-MCNC: 5.6 G/DL (ref 13.3–17.7)
HGB BLD-MCNC: 6.5 G/DL (ref 13.3–17.7)
HOLD SPECIMEN: NORMAL
HSV1 DNA SPEC QL NAA+PROBE: NOT DETECTED
HSV2 DNA SPEC QL NAA+PROBE: NOT DETECTED
INR PPP: 1.26 (ref 0.85–1.15)
ISSUE DATE AND TIME: NORMAL
ISSUE DATE AND TIME: NORMAL
LACTATE SERPL-SCNC: 1.6 MMOL/L (ref 0.7–2)
LDH SERPL L TO P-CCNC: 173 U/L (ref 0–250)
MAGNESIUM SERPL-MCNC: 2.1 MG/DL (ref 1.7–2.3)
MCH RBC QN AUTO: 29.3 PG (ref 26.5–33)
MCH RBC QN AUTO: 29.5 PG (ref 26.5–33)
MCHC RBC AUTO-ENTMCNC: 33.7 G/DL (ref 31.5–36.5)
MCHC RBC AUTO-ENTMCNC: 34.4 G/DL (ref 31.5–36.5)
MCV RBC AUTO: 86 FL (ref 78–100)
MCV RBC AUTO: 87 FL (ref 78–100)
OTHER UNITS TRANSFUSED: NORMAL
PHOSPHATE SERPL-MCNC: 1.5 MG/DL (ref 2.5–4.5)
PHOSPHATE SERPL-MCNC: 1.7 MG/DL (ref 2.5–4.5)
PLAT MORPH BLD: NORMAL
PLATELET # BLD AUTO: 12 10E3/UL (ref 150–450)
PLATELET # BLD AUTO: 14 10E3/UL (ref 150–450)
POST RXN CLERICAL CHECK: NORMAL
POST SPECIMEN APPEARANCE: NORMAL
POST-RXN POLY DAT: NEGATIVE
POTASSIUM SERPL-SCNC: 3.5 MMOL/L (ref 3.4–5.3)
PRODUCT CODE: NORMAL
PROT SERPL-MCNC: 5.9 G/DL (ref 6.4–8.3)
RBC # BLD AUTO: 1.9 10E6/UL (ref 4.4–5.9)
RBC # BLD AUTO: 2.22 10E6/UL (ref 4.4–5.9)
RBC MORPH BLD: NORMAL
SODIUM SERPL-SCNC: 132 MMOL/L (ref 136–145)
SPECIMEN EXPIRATION DATE: NORMAL
UNIT ABO/RH: NORMAL
UNIT ABO/RH: NORMAL
UNIT BLOOD TYPE TRANSFUSION REACTION: NORMAL
UNIT NUMBER: NORMAL
UNIT STATUS: NORMAL
UNIT STATUS: NORMAL
UNIT TYPE ISBT: 5100
UNIT TYPE ISBT: 5100
URATE SERPL-MCNC: 2.3 MG/DL (ref 3.4–7)
WBC # BLD AUTO: 0.1 10E3/UL (ref 4–11)
WBC # BLD AUTO: 0.2 10E3/UL (ref 4–11)

## 2023-09-25 PROCEDURE — 83735 ASSAY OF MAGNESIUM: CPT

## 2023-09-25 PROCEDURE — 85384 FIBRINOGEN ACTIVITY: CPT

## 2023-09-25 PROCEDURE — 83605 ASSAY OF LACTIC ACID: CPT | Performed by: INTERNAL MEDICINE

## 2023-09-25 PROCEDURE — 250N000013 HC RX MED GY IP 250 OP 250 PS 637

## 2023-09-25 PROCEDURE — 85027 COMPLETE CBC AUTOMATED: CPT

## 2023-09-25 PROCEDURE — 250N000013 HC RX MED GY IP 250 OP 250 PS 637: Performed by: HOSPITALIST

## 2023-09-25 PROCEDURE — 36415 COLL VENOUS BLD VENIPUNCTURE: CPT | Performed by: INTERNAL MEDICINE

## 2023-09-25 PROCEDURE — 86078 PHYS BLOOD BANK SERV REACTJ: CPT | Performed by: PATHOLOGY

## 2023-09-25 PROCEDURE — 85610 PROTHROMBIN TIME: CPT

## 2023-09-25 PROCEDURE — 86923 COMPATIBILITY TEST ELECTRIC: CPT | Performed by: PHYSICIAN ASSISTANT

## 2023-09-25 PROCEDURE — 71045 X-RAY EXAM CHEST 1 VIEW: CPT | Mod: 26 | Performed by: RADIOLOGY

## 2023-09-25 PROCEDURE — 250N000013 HC RX MED GY IP 250 OP 250 PS 637: Performed by: STUDENT IN AN ORGANIZED HEALTH CARE EDUCATION/TRAINING PROGRAM

## 2023-09-25 PROCEDURE — 250N000013 HC RX MED GY IP 250 OP 250 PS 637: Performed by: INTERNAL MEDICINE

## 2023-09-25 PROCEDURE — P9016 RBC LEUKOCYTES REDUCED: HCPCS | Performed by: PHYSICIAN ASSISTANT

## 2023-09-25 PROCEDURE — 99233 SBSQ HOSP IP/OBS HIGH 50: CPT | Mod: FS | Performed by: INTERNAL MEDICINE

## 2023-09-25 PROCEDURE — 83615 LACTATE (LD) (LDH) ENZYME: CPT

## 2023-09-25 PROCEDURE — 120N000002 HC R&B MED SURG/OB UMMC

## 2023-09-25 PROCEDURE — 250N000011 HC RX IP 250 OP 636: Performed by: PEDIATRICS

## 2023-09-25 PROCEDURE — 86850 RBC ANTIBODY SCREEN: CPT | Performed by: PHYSICIAN ASSISTANT

## 2023-09-25 PROCEDURE — 87040 BLOOD CULTURE FOR BACTERIA: CPT | Performed by: INTERNAL MEDICINE

## 2023-09-25 PROCEDURE — 85027 COMPLETE CBC AUTOMATED: CPT | Performed by: PHYSICIAN ASSISTANT

## 2023-09-25 PROCEDURE — 84100 ASSAY OF PHOSPHORUS: CPT

## 2023-09-25 PROCEDURE — 86901 BLOOD TYPING SEROLOGIC RH(D): CPT | Performed by: PHYSICIAN ASSISTANT

## 2023-09-25 PROCEDURE — 250N000011 HC RX IP 250 OP 636: Mod: JZ

## 2023-09-25 PROCEDURE — 80053 COMPREHEN METABOLIC PANEL: CPT | Performed by: PHYSICIAN ASSISTANT

## 2023-09-25 PROCEDURE — 250N000013 HC RX MED GY IP 250 OP 250 PS 637: Performed by: PHYSICIAN ASSISTANT

## 2023-09-25 PROCEDURE — 85730 THROMBOPLASTIN TIME PARTIAL: CPT

## 2023-09-25 PROCEDURE — 71045 X-RAY EXAM CHEST 1 VIEW: CPT

## 2023-09-25 PROCEDURE — 250N000011 HC RX IP 250 OP 636: Mod: JZ | Performed by: INTERNAL MEDICINE

## 2023-09-25 PROCEDURE — 84550 ASSAY OF BLOOD/URIC ACID: CPT

## 2023-09-25 PROCEDURE — 84100 ASSAY OF PHOSPHORUS: CPT | Performed by: INTERNAL MEDICINE

## 2023-09-25 RX ORDER — FUROSEMIDE 10 MG/ML
20 INJECTION INTRAMUSCULAR; INTRAVENOUS ONCE
Status: DISCONTINUED | OUTPATIENT
Start: 2023-09-25 | End: 2023-09-25

## 2023-09-25 RX ADMIN — CEFEPIME HYDROCHLORIDE 2 G: 2 INJECTION, POWDER, FOR SOLUTION INTRAVENOUS at 13:55

## 2023-09-25 RX ADMIN — POTASSIUM & SODIUM PHOSPHATES POWDER PACK 280-160-250 MG 2 PACKET: 280-160-250 PACK at 10:43

## 2023-09-25 RX ADMIN — ACYCLOVIR 400 MG: 400 TABLET ORAL at 08:48

## 2023-09-25 RX ADMIN — CETIRIZINE HYDROCHLORIDE 10 MG: 10 TABLET, FILM COATED ORAL at 08:48

## 2023-09-25 RX ADMIN — DOXYCYCLINE HYCLATE 100 MG: 100 CAPSULE ORAL at 08:48

## 2023-09-25 RX ADMIN — ALTEPLASE 2 MG: 2.2 INJECTION, POWDER, LYOPHILIZED, FOR SOLUTION INTRAVENOUS at 19:53

## 2023-09-25 RX ADMIN — GUAIFENESIN AND DEXTROMETHORPHAN HYDROBROMIDE 1 TABLET: 600; 30 TABLET, EXTENDED RELEASE ORAL at 23:00

## 2023-09-25 RX ADMIN — SODIUM CHLORIDE, PRESERVATIVE FREE 5 ML: 5 INJECTION INTRAVENOUS at 20:40

## 2023-09-25 RX ADMIN — POTASSIUM & SODIUM PHOSPHATES POWDER PACK 280-160-250 MG 2 PACKET: 280-160-250 PACK at 14:20

## 2023-09-25 RX ADMIN — SODIUM CHLORIDE, PRESERVATIVE FREE 5 ML: 5 INJECTION INTRAVENOUS at 05:11

## 2023-09-25 RX ADMIN — SODIUM CHLORIDE, PRESERVATIVE FREE 5 ML: 5 INJECTION INTRAVENOUS at 08:48

## 2023-09-25 RX ADMIN — CEFEPIME HYDROCHLORIDE 2 G: 2 INJECTION, POWDER, FOR SOLUTION INTRAVENOUS at 20:31

## 2023-09-25 RX ADMIN — ACETAMINOPHEN 650 MG: 325 TABLET, FILM COATED ORAL at 23:00

## 2023-09-25 RX ADMIN — CEFEPIME HYDROCHLORIDE 2 G: 2 INJECTION, POWDER, FOR SOLUTION INTRAVENOUS at 05:11

## 2023-09-25 RX ADMIN — ALTEPLASE 2 MG: 2.2 INJECTION, POWDER, LYOPHILIZED, FOR SOLUTION INTRAVENOUS at 19:54

## 2023-09-25 RX ADMIN — POTASSIUM & SODIUM PHOSPHATES POWDER PACK 280-160-250 MG 2 PACKET: 280-160-250 PACK at 17:21

## 2023-09-25 RX ADMIN — HYDROXYZINE HYDROCHLORIDE 50 MG: 25 TABLET, FILM COATED ORAL at 20:17

## 2023-09-25 RX ADMIN — VORICONAZOLE 200 MG: 200 TABLET ORAL at 20:17

## 2023-09-25 RX ADMIN — DOXYCYCLINE HYCLATE 100 MG: 100 CAPSULE ORAL at 20:17

## 2023-09-25 RX ADMIN — VORICONAZOLE 200 MG: 200 TABLET ORAL at 08:48

## 2023-09-25 RX ADMIN — ACYCLOVIR 400 MG: 400 TABLET ORAL at 20:17

## 2023-09-25 ASSESSMENT — ACTIVITIES OF DAILY LIVING (ADL)
ADLS_ACUITY_SCORE: 30
ADLS_ACUITY_SCORE: 27
ADLS_ACUITY_SCORE: 27
ADLS_ACUITY_SCORE: 30
ADLS_ACUITY_SCORE: 27
ADLS_ACUITY_SCORE: 30
ADLS_ACUITY_SCORE: 27
ADLS_ACUITY_SCORE: 30
ADLS_ACUITY_SCORE: 27
ADLS_ACUITY_SCORE: 30

## 2023-09-25 NOTE — PROGRESS NOTES
Notified of dizziness at rest and soft blood pressure (93/45, HR 98) tonight. Recent systolics appear in 100-120 range, pulses mostly in the 90s. Lactate was checked and found normal earlier this evening. Reviewing daily documentation this had occurred earlier as well and a 1L NS bolus was given, with improvement in BP. Will repeat this with 500ml bolus and pursue further evaluation if dizziness persists. Discussed with RANGEL Chau.    Brad Jonas, DO

## 2023-09-25 NOTE — PLAN OF CARE
0700 - 1530    Tmax 101.0 - notified provider, BCs and lactic drawn. OVSS. Hgb 5.6 this AM, 1u infused, transfusion rxn workup sent to blood bank. Phos 1.5, PO replacements ordered, recheck tonight. Pt's wife at bedside, bed bath completed. Pt aware to call nursing staff when wanting out of bed. Continue POC.

## 2023-09-25 NOTE — PROVIDER NOTIFICATION
Provider Sumi Salinas paged    pt bp is low, 93/45 while sitting down. he report feeling dizzy. is there any intervention?    thanks     Daryn Gonzalez RN on 9/24/2023 at 11:30 PM

## 2023-09-25 NOTE — PLAN OF CARE
"Goal Outcome Evaluation:    Time    /53 (BP Location: Left arm)   Pulse 101   Temp 98.5  F (36.9  C) (Oral)   Resp 18   Ht 1.74 m (5' 8.5\")   Wt 85.9 kg (189 lb 6.4 oz)   SpO2 94%   BMI 28.38 kg/m      Reason for admission: AML  Activity: A1 with walker and GB, generalized weakness. Bed alarm is on  Pain: denies  Neuro: Alert and oriented, report feeling dizziness when sitting up in bed  Cardiac: pt was hypotensive, and dizzy. NS 500ml bolus over 2Hours. BP improved and pt denies dizziness.   Respiratory: denies SOB, no distress observed on RA   GI/:   Diet: regular  Lines: PICC, DL cdi, HL  Wounds: lip lesion, rash on chest, abdomen and thighs, might be cefepime induced  Labs/imaging:       New changes this shift:     Plan:       Continue to monitor and follow POC    "

## 2023-09-25 NOTE — PROGRESS NOTES
"[facility]    Hematology / Oncology Progress Note    Patient: Artie Fine  MRN: [id number]  Admission Date: 9/1/2023  Date of Service (when I saw the patient): 09/25/2023  Hospital Day # 24     Assessment & Plan   Artie Fine is a 68 year old male with a history of brain abscess (2021), HTN, and BPH. He was transferred from [facility] in [place], [place] to [facility] for work up of pancytopenia; he was noted to have circulating blasts concerning for acute leukemia, and was subsequently transferred to [facility]. He underwent a diagnostic bone marrow biopsy on 9/1/23 which confirmed a new diagnosis of AML. He was started on induction with 7+3 (C1D1=9/2/23). Hospital course has been complicated by rhinovirus infection, recurrent neutropenic fever,  S. mitis bacteremia, and S. epidermidis UTI.     Today: Day 24 from 7+3  - Afebrile since yesterday, temperature this AM was 99.3.  - Vancomycin discontinued this weekend after BCx susceptibilities and new onset rash rash.   - Continue IV cefepime and PO doxycycline; Bcx 9/20/23 + Streptococcus Mitis, urine culture + for S. Epidermidis (susceptible to doxy).  - Chest CT showing basilar reticular opacities; indeterminate, possible atypical infection vs. scarring/atelectasis.  - If patient clinically declines or fever curse does not improve; will consider ID consult.  - Pt had episode of dizziness and \"near fall\" when ambulating to restroom yesterday s/p 1 L bolus fluids. Episode of dizziness again overnight s/p 500 mL bolus of fluids d/t hypotension overnight of 93/45.  - Hgb drop to 5.6 this AM, will receive 1 unit RBC's this morning. No s/sx's of acute bleed. Neuro intact.  - Diffuse red rash on chest, abdomen, bilateral arms and legs appears to be improving; it is thought to be 2/2 vancomycin which was discontinued on 9/23/23.   - If rash worsens, consider rotating off cefepime as this is also a possible cause of the " rash.   - Pt reports pruritis of back rash, continue kenalog cream PRN.  - Patient requiring count recovery bone marrow biopsy.  - Patient weight is up to 194 lbs, will trial 20 mg IV lasix today with use of bedside commode given ambulation issues. Will CTM closely as patient does have intermittent episodes of hypertension.   - Discussed with bedside RN, patient is not currently ambulating independently and will receive assistance when getting out of bed d/t fall risk.    HEME/ONC  # AML, adverse risk  Patient initially presented to [facility] in [place], [place] with 4-6 weeks of progressive bruising, weakness, fatigue, loss of appetite, and night sweats. He also noted a headache similar to his previous brain abscess. CBC notable for pancytopenia; WBC 2.7 (), Hgb 8.1, and plt 1k. He was transferred to [facility] and was found on peripheral flow w/ 11% circulating myeloid blasts. He was then transferred to [facility] for further work up and treatment of AML. Diagnostic BMBx performed 9/1 which showed AML with 30% blasts by morph and 47% by flow. P53 negative. FISH negative for MLLT10, NUP98, and KMT2A. Normal karyotype. NGS showed ASXL1, CEBPA, SRSF2, and STAG2 mutations. Started 7+3 (C1D1=9/2/23).   - Baseline TTE showing normal LVEF of 55-60%. Baseline EKG showed NSR and QTc of 434.   - Viral serologies: HepB/C-, HIV-. HSV1/2+, EBV IgG+, CMV IgG+  - PICC placed 9/1  - S/p diagnostic LP 9/8/23 for CNS leukemia work up as patient endorses headache upon admission w/ new AML diagnosis. CSF flow negative. MRI brain 9/11 negative, only showing chronic changes from h/o brain abscess.   - D14 BMBx w/o evidence of leukemia.   - HLA typing ordered. S/p BMT NT 9/22 with Dr. Cam Edward. Starting donor search. Plan for JIMMY in CR1.                   Treatment Plan: 7+3 (C1D1=9/2/23)                - Cytarabine 100 mg/m2 - D1-D7               - Daunorubicin 60 mg/m2 - D1-D3               - Supportive meds: Decadron 12 mg D1-D3,  Zofran     # Low risk for TLS/DIC  - S/p allopurinol  - Montior TLS/DIC labs twice weekly     ID  # Neutropenic fever  # BCx 9/20 1/2 positive for strep mitis  # UCx 9/20 with 10-50k MRSE  # Diarrhea, resolved  Tmax 102.7 on 9/20 evening. Pt has been having cold symptoms as below in the setting of positive rhinovirus on 9/16. Cold symptoms on improving trajectory at time of new fever. Pt had diarrhea around time of fever onset, but had recently received MiraLax for constipation. Diarrhea persisted after MiraLax effect waned. Overnight 9/21-9/22, pt was too weak to return to bed from bathroom, called RN to assist.   - Work up:   - BCx 9/20 1/2 positive for strep mitis, pan-sensitive  - BCx 9/21-9/23 NGTD  - UCx 9/20 grew 10-50k staph epi in the setting of bland UA and no urinary symptoms. Oxacillin resistant.   - CXR unremarkable  - Cdiff negative  - CT chest indeterminate  - Antibiotics:   - Cefepime 2 g q 8hr x9/20 PM  - S/p vancomycin 9/22-9/23, discontinued once BCx susceptibilities resulted & d/t diffuse red rash  - Doxycycline 100 mg BID for possible MRSE UTI and empirically for atypical pneumonia  - Consider ID consult if fever curve does not improve.    # Rhinovirus, improved  # Post nasal drip, improving  # Cough, improving  Nasal congestion and post nasal drip 9/15 morning upon waking. H/o seasonal allergies. On 9/16, noted development of productive cough and worsening nasal congestion and post-nasal drip. One episode of blood-tinged sputum, now resolved. COVID/flu/RSV negative, but RVP positive for rhinovirus. Although new NF 9/2, URI symptoms are improving.   - Continue PTA cetirizine  - Supportive: Tessalon Perles, Mucinex DM, Hurricaine spray    # Immunosuppressed 2/2 malignancy and chemotherapy  # ID PPX  -  mg BID  - Levofloxacin 250 mg daily - on hold with treatment dose antibiotics as above  - Voriconazole 200 mg BID x9/10     # H/o brain abscess (2021)  # Headache, resolved  Treated in .  "[place]. S/p craniotomy for drainage of abscess July 2021. It is unclear what culture speciated to, but he completed a course of antibiotics with ceftriaxone then meropenem with resolution on imaging. Head CT 8/31 at OSH revealed no intracranial hemorrhage midline shift or mass effect. Postsurgical changes noted. Previous area of infection demonstrates trace amount of encephalomalacia but no mass effect or inflammatory changes to suggest new or acute infection. Otherwise unremarkable. Patient presented initially with headache and dizziness, which has now resolved.   - See work up above for CNS leukemia work-up, which was negative     CHRONIC  # HTN  Noted during prior hospitalization for brain abscess in 2021. Previously on lisinopril, which he is no longer taking.  - Patient BP's have fluctuated this admission, w/occasionally elevated BPs. Continue to monitor.     # BPH  Notes difficulty with complete emptying of bladder. Previously on Flomax but states it did nothing, so he stopped it. He does note frequent urination at baseline, currently urination remains unchanged from baseline.  - Monitor clinically     MISC  # Drug rash  On 9/23 AM, pt noted to have drug rash on upper thighs and trunk.  - Patient endorsing pruritis to back rash, denies concerning symptoms; no evidence of angioedema, denies SOB.  - Benadryl and kenalog cream PRN  - Continue to monitor for improvement s/p discontinued vancomycin.  - If no improvement to fever curve, consider alternative to cefepime as this may be next likely causative drug.    # Weakness  # Deconditioning  Pt reports issues w/ balance after his brain abscess in 2021, exacerbated when ambulating. Deconditioning acute-on-chronic upon admission since 2021.   - PT consulted    # Episodes of Hypotension   # Dizziness  # Fall risk  Per chart review, patient had \"near fall\" on 9/24/23 when he attempted to get up from bed independently to ambulate to the restroom. He has been reported " "intermittent episodes of dizziness since admission, however, he notices this has increased in the past few days. He is also experiencing hypotensive episodes, requiring 1.5 L fluid support on 9/24-9/25.   - This AM patient not hypotensive, /57, will CTM closely and provide bolus support PRN. Patient appearseuvolemic at this time.    # Weight loss  # Loss of appetite  # Moderate malnutrition in the context of acute illness   Patient presented with loss of appetite 2/2 malignancy. He has been gradually losing weight throughout admission, although PO intake is overall sufficient.   - RD consulted  - Ensure BID between meals and PRN    # Hyponatremia  # Hypophosphatemia  - Replete per protocol     Clinically Significant Risk Factors              # Hypoalbuminemia: Lowest albumin = 2.9 g/dL at 9/25/2023  5:13 AM, will monitor as appropriate  # Coagulation Defect: INR = 1.26 (Ref range: 0.85 - 1.15) and/or PTT = 37 Seconds (Ref range: 22 - 38 Seconds), will monitor for bleeding    # Thrombocytopenia: Lowest platelets = 9 in last 2 days, will monitor for bleeding          # Overweight: Estimated body mass index is 28.38 kg/m  as calculated from the following:    Height as of this encounter: 1.74 m (5' 8.5\").    Weight as of this encounter: 85.9 kg (189 lb 6.4 oz).       # Moderate Malnutrition: based on nutrition assessment           FEN  Diet: Regular Diet Adult   IVF: Bolus PRN   Lytes: Replete per protocol     PPX  VTE: None given thrombocytopenia  Bowel: Senna/MiraLax PRN  GI/PUD: None currently indicated     MISC  Code Status: Full Code   Lines/Drains: PICC  Dispo: Will remain inpatient through count recovery with safe discharge plan outpatient.   Follow Up: Pt will follow with Dr. Garcia, appointment scheduled for end of September. Pt would prefer labs and blood transfusions done closer to home in [facility].    Patient was seen and plan of care was discussed with attending physician Dr. Levine.    I " spent 50 minutes face-to-face or coordinating care of Artie Fine. Over 50% of our time on the unit was spent counseling the patient and coordinating care.    Hilda Gamble PA-C  Hematology/Oncology  Pager: [phone]  Phone: [phone]      Interval History   Chart and nursing notes reviewed from overnight, showing episode of dizziness and hypotension overnight; s/p 500 mL bolus. Tmax of 100.7 on 9/24 with fever curve improving, this AM temperature of 99.3. Patient is breathing comfortably on RA satting 98% O2. Upon my visit today patient reports feeling fatigued and weak. He reports intermittent episodes of dizziness and states he fell yesterday when getting up to ambulate independently to the commode. I observed a dry cough while I was talking to him. His wife, Aundrea, was bedside providing emotional support. Patient reports eating a large french toast breakfast this morning and is drinking well. He is endorsing improving red rash that is over bilateral arms, chest, abdomen, back, and thighs. The only area that is pruritic per patient report is on his back which he has as needed benadryl and kenalog cream. He denies trouble with breathing, eating/drinking, throat swelling. Denies F/C/NS, productive cough, chest or abdominal pain, N/V/D. We discussed that his CT was showing indeterminate results, patient believes he is more swollen, however upon examination today he appears euvolemic. Mild swelling to R upper arm near IV access. We discussed continuing current antibiotic regimen. Patient was agreeable to the plan today, requested a shower which we discussed with bedside RN who will provide assistance.    He had no further questions or concerns.    Vital Signs with Ranges  Temp:  [97.5  F (36.4  C)-100.7  F (38.2  C)] 99.3  F (37.4  C)  Pulse:  [] 95  Resp:  [16-18] 18  BP: ()/(47-59) 120/57  SpO2:  [94 %-99 %] 98 %  I/O last 3 completed shifts:  In: 2260 [P.O.:960; I.V.:100; IV Piggyback:1000]  Out: 1900  [Urine:1900]    Physical Exam   Physical Exam  Constitutional:       Appearance: Normal appearance. He is normal weight.   HENT:      Head: Normocephalic and atraumatic.      Nose: Congestion present.      Mouth/Throat:      Mouth: Mucous membranes are dry.      Pharynx: No oropharyngeal exudate or posterior oropharyngeal erythema.   Eyes:      General: No scleral icterus.     Conjunctiva/sclera: Conjunctivae normal.      Pupils: Pupils are equal, round, and reactive to light.   Cardiovascular:      Rate and Rhythm: Normal rate and regular rhythm.      Heart sounds: Normal heart sounds. No murmur heard.     No friction rub. No gallop.   Pulmonary:      Effort: Pulmonary effort is normal. No respiratory distress.      Breath sounds: Normal breath sounds. No wheezing or rales.   Abdominal:      General: Abdomen is flat. Bowel sounds are normal. There is no distension.      Palpations: Abdomen is soft.      Tenderness: There is no abdominal tenderness. There is no guarding.   Musculoskeletal:         General: No swelling or deformity.      Right lower leg: No edema.      Left lower leg: No edema.   Skin:     General: Skin is warm and dry.      Coloration: Skin is not jaundiced.      Findings: Erythema and rash present.   Neurological:      General: No focal deficit present.      Mental Status: He is alert and oriented to person, place, and time. Mental status is at baseline.      Motor: Weakness present.   Psychiatric:      Comments: Baseline anxious and coping difficulty          Medications    - MEDICATION INSTRUCTIONS -        acyclovir  400 mg Oral BID    ceFEPIme  2 g Intravenous Q8H    cetirizine  10 mg Oral Daily    doxycycline hyclate  100 mg Oral BID    hydrOXYzine  50 mg Oral At Bedtime    [Held by provider] levofloxacin  250 mg Oral Daily    potassium & sodium phosphates  2 packet Oral or Feeding Tube Q4H    voriconazole  200 mg Oral Q12H [facility] (08/20)     Data   Results for orders placed or performed during  the hospital encounter of 09/01/23 (from the past 24 hour(s))   CT Chest w/o Contrast    Narrative    EXAMINATION: CT CHEST W/O CONTRAST  9/24/2023 12:23 PM      CLINICAL HISTORY: Persistent neutropenic fever with cough. Evaluate  for pneumonia.     COMPARISON: X-ray chest 9/27/2023    PROCEDURE COMMENTS: CT of the chest was performed without intravenous  contrast. Axial MIP, coronal and sagittal reformatted images were  obtained.    FINDINGS:   Support devices: Right arm PICC tip terminates in the low IVC.    The lung parenchyma demonstrates no large focal consolidations.  Scattered throughout the bases and Inferior lateral lung fields is  scattered reticular opacities. No cavitary lesions. Throughout the  lung bases there is a small amount of bronchiectasis distally. No  significant peribronchial thickening. No pleural effusion, no  pneumothorax. No pericardial effusion.     The trachea and central airways are clear.     Prominent subcarinal calcified lymph node measuring up to 1.3 x 3.1 cm  (series 2, image 32). Otherwise no significant or abnormally enlarged  axillary, hilar or mediastinal lymph nodes.     The heart and great vessels are normal in appearance. .    Osseous structures: Normal.    Upper abdomen: Normal noncontrast appearance.      Impression    IMPRESSION:    1. Basilar reticular opacities are indeterminate, possibly atypical  infection versus scarring and subsegmental atelectasis.    I have personally reviewed the examination and initial interpretation  and I agree with the findings.    RAO LAU DO         SYSTEM ID:  [id number]   Lactic Acid STAT   Result Value Ref Range    Lactic Acid 1.4 0.7 - 2.0 mmol/L   ABO/Rh type and screen    Narrative    The following orders were created for panel order ABO/Rh type and screen.  Procedure                               Abnormality         Status                     ---------                               -----------         ------                      Adult Type and Screen[[id number]]                            Preliminary result           Please view results for these tests on the individual orders.   Fibrinogen activity   Result Value Ref Range    Fibrinogen Activity 542 (H) 170 - 490 mg/dL   INR   Result Value Ref Range    INR 1.26 (H) 0.85 - 1.15   Lactate Dehydrogenase   Result Value Ref Range    Lactate Dehydrogenase 173 0 - 250 U/L   Magnesium   Result Value Ref Range    Magnesium 2.1 1.7 - 2.3 mg/dL   Partial thromboplastin time   Result Value Ref Range    aPTT 37 22 - 38 Seconds   Phosphorus   Result Value Ref Range    Phosphorus 1.5 (L) 2.5 - 4.5 mg/dL   Uric acid   Result Value Ref Range    Uric Acid 2.3 (L) 3.4 - 7.0 mg/dL   Comprehensive metabolic panel   Result Value Ref Range    Sodium 132 (L) 136 - 145 mmol/L    Potassium 3.5 3.4 - 5.3 mmol/L    Chloride 102 98 - 107 mmol/L    Carbon Dioxide (CO2) 19 (L) 22 - 29 mmol/L    Anion Gap 11 7 - 15 mmol/L    Urea Nitrogen 11.4 8.0 - 23.0 mg/dL    Creatinine 0.85 0.67 - 1.17 mg/dL    Calcium 7.1 (L) 8.8 - 10.2 mg/dL    Glucose 109 (H) 70 - 99 mg/dL    Alkaline Phosphatase 45 40 - 129 U/L    AST 32 0 - 45 U/L    ALT 29 0 - 70 U/L    Protein Total 5.9 (L) 6.4 - 8.3 g/dL    Albumin 2.9 (L) 3.5 - 5.2 g/dL    Bilirubin Total 0.5 <=1.2 mg/dL    GFR Estimate >90 >60 mL/min/1.73m2   Adult Type and Screen   Result Value Ref Range    Antibody Screen Negative Negative    SPECIMEN EXPIRATION DATE [id number]    CBC with platelets differential    Narrative    The following orders were created for panel order CBC with platelets differential.  Procedure                               Abnormality         Status                     ---------                               -----------         ------                     CBC with platelets and d...[[id number]]  Abnormal            Final result               RBC and Platelet Morphology[[id number]]                      Final result                 Please view results for these  tests on the individual orders.   CBC with platelets and differential   Result Value Ref Range    WBC Count 0.1 (LL) 4.0 - 11.0 10e3/uL    RBC Count 1.90 (L) 4.40 - 5.90 10e6/uL    Hemoglobin 5.6 (LL) 13.3 - 17.7 g/dL    Hematocrit 16.3 (L) 40.0 - 53.0 %    MCV 86 78 - 100 fL    MCH 29.5 26.5 - 33.0 pg    MCHC 34.4 31.5 - 36.5 g/dL    RDW 13.3 10.0 - 15.0 %    Platelet Count 14 (LL) 150 - 450 10e3/uL   RBC and Platelet Morphology   Result Value Ref Range    Platelet Assessment  Automated Count Confirmed. Platelet morphology is normal.     Automated Count Confirmed. Platelet morphology is normal.    RBC Morphology Confirmed RBC Indices

## 2023-09-25 NOTE — PROVIDER NOTIFICATION
Dr Villaseñor paged: Unable to flush/get blood return from PICC line.  Attempted to troubleshoot but still no luck.  Could you pls order CXR to determine if PICC is in the right place?  Thanks!

## 2023-09-25 NOTE — PROGRESS NOTES
CLINICAL NUTRITION SERVICES - BRIEF NOTE    REASON FOR ASSESSMENT  Artie Fine is a/an 68 year old male assessed by the dietitian for check in on supplement tolerance.     Findings  Visited with Artie in room today. Artie stated that his intake over the weekend was greatly decreased and that he was able to have a large breakfast today. Artie confirmed that he would still like the snacks canceled today, including Ensure Enlive. Discussed importance of adequate intake and recommended pt add an additional side/snack/small meal throughout the day to help increase intake. Artie stated that he does like the Special K Bar and was agreeable to receiving during PM snack time.     INTERVENTIONS  Implementation  Discontinue current supplements  Order Special K Bar for 2 pm snack.   Encouraged additional snacks/supplements to help increase intake.      Follow up/Monitoring  Will continue to follow up per protocol.     Swati Wall RD, LD   Available on [email or web address] and Pager  5B/6A pager [phone]  Weekend pager [phone]

## 2023-09-26 ENCOUNTER — APPOINTMENT (OUTPATIENT)
Dept: CT IMAGING | Facility: CLINIC | Age: 68
DRG: 835 | End: 2023-09-26
Attending: PHYSICIAN ASSISTANT
Payer: COMMERCIAL

## 2023-09-26 ENCOUNTER — APPOINTMENT (OUTPATIENT)
Dept: PHYSICAL THERAPY | Facility: CLINIC | Age: 68
DRG: 835 | End: 2023-09-26
Attending: INTERNAL MEDICINE
Payer: COMMERCIAL

## 2023-09-26 ENCOUNTER — APPOINTMENT (OUTPATIENT)
Dept: ULTRASOUND IMAGING | Facility: CLINIC | Age: 68
DRG: 835 | End: 2023-09-26
Attending: PHYSICIAN ASSISTANT
Payer: COMMERCIAL

## 2023-09-26 LAB
ALBUMIN SERPL BCG-MCNC: 2.7 G/DL (ref 3.5–5.2)
ALP SERPL-CCNC: 46 U/L (ref 40–129)
ALT SERPL W P-5'-P-CCNC: 38 U/L (ref 0–70)
ANION GAP SERPL CALCULATED.3IONS-SCNC: 9 MMOL/L (ref 7–15)
AST SERPL W P-5'-P-CCNC: 44 U/L (ref 0–45)
BACTERIA BLD CULT: NO GROWTH
BACTERIA BLD CULT: NO GROWTH
BILIRUB SERPL-MCNC: 0.8 MG/DL
BLD PROD TYP BPU: NORMAL
BLOOD COMPONENT TYPE: NORMAL
BUN SERPL-MCNC: 11 MG/DL (ref 8–23)
CA-I BLD-MCNC: 4.1 MG/DL (ref 4.4–5.2)
CALCIUM SERPL-MCNC: 7.2 MG/DL (ref 8.8–10.2)
CHLORIDE SERPL-SCNC: 105 MMOL/L (ref 98–107)
CK SERPL-CCNC: 80 U/L (ref 39–308)
CODING SYSTEM: NORMAL
CREAT SERPL-MCNC: 0.81 MG/DL (ref 0.67–1.17)
DEPRECATED HCO3 PLAS-SCNC: 19 MMOL/L (ref 22–29)
EGFRCR SERPLBLD CKD-EPI 2021: >90 ML/MIN/1.73M2
ERYTHROCYTE [DISTWIDTH] IN BLOOD BY AUTOMATED COUNT: 13.6 % (ref 10–15)
GLUCOSE SERPL-MCNC: 109 MG/DL (ref 70–99)
HCT VFR BLD AUTO: 23.6 % (ref 40–53)
HGB BLD-MCNC: 8.1 G/DL (ref 13.3–17.7)
ISSUE DATE AND TIME: NORMAL
LACTATE SERPL-SCNC: 1.1 MMOL/L (ref 0.7–2)
MCH RBC QN AUTO: 28.9 PG (ref 26.5–33)
MCHC RBC AUTO-ENTMCNC: 34.3 G/DL (ref 31.5–36.5)
MCV RBC AUTO: 84 FL (ref 78–100)
PHOSPHATE SERPL-MCNC: 1.4 MG/DL (ref 2.5–4.5)
PHOSPHATE SERPL-MCNC: 1.7 MG/DL (ref 2.5–4.5)
PLAT MORPH BLD: NORMAL
PLATELET # BLD AUTO: 9 10E3/UL (ref 150–450)
POTASSIUM SERPL-SCNC: 3.7 MMOL/L (ref 3.4–5.3)
PROT SERPL-MCNC: 6 G/DL (ref 6.4–8.3)
RBC # BLD AUTO: 2.8 10E6/UL (ref 4.4–5.9)
RBC MORPH BLD: NORMAL
SODIUM SERPL-SCNC: 133 MMOL/L (ref 136–145)
UNIT ABO/RH: NORMAL
UNIT NUMBER: NORMAL
UNIT STATUS: NORMAL
UNIT TYPE ISBT: 6200
WBC # BLD AUTO: 0.2 10E3/UL (ref 4–11)

## 2023-09-26 PROCEDURE — P9037 PLATE PHERES LEUKOREDU IRRAD: HCPCS | Performed by: PHYSICIAN ASSISTANT

## 2023-09-26 PROCEDURE — 250N000013 HC RX MED GY IP 250 OP 250 PS 637: Performed by: HOSPITALIST

## 2023-09-26 PROCEDURE — 250N000011 HC RX IP 250 OP 636: Performed by: PEDIATRICS

## 2023-09-26 PROCEDURE — 99233 SBSQ HOSP IP/OBS HIGH 50: CPT | Mod: FS | Performed by: INTERNAL MEDICINE

## 2023-09-26 PROCEDURE — 74176 CT ABD & PELVIS W/O CONTRAST: CPT

## 2023-09-26 PROCEDURE — 74176 CT ABD & PELVIS W/O CONTRAST: CPT | Mod: 26 | Performed by: RADIOLOGY

## 2023-09-26 PROCEDURE — 93971 EXTREMITY STUDY: CPT | Mod: RT

## 2023-09-26 PROCEDURE — 120N000002 HC R&B MED SURG/OB UMMC

## 2023-09-26 PROCEDURE — 250N000013 HC RX MED GY IP 250 OP 250 PS 637: Performed by: PHYSICIAN ASSISTANT

## 2023-09-26 PROCEDURE — 84100 ASSAY OF PHOSPHORUS: CPT | Performed by: INTERNAL MEDICINE

## 2023-09-26 PROCEDURE — 99223 1ST HOSP IP/OBS HIGH 75: CPT | Mod: GC | Performed by: DERMATOLOGY

## 2023-09-26 PROCEDURE — 258N000003 HC RX IP 258 OP 636: Performed by: PHYSICIAN ASSISTANT

## 2023-09-26 PROCEDURE — 93971 EXTREMITY STUDY: CPT | Mod: 26 | Performed by: RADIOLOGY

## 2023-09-26 PROCEDURE — 85027 COMPLETE CBC AUTOMATED: CPT

## 2023-09-26 PROCEDURE — 250N000009 HC RX 250: Performed by: INTERNAL MEDICINE

## 2023-09-26 PROCEDURE — 83605 ASSAY OF LACTIC ACID: CPT | Performed by: INTERNAL MEDICINE

## 2023-09-26 PROCEDURE — 97530 THERAPEUTIC ACTIVITIES: CPT | Mod: GP

## 2023-09-26 PROCEDURE — 250N000011 HC RX IP 250 OP 636: Mod: JZ

## 2023-09-26 PROCEDURE — 82330 ASSAY OF CALCIUM: CPT | Performed by: PHYSICIAN ASSISTANT

## 2023-09-26 PROCEDURE — 250N000013 HC RX MED GY IP 250 OP 250 PS 637

## 2023-09-26 PROCEDURE — 258N000003 HC RX IP 258 OP 636: Performed by: INTERNAL MEDICINE

## 2023-09-26 PROCEDURE — 99223 1ST HOSP IP/OBS HIGH 75: CPT | Performed by: INTERNAL MEDICINE

## 2023-09-26 PROCEDURE — 250N000013 HC RX MED GY IP 250 OP 250 PS 637: Performed by: STUDENT IN AN ORGANIZED HEALTH CARE EDUCATION/TRAINING PROGRAM

## 2023-09-26 PROCEDURE — 82550 ASSAY OF CK (CPK): CPT | Performed by: PHYSICIAN ASSISTANT

## 2023-09-26 PROCEDURE — 80053 COMPREHEN METABOLIC PANEL: CPT | Performed by: PHYSICIAN ASSISTANT

## 2023-09-26 PROCEDURE — 250N000011 HC RX IP 250 OP 636: Mod: JZ | Performed by: PHYSICIAN ASSISTANT

## 2023-09-26 RX ORDER — AZTREONAM 1 G/1
1 INJECTION, POWDER, LYOPHILIZED, FOR SOLUTION INTRAMUSCULAR; INTRAVENOUS EVERY 8 HOURS
Status: DISCONTINUED | OUTPATIENT
Start: 2023-09-26 | End: 2023-10-02

## 2023-09-26 RX ORDER — METRONIDAZOLE 500 MG/100ML
500 INJECTION, SOLUTION INTRAVENOUS EVERY 8 HOURS
Status: DISCONTINUED | OUTPATIENT
Start: 2023-09-26 | End: 2023-10-02

## 2023-09-26 RX ORDER — CALCIUM CARBONATE 500 MG/1
500 TABLET, CHEWABLE ORAL 2 TIMES DAILY
Status: DISCONTINUED | OUTPATIENT
Start: 2023-09-26 | End: 2023-09-29

## 2023-09-26 RX ADMIN — HYDROXYZINE HYDROCHLORIDE 50 MG: 25 TABLET, FILM COATED ORAL at 19:47

## 2023-09-26 RX ADMIN — CETIRIZINE HYDROCHLORIDE 10 MG: 10 TABLET, FILM COATED ORAL at 09:39

## 2023-09-26 RX ADMIN — POTASSIUM PHOSPHATE, MONOBASIC POTASSIUM PHOSPHATE, DIBASIC 9 MMOL: 224; 236 INJECTION, SOLUTION, CONCENTRATE INTRAVENOUS at 00:50

## 2023-09-26 RX ADMIN — CEFEPIME HYDROCHLORIDE 2 G: 2 INJECTION, POWDER, FOR SOLUTION INTRAVENOUS at 14:33

## 2023-09-26 RX ADMIN — VORICONAZOLE 200 MG: 200 TABLET ORAL at 09:39

## 2023-09-26 RX ADMIN — CEFEPIME HYDROCHLORIDE 2 G: 2 INJECTION, POWDER, FOR SOLUTION INTRAVENOUS at 05:13

## 2023-09-26 RX ADMIN — ACYCLOVIR 400 MG: 400 TABLET ORAL at 19:47

## 2023-09-26 RX ADMIN — DAPTOMYCIN 500 MG: 500 INJECTION, POWDER, LYOPHILIZED, FOR SOLUTION INTRAVENOUS at 18:04

## 2023-09-26 RX ADMIN — CALCIUM CARBONATE (ANTACID) CHEW TAB 500 MG 500 MG: 500 CHEW TAB at 19:47

## 2023-09-26 RX ADMIN — METRONIDAZOLE 500 MG: 500 INJECTION, SOLUTION INTRAVENOUS at 17:01

## 2023-09-26 RX ADMIN — SODIUM CHLORIDE, PRESERVATIVE FREE 5 ML: 5 INJECTION INTRAVENOUS at 11:07

## 2023-09-26 RX ADMIN — DOXYCYCLINE HYCLATE 100 MG: 100 CAPSULE ORAL at 09:39

## 2023-09-26 RX ADMIN — ACYCLOVIR 400 MG: 400 TABLET ORAL at 09:39

## 2023-09-26 RX ADMIN — SODIUM CHLORIDE, PRESERVATIVE FREE 5 ML: 5 INJECTION INTRAVENOUS at 19:47

## 2023-09-26 RX ADMIN — SODIUM PHOSPHATE, MONOBASIC, MONOHYDRATE AND SODIUM PHOSPHATE, DIBASIC, ANHYDROUS 9 MMOL: 142; 276 INJECTION, SOLUTION INTRAVENOUS at 14:36

## 2023-09-26 RX ADMIN — GUAIFENESIN AND DEXTROMETHORPHAN HYDROBROMIDE 1 TABLET: 600; 30 TABLET, EXTENDED RELEASE ORAL at 13:00

## 2023-09-26 RX ADMIN — POTASSIUM PHOSPHATE, MONOBASIC POTASSIUM PHOSPHATE, DIBASIC 9 MMOL: 224; 236 INJECTION, SOLUTION, CONCENTRATE INTRAVENOUS at 03:02

## 2023-09-26 RX ADMIN — VORICONAZOLE 200 MG: 200 TABLET ORAL at 19:47

## 2023-09-26 RX ADMIN — AZTREONAM 1 G: 1 INJECTION, POWDER, LYOPHILIZED, FOR SOLUTION INTRAMUSCULAR; INTRAVENOUS at 22:13

## 2023-09-26 RX ADMIN — SODIUM PHOSPHATE, MONOBASIC, MONOHYDRATE AND SODIUM PHOSPHATE, DIBASIC, ANHYDROUS 9 MMOL: 142; 276 INJECTION, SOLUTION INTRAVENOUS at 16:49

## 2023-09-26 ASSESSMENT — ACTIVITIES OF DAILY LIVING (ADL)
ADLS_ACUITY_SCORE: 30
ADLS_ACUITY_SCORE: 27
ADLS_ACUITY_SCORE: 30
ADLS_ACUITY_SCORE: 27
ADLS_ACUITY_SCORE: 30

## 2023-09-26 NOTE — PLAN OF CARE
"/56 (BP Location: Left arm)   Pulse 105   Temp 100.2  F (37.9  C) (Oral)   Resp 18   Ht 1.74 m (5' 8.5\")   Wt 84.5 kg (186 lb 3.2 oz)   SpO2 96%   BMI 27.90 kg/m    TMax 100.2  Reason for admission: AML, Day 25 7+3  Neuro: AOx4  Pain: denies  CMS: intact  Cardiac: tachy 100s  Resp: O2>90 on RA  GI/: AUOP, LBM 9/26  Skin/Wound: diffuse rash per provider note: \"Skin rash worsened today per patient report. Of note, Vancomycin discontinued over the weekend with concern that this may be attributing.  Could be drug related versus chemotherapy versus antibiotic versus other.  Derm consult placed today, appreciate input\"  Access: PICC infusing TKO  Labs: phos redraw at 2020  Activity: up Ax1 w walker and MICHAEL  Nutrition: Reg, poor appetite   Changes this shift: abx switched to cubicin, azactam, and flagyl  Plan: Monitor fevers                         "

## 2023-09-26 NOTE — PROVIDER NOTIFICATION
Hospitalist paged:     Patient has temp 100.5, been fevering all day. Needs RBC, ok to give while fevering?

## 2023-09-26 NOTE — PLAN OF CARE
[id number]     A&Ox4. VSS on RA. Afebrile, tmax 99.5. Triggered sepsis, lactic acid 1.1. Denies pain, nausea, and SOB. Continues to have dry cough, given mucinex x1. Phos recheck 1.4, replaced IV, recheck at 2020. Plts 9, received 1 unit. Voids with adequate output. Continues to have frequent stools. CT abd/pelvis done. Pt still needs RUE US. Up with 1 assist, gait belt, and walker. Pt calls appropriately. Continue with plan of care.

## 2023-09-26 NOTE — CONSULTS
[facility] Inpatient Consult Dermatology Note    Impression/Plan:    1. Morbiliform (Exanthematous) Eruption with likely component of toxic erythema of chemotherapy      Favor that there are two morphologies and two different contributing factors to the rash- the bright red patches on the proximal thighs, and edematous red plaques on the proximal, extensor arms likely represent toxic erythema of chemotherapy which is most likely to cytarabine, which patient received at the beginning of September.     The pink-red macules coalescing into patches on the abdomen and back likely represent an exanthematous/morbilliform drug eruption.The rash usually appears between 7 to 10 days after initiation of triggering medication, though it is also been reported up to 14 days afterwards.  Of note, the rash can present sooner than this if the patient is re-exposed to a prior medication.  In this patient, cefepime was only initiated a few days before onset of the rash, which is not typical for first-time exposure, however, could be explained if patient has received these antibiotics in the past. There is a good history in this patient of a rash after exposure to ceftriaxone/meropenem (listed as allergy to IV contrast, but patient was receiving these antibiotics at the time as well).    Exanthematous drug eruption is most commonly seen with the use of antibiotics (penicillins and sulfas), allopurinol, phenytoin, barbiturates, chlorpromazine, captopril, NSAIDs, but many other drug culprits have been reported.       In patients with morbiliform eruption, we also recommend ruling out a viral etiology by PCR testing of common triggers including Ebstein-Barr virus, cytomegalovirus, and HHV-6.     Drug-induced hypersensitivity syndrome (DIHS; previously DRESS or Drug Eeaction with Eosinophilia and Systemic Symptoms) less favored at this time given lack of facial edema or new lymphadenomapthy, though it remains a  "possibility. It is reassuring as patient had a normal creatinine and LFTs. Eosinophilia not able to be assessed given leukopenia in this patient.      If the offending medication has stopped, it can take up to 1 to 2 weeks before the rash completely resolves. If the offending medication is not discontinued, the rash will continue to progress. In severe cases, a low-grade fever can occur.  For simple drug rash as suspected in this patient, discontinuation of the offending agent is not required and patients can be \"treated through\" the rash.      Recommendations:  - recommend checking EBV, CMV, and HHV6 PCR if not already done so   - start triamcinolone 0.1% ointment BID rash on body (can change to cream per patient preference though ointment will be more potent)  - consider hydrocortisone 2.5 % cream BID rash on face   - daily CBC with diff and CMP to verify to not progressing to DIHS/DRESS (looking for creatinine elevation, transaminitis)  - please notify us if patient should develop any of the following red flag symptoms including persistent fever, facial edema, lymphadenopathy, new arthralgias/myalgias, new diarrhea.      Thank you for the dermatology consultation. We will continue to follow.     Staffed with attending physician, Dr. Quinteros.     Natalia Fairbanks MD (PGY-3)  Dermatology Resident  Pager: [phone]    I have seen and examined this patient and agree with the assessment and plan as documented in the resident's note.    Mohan Quinteros MD  Dermatology Attending      Dermatology Problem List:  1. Morbiliform drug eruption  -     Date of Admission: Aug 31, 2023   Encounter Date: 09/26/2023    Reason for Consultation:   New rash    History of Present Illness:  Mr. 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 He was started on intensive induction chemotherapy with cytarabine and daunorubicin 7+3 on 9/2/2023 (today is Day 24). He began " "having neutropenic fevers on 9/20/23.     Blood culture 9/20/23 was positive for Streptococcus Mitis (1/2), urine culture + for S. Epidermidis (susceptible to doxy). CXR unremarkable, repeat blood cultures with NGTD (9/21-9/23), and CT chest with possible atypical infection v atelectasis. He was previously positive for Rhinovirus (on 9/16).     He was started on IV cefepime (9/20-present), and PO doxycycline on 9/24. Vancomycin was started on 9/22 and stopped on 9/23/23 when rash started.    Dermatology was consulted for rash on the chest, abdomen, bilateral arms, and legs, that started approximately on 9/23/23. Primary team has been using triamcinolone cream as needed for itching.     Labs show normal creatinine, pancytopenia, normal LFTs. Last febrile to 101.9 9/25 PM.    Patient states that his rash started late last week-around Wednesday. It initially improved, but then he felt that it worsened again (became more red) around Saturday. He first noted it on his arms, and then noted it spread to his trunk and lower face. He had a similar rash when he was treated for a brain abscess-this was thought to be related to the contrast he had from the CT at the time. He reports it took a long time to resolve. He was treated with ceftriaxone and meropenem at the time. At this time, the rash is not itchy or painful. Not symptomatic for him.     Past Medical History:   Reviewed in epic, pertinent findings summarized as above    Social History:  Patient     Family History:  No family history on file.    Medications:  Reviewed in epic, pertinent findings summarized as above     Allergies   Allergen Reactions     Iodinated Contrast Media Rash     Ragweeds Other (See Comments)     Congestion        Review of Systems:  As per HPI.     Physical exam:  Vitals: /64 (BP Location: Left arm)   Pulse 92   Temp 98.2  F (36.8  C) (Oral)   Resp 18   Ht 1.74 m (5' 8.5\")   Wt 84.5 kg (186 lb 3.2 oz)   SpO2 96%   BMI 27.90 kg/m  "     Bright red patches with overlying petechiae on the feet, proximal thighs, and proximal, extensor arms (more prominent over the antecubital fossa)   Pink-red macules coalescing into patches on the abdomen and back (fainter on back)  Faint, patchy pink-red patches on the face and erythema along the helical rim  No cervical, supraclavicular, or axillary lymphadenopathy                                          Laboratory:  Reviewed in epic, pertinent findings summarized as above

## 2023-09-26 NOTE — CONSULTS
[facility]    Transplant Infectious Diseases Inpatient Consultation      Artie Fine MRN# [id number]   YOB: 1955 Age: 68 year old   Date of Admission and time: 9/1/2023  3:13 PM     Reason for consult: I was asked by Dr. Levine to evaluate this patient for persistent neutropenic fever while on ABx with positive blood and urine cx.             Recommendations:   Please discontinue cefepime and doxycyline.   Please start aztreonam 1 gram q8 hr.   Please start flagyl 500 mg mg q 8hr.   Please start daptomycin 6 mg/kg/day.   Check baseline CPK.         Summary of Presentation:   This patient is a 68 year old male with recent diagnosis of AML s/p 7+3 therapy.   The course is complicated by rash and febrile neutropenia.         Active Problems and Infectious Diseases Issues:   Febrile neutropenia.   Rash.   The differential diagnosis should include gut bacterial translocation associated with neutropenia typically resulting in fever vs drug-induced rash.   The patient remembers rash while on ceftriaxone for brain abscess.   He was itching while on meropenem, but whether the pruritus was due to meropenem or HOMER gadolinium is not clear.     I am inclined to avoid both beta-lactams and carbapenems. Also given rash and use of vancomycin, I am avoiding vancomycin.     It is unlikely that flagyl would result in rash.     The rash can also be a delayed reaction to chemotherapy.     I would use aztreonam 1 gm every 8 hr, daptomycin 6 mg/kg/day, and flagyl to avoid ceftriaxone and meropenem which may have caused rash in the past.   I am also avoiding vancomycin because the patient thinks stopping vancomycin improved the rash.     I would not add vancomycin, meropenem, ceftriaxone, doxycycline to allergy list yet    I am not impressed by the CT chest findings to suggest pneumonia.     MRSE bacteriuria.   Without  instrumentation this is a contaminant.     S mitis in blood cx.   In one  out of 2 sets from 9/20/23.   Very difficult to ascertain if this is due to GI lucy translocation vs contamination.   Covered by daptomycin.         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 Apparently developed rash on ceftriaxone and was switched to meropenem. MRI in 9/2021 and 10/2021 with decrease in size of abscess. He developed itching while on meropenem and was attributed to MRI contrast.      Other Infectious Disease issues include:  - QTc: 434 as of 9/4/23.   - PJP prophylaxis: none.   - Serostatus: CMV+, EBV+, HSV1+/2+, VZV ?  - Gamma globulin status: ?      Thank you very much Dr. Levine for involving me in the care of Mr. Artie Fine. Please do not hesitate to call me for any question.     Attestation:  Total duration of visit including chart review, reviewing labs and imaging, interviewing and examining the patient, documentation, and sending communication to the primary treating team, all at the same day of this encounter, is: 80 minutes.     Dagoberto Larios MD  [facility]  Contact information available via [facility] Paging/Directory    09/26/2023             History of Present Illness:   This patient is a 68 year old male who was diagnosed early 9/2023 with AML when he presented with weeks/months of easy bruisability and weakness and was found to be pancytopenic. BMBx confirmed AML and he received 7+3 cytarabine/daunorubicin starting 9/2/23.   On 9/20/23 fever occurred and was started on cefepime. Vancomycin was added 9/22/23 when blood cx grew S mitis then vancomycin was discontinued after susceptibilities resulted.   Fever persisted and doxycyline was added on 9/24/23 for atypical coverage.     The patient started to develop rash that was first noted on the BiUE then spread. The patient stated that the rash improved after stopping one  ABx and starting another; I am not sure if he meant stopping vanco and initiating doxycycline or stopping levaquin and initiating cefepime.     The patient endorsed cough for two weeks, mostly non-productive. No shortness of breath.   Also endorses watery diarrhea.   No N/V and no abdominal pain.            Review of Systems:      As mentioned in the HPI otherwise negative by reviewing constitutional symptoms, central and peripheral neurological systems, respiratory system, cardiac system, GI system,  system, musculoskeletal, skin, allergy, and lymphatics.                  Past Medical History:   No past medical history on file.         Past Surgical History:     Past Surgical History:   Procedure Laterality Date    PICC Right 09/02/2023    Placed R basilic 46 cm 1 external  without problem            Social History:     Social History     Tobacco Use    Smoking status: Not on file    Smokeless tobacco: Not on file   Substance Use Topics    Alcohol use: Not on file            Family History:   I have reviewed this patient's family history  No family history on file.         Immunizations:     Immunization History   Administered Date(s) Administered    COVID-19 Bivalent 12+ (Pfizer) 10/19/2022    COVID-19 MONOVALENT 12+ (Pfizer) 01/28/2021, 02/18/2021, 09/28/2021            Allergies:     Allergies   Allergen Reactions    Iodinated Contrast Media Rash    Ragweeds Other (See Comments)     Congestion              Medications:   Medications that Require Transfusion:    - MEDICATION INSTRUCTIONS -       Scheduled Medications:    acyclovir  400 mg Oral BID    ceFEPIme  2 g Intravenous Q8H    cetirizine  10 mg Oral Daily    doxycycline hyclate  100 mg Oral BID    hydrOXYzine  50 mg Oral At Bedtime    [Held by provider] levofloxacin  250 mg Oral Daily    sodium phosphate  9 mmol Intravenous Q2H    voriconazole  200 mg Oral Q12H [facility] (08/20)               Physical Exam:   Temp: 98.2  F (36.8  C) Temp src: Oral BP: 136/64  Pulse: 92   Resp: 18 SpO2: 96 % O2 Device: None (Room air)      Wt Readings from Last 4 Encounters:   09/26/23 84.5 kg (186 lb 3.2 oz)   09/01/23 86.2 kg (190 lb 1.6 oz)     Constitutional: awake, alert, cooperative, no apparent distress and appears at stated age, well nourished.   Head, ENT, Eyes, and Neck: Normocephalic, sinuses non-tender to palpation, external ears without lesions, moist buccal mucosa without oral thrush or ulcers, tonsils without swelling, erythema, or exudate, no tenderness palpating teeth, good dentition, gums without necrosis or abscesses.   PERRL, EOMI, pink conjunctivae, non-icteric sclera.   Neck supple without rigidity.   Lymphatics: no cervical/axillary/inguinal LA bilaterally.    Neurologic: Patient is moving all extremities without focal deficit, no focal sensory loss.   Lungs: CTA bilaterally except for LLL crackles, no accessory muscle use, no dullness to percussion and no abnormal tactile fremitus.   CVS: RRR, normal S1/S2, no murmur, PMI was not displaced.   Abdomen: non-tender, non-distended, no masses, no bruit, no shifting dullness, normal BS.   Musculoskeletal: +1-2 pitting edema of bilateral lower extremities, no ulcers, normal ROM of all joints, no swelling or erythema of any of joints and no tenderness to palpation.   Skin: no induration, fluctuation or discharge at the PICC line in the RUE. Disseminated macular blanching no rash            Data:   No results found for: ACD4    Inflammatory Markers    No lab results found.    Immune Globulin Studies   No lab results found.    Metabolic Studies       Recent Labs   Lab Test 09/26/23  1101 09/26/23  0512 09/25/23  2246 09/25/23  1416 09/25/23  0513 09/24/23  1812 09/24/23  0447 09/24/23  0035 09/23/23  0455 09/22/23  1157 09/22/23  0531 09/21/23  1511 09/21/23  0515   NA  --  133*  --   --  132*  --  135*  --  133*  --  132*  --  134*   POTASSIUM  --  3.7  --   --  3.5  --  3.9  --  3.7  --  3.7  --  3.9   CHLORIDE  --  105   --   --  102  --  103  --  101  --  102  --  103   CO2  --  19*  --   --  19*  --  17*  --  20*  --  20*  --  21*   ANIONGAP  --  9  --   --  11  --  15  --  12  --  10  --  10   BUN  --  11.0  --   --  11.4  --  13.8  --  14.8  --  15.6  --  14.1   CR  --  0.81  --   --  0.85  --  0.96  --  0.87  --  0.93  --  0.87   GFRESTIMATED  --  >90  --   --  >90  --  86  --  >90  --  89  --  >90   GLC  --  109*  --   --  109*  --  118*  --  110*  --  110*  --  112*   VENANCIO  --  7.2*  --   --  7.1*  --  8.0*  --  8.2*  --  8.2*  --  8.3*   PHOS 1.4*  --  1.7*  --  1.5*  --  2.5  --  2.1*  --  2.7  --  2.6   MAG  --   --   --   --  2.1  --  2.3  --  2.3  --  2.3  --  2.2   LACT 1.1  --   --  1.6  --    < >  --    < >  --    < >  --    < >  --     < > = values in this interval not displayed.       Hepatic Studies    Recent Labs   Lab Test 09/26/23 0512 09/25/23 0513 09/24/23 0447 09/23/23 0455 09/22/23  0531 09/21/23  0515   BILITOTAL 0.8 0.5 0.6 0.5 0.5 0.8   ALKPHOS 46 45 55 54 55 58   ALBUMIN 2.7* 2.9* 3.3* 3.5 3.4* 3.6   AST 44 32 30 22 20 28   ALT 38 29 30 22 21 30   LDH  --  173  --   --   --  191       Pancreatitis testing    No lab results found.    Hematology Studies      Recent Labs   Lab Test 09/26/23 0512 09/25/23 2039 09/25/23 0748 09/24/23 0447 09/23/23 0455 09/22/23  0531 09/18/23  0423 09/17/23  0435 09/11/23  0436 09/10/23  0445 09/09/23  0445 09/08/23  0523 09/07/23  0536 09/06/23  0618   WBC 0.2* 0.2* 0.1* 0.3* 0.2* 0.1*   < > 0.5*   < > 0.8* 0.9* 1.5* 1.4* 2.0*   ANEU  --   --   --   --   --   --   --  0.0*  --  0.0* 0.1* 0.5* 0.5* 0.5*   ALYM  --   --   --   --   --   --   --  0.5*  --  0.7* 0.8 0.9 0.9 1.5   JAX  --   --   --   --   --   --   --  0.0  --  0.0 0.0 0.0 0.0 0.0   AEOS  --   --   --   --   --   --   --  0.0  --  0.0 0.0 0.0 0.0 0.0   HGB 8.1* 6.5* 5.6* 7.4* 7.4* 7.3*   < > 6.9*   < > 8.1* 7.5* 7.8* 7.0* 7.9*   HCT 23.6* 19.3* 16.3* 21.7* 22.0* 21.2*   < > 21.1*   < > 25.6* 24.7* 25.6*  23.5* 26.6*   PLT 9* 12* 14* 9* 12* 23*   < > 16*   < > 26* 33* 51* 31* 9*    < > = values in this interval not displayed.       Clotting Studies    Recent Labs   Lab Test 09/25/23  0513 09/21/23  0515 09/18/23  0423 09/14/23  0420   INR 1.26* 1.25* 1.13 1.09   PTT 37 38 35 29       Arterial Blood Gas Testing  No lab results found.     Urine Studies     Recent Labs   Lab Test 09/21/23  0009   URINEPH 6.5   NITRITE Negative   LEUKEST Negative   WBCU <1       Vancomycin Levels     No lab results found.    Invalid input(s): VANCO    Tobramycin levels     No lab results found.    Gentamicin levels    No lab results found.    Tacrolimus levels    Invalid input(s): TACROLIMUS, TAC, TACR      Latest Ref Rng & Units 9/26/2023     5:12 AM 9/25/2023     8:39 PM 9/25/2023     7:48 AM 9/25/2023     5:13 AM 9/24/2023     4:47 AM   Transplant Immunosuppression Labs   Creat 0.67 - 1.17 mg/dL 0.81    0.85  0.96    Urea Nitrogen 8.0 - 23.0 mg/dL 11.0    11.4  13.8    WBC 4.0 - 11.0 10e3/uL 0.2  0.2  0.1   0.3        Cyclosporine levels    Invalid input(s): CYCLOSPORINE, CYC    Mycophenolate levels    Invalid input(s): MYPA, MYP    Sirolimus levels    Invalid input(s): SIROLIMUS, SIR, RAPA    CSF testing     Recent Labs   Lab Test 09/08/23  1159 09/08/23  1158   CWBC 5  --    CRBC 1  --    CGLU  --  51   CTP  --  46.4*         Microbiology:  Blood cx negative.   Last check of C difficile  C Difficile Toxin B by PCR   Date Value Ref Range Status   09/22/2023 Negative Negative Final     Comment:     A negative result does not exclude actual disease due to C. difficile and may be due to improper collection, handling and storage of the specimen or the number of organisms in the specimen is below the detection limit of the assay.       Virology:  CMV viral loads  No results found for: CMVRESINST, 48990, 60220, 74061, 98938, CMVQAL  CMV viral loads  No lab results found.    CMV viral loads  No results found for: CMVQNT, CMVRESINST, CMVLOG,  02620, 70302, 91623, 85594    CMV resistance testing  No lab results found.  No results found for: CMVCID, CMVFOS, CMVGAN, CMVDRUGRES     No results found for: H6RES    No results found for: EBVDN, EBRES, EBVDN, EBVSP, EBVPC, EBVPCR    CMV Antibody IgG   Date Value Ref Range Status   09/02/2023 Positive, suggests recent or past exposure. (A) No detectable antibody.  Final   09/01/2023 Positive, suggests recent or past exposure. (A) No detectable antibody.  Final       No results found for: EBIG2, EBIGM, TOXG      Imaging:  CT chest WO 9/24/23   IMPRESSION:    1. Basilar reticular opacities are indeterminate, possibly atypical  infection versus scarring and subsegmental atelectasis.        Dagoberto Larios MD  [facility]  Contact information available via [facility] Paging/Directory     09/26/2023

## 2023-09-26 NOTE — PROGRESS NOTES
[facility]    Hematology / Oncology Progress Note    Patient: Artie Fine  MRN: [id number]  Admission Date: 9/1/2023  Date of Service (when I saw the patient): 09/26/2023  Hospital Day # 25     Assessment & Plan   Artie Fine is a 68 year old male with a history of brain abscess (2021), HTN, and BPH. He was transferred from [facility] in [place], [place] to [facility] for work up of pancytopenia; he was noted to have circulating blasts concerning for acute leukemia, and was subsequently transferred to [facility]. He underwent a diagnostic bone marrow biopsy on 9/1/23 which confirmed a new diagnosis of AML. He was started on induction with 7+3 (C1D1=9/2/23). Hospital course has been complicated by rhinovirus infection, recurrent neutropenic fever,  S. mitis bacteremia, and S. epidermidis UTI.     Today:  - Day 25 from 7+3 induction chemotherapy  - Last documented fever to 101.9 F, 9/25 PM  - Currently on cefepime and doxycycline   - Bcx 9/20/23 + streptococcus mitis (intermediate to penicillins, otherwise pansensitive)  - UCx + s. Epidermis (MRSE, noted susceptible to doxy)  - Chest CT showing basilar reticular opacities; indeterminate, possible atypical infection vs. scarring/atelectasis.  - CT abdomen pelvis ordered WO contrast; patient reports rash developing to CT contrast in past and would prefer to avoid  - Skin rash worsened today per patient report. Of note, Vancomycin discontinued over the weekend with concern that this may be attributing.  Could be drug related versus chemotherapy versus antibiotic versus other.  Derm consult placed today, appreciate input  - Right upper extremity edema, per patient report developed after skin rash.  Right upper extremity ultrasound ordered and pending.  - Dizziness improving, generalized weakness stable, blood pressures stable  - Calcium low at 7.2; when corrected for hypoalbuminemia, ionized calcium 4.1. Starting calcium  carbonate BID and will continue to monitor  - Receiving 1 unit platelets for AM labs 9K  - Will need end of cycle bone marrow biopsy when evidence of count recovery    HEME/ONC  # AML, adverse risk  Patient initially presented to [facility] in [place], [place] with 4-6 weeks of progressive bruising, weakness, fatigue, loss of appetite, and night sweats. He also noted a headache similar to his previous brain abscess. CBC notable for pancytopenia; WBC 2.7 (), Hgb 8.1, and plt 1k. He was transferred to [facility] and was found on peripheral flow w/ 11% circulating myeloid blasts. He was then transferred to [facility] for further work up and treatment of AML. Diagnostic BMBx performed 9/1 which showed AML with 30% blasts by morph and 47% by flow. P53 negative. FISH negative for MLLT10, NUP98, and KMT2A. Normal karyotype. NGS showed ASXL1, CEBPA, SRSF2, and STAG2 mutations. Started 7+3 (C1D1=9/2/23).   - Baseline TTE showing normal LVEF of 55-60%. Baseline EKG showed NSR and QTc of 434.   - Viral serologies: HepB/C-, HIV-. HSV1/2+, EBV IgG+, CMV IgG+  - PICC placed 9/1  - S/p diagnostic LP 9/8/23 for CNS leukemia work up as patient endorses headache upon admission w/ new AML diagnosis. CSF flow negative. MRI brain 9/11 negative, only showing chronic changes from h/o brain abscess.   - D14 BMBx w/o evidence of leukemia.   - HLA typing ordered. S/p BMT NT 9/22 with Dr. Cam Edward. Starting donor search. Plan for JIMMY in CR1.   - Will need end of cycle bone marrow biopsy when evidence of count recovery                  Treatment Plan: 7+3 (C1D1=9/2/23)                - Cytarabine 100 mg/m2 - D1-D7               - Daunorubicin 60 mg/m2 - D1-D3               - Supportive meds: Decadron 12 mg D1-D3, Zofran     # Low risk for TLS/DIC  - S/p allopurinol  - Montior TLS/DIC labs twice weekly     ID  # Neutropenic fever  # BCx 9/20 1/2 positive for strep mitis  # UCx 9/20 with 10-50k MRSE  # Diarrhea, resolved  Tmax 102.7 on 9/20  evening. Pt has been having cold symptoms as below in the setting of positive rhinovirus on 9/16. Cold symptoms on improving trajectory at time of new fever. Pt had diarrhea around time of fever onset, but had recently received MiraLax for constipation. Diarrhea persisted after MiraLax effect waned. Overnight 9/21-9/22, pt was too weak to return to bed from bathroom, called RN to assist.  Otherwise no localizing signs/symptoms of infection.  - Work up:   - BCx 9/20 1/2 positive for strep mitis, pan-sensitive  - BCx 9/21-9/23 NGTD  - UCx 9/20 grew 10-50k staph epi in the setting of bland UA and no urinary symptoms. Oxacillin resistant.   - CXR unremarkable  - C diff negative  - CT chest indeterminate  - CT abdomen pelvis with reticulonodular attenuation in lung bases, suspicious for infection versus aspiration, distal esophageal wall thickening with adjacent borderline enlarged gastrohepatic lymph node, digestive esophagitis though endoscopy recommended to exclude neoplasm  - ID consulted 9/26 in setting of persisting neutropenic fevers on antibiotics, appreciate input  - Antibiotics:   - Cefepime 2 g q 8hr x9/20 PM  - S/p vancomycin 9/22-9/23, discontinued once BCx susceptibilities resulted & d/t diffuse red rash  - Doxycycline 100 mg BID for possible MRSE UTI and empirically for atypical pneumonia    # Rhinovirus, improved  # Post nasal drip, improving  # Cough, improving  Nasal congestion and post nasal drip 9/15 morning upon waking. H/o seasonal allergies. On 9/16, noted development of productive cough and worsening nasal congestion and post-nasal drip. One episode of blood-tinged sputum, now resolved. COVID/flu/RSV negative, but RVP positive for rhinovirus. Although new neutropenic fever noted 9/20, URI symptoms are improving.   - Continue PTA cetirizine  - Supportive: Tessalon Perles, Mucinex DM, Hurricaine spray    # Immunosuppressed 2/2 malignancy and chemotherapy  # ID PPX  -  mg BID  - Levofloxacin  250 mg daily - on hold with treatment dose antibiotics as above  - Voriconazole 200 mg BID x9/10     # H/o brain abscess (2021)  # Headache, resolved  Treated in [place]. S/p craniotomy for drainage of abscess July 2021. It is unclear what culture speciated to, but he completed a course of antibiotics with ceftriaxone then meropenem with resolution on imaging. Head CT 8/31 at OSH revealed no intracranial hemorrhage midline shift or mass effect. Postsurgical changes noted. Previous area of infection demonstrates trace amount of encephalomalacia but no mass effect or inflammatory changes to suggest new or acute infection. Otherwise unremarkable. Patient presented initially with headache and dizziness, which has now resolved.   - See work up above for CNS leukemia work-up, which was negative     GI  # Esophageal wall thickening, seen on CT  CT abd/pelvis 9/26 obtained in setting of recurrent neutropenic fevers, strep mitis bacteremia.  On scan, noted distal esophageal wall thickening with adjacent borderline enlarged gastrohepatic lymph node, digestive esophagitis though endoscopy recommended to exclude neoplasm  - Given current pancytopenia s/p induction chemotherapy, concern for significant risk with endoscopy at this time.    - We will continue to monitor and defer for follow-up with repeat imaging/endoscopy when appropriate    CHRONIC  # HTN  Noted during prior hospitalization for brain abscess in 2021. Previously on lisinopril, which he is no longer taking.  - Patient BP's have fluctuated this admission, w/occasionally elevated BPs. Continue to monitor.     # BPH  Notes difficulty with complete emptying of bladder. Previously on Flomax but states it did nothing, so he stopped it. He does note frequent urination at baseline, currently urination remains unchanged from baseline.  - Monitor clinically     MISC  # Mobiliform rash  On 9/23 AM, pt noted to have ocular papular rash on upper thighs and trunk.  Likely  "drug rash? Patient endorsing mild pruritis to back rash, denies concerning symptoms; no evidence of angioedema, denies SOB.  Vancomycin discontinued 9/23 with concern this may be attributing.  - Benadryl and kenalog cream PRN  - Continue to monitor for improvement s/p discontinued vancomycin.  - Rash worsening as of 9/26/2023 - likely drug rash in setting of antibiotics vs chemotherapy vs other  - In discussion with Dr. Levine, given worsening of rash, dermatology consulted; appreciate recs    # RUE edema  Noted ~9/25, unilateral RUE 1-2+ nonpitting edema. Denies pain associated.  On exam, edema right upper extremity noted, diffuse skin rash expanding from trunk/chest into bilateral upper extremities, though edema does not seem to be associated with the rash.  Nontender to palpation.  Range of motion intact at joint lines of upper extremity.  No pustular, vesicular lesions, open wounds, or dehiscence.  PICC in place to right upper extremity, functioning appropriately, nontender at site, and dressing is clean dry intact.  - US ordered to eval for DVT    # Weakness  # Deconditioning  Pt reports issues w/ balance after his brain abscess in 2021, exacerbated when ambulating. Deconditioning acute-on-chronic upon admission since 2021.   - PT consulted    # Episodes of Hypotension   # Dizziness  # Fall risk  Per chart review, patient had \"near fall\" on 9/24/23 when he attempted to get up from bed independently to ambulate to the restroom. He has been reported intermittent episodes of dizziness since admission, however, he notices this has increased in the past few days. He is also experiencing hypotensive episodes, requiring 1.5 L fluid support on 9/24-9/25.  - Feeling improved AM 9/26  - Continue to monitor closely  - Consider IVF bolus prn    # Weight loss  # Loss of appetite  # Moderate malnutrition in the context of acute illness   Patient presented with loss of appetite 2/2 malignancy. He has been gradually losing " "weight throughout admission, although PO intake is overall sufficient.   - RD consulted  - Ensure BID between meals and PRN    # Hyponatremia  # Hypophosphatemia  - Replete per protocol    # Hypocalcemia  Noted hypocalcemia trends on labs and downtrending 9/24. Ionized calcium low at 4.1 when corrected for hypoalbuminemia.   - start calcium carbonate BID  - continue to monitor with daily labs     Clinically Significant Risk Factors          # Hypocalcemia: Lowest iCa = 4.1 mg/dL in last 2 days, will monitor and replace as appropriate     # Hypoalbuminemia: Lowest albumin = 2.7 g/dL at 9/26/2023  5:12 AM, will monitor as appropriate  # Coagulation Defect: INR = 1.26 (Ref range: 0.85 - 1.15) and/or PTT = 37 Seconds (Ref range: 22 - 38 Seconds), will monitor for bleeding    # Thrombocytopenia: Lowest platelets = 9 in last 2 days, will monitor for bleeding          # Overweight: Estimated body mass index is 27.9 kg/m  as calculated from the following:    Height as of this encounter: 1.74 m (5' 8.5\").    Weight as of this encounter: 84.5 kg (186 lb 3.2 oz).       # Moderate Malnutrition: based on nutrition assessment           FEN  Diet: Regular Diet Adult   IVF: Bolus PRN   Lytes: Replete per protocol     PPX  VTE: None given thrombocytopenia  Bowel: Senna/MiraLax PRN  GI/PUD: None currently indicated     MISC  Code Status: Full Code   Lines/Drains: PICC  Dispo: Will remain inpatient through count recovery with safe discharge plan outpatient.   Follow Up: Pt will follow with Dr. Garcia, appointment scheduled for end of September. Pt would prefer labs and blood transfusions done closer to home in [facility].    Patient was seen and plan of care was discussed with attending physician Dr. Levine.    I spent >65 minutes face-to-face or coordinating care of Artie Fine. Over 50% of our time on the unit was spent counseling the patient and coordinating care.    Sheree Chowdhury PA-C  Hematology/Oncology  Pager " [phone]    Interval History   Nursing notes reviewed, fever noted overnight. DONNA Alva is feeling okay this morning.  He notes feeling \"feverish\" with a fever breaking last night.  He continues to have generalized weakness, though notes ambulating to the bathroom with a walker today and his nurse, and notes it is the best it is gone in several days.  He feels his rash is worsening today.  Minimally itchy, no pain.  No open sores or vesicular lesions.  He notes runny nose/congestion and cough continue to improve.  Mild shortness of breath with ambulation.  Denies chest pain, abdominal pain, nausea, vomiting, urinary symptoms, headache, numbness tingling weakness, or skin/mouth sores.  Lightheaded dizziness remains but is improving.  Continues to have loose stools with 1 episode daily for last few days.  Recent C. difficile (9/22) was negative.  Right upper extremity is edematous, reportedly has been present for several days.  Will obtain ultrasound to rule out thrombosis.  We reviewed morning labs, plan for right upper extremity ultrasound, continued supportive cares.  Patient expressed understanding and in agreement with this plan.  Questions addressed at bedside.      Vital Signs with Ranges  Temp:  [98.1  F (36.7  C)-101.9  F (38.8  C)] 98.2  F (36.8  C)  Pulse:  [] 92  Resp:  [16-18] 18  BP: (103-138)/(49-65) 136/64  SpO2:  [95 %-100 %] 96 %  I/O last 3 completed shifts:  In: 1852 [P.O.:480; I.V.:850]  Out: 2350 [Urine:2350]    Physical Exam     Constitutional: Awake and conversational. Non- toxic appearing. No acute distress.   HEENT: Normocephalic. Moist mucus membranes without lesions, thrush, or exudates appreciated  Lymph: Neck supple, no ridigity. No significant adenopathy noted.   Respiratory: Breathing comfortably on room air with no accessory muscle use. Speaking in full sentences, no evidence of respiratory distress.  Breath sounds diminished bilateral bases, otherwise clear to auscultation " without stridor, wheeze, rhonchi, or rales.   Cardiovascular: Regular rate and rhythm. 2+ radial pulses bilaterally.  1-2+ nonpitting edema right upper extremity, otherwise no peripheral edema.    GI: Abdomen with normoactive bowel sounds, soft and non-tender throughout. No rebound, guarding, or peritoneal sign.   Skin: Skin is clean, dry, intact.  Diffuse maculopapular rash over trunk including chest, back, abdomen, extending up towards neck, and proximal bilateral upper and lower extremities.  No open wounds, vesicular papular pustular lesions, drainage.  No crepitus.    Neurologic: Alert with normal speech. Grossly nonfocal.  Moves extremities spontaneously.    Neuropsychiatric: Calm, affect congruent to situation.   Vascular access: PICC on RUE CDI        Medications    - MEDICATION INSTRUCTIONS -        acyclovir  400 mg Oral BID    aztreonam  1 g Intravenous Q8H    calcium carbonate  500 mg Oral BID    cetirizine  10 mg Oral Daily    DAPTOmycin (CUBICIN) 500 mg in sodium chloride 0.9 % 100 mL intermittent infusion  6 mg/kg Intravenous Q24H    hydrOXYzine  50 mg Oral At Bedtime    [Held by provider] levofloxacin  250 mg Oral Daily    metroNIDAZOLE  500 mg Intravenous Q8H    sodium phosphate  9 mmol Intravenous Q2H    voriconazole  200 mg Oral Q12H [facility] (08/20)     Data   Results for orders placed or performed during the hospital encounter of 09/01/23 (from the past 24 hour(s))   XR Chest Port 1 View    Narrative    Exam: XR CHEST PORT 1 VIEW, 9/25/2023 7:00 PM    Indication: Check PICC position, currently unable to flush or get  blood return    Comparison: 9/24/2023 chest CT    Findings:   Right upper extremity PICC tip at the mid to lower SVC. The  cardiomediastinal silhouette and pulmonary vasculature are within  normal limits. No appreciable pleural effusion or pneumothorax. No new  focal airspace opacity.      Impression    Impression: Right upper extremity PICC tip at the mid to low SVC.    AURA BUCKLEY,  DO         SYSTEM ID:  [id number]   CBC with platelets differential    Narrative    The following orders were created for panel order CBC with platelets differential.  Procedure                               Abnormality         Status                     ---------                               -----------         ------                     CBC with platelets and d...[[id number]]  Abnormal            Final result                 Please view results for these tests on the individual orders.   CBC with platelets and differential   Result Value Ref Range    WBC Count 0.2 (LL) 4.0 - 11.0 10e3/uL    RBC Count 2.22 (L) 4.40 - 5.90 10e6/uL    Hemoglobin 6.5 (LL) 13.3 - 17.7 g/dL    Hematocrit 19.3 (L) 40.0 - 53.0 %    MCV 87 78 - 100 fL    MCH 29.3 26.5 - 33.0 pg    MCHC 33.7 31.5 - 36.5 g/dL    RDW 13.2 10.0 - 15.0 %    Platelet Count 12 (LL) 150 - 450 10e3/uL   CONDITIONAL Prepare red blood cells (unit)   Result Value Ref Range    Blood Component Type Red Blood Cells     Product Code U8247P10     Unit Status Transfused     Unit Number [id number]     CROSSMATCH Compatible     CODING SYSTEM [id number]     ISSUE DATE AND TIME 36462496292244     UNIT ABO/RH O+     UNIT TYPE ISBT 5100    Phosphorus   Result Value Ref Range    Phosphorus 1.7 (L) 2.5 - 4.5 mg/dL   CBC with platelets differential    Narrative    The following orders were created for panel order CBC with platelets differential.  Procedure                               Abnormality         Status                     ---------                               -----------         ------                     CBC with platelets and d...[[id number]]  Abnormal            Final result               RBC and Platelet Morphology[[id number]]                      Final result                 Please view results for these tests on the individual orders.   Comprehensive metabolic panel   Result Value Ref Range    Sodium 133 (L) 136 - 145 mmol/L    Potassium 3.7 3.4 - 5.3 mmol/L    Carbon  Dioxide (CO2) 19 (L) 22 - 29 mmol/L    Anion Gap 9 7 - 15 mmol/L    Urea Nitrogen 11.0 8.0 - 23.0 mg/dL    Creatinine 0.81 0.67 - 1.17 mg/dL    GFR Estimate >90 >60 mL/min/1.73m2    Calcium 7.2 (L) 8.8 - 10.2 mg/dL    Chloride 105 98 - 107 mmol/L    Glucose 109 (H) 70 - 99 mg/dL    Alkaline Phosphatase 46 40 - 129 U/L    AST 44 0 - 45 U/L    ALT 38 0 - 70 U/L    Protein Total 6.0 (L) 6.4 - 8.3 g/dL    Albumin 2.7 (L) 3.5 - 5.2 g/dL    Bilirubin Total 0.8 <=1.2 mg/dL   CBC with platelets and differential   Result Value Ref Range    WBC Count 0.2 (LL) 4.0 - 11.0 10e3/uL    RBC Count 2.80 (L) 4.40 - 5.90 10e6/uL    Hemoglobin 8.1 (L) 13.3 - 17.7 g/dL    Hematocrit 23.6 (L) 40.0 - 53.0 %    MCV 84 78 - 100 fL    MCH 28.9 26.5 - 33.0 pg    MCHC 34.3 31.5 - 36.5 g/dL    RDW 13.6 10.0 - 15.0 %    Platelet Count 9 (LL) 150 - 450 10e3/uL   RBC and Platelet Morphology   Result Value Ref Range    Platelet Assessment  Automated Count Confirmed. Platelet morphology is normal.     Automated Count Confirmed. Platelet morphology is normal.    RBC Morphology Confirmed RBC Indices    CONDITIONAL Prepare pheresed platelets (unit)   Result Value Ref Range    Blood Component Type Platelets     Product Code [id number]     Unit Status Transfused     Unit Number [id number]     CODING SYSTEM QSPO021     ISSUE DATE AND TIME [id number]     UNIT ABO/RH A+     UNIT TYPE ISBT 6200    Phosphorus   Result Value Ref Range    Phosphorus 1.4 (L) 2.5 - 4.5 mg/dL   Ionized Calcium   Result Value Ref Range    Calcium Ionized Whole Blood 4.1 (L) 4.4 - 5.2 mg/dL   Lactic Acid STAT   Result Value Ref Range    Lactic Acid 1.1 0.7 - 2.0 mmol/L   CT Abdomen Pelvis w/o Contrast    Narrative    EXAMINATION: CT ABDOMEN PELVIS W/O CONTRAST 9/26/2023 12:37 PM      CLINICAL HISTORY: 67 yo male with AML, diarrhea (c diff neg),  neutropenic fevers persisting.    COMPARISON: Chest CT 9/24/2023.    PROCEDURE COMMENTS: CT of the abdomen was performed without the  use of  intravenous contrast. Coronal and sagittal reformatted images were  obtained.    FINDINGS:    LOWER THORAX: Reticulonodular attenuation in the posterior lung bases,  grossly unchanged from chest CT 9/24/2023. No pericardial effusion.  Dense mitral annular calcifications. Small hiatal hernia  circumferential distal esophageal wall thickening (series 5, image  13).    LIVER: No focal liver mass.    BILIARY: No intra or extrahepatic biliary dilation. No cholelithiasis.    PANCREAS: Within normal limits.    SPLEEN: Within normal limits.    ADRENAL GLANDS: Within normal limits.    URINARY TRACT: No evidence for focal renal mass. No hydronephrosis.  Mild perinephric fat stranding bilaterally.    REPRODUCTIVE ORGANS: Small fat-containing left inguinal hernia. Coarse  prostate calcifications.    STOMACH: Small hiatal hernia. No abnormal wall thickening.    BOWEL: Normal caliber of the small and large bowel. Appendix is within  normal limits. Small periampullary duodenal diverticulum.    PERITONEUM/FLUID: Tiny amount of pelvic free fluid.    VESSELS: Calcific atherosclerotic plaques of the large vessels. No  aneurysmal dilatation of the abdominal aorta.    LYMPH NODES: Prominent gastrohepatic lymph node measuring 11 mm  (series 5, image 44).     BONES/SOFT TISSUES: Chronic-appearing superior endplate compression  deformity at T12. Degenerative changes at the spinous processes of L3  and L4. Well-healed posterior rib fractures of the right 10th and 11th  ribs. Severe degenerative changes at the pubic symphysis. No  aggressive osseous lesions.        Impression    IMPRESSION:  1. Reticulonodular attenuation in the lung bases, suspicious for  infection or aspiration.  2. Distal esophageal wall thickening with adjacent borderline enlarged  gastrohepatic lymph node. This may represent esophagitis although  endoscopy should be considered to exclude neoplasm.  3. Chronic incidental findings as above.    I have personally  reviewed the examination and initial interpretation  and I agree with the findings.    ANGELLA RUSSELL MD         SYSTEM ID:  [id number]

## 2023-09-26 NOTE — PLAN OF CARE
Goal Outcome Evaluation:      Plan of Care Reviewed With: patient    Overall Patient Progress: no change    T-max 101.9, Tylenol given. 1 unit RBC given for Hgb 6.5, tolerated well. Phosphorus 1.7, IV replacement given. Re-check at 0800. Loose BM x1. Voiding spontaneously. Up assist x1 with GB and walker. Continue w/ POC.

## 2023-09-27 ENCOUNTER — APPOINTMENT (OUTPATIENT)
Dept: PHYSICAL THERAPY | Facility: CLINIC | Age: 68
DRG: 835 | End: 2023-09-27
Attending: INTERNAL MEDICINE
Payer: COMMERCIAL

## 2023-09-27 ENCOUNTER — PRE VISIT (OUTPATIENT)
Dept: ONCOLOGY | Facility: CLINIC | Age: 68
End: 2023-09-27
Payer: COMMERCIAL

## 2023-09-27 LAB
ALBUMIN SERPL BCG-MCNC: 2.5 G/DL (ref 3.5–5.2)
ALBUMIN SERPL BCG-MCNC: 2.8 G/DL (ref 3.5–5.2)
ALP SERPL-CCNC: 44 U/L (ref 40–129)
ALP SERPL-CCNC: 49 U/L (ref 40–129)
ALT SERPL W P-5'-P-CCNC: 52 U/L (ref 0–70)
ALT SERPL W P-5'-P-CCNC: 61 U/L (ref 0–70)
ANION GAP SERPL CALCULATED.3IONS-SCNC: 10 MMOL/L (ref 7–15)
ANION GAP SERPL CALCULATED.3IONS-SCNC: 14 MMOL/L (ref 7–15)
AST SERPL W P-5'-P-CCNC: 63 U/L (ref 0–45)
AST SERPL W P-5'-P-CCNC: 69 U/L (ref 0–45)
BACTERIA BLD CULT: NO GROWTH
BACTERIA BLD CULT: NO GROWTH
BILIRUB SERPL-MCNC: 0.6 MG/DL
BILIRUB SERPL-MCNC: 0.6 MG/DL
BKR LAB AP TR RXN INTERPRETATION: NORMAL
BKR LAB AP TRAN RXN RECOMMENDATION: NORMAL
BKR LAB AP TRANS SIGNS AND SX: NORMAL
BKR LAB AP TRANSFUSION REACTION: NORMAL
BLD PROD TYP BPU: NORMAL
BLOOD COMPONENT TYPE: NORMAL
BUN SERPL-MCNC: 10.1 MG/DL (ref 8–23)
BUN SERPL-MCNC: 10.7 MG/DL (ref 8–23)
CA-I BLD-MCNC: 3.8 MG/DL (ref 4.4–5.2)
CALCIUM SERPL-MCNC: 6.9 MG/DL (ref 8.8–10.2)
CALCIUM SERPL-MCNC: 7.3 MG/DL (ref 8.8–10.2)
CHLORIDE SERPL-SCNC: 102 MMOL/L (ref 98–107)
CHLORIDE SERPL-SCNC: 106 MMOL/L (ref 98–107)
CMV DNA SPEC NAA+PROBE-ACNC: NOT DETECTED IU/ML
CODING SYSTEM: NORMAL
CREAT SERPL-MCNC: 0.84 MG/DL (ref 0.67–1.17)
CREAT SERPL-MCNC: 0.85 MG/DL (ref 0.67–1.17)
CROSSMATCH: NORMAL
DEPRECATED HCO3 PLAS-SCNC: 16 MMOL/L (ref 22–29)
DEPRECATED HCO3 PLAS-SCNC: 18 MMOL/L (ref 22–29)
EGFRCR SERPLBLD CKD-EPI 2021: >90 ML/MIN/1.73M2
EGFRCR SERPLBLD CKD-EPI 2021: >90 ML/MIN/1.73M2
ERYTHROCYTE [DISTWIDTH] IN BLOOD BY AUTOMATED COUNT: 13.7 % (ref 10–15)
GLUCOSE SERPL-MCNC: 107 MG/DL (ref 70–99)
GLUCOSE SERPL-MCNC: 197 MG/DL (ref 70–99)
HCT VFR BLD AUTO: 19.2 % (ref 40–53)
HGB BLD-MCNC: 6.5 G/DL (ref 13.3–17.7)
ISSUE DATE AND TIME: NORMAL
LACTATE SERPL-SCNC: 1 MMOL/L (ref 0.7–2)
MAGNESIUM SERPL-MCNC: 1.9 MG/DL (ref 1.7–2.3)
MCH RBC QN AUTO: 29.3 PG (ref 26.5–33)
MCHC RBC AUTO-ENTMCNC: 33.9 G/DL (ref 31.5–36.5)
MCV RBC AUTO: 87 FL (ref 78–100)
PATH REPORT.COMMENTS IMP SPEC: NORMAL
PATH REPORT.COMMENTS IMP SPEC: NORMAL
PHOSPHATE SERPL-MCNC: 1.3 MG/DL (ref 2.5–4.5)
PHOSPHATE SERPL-MCNC: 1.4 MG/DL (ref 2.5–4.5)
PHOSPHATE SERPL-MCNC: 10.6 MG/DL (ref 2.5–4.5)
PLAT MORPH BLD: NORMAL
PLATELET # BLD AUTO: 18 10E3/UL (ref 150–450)
POTASSIUM SERPL-SCNC: 3.4 MMOL/L (ref 3.4–5.3)
POTASSIUM SERPL-SCNC: 3.8 MMOL/L (ref 3.4–5.3)
POTASSIUM SERPL-SCNC: 5.8 MMOL/L (ref 3.4–5.3)
POTASSIUM SERPL-SCNC: 7.7 MMOL/L (ref 3.4–5.3)
PROT SERPL-MCNC: 5.4 G/DL (ref 6.4–8.3)
PROT SERPL-MCNC: 6.1 G/DL (ref 6.4–8.3)
RBC # BLD AUTO: 2.22 10E6/UL (ref 4.4–5.9)
RBC MORPH BLD: NORMAL
SA 2 CELL: NORMAL
SA 2 TEST METHOD: NORMAL
SA2 HI RISK ABY: NORMAL
SA2 MOD RISK ABY: NORMAL
SCR 1 TEST METHOD: NORMAL
SCR1 CELL: NORMAL
SCR1 RESULT: NORMAL
SCR2 CELL: NORMAL
SCR2 RESULT: NORMAL
SCR2 TEST METHOD: NORMAL
SODIUM SERPL-SCNC: 132 MMOL/L (ref 135–145)
SODIUM SERPL-SCNC: 134 MMOL/L (ref 135–145)
UNIT ABO/RH: NORMAL
UNIT NUMBER: NORMAL
UNIT STATUS: NORMAL
UNIT TYPE ISBT: 5100
WBC # BLD AUTO: 0.4 10E3/UL (ref 4–11)
ZZZSA 2 COMMENTS: NORMAL
ZZZSCR1 COMMENTS: NORMAL
ZZZSCR2 COMMENTS: NORMAL

## 2023-09-27 PROCEDURE — 99231 SBSQ HOSP IP/OBS SF/LOW 25: CPT | Performed by: INTERNAL MEDICINE

## 2023-09-27 PROCEDURE — P9016 RBC LEUKOCYTES REDUCED: HCPCS | Performed by: PHYSICIAN ASSISTANT

## 2023-09-27 PROCEDURE — 250N000011 HC RX IP 250 OP 636: Mod: JZ | Performed by: PHYSICIAN ASSISTANT

## 2023-09-27 PROCEDURE — 82330 ASSAY OF CALCIUM: CPT | Performed by: PHYSICIAN ASSISTANT

## 2023-09-27 PROCEDURE — 84100 ASSAY OF PHOSPHORUS: CPT | Performed by: INTERNAL MEDICINE

## 2023-09-27 PROCEDURE — 250N000013 HC RX MED GY IP 250 OP 250 PS 637: Performed by: STUDENT IN AN ORGANIZED HEALTH CARE EDUCATION/TRAINING PROGRAM

## 2023-09-27 PROCEDURE — 258N000003 HC RX IP 258 OP 636: Performed by: INTERNAL MEDICINE

## 2023-09-27 PROCEDURE — 250N000009 HC RX 250: Performed by: STUDENT IN AN ORGANIZED HEALTH CARE EDUCATION/TRAINING PROGRAM

## 2023-09-27 PROCEDURE — 84155 ASSAY OF PROTEIN SERUM: CPT | Performed by: PHYSICIAN ASSISTANT

## 2023-09-27 PROCEDURE — 87040 BLOOD CULTURE FOR BACTERIA: CPT | Performed by: STUDENT IN AN ORGANIZED HEALTH CARE EDUCATION/TRAINING PROGRAM

## 2023-09-27 PROCEDURE — 36415 COLL VENOUS BLD VENIPUNCTURE: CPT | Performed by: STUDENT IN AN ORGANIZED HEALTH CARE EDUCATION/TRAINING PROGRAM

## 2023-09-27 PROCEDURE — 250N000013 HC RX MED GY IP 250 OP 250 PS 637

## 2023-09-27 PROCEDURE — 258N000003 HC RX IP 258 OP 636: Performed by: STUDENT IN AN ORGANIZED HEALTH CARE EDUCATION/TRAINING PROGRAM

## 2023-09-27 PROCEDURE — 97116 GAIT TRAINING THERAPY: CPT | Mod: GP | Performed by: REHABILITATION PRACTITIONER

## 2023-09-27 PROCEDURE — 80053 COMPREHEN METABOLIC PANEL: CPT | Performed by: PHYSICIAN ASSISTANT

## 2023-09-27 PROCEDURE — 250N000011 HC RX IP 250 OP 636: Mod: JZ

## 2023-09-27 PROCEDURE — 250N000013 HC RX MED GY IP 250 OP 250 PS 637: Performed by: PHYSICIAN ASSISTANT

## 2023-09-27 PROCEDURE — 250N000013 HC RX MED GY IP 250 OP 250 PS 637: Performed by: INTERNAL MEDICINE

## 2023-09-27 PROCEDURE — 250N000013 HC RX MED GY IP 250 OP 250 PS 637: Performed by: HOSPITALIST

## 2023-09-27 PROCEDURE — 84132 ASSAY OF SERUM POTASSIUM: CPT | Performed by: INTERNAL MEDICINE

## 2023-09-27 PROCEDURE — 97110 THERAPEUTIC EXERCISES: CPT | Mod: GP | Performed by: REHABILITATION PRACTITIONER

## 2023-09-27 PROCEDURE — 258N000003 HC RX IP 258 OP 636: Performed by: PHYSICIAN ASSISTANT

## 2023-09-27 PROCEDURE — 83735 ASSAY OF MAGNESIUM: CPT | Performed by: STUDENT IN AN ORGANIZED HEALTH CARE EDUCATION/TRAINING PROGRAM

## 2023-09-27 PROCEDURE — 97530 THERAPEUTIC ACTIVITIES: CPT | Mod: GP | Performed by: REHABILITATION PRACTITIONER

## 2023-09-27 PROCEDURE — 84100 ASSAY OF PHOSPHORUS: CPT | Performed by: STUDENT IN AN ORGANIZED HEALTH CARE EDUCATION/TRAINING PROGRAM

## 2023-09-27 PROCEDURE — 85027 COMPLETE CBC AUTOMATED: CPT | Performed by: PHYSICIAN ASSISTANT

## 2023-09-27 PROCEDURE — 120N000002 HC R&B MED SURG/OB UMMC

## 2023-09-27 PROCEDURE — 83605 ASSAY OF LACTIC ACID: CPT | Performed by: STUDENT IN AN ORGANIZED HEALTH CARE EDUCATION/TRAINING PROGRAM

## 2023-09-27 PROCEDURE — 250N000009 HC RX 250: Performed by: INTERNAL MEDICINE

## 2023-09-27 RX ORDER — TRIAMCINOLONE ACETONIDE 1 MG/G
OINTMENT TOPICAL 2 TIMES DAILY
Status: DISCONTINUED | OUTPATIENT
Start: 2023-09-27 | End: 2023-10-03 | Stop reason: HOSPADM

## 2023-09-27 RX ORDER — FUROSEMIDE 10 MG/ML
20 INJECTION INTRAMUSCULAR; INTRAVENOUS ONCE
Status: COMPLETED | OUTPATIENT
Start: 2023-09-27 | End: 2023-09-27

## 2023-09-27 RX ORDER — POTASSIUM CHLORIDE 750 MG/1
40 TABLET, EXTENDED RELEASE ORAL ONCE
Status: COMPLETED | OUTPATIENT
Start: 2023-09-27 | End: 2023-09-27

## 2023-09-27 RX ADMIN — DAPTOMYCIN 500 MG: 500 INJECTION, POWDER, LYOPHILIZED, FOR SOLUTION INTRAVENOUS at 17:36

## 2023-09-27 RX ADMIN — AZTREONAM 1 G: 1 INJECTION, POWDER, LYOPHILIZED, FOR SOLUTION INTRAMUSCULAR; INTRAVENOUS at 21:58

## 2023-09-27 RX ADMIN — ACYCLOVIR 400 MG: 400 TABLET ORAL at 08:35

## 2023-09-27 RX ADMIN — BENZONATATE 100 MG: 100 CAPSULE ORAL at 23:34

## 2023-09-27 RX ADMIN — CALCIUM CARBONATE (ANTACID) CHEW TAB 500 MG 500 MG: 500 CHEW TAB at 20:47

## 2023-09-27 RX ADMIN — METRONIDAZOLE 500 MG: 500 INJECTION, SOLUTION INTRAVENOUS at 16:18

## 2023-09-27 RX ADMIN — SODIUM CHLORIDE, PRESERVATIVE FREE 5 ML: 5 INJECTION INTRAVENOUS at 11:46

## 2023-09-27 RX ADMIN — POTASSIUM PHOSPHATE, MONOBASIC POTASSIUM PHOSPHATE, DIBASIC 9 MMOL: 224; 236 INJECTION, SOLUTION, CONCENTRATE INTRAVENOUS at 04:03

## 2023-09-27 RX ADMIN — CALCIUM CARBONATE (ANTACID) CHEW TAB 500 MG 500 MG: 500 CHEW TAB at 08:35

## 2023-09-27 RX ADMIN — POTASSIUM PHOSPHATE, MONOBASIC POTASSIUM PHOSPHATE, DIBASIC 9 MMOL: 224; 236 INJECTION, SOLUTION, CONCENTRATE INTRAVENOUS at 14:15

## 2023-09-27 RX ADMIN — AZTREONAM 1 G: 1 INJECTION, POWDER, LYOPHILIZED, FOR SOLUTION INTRAMUSCULAR; INTRAVENOUS at 14:16

## 2023-09-27 RX ADMIN — ACYCLOVIR 400 MG: 400 TABLET ORAL at 20:47

## 2023-09-27 RX ADMIN — METRONIDAZOLE 500 MG: 500 INJECTION, SOLUTION INTRAVENOUS at 01:50

## 2023-09-27 RX ADMIN — AZTREONAM 1 G: 1 INJECTION, POWDER, LYOPHILIZED, FOR SOLUTION INTRAMUSCULAR; INTRAVENOUS at 05:18

## 2023-09-27 RX ADMIN — POTASSIUM PHOSPHATE, MONOBASIC POTASSIUM PHOSPHATE, DIBASIC 9 MMOL: 224; 236 INJECTION, SOLUTION, CONCENTRATE INTRAVENOUS at 23:35

## 2023-09-27 RX ADMIN — VORICONAZOLE 200 MG: 200 TABLET ORAL at 20:47

## 2023-09-27 RX ADMIN — METRONIDAZOLE 500 MG: 500 INJECTION, SOLUTION INTRAVENOUS at 08:39

## 2023-09-27 RX ADMIN — VORICONAZOLE 200 MG: 200 TABLET ORAL at 08:35

## 2023-09-27 RX ADMIN — POTASSIUM PHOSPHATE, MONOBASIC POTASSIUM PHOSPHATE, DIBASIC 9 MMOL: 224; 236 INJECTION, SOLUTION, CONCENTRATE INTRAVENOUS at 01:52

## 2023-09-27 RX ADMIN — POTASSIUM PHOSPHATE, MONOBASIC POTASSIUM PHOSPHATE, DIBASIC 9 MMOL: 224; 236 INJECTION, SOLUTION, CONCENTRATE INTRAVENOUS at 11:01

## 2023-09-27 RX ADMIN — POTASSIUM CHLORIDE 40 MEQ: 750 TABLET, EXTENDED RELEASE ORAL at 11:03

## 2023-09-27 RX ADMIN — HYDROXYZINE HYDROCHLORIDE 50 MG: 25 TABLET, FILM COATED ORAL at 20:47

## 2023-09-27 RX ADMIN — CETIRIZINE HYDROCHLORIDE 10 MG: 10 TABLET, FILM COATED ORAL at 08:35

## 2023-09-27 RX ADMIN — POTASSIUM PHOSPHATE, MONOBASIC POTASSIUM PHOSPHATE, DIBASIC 9 MMOL: 224; 236 INJECTION, SOLUTION, CONCENTRATE INTRAVENOUS at 20:47

## 2023-09-27 RX ADMIN — FUROSEMIDE 20 MG: 10 INJECTION, SOLUTION INTRAVENOUS at 12:22

## 2023-09-27 ASSESSMENT — ACTIVITIES OF DAILY LIVING (ADL)
ADLS_ACUITY_SCORE: 30

## 2023-09-27 NOTE — PROGRESS NOTES
[facility]    Hematology / Oncology Progress Note    Patient: Artie Fine  MRN: [id number]  Admission Date: 9/1/2023  Date of Service (when I saw the patient): 09/27/2023  Hospital Day # 26     Assessment & Plan   Artie Fine is a 68 year old male with a history of brain abscess (2021), HTN, and BPH. He was transferred from [facility] in [place], [place] to [facility] for work up of pancytopenia; he was noted to have circulating blasts concerning for acute leukemia, and was subsequently transferred to [facility]. He underwent a diagnostic bone marrow biopsy on 9/1/23 which confirmed a new diagnosis of AML. He was started on induction with 7+3 (C1D1=9/2/23). Hospital course has been complicated by rhinovirus infection, recurrent neutropenic fever,  S. mitis bacteremia, and S. epidermidis UTI.     Today:  - Day 26 from 7+3 induction chemotherapy  - Last documented fever to 100.5 F, 9/25 PM  - Under ID direction, antibiotics transitioned to aztreonam, daptomycin, Flagyl entheses 9/26)  - Bcx 9/20/23 + streptococcus mitis (intermediate to penicillins, otherwise pansensitive)  - UCx + s. Epidermis (MRSE, noted susceptible to doxy)  - Chest CT showing basilar reticular opacities; indeterminate, possible atypical infection vs. scarring/atelectasis.  - CT abdomen pelvis (WO contrast due to patient concern for allergy); noting distal esophageal wall thickening with adjacent borderline enlarged gastrohepatic lymph node   - Skin rash somewhat worse today, spreading to the anterior bilateral forearms (from level of elbow yesterday).  Denies itch, pain, open wound or lesions.  Dermatology evaluated yesterday, consistent with morbilliform eruption likely component of toxic erythema of chemotherapy.  - EBV, CMV, HHV PCR added per Derm recommendations triamcinolone 0.1% ointment twice daily to affected area added  - Hydrocortisone 2.5% cream twice daily to face added  - Continue  to monitor with daily CBC/differential and CMP to monitor for progression to DIHS/DRESS  - RUE US negative for thrombosis/DVT  - Generalized edema today involving bilateral upper and lower extremities; weight increased 10 pounds when compared with yesterday.  Likely in setting of fluids (antibiotics), hypoalbuminemia, and third spacing.  Has responded to Lasix diuresis well in the past.    - 20 mg IV Lasix ordered today.    - Strict I/O's and daily weights.  - Calcium low at 7.3; when corrected for hypoalbuminemia, ionized calcium 4.1.  - Calcium carbonate BID and will continue to monitor  - Receiving 1 unit pRBCs for AM labs hgb 6.5  - Will need end of cycle bone marrow biopsy when evidence of count recovery      HEME/ONC  # AML, adverse risk  Patient initially presented to [facility] in [place], [place] with 4-6 weeks of progressive bruising, weakness, fatigue, loss of appetite, and night sweats. He also noted a headache similar to his previous brain abscess. CBC notable for pancytopenia; WBC 2.7 (), Hgb 8.1, and plt 1k. He was transferred to [facility] and was found on peripheral flow w/ 11% circulating myeloid blasts. He was then transferred to [facility] for further work up and treatment of AML. Diagnostic BMBx performed 9/1 which showed AML with 30% blasts by morph and 47% by flow. P53 negative. FISH negative for MLLT10, NUP98, and KMT2A. Normal karyotype. NGS showed ASXL1, CEBPA, SRSF2, and STAG2 mutations. Started 7+3 (C1D1=9/2/23).   - Baseline TTE showing normal LVEF of 55-60%. Baseline EKG showed NSR and QTc of 434.   - Viral serologies: HepB/C-, HIV-. HSV1/2+, EBV IgG+, CMV IgG+  - PICC placed 9/1  - S/p diagnostic LP 9/8/23 for CNS leukemia work up as patient endorses headache upon admission w/ new AML diagnosis. CSF flow negative. MRI brain 9/11 negative, only showing chronic changes from h/o brain abscess.   - D14 BMBx w/o evidence of leukemia.   - HLA typing ordered. S/p BMT NT 9/22 with Dr. Levy  Wagner. Starting donor search. Plan for JIMMY in CR1.   - Will need end of cycle bone marrow biopsy when evidence of count recovery                  Treatment Plan: 7+3 (C1D1=9/2/23)                - Cytarabine 100 mg/m2 - D1-D7               - Daunorubicin 60 mg/m2 - D1-D3               - Supportive meds: Decadron 12 mg D1-D3, Zofran     # Low risk for TLS/DIC  - S/p allopurinol  - Montior TLS/DIC labs twice weekly     ID  # Neutropenic fever  # BCx 9/20 1/2 positive for strep mitis  # UCx 9/20 with 10-50k MRSE  # Diarrhea, resolved  Tmax 102.7 on 9/20 evening. Pt has been having cold symptoms as below in the setting of positive rhinovirus on 9/16. Cold symptoms on improving trajectory at time of new fever. Pt had diarrhea around time of fever onset, but had recently received MiraLax for constipation. Diarrhea persisted after MiraLax effect waned. Overnight 9/21-9/22, pt was too weak to return to bed from bathroom, called RN to assist.  Otherwise no localizing signs/symptoms of infection.  - Work up:   - BCx 9/20 1/2 positive for strep mitis, pan-sensitive  - BCx 9/21-9/23 NGTD  - UCx 9/20 grew 10-50k staph epi in the setting of bland UA and no urinary symptoms. Oxacillin resistant.   - CXR unremarkable  - C diff negative  - CT chest indeterminate  - CT abdomen pelvis with reticulonodular attenuation in lung bases, suspicious for infection versus aspiration, distal esophageal wall thickening with adjacent borderline enlarged gastrohepatic lymph node, digestive esophagitis though endoscopy recommended to exclude neoplasm  - ID consulted 9/26 in setting of persisting neutropenic fevers on antibiotics, appreciate input  - Discontinue cefepime and doxycycline (9/26)  - Start aztreonam, Flagyl, daptomycin (9/26)  - CK 9/26 wnl, monitor with weekly labs while on daptomycin  - Antibiotics:   - Cefepime 2 g q 8hr x9/20-9/26/23  - S/p vancomycin 9/22-9/23, discontinued once BCx susceptibilities resulted & d/t diffuse red  rash  - Doxycycline 100 mg BID 9/23-9/26/23, started for possible MRSE UTI and empirically for atypical pneumonia  - Aztreonam 1g q8h 9/26-X  - Flagyl 500 mg q8h 9/26-X  - Daptomycin 6 mg/kg/day 9/26-X    # Rhinovirus, improved  # Post nasal drip, improving  # Cough, improving  Nasal congestion and post nasal drip 9/15 morning upon waking. H/o seasonal allergies. On 9/16, noted development of productive cough and worsening nasal congestion and post-nasal drip. One episode of blood-tinged sputum, now resolved. COVID/flu/RSV negative, but RVP positive for rhinovirus. Although new neutropenic fever noted 9/20, URI symptoms are improving.   - Continue PTA cetirizine  - Supportive: Tessalon Perles, Mucinex DM, Hurricaine spray    # Immunosuppressed 2/2 malignancy and chemotherapy  # ID PPX  -  mg BID  - Levofloxacin 250 mg daily - on hold with treatment dose antibiotics as above  - Voriconazole 200 mg BID x9/10     # H/o brain abscess (2021)  # Headache, resolved  Treated in [place]. S/p craniotomy for drainage of abscess July 2021. It is unclear what culture speciated to, but he completed a course of antibiotics with ceftriaxone then meropenem with resolution on imaging. Head CT 8/31 at OSH revealed no intracranial hemorrhage midline shift or mass effect. Postsurgical changes noted. Previous area of infection demonstrates trace amount of encephalomalacia but no mass effect or inflammatory changes to suggest new or acute infection. Otherwise unremarkable. Patient presented initially with headache and dizziness, which has now resolved.   - See work up above for CNS leukemia work-up, which was negative     GI  # Esophageal wall thickening, seen on CT  CT abd/pelvis 9/26 obtained in setting of recurrent neutropenic fevers, strep mitis bacteremia.  On scan, noted distal esophageal wall thickening with adjacent borderline enlarged gastrohepatic lymph node, digestive esophagitis though endoscopy recommended to exclude  neoplasm  - Given current pancytopenia s/p induction chemotherapy, concern for significant risk with endoscopy at this time.    - We will continue to monitor and defer for follow-up with repeat imaging/endoscopy when appropriate    CHRONIC  # HTN  Noted during prior hospitalization for brain abscess in 2021. Previously on lisinopril, which he is no longer taking.  - Patient BP's have fluctuated this admission, w/occasionally elevated BPs. Continue to monitor.     # BPH  Notes difficulty with complete emptying of bladder. Previously on Flomax but states it did nothing, so he stopped it. He does note frequent urination at baseline, currently urination remains unchanged from baseline.  - Monitor clinically     MISC  # Mobiliform rash  On 9/23 AM, pt noted to have ocular papular rash on upper thighs and trunk.  Likely drug rash? Patient endorsing mild pruritis to back rash, denies concerning symptoms; no evidence of angioedema, denies SOB.  Vancomycin discontinued 9/23 with concern this may be attributing.  - Benadryl and kenalog cream PRN  - Continue to monitor for improvement s/p discontinued vancomycin.  - Rash worsening as of 9/26/2023 - likely drug rash in setting of antibiotics vs chemotherapy vs other  - In discussion with Dr. Levine, given worsening of rash, dermatology consulted; appreciate recs  - Consistent with morbilliform eruption with likely component of toxic erythema of chemotherapy  - EBV, CMV, HHV PCR added per Derm recommendations triamcinolone 0.1% ointment twice daily to affected area added  - Hydrocortisone 2.5% cream twice daily to face added  - Continue to monitor with daily CBC/differential and CMP to monitor for progression to DIHS/DRESS    # RUE edema  Noted ~9/25, unilateral RUE 1-2+ nonpitting edema. Denies pain associated.  On exam, edema right upper extremity noted, diffuse skin rash expanding from trunk/chest into bilateral upper extremities, though edema does not seem to be associated  "with the rash.  Nontender to palpation.  Range of motion intact at joint lines of upper extremity.  No pustular, vesicular lesions, open wounds, or dehiscence.  PICC in place to right upper extremity, functioning appropriately, nontender at site, and dressing is clean dry intact.  - US negative for DVT    # Bilateral lower and upper extremity edema  Noted generalized edema bilateral upper and lower extremities on exam 9/27/2023.  Noted weight increase of approximately 10 pounds since prior day.  Likely third spacing in setting of hypoalbuminemia.  Not currently on IV fluids, though noted IV antibiotics administered with fluids.  Had previously been diuresed during admission, response to Lasix.  - Lymphedema consult placed for compression wraps  - Lasix 20 mg IV x1    # Weakness  # Deconditioning  Pt reports issues w/ balance after his brain abscess in 2021, exacerbated when ambulating. Deconditioning acute-on-chronic upon admission since 2021.   - PT consulted    # Episodes of Hypotension   # Dizziness  # Fall risk  Per chart review, patient had \"near fall\" on 9/24/23 when he attempted to get up from bed independently to ambulate to the restroom. He has been reported intermittent episodes of dizziness since admission, however, he notices this has increased in the past few days. He is also experiencing hypotensive episodes, requiring 1.5 L fluid support on 9/24-9/25.  - Feeling improved AM 9/26  - Continue to monitor closely  - Consider IVF bolus prn    # Weight loss  # Loss of appetite  # Moderate malnutrition in the context of acute illness   Patient presented with loss of appetite 2/2 malignancy. He has been gradually losing weight throughout admission, although PO intake is overall sufficient.   - RD consulted  - Ensure BID between meals and PRN    # Hyponatremia  # Hypophosphatemia  - Replete per protocol    # Hypocalcemia  Noted hypocalcemia trends on labs and downtrending 9/24. Ionized calcium low at 4.1 when " "corrected for hypoalbuminemia.   - start calcium carbonate BID  - continue to monitor with daily labs     Clinically Significant Risk Factors        # Hyperkalemia: Highest K = 5.8 mmol/L in last 2 days, will monitor as appropriate   # Hypocalcemia: Lowest iCa = 3.8 mg/dL in last 2 days, will monitor and replace as appropriate     # Hypoalbuminemia: Lowest albumin = 2.5 g/dL at 9/27/2023  5:17 AM, will monitor as appropriate  # Coagulation Defect: INR = 1.26 (Ref range: 0.85 - 1.15) and/or PTT = 37 Seconds (Ref range: 22 - 38 Seconds), will monitor for bleeding    # Thrombocytopenia: Lowest platelets = 9 in last 2 days, will monitor for bleeding          # Overweight: Estimated body mass index is 29.41 kg/m  as calculated from the following:    Height as of this encounter: 1.74 m (5' 8.5\").    Weight as of this encounter: 89 kg (196 lb 4.8 oz).       # Moderate Malnutrition: based on nutrition assessment           FEN  Diet: Regular Diet Adult   IVF: Bolus PRN   Lytes: Replete per protocol     PPX  VTE: None given thrombocytopenia  Bowel: Senna/MiraLax PRN  GI/PUD: None currently indicated     MISC  Code Status: Full Code   Lines/Drains: PICC  Dispo: Will remain inpatient through count recovery with safe discharge plan outpatient.   Follow Up: Pt will follow with Dr. Garcia, appointment scheduled for end of September. Pt would prefer labs and blood transfusions done closer to home in [facility].    Patient was seen and plan of care was discussed with attending physician Dr. Erickson    I spent >55 minutes face-to-face or coordinating care of Artie Fine. Over 50% of our time on the unit was spent counseling the patient and coordinating care.    Sheree Chowdhury PA-C  Hematology/Oncology  Pager #[phone]    Interval History   Nursing notes reviewed, fever noted overnight. DONNA Alva is seen sitting up in recliner today. He notes he was feeling okay this morning when waking \"I thought today was going to be the day\" but now " since sitting up in feeling more tired and weak. He notes the skin rash looks worse to him today, but is not painful or itching. He also notes swelling of bilateral upper and lower extremities today. We discussed lasix today and lymphedema compression wraps, to which he is agreeable.  Otherwise feeling okay today.  Runny nose/congestion and cough continues to improve.  Denies chest pain, shortness of breath, abdominal pain, nausea, vomiting, urinary symptoms, headache, numbness tingling weakness, dizziness, skin/mouth sores.  We reviewed morning labs, antibiotic changed yesterday as directed by infectious disease, continued monitoring, transfusions as needed with plan for 1 unit PRBCs today.  He is agreeable with this plan. Questions addressed at bedside.      Vital Signs with Ranges  Temp:  [98.3  F (36.8  C)-100.5  F (38.1  C)] 98.9  F (37.2  C)  Pulse:  [] 88  Resp:  [16-20] 16  BP: (114-129)/(53-66) 124/56  SpO2:  [95 %-100 %] 96 %  I/O last 3 completed shifts:  In: 1452 [P.O.:880; I.V.:350]  Out: 1600 [Urine:1600]    Physical Exam     Constitutional: Awake and conversational. Non- toxic appearing. No acute distress.   HEENT: Normocephalic. Moist mucus membranes without lesions, thrush, or exudates appreciated  Lymph: Neck supple, no ridigity. No significant adenopathy noted.   Respiratory: Breathing comfortably on room air with no accessory muscle use. Speaking in full sentences, no evidence of respiratory distress.  Breath sounds diminished bilateral bases, otherwise clear to auscultation without stridor, wheeze, rhonchi, or rales.   Cardiovascular: Regular rate and rhythm. 2+ radial pulses bilaterally.  1-2+ nonpitting edema bilateral upper and lower extremities.    GI: Abdomen with normoactive bowel sounds, soft and non-tender throughout. No rebound, guarding, or peritoneal sign.   Skin: Skin is clean, dry, intact.  Diffuse maculopapular rash over trunk including chest, back, abdomen, extending up  towards neck, and proximal bilateral upper and lower extremities.  No open wounds, vesicular papular pustular lesions, drainage.  No crepitus.    Neurologic: Alert with normal speech. Grossly nonfocal.  Moves extremities spontaneously.    Neuropsychiatric: Calm, affect congruent to situation.   Vascular access: PICC on RUE CDI        Medications    - MEDICATION INSTRUCTIONS -        acyclovir  400 mg Oral BID    aztreonam  1 g Intravenous Q8H    calcium carbonate  500 mg Oral BID    cetirizine  10 mg Oral Daily    DAPTOmycin (CUBICIN) 500 mg in sodium chloride 0.9 % 100 mL intermittent infusion  6 mg/kg Intravenous Q24H    hydrOXYzine  50 mg Oral At Bedtime    [Held by provider] levofloxacin  250 mg Oral Daily    metroNIDAZOLE  500 mg Intravenous Q8H    sodium phosphate  9 mmol Intravenous Q2H    triamcinolone   Topical BID    voriconazole  200 mg Oral Q12H [facility] (08/20)     Data   Results for orders placed or performed during the hospital encounter of 09/01/23 (from the past 24 hour(s))   US Upper Extremity Venous Duplex Right    Narrative    EXAMINATION: DOPPLER VENOUS ULTRASOUND OF THE RIGHT UPPER EXTREMITY,  9/26/2023 4:09 PM     COMPARISON: None.    HISTORY: AML. PICC in right upper extremity with unilateral right  upper extremity edema. Evaluate for thrombosis.    TECHNIQUE:  Gray-scale evaluation with compression, spectral flow and  color Doppler assessment of the deep venous system of the right upper  extremity.    FINDINGS:  Right: Normal blood flow and waveforms are demonstrated in the  internal jugular, innominate, subclavian, and axillary veins. There is  normal compressibility of the brachial, basilic and cephalic veins. A  PICC catheter in the right brachial vein.      Impression    IMPRESSION:  1.  No evidence of right upper extremity deep venous thrombosis.  2.  Right upper extremity PICC in place.    SUMIT STEWART MD         SYSTEM ID:  [id number]   Phosphorus   Result Value Ref Range    Phosphorus  1.7 (L) 2.5 - 4.5 mg/dL   ABO/Rh type and screen *Canceled*    Narrative    The following orders were created for panel order ABO/Rh type and screen.  Procedure                               Abnormality         Status                     ---------                               -----------         ------                     Adult Type and Screen[[id number]]                                                         Please view results for these tests on the individual orders.   CBC with platelets differential    Narrative    The following orders were created for panel order CBC with platelets differential.  Procedure                               Abnormality         Status                     ---------                               -----------         ------                     CBC with platelets and d...[[id number]]  Abnormal            Final result               RBC and Platelet Morphology[[id number]]                      Final result                 Please view results for these tests on the individual orders.   Comprehensive metabolic panel   Result Value Ref Range    Sodium 134 (L) 135 - 145 mmol/L    Potassium 5.8 (H) 3.4 - 5.3 mmol/L    Carbon Dioxide (CO2) 18 (L) 22 - 29 mmol/L    Anion Gap 10 7 - 15 mmol/L    Urea Nitrogen 10.1 8.0 - 23.0 mg/dL    Creatinine 0.84 0.67 - 1.17 mg/dL    GFR Estimate >90 >60 mL/min/1.73m2    Calcium 6.9 (L) 8.8 - 10.2 mg/dL    Chloride 106 98 - 107 mmol/L    Glucose 107 (H) 70 - 99 mg/dL    Alkaline Phosphatase 44 40 - 129 U/L    AST 63 (H) 0 - 45 U/L    ALT 52 0 - 70 U/L    Protein Total 5.4 (L) 6.4 - 8.3 g/dL    Albumin 2.5 (L) 3.5 - 5.2 g/dL    Bilirubin Total 0.6 <=1.2 mg/dL   CBC with platelets and differential   Result Value Ref Range    WBC Count 0.4 (LL) 4.0 - 11.0 10e3/uL    RBC Count 2.22 (L) 4.40 - 5.90 10e6/uL    Hemoglobin 6.5 (LL) 13.3 - 17.7 g/dL    Hematocrit 19.2 (L) 40.0 - 53.0 %    MCV 87 78 - 100 fL    MCH 29.3 26.5 - 33.0 pg    MCHC 33.9 31.5 - 36.5 g/dL    RDW  13.7 10.0 - 15.0 %    Platelet Count 18 (LL) 150 - 450 10e3/uL   Lactic acid whole blood   Result Value Ref Range    Lactic Acid 1.0 0.7 - 2.0 mmol/L   Magnesium   Result Value Ref Range    Magnesium 1.9 1.7 - 2.3 mg/dL   RBC and Platelet Morphology   Result Value Ref Range    Platelet Assessment  Automated Count Confirmed. Platelet morphology is normal.     Automated Count Confirmed. Platelet morphology is normal.    RBC Morphology Confirmed RBC Indices    Ionized Calcium   Result Value Ref Range    Calcium Ionized Whole Blood 3.8 (L) 4.4 - 5.2 mg/dL   CONDITIONAL Prepare red blood cells (unit)   Result Value Ref Range    Blood Component Type Red Blood Cells     Product Code [id number]     Unit Status Transfused     Unit Number [id number]     CROSSMATCH Compatible     CODING SYSTEM [id number]     ISSUE DATE AND TIME [id number]     UNIT ABO/RH O+     UNIT TYPE ISBT 5100    Phosphorus   Result Value Ref Range    Phosphorus 1.3 (L) 2.5 - 4.5 mg/dL   Comprehensive metabolic panel   Result Value Ref Range    Sodium 132 (L) 135 - 145 mmol/L    Potassium 3.4 3.4 - 5.3 mmol/L    Carbon Dioxide (CO2) 16 (L) 22 - 29 mmol/L    Anion Gap 14 7 - 15 mmol/L    Urea Nitrogen 10.7 8.0 - 23.0 mg/dL    Creatinine 0.85 0.67 - 1.17 mg/dL    GFR Estimate >90 >60 mL/min/1.73m2    Calcium 7.3 (L) 8.8 - 10.2 mg/dL    Chloride 102 98 - 107 mmol/L    Glucose 197 (H) 70 - 99 mg/dL    Alkaline Phosphatase 49 40 - 129 U/L    AST 69 (H) 0 - 45 U/L    ALT 61 0 - 70 U/L    Protein Total 6.1 (L) 6.4 - 8.3 g/dL    Albumin 2.8 (L) 3.5 - 5.2 g/dL    Bilirubin Total 0.6 <=1.2 mg/dL

## 2023-09-27 NOTE — PLAN OF CARE
"Goal Outcome Evaluation:      Plan of Care Reviewed With: patient    Overall Patient Progress: no changeOverall Patient Progress: no change         Time: 5611-3374    Reason for admission: AML, Day 25 from 7+3 induction chemotherapy   Activity: Ax1 with gait belt and walker, bed alarm off  Pain: denies  Neuro: WDL  Cardiac: tachy in 110s  Respiratory: WDL  GI/: WDL, LBM 9/26/2023  Diet: reg diet  Lines: right PICC- infusing  Labs/imaging: Phos replaced IV, recheck 0900.   Vitals: tachy, OVSS  Max Temp: 100.1      This Shift: Rash still present. Pt states he \"feels warm\" yet afebrile. Cold washcloth applied to forehead stated to help. Phos replaced IV. Recheck at 0900. Sepsis protocol triggered- lactic resulted at 1.0. Blood cultures collected x2.       Continue POC..    "

## 2023-09-27 NOTE — PROVIDER NOTIFICATION
Brad Jonas DO paged at [phone] via web based paging-     HUMZA Fine  [phone]- pt temp 100.5 30 min ago, now 98.7. Last blood cultures on 9/25. Do you want another set?  Thanks, Lyssa [phone]

## 2023-09-27 NOTE — PLAN OF CARE
[phone]  Newly dx with AML with neutropenic fevers, c/b bacteremia, and UTI. VSS, on RA. Upper body rash medication induced. Generalzied weakness present. Voiding adequately and having frequent stools. Upper and lower BLE 2+ edema present. Ax1 GB/W. Call appropriately. Continue with POC. Phos replacement being replaced recheck 0240. K recheck in the AM.

## 2023-09-27 NOTE — PROGRESS NOTES
[facility]    Transplant Infectious Diseases Inpatient Progress Note      Artie Fine MRN# [id number]   YOB: 1955 Age: 68 year old   Date of Admission and time: 9/1/2023  3:13 PM             Recommendations:   Continue aztreonam 1 gram q8 hr, flagyl 500 mg mg q 8hr, daptomycin 6 mg/kg/day.   Check weekly CPK while on daptomycin.         Summary of Presentation:   This patient is a 68 year old male with recent diagnosis of AML s/p 7+3 therapy.   The course is complicated by rash and febrile neutropenia.         Active Problems and Infectious Diseases Issues:   Febrile neutropenia.   Rash.   The differential diagnosis should include gut bacterial translocation associated with neutropenia typically resulting in fever vs drug-induced rash.   The rash is either ABx-induced or chemo-induced.     The patient remembers rash while on ceftriaxone for brain abscess.   He was itching while on meropenem, but whether the pruritus was due to meropenem or gadolinium contrast is not clear.     I am inclined to avoid both beta-lactams and carbapenems. Also given rash and use of vancomycin, I am avoiding vancomycin.   It is unlikely that flagyl would result in rash.     We are using aztreonam, daptomycin and flagyl.     I would not add vancomycin, meropenem, ceftriaxone, doxycycline to allergy list yet    I am not impressed by the CT chest findings to suggest pneumonia.     MRSE bacteriuria.   Without  instrumentation this is a contaminant.     S mitis in blood cx.   In one out of 2 sets from 9/20/23.   Very difficult to ascertain if this is due to GI lucy translocation vs contamination.   Covered by daptomycin.         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 Apparently developed rash on ceftriaxone and was switched to meropenem. MRI in 9/2021 and 10/2021 with decrease  "in size of abscess. He developed itching while on meropenem and was attributed to MRI contrast.      Other Infectious Disease issues include:  - QTc: 434 as of 9/4/23.   - PJP prophylaxis: none.   - Serostatus: CMV+, EBV+, HSV1+/2+, VZV ?  - Gamma globulin status: ?      Attestation:  Total duration of visit including chart review, reviewing labs and imaging, interviewing and examining the patient, documentation, and sending communication to the primary treating team, all at the same day of this encounter, is: 30 minutes.     Dagoberto Larios MD  [facility]  Contact information available via [facility] Paging/Directory    09/27/2023            Interim History:   Still with fever.   \"I feel out of it after blood transfusion\".   Still with generalized rash and pruritus.          History of Present Illness:   This patient is a 68 year old male who was diagnosed early 9/2023 with AML when he presented with weeks/months of easy bruisability and weakness and was found to be pancytopenic. BMBx confirmed AML and he received 7+3 cytarabine/daunorubicin starting 9/2/23.   On 9/20/23 fever occurred and was started on cefepime. Vancomycin was added 9/22/23 when blood cx grew S mitis then vancomycin was discontinued after susceptibilities resulted.   Fever persisted and doxycyline was added on 9/24/23 for atypical coverage.     The patient started to develop rash that was first noted on the BiUE then spread. The patient stated that the rash improved after stopping one ABx and starting another; I am not sure if he meant stopping vanco and initiating doxycycline or stopping levaquin and initiating cefepime.     The patient endorsed cough for two weeks, mostly non-productive. No shortness of breath.   Also endorses watery diarrhea.   No N/V and no abdominal pain.            Review of Systems:      As mentioned in the HPI otherwise negative by reviewing constitutional symptoms, central and " peripheral neurological systems, respiratory system, cardiac system, GI system,  system, musculoskeletal, skin, allergy, and lymphatics.                Immunizations:     Immunization History   Administered Date(s) Administered    COVID-19 Bivalent 12+ (Pfizer) 10/19/2022    COVID-19 MONOVALENT 12+ (Pfizer) 01/28/2021, 02/18/2021, 09/28/2021            Allergies:     Allergies   Allergen Reactions    Iodinated Contrast Media Rash    Ragweeds Other (See Comments)     Congestion              Medications:   Medications that Require Transfusion:    - MEDICATION INSTRUCTIONS -       Scheduled Medications:    acyclovir  400 mg Oral BID    aztreonam  1 g Intravenous Q8H    calcium carbonate  500 mg Oral BID    cetirizine  10 mg Oral Daily    DAPTOmycin (CUBICIN) 500 mg in sodium chloride 0.9 % 100 mL intermittent infusion  6 mg/kg Intravenous Q24H    hydrOXYzine  50 mg Oral At Bedtime    [Held by provider] levofloxacin  250 mg Oral Daily    metroNIDAZOLE  500 mg Intravenous Q8H    sodium phosphate  9 mmol Intravenous Q2H    triamcinolone   Topical BID    voriconazole  200 mg Oral Q12H [facility] (08[facility]20)             Physical Exam:   Temp: 98.9  F (37.2  C) Temp src: Oral BP: 124/56 Pulse: 88   Resp: 16 SpO2: 96 % O2 Device: None (Room air)      Wt Readings from Last 4 Encounters:   09/27/23 89 kg (196 lb 4.8 oz)   09/01/23 86.2 kg (190 lb 1.6 oz)     Constitutional: awake, alert, cooperative, no apparent distress and appears at stated age, well nourished.   Head, ENT, Eyes, and Neck: Normocephalic, moist buccal mucosa without oral thrush   Skin: no induration, fluctuation or discharge at the PICC line in the RUE. Disseminated maculopapular blanching no rash in combination with petechial rash.            Data:   No results found for: ACD4    Inflammatory Markers    No lab results found.    Immune Globulin Studies   No lab results found.    Metabolic Studies       Recent Labs   Lab Test 09/27/23  0848 09/27/23  0517 09/26/23  8780  09/26/23  1101 09/26/23  0512 09/25/23  1416 09/25/23  0513 09/24/23  1812 09/24/23 0447 09/24/23  0035 09/23/23 0455   * 134*  --   --  133*  --  132*  --  135*  --  133*   POTASSIUM 3.4 5.8*  --   --  3.7  --  3.5  --  3.9  --  3.7   CHLORIDE 102 106  --   --  105  --  102  --  103  --  101   CO2 16* 18*  --   --  19*  --  19*  --  17*  --  20*   ANIONGAP 14 10  --   --  9  --  11  --  15  --  12   BUN 10.7 10.1  --   --  11.0  --  11.4  --  13.8  --  14.8   CR 0.85 0.84  --   --  0.81  --  0.85  --  0.96  --  0.87   GFRESTIMATED >90 >90  --   --  >90  --  >90  --  86  --  >90   * 107*  --   --  109*  --  109*  --  118*  --  110*   VENANCIO 7.3* 6.9*  --   --  7.2*  --  7.1*  --  8.0*  --  8.2*   PHOS 1.3*  --  1.7* 1.4*  --    < > 1.5*  --  2.5  --  2.1*   MAG  --  1.9  --   --   --   --  2.1  --  2.3  --  2.3   LACT  --  1.0  --  1.1  --    < >  --    < >  --    < >  --    CKT  --   --   --  80  --   --   --   --   --   --   --     < > = values in this interval not displayed.       Hepatic Studies    Recent Labs   Lab Test 09/27/23  0848 09/27/23  0517 09/26/23  0512 09/25/23  0513 09/24/23  0447 09/23/23 0455 09/22/23  0531 09/21/23  0515   BILITOTAL 0.6 0.6 0.8 0.5 0.6 0.5   < > 0.8   ALKPHOS 49 44 46 45 55 54   < > 58   ALBUMIN 2.8* 2.5* 2.7* 2.9* 3.3* 3.5   < > 3.6   AST 69* 63* 44 32 30 22   < > 28   ALT 61 52 38 29 30 22   < > 30   LDH  --   --   --  173  --   --   --  191    < > = values in this interval not displayed.       Pancreatitis testing    No lab results found.    Hematology Studies      Recent Labs   Lab Test 09/27/23  0517 09/26/23  0512 09/25/23  2039 09/25/23  0748 09/24/23  0447 09/23/23  0455 09/18/23  0423 09/17/23  0435 09/11/23  0436 09/10/23  0445 09/09/23  0445 09/08/23  0523 09/07/23  0536 09/06/23  0618   WBC 0.4* 0.2* 0.2* 0.1* 0.3* 0.2*   < > 0.5*   < > 0.8* 0.9* 1.5* 1.4* 2.0*   ANEU  --   --   --   --   --   --   --  0.0*  --  0.0* 0.1* 0.5* 0.5* 0.5*   ALYM  --   --    --   --   --   --   --  0.5*  --  0.7* 0.8 0.9 0.9 1.5   JAX  --   --   --   --   --   --   --  0.0  --  0.0 0.0 0.0 0.0 0.0   AEOS  --   --   --   --   --   --   --  0.0  --  0.0 0.0 0.0 0.0 0.0   HGB 6.5* 8.1* 6.5* 5.6* 7.4* 7.4*   < > 6.9*   < > 8.1* 7.5* 7.8* 7.0* 7.9*   HCT 19.2* 23.6* 19.3* 16.3* 21.7* 22.0*   < > 21.1*   < > 25.6* 24.7* 25.6* 23.5* 26.6*   PLT 18* 9* 12* 14* 9* 12*   < > 16*   < > 26* 33* 51* 31* 9*    < > = values in this interval not displayed.       Clotting Studies    Recent Labs   Lab Test 09/25/23  0513 09/21/23  0515 09/18/23  0423 09/14/23  0420   INR 1.26* 1.25* 1.13 1.09   PTT 37 38 35 29       Arterial Blood Gas Testing  No lab results found.     Urine Studies     Recent Labs   Lab Test 09/21/23  0009   URINEPH 6.5   NITRITE Negative   LEUKEST Negative   WBCU <1       Vancomycin Levels     No lab results found.    Invalid input(s): VANCO    Tobramycin levels     No lab results found.    Gentamicin levels    No lab results found.    Tacrolimus levels    Invalid input(s): TACROLIMUS, TAC, TACR      Latest Ref Rng & Units 9/27/2023     8:48 AM 9/27/2023     5:17 AM 9/26/2023     5:12 AM 9/25/2023     8:39 PM 9/25/2023     7:48 AM   Transplant Immunosuppression Labs   Creat 0.67 - 1.17 mg/dL 0.85  0.84  0.81      Urea Nitrogen 8.0 - 23.0 mg/dL 10.7  10.1  11.0      WBC 4.0 - 11.0 10e3/uL  0.4  0.2  0.2  0.1        Cyclosporine levels    Invalid input(s): CYCLOSPORINE, CYC    Mycophenolate levels    Invalid input(s): MYPA, MYP    Sirolimus levels    Invalid input(s): SIROLIMUS, SIR, RAPA    CSF testing     Recent Labs   Lab Test 09/08/23  1159 09/08/23  1158   CWBC 5  --    CRBC 1  --    CGLU  --  51   CTP  --  46.4*         Microbiology:  Blood cx negative.   Last check of C difficile  C Difficile Toxin B by PCR   Date Value Ref Range Status   09/22/2023 Negative Negative Final     Comment:     A negative result does not exclude actual disease due to C. difficile and may be due to  improper collection, handling and storage of the specimen or the number of organisms in the specimen is below the detection limit of the assay.       Virology:  CMV viral loads  No results found for: CMVRESINST, 87028, 34432, 53650, 76561, CMVQAL  CMV viral loads  No lab results found.    CMV viral loads  No results found for: CMVQNT, CMVRESINST, CMVLOG, 76315, 76217, 40842, 13927    CMV resistance testing  No lab results found.  No results found for: CMVCID, CMVFOS, CMVGAN, CMVDRUGRES     No results found for: H6RES    No results found for: EBVDN, EBRES, EBVDN, EBVSP, EBVPC, EBVPCR    CMV Antibody IgG   Date Value Ref Range Status   09/02/2023 Positive, suggests recent or past exposure. (A) No detectable antibody.  Final   09/01/2023 Positive, suggests recent or past exposure. (A) No detectable antibody.  Final       No results found for: EBIG2, EBIGM, TOXG      Imaging:  CT chest WO 9/24/23   IMPRESSION:    1. Basilar reticular opacities are indeterminate, possibly atypical  infection versus scarring and subsegmental atelectasis.        Dagoberto Larios MD  [facility]  Contact information available via [facility] Paging/Directory     09/27/2023

## 2023-09-27 NOTE — PLAN OF CARE
[id number]    A&Ox4. VSS on RA. Afebrile. Denies pain, nausea, and SOB. Endorses generalized weakness and feeling more tired today. Hgb 6.5, received 1 unit RBCs. Phos 1.3, replaced IV, recheck at 1845. K 3.4, replaced PO, recheck at 1545. Voiding with adequate output. Continues to have frequent stools. Pt reports feeling more edematous and puffy, given 1x dose IV lasix with good urine output. Up with 1 assist, gait belt, and walker. Pt calls appropriately. Continue with plan of care.

## 2023-09-28 ENCOUNTER — APPOINTMENT (OUTPATIENT)
Dept: OCCUPATIONAL THERAPY | Facility: CLINIC | Age: 68
DRG: 835 | End: 2023-09-28
Attending: PHYSICIAN ASSISTANT
Payer: COMMERCIAL

## 2023-09-28 LAB
ABO/RH TYPE: NORMAL
ALBUMIN SERPL BCG-MCNC: 2.7 G/DL (ref 3.5–5.2)
ALP SERPL-CCNC: 49 U/L (ref 40–129)
ALT SERPL W P-5'-P-CCNC: 54 U/L (ref 0–70)
ANION GAP SERPL CALCULATED.3IONS-SCNC: 12 MMOL/L (ref 7–15)
ANTIBODY SCREEN: NEGATIVE
APTT PPP: 41 SECONDS (ref 22–38)
AST SERPL W P-5'-P-CCNC: 53 U/L (ref 0–45)
BACTERIA SPEC CULT: NO GROWTH
BASOPHILS # BLD MANUAL: 0 10E3/UL (ref 0–0.2)
BASOPHILS NFR BLD MANUAL: 0 %
BILIRUB SERPL-MCNC: 0.7 MG/DL
BLD PROD TYP BPU: NORMAL
BLOOD COMPONENT TYPE: NORMAL
BUN SERPL-MCNC: 12.1 MG/DL (ref 8–23)
CALCIUM SERPL-MCNC: 7.5 MG/DL (ref 8.8–10.2)
CHLORIDE SERPL-SCNC: 103 MMOL/L (ref 98–107)
CODING SYSTEM: NORMAL
CREAT SERPL-MCNC: 0.88 MG/DL (ref 0.67–1.17)
DEPRECATED HCO3 PLAS-SCNC: 18 MMOL/L (ref 22–29)
EGFRCR SERPLBLD CKD-EPI 2021: >90 ML/MIN/1.73M2
EOSINOPHIL # BLD MANUAL: 0 10E3/UL (ref 0–0.7)
EOSINOPHIL NFR BLD MANUAL: 0 %
ERYTHROCYTE [DISTWIDTH] IN BLOOD BY AUTOMATED COUNT: 13.7 % (ref 10–15)
FIBRINOGEN PPP-MCNC: 556 MG/DL (ref 170–490)
GLUCOSE SERPL-MCNC: 123 MG/DL (ref 70–99)
HCT VFR BLD AUTO: 23 % (ref 40–53)
HGB BLD-MCNC: 7.8 G/DL (ref 13.3–17.7)
INR PPP: 1.43 (ref 0.85–1.15)
ISSUE DATE AND TIME: NORMAL
LACTATE SERPL-SCNC: 1 MMOL/L (ref 0.7–2)
LACTATE SERPL-SCNC: 2.5 MMOL/L (ref 0.7–2)
LDH SERPL L TO P-CCNC: 216 U/L (ref 0–250)
LYMPHOCYTES # BLD MANUAL: 0.7 10E3/UL (ref 0.8–5.3)
LYMPHOCYTES NFR BLD MANUAL: 86 %
MAGNESIUM SERPL-MCNC: 1.9 MG/DL (ref 1.7–2.3)
MCH RBC QN AUTO: 29.1 PG (ref 26.5–33)
MCHC RBC AUTO-ENTMCNC: 33.9 G/DL (ref 31.5–36.5)
MCV RBC AUTO: 86 FL (ref 78–100)
MONOCYTES # BLD MANUAL: 0 10E3/UL (ref 0–1.3)
MONOCYTES NFR BLD MANUAL: 2 %
NEUTROPHILS # BLD MANUAL: 0.1 10E3/UL (ref 1.6–8.3)
NEUTROPHILS NFR BLD MANUAL: 12 %
PHOSPHATE SERPL-MCNC: 1.6 MG/DL (ref 2.5–4.5)
PLAT MORPH BLD: ABNORMAL
PLATELET # BLD AUTO: 10 10E3/UL (ref 150–450)
POTASSIUM SERPL-SCNC: 3.5 MMOL/L (ref 3.4–5.3)
PROT SERPL-MCNC: 6 G/DL (ref 6.4–8.3)
RBC # BLD AUTO: 2.68 10E6/UL (ref 4.4–5.9)
RBC MORPH BLD: ABNORMAL
SODIUM SERPL-SCNC: 133 MMOL/L (ref 135–145)
SPECIMEN EXPIRATION DATE: NORMAL
SPECIMEN EXPIRATION DATE: NORMAL
UNIT ABO/RH: NORMAL
UNIT NUMBER: NORMAL
UNIT STATUS: NORMAL
UNIT TYPE ISBT: 6200
URATE SERPL-MCNC: 2 MG/DL (ref 3.4–7)
WBC # BLD AUTO: 0.8 10E3/UL (ref 4–11)

## 2023-09-28 PROCEDURE — 83605 ASSAY OF LACTIC ACID: CPT

## 2023-09-28 PROCEDURE — 99233 SBSQ HOSP IP/OBS HIGH 50: CPT | Mod: GC | Performed by: DERMATOLOGY

## 2023-09-28 PROCEDURE — 85384 FIBRINOGEN ACTIVITY: CPT

## 2023-09-28 PROCEDURE — 83615 LACTATE (LD) (LDH) ENZYME: CPT

## 2023-09-28 PROCEDURE — 250N000011 HC RX IP 250 OP 636: Mod: JZ

## 2023-09-28 PROCEDURE — 84550 ASSAY OF BLOOD/URIC ACID: CPT

## 2023-09-28 PROCEDURE — 250N000013 HC RX MED GY IP 250 OP 250 PS 637: Performed by: PHYSICIAN ASSISTANT

## 2023-09-28 PROCEDURE — 83605 ASSAY OF LACTIC ACID: CPT | Performed by: STUDENT IN AN ORGANIZED HEALTH CARE EDUCATION/TRAINING PROGRAM

## 2023-09-28 PROCEDURE — 258N000003 HC RX IP 258 OP 636: Performed by: PHYSICIAN ASSISTANT

## 2023-09-28 PROCEDURE — 99207 PR NO BILLABLE SERVICE THIS VISIT: CPT | Performed by: STUDENT IN AN ORGANIZED HEALTH CARE EDUCATION/TRAINING PROGRAM

## 2023-09-28 PROCEDURE — 120N000002 HC R&B MED SURG/OB UMMC

## 2023-09-28 PROCEDURE — 80053 COMPREHEN METABOLIC PANEL: CPT | Performed by: PHYSICIAN ASSISTANT

## 2023-09-28 PROCEDURE — 87040 BLOOD CULTURE FOR BACTERIA: CPT | Performed by: STUDENT IN AN ORGANIZED HEALTH CARE EDUCATION/TRAINING PROGRAM

## 2023-09-28 PROCEDURE — 83735 ASSAY OF MAGNESIUM: CPT

## 2023-09-28 PROCEDURE — 85027 COMPLETE CBC AUTOMATED: CPT | Performed by: PHYSICIAN ASSISTANT

## 2023-09-28 PROCEDURE — 85610 PROTHROMBIN TIME: CPT

## 2023-09-28 PROCEDURE — 36415 COLL VENOUS BLD VENIPUNCTURE: CPT | Performed by: STUDENT IN AN ORGANIZED HEALTH CARE EDUCATION/TRAINING PROGRAM

## 2023-09-28 PROCEDURE — 250N000013 HC RX MED GY IP 250 OP 250 PS 637

## 2023-09-28 PROCEDURE — 99232 SBSQ HOSP IP/OBS MODERATE 35: CPT

## 2023-09-28 PROCEDURE — 250N000013 HC RX MED GY IP 250 OP 250 PS 637: Performed by: STUDENT IN AN ORGANIZED HEALTH CARE EDUCATION/TRAINING PROGRAM

## 2023-09-28 PROCEDURE — 97165 OT EVAL LOW COMPLEX 30 MIN: CPT | Mod: GO

## 2023-09-28 PROCEDURE — 250N000013 HC RX MED GY IP 250 OP 250 PS 637: Performed by: HOSPITALIST

## 2023-09-28 PROCEDURE — 86923 COMPATIBILITY TEST ELECTRIC: CPT | Performed by: PHYSICIAN ASSISTANT

## 2023-09-28 PROCEDURE — P9045 ALBUMIN (HUMAN), 5%, 250 ML: HCPCS | Mod: JZ

## 2023-09-28 PROCEDURE — 85007 BL SMEAR W/DIFF WBC COUNT: CPT | Performed by: PHYSICIAN ASSISTANT

## 2023-09-28 PROCEDURE — P9037 PLATE PHERES LEUKOREDU IRRAD: HCPCS | Performed by: PHYSICIAN ASSISTANT

## 2023-09-28 PROCEDURE — 99231 SBSQ HOSP IP/OBS SF/LOW 25: CPT | Performed by: INTERNAL MEDICINE

## 2023-09-28 PROCEDURE — 36415 COLL VENOUS BLD VENIPUNCTURE: CPT

## 2023-09-28 PROCEDURE — 86850 RBC ANTIBODY SCREEN: CPT | Performed by: PHYSICIAN ASSISTANT

## 2023-09-28 PROCEDURE — 86901 BLOOD TYPING SEROLOGIC RH(D): CPT | Performed by: PHYSICIAN ASSISTANT

## 2023-09-28 PROCEDURE — 250N000011 HC RX IP 250 OP 636: Mod: JZ | Performed by: PHYSICIAN ASSISTANT

## 2023-09-28 PROCEDURE — 258N000003 HC RX IP 258 OP 636

## 2023-09-28 PROCEDURE — 84100 ASSAY OF PHOSPHORUS: CPT | Performed by: INTERNAL MEDICINE

## 2023-09-28 PROCEDURE — 97140 MANUAL THERAPY 1/> REGIONS: CPT | Mod: GO

## 2023-09-28 PROCEDURE — 85730 THROMBOPLASTIN TIME PARTIAL: CPT

## 2023-09-28 RX ADMIN — SODIUM CHLORIDE, PRESERVATIVE FREE 5 ML: 5 INJECTION INTRAVENOUS at 03:49

## 2023-09-28 RX ADMIN — CALCIUM CARBONATE (ANTACID) CHEW TAB 500 MG 500 MG: 500 CHEW TAB at 08:18

## 2023-09-28 RX ADMIN — METRONIDAZOLE 500 MG: 500 INJECTION, SOLUTION INTRAVENOUS at 08:17

## 2023-09-28 RX ADMIN — TRIAMCINOLONE ACETONIDE: 1 OINTMENT TOPICAL at 11:55

## 2023-09-28 RX ADMIN — SODIUM CHLORIDE 500 ML: 9 INJECTION, SOLUTION INTRAVENOUS at 05:28

## 2023-09-28 RX ADMIN — ACYCLOVIR 400 MG: 400 TABLET ORAL at 20:12

## 2023-09-28 RX ADMIN — BENZONATATE 100 MG: 100 CAPSULE ORAL at 06:47

## 2023-09-28 RX ADMIN — AZTREONAM 1 G: 1 INJECTION, POWDER, LYOPHILIZED, FOR SOLUTION INTRAMUSCULAR; INTRAVENOUS at 05:33

## 2023-09-28 RX ADMIN — ACETAMINOPHEN 650 MG: 325 TABLET, FILM COATED ORAL at 05:28

## 2023-09-28 RX ADMIN — CALCIUM CARBONATE (ANTACID) CHEW TAB 500 MG 500 MG: 500 CHEW TAB at 20:13

## 2023-09-28 RX ADMIN — CETIRIZINE HYDROCHLORIDE 10 MG: 10 TABLET, FILM COATED ORAL at 08:18

## 2023-09-28 RX ADMIN — BENZONATATE 100 MG: 100 CAPSULE ORAL at 20:12

## 2023-09-28 RX ADMIN — ACYCLOVIR 400 MG: 400 TABLET ORAL at 08:18

## 2023-09-28 RX ADMIN — VORICONAZOLE 200 MG: 200 TABLET ORAL at 20:12

## 2023-09-28 RX ADMIN — ALBUMIN HUMAN 12.5 G: 0.05 INJECTION, SOLUTION INTRAVENOUS at 06:42

## 2023-09-28 RX ADMIN — SODIUM CHLORIDE, PRESERVATIVE FREE 5 ML: 5 INJECTION INTRAVENOUS at 11:54

## 2023-09-28 RX ADMIN — AZTREONAM 1 G: 1 INJECTION, POWDER, LYOPHILIZED, FOR SOLUTION INTRAMUSCULAR; INTRAVENOUS at 22:37

## 2023-09-28 RX ADMIN — METRONIDAZOLE 500 MG: 500 INJECTION, SOLUTION INTRAVENOUS at 16:19

## 2023-09-28 RX ADMIN — SODIUM CHLORIDE, PRESERVATIVE FREE 5 ML: 5 INJECTION INTRAVENOUS at 20:15

## 2023-09-28 RX ADMIN — METRONIDAZOLE 500 MG: 500 INJECTION, SOLUTION INTRAVENOUS at 01:26

## 2023-09-28 RX ADMIN — DAPTOMYCIN 500 MG: 500 INJECTION, POWDER, LYOPHILIZED, FOR SOLUTION INTRAVENOUS at 18:00

## 2023-09-28 RX ADMIN — TRIAMCINOLONE ACETONIDE: 1 OINTMENT TOPICAL at 20:15

## 2023-09-28 RX ADMIN — AZTREONAM 1 G: 1 INJECTION, POWDER, LYOPHILIZED, FOR SOLUTION INTRAMUSCULAR; INTRAVENOUS at 14:36

## 2023-09-28 RX ADMIN — HYDROXYZINE HYDROCHLORIDE 50 MG: 25 TABLET, FILM COATED ORAL at 20:12

## 2023-09-28 RX ADMIN — VORICONAZOLE 200 MG: 200 TABLET ORAL at 08:18

## 2023-09-28 ASSESSMENT — ACTIVITIES OF DAILY LIVING (ADL)
ADLS_ACUITY_SCORE: 30

## 2023-09-28 NOTE — PROGRESS NOTES
CLINICAL NUTRITION SERVICES - REASSESSMENT NOTE     Nutrition Prescription    RECOMMENDATIONS FOR MDs/PROVIDERS TO ORDER:   Ultimately would recommend nutrition support if intake <75% of minimum kcal/protein needs (1400 kcal and 70 g protein daily) for greater than 5 days, will be starting with calorie count at this time to assess intake.     Malnutrition Status:   Severe malnutrition in the context of acute illness     Recommendations already ordered by Registered Dietitian (RD):   Encouraged small frequent meals/snacks.  Encouraged patient to request additional Special K Bars as able.    Collaboration with other providers - decided calorie count most appropriate at this time.     Future/Additional Recommendations:   Monitor weight/intake trends. Monitor need for nutrition support.      EVALUATION OF THE PROGRESS TOWARD GOALS   Diet: Regular  Nutrition Support: Special K Bar @ 2 pm snack    Intake: Pt reports he feels intake has been improving over last 1-2 days. Discussed that overall meal ordering is inadequate and that we need him ordering a minimum of 2-3 meals/day consistently. Pt stated that he feels that he is unable to eat nearly this much at this time.       Per flowsheets, intake documentation likely missing some meals/snacks, but on average 75%-100% of recent meals consumed. Per [employer], pt only ordering 1-2 meals some days with 3 days (nonconsecutive) in last 1 week with no meals ordered. Per [employer] (and pt confirming minimal intake from home food other than Oreos), pt ordering average of 500 kcal and 13.5 g protein daily per 7 day average.      NEW FINDINGS   Visited with patient in room. Pt stated that he is eating and feeling a bit better today. Per [employer], pt not ordering adequately, only 1-2 meals with many days of 0 meals ordered over past week. Encouraged patient to consider ordering meals even if unable to eat everything to help provide any intake improvement. Pt did not want to  discuss nutrition support at this time but stated that he was not opposed to talking about it in the future if intake does not improve. Pt was open to starting appetite stimulant but per discussion with care team, may not be appropriate at this time due to concerns for polypharmacy and other possible interactions. Per discussion with care team, will start with Calorie counts first to see what patients intake looks like and how much intervention is needed. Ultimately would recommend nutrition support if intake <75% of minimum kcal/protein needs (1400 kcal and 70 g protein daily) for greater than 5 days. Did discuss with pt about adding additional supplements and snacks as scheduled orders and patient declined due to not liking any that he has tried at this time.     Skin: Crow score 19 with nutrition marked as adequate per flowsheets. Trace to mild edema noted in flowsheets.     LABS   Labs Reviewed   09/27/23 08:48 09/27/23 16:10 09/27/23 17:30 09/28/23 03:48   Sodium 132 (L)   133 (L)   Potassium 3.4 7.7 (HH) 3.8 3.5   GFR Estimate >90   >90   Calcium 7.3 (L)   7.5 (L)   Magnesium    1.9   Phosphorus 1.3 (L) 10.6 (H) 1.4 (L) 1.6 (L)   Albumin 2.8 (L)   2.7 (L)   Glucose 197 (H)   123 (H)   WBC    0.8 (LL)   Hemoglobin    7.8 (L)     MEDICATIONS   Medications Reviewed  - Calcium Carbonate   - Lasix given 9/27 + 9/6  - Potassium Chloride   - Potassium Phosphate  - Voriconazole  - Mucinex PRN  - Miralax PRN    ANTHROPOMETRICS   Weight Trends:   Weight up significantly from 9/26 to 9/27, possibly fluid related or external factors contributing to weight changes.     Weight trends this admission:   09/27/23 89 kg (196 lb 4.8 oz) Standing scale   09/26/23 84.5 kg (186 lb 3.2 oz) Standing scale   09/25/23 88.2 kg (194 lb 6.4 oz) Standing scale   09/23/23 85.9 kg (189 lb 6.4 oz) --   09/21/23 84.1 kg (185 lb 6.4 oz) Standing scale   09/20/23 84.9 kg (187 lb 1.6 oz) Standing scale   09/19/23 83.6 kg (184 lb 4.9 oz)  Standing scale   09/18/23 85.3 kg (188 lb 1.6 oz) Standing scale   09/17/23 85.5 kg (188 lb 9.6 oz) Standing scale   09/16/23 85 kg (187 lb 6.4 oz) Standing scale   09/15/23 85 kg (187 lb 6.3 oz) Standing scale   09/14/23 86.9 kg (191 lb 8 oz) Standing scale   09/13/23 85.5 kg (188 lb 8 oz) Standing scale   09/12/23 83.7 kg (184 lb 8 oz) Standing scale   09/11/23 84.3 kg (185 lb 12.8 oz) Standing scale   09/10/23 84.2 kg (185 lb 9.6 oz) Standing scale   09/09/23 84.7 kg (186 lb 12.8 oz) Standing scale   09/08/23 85.3 kg (188 lb) Standing scale   09/07/23 87 kg (191 lb 14.4 oz) Standing scale   09/06/23 87 kg (191 lb 12.8 oz) --   09/05/23 88 kg (194 lb 1.6 oz) Standing scale   09/04/23 88.8 kg (195 lb 11.2 oz) Standing scale   09/03/23 87.5 kg (193 lb) Standing scale   09/02/23 86.8 kg (191 lb 4.8 oz) Standing scale   09/01/23 86.5 kg (190 lb 9.6 oz) Standing scale     Wt Readings from Last Encounters:   09/27/23 89 kg (196 lb 4.8 oz)   09/01/23 86.2 kg (190 lb 1.6 oz)     MALNUTRITION   % Intake: </= 50% for >/= 5 days (severe)  % Weight Loss: Unable to assess  Subcutaneous Fat Loss: Facial region: Mild-Moderate and Lower arm: Moderate  Muscle Loss: Temporal: Moderate, Thoracic region (clavicle, acromium bone, deltoid, trapezius, pectoral): Mild  Fluid Accumulation/Edema: Mild  Malnutrition Diagnosis: Severe malnutrition in the context of acute illness     Previous Goals   Patient to consume % of nutritionally adequate meal trays TID, or the equivalent with supplements/snacks.   Evaluation: Not met    Previous Nutrition Diagnosis   Inadequate oral intake related to decreased appetite as evidenced by pt self-report    Evaluation: Declining    CURRENT NUTRITION DIAGNOSIS   Inadequate oral intake related to decreased appetite as evidenced by pt self-report      INTERVENTIONS   Implementation   Encouraged small frequent meals/snacks.  Encouraged patient to request additional Special K Bars as able.     Collaboration with other providers - decided calorie count most appropriate at this time.     Goals   Total avg nutritional intake to meet a minimum of 25 kcal/kg and 1.2 g PRO/kg daily (per dosing wt 75 kg).    Monitoring/Evaluation   Progress toward goals will be monitored and evaluated per protocol.    Swati Wall RD, LD   Available on [email or web address] and Pager  5B/6A pager [phone]  Weekend pager [phone]

## 2023-09-28 NOTE — TELEPHONE ENCOUNTER
Received a return call from pt spouse to discuss leave at work  Family member would like intermittent leave while spouse is in the hospital.  She will need 100 consecutive days off post transplant.    Forms will be updated as necessary.     BMT to take over forms post transplant.    Tatiana Martinez CMA ([facility])

## 2023-09-28 NOTE — PROVIDER NOTIFICATION
09/28/23 0400   Call Information   Date of Call 09/28/23   Time of Call 0455   Name of person requesting the team Lyssa MICHEL   Title of person requesting team RN   RRT Arrival time 0455   Time RRT ended 0520   Reason for call   Type of RRT Adult   Primary reason for call Sepsis suspected   Sepsis Suspected Elevated Lactate level;BMT/Oncology patient with WBC<0.5 or >12 or ANC <500;Heart Rate > 100   Was patient transferred from the ED, ICU, or PACU within last 24 hours prior to RRT call? No   SBAR   Situation LA 2.5   Background per provider note,  68 year old male with a past medical history of brain abscess (2021), HTN, and BPH. He was transferred from [facility] in [place], [place] to [facility] for work up of pancytopenia. At [facility], was noted to have circulating blasts concerning for acute leukemia. He was transferred to [facility] for work up of acute leukemia.   Notable History/Conditions Cancer;Hypertension   Assessment A+Ox4, VSS   Interventions Fluid bolus   Patient Outcome   Patient Outcome Stabilized on unit   RRT Team   Attending/Primary/Covering Physician Heme Malignancy   Date Attending Physician notified 09/28/23   Time Attending Physician notified 0455   Physician(s) Angel CHAPMAN   Lead RN Anthony MICHEL   RT n/a   Post RRT Intervention Assessment   Post RRT Assessment Stable/Improved   Date Follow Up Done 09/28/23   Time Follow Up Done 0915

## 2023-09-28 NOTE — PLAN OF CARE
Goal Outcome Evaluation:      Plan of Care Reviewed With: patient    Overall Patient Progress: no changeOverall Patient Progress: no change         Time: 9449-4304     Reason for admission: AML, Day 26 from 7+3 induction chemotherapy   Activity: Ax1 with gait belt and walker, bed alarm off  Pain: denies  Neuro: WDL  Cardiac: tachy at 107  Respiratory: WDL  GI/: WDL, LBM 9/27  Diet: reg diet  Lines: right PICC- infusing  Labs/imaging: Phos replaced IV, recheck with AM labs resulted at 1.6.  Vitals: tachy, OVSS  Max Temp: 100.6      This Shift: Phos replaced IV. Recheck at 0900. Sepsis protocol triggered- lactic resulted at 2.5. Code sepsis called. One time Albumin injection ordered, lactic recheck ordered for 0800. Blood cultures peripheral x2 ordered. Tyl x1.       Continue POC..

## 2023-09-28 NOTE — TELEPHONE ENCOUNTER
FMLA for family forms filled out and put in providers folder for review and signature.      Tatiana Martinez CMA ([facility])

## 2023-09-28 NOTE — PROGRESS NOTES
[facility] Inpatient Consult Dermatology Note    Impression/Plan:    1. Morbiliform (Exanthematous) Eruption and toxic erythema of chemotherapy      Favor that there are two morphologies and two different contributing factors to the rash- the bright red patches on the proximal thighs, and edematous red plaques on the proximal, extensor arms likely represent toxic erythema of chemotherapy which is most likely to cytarabine, which patient received at the beginning of September.     The pink-red macules coalescing into patches on the abdomen and back (improving at this point) likely represented an exanthematous/morbilliform drug eruption.The rash usually appears between 7 to 10 days after initiation of triggering medication, though it is also been reported up to 14 days afterwards.  Of note, the rash can present sooner than this if the patient is re-exposed to a prior medication.  In this patient, cefepime was only initiated a few days before onset of the rash, which is not typical for first-time exposure, however, could be explained if patient has received these antibiotics in the past. There is a good history in this patient of a rash after exposure to ceftriaxone/meropenem (listed as allergy to IV contrast, but patient was receiving these antibiotics at the time as well).    The morbiliform component of patient's rash is improving, and I favor that the remaining skin lesions represent more along the toxic erythema of chemotherapy spectrum. CMV checked and negative.    Drug-induced hypersensitivity syndrome (DIHS; previously DRESS or Drug Eeaction with Eosinophilia and Systemic Symptoms) less favored at this time given lack of facial edema or new lymphadenomapthy, though it remains a possibility. Patient did have a mild raise in his AST on 9/27 (to 69), but it is now down-trending again. As morbiliform rash is improving, this remains less of a concern.      Recommendations:  - recommend checking  "EBV and HHV6 PCR (these were ordered as an add on but weren't collected)  - Continue triamcinolone 0.1% ointment BID rash on body (can change to cream per patient preference though ointment will be more potent) and hydrocortisone 2.5 % cream BID PRN rash on face   - daily CBC with diff and CMP to verify to not progressing to DIHS/DRESS (looking for creatinine elevation, transaminitis)  - please notify us if patient should develop any of the following red flag symptoms including persistent fever, facial edema, lymphadenopathy, new arthralgias/myalgias, new diarrhea.    Thank you for the dermatology consultation. We will continue to follow.     Staffed with attending physician, Dr. Quinteros.    Natalia Fairbanks MD (PGY-3)  Dermatology Resident  Pager: [phone]    I have seen and examined this patient and agree with the assessment and plan as documented in the resident's note.    Mohan Quinteros MD  Dermatology Attending      Dermatology Problem List:  1. Morbiliform drug eruption  2. Toxic erythema of chemotherapy  TX: topical steroids    Date of Admission: Aug 31, 2023   Encounter Date: 09/28/2023    Interval Updates    Patient thinks his rash is slightly better. Rash is still not symptomatic for him. Overall, he is feeling stable-no better and no worse.     Past Medical History:   Reviewed in epic, pertinent findings summarized as above    Social History:  Patient     Family History:  No family history on file.    Medications:  Reviewed in epic, pertinent findings summarized as above     Allergies   Allergen Reactions     Iodinated Contrast Media Rash     Ragweeds Other (See Comments)     Congestion        Review of Systems:  As per HPI.     Physical exam:  Vitals: /55 (BP Location: Left arm, Cuff Size: Adult Regular)   Pulse 104   Temp 98.8  F (37.1  C) (Oral)   Resp 18   Ht 1.74 m (5' 8.5\")   Wt 91.9 kg (202 lb 9.6 oz)   SpO2 98%   BMI 30.36 kg/m      Bright red patches with overlying petechiae on " the proximal thighs, and lateral, extensor arms   Pink-red macules coalescing into patches on the abdomen and back are improved from prior  Facial rash significantly improved  No facial swelling                          Laboratory:  Reviewed in epic, pertinent findings summarized as above

## 2023-09-28 NOTE — PROGRESS NOTES
09/28/23 1059   Appointment Info   Signing Clinician's Name / Credentials (OT) Becky Hatch, OTR/L   Rehab Comments (OT) OT for edema only, PT following, cecilio billed for edema   Quick Adds   Quick Adds Edema   General Information   Onset of Illness/Injury or Date of Surgery 09/01/23   Referring Physician Sheree Chowdhury PA-C   Additional Occupational Profile Info/Pertinent History of Current Problem Artie Fine is a 68 year old male with a history of brain abscess (2021), HTN, and BPH. He was transferred from [facility] in [place], [place] to [facility] for work up of pancytopenia; he was noted to have circulating blasts concerning for acute leukemia, and was subsequently transferred to [facility]. He underwent a diagnostic bone marrow biopsy on 9/1/23 which confirmed a new diagnosis of AML. He was started on induction with 7+3 (C1D1=9/2/23). Hospital course has been complicated by rhinovirus infection, recurrent neutropenic fever, S. mitis bacteremia, and S. epidermidis UTI.   Edema General Information   Onset of Edema 08/25/23   Affected Body Part(s) Left UE;Right UE;Left LE;Right LE   Edema Etiology Chemo   Edema Precautions Active Cancer   Edema Precaution Comments Per chart review, RUE negative for DVT as of 9/26   General Comments/Previous Edema Treatment/Edema Equipment Pt reports no history of edema or prior lymphedema therapy treatment. Reports onset of edema ~1 week prior to hospital admission.   Edema Examination/Assessment   Skin Condition Pitting;Intact   Skin Condition Comments Skin intact with rash on entire body scattered on BLE and BUE. Per chart reiew, rash potentially from chemo. Pt reports no pain or itchiness from rash.   Pitting Assessment 2-3+ pitting in BLE and RUE with PICC line in RUE. Non pitting edema in LUE although pt reporting increased in size.   Clinical Impression   Criteria for Skilled Therapeutic Interventions Met (OT) Yes, treatment indicated   OT Diagnosis Impaired ROM  and function for ADL/IADL routines from BLE/BUE edema   Edema: Patient Presentation Edema   Influenced by the following impairments Decreased ROM, pain   Edema: Planned Interventions Gradient compression bandaging;Fit for compression garment;Edema exercises;Precautions to prevent infection/exacerbation;Education;Manual therapy;ADL training   Clinical Decision Making Complexity (OT) low complexity   Risk & Benefits of therapy have been explained evaluation/treatment results reviewed;care plan/treatment goals reviewed;risks/benefits reviewed;current/potential barriers reviewed;participants voiced agreement with care plan;participants included;patient   Clinical Impression Comments Pt presents with BLE and BUE edema limiting ROM and function for ADL/IADL routines. Pt with 2-3+ edema in BLE and RUE and non pitting edema in LUE. Pt appropriate for compression for edema management for BLE and with plan to conservatively monitor BUE d/t PICC line in RUE and non pitting in LUE.   OT Total Evaluation Time   OT Eval, Low Complexity Minutes (14689) 5   OT Goals   Therapy Frequency (OT) 4 times/wk  (Edema)   OT Predicted Duration/Target Date for Goal Attainment 10/21/23   OT Goals Edema   OT: Edema education to increase ability to manage edema after discharge from the hospital Patient;Caregiver;Verbalize;Demonstrate;Gem;Skin care routine;signs/symptoms of intolerance;wear schedule;limb positioning;garrnet/bandage care;discharge recommendations   OT: Management of edema bandages Patient;Caregiver;Verbalize;Demonstrate;Gem;quick wrap;garment(s)   OT: Functional edema exercise program to reduce limb volume, increase activity tolerance and improve independence with ADL Patient;Caregiver;Verbalize;Demonstrates;Gem;HEP;walking program   Manual Therapy   Manual Therapy: Mobilization, MFR, MLD, friction massage minutes (22153) 42   Symptoms noted during/after treatment none   Treatment Detail/Skilled  "Intervention Edema: Pt educated on lymphatic system anatomy/physiology, precautions, and treatment options. Skin inspected with skin intact with rash on entire body scattered on BLE and BUE, potentially from chemo, and 2-3+ pitting in BLE and RUE with PICC line in RUE. Non pitting edema in LUE although pt reporting increased in size. Pt reports no pain or itchiness from rash. Pt is appropriate for use of GCBs on BLE to promote fluid mobilization and edema management, for improved skin integrity, functional mobility, and ADL independence. Pt's LE's washed and lotioned with Sween 24. Donned Size M TGSoft base layer, followed by 1 x 8cm carrie from MTP's to distal shin and 1 x 10 cm carrie from ankle to knee crease using \"quick wrap technique\" with 50% overlap and 25% stretch d/t low platelets this date (platelets 10 this AM but receiving platelets at time of session with RN willaing lymphedema therapy). Surgilast added over tape for increased hold. Pt reporting comfort. Pt educated on wear 24-48 hr wear schedule and indications for removal (pain, numbness, tingling, soiled, or loose/falling off). Educated in conservative strategies for managing BLE/BUE edema, including elevation and muscle pump activation and plan to conservatively manage BUE edema d/t nonpitting in LUE and PICC line placed in RUE. Elevated RUE on multiple pillows in bed to promote fluid mobilization without compression. Pt verbalized understanding of all education. Patient care order in place.   OT Discharge Planning   OT Plan Edema Only: New G2 (1/3), conservative manage 1 (R>L)   Total Session Time   Timed Code Treatment Minutes 42   Total Session Time (sum of timed and untimed services) 47       "

## 2023-09-28 NOTE — PROVIDER NOTIFICATION
Brad Jonas DO paged at 0512 via web based paging-          HUMZA Fine  5236- FYI code sepsis called, lactic 2.5.   Thanks, Lyssa  [phone]

## 2023-09-28 NOTE — PROVIDER NOTIFICATION
Brad Jonas DO paged at [phone]-        HUMZA Fine  6655- just got albumin from pharmacy. Sepsis protocol triggered. I assume  you want to wait on the lactic until 8? Or do you want one now  ThanksLyssa  [phone]

## 2023-09-28 NOTE — PROGRESS NOTES
Code sepsis with mild lactate elevation of 2.5. Recurrent fever was part of the trigger here.    He is followed by transplant ID and covered for S mitis blood and MRSE urine isolates of unclear significance. With this ambiguity and ongoing neutropenia, despite multiple negative samples, will repeat blood cultures now. Primary team diuresing yesterday; will hold off on fluids for now with reasonable BP, normal MAP, and no marked change in HR (generally 90s, now 101). Could consider colloid and repeat lactate later in the day, given peripheral edema and hypoalbuminemia.    Addendum: Discussed with MARIEL who felt exam consistent with hypovolemia (relative, intravascular), consistent with large volume UOP yesterday. He is on room air. We will give albumin now and order repeat lactate for later this morning.

## 2023-09-28 NOTE — CODE/RAPID RESPONSE
Rapid Response Team Note    Assessment   In assessment a rapid response was called on Artie Fine due to SIRS/Sepsis trigger. LA 2.5, a jump from recent normal value.   Assessed patient at bedside:  No new concerning symptom like new chest pain, SoB, new abdominal pain  Vitals without significant change from baseline, /48, oxygen still on RA, mental status fully oriented. Did spike another fever   Appear dry on exam: dry lips, mucosa, thirst; I/O is running negative (-1.6L)  fluid due to diuresis to help with generalized edema  Overall, presentation does not seem worsening of sepsis, but appears likely volume depletion; will monitor for other causes. Adequately covered with antibiotic and ID following.     Plan   -  Felt patient will benefit from mild volume support, discussed with cross cover due to concern of worsening peripheral edema who suggested Albumin probably preferable over crystalloid. Will give 12.5g albumin and repeat LA in about 3 hours.   -  Disposition: The patient will remain on the current unit. We will continue to monitor this patient closely.  -  Reassessment and plan follow-up will be performed by the primary team      Angel Wilkins PA-C  [facility] RRT [facility] Job Code Contact #[phone]  [facility] Paging/Directory    Hospital Course   Brief Summary of events leading to rapid response:   Per chart, Artie Fine is a 68 year old male with a history of brain abscess (2021), HTN, and BPH. He was transferred from [facility] in [place], [place] to [facility] for work up of pancytopenia; he was noted to have circulating blasts concerning for acute leukemia, and was subsequently transferred to [facility]. He underwent a diagnostic bone marrow biopsy on 9/1/23 which confirmed a new diagnosis of AML. He was started on induction with 7+3 (C1D1=9/2/23). Hospital course has been complicated by rhinovirus infection, recurrent neutropenic fever, S. mitis bacteremia, and S. epidermidis UTI.   RRT  called per sepsis protocol     Admission Diagnosis:   Acute leukemia (H) [C95.00]    Physical Exam   Temp: (!) 100.6  F (38.1  C) Temp  Min: 98.5  F (36.9  C)  Max: 100.6  F (38.1  C)  Resp: 17 Resp  Min: 15  Max: 20  SpO2: 93 % SpO2  Min: 93 %  Max: 100 %  Pulse: 101 Pulse  Min: 88  Max: 107    No data recorded  BP: 106/46 Systolic (24hrs), Av , Min:106 , Max:129   Diastolic (24hrs), Av, Min:46, Max:66     I/Os: I/O last 3 completed shifts:  In: 1110 [P.O.:360; I.V.:450]  Out: 2500 [Urine:2500]     Exam:   General: in no acute distress  Mental Status: AAOx4.  HEENT- Dry mucous membranes  Respiratory- No labored breathing; speaking comfortably; Clear to auscultation bilaterally anteriorly, no crackles or wheezing.  Cardiovascular- Regular rate and rhythm, normal S1 and S2, and no murmur noted.  GI: Soft, non-distended, non-tender      Significant Results and Procedures   Lactic Acid:   Recent Labs   Lab Test 23  0348 23  0517 23  1101   LACT 2.5* 1.0 1.1     CBC:   Recent Labs   Lab Test 23  0517 23  0512 23  2039   WBC 0.4* 0.2* 0.2*   HGB 6.5* 8.1* 6.5*   HCT 19.2* 23.6* 19.3*   PLT 18* 9* 12*        Sepsis Evaluation   The patient is known to have an infection.  Artie Fine meets SIRS criteria but does NOT have a lactate >2 or other evidence of acute organ damage.  These vital sign, lab and physical exam findings constitute a diagnosis of SEPSIS.         Anti-infectives (From now, onward)      Start     Dose/Rate Route Frequency Ordered Stop    23 2200  aztreonam (AZACTAM) 1 g vial to attach to  mL bag         1 g  over 20-60 Minutes Intravenous EVERY 8 HOURS 23 1604      23 1730  DAPTOmycin (CUBICIN) 500 mg in sodium chloride 0.9 % 100 mL intermittent infusion         6 mg/kg × 84.5 kg  over 30 Minutes Intravenous EVERY 24 HOURS 23 1604      23 1700  metroNIDAZOLE (FLAGYL) infusion 500 mg        Note to Pharmacy: Metronidazole IV  may be dosed more frequently than Q12h for bacteremia, CNS infection and Clostridium difficile.    500 mg  over 60 Minutes Intravenous EVERY 8 HOURS 09/26/23 1604      09/10/23 0800  voriconazole (VFEND) tablet 200 mg         200 mg Oral EVERY 12 HOURS SCHEDULED 09/10/23 0701      09/01/23 2000  acyclovir (ZOVIRAX) tablet 400 mg         400 mg Oral 2 TIMES DAILY 09/01/23 1513      09/01/23 1530  [Held by provider]  levofloxacin (LEVAQUIN) tablet 250 mg        (Held by provider since Wed 9/20/2023 at 2051 by Narendra Levine MD.Hold Reason: Other)    250 mg Oral DAILY 09/01/23 1513            Current antibiotic coverage is appropriate for source of infection.

## 2023-09-28 NOTE — PLAN OF CARE
"[id number]    /55 (BP Location: Left arm, Cuff Size: Adult Regular)   Pulse 98   Temp 98.1  F (36.7  C) (Oral)   Resp 18   Ht 1.74 m (5' 8.5\")   Wt 91.9 kg (202 lb 9.6 oz)   SpO2 97%   BMI 30.36 kg/m      Afebrile, VSS.   No acute event.       NEURO: Alert and oriented x4.      RESPIRATORY: Room Air, denies dizziness, and SOB. Lungs sound, clear, equal bilateral.     CARDIO: VSS, denies dizziness, and extremity pain.      GI/: Denies N/V, BS active, pt reported multiple small looses stools , void urine spontaneously without saving.       SKIN: Intact.      ACTIVITY:      PAIN: Denies pain.    DLA: PICC, double lumen    BG: NO     PLAN:     Continue monitoring.     * Italic, from provider's note.            "

## 2023-09-28 NOTE — PROGRESS NOTES
[facility]    Transplant Infectious Diseases Inpatient Progress Note      Artie Fine MRN# [id number]   YOB: 1955 Age: 68 year old   Date of Admission and time: 9/1/2023  3:13 PM             Recommendations:   Continue aztreonam 1 gram q8 hr, flagyl 500 mg mg q 8hr, daptomycin 6 mg/kg/day.   Check weekly CPK while on daptomycin.         Summary of Presentation:   This patient is a 68 year old male with recent diagnosis of AML s/p 7+3 therapy.   The course is complicated by rash and febrile neutropenia.         Active Problems and Infectious Diseases Issues:   Febrile neutropenia.   Rash.   The differential diagnosis for the febrile neutropenia should include gut bacterial translocation associated with neutropenia typically resulting in fever vs drug-induced rash.   The rash is either ABx-induced and/or chemo-induced. I think the ABx-induced component of the rash is improving. I think the remaining petechial-like rash is chemo-related.     The patient remembers rash while on ceftriaxone for brain abscess.   He was itching while on meropenem, but whether the pruritus was due to meropenem or gadolinium contrast is not clear.     I was inclined to avoid both beta-lactams and carbapenems. Also given rash and use of vancomycin, I am avoiding vancomycin.   It is unlikely that flagyl would result in rash.     We are using aztreonam, daptomycin and flagyl.     I would not add vancomycin, meropenem, ceftriaxone, doxycycline to allergy list yet.    I am not impressed by the CT chest findings to suggest pneumonia.     MRSE bacteriuria.   Without  instrumentation this is a contaminant.     S mitis in blood cx.   In one out of 2 sets from 9/20/23.   Very difficult to ascertain if this is due to GI lucy translocation vs contamination.   Covered by daptomycin.         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 Apparently developed rash on ceftriaxone and was switched to meropenem. MRI in 9/2021 and 10/2021 with decrease in size of abscess. He developed itching while on meropenem and was attributed to MRI contrast.      Other Infectious Disease issues include:  - QTc: 434 as of 9/4/23.   - PJP prophylaxis: none.   - Serostatus: CMV+, EBV+, HSV1+/2+, VZV ?  - Gamma globulin status: ?      Attestation:  Total duration of visit including chart review, reviewing labs and imaging, interviewing and examining the patient, documentation, and sending communication to the primary treating team, all at the same day of this encounter, is: 30 minutes.     Dagoberto Larios MD  [facility]  Contact information available via [facility] Paging/Directory    09/28/2023            Interim History:   Still with fever.   Still very tired and fatigued.   Has watery diarrhea.   No N/V and no abdominal pain.         History of Present Illness:   This patient is a 68 year old male who was diagnosed early 9/2023 with AML when he presented with weeks/months of easy bruisability and weakness and was found to be pancytopenic. BMBx confirmed AML and he received 7+3 cytarabine/daunorubicin starting 9/2/23.   On 9/20/23 fever occurred and was started on cefepime. Vancomycin was added 9/22/23 when blood cx grew S mitis then vancomycin was discontinued after susceptibilities resulted.   Fever persisted and doxycyline was added on 9/24/23 for atypical coverage.     The patient started to develop rash that was first noted on the BiUE then spread. The patient stated that the rash improved after stopping one ABx and starting another; I am not sure if he meant stopping vanco and initiating doxycycline or stopping levaquin and initiating cefepime.     The patient endorsed cough for two weeks, mostly non-productive. No shortness of breath.   Also endorses watery diarrhea.   No N/V and  no abdominal pain.      Exposure History  Was born in [place]. Lives in [place]. Has a large garden that he cares for including planting.   Used to work in constructions.   Did travel in the past to [facility].   No international travels.   No institutionalization and no known TB exposure.           Review of Systems:      As mentioned in the HPI otherwise negative by reviewing constitutional symptoms, central and peripheral neurological systems, respiratory system, cardiac system, GI system,  system, musculoskeletal, skin, allergy, and lymphatics.                Immunizations:     Immunization History   Administered Date(s) Administered    COVID-19 Bivalent 12+ (Pfizer) 10/19/2022    COVID-19 MONOVALENT 12+ (Pfizer) 01/28/2021, 02/18/2021, 09/28/2021            Allergies:     Allergies   Allergen Reactions    Iodinated Contrast Media Rash    Ragweeds Other (See Comments)     Congestion              Medications:   Medications that Require Transfusion:    - MEDICATION INSTRUCTIONS -       Scheduled Medications:    acyclovir  400 mg Oral BID    aztreonam  1 g Intravenous Q8H    calcium carbonate  500 mg Oral BID    cetirizine  10 mg Oral Daily    DAPTOmycin (CUBICIN) 500 mg in sodium chloride 0.9 % 100 mL intermittent infusion  6 mg/kg Intravenous Q24H    hydrOXYzine  50 mg Oral At Bedtime    [Held by provider] levofloxacin  250 mg Oral Daily    metroNIDAZOLE  500 mg Intravenous Q8H    triamcinolone   Topical BID    voriconazole  200 mg Oral Q12H [facility] (08/20)             Physical Exam:   Temp: 98.2  F (36.8  C) Temp src: Oral BP: 118/65 Pulse: 93   Resp: 18 SpO2: 99 % O2 Device: None (Room air)      Wt Readings from Last 4 Encounters:   09/28/23 91.9 kg (202 lb 9.6 oz)   09/01/23 86.2 kg (190 lb 1.6 oz)     Constitutional: awake, alert, cooperative, no apparent distress and appears at stated age, well nourished.   Head, ENT, Eyes, and Neck: Normocephalic, moist buccal mucosa without oral thrush   CVS: distant  HS  Chest: crackles bilaterally.   Skin: no induration, fluctuation or discharge at the PICC line in the RUE. Disseminated maculopapular blanching no rash in combination with petechial rash.            Data:   No results found for: ACD4    Inflammatory Markers    No lab results found.    Immune Globulin Studies   No lab results found.    Metabolic Studies       Recent Labs   Lab Test 09/28/23  0719 09/28/23  0348 09/27/23  1730 09/27/23  1610 09/27/23  0848 09/27/23  0517 09/26/23  2212 09/26/23  1101 09/26/23  0512 09/25/23  1416 09/25/23  0513 09/24/23  1812 09/24/23  0447   NA  --  133*  --   --  132* 134*  --   --  133*  --  132*  --  135*   POTASSIUM  --  3.5 3.8 7.7* 3.4 5.8*  --   --  3.7  --  3.5  --  3.9   CHLORIDE  --  103  --   --  102 106  --   --  105  --  102  --  103   CO2  --  18*  --   --  16* 18*  --   --  19*  --  19*  --  17*   ANIONGAP  --  12  --   --  14 10  --   --  9  --  11  --  15   BUN  --  12.1  --   --  10.7 10.1  --   --  11.0  --  11.4  --  13.8   CR  --  0.88  --   --  0.85 0.84  --   --  0.81  --  0.85  --  0.96   GFRESTIMATED  --  >90  --   --  >90 >90  --   --  >90  --  >90  --  86   GLC  --  123*  --   --  197* 107*  --   --  109*  --  109*  --  118*   VENANCIO  --  7.5*  --   --  7.3* 6.9*  --   --  7.2*  --  7.1*  --  8.0*   PHOS  --  1.6* 1.4* 10.6* 1.3*  --    < > 1.4*  --    < > 1.5*  --  2.5   MAG  --  1.9  --   --   --  1.9  --   --   --   --  2.1  --  2.3   LACT 1.0 2.5*  --   --   --  1.0  --  1.1  --    < >  --    < >  --    CKT  --   --   --   --   --   --   --  80  --   --   --   --   --     < > = values in this interval not displayed.       Hepatic Studies    Recent Labs   Lab Test 09/28/23  0348 09/27/23  0848 09/27/23  0517 09/26/23  0512 09/25/23  0513 09/24/23  0447   BILITOTAL 0.7 0.6 0.6 0.8 0.5 0.6   ALKPHOS 49 49 44 46 45 55   ALBUMIN 2.7* 2.8* 2.5* 2.7* 2.9* 3.3*   AST 53* 69* 63* 44 32 30   ALT 54 61 52 38 29 30     --   --   --  173  --         Pancreatitis testing    No lab results found.    Hematology Studies      Recent Labs   Lab Test 09/28/23  0348 09/27/23  0517 09/26/23  0512 09/25/23 2039 09/25/23  0748 09/24/23  0447 09/18/23  0423 09/17/23  0435 09/11/23  0436 09/10/23  0445 09/09/23  0445 09/08/23  0523 09/07/23  0536   WBC 0.8* 0.4* 0.2* 0.2* 0.1* 0.3*   < > 0.5*   < > 0.8* 0.9* 1.5* 1.4*   ANEU 0.1*  --   --   --   --   --   --  0.0*  --  0.0* 0.1* 0.5* 0.5*   ALYM 0.7*  --   --   --   --   --   --  0.5*  --  0.7* 0.8 0.9 0.9   JAX 0.0  --   --   --   --   --   --  0.0  --  0.0 0.0 0.0 0.0   AEOS 0.0  --   --   --   --   --   --  0.0  --  0.0 0.0 0.0 0.0   HGB 7.8* 6.5* 8.1* 6.5* 5.6* 7.4*   < > 6.9*   < > 8.1* 7.5* 7.8* 7.0*   HCT 23.0* 19.2* 23.6* 19.3* 16.3* 21.7*   < > 21.1*   < > 25.6* 24.7* 25.6* 23.5*   PLT 10* 18* 9* 12* 14* 9*   < > 16*   < > 26* 33* 51* 31*    < > = values in this interval not displayed.       Clotting Studies    Recent Labs   Lab Test 09/28/23 0348 09/25/23  0513 09/21/23  0515 09/18/23 0423   INR 1.43* 1.26* 1.25* 1.13   PTT 41* 37 38 35       Arterial Blood Gas Testing  No lab results found.     Urine Studies     Recent Labs   Lab Test 09/21/23  0009   URINEPH 6.5   NITRITE Negative   LEUKEST Negative   WBCU <1       Vancomycin Levels     No lab results found.    Invalid input(s): VANCO    Tobramycin levels     No lab results found.    Gentamicin levels    No lab results found.    Tacrolimus levels    Invalid input(s): TACROLIMUS, TAC, TACR      Latest Ref Rng & Units 9/28/2023     3:48 AM 9/27/2023     8:48 AM 9/27/2023     5:17 AM 9/26/2023     5:12 AM 9/25/2023     8:39 PM   Transplant Immunosuppression Labs   Creat 0.67 - 1.17 mg/dL 0.88  0.85  0.84  0.81     Urea Nitrogen 8.0 - 23.0 mg/dL 12.1  10.7  10.1  11.0     WBC 4.0 - 11.0 10e3/uL 0.8   0.4  0.2  0.2    Neutrophil % 12        ANEU 1.6 - 8.3 10e3/uL 0.1            Cyclosporine levels    Invalid input(s): CYCLOSPORINE, CYC    Mycophenolate  levels    Invalid input(s): MYPA, MYP    Sirolimus levels    Invalid input(s): SIROLIMUS, SIR, RAPA    CSF testing     Recent Labs   Lab Test 09/08/23  1159 09/08/23  1158   CWBC 5  --    CRBC 1  --    CGLU  --  51   CTP  --  46.4*         Microbiology:  Blood cx negative.   Last check of C difficile  C Difficile Toxin B by PCR   Date Value Ref Range Status   09/22/2023 Negative Negative Final     Comment:     A negative result does not exclude actual disease due to C. difficile and may be due to improper collection, handling and storage of the specimen or the number of organisms in the specimen is below the detection limit of the assay.       Virology:  CMV viral loads  No results found for: CMVRESINST, 73484, 05721, 61460, 30263, CMVQAL  CMV viral loads  No lab results found.    CMV viral loads    CMV DNA IU/mL   Date Value Ref Range Status   09/27/2023 Not Detected Not Detected IU/mL Final       CMV resistance testing  No lab results found.  No results found for: CMVCID, CMVFOS, CMVGAN, CMVDRUGRES     No results found for: H6RES    No results found for: EBVDN, EBRES, EBVDN, EBVSP, EBVPC, EBVPCR    CMV Antibody IgG   Date Value Ref Range Status   09/02/2023 Positive, suggests recent or past exposure. (A) No detectable antibody.  Final   09/01/2023 Positive, suggests recent or past exposure. (A) No detectable antibody.  Final       No results found for: EBIG2, EBIGM, TOXG      Imaging:  CT chest WO 9/24/23   IMPRESSION:    1. Basilar reticular opacities are indeterminate, possibly atypical  infection versus scarring and subsegmental atelectasis.        Dagoberto Larios MD  [facility]  Contact information available via [facility] Paging/Directory     09/28/2023

## 2023-09-28 NOTE — PROGRESS NOTES
[facility]    Hematology / Oncology Progress Note    Patient: Artie Fine  MRN: [id number]  Admission Date: 9/1/2023  Date of Service (when I saw the patient): 09/28/2023  Hospital Day # 27     Assessment & Plan   Artie Fine is a 68 year old male with a history of brain abscess (2021), HTN, and BPH. He was transferred from [facility] in [place], [place] to [facility] for work up of pancytopenia; he was noted to have circulating blasts concerning for acute leukemia, and was subsequently transferred to [facility]. He underwent a diagnostic bone marrow biopsy on 9/1/23 which confirmed a new diagnosis of AML. He was started on induction with 7+3 (C1D1=9/2/23). Hospital course has been complicated by rhinovirus infection, recurrent neutropenic fever,  S. mitis bacteremia, and S. epidermidis UTI.     Today:  - Day 27 from 7+3 induction chemotherapy  - Sepsis rapid called overnight for lactate 2.5.  Appears clinically dry per overnight provider.  Was given albumin and repeat lactate improved to 1.0.  Received 20 mg IV Lasix yesterday, with net output -1600 ml.   - Generalized edema today including bilateral upper and lower extremities, minimally improved when compared with yesterday. Likely in setting of fluids (antibiotics), hypoalbuminemia, and third spacing.  - Strict I/O's and daily weights.  - Lymphedema consulted, appreciate assistance  - No respiratory symptoms, O2 sats 99% and breathing comfortably on room air  - Last documented fever to 100.6 F, 9/28 AM though normal with recheck 20 mins later  - Under ID direction, antibiotics transitioned to aztreonam, daptomycin, Flagyl (9/26)  - Bcx 9/20/23 + streptococcus mitis (intermediate to penicillins, otherwise pansensitive)  - UCx + s. Epidermis (MRSE, noted susceptible to doxy)  - Chest CT showing basilar reticular opacities; indeterminate, possible atypical infection vs. scarring/atelectasis.  - CT abdomen pelvis  (WO contrast due to patient concern for allergy); noting distal esophageal wall thickening with adjacent borderline enlarged gastrohepatic lymph node   - Skin rash somewhat different today; diffuse erythema improving, with underlying petechial rash existing to bilateral upper and lower extremities.  No open wounds, lesions.  Denies pain or itching.  Dermatology has evaluated, consistent with morbilliform eruption likely component of toxic erythema of chemotherapy.  - EBV, CMV, HHV PCR added per Derm recommendations triamcinolone 0.1% ointment twice daily to affected area added  - Hydrocortisone 2.5% cream twice daily to face added  - Continue to monitor with daily CBC/differential and CMP to monitor for progression to DIHS/DRESS  - Calcium low at 7.5; when corrected for hypoalbuminemia, ionized calcium 4.1.  - Calcium carbonate BID and will continue to monitor  - Receiving 1 unit plts for AM labs 10K  - Will need end of cycle bone marrow biopsy when evidence of count recovery      HEME/ONC  # AML, adverse risk  Patient initially presented to [facility] in [place], [place] with 4-6 weeks of progressive bruising, weakness, fatigue, loss of appetite, and night sweats. He also noted a headache similar to his previous brain abscess. CBC notable for pancytopenia; WBC 2.7 (), Hgb 8.1, and plt 1k. He was transferred to [facility] and was found on peripheral flow w/ 11% circulating myeloid blasts. He was then transferred to [facility] for further work up and treatment of AML. Diagnostic BMBx performed 9/1 which showed AML with 30% blasts by morph and 47% by flow. P53 negative. FISH negative for MLLT10, NUP98, and KMT2A. Normal karyotype. NGS showed ASXL1, CEBPA, SRSF2, and STAG2 mutations. Started 7+3 (C1D1=9/2/23).   - Baseline TTE showing normal LVEF of 55-60%. Baseline EKG showed NSR and QTc of 434.   - Viral serologies: HepB/C-, HIV-. HSV1/2+, EBV IgG+, CMV IgG+  - PICC placed 9/1  - S/p diagnostic LP 9/8/23 for CNS  leukemia work up as patient endorses headache upon admission w/ new AML diagnosis. CSF flow negative. MRI brain 9/11 negative, only showing chronic changes from h/o brain abscess.   - D14 BMBx w/o evidence of leukemia.   - HLA typing ordered. S/p BMT NT 9/22 with Dr. Cam Edward. Starting donor search. Plan for JIMMY in CR1.   - Will need end of cycle bone marrow biopsy when evidence of count recovery                  Treatment Plan: 7+3 (C1D1=9/2/23)                - Cytarabine 100 mg/m2 - D1-D7               - Daunorubicin 60 mg/m2 - D1-D3               - Supportive meds: Decadron 12 mg D1-D3, Zofran     # Low risk for TLS/DIC  - S/p allopurinol  - Montior TLS/DIC labs twice weekly     ID  # Neutropenic fever  # BCx 9/20 1/2 positive for strep mitis  # UCx 9/20 with 10-50k MRSE  # Diarrhea, resolved  Tmax 102.7 on 9/20 evening. Pt has been having cold symptoms as below in the setting of positive rhinovirus on 9/16. Cold symptoms on improving trajectory at time of new fever. Pt had diarrhea around time of fever onset, but had recently received MiraLax for constipation. Diarrhea persisted after MiraLax effect waned. Overnight 9/21-9/22, pt was too weak to return to bed from bathroom, called RN to assist.  Otherwise no localizing signs/symptoms of infection.  - Work up:   - BCx 9/20 1/2 positive for strep mitis, pan-sensitive  - BCx 9/21-9/23 NGTD  - UCx 9/20 grew 10-50k staph epi in the setting of bland UA and no urinary symptoms. Oxacillin resistant.   - CXR unremarkable  - C diff negative  - CT chest indeterminate  - CT abdomen pelvis with reticulonodular attenuation in lung bases, suspicious for infection versus aspiration, distal esophageal wall thickening with adjacent borderline enlarged gastrohepatic lymph node, digestive esophagitis though endoscopy recommended to exclude neoplasm  - ID consulted 9/26 in setting of persisting neutropenic fevers on antibiotics, appreciate input  - Discontinue cefepime and  doxycycline (9/26)  - Start aztreonam, Flagyl, daptomycin (9/26)  - CK 9/26 wnl, monitor with weekly labs while on daptomycin  - Antibiotics:   - Cefepime 2 g q 8hr x9/20-9/26/23  - S/p vancomycin 9/22-9/23, discontinued once BCx susceptibilities resulted & d/t diffuse red rash  - Doxycycline 100 mg BID 9/23-9/26/23, started for possible MRSE UTI and empirically for atypical pneumonia  - Aztreonam 1g q8h 9/26-X  - Flagyl 500 mg q8h 9/26-X  - Daptomycin 6 mg/kg/day 9/26-X    # Rhinovirus, improved  # Post nasal drip, improving  # Cough, improving  Nasal congestion and post nasal drip 9/15 morning upon waking. H/o seasonal allergies. On 9/16, noted development of productive cough and worsening nasal congestion and post-nasal drip. One episode of blood-tinged sputum, now resolved. COVID/flu/RSV negative, but RVP positive for rhinovirus. Although new neutropenic fever noted 9/20, URI symptoms are improving.   - Continue PTA cetirizine  - Supportive: Tessalon Perles, Mucinex DM, Hurricaine spray    # Immunosuppressed 2/2 malignancy and chemotherapy  # ID PPX  -  mg BID  - Levofloxacin 250 mg daily - on hold with treatment dose antibiotics as above  - Voriconazole 200 mg BID x9/10     # H/o brain abscess (2021)  # Headache, resolved  Treated in [place]. S/p craniotomy for drainage of abscess July 2021. It is unclear what culture speciated to, but he completed a course of antibiotics with ceftriaxone then meropenem with resolution on imaging. Head CT 8/31 at OSH revealed no intracranial hemorrhage midline shift or mass effect. Postsurgical changes noted. Previous area of infection demonstrates trace amount of encephalomalacia but no mass effect or inflammatory changes to suggest new or acute infection. Otherwise unremarkable. Patient presented initially with headache and dizziness, which has now resolved.   - See work up above for CNS leukemia work-up, which was negative     GI  # Esophageal wall thickening, seen  on CT  CT abd/pelvis 9/26 obtained in setting of recurrent neutropenic fevers, strep mitis bacteremia.  On scan, noted distal esophageal wall thickening with adjacent borderline enlarged gastrohepatic lymph node, digestive esophagitis though endoscopy recommended to exclude neoplasm  - Given current pancytopenia s/p induction chemotherapy, concern for significant risk with endoscopy at this time.    - We will continue to monitor and defer for follow-up with repeat imaging/endoscopy when appropriate  - No GI symptoms to correlate    CHRONIC  # HTN  Noted during prior hospitalization for brain abscess in 2021. Previously on lisinopril, which he is no longer taking.  - Patient BP's have fluctuated this admission, w/occasionally elevated BPs. Continue to monitor.     # BPH  Notes difficulty with complete emptying of bladder. Previously on Flomax but states it did nothing, so he stopped it. He does note frequent urination at baseline, currently urination remains unchanged from baseline.  - Monitor clinically     MISC  # Mobiliform rash  On 9/23 AM, pt noted to have ocular papular rash on upper thighs and trunk.  Likely drug rash? Patient endorsing mild pruritis to back rash, denies concerning symptoms; no evidence of angioedema, denies SOB.  Vancomycin discontinued 9/23 with concern this may be attributing.  - Benadryl and kenalog cream PRN  - Continue to monitor for improvement s/p discontinued vancomycin.  - Rash worsening as of 9/26/2023 - likely drug rash in setting of antibiotics vs chemotherapy vs other  - In discussion with Dr. Levine, given worsening of rash, dermatology consulted; appreciate recs  - Consistent with morbilliform eruption with likely component of toxic erythema of chemotherapy  - EBV, CMV, HHV PCR added per Derm recommendations triamcinolone 0.1% ointment twice daily to affected area added  - Hydrocortisone 2.5% cream twice daily to face added  - Continue to monitor with daily CBC/differential  "and CMP to monitor for progression to DIHS/DRESS    # RUE edema  Noted ~9/25, unilateral RUE 1-2+ nonpitting edema. Denies pain associated.  On exam, edema right upper extremity noted, diffuse skin rash expanding from trunk/chest into bilateral upper extremities, though edema does not seem to be associated with the rash.  Nontender to palpation.  Range of motion intact at joint lines of upper extremity.  No pustular, vesicular lesions, open wounds, or dehiscence.  PICC in place to right upper extremity, functioning appropriately, nontender at site, and dressing is clean dry intact.  - US negative for DVT    # Bilateral lower and upper extremity edema  Noted generalized edema bilateral upper and lower extremities on exam 9/27/2023.  Noted weight increase of approximately 10 pounds since prior day.  Likely third spacing in setting of hypoalbuminemia.  Not currently on IV fluids, though noted IV antibiotics administered with fluids.  Had previously been diuresed during admission, response to Lasix.  - Lymphedema consult placed for compression wraps  - Lasix 20 mg IV x1 (9/27), significant response with net -1600ml   - Will eval daily for diuresis needs    # Weakness  # Deconditioning  Pt reports issues w/ balance after his brain abscess in 2021, exacerbated when ambulating. Deconditioning acute-on-chronic upon admission since 2021.   - PT consulted    # Episodes of Hypotension   # Dizziness  # Fall risk  Per chart review, patient had \"near fall\" on 9/24/23 when he attempted to get up from bed independently to ambulate to the restroom. He has been reported intermittent episodes of dizziness since admission, however, he notices this has increased in the past few days. He is also experiencing hypotensive episodes, requiring 1.5 L fluid support on 9/24-9/25.  - Feeling improved AM 9/26  - Continue to monitor closely  - Consider IVF bolus prn    # Weight loss  # Loss of appetite  # Moderate malnutrition in the context of " "acute illness   Patient presented with loss of appetite 2/2 malignancy. He has been gradually losing weight throughout admission, although PO intake is overall sufficient.   - RD consulted  - Ensure BID between meals and PRN    # Hyponatremia  # Hypophosphatemia  - Replete per protocol    # Hypocalcemia  Noted hypocalcemia trends on labs and downtrending 9/24. Ionized calcium low at 4.1 when corrected for hypoalbuminemia.   - calcium carbonate BID  - continue to monitor with daily labs     Clinically Significant Risk Factors        # Hyperkalemia: Highest K = 7.7 mmol/L in last 2 days, will monitor as appropriate   # Hypocalcemia: Lowest iCa = 3.8 mg/dL in last 2 days, will monitor and replace as appropriate     # Hypoalbuminemia: Lowest albumin = 2.5 g/dL at 9/27/2023  5:17 AM, will monitor as appropriate  # Coagulation Defect: INR = 1.43 (Ref range: 0.85 - 1.15) and/or PTT = 41 Seconds (Ref range: 22 - 38 Seconds), will monitor for bleeding    # Thrombocytopenia: Lowest platelets = 10 in last 2 days, will monitor for bleeding          # Obesity: Estimated body mass index is 30.36 kg/m  as calculated from the following:    Height as of this encounter: 1.74 m (5' 8.5\").    Weight as of this encounter: 91.9 kg (202 lb 9.6 oz).       # Moderate Malnutrition: based on nutrition assessment           FEN  Diet: Regular Diet Adult   IVF: Bolus PRN   Lytes: Replete per protocol     PPX  VTE: None given thrombocytopenia  Bowel: Senna/MiraLax PRN  GI/PUD: None currently indicated     MISC  Code Status: Full Code   Lines/Drains: PICC  Dispo: Will remain inpatient through count recovery with safe discharge plan outpatient.   Follow Up: Pt will follow with Dr. Garcia, appointment scheduled for end of September. Pt would prefer labs and blood transfusions done closer to home in [facility].    Patient was seen and plan of care was discussed with attending physician Dr. Erickson    I spent >50 minutes face-to-face or " "coordinating care of Artie Fine. Over 50% of our time on the unit was spent counseling the patient and coordinating care.    Sheree Chowdhury PA-C  Hematology/Oncology  Pager #[phone]    Interval History   Nursing notes reviewed, fever noted overnight. DONNA Alva is feeling okay today. He notes \"a fever breaking\" overnight with associated chills. Continues to have generalized weakness and fatigue, but felt stronger getting up and going to the bathroom today. Is looking forward to taking a shower and getting washed up. He notes the skin rash looks a little different but is not itching or painful. He continues to have some swelling of upper and lower extremities but denies pain, numbness, or tingling.  Denies headache, dizziness, sore throat, runny nose, cough, chest pain, shortness of breath, abdominal pain, nausea, vomiting, diarrhea, urinary symptoms.  We reviewed plan for 1 unit of platelets today, continued antibiotics, and symptomatic supportive cares. He is agreeable with this plan. Questions addressed at bedside.      Vital Signs with Ranges  Temp:  [97.7  F (36.5  C)-100.6  F (38.1  C)] 98.2  F (36.8  C)  Pulse:  [] 93  Resp:  [15-18] 18  BP: (106-126)/(46-65) 118/65  SpO2:  [93 %-99 %] 99 %  I/O last 3 completed shifts:  In: 0   Out: 1650 [Urine:1650]    Physical Exam     Constitutional: Awake and conversational. Non- toxic appearing. No acute distress.   HEENT: Normocephalic. Moist mucus membranes without lesions, thrush, or exudates appreciated  Lymph: Neck supple, no ridigity. No significant adenopathy noted.   Respiratory: Breathing comfortably on room air with no accessory muscle use. Speaking in full sentences, no evidence of respiratory distress.  Breath sounds diminished bilateral bases, otherwise clear to auscultation without stridor, wheeze, rhonchi, or rales.   Cardiovascular: Regular rate and rhythm. 2+ radial pulses bilaterally.  1-2+ nonpitting edema bilateral upper and lower extremities.  "   GI: Abdomen with normoactive bowel sounds, soft and non-tender throughout. No rebound, guarding, or peritoneal sign.   Skin: Skin is clean, dry, intact.  Diffuse maculopapular rash over trunk including chest, back, abdomen, extending up towards neck, and proximal bilateral upper and lower extremities.  No open wounds, vesicular papular pustular lesions, drainage.  No crepitus.    Neurologic: Alert with normal speech. Grossly nonfocal.  Moves extremities spontaneously.    Neuropsychiatric: Calm, affect congruent to situation.   Vascular access: PICC on RUE CDI      Medications    - MEDICATION INSTRUCTIONS -        acyclovir  400 mg Oral BID    aztreonam  1 g Intravenous Q8H    calcium carbonate  500 mg Oral BID    cetirizine  10 mg Oral Daily    DAPTOmycin (CUBICIN) 500 mg in sodium chloride 0.9 % 100 mL intermittent infusion  6 mg/kg Intravenous Q24H    hydrOXYzine  50 mg Oral At Bedtime    [Held by provider] levofloxacin  250 mg Oral Daily    metroNIDAZOLE  500 mg Intravenous Q8H    triamcinolone   Topical BID    voriconazole  200 mg Oral Q12H [facility] (08/20)     Data   Results for orders placed or performed during the hospital encounter of 09/01/23 (from the past 24 hour(s))   Potassium   Result Value Ref Range    Potassium 7.7 (HH) 3.4 - 5.3 mmol/L   Phosphorus   Result Value Ref Range    Phosphorus 10.6 (H) 2.5 - 4.5 mg/dL   Potassium   Result Value Ref Range    Potassium 3.8 3.4 - 5.3 mmol/L   Phosphorus   Result Value Ref Range    Phosphorus 1.4 (L) 2.5 - 4.5 mg/dL   ABO/Rh type and screen    Narrative    The following orders were created for panel order ABO/Rh type and screen.  Procedure                               Abnormality         Status                     ---------                               -----------         ------                     Adult Type and Screen[[id number]]                            Final result                 Please view results for these tests on the individual orders.   CBC with  platelets differential    Narrative    The following orders were created for panel order CBC with platelets differential.  Procedure                               Abnormality         Status                     ---------                               -----------         ------                     CBC with platelets and d...[[id number]]  Abnormal            Final result               Manual Differential[[id number]]          Abnormal            Final result                 Please view results for these tests on the individual orders.   Phosphorus   Result Value Ref Range    Phosphorus 1.6 (L) 2.5 - 4.5 mg/dL   Fibrinogen activity   Result Value Ref Range    Fibrinogen Activity 556 (H) 170 - 490 mg/dL   INR   Result Value Ref Range    INR 1.43 (H) 0.85 - 1.15   Lactate Dehydrogenase   Result Value Ref Range    Lactate Dehydrogenase 216 0 - 250 U/L   Magnesium   Result Value Ref Range    Magnesium 1.9 1.7 - 2.3 mg/dL   Partial thromboplastin time   Result Value Ref Range    aPTT 41 (H) 22 - 38 Seconds   Uric acid   Result Value Ref Range    Uric Acid 2.0 (L) 3.4 - 7.0 mg/dL   Comprehensive metabolic panel   Result Value Ref Range    Sodium 133 (L) 135 - 145 mmol/L    Potassium 3.5 3.4 - 5.3 mmol/L    Carbon Dioxide (CO2) 18 (L) 22 - 29 mmol/L    Anion Gap 12 7 - 15 mmol/L    Urea Nitrogen 12.1 8.0 - 23.0 mg/dL    Creatinine 0.88 0.67 - 1.17 mg/dL    GFR Estimate >90 >60 mL/min/1.73m2    Calcium 7.5 (L) 8.8 - 10.2 mg/dL    Chloride 103 98 - 107 mmol/L    Glucose 123 (H) 70 - 99 mg/dL    Alkaline Phosphatase 49 40 - 129 U/L    AST 53 (H) 0 - 45 U/L    ALT 54 0 - 70 U/L    Protein Total 6.0 (L) 6.4 - 8.3 g/dL    Albumin 2.7 (L) 3.5 - 5.2 g/dL    Bilirubin Total 0.7 <=1.2 mg/dL   Lactic acid whole blood   Result Value Ref Range    Lactic Acid 2.5 (H) 0.7 - 2.0 mmol/L   Adult Type and Screen   Result Value Ref Range    Antibody Screen Negative Negative    SPECIMEN EXPIRATION DATE [id number]    CBC with platelets and  "differential   Result Value Ref Range    WBC Count 0.8 (LL) 4.0 - 11.0 10e3/uL    RBC Count 2.68 (L) 4.40 - 5.90 10e6/uL    Hemoglobin 7.8 (L) 13.3 - 17.7 g/dL    Hematocrit 23.0 (L) 40.0 - 53.0 %    MCV 86 78 - 100 fL    MCH 29.1 26.5 - 33.0 pg    MCHC 33.9 31.5 - 36.5 g/dL    RDW 13.7 10.0 - 15.0 %    Platelet Count 10 (LL) 150 - 450 10e3/uL    Narrative    Previously reported component [ NRBCs ] is no longer reported.\"  Previously reported component [ NRBCs Absolute ] is no longer reported.\"   ABO/RH Type & Screen   Result Value Ref Range    SPECIMEN EXPIRATION DATE [id number]     ABORH A POS    Manual Differential   Result Value Ref Range    % Neutrophils 12 %    % Lymphocytes 86 %    % Monocytes 2 %    % Eosinophils 0 %    % Basophils 0 %    Absolute Neutrophils 0.1 (LL) 1.6 - 8.3 10e3/uL    Absolute Lymphocytes 0.7 (L) 0.8 - 5.3 10e3/uL    Absolute Monocytes 0.0 0.0 - 1.3 10e3/uL    Absolute Eosinophils 0.0 0.0 - 0.7 10e3/uL    Absolute Basophils 0.0 0.0 - 0.2 10e3/uL    RBC Morphology Confirmed RBC Indices     Platelet Assessment  Automated Count Confirmed. Platelet morphology is normal.     Automated Count Confirmed. Platelet morphology is normal.   Lactic acid whole blood   Result Value Ref Range    Lactic Acid 1.0 0.7 - 2.0 mmol/L   CONDITIONAL Prepare pheresed platelets (unit)   Result Value Ref Range    Blood Component Type Platelets     Product Code X7824S82     Unit Status Transfused     Unit Number [id number]     CODING SYSTEM [id number]     ISSUE DATE AND TIME [id number]     UNIT ABO/RH A+     UNIT TYPE ISBT 6200        "

## 2023-09-29 ENCOUNTER — APPOINTMENT (OUTPATIENT)
Dept: OCCUPATIONAL THERAPY | Facility: CLINIC | Age: 68
DRG: 835 | End: 2023-09-29
Attending: INTERNAL MEDICINE
Payer: COMMERCIAL

## 2023-09-29 LAB
ALBUMIN SERPL BCG-MCNC: 2.5 G/DL (ref 3.5–5.2)
ALP SERPL-CCNC: 46 U/L (ref 40–129)
ALT SERPL W P-5'-P-CCNC: 43 U/L (ref 0–70)
ANION GAP SERPL CALCULATED.3IONS-SCNC: 9 MMOL/L (ref 7–15)
APTT PPP: 44 SECONDS (ref 22–38)
AST SERPL W P-5'-P-CCNC: 51 U/L (ref 0–45)
BASOPHILS # BLD MANUAL: 0 10E3/UL (ref 0–0.2)
BASOPHILS NFR BLD MANUAL: 0 %
BILIRUB SERPL-MCNC: 0.6 MG/DL
BLD PROD TYP BPU: NORMAL
BLOOD COMPONENT TYPE: NORMAL
BUN SERPL-MCNC: 10.8 MG/DL (ref 8–23)
CALCIUM SERPL-MCNC: 7.2 MG/DL (ref 8.8–10.2)
CHLORIDE SERPL-SCNC: 104 MMOL/L (ref 98–107)
CODING SYSTEM: NORMAL
CREAT SERPL-MCNC: 0.85 MG/DL (ref 0.67–1.17)
CROSSMATCH: NORMAL
DEPRECATED HCO3 PLAS-SCNC: 20 MMOL/L (ref 22–29)
EGFRCR SERPLBLD CKD-EPI 2021: >90 ML/MIN/1.73M2
EOSINOPHIL # BLD MANUAL: 0 10E3/UL (ref 0–0.7)
EOSINOPHIL NFR BLD MANUAL: 0 %
ERYTHROCYTE [DISTWIDTH] IN BLOOD BY AUTOMATED COUNT: 13.7 % (ref 10–15)
FIBRINOGEN PPP-MCNC: 513 MG/DL (ref 170–490)
GLUCOSE SERPL-MCNC: 114 MG/DL (ref 70–99)
HCT VFR BLD AUTO: 18.7 % (ref 40–53)
HGB BLD-MCNC: 6.5 G/DL (ref 13.3–17.7)
INR PPP: 1.55 (ref 0.85–1.15)
ISSUE DATE AND TIME: NORMAL
LACTATE SERPL-SCNC: 1.2 MMOL/L (ref 0.7–2)
LYMPHOCYTES # BLD MANUAL: 0.6 10E3/UL (ref 0.8–5.3)
LYMPHOCYTES NFR BLD MANUAL: 83 %
MAGNESIUM SERPL-MCNC: 2 MG/DL (ref 1.7–2.3)
MCH RBC QN AUTO: 29.8 PG (ref 26.5–33)
MCHC RBC AUTO-ENTMCNC: 34.8 G/DL (ref 31.5–36.5)
MCV RBC AUTO: 86 FL (ref 78–100)
MONOCYTES # BLD MANUAL: 0 10E3/UL (ref 0–1.3)
MONOCYTES NFR BLD MANUAL: 1 %
NEUTROPHILS # BLD MANUAL: 0.1 10E3/UL (ref 1.6–8.3)
NEUTROPHILS NFR BLD MANUAL: 16 %
PHOSPHATE SERPL-MCNC: 1.5 MG/DL (ref 2.5–4.5)
PLAT MORPH BLD: ABNORMAL
PLATELET # BLD AUTO: 21 10E3/UL (ref 150–450)
POTASSIUM SERPL-SCNC: 3.5 MMOL/L (ref 3.4–5.3)
PROT SERPL-MCNC: 5.4 G/DL (ref 6.4–8.3)
RBC # BLD AUTO: 2.18 10E6/UL (ref 4.4–5.9)
RBC MORPH BLD: ABNORMAL
SODIUM SERPL-SCNC: 133 MMOL/L (ref 135–145)
UNIT ABO/RH: NORMAL
UNIT NUMBER: NORMAL
UNIT STATUS: NORMAL
UNIT TYPE ISBT: 5100
WBC # BLD AUTO: 0.7 10E3/UL (ref 4–11)

## 2023-09-29 PROCEDURE — 85007 BL SMEAR W/DIFF WBC COUNT: CPT | Performed by: PHYSICIAN ASSISTANT

## 2023-09-29 PROCEDURE — 250N000013 HC RX MED GY IP 250 OP 250 PS 637: Performed by: PHYSICIAN ASSISTANT

## 2023-09-29 PROCEDURE — 250N000013 HC RX MED GY IP 250 OP 250 PS 637

## 2023-09-29 PROCEDURE — 85730 THROMBOPLASTIN TIME PARTIAL: CPT | Performed by: PHYSICIAN ASSISTANT

## 2023-09-29 PROCEDURE — 84100 ASSAY OF PHOSPHORUS: CPT | Performed by: INTERNAL MEDICINE

## 2023-09-29 PROCEDURE — 83735 ASSAY OF MAGNESIUM: CPT | Performed by: INTERNAL MEDICINE

## 2023-09-29 PROCEDURE — 99232 SBSQ HOSP IP/OBS MODERATE 35: CPT | Performed by: INTERNAL MEDICINE

## 2023-09-29 PROCEDURE — 250N000011 HC RX IP 250 OP 636: Performed by: PHYSICIAN ASSISTANT

## 2023-09-29 PROCEDURE — 87799 DETECT AGENT NOS DNA QUANT: CPT | Performed by: PHYSICIAN ASSISTANT

## 2023-09-29 PROCEDURE — P9016 RBC LEUKOCYTES REDUCED: HCPCS | Performed by: PHYSICIAN ASSISTANT

## 2023-09-29 PROCEDURE — 258N000003 HC RX IP 258 OP 636: Performed by: PHYSICIAN ASSISTANT

## 2023-09-29 PROCEDURE — 83605 ASSAY OF LACTIC ACID: CPT | Performed by: STUDENT IN AN ORGANIZED HEALTH CARE EDUCATION/TRAINING PROGRAM

## 2023-09-29 PROCEDURE — 85027 COMPLETE CBC AUTOMATED: CPT | Performed by: PHYSICIAN ASSISTANT

## 2023-09-29 PROCEDURE — 120N000002 HC R&B MED SURG/OB UMMC

## 2023-09-29 PROCEDURE — 87533 HHV-6 DNA QUANT: CPT | Performed by: PHYSICIAN ASSISTANT

## 2023-09-29 PROCEDURE — 97140 MANUAL THERAPY 1/> REGIONS: CPT | Mod: GO | Performed by: OCCUPATIONAL THERAPIST

## 2023-09-29 PROCEDURE — 85384 FIBRINOGEN ACTIVITY: CPT | Performed by: PHYSICIAN ASSISTANT

## 2023-09-29 PROCEDURE — 85610 PROTHROMBIN TIME: CPT | Performed by: PHYSICIAN ASSISTANT

## 2023-09-29 PROCEDURE — 250N000011 HC RX IP 250 OP 636: Mod: JZ

## 2023-09-29 PROCEDURE — 250N000013 HC RX MED GY IP 250 OP 250 PS 637: Performed by: HOSPITALIST

## 2023-09-29 PROCEDURE — 250N000013 HC RX MED GY IP 250 OP 250 PS 637: Performed by: INTERNAL MEDICINE

## 2023-09-29 PROCEDURE — 80053 COMPREHEN METABOLIC PANEL: CPT | Performed by: PHYSICIAN ASSISTANT

## 2023-09-29 RX ORDER — CALCIUM CARBONATE 500 MG/1
500 TABLET, CHEWABLE ORAL 3 TIMES DAILY
Status: DISCONTINUED | OUTPATIENT
Start: 2023-09-29 | End: 2023-10-03 | Stop reason: HOSPADM

## 2023-09-29 RX ADMIN — TRIAMCINOLONE ACETONIDE: 1 OINTMENT TOPICAL at 09:00

## 2023-09-29 RX ADMIN — AZTREONAM 1 G: 1 INJECTION, POWDER, LYOPHILIZED, FOR SOLUTION INTRAMUSCULAR; INTRAVENOUS at 13:39

## 2023-09-29 RX ADMIN — AZTREONAM 1 G: 1 INJECTION, POWDER, LYOPHILIZED, FOR SOLUTION INTRAMUSCULAR; INTRAVENOUS at 06:14

## 2023-09-29 RX ADMIN — DAPTOMYCIN 500 MG: 500 INJECTION, POWDER, LYOPHILIZED, FOR SOLUTION INTRAVENOUS at 18:08

## 2023-09-29 RX ADMIN — VORICONAZOLE 200 MG: 200 TABLET ORAL at 09:06

## 2023-09-29 RX ADMIN — POTASSIUM & SODIUM PHOSPHATES POWDER PACK 280-160-250 MG 2 PACKET: 280-160-250 PACK at 21:05

## 2023-09-29 RX ADMIN — METRONIDAZOLE 500 MG: 500 INJECTION, SOLUTION INTRAVENOUS at 09:04

## 2023-09-29 RX ADMIN — CALCIUM CARBONATE (ANTACID) CHEW TAB 500 MG 500 MG: 500 CHEW TAB at 09:00

## 2023-09-29 RX ADMIN — CALCIUM CARBONATE (ANTACID) CHEW TAB 500 MG 500 MG: 500 CHEW TAB at 21:05

## 2023-09-29 RX ADMIN — SODIUM CHLORIDE, PRESERVATIVE FREE 5 ML: 5 INJECTION INTRAVENOUS at 11:51

## 2023-09-29 RX ADMIN — CETIRIZINE HYDROCHLORIDE 10 MG: 10 TABLET, FILM COATED ORAL at 09:00

## 2023-09-29 RX ADMIN — AZTREONAM 1 G: 1 INJECTION, POWDER, LYOPHILIZED, FOR SOLUTION INTRAMUSCULAR; INTRAVENOUS at 21:04

## 2023-09-29 RX ADMIN — ACYCLOVIR 400 MG: 400 TABLET ORAL at 09:00

## 2023-09-29 RX ADMIN — METRONIDAZOLE 500 MG: 500 INJECTION, SOLUTION INTRAVENOUS at 01:19

## 2023-09-29 RX ADMIN — VORICONAZOLE 200 MG: 200 TABLET ORAL at 21:05

## 2023-09-29 RX ADMIN — METRONIDAZOLE 500 MG: 500 INJECTION, SOLUTION INTRAVENOUS at 18:08

## 2023-09-29 RX ADMIN — POTASSIUM & SODIUM PHOSPHATES POWDER PACK 280-160-250 MG 2 PACKET: 280-160-250 PACK at 18:08

## 2023-09-29 RX ADMIN — POTASSIUM & SODIUM PHOSPHATES POWDER PACK 280-160-250 MG 2 PACKET: 280-160-250 PACK at 13:39

## 2023-09-29 RX ADMIN — HYDROXYZINE HYDROCHLORIDE 50 MG: 25 TABLET, FILM COATED ORAL at 21:05

## 2023-09-29 RX ADMIN — CALCIUM CARBONATE (ANTACID) CHEW TAB 500 MG 500 MG: 500 CHEW TAB at 13:39

## 2023-09-29 RX ADMIN — ACYCLOVIR 400 MG: 400 TABLET ORAL at 21:05

## 2023-09-29 ASSESSMENT — ACTIVITIES OF DAILY LIVING (ADL)
ADLS_ACUITY_SCORE: 30

## 2023-09-29 NOTE — PROGRESS NOTES
[facility]    Hematology / Oncology Progress Note    Patient: Artie Fine  MRN: [id number]  Admission Date: 9/1/2023  Date of Service (when I saw the patient): 09/29/2023  Hospital Day # 28     Assessment & Plan   Artie Fine is a 68 year old male with a history of brain abscess (2021), HTN, and BPH. He was transferred from [facility] in [place], [place] to [facility] for work up of pancytopenia; he was noted to have circulating blasts concerning for acute leukemia, and was subsequently transferred to [facility]. He underwent a diagnostic bone marrow biopsy on 9/1/23 which confirmed a new diagnosis of AML. He was started on induction with 7+3 (C1D1=9/2/23). Hospital course has been complicated by rhinovirus infection, recurrent neutropenic fever,  S. mitis bacteremia, and S. epidermidis UTI.     Today:  - Day 28 from 7+3 induction chemotherapy  - Feeling okay today. Continues to have nasal congestion and mild cough, which he attributes to the juana air vents in his room. He is quite upset about the air circulation and air quality noting dust on the vents. I have talked to charge nursing to facilitate cleaning/vacuuming of his vents. Will closely monitor URI symptoms - if worsening or not improving, will consider RVP/COVID  - Generalized edema improving with compression wraps BLE. Mild edema persisting to BUE.  Likely in setting of fluids (antibiotics), hypoalbuminemia, and third spacing.  - Strict I/O's and daily weights.  - Lymphedema consulted, appreciate assistance  - No respiratory symptoms, O2 sats 99% and breathing comfortably on room air  - Last documented fever to 100.6 F, 9/28 AM though normal with recheck 20 mins later  - Under ID direction, antibiotics transitioned to aztreonam, daptomycin, Flagyl (9/26)  - Bcx 9/20/23 + streptococcus mitis (intermediate to penicillins, otherwise pansensitive)  - UCx + s. Epidermis (MRSE, noted susceptible to doxy)  -  Chest CT showing basilar reticular opacities; indeterminate, possible atypical infection vs. scarring/atelectasis.  - CT abdomen pelvis (WO contrast due to patient concern for allergy); noting distal esophageal wall thickening with adjacent borderline enlarged gastrohepatic lymph node   - Skin rash stable/improving today; diffuse erythema improving, with underlying petechial rash existing to bilateral upper and lower extremities.  No open wounds, lesions.  Denies pain or itching.  Dermatology has evaluated, consistent with morbilliform eruption likely component of toxic erythema of chemotherapy as well as component of drug eruption 2/2 cephalosporins. Cefepime and ceftriaxone added to allergy list.  - EBV, CMV, HHV PCR added per Derm recommendations triamcinolone 0.1% ointment twice daily to affected area added  - Hydrocortisone 2.5% cream twice daily to face added  - Continue to monitor with daily CBC/differential and CMP to monitor for progression to DIHS/DRESS  - Calcium low at 7.2; when corrected for hypoalbuminemia, ionized calcium 4.1.  - Calcium carbonate BID and will continue to monitor  - Receiving 1 unit pRBCs for AM labs hgb 6.5  - Will need end of cycle bone marrow biopsy when evidence of count recovery    HEME/ONC  # AML, adverse risk  Patient initially presented to [facility] in [place], [place] with 4-6 weeks of progressive bruising, weakness, fatigue, loss of appetite, and night sweats. He also noted a headache similar to his previous brain abscess. CBC notable for pancytopenia; WBC 2.7 (), Hgb 8.1, and plt 1k. He was transferred to [facility] and was found on peripheral flow w/ 11% circulating myeloid blasts. He was then transferred to [facility] for further work up and treatment of AML. Diagnostic BMBx performed 9/1 which showed AML with 30% blasts by morph and 47% by flow. P53 negative. FISH negative for MLLT10, NUP98, and KMT2A. Normal karyotype. NGS showed ASXL1, CEBPA, SRSF2, and STAG2  mutations. Started 7+3 (C1D1=9/2/23).   - Baseline TTE showing normal LVEF of 55-60%. Baseline EKG showed NSR and QTc of 434.   - Viral serologies: HepB/C-, HIV-. HSV1/2+, EBV IgG+, CMV IgG+  - PICC placed 9/1  - S/p diagnostic LP 9/8/23 for CNS leukemia work up as patient endorses headache upon admission w/ new AML diagnosis. CSF flow negative. MRI brain 9/11 negative, only showing chronic changes from h/o brain abscess.   - D14 BMBx w/o evidence of leukemia.   - HLA typing ordered. S/p BMT NT 9/22 with Dr. Cam Edward. Starting donor search. Plan for JIMMY in CR1.   - Will need end of cycle bone marrow biopsy when evidence of count recovery                  Treatment Plan: 7+3 (C1D1=9/2/23)                - Cytarabine 100 mg/m2 - D1-D7               - Daunorubicin 60 mg/m2 - D1-D3               - Supportive meds: Decadron 12 mg D1-D3, Zofran     # Low risk for TLS/DIC  - S/p allopurinol  - Montior TLS/DIC labs twice weekly    \   ID  # Neutropenic fever  # BCx 9/20 1/2 positive for strep mitis  # UCx 9/20 with 10-50k MRSE  # Diarrhea, resolved  Tmax 102.7 on 9/20 evening. Pt has been having cold symptoms as below in the setting of positive rhinovirus on 9/16. Cold symptoms on improving trajectory at time of new fever. Pt had diarrhea around time of fever onset, but had recently received MiraLax for constipation. Diarrhea persisted after MiraLax effect waned. Overnight 9/21-9/22, pt was too weak to return to bed from bathroom, called RN to assist.  Otherwise no localizing signs/symptoms of infection.  - Follow fever curve, repeat blood cultures for fever if not done in previous 24 hours  - Follow blood cultures  - Work up:   - BCx 9/20 1/2 positive for strep mitis, pan-sensitive  - BCx 9/21-9/28: NGTD  - UCx 9/20 grew 10-50k staph epi in the setting of bland UA and no urinary symptoms. Oxacillin resistant.  ID feels this is likely contaminant.  - CXR unremarkable  - C diff negative  - CT chest indeterminate - atypical  pneumonia? Though ID not convinced for pneumonia  - CT abdomen pelvis with reticulonodular attenuation in lung bases, suspicious for infection versus aspiration, distal esophageal wall thickening with adjacent borderline enlarged gastrohepatic lymph node, digestive esophagitis though endoscopy recommended to exclude neoplasm  - ID consulted 9/26 in setting of persisting neutropenic fevers on antibiotics, appreciate input  - Discontinue cefepime and doxycycline (9/26) - concern for cephalosporin drug rash? (See below)  - Start aztreonam, Flagyl, daptomycin (9/26)  - CK 9/26 wnl, monitor with weekly labs while on daptomycin  - Antibiotics:   - Cefepime 2 g q 8hr x9/20-9/26/23  - S/p vancomycin 9/22-9/23, discontinued once BCx susceptibilities resulted & d/t diffuse red rash  - Doxycycline 100 mg BID 9/23-9/26/23, started for possible MRSE UTI and empirically for atypical pneumonia  - Aztreonam 1g q8h 9/26-X  - Flagyl 500 mg q8h 9/26-X  - Daptomycin 6 mg/kg/day 9/26-X    # Rhinovirus, improved  # Post nasal drip, improving  # Cough, improving  Nasal congestion and post nasal drip 9/15 morning upon waking. H/o seasonal allergies. On 9/16, noted development of productive cough and worsening nasal congestion and post-nasal drip. One episode of blood-tinged sputum, now resolved. COVID/flu/RSV negative, but RVP positive for rhinovirus. Although new neutropenic fever noted 9/20, URI symptoms are improving.   - Continue PTA cetirizine  - Supportive: Tessalon Perles, Mucinex DM, Hurricaine spray    # Immunosuppressed 2/2 malignancy and chemotherapy  # ID PPX  -  mg BID  - Levofloxacin 250 mg daily - on hold with treatment dose antibiotics as above  - Voriconazole 200 mg BID x9/10     # H/o brain abscess (2021)  # Headache, resolved  Treated in [place]. S/p craniotomy for drainage of abscess July 2021. It is unclear what culture speciated to, but he completed a course of antibiotics with ceftriaxone then meropenem with  resolution on imaging. Head CT 8/31 at OSH revealed no intracranial hemorrhage midline shift or mass effect. Postsurgical changes noted. Previous area of infection demonstrates trace amount of encephalomalacia but no mass effect or inflammatory changes to suggest new or acute infection. Otherwise unremarkable. Patient presented initially with headache and dizziness, which has now resolved.   - See work up above for CNS leukemia work-up, which was negative     GI  # Esophageal wall thickening, seen on CT  CT abd/pelvis 9/26 obtained in setting of recurrent neutropenic fevers, strep mitis bacteremia.  On scan, noted distal esophageal wall thickening with adjacent borderline enlarged gastrohepatic lymph node, digestive esophagitis though endoscopy recommended to exclude neoplasm  - Given current pancytopenia s/p induction chemotherapy, concern for significant risk with endoscopy at this time.    - We will continue to monitor and defer for follow-up with repeat imaging/endoscopy when appropriate  - No GI symptoms to correlate    CHRONIC  # HTN  Noted during prior hospitalization for brain abscess in 2021. Previously on lisinopril, which he is no longer taking.  - Patient BP's have fluctuated this admission, w/occasionally elevated BPs. Continue to monitor.     # BPH  Notes difficulty with complete emptying of bladder. Previously on Flomax but states it did nothing, so he stopped it. He does note frequent urination at baseline, currently urination remains unchanged from baseline.  - Monitor clinically     MISC  # Mobiliform rash  On 9/23 AM, pt noted to have ocular papular rash on upper thighs and trunk.  Likely drug rash? Patient endorsing mild pruritis to back rash, denies concerning symptoms; no evidence of angioedema, denies SOB.  Vancomycin discontinued 9/23 with concern this may be attributing.  - Benadryl and kenalog cream PRN  - Continue to monitor for improvement s/p discontinued vancomycin.  - Rash worsening as  of 9/26/2023 - likely drug rash in setting of antibiotics vs chemotherapy vs other  - In discussion with Dr. Levine, given worsening of rash, dermatology consulted; appreciate recs  - Consistent with morbilliform eruption with likely multifactorial: component of toxic erythema of chemotherapy as well as drug eruption 2/2 cephalosporins  - drug eruption erythema improving w discontinuation of cefepime (added to allergy list); petechial rash//concern for JONATHAN stable  - EBV, CMV, HHV PCR added per Derm recommendations   - triamcinolone 0.1% ointment twice daily to affected area added  - Hydrocortisone 2.5% cream twice daily to face added  - Continue to monitor with daily CBC/differential and CMP to monitor for progression to DIHS/DRESS    # RUE edema  Noted ~9/25, unilateral RUE 1-2+ nonpitting edema. Denies pain associated.  On exam, edema right upper extremity noted, diffuse skin rash expanding from trunk/chest into bilateral upper extremities, though edema does not seem to be associated with the rash.  Nontender to palpation.  Range of motion intact at joint lines of upper extremity.  No pustular, vesicular lesions, open wounds, or dehiscence.  PICC in place to right upper extremity, functioning appropriately, nontender at site, and dressing is clean dry intact.  - US negative for DVT    # Bilateral lower and upper extremity edema  Noted generalized edema bilateral upper and lower extremities on exam 9/27/2023.  Noted weight increase of approximately 10 pounds since prior day.  Likely third spacing in setting of hypoalbuminemia.  Not currently on IV fluids, though noted IV antibiotics administered with fluids.  Had previously been diuresed during admission, response to Lasix. No respiratory symptoms.   - Lymphedema consult placed for compression wraps  - Lasix 20 mg IV x1 (9/27), significant response with net -1600ml   - Will eval daily for diuresis needs  - BLE improving with compression    # Weakness  #  "Deconditioning  Pt reports issues w/ balance after his brain abscess in 2021, exacerbated when ambulating. Deconditioning acute-on-chronic upon admission since 2021.   - PT consulted    # Episodes of Hypotension   # Dizziness  # Fall risk  Per chart review, patient had \"near fall\" on 9/24/23 when he attempted to get up from bed independently to ambulate to the restroom. He has been reported intermittent episodes of dizziness since admission, however, he notices this has increased in the past few days. He is also experiencing hypotensive episodes, requiring 1.5 L fluid support on 9/24-9/25.  - Feeling improved AM 9/26  - Continue to monitor closely  - Consider IVF bolus prn    # Weight loss  # Loss of appetite  # Moderate malnutrition in the context of acute illness   Patient presented with loss of appetite 2/2 malignancy. He has been gradually losing weight throughout admission, although PO intake is overall sufficient. 9/29 discussed with patient and tells me he is eating about 1/2 of meals, TID. Encouragement provided  - RD consulted  - Armand counts  - Ensure BID between meals and PRN    # Hyponatremia  # Hypophosphatemia  - Replete per protocol    # Hypocalcemia  Noted hypocalcemia trends on labs and downtrending 9/24. Ionized calcium low at 4.1 when corrected for hypoalbuminemia.   - calcium carbonate BID  - continue to monitor with daily labs     Clinically Significant Risk Factors        # Hyperkalemia: Highest K = 7.7 mmol/L in last 2 days, will monitor as appropriate       # Hypoalbuminemia: Lowest albumin = 2.5 g/dL at 9/29/2023  4:15 AM, will monitor as appropriate  # Coagulation Defect: INR = 1.55 (Ref range: 0.85 - 1.15) and/or PTT = 44 Seconds (Ref range: 22 - 38 Seconds), will monitor for bleeding    # Thrombocytopenia: Lowest platelets = 10 in last 2 days, will monitor for bleeding          # Overweight: Estimated body mass index is 28.08 kg/m  as calculated from the following:    Height as of this " "encounter: 1.74 m (5' 8.5\").    Weight as of this encounter: 85 kg (187 lb 6.3 oz).       # Severe Malnutrition: based on nutrition assessment           FEN  Diet: Regular Diet Adult   IVF: Bolus PRN   Lytes: Replete per protocol     PPX  VTE: None given thrombocytopenia  Bowel: Senna/MiraLax PRN  GI/PUD: None currently indicated     MISC  Code Status: Full Code   Lines/Drains: PICC  Dispo: Will remain inpatient through count recovery with safe discharge plan outpatient.   Follow Up: Pt will follow with Dr. Garcia, appointment scheduled for end of September. Pt would prefer labs and blood transfusions done closer to home in [facility].    Patient was seen and plan of care was discussed with attending physician Dr. Erickson    I spent >50 minutes face-to-face or coordinating care of Artie Fine. Over 50% of our time on the unit was spent counseling the patient and coordinating care.    Sheree Chowdhury PA-C  Hematology/Oncology  Pager #[phone]    Interval History   Nursing notes reviewed, fever noted overnight. DONNA Alva is feeling pretty good today.  He has some nasal congestion/runny nose/cough which he notes is present when he is in his bed.  He is concerned that the air coming out of his events is very dirty, and has found dust on the air vents which he attributes to his symptoms.  He is requesting his room is cleaned.  I talked with charge nurse and will talk with environmental services to see if we can get his events/air ducts vacuumed.  Otherwise no fevers in last 24 hours.  He reports fatigue is a little better today and notes he feels like he could work with his therapy today.  He has been eating about one half of each of his meals ordered 3 times daily.  He notes he feels satisfied with eating about one half of meal, denies significant suppressed appetite, nausea, or other factors contributing.  He notes his wife is coming today and will be visiting over the weekend, and she is bringing homemade chicken noodle " soup and chicken salad.  We will do calorie counts and reassess, as he does appear motivated to eat.  He denies headache, dizziness, chest pain, shortness of breath, abdominal pain, nausea, vomiting, diarrhea, urinary symptoms.  Rash seems to be improving, denies pain or itch.  We reviewed morning labs, plan for 1 unit PRBCs, continued antibiotics, close following and symptomatic supportive cares.  He is agreeable with this plan.  Questions addressed at bedside.      Vital Signs with Ranges  Temp:  [98  F (36.7  C)-99  F (37.2  C)] 98  F (36.7  C)  Pulse:  [] 96  Resp:  [15-18] 18  BP: (119-132)/(51-61) 123/57  SpO2:  [92 %-98 %] 97 %  I/O last 3 completed shifts:  In: 1285 [P.O.:600; I.V.:685]  Out: 1650 [Urine:1650]    Physical Exam     Constitutional: Awake and conversational. Non- toxic appearing. No acute distress.   HEENT: Normocephalic. Moist mucus membranes without lesions, thrush, or exudates appreciated  Lymph: Neck supple, no ridigity. No significant adenopathy noted.   Respiratory: Breathing comfortably on room air with no accessory muscle use. Speaking in full sentences, no evidence of respiratory distress.  Breath sounds diminished bilateral bases, otherwise clear to auscultation without stridor, wheeze, rhonchi, or rales.   Cardiovascular: Regular rate and rhythm. 2+ radial pulses bilaterally.  1-2+ nonpitting edema bilateral upper and lower extremities.    GI: Abdomen with normoactive bowel sounds, soft and non-tender throughout. No rebound, guarding, or peritoneal sign.   Skin: Skin is clean, dry, intact.  Improving diffuse maculopapular rash over trunk including chest, back, abdomen, extending up towards neck, and bilateral upper and lower extremities.  No open wounds, vesicular papular pustular lesions, drainage.  No crepitus.    Neurologic: Alert with normal speech. Grossly nonfocal.  Moves extremities spontaneously.    Neuropsychiatric: Calm, affect congruent to situation.   Vascular  access: PICC on RUE CDI      Medications    - MEDICATION INSTRUCTIONS -        acyclovir  400 mg Oral BID    aztreonam  1 g Intravenous Q8H    calcium carbonate  500 mg Oral TID    cetirizine  10 mg Oral Daily    DAPTOmycin (CUBICIN) 500 mg in sodium chloride 0.9 % 100 mL intermittent infusion  6 mg/kg Intravenous Q24H    hydrOXYzine  50 mg Oral At Bedtime    [Held by provider] levofloxacin  250 mg Oral Daily    metroNIDAZOLE  500 mg Intravenous Q8H    triamcinolone   Topical BID    voriconazole  200 mg Oral Q12H [facility] (08/20)     Data   Results for orders placed or performed during the hospital encounter of 09/01/23 (from the past 24 hour(s))   ABO/Rh type and screen    Narrative    The following orders were created for panel order ABO/Rh type and screen.  Procedure                               Abnormality         Status                     ---------                               -----------         ------                     Adult Type and Screen[[id number]]                            In process                   Please view results for these tests on the individual orders.   CBC with platelets differential    Narrative    The following orders were created for panel order CBC with platelets differential.  Procedure                               Abnormality         Status                     ---------                               -----------         ------                     CBC with platelets and d...[[id number]]  Abnormal            Final result               Manual Differential[[id number]]          Abnormal            Final result                 Please view results for these tests on the individual orders.   Comprehensive metabolic panel   Result Value Ref Range    Sodium 133 (L) 135 - 145 mmol/L    Potassium 3.5 3.4 - 5.3 mmol/L    Carbon Dioxide (CO2) 20 (L) 22 - 29 mmol/L    Anion Gap 9 7 - 15 mmol/L    Urea Nitrogen 10.8 8.0 - 23.0 mg/dL    Creatinine 0.85 0.67 - 1.17 mg/dL    GFR Estimate >90 >60  "mL/min/1.73m2    Calcium 7.2 (L) 8.8 - 10.2 mg/dL    Chloride 104 98 - 107 mmol/L    Glucose 114 (H) 70 - 99 mg/dL    Alkaline Phosphatase 46 40 - 129 U/L    AST 51 (H) 0 - 45 U/L    ALT 43 0 - 70 U/L    Protein Total 5.4 (L) 6.4 - 8.3 g/dL    Albumin 2.5 (L) 3.5 - 5.2 g/dL    Bilirubin Total 0.6 <=1.2 mg/dL   Fibrinogen activity   Result Value Ref Range    Fibrinogen Activity 513 (H) 170 - 490 mg/dL   INR   Result Value Ref Range    INR 1.55 (H) 0.85 - 1.15   Partial thromboplastin time   Result Value Ref Range    aPTT 44 (H) 22 - 38 Seconds   Lactic Acid STAT   Result Value Ref Range    Lactic Acid 1.2 0.7 - 2.0 mmol/L   CBC with platelets and differential   Result Value Ref Range    WBC Count 0.7 (LL) 4.0 - 11.0 10e3/uL    RBC Count 2.18 (L) 4.40 - 5.90 10e6/uL    Hemoglobin 6.5 (LL) 13.3 - 17.7 g/dL    Hematocrit 18.7 (L) 40.0 - 53.0 %    MCV 86 78 - 100 fL    MCH 29.8 26.5 - 33.0 pg    MCHC 34.8 31.5 - 36.5 g/dL    RDW 13.7 10.0 - 15.0 %    Platelet Count 21 (LL) 150 - 450 10e3/uL    Narrative    Previously reported component [ NRBCs ] is no longer reported.\"  Previously reported component [ NRBCs Absolute ] is no longer reported.\"   Manual Differential   Result Value Ref Range    % Neutrophils 16 %    % Lymphocytes 83 %    % Monocytes 1 %    % Eosinophils 0 %    % Basophils 0 %    Absolute Neutrophils 0.1 (LL) 1.6 - 8.3 10e3/uL    Absolute Lymphocytes 0.6 (L) 0.8 - 5.3 10e3/uL    Absolute Monocytes 0.0 0.0 - 1.3 10e3/uL    Absolute Eosinophils 0.0 0.0 - 0.7 10e3/uL    Absolute Basophils 0.0 0.0 - 0.2 10e3/uL    RBC Morphology Confirmed RBC Indices     Platelet Assessment  Automated Count Confirmed. Platelet morphology is normal.     Automated Count Confirmed. Platelet morphology is normal.   CONDITIONAL Prepare red blood cells (unit)   Result Value Ref Range    Blood Component Type Red Blood Cells     Product Code N9652S05     Unit Status Transfused     Unit Number [id number]     CROSSMATCH Compatible     " CODING SYSTEM [id number]     ISSUE DATE AND TIME [id number]     UNIT ABO/RH O+     UNIT TYPE ISBT 5100    Magnesium   Result Value Ref Range    Magnesium 2.0 1.7 - 2.3 mg/dL   Phosphorus   Result Value Ref Range    Phosphorus 1.5 (L) 2.5 - 4.5 mg/dL

## 2023-09-29 NOTE — PROGRESS NOTES
[facility]    Transplant Infectious Diseases Inpatient Progress Note      Artie Fine MRN# [id number]   YOB: 1955 Age: 68 year old   Date of Admission and time: 9/1/2023  3:13 PM             Recommendations:   Continue aztreonam 1 gram q8 hr, flagyl 500 mg mg q 8hr, daptomycin 6 mg/kg/day for now.   If afebrile for 48 hr, may stop daptomycin, aztreonam, and flagyl and start PO ABx per your protocol.   Check weekly CPK while on daptomycin.   Please add ceftriaxone and cefepime to allergy list.     Dr. Ignacio is available over the weekend for questions. Dr. Leger will assume the patient's care on Monday.        Summary of Presentation:   This patient is a 68 year old male with recent diagnosis of AML s/p 7+3 therapy.   The course is complicated by rash and febrile neutropenia.         Active Problems and Infectious Diseases Issues:   Febrile neutropenia.   Rash.   The differential diagnosis for the febrile neutropenia should include gut bacterial translocation associated with neutropenia typically resulting in fever vs drug-induced rash.   The rash is either ABx-induced and/or chemo-induced. I think the ABx-induced component of the rash is improving. I think the remaining petechial-like rash is likely chemo-related.     With the patient's history of rash with ceftriaxone and the significant cross-reactivity between ceftriaxone and cefepime, cefepime seems to be the most likely culprit.   There is no cross-reactivity between cefepime or ceftriaxone with PCN (so can still use amoxicillin, zosyn, unasyn, etc....), 1st and 2nd generation cephalosporins except cefuroxime. Would not use 3rd or 4th generation cephalosporin in the future due to with the exception of cefdinir due to cross-reactivity with ceftriaxone and cefepime.     I am not impressed by the CT chest findings to suggest pneumonia.     MRSE bacteriuria.   Without  instrumentation this is a contaminant.     S mitis  in blood cx.   In one out of 2 sets from 9/20/23.   Very difficult to ascertain if this is due to GI lucy translocation vs contamination.   Covered by daptomycin  10 days of ABx would be sufficient to treat possible S mitis BSI.        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 Apparently developed rash on ceftriaxone and was switched to meropenem. MRI in 9/2021 and 10/2021 with decrease in size of abscess. He developed itching while on meropenem and was attributed to MRI contrast.      Other Infectious Disease issues include:  - QTc: 434 as of 9/4/23.   - PJP prophylaxis: none.   - Serostatus: CMV+, EBV+, HSV1+/2+, VZV ?  - Gamma globulin status: ?      Attestation:  Total duration of visit including chart review, reviewing labs and imaging, interviewing and examining the patient, documentation, and sending communication to the primary treating team, all at the same day of this encounter, is: 40 minutes.     Dagoberto Larios MD  [facility]  Contact information available via [facility] Paging/Directory    09/29/2023            Interim History:   Last fever was 5 am 9/28/23.   The rash is improving.   No pruritus.   Still very tired and fatigued but feels better after RBC infusion.          History of Present Illness:   This patient is a 68 year old male who was diagnosed early 9/2023 with AML when he presented with weeks/months of easy bruisability and weakness and was found to be pancytopenic. BMBx confirmed AML and he received 7+3 cytarabine/daunorubicin starting 9/2/23.   On 9/20/23 fever occurred and was started on cefepime. Vancomycin was added 9/22/23 when blood cx grew S mitis then vancomycin was discontinued after susceptibilities resulted.   Fever persisted and doxycyline was added on 9/24/23 for atypical coverage.     The patient started to develop  rash that was first noted on the BiUE then spread. The patient stated that the rash improved after stopping one ABx and starting another; I am not sure if he meant stopping vanco and initiating doxycycline or stopping levaquin and initiating cefepime.     The patient endorsed cough for two weeks, mostly non-productive. No shortness of breath.   Also endorses watery diarrhea.   No N/V and no abdominal pain.      Exposure History  Was born in [place]. Lives in [place]. Has a large garden that he cares for including planting.   Used to work in constructions.   Did travel in the past to [facility].   No international travels.   No institutionalization and no known TB exposure.           Review of Systems:      As mentioned in the HPI otherwise negative by reviewing constitutional symptoms, central and peripheral neurological systems, respiratory system, cardiac system, GI system,  system, musculoskeletal, skin, allergy, and lymphatics.                Immunizations:     Immunization History   Administered Date(s) Administered    COVID-19 Bivalent 12+ (Pfizer) 10/19/2022    COVID-19 MONOVALENT 12+ (Pfizer) 01/28/2021, 02/18/2021, 09/28/2021            Allergies:     Allergies   Allergen Reactions    Iodinated Contrast Media Rash    Ragweeds Other (See Comments)     Congestion              Medications:   Medications that Require Transfusion:    - MEDICATION INSTRUCTIONS -       Scheduled Medications:    acyclovir  400 mg Oral BID    aztreonam  1 g Intravenous Q8H    calcium carbonate  500 mg Oral BID    cetirizine  10 mg Oral Daily    DAPTOmycin (CUBICIN) 500 mg in sodium chloride 0.9 % 100 mL intermittent infusion  6 mg/kg Intravenous Q24H    hydrOXYzine  50 mg Oral At Bedtime    [Held by provider] levofloxacin  250 mg Oral Daily    metroNIDAZOLE  500 mg Intravenous Q8H    triamcinolone   Topical BID    voriconazole  200 mg Oral Q12H [facility] (08/20)             Physical Exam:   Temp: 98.7  F (37.1  C) Temp src: Oral  BP: 119/57 Pulse: 98   Resp: 16 SpO2: 96 % O2 Device: None (Room air)      Wt Readings from Last 4 Encounters:   09/29/23 85 kg (187 lb 6.3 oz)   09/01/23 86.2 kg (190 lb 1.6 oz)     Constitutional: awake, alert, cooperative, no apparent distress and appears at stated age, well nourished.   Head, ENT, Eyes, and Neck: Normocephalic, moist buccal mucosa without oral thrush   CVS: distant HS  Chest: crackles bilaterally.   Skin: no induration, fluctuation or discharge at the PICC line in the RUE. Disseminated maculopapular blanching no rash in combination with petechial rash.            Data:   No results found for: ACD4    Inflammatory Markers    No lab results found.    Immune Globulin Studies   No lab results found.    Metabolic Studies       Recent Labs   Lab Test 09/29/23  0909 09/29/23  0415 09/28/23  0719 09/28/23  0348 09/27/23  1730 09/27/23  1610 09/27/23  0848 09/27/23  0517 09/26/23  2212 09/26/23  1101 09/26/23  0512 09/25/23  1416 09/25/23  0513   NA  --  133*  --  133*  --   --  132* 134*  --   --  133*  --  132*   POTASSIUM  --  3.5  --  3.5 3.8 7.7* 3.4 5.8*  --   --  3.7  --  3.5   CHLORIDE  --  104  --  103  --   --  102 106  --   --  105  --  102   CO2  --  20*  --  18*  --   --  16* 18*  --   --  19*  --  19*   ANIONGAP  --  9  --  12  --   --  14 10  --   --  9  --  11   BUN  --  10.8  --  12.1  --   --  10.7 10.1  --   --  11.0  --  11.4   CR  --  0.85  --  0.88  --   --  0.85 0.84  --   --  0.81  --  0.85   GFRESTIMATED  --  >90  --  >90  --   --  >90 >90  --   --  >90  --  >90   GLC  --  114*  --  123*  --   --  197* 107*  --   --  109*  --  109*   VENANCIO  --  7.2*  --  7.5*  --   --  7.3* 6.9*  --   --  7.2*  --  7.1*   PHOS 1.5*  --   --  1.6* 1.4* 10.6* 1.3*  --    < > 1.4*  --    < > 1.5*   MAG 2.0  --   --  1.9  --   --   --  1.9  --   --   --   --  2.1   LACT  --  1.2 1.0 2.5*  --   --   --  1.0  --  1.1  --    < >  --    CKT  --   --   --   --   --   --   --   --   --  80  --   --   --      < > = values in this interval not displayed.       Hepatic Studies    Recent Labs   Lab Test 09/29/23 0415 09/28/23 0348 09/27/23  0848 09/27/23  0517 09/26/23  0512 09/25/23  0513   BILITOTAL 0.6 0.7 0.6 0.6 0.8 0.5   ALKPHOS 46 49 49 44 46 45   ALBUMIN 2.5* 2.7* 2.8* 2.5* 2.7* 2.9*   AST 51* 53* 69* 63* 44 32   ALT 43 54 61 52 38 29   LDH  --  216  --   --   --  173       Pancreatitis testing    No lab results found.    Hematology Studies      Recent Labs   Lab Test 09/29/23 0415 09/28/23 0348 09/27/23 0517 09/26/23  0512 09/25/23 2039 09/25/23  0748 09/18/23  0423 09/17/23  0435 09/11/23  0436 09/10/23  0445 09/09/23  0445 09/08/23  0523   WBC 0.7* 0.8* 0.4* 0.2* 0.2* 0.1*   < > 0.5*   < > 0.8* 0.9* 1.5*   ANEU 0.1* 0.1*  --   --   --   --   --  0.0*  --  0.0* 0.1* 0.5*   ALYM 0.6* 0.7*  --   --   --   --   --  0.5*  --  0.7* 0.8 0.9   JXA 0.0 0.0  --   --   --   --   --  0.0  --  0.0 0.0 0.0   AEOS 0.0 0.0  --   --   --   --   --  0.0  --  0.0 0.0 0.0   HGB 6.5* 7.8* 6.5* 8.1* 6.5* 5.6*   < > 6.9*   < > 8.1* 7.5* 7.8*   HCT 18.7* 23.0* 19.2* 23.6* 19.3* 16.3*   < > 21.1*   < > 25.6* 24.7* 25.6*   PLT 21* 10* 18* 9* 12* 14*   < > 16*   < > 26* 33* 51*    < > = values in this interval not displayed.       Clotting Studies    Recent Labs   Lab Test 09/29/23  0415 09/28/23  0348 09/25/23  0513 09/21/23  0515   INR 1.55* 1.43* 1.26* 1.25*   PTT 44* 41* 37 38       Arterial Blood Gas Testing  No lab results found.     Urine Studies     Recent Labs   Lab Test 09/21/23  0009   URINEPH 6.5   NITRITE Negative   LEUKEST Negative   WBCU <1       Vancomycin Levels     No lab results found.    Invalid input(s): VANCO    Tobramycin levels     No lab results found.    Gentamicin levels    No lab results found.    Tacrolimus levels    Invalid input(s): TACROLIMUS, TAC, TACR      Latest Ref Rng & Units 9/29/2023     4:15 AM 9/28/2023     3:48 AM 9/27/2023     8:48 AM 9/27/2023     5:17 AM 9/26/2023     5:12 AM   Transplant  Immunosuppression Labs   Creat 0.67 - 1.17 mg/dL 0.85  0.88  0.85  0.84  0.81    Urea Nitrogen 8.0 - 23.0 mg/dL 10.8  12.1  10.7  10.1  11.0    WBC 4.0 - 11.0 10e3/uL 0.7  0.8   0.4  0.2    Neutrophil % 16  12       ANEU 1.6 - 8.3 10e3/uL 0.1  0.1           Cyclosporine levels    Invalid input(s): CYCLOSPORINE, CYC    Mycophenolate levels    Invalid input(s): MYPA, MYP    Sirolimus levels    Invalid input(s): SIROLIMUS, SIR, RAPA    CSF testing     Recent Labs   Lab Test 09/08/23  1159 09/08/23  1158   CWBC 5  --    CRBC 1  --    CGLU  --  51   CTP  --  46.4*         Microbiology:  Blood cx negative.   Last check of C difficile  C Difficile Toxin B by PCR   Date Value Ref Range Status   09/22/2023 Negative Negative Final     Comment:     A negative result does not exclude actual disease due to C. difficile and may be due to improper collection, handling and storage of the specimen or the number of organisms in the specimen is below the detection limit of the assay.       Virology:  CMV viral loads  No results found for: CMVRESINST, 78701, 26588, 68724, 06475, CMVQAL  CMV viral loads  No lab results found.    CMV viral loads    CMV DNA IU/mL   Date Value Ref Range Status   09/27/2023 Not Detected Not Detected IU/mL Final       CMV resistance testing  No lab results found.  No results found for: CMVCID, CMVFOS, CMVGAN, CMVDRUGRES     No results found for: H6RES    No results found for: EBVDN, EBRES, EBVDN, EBVSP, EBVPC, EBVPCR    CMV Antibody IgG   Date Value Ref Range Status   09/02/2023 Positive, suggests recent or past exposure. (A) No detectable antibody.  Final   09/01/2023 Positive, suggests recent or past exposure. (A) No detectable antibody.  Final       No results found for: EBIG2, EBIGM, TOXG      Imaging:  CT chest WO 9/24/23   IMPRESSION:    1. Basilar reticular opacities are indeterminate, possibly atypical  infection versus scarring and subsegmental atelectasis.        Dagoberto Larios MD  [facility]  [facility]  Contact information available via [facility] Paging/Directory     09/29/2023

## 2023-09-29 NOTE — PLAN OF CARE
Goal Outcome Evaluation:    [id number]    VSS. Alert and oriented. Denies pain. Up with assistx1 to bathroom. Triggered sepsis, lactic result 1.2. Up with assistx1 to bathroom. No acute events, continue with POC.

## 2023-09-30 ENCOUNTER — APPOINTMENT (OUTPATIENT)
Dept: PHYSICAL THERAPY | Facility: CLINIC | Age: 68
DRG: 835 | End: 2023-09-30
Attending: INTERNAL MEDICINE
Payer: COMMERCIAL

## 2023-09-30 ENCOUNTER — APPOINTMENT (OUTPATIENT)
Dept: GENERAL RADIOLOGY | Facility: CLINIC | Age: 68
DRG: 835 | End: 2023-09-30
Attending: PHYSICIAN ASSISTANT
Payer: COMMERCIAL

## 2023-09-30 LAB
ALBUMIN SERPL BCG-MCNC: 2.5 G/DL (ref 3.5–5.2)
ALP SERPL-CCNC: 52 U/L (ref 40–129)
ALT SERPL W P-5'-P-CCNC: 51 U/L (ref 0–70)
ANION GAP SERPL CALCULATED.3IONS-SCNC: 9 MMOL/L (ref 7–15)
APTT PPP: 61 SECONDS (ref 22–38)
AST SERPL W P-5'-P-CCNC: 69 U/L (ref 0–45)
BACTERIA BLD CULT: NO GROWTH
BACTERIA BLD CULT: NO GROWTH
BASOPHILS # BLD MANUAL: 0 10E3/UL (ref 0–0.2)
BASOPHILS # BLD MANUAL: 0 10E3/UL (ref 0–0.2)
BASOPHILS NFR BLD MANUAL: 0 %
BASOPHILS NFR BLD MANUAL: 0 %
BILIRUB SERPL-MCNC: 0.5 MG/DL
BLD PROD TYP BPU: NORMAL
BLOOD COMPONENT TYPE: NORMAL
BUN SERPL-MCNC: 12.1 MG/DL (ref 8–23)
C PNEUM DNA SPEC QL NAA+PROBE: NOT DETECTED
CALCIUM SERPL-MCNC: 7.5 MG/DL (ref 8.8–10.2)
CHLORIDE SERPL-SCNC: 105 MMOL/L (ref 98–107)
CODING SYSTEM: NORMAL
CREAT SERPL-MCNC: 0.72 MG/DL (ref 0.67–1.17)
CROSSMATCH: NORMAL
DEPRECATED HCO3 PLAS-SCNC: 20 MMOL/L (ref 22–29)
EGFRCR SERPLBLD CKD-EPI 2021: >90 ML/MIN/1.73M2
EOSINOPHIL # BLD MANUAL: 0 10E3/UL (ref 0–0.7)
EOSINOPHIL # BLD MANUAL: 0 10E3/UL (ref 0–0.7)
EOSINOPHIL NFR BLD MANUAL: 0 %
EOSINOPHIL NFR BLD MANUAL: 0 %
ERYTHROCYTE [DISTWIDTH] IN BLOOD BY AUTOMATED COUNT: 13.8 % (ref 10–15)
ERYTHROCYTE [DISTWIDTH] IN BLOOD BY AUTOMATED COUNT: 14.3 % (ref 10–15)
FIBRINOGEN PPP-MCNC: 229 MG/DL (ref 170–490)
FLUAV H1 2009 PAND RNA SPEC QL NAA+PROBE: NOT DETECTED
FLUAV H1 RNA SPEC QL NAA+PROBE: NOT DETECTED
FLUAV H3 RNA SPEC QL NAA+PROBE: NOT DETECTED
FLUAV RNA SPEC QL NAA+PROBE: NOT DETECTED
FLUBV RNA SPEC QL NAA+PROBE: NOT DETECTED
GLUCOSE SERPL-MCNC: 106 MG/DL (ref 70–99)
HADV DNA SPEC QL NAA+PROBE: NOT DETECTED
HCOV PNL SPEC NAA+PROBE: NOT DETECTED
HCT VFR BLD AUTO: 20.5 % (ref 40–53)
HCT VFR BLD AUTO: 24.4 % (ref 40–53)
HGB BLD-MCNC: 6.9 G/DL (ref 13.3–17.7)
HGB BLD-MCNC: 8.2 G/DL (ref 13.3–17.7)
HMPV RNA SPEC QL NAA+PROBE: NOT DETECTED
HPIV1 RNA SPEC QL NAA+PROBE: NOT DETECTED
HPIV2 RNA SPEC QL NAA+PROBE: NOT DETECTED
HPIV3 RNA SPEC QL NAA+PROBE: NOT DETECTED
HPIV4 RNA SPEC QL NAA+PROBE: NOT DETECTED
INR PPP: 1.97 (ref 0.85–1.15)
ISSUE DATE AND TIME: NORMAL
LACTATE SERPL-SCNC: 1 MMOL/L (ref 0.7–2)
LYMPHOCYTES # BLD MANUAL: 0.6 10E3/UL (ref 0.8–5.3)
LYMPHOCYTES # BLD MANUAL: 0.7 10E3/UL (ref 0.8–5.3)
LYMPHOCYTES NFR BLD MANUAL: 60 %
LYMPHOCYTES NFR BLD MANUAL: 66 %
M PNEUMO DNA SPEC QL NAA+PROBE: NOT DETECTED
MAGNESIUM SERPL-MCNC: 2 MG/DL (ref 1.7–2.3)
MCH RBC QN AUTO: 28.8 PG (ref 26.5–33)
MCH RBC QN AUTO: 29.5 PG (ref 26.5–33)
MCHC RBC AUTO-ENTMCNC: 33.6 G/DL (ref 31.5–36.5)
MCHC RBC AUTO-ENTMCNC: 33.7 G/DL (ref 31.5–36.5)
MCV RBC AUTO: 86 FL (ref 78–100)
MCV RBC AUTO: 88 FL (ref 78–100)
MONOCYTES # BLD MANUAL: 0.1 10E3/UL (ref 0–1.3)
MONOCYTES # BLD MANUAL: 0.1 10E3/UL (ref 0–1.3)
MONOCYTES NFR BLD MANUAL: 6 %
MONOCYTES NFR BLD MANUAL: 8 %
MYELOCYTES # BLD MANUAL: 0 10E3/UL
MYELOCYTES NFR BLD MANUAL: 1 %
NEUTROPHILS # BLD MANUAL: 0.2 10E3/UL (ref 1.6–8.3)
NEUTROPHILS # BLD MANUAL: 0.4 10E3/UL (ref 1.6–8.3)
NEUTROPHILS NFR BLD MANUAL: 25 %
NEUTROPHILS NFR BLD MANUAL: 34 %
NRBC # BLD AUTO: 0 10E3/UL
NRBC BLD MANUAL-RTO: 2 %
PHOSPHATE SERPL-MCNC: 1.9 MG/DL (ref 2.5–4.5)
PHOSPHATE SERPL-MCNC: 2 MG/DL (ref 2.5–4.5)
PHOSPHATE SERPL-MCNC: 3 MG/DL (ref 2.5–4.5)
PLAT MORPH BLD: ABNORMAL
PLAT MORPH BLD: ABNORMAL
PLATELET # BLD AUTO: 17 10E3/UL (ref 150–450)
PLATELET # BLD AUTO: 17 10E3/UL (ref 150–450)
POTASSIUM SERPL-SCNC: 3.7 MMOL/L (ref 3.4–5.3)
PROT SERPL-MCNC: 5.5 G/DL (ref 6.4–8.3)
RBC # BLD AUTO: 2.34 10E6/UL (ref 4.4–5.9)
RBC # BLD AUTO: 2.85 10E6/UL (ref 4.4–5.9)
RBC MORPH BLD: ABNORMAL
RBC MORPH BLD: ABNORMAL
RSV RNA SPEC QL NAA+PROBE: NOT DETECTED
RSV RNA SPEC QL NAA+PROBE: NOT DETECTED
RV+EV RNA SPEC QL NAA+PROBE: DETECTED
SARS-COV-2 RNA RESP QL NAA+PROBE: NEGATIVE
SODIUM SERPL-SCNC: 134 MMOL/L (ref 135–145)
TARGETS BLD QL SMEAR: SLIGHT
TOXIC GRANULES BLD QL SMEAR: PRESENT
UNIT ABO/RH: NORMAL
UNIT NUMBER: NORMAL
UNIT STATUS: NORMAL
UNIT TYPE ISBT: 5100
WBC # BLD AUTO: 0.9 10E3/UL (ref 4–11)
WBC # BLD AUTO: 1.1 10E3/UL (ref 4–11)

## 2023-09-30 PROCEDURE — 80053 COMPREHEN METABOLIC PANEL: CPT | Performed by: PHYSICIAN ASSISTANT

## 2023-09-30 PROCEDURE — 250N000013 HC RX MED GY IP 250 OP 250 PS 637

## 2023-09-30 PROCEDURE — 71046 X-RAY EXAM CHEST 2 VIEWS: CPT

## 2023-09-30 PROCEDURE — 250N000009 HC RX 250: Performed by: INTERNAL MEDICINE

## 2023-09-30 PROCEDURE — 87635 SARS-COV-2 COVID-19 AMP PRB: CPT | Performed by: PHYSICIAN ASSISTANT

## 2023-09-30 PROCEDURE — 85027 COMPLETE CBC AUTOMATED: CPT | Performed by: PHYSICIAN ASSISTANT

## 2023-09-30 PROCEDURE — 258N000003 HC RX IP 258 OP 636: Performed by: PHYSICIAN ASSISTANT

## 2023-09-30 PROCEDURE — 250N000011 HC RX IP 250 OP 636: Mod: JZ | Performed by: PHYSICIAN ASSISTANT

## 2023-09-30 PROCEDURE — 83735 ASSAY OF MAGNESIUM: CPT | Performed by: INTERNAL MEDICINE

## 2023-09-30 PROCEDURE — 85007 BL SMEAR W/DIFF WBC COUNT: CPT | Performed by: PHYSICIAN ASSISTANT

## 2023-09-30 PROCEDURE — 36415 COLL VENOUS BLD VENIPUNCTURE: CPT | Performed by: INTERNAL MEDICINE

## 2023-09-30 PROCEDURE — 85384 FIBRINOGEN ACTIVITY: CPT | Performed by: PHYSICIAN ASSISTANT

## 2023-09-30 PROCEDURE — 250N000013 HC RX MED GY IP 250 OP 250 PS 637: Performed by: STUDENT IN AN ORGANIZED HEALTH CARE EDUCATION/TRAINING PROGRAM

## 2023-09-30 PROCEDURE — 85610 PROTHROMBIN TIME: CPT | Performed by: PHYSICIAN ASSISTANT

## 2023-09-30 PROCEDURE — 250N000009 HC RX 250: Performed by: PHYSICIAN ASSISTANT

## 2023-09-30 PROCEDURE — 84100 ASSAY OF PHOSPHORUS: CPT | Performed by: INTERNAL MEDICINE

## 2023-09-30 PROCEDURE — 120N000002 HC R&B MED SURG/OB UMMC

## 2023-09-30 PROCEDURE — 87633 RESP VIRUS 12-25 TARGETS: CPT | Performed by: PHYSICIAN ASSISTANT

## 2023-09-30 PROCEDURE — 85730 THROMBOPLASTIN TIME PARTIAL: CPT | Performed by: PHYSICIAN ASSISTANT

## 2023-09-30 PROCEDURE — 83605 ASSAY OF LACTIC ACID: CPT | Performed by: STUDENT IN AN ORGANIZED HEALTH CARE EDUCATION/TRAINING PROGRAM

## 2023-09-30 PROCEDURE — 258N000003 HC RX IP 258 OP 636: Performed by: INTERNAL MEDICINE

## 2023-09-30 PROCEDURE — 250N000011 HC RX IP 250 OP 636: Mod: JZ

## 2023-09-30 PROCEDURE — 250N000013 HC RX MED GY IP 250 OP 250 PS 637: Performed by: PHYSICIAN ASSISTANT

## 2023-09-30 PROCEDURE — 999N000044 HC STATISTIC CVC DRESSING CHANGE

## 2023-09-30 PROCEDURE — 87040 BLOOD CULTURE FOR BACTERIA: CPT | Performed by: INTERNAL MEDICINE

## 2023-09-30 PROCEDURE — 250N000013 HC RX MED GY IP 250 OP 250 PS 637: Performed by: INTERNAL MEDICINE

## 2023-09-30 PROCEDURE — 250N000013 HC RX MED GY IP 250 OP 250 PS 637: Performed by: HOSPITALIST

## 2023-09-30 PROCEDURE — P9016 RBC LEUKOCYTES REDUCED: HCPCS | Performed by: PHYSICIAN ASSISTANT

## 2023-09-30 PROCEDURE — 999N000127 HC STATISTIC PERIPHERAL IV START W US GUIDANCE

## 2023-09-30 PROCEDURE — 97110 THERAPEUTIC EXERCISES: CPT | Mod: GP

## 2023-09-30 PROCEDURE — 71046 X-RAY EXAM CHEST 2 VIEWS: CPT | Mod: 26 | Performed by: RADIOLOGY

## 2023-09-30 RX ORDER — FUROSEMIDE 20 MG
20 TABLET ORAL ONCE
Status: COMPLETED | OUTPATIENT
Start: 2023-09-30 | End: 2023-09-30

## 2023-09-30 RX ORDER — PHYTONADIONE 5 MG/1
5 TABLET ORAL DAILY
Status: DISCONTINUED | OUTPATIENT
Start: 2023-09-30 | End: 2023-09-30

## 2023-09-30 RX ORDER — POTASSIUM PHOS IN 0.9 % NACL 15MMOL/250
15 PLASTIC BAG, INJECTION (ML) INTRAVENOUS
Status: COMPLETED | OUTPATIENT
Start: 2023-09-30 | End: 2023-09-30

## 2023-09-30 RX ADMIN — AZTREONAM 1 G: 1 INJECTION, POWDER, LYOPHILIZED, FOR SOLUTION INTRAMUSCULAR; INTRAVENOUS at 05:38

## 2023-09-30 RX ADMIN — FUROSEMIDE 20 MG: 20 TABLET ORAL at 16:48

## 2023-09-30 RX ADMIN — CALCIUM CARBONATE (ANTACID) CHEW TAB 500 MG 500 MG: 500 CHEW TAB at 19:09

## 2023-09-30 RX ADMIN — AZTREONAM 1 G: 1 INJECTION, POWDER, LYOPHILIZED, FOR SOLUTION INTRAMUSCULAR; INTRAVENOUS at 13:23

## 2023-09-30 RX ADMIN — ACETAMINOPHEN 650 MG: 325 TABLET, FILM COATED ORAL at 04:58

## 2023-09-30 RX ADMIN — METRONIDAZOLE 500 MG: 500 INJECTION, SOLUTION INTRAVENOUS at 16:48

## 2023-09-30 RX ADMIN — POTASSIUM & SODIUM PHOSPHATES POWDER PACK 280-160-250 MG 1 PACKET: 280-160-250 PACK at 08:42

## 2023-09-30 RX ADMIN — POTASSIUM PHOSPHATE, MONOBASIC POTASSIUM PHOSPHATE, DIBASIC 15 MMOL: 224; 236 INJECTION, SOLUTION, CONCENTRATE INTRAVENOUS at 16:48

## 2023-09-30 RX ADMIN — TRIAMCINOLONE ACETONIDE: 1 OINTMENT TOPICAL at 19:16

## 2023-09-30 RX ADMIN — ACYCLOVIR 400 MG: 400 TABLET ORAL at 08:40

## 2023-09-30 RX ADMIN — SODIUM CHLORIDE, PRESERVATIVE FREE 10 ML: 5 INJECTION INTRAVENOUS at 22:42

## 2023-09-30 RX ADMIN — METRONIDAZOLE 500 MG: 500 INJECTION, SOLUTION INTRAVENOUS at 01:14

## 2023-09-30 RX ADMIN — ACYCLOVIR 400 MG: 400 TABLET ORAL at 19:09

## 2023-09-30 RX ADMIN — CALCIUM CARBONATE (ANTACID) CHEW TAB 500 MG 500 MG: 500 CHEW TAB at 08:40

## 2023-09-30 RX ADMIN — GUAIFENESIN AND DEXTROMETHORPHAN HYDROBROMIDE 1 TABLET: 600; 30 TABLET, EXTENDED RELEASE ORAL at 05:03

## 2023-09-30 RX ADMIN — VORICONAZOLE 200 MG: 200 TABLET ORAL at 19:09

## 2023-09-30 RX ADMIN — POTASSIUM & SODIUM PHOSPHATES POWDER PACK 280-160-250 MG 1 PACKET: 280-160-250 PACK at 04:59

## 2023-09-30 RX ADMIN — POTASSIUM PHOSPHATE, MONOBASIC POTASSIUM PHOSPHATE, DIBASIC 15 MMOL: 224; 236 INJECTION, SOLUTION, CONCENTRATE INTRAVENOUS at 19:12

## 2023-09-30 RX ADMIN — POTASSIUM & SODIUM PHOSPHATES POWDER PACK 280-160-250 MG 1 PACKET: 280-160-250 PACK at 11:03

## 2023-09-30 RX ADMIN — ACETAMINOPHEN 650 MG: 325 TABLET, FILM COATED ORAL at 10:58

## 2023-09-30 RX ADMIN — HYDROXYZINE HYDROCHLORIDE 50 MG: 25 TABLET, FILM COATED ORAL at 19:08

## 2023-09-30 RX ADMIN — AZTREONAM 1 G: 1 INJECTION, POWDER, LYOPHILIZED, FOR SOLUTION INTRAMUSCULAR; INTRAVENOUS at 21:28

## 2023-09-30 RX ADMIN — CETIRIZINE HYDROCHLORIDE 10 MG: 10 TABLET, FILM COATED ORAL at 08:40

## 2023-09-30 RX ADMIN — CALCIUM CARBONATE (ANTACID) CHEW TAB 500 MG 500 MG: 500 CHEW TAB at 13:23

## 2023-09-30 RX ADMIN — PHYTONADIONE 5 MG: 10 INJECTION, EMULSION INTRAMUSCULAR; INTRAVENOUS; SUBCUTANEOUS at 09:53

## 2023-09-30 RX ADMIN — SODIUM CHLORIDE, PRESERVATIVE FREE 5 ML: 5 INJECTION INTRAVENOUS at 13:52

## 2023-09-30 RX ADMIN — TRIAMCINOLONE ACETONIDE: 1 OINTMENT TOPICAL at 08:44

## 2023-09-30 RX ADMIN — METRONIDAZOLE 500 MG: 500 INJECTION, SOLUTION INTRAVENOUS at 08:39

## 2023-09-30 RX ADMIN — DAPTOMYCIN 500 MG: 500 INJECTION, POWDER, LYOPHILIZED, FOR SOLUTION INTRAVENOUS at 19:14

## 2023-09-30 RX ADMIN — VORICONAZOLE 200 MG: 200 TABLET ORAL at 08:40

## 2023-09-30 ASSESSMENT — ACTIVITIES OF DAILY LIVING (ADL)
ADLS_ACUITY_SCORE: 30

## 2023-09-30 NOTE — PLAN OF CARE
Goal Outcome Evaluation: 1900-0700      Plan of Care Reviewed With: patient    Overall Patient Progress: no change    Outcome Evaluation: Febrile w/ Tmax 102.2. Tachy low 100s. Tylenol given x1. Blood cultures collected. Sepsis triggered, lactic acid 1. Pt reported improving cough due to clean air vents, then worsening around again 0200. Mucinex given. Weakness continues, up Ax1. Rash continues, pt denies itchiness or irritation. Loose stools x2. Fair Po intake. Phos replaced, recheck 2- replacement started again. PICC infusing abx/TKO. Continue w/ POC.

## 2023-09-30 NOTE — PROGRESS NOTES
"Calorie Count  Intake recorded for: 9/29  Total Kcals: 477 Total Protein: 22g  Kcals from Hospital Food: 431   Protein: 22g  Kcals from Outside Food (average): 46  Protein: 0g  # Meals Ordered from Kitchen: 2 meals  # Meals Recorded: 2 meals (First - 100% oatmeal, wheat toast, 8oz 1% milk)      (Second - 50% apple - from outside the hospital)  # Supplements Recorded: 100% 1 Special K Bar  Note: Pt recorded eating \"homemade delicious chicken noodle soup - 1g bowl\", \"1/2 bun w/ chicken, spread and lettuce\", and 1% milk (no amount given), but not enough information given to calculate calories and protein.     "

## 2023-09-30 NOTE — PLAN OF CARE
"Goal Outcome Evaluation:      Plan of Care Reviewed With: patient, spouse    Overall Patient Progress: no changeOverall Patient Progress: no change     0700 - 1930:   /55 (BP Location: Left arm)   Pulse 83   Temp 97.5  F (36.4  C) (Oral)   Resp 20   Ht 1.74 m (5' 8.5\")   Wt 90.1 kg (198 lb 11.2 oz)   SpO2 97%   BMI 29.77 kg/m        Pt was placed back on droplet isolation d/t Res panel + for Rhinovirus, covid came back negative.  A&O x 4, pt afebrile this shift, denies pain/nausea/sob on RA.  Transfused 1 unit of RBC for hgb 6.9 & pt tolerate well.   Phos recheck was 1.9, replaced with IV phos, recheck timed at   PICC dressing changed today.   CBC redraw came back with hgb 8.2 & Plt 17*.  Bilateral upper/lower extremities swollen, weight trending up, gave one time PO lasix 20 mg.  Up assist x 1/walker to bathroom, voiding spontaneously, had a medium loose stool, bottoms sore/raw from rash when wiping, little blood noted on tissue, applied kenalog cream.   Continue with poc...    "

## 2023-09-30 NOTE — PROVIDER NOTIFICATION
"Paged Robert JUSTIN at 0500  \"Pt has temp 102.2, giving tylenol. Do you want blood cultures? Thanks\"  "

## 2023-09-30 NOTE — PROGRESS NOTES
"[facility]    Hematology / Oncology Progress Note    Patient: Artie Fine  MRN: [id number]  Admission Date: 9/1/2023  Date of Service (when I saw the patient): 09/30/2023  Hospital Day # 29     Assessment & Plan   Artie Fine is a 68 year old male with a history of brain abscess (2021), HTN, and BPH. He was transferred from [facility] in [place], [place] to [facility] for work up of pancytopenia; he was noted to have circulating blasts concerning for acute leukemia, and was subsequently transferred to [facility]. He underwent a diagnostic bone marrow biopsy on 9/1/23 which confirmed a new diagnosis of AML. He was started on induction with 7+3 (C1D1=9/2/23). Hospital course has been complicated by rhinovirus infection, recurrent neutropenic fever,  S. mitis bacteremia, and S. epidermidis UTI.     Today:  - Day 29 from 7+3 induction chemotherapy  - Feeling okay today.  Notes nasal congestion and cough are much better after getting his air vents cleaned yesterday, however around 2 AM, he had a \"2-hour coughing fit\".  Denies chest pain, shortness of breath.  Will obtain RVP, COVID-19, chest x-ray  - Generalized edema improving with compression wraps BLE. Mild edema persisting to BUE.  Likely in setting of fluids (antibiotics), hypoalbuminemia, and third spacing.  - Lasix 20 mg PO ordered  - Strict I/O's and daily weights.  - Lymphedema consulted, appreciate assistance  - No respiratory symptoms, O2 sats 99% and breathing comfortably on room air  - Last documented fever to 101.5 F, 9/30 AM  - Under ID direction, antibiotics transitioned to aztreonam, daptomycin, Flagyl (9/26)  - Bcx 9/20/23 + streptococcus mitis (intermediate to penicillins, otherwise pansensitive)  - UCx + s. Epidermis (MRSE, noted susceptible to doxy)  - Chest CT showing basilar reticular opacities; indeterminate, possible atypical infection vs. scarring/atelectasis.  - CT abdomen pelvis (WO contrast " due to patient concern for allergy); noting distal esophageal wall thickening with adjacent borderline enlarged gastrohepatic lymph node   - 9/30: RVP, COVID, CXR ordered  - Skin rash stable today; diffuse erythema improving, with underlying petechial rash existing to bilateral upper and lower extremities.  No open wounds, lesions.  Denies pain or itching.  Dermatology has evaluated, consistent with morbilliform eruption likely component of toxic erythema of chemotherapy as well as component of drug eruption 2/2 cephalosporins. Cefepime and ceftriaxone added to allergy list.  - EBV, CMV, HHV PCR added per Derm recommendations triamcinolone 0.1% ointment twice daily to affected area added  - Hydrocortisone 2.5% cream twice daily to face added  - Continue to monitor with daily CBC/differential and CMP to monitor for progression to DIHS/DRESS  - Calcium low at 7.5; when corrected for hypoalbuminemia, ionized calcium 4.1.  - Calcium carbonate BID and will continue to monitor  - INR increasing to 1.98 today. PT prolonged to 61. Fibrinogen wnl. No signs/symptoms of bleeding. Likely nutritional? Will add vitamin K 5 mg/day x 3 days and closely monitor with daily labs, regarding needs for correction/cryo/FFP  - Receiving 1 unit pRBCs for AM labs hgb 6.7  - Received 1 unit PRBCs yesterday, with minimal response.  No signs/symptoms of bleeding, denies abdominal pain, nausea, vomiting, hematuria, melena/hematochezia, headaches or dizziness.  We will recheck CBC after transfusion today  - Will need end of cycle bone marrow biopsy when evidence of count recovery    HEME/ONC  # AML, adverse risk  Patient initially presented to [facility] in [place], [place] with 4-6 weeks of progressive bruising, weakness, fatigue, loss of appetite, and night sweats. He also noted a headache similar to his previous brain abscess. CBC notable for pancytopenia; WBC 2.7 (), Hgb 8.1, and plt 1k. He was transferred to [facility] and was found  on peripheral flow w/ 11% circulating myeloid blasts. He was then transferred to [facility] for further work up and treatment of AML. Diagnostic BMBx performed 9/1 which showed AML with 30% blasts by morph and 47% by flow. P53 negative. FISH negative for MLLT10, NUP98, and KMT2A. Normal karyotype. NGS showed ASXL1, CEBPA, SRSF2, and STAG2 mutations. Started 7+3 (C1D1=9/2/23).   - Baseline TTE showing normal LVEF of 55-60%. Baseline EKG showed NSR and QTc of 434.   - Viral serologies: HepB/C-, HIV-. HSV1/2+, EBV IgG+, CMV IgG+  - PICC placed 9/1  - S/p diagnostic LP 9/8/23 for CNS leukemia work up as patient endorses headache upon admission w/ new AML diagnosis. CSF flow negative. MRI brain 9/11 negative, only showing chronic changes from h/o brain abscess.   - D14 BMBx w/o evidence of leukemia.   - HLA typing ordered. S/p BMT NT 9/22 with Dr. Cam Edward. Starting donor search. Plan for JIMMY in CR1.   - Will need end of cycle bone marrow biopsy when evidence of count recovery                  Treatment Plan: 7+3 (C1D1=9/2/23)                - Cytarabine 100 mg/m2 - D1-D7               - Daunorubicin 60 mg/m2 - D1-D3               - Supportive meds: Decadron 12 mg D1-D3, Zofran     # Low risk for TLS/DIC  - S/p allopurinol  - Montior TLS/DIC labs twice weekly    ID  # Neutropenic fever  # BCx 9/20 1/2 positive for strep mitis  # UCx 9/20 with 10-50k MRSE  # Diarrhea, resolved  Tmax 102.7 on 9/20 evening. Pt has been having cold symptoms as below in the setting of positive rhinovirus on 9/16. Cold symptoms on improving trajectory at time of new fever. Pt had diarrhea around time of fever onset, but had recently received MiraLax for constipation. Diarrhea persisted after MiraLax effect waned. Overnight 9/21-9/22, pt was too weak to return to bed from bathroom, called RN to assist.  Otherwise no localizing signs/symptoms of infection.  - Follow fever curve, repeat blood cultures for fever if not done in previous 24 hours  -  Follow blood cultures  - Work up:   - BCx 9/20 1/2 positive for strep mitis, pan-sensitive  - BCx 9/21-9/28: NGTD  - UCx 9/20 grew 10-50k staph epi in the setting of bland UA and no urinary symptoms. Oxacillin resistant.  ID feels this is likely contaminant.  - CXR unremarkable  - C diff negative  - CT chest indeterminate - atypical pneumonia? Though ID not convinced for pneumonia  - CT abdomen pelvis with reticulonodular attenuation in lung bases, suspicious for infection versus aspiration, distal esophageal wall thickening with adjacent borderline enlarged gastrohepatic lymph node, digestive esophagitis though endoscopy recommended to exclude neoplasm  - ID consulted 9/26 in setting of persisting neutropenic fevers on antibiotics, appreciate input  - Discontinue cefepime and doxycycline (9/26) - concern for cephalosporin drug rash? (See below)  - Start aztreonam, Flagyl, daptomycin (9/26)  - CK 9/26 wnl, monitor with weekly labs while on daptomycin  - 9/30: continued cough, congestion, recurrent fevers, RVP/COVID/CXR ordered  - Antibiotics:   - Cefepime 2 g q 8hr x9/20-9/26/23  - S/p vancomycin 9/22-9/23, discontinued once BCx susceptibilities resulted & d/t diffuse red rash  - Doxycycline 100 mg BID 9/23-9/26/23, started for possible MRSE UTI and empirically for atypical pneumonia  - Aztreonam 1g q8h 9/26-X  - Flagyl 500 mg q8h 9/26-X  - Daptomycin 6 mg/kg/day 9/26-X    # Rhinovirus, improved  # Post nasal drip, improving  # Cough, improving  Nasal congestion and post nasal drip 9/15 morning upon waking. H/o seasonal allergies. On 9/16, noted development of productive cough and worsening nasal congestion and post-nasal drip. One episode of blood-tinged sputum, now resolved. COVID/flu/RSV negative, but RVP positive for rhinovirus. Although new neutropenic fever noted 9/20, URI symptoms are improving.   - Continue PTA cetirizine  - Supportive: Tessalon Perles, Mucinex DM, Hurricaine spray    # Immunosuppressed 2/2  malignancy and chemotherapy  # ID PPX  -  mg BID  - Levofloxacin 250 mg daily - on hold with treatment dose antibiotics as above  - Voriconazole 200 mg BID x9/10     # H/o brain abscess (2021)  # Headache, resolved  Treated in [place]. S/p craniotomy for drainage of abscess July 2021. It is unclear what culture speciated to, but he completed a course of antibiotics with ceftriaxone then meropenem with resolution on imaging. Head CT 8/31 at OSH revealed no intracranial hemorrhage midline shift or mass effect. Postsurgical changes noted. Previous area of infection demonstrates trace amount of encephalomalacia but no mass effect or inflammatory changes to suggest new or acute infection. Otherwise unremarkable. Patient presented initially with headache and dizziness, which has now resolved.   - See work up above for CNS leukemia work-up, which was negative     GI  # Esophageal wall thickening, seen on CT  CT abd/pelvis 9/26 obtained in setting of recurrent neutropenic fevers, strep mitis bacteremia.  On scan, noted distal esophageal wall thickening with adjacent borderline enlarged gastrohepatic lymph node, digestive esophagitis though endoscopy recommended to exclude neoplasm  - Given current pancytopenia s/p induction chemotherapy, concern for significant risk with endoscopy at this time.    - We will continue to monitor and defer for follow-up with repeat imaging/endoscopy when appropriate  - No GI symptoms to correlate  - Informed pt and wife (Aundrea) of these results with plan for close follow up as above    CHRONIC  # HTN  Noted during prior hospitalization for brain abscess in 2021. Previously on lisinopril, which he is no longer taking.  - Patient BP's have fluctuated this admission, w/occasionally elevated BPs. Continue to monitor.     # BPH  Notes difficulty with complete emptying of bladder. Previously on Flomax but states it did nothing, so he stopped it. He does note frequent urination at baseline,  currently urination remains unchanged from baseline.  - Monitor clinically     MISC  # Mobiliform rash, improving  On 9/23 AM, pt noted to have ocular papular rash on upper thighs and trunk.  Likely drug rash? Patient endorsing mild pruritis to back rash, denies concerning symptoms; no evidence of angioedema, denies SOB.  Vancomycin discontinued 9/23 with concern this may be attributing.  - Benadryl and kenalog cream PRN  - Continue to monitor for improvement s/p discontinued vancomycin.  - Rash worsening as of 9/26/2023 - likely drug rash in setting of antibiotics vs chemotherapy vs other  - In discussion with Dr. Levine, given worsening of rash, dermatology consulted; appreciate recs  - Consistent with morbilliform eruption with likely multifactorial: component of toxic erythema of chemotherapy as well as drug eruption 2/2 cephalosporins  - drug eruption erythema improving w discontinuation of cefepime (added to allergy list); petechial rash//concern for JONATHAN stable  - EBV, CMV, HHV PCR added per Derm recommendations   - triamcinolone 0.1% ointment twice daily to affected area added  - Hydrocortisone 2.5% cream twice daily to face added  - Continue to monitor with daily CBC/differential and CMP to monitor for progression to DIHS/DRESS    # RUE edema  Noted ~9/25, unilateral RUE 1-2+ nonpitting edema. Denies pain associated.  On exam, edema right upper extremity noted, diffuse skin rash expanding from trunk/chest into bilateral upper extremities, though edema does not seem to be associated with the rash.  Nontender to palpation.  Range of motion intact at joint lines of upper extremity.  No pustular, vesicular lesions, open wounds, or dehiscence.  PICC in place to right upper extremity, functioning appropriately, nontender at site, and dressing is clean dry intact.  - US negative for DVT    # Bilateral lower and upper extremity edema  Noted generalized edema bilateral upper and lower extremities on exam 9/27/2023.  " Noted weight increase of approximately 10 pounds since prior day.  Likely third spacing in setting of hypoalbuminemia.  Not currently on IV fluids, though noted IV antibiotics administered with fluids.  Had previously been diuresed during admission, response to Lasix. No respiratory symptoms.   - Lymphedema consult placed for compression wraps  - Lasix 20 mg IV x1 (9/27), significant response with net -1600ml   - Will eval daily for diuresis needs  - BLE improving with compression  - Lasix 20 mg PO 9/30/2023     # Weakness  # Deconditioning  Pt reports issues w/ balance after his brain abscess in 2021, exacerbated when ambulating. Deconditioning acute-on-chronic upon admission since 2021.   - PT consulted    # Episodes of Hypotension   # Dizziness  # Fall risk  Per chart review, patient had \"near fall\" on 9/24/23 when he attempted to get up from bed independently to ambulate to the restroom. He has been reported intermittent episodes of dizziness since admission, however, he notices this has increased in the past few days. He is also experiencing hypotensive episodes, requiring 1.5 L fluid support on 9/24-9/25.  - Feeling improved AM 9/26  - Continue to monitor closely  - Consider IVF bolus prn    # Weight loss  # Loss of appetite  # Moderate malnutrition in the context of acute illness   Patient presented with loss of appetite 2/2 malignancy. He has been gradually losing weight throughout admission, although PO intake is overall sufficient. 9/29 discussed with patient and tells me he is eating about 1/2 of meals, TID. Encouragement provided  - RD consulted  - Armand counts  - Ensure BID between meals and PRN    # Hyponatremia  # Hypophosphatemia  - Replete per protocol    # Hypocalcemia  Noted hypocalcemia trends on labs and downtrending 9/24. Ionized calcium low at 4.1 when corrected for hypoalbuminemia.   - calcium carbonate BID  - continue to monitor with daily labs     Clinically Significant Risk Factors          " "    # Hypoalbuminemia: Lowest albumin = 2.5 g/dL at 9/30/2023  5:04 AM, will monitor as appropriate  # Coagulation Defect: INR = 1.97 (Ref range: 0.85 - 1.15) and/or PTT = 61 Seconds (Ref range: 22 - 38 Seconds), will monitor for bleeding    # Thrombocytopenia: Lowest platelets = 17 in last 2 days, will monitor for bleeding          # Overweight: Estimated body mass index is 29.77 kg/m  as calculated from the following:    Height as of this encounter: 1.74 m (5' 8.5\").    Weight as of this encounter: 90.1 kg (198 lb 11.2 oz).       # Severe Malnutrition: based on nutrition assessment           FEN  Diet: Regular Diet Adult   IVF: Bolus PRN   Lytes: Replete per protocol     PPX  VTE: None given thrombocytopenia  Bowel: Senna/MiraLax PRN  GI/PUD: None currently indicated     MISC  Code Status: Full Code   Lines/Drains: PICC  Dispo: Will remain inpatient through count recovery with safe discharge plan outpatient.   Follow Up: Pt will follow with Dr. Garcia, appointment scheduled for end of September. Pt would prefer labs and blood transfusions done closer to home in [facility].    Patient was seen and plan of care was discussed with attending physician Dr. Erickson    I spent >45 minutes face-to-face or coordinating care of Artie Fine. Over 50% of our time on the unit was spent counseling the patient and coordinating care.    Sheree Chowdhury PA-C  Hematology/Oncology  Pager #[phone]    Interval History   Nursing notes reviewed, fever noted overnight. DONNA Alva is feeling okay today.  He is seen sitting up in the recliner after ambulating from the bathroom, using a walker, with steady gait.  He notes yesterday after his air vents were cleaned, the nasal congestion, rhinorrhea, and cough are much better.  However around 2 AM, he developed onset of a cough that lasted \"2 hours.\"  He again had a fever this morning around 5 AM, and notes that it \"breaking\" with chills and sweats.  He denies headache, dizziness, mouth sores, " sore throat, chest pain, shortness of breath, abdominal pain, nausea, vomiting, diarrhea, urinary symptoms.  Skin rash is stable, and appears to be improving each day.  No pain or itching.  No open wounds or sores.  We reviewed morning labs, plan for 1 unit PRBCs, continued antibiotics, 1 dose of Lasix for generalized edema and weight gain, close following his symptomatic supportive cares.  He and his wife (Aundrea) are agreeable with this plan.  Questions addressed at bedside.        Vital Signs with Ranges  Temp:  [97.4  F (36.3  C)-102.2  F (39  C)] 98.1  F (36.7  C)  Pulse:  [] 83  Resp:  [18-22] 20  BP: (116-139)/(52-67) 129/61  SpO2:  [94 %-100 %] 99 %  I/O last 3 completed shifts:  In: 620 [P.O.:120; I.V.:200]  Out: -     Physical Exam     Constitutional: Awake and conversational. Non- toxic appearing. No acute distress.   HEENT: Normocephalic. Moist mucus membranes without lesions, thrush, or exudates appreciated  Lymph: Neck supple, no ridigity. No significant adenopathy noted.   Respiratory: Breathing comfortably on room air with no accessory muscle use. Speaking in full sentences, no evidence of respiratory distress.  Breath sounds diminished bilateral bases, otherwise clear to auscultation without stridor, wheeze, rhonchi, or rales.   Cardiovascular: Regular rate and rhythm. 2+ radial pulses bilaterally.  1-2+ nonpitting edema bilateral upper and lower extremities.    GI: Abdomen with normoactive bowel sounds, soft and non-tender throughout. No rebound, guarding, or peritoneal sign.   Skin: Skin is clean, dry, intact.  Improving diffuse maculopapular rash over trunk including chest, back, abdomen, extending up towards neck, and bilateral upper and lower extremities.  No open wounds, vesicular papular pustular lesions, drainage.  No crepitus.    Neurologic: Alert with normal speech. Grossly nonfocal.  Moves extremities spontaneously.    Neuropsychiatric: Calm, affect congruent to situation.   Vascular  access: PICC on RUE CDI      Medications    - MEDICATION INSTRUCTIONS -        acyclovir  400 mg Oral BID    aztreonam  1 g Intravenous Q8H    calcium carbonate  500 mg Oral TID    cetirizine  10 mg Oral Daily    DAPTOmycin (CUBICIN) 500 mg in sodium chloride 0.9 % 100 mL intermittent infusion  6 mg/kg Intravenous Q24H    hydrOXYzine  50 mg Oral At Bedtime    [Held by provider] levofloxacin  250 mg Oral Daily    metroNIDAZOLE  500 mg Intravenous Q8H    phytonadione  5 mg Oral Daily    sodium phosphate  15 mmol Intravenous Q2H    triamcinolone   Topical BID    voriconazole  200 mg Oral Q12H [facility] (08/20)     Data   Results for orders placed or performed during the hospital encounter of 09/01/23 (from the past 24 hour(s))   Phosphorus   Result Value Ref Range    Phosphorus 2.0 (L) 2.5 - 4.5 mg/dL   CBC with platelets differential    Narrative    The following orders were created for panel order CBC with platelets differential.  Procedure                               Abnormality         Status                     ---------                               -----------         ------                     CBC with platelets and d...[[id number]]  Abnormal            Final result               Manual Differential[[id number]]          Abnormal            Final result                 Please view results for these tests on the individual orders.   Comprehensive metabolic panel   Result Value Ref Range    Sodium 134 (L) 135 - 145 mmol/L    Potassium 3.7 3.4 - 5.3 mmol/L    Carbon Dioxide (CO2) 20 (L) 22 - 29 mmol/L    Anion Gap 9 7 - 15 mmol/L    Urea Nitrogen 12.1 8.0 - 23.0 mg/dL    Creatinine 0.72 0.67 - 1.17 mg/dL    GFR Estimate >90 >60 mL/min/1.73m2    Calcium 7.5 (L) 8.8 - 10.2 mg/dL    Chloride 105 98 - 107 mmol/L    Glucose 106 (H) 70 - 99 mg/dL    Alkaline Phosphatase 52 40 - 129 U/L    AST 69 (H) 0 - 45 U/L    ALT 51 0 - 70 U/L    Protein Total 5.5 (L) 6.4 - 8.3 g/dL    Albumin 2.5 (L) 3.5 - 5.2 g/dL    Bilirubin Total 0.5  "<=1.2 mg/dL   Fibrinogen activity   Result Value Ref Range    Fibrinogen Activity 229 170 - 490 mg/dL   INR   Result Value Ref Range    INR 1.97 (H) 0.85 - 1.15   Partial thromboplastin time   Result Value Ref Range    aPTT 61 (H) 22 - 38 Seconds   Magnesium   Result Value Ref Range    Magnesium 2.0 1.7 - 2.3 mg/dL   CBC with platelets and differential   Result Value Ref Range    WBC Count 0.9 (LL) 4.0 - 11.0 10e3/uL    RBC Count 2.34 (L) 4.40 - 5.90 10e6/uL    Hemoglobin 6.9 (LL) 13.3 - 17.7 g/dL    Hematocrit 20.5 (L) 40.0 - 53.0 %    MCV 88 78 - 100 fL    MCH 29.5 26.5 - 33.0 pg    MCHC 33.7 31.5 - 36.5 g/dL    RDW 13.8 10.0 - 15.0 %    Platelet Count 17 (LL) 150 - 450 10e3/uL    Narrative    Previously reported component [ NRBCs ] is no longer reported.\"  Previously reported component [ NRBCs Absolute ] is no longer reported.\"   Manual Differential   Result Value Ref Range    % Neutrophils 25 %    % Lymphocytes 66 %    % Monocytes 8 %    % Eosinophils 0 %    % Basophils 0 %    % Myelocytes 1 %    NRBCs per 100 WBC 2 (H) <=0 %    Absolute Neutrophils 0.2 (LL) 1.6 - 8.3 10e3/uL    Absolute Lymphocytes 0.6 (L) 0.8 - 5.3 10e3/uL    Absolute Monocytes 0.1 0.0 - 1.3 10e3/uL    Absolute Eosinophils 0.0 0.0 - 0.7 10e3/uL    Absolute Basophils 0.0 0.0 - 0.2 10e3/uL    Absolute Myelocytes 0.0 <=0.0 10e3/uL    Absolute NRBCs 0.0 <=0.0 10e3/uL    RBC Morphology Confirmed RBC Indices     Platelet Assessment  Automated Count Confirmed. Platelet morphology is normal.     Automated Count Confirmed. Platelet morphology is normal.    Target Cells Slight (A) None Seen   Lactic Acid STAT   Result Value Ref Range    Lactic Acid 1.0 0.7 - 2.0 mmol/L   Respiratory Panel PCR    Specimen: Nasopharyngeal; Swab   Result Value Ref Range    Adenovirus Not Detected Not Detected    Coronavirus Not Detected Not Detected    Human Metapneumovirus Not Detected Not Detected    Human Rhin/Enterovirus Detected (A) Not Detected    Influenza A Not " Detected Not Detected    Influenza A, H1 Not Detected Not Detected    Influenza A 2009 H1N1 Not Detected Not Detected    Influenza A, H3 Not Detected Not Detected    Influenza B Not Detected Not Detected    Parainfluenza Virus 1 Not Detected Not Detected    Parainfluenza Virus 2 Not Detected Not Detected    Parainfluenza Virus 3 Not Detected Not Detected    Parainfluenza Virus 4 Not Detected Not Detected    Respiratory Syncytial Virus A Not Detected Not Detected    Respiratory Syncytial Virus B Not Detected Not Detected    Chlamydia Pneumoniae Not Detected Not Detected    Mycoplasma Pneumoniae Not Detected Not Detected    Narrative    The [employer] Respiratory Panel is a qualitative nucleic acid, multiplex, in vitro diagnostic test for the simultaneous detection and identification of multiple respiratory viral and bacterial nucleic acids in nasopharyngeal swabs collected in viral transport media from individual exhibiting signs and symptoms of respiratory infection. The assay has received FDA approval for the testing of nasopharyngeal (NP) swabs only. The [facility] Laboratory at [facility] has validated the performance characteristics for bronchial alveolar lavage specimens. This test is used for clinical purposes and should not be regarded as investigational or for research. This laboratory is certified under the Clinical Laboratory Improvement Amendments of 1988 (CLIA-88) as qualified to perform high complexity clinical laboratory testing.    Symptomatic COVID-19 Virus (Coronavirus) by PCR Nasopharyngeal    Specimen: Nasopharyngeal; Swab   Result Value Ref Range    SARS CoV2 PCR Negative Negative    Narrative    Testing was performed using the Xpert Xpress SARS-CoV-2 Assay on the [employer] Gene-Xpert Instrument Systems. Additional information about this Emergency Use Authorization (EUA) assay can be found via the Lab Guide. This test should be ordered for the detection of SARS-CoV-2 in  individuals who meet SARS-CoV-2 clinical and/or epidemiological criteria as well as from individuals without symptoms or other reasons to suspect COVID-19. Test performance for asymptomatic patients has only been established in anterior nasal swab specimens. This test is for in vitro diagnostic use under the FDA EUA for laboratories certified under CLIA to perform high complexity testing. This test has not been FDA cleared or approved. A negative result does not rule out the presence of PCR inhibitors in the specimen or target RNA concentration below the limit of detection for the assay. The possibility of a false negative should be considered if the patient's recent exposure or clinical presentation suggests COVID-19. This test was validated by [facility]. These Laboratories are certified under the Clinical Laboratory Improvement Amendments (CLIA) as qualified to perform high complexity testing.     CONDITIONAL Prepare red blood cells (unit)   Result Value Ref Range    Blood Component Type Red Blood Cells     Product Code B4312O94     Unit Status Transfused     Unit Number [id number]     CROSSMATCH Compatible     CODING SYSTEM [id number]     ISSUE DATE AND TIME [id number]     UNIT ABO/RH O+     UNIT TYPE ISBT 5100    XR Chest 2 Views    Narrative    XR CHEST 2 VIEWS  9/30/2023 11:54 AM     HISTORY:  67 yo with neutropenic fever, cough       COMPARISON:  9/25/2023    FINDINGS:   Frontal and lateral views of the chest. Right arm PICC tip projects  over superior cavoatrial junction.     Cardiac silhouette is within normal limits. Mild bibasilar opacities.  Mild blunting of bilateral costophrenic angles. No appreciable  pneumothorax. Degenerative changes of the spine.      Impression    IMPRESSION:   1. Mild bibasilar opacities, which may represent atelectasis/edema  and/or infection.  2. Small bilateral pleural effusions.     I have personally reviewed the examination and initial  interpretation  and I agree with the findings.    SUMIT STEWART MD         SYSTEM ID:  [id number]   Phosphorus   Result Value Ref Range    Phosphorus 1.9 (L) 2.5 - 4.5 mg/dL   CBC with Platelets & Differential    Narrative    The following orders were created for panel order CBC with Platelets & Differential.  Procedure                               Abnormality         Status                     ---------                               -----------         ------                     CBC with platelets and d...[[id number]]                                                   Please view results for these tests on the individual orders.

## 2023-10-01 ENCOUNTER — APPOINTMENT (OUTPATIENT)
Dept: OCCUPATIONAL THERAPY | Facility: CLINIC | Age: 68
DRG: 835 | End: 2023-10-01
Attending: INTERNAL MEDICINE
Payer: COMMERCIAL

## 2023-10-01 LAB
ABO/RH TYPE: NORMAL
ALBUMIN SERPL BCG-MCNC: 2.6 G/DL (ref 3.5–5.2)
ALP SERPL-CCNC: 53 U/L (ref 40–129)
ALT SERPL W P-5'-P-CCNC: 42 U/L (ref 0–70)
ANION GAP SERPL CALCULATED.3IONS-SCNC: 11 MMOL/L (ref 7–15)
ANTIBODY SCREEN: NEGATIVE
APTT PPP: 39 SECONDS (ref 22–38)
AST SERPL W P-5'-P-CCNC: 44 U/L (ref 0–45)
BASOPHILS # BLD MANUAL: 0 10E3/UL (ref 0–0.2)
BASOPHILS NFR BLD MANUAL: 0 %
BILIRUB SERPL-MCNC: 0.5 MG/DL
BUN SERPL-MCNC: 9.6 MG/DL (ref 8–23)
CALCIUM SERPL-MCNC: 7.5 MG/DL (ref 8.8–10.2)
CHLORIDE SERPL-SCNC: 106 MMOL/L (ref 98–107)
CREAT SERPL-MCNC: 0.68 MG/DL (ref 0.67–1.17)
DEPRECATED HCO3 PLAS-SCNC: 20 MMOL/L (ref 22–29)
EGFRCR SERPLBLD CKD-EPI 2021: >90 ML/MIN/1.73M2
EOSINOPHIL # BLD MANUAL: 0 10E3/UL (ref 0–0.7)
EOSINOPHIL NFR BLD MANUAL: 0 %
ERYTHROCYTE [DISTWIDTH] IN BLOOD BY AUTOMATED COUNT: 14.5 % (ref 10–15)
FIBRINOGEN PPP-MCNC: 494 MG/DL (ref 170–490)
GLUCOSE SERPL-MCNC: 144 MG/DL (ref 70–99)
HCT VFR BLD AUTO: 21.6 % (ref 40–53)
HGB BLD-MCNC: 7.4 G/DL (ref 13.3–17.7)
INR PPP: 1.31 (ref 0.85–1.15)
LYMPHOCYTES # BLD MANUAL: 0.7 10E3/UL (ref 0.8–5.3)
LYMPHOCYTES NFR BLD MANUAL: 60 %
MAGNESIUM SERPL-MCNC: 2 MG/DL (ref 1.7–2.3)
MCH RBC QN AUTO: 29.5 PG (ref 26.5–33)
MCHC RBC AUTO-ENTMCNC: 34.3 G/DL (ref 31.5–36.5)
MCV RBC AUTO: 86 FL (ref 78–100)
MONOCYTES # BLD MANUAL: 0 10E3/UL (ref 0–1.3)
MONOCYTES NFR BLD MANUAL: 3 %
MYELOCYTES # BLD MANUAL: 0 10E3/UL
MYELOCYTES NFR BLD MANUAL: 1 %
NEUTROPHILS # BLD MANUAL: 0.4 10E3/UL (ref 1.6–8.3)
NEUTROPHILS NFR BLD MANUAL: 36 %
PHOSPHATE SERPL-MCNC: 2.3 MG/DL (ref 2.5–4.5)
PLAT MORPH BLD: ABNORMAL
PLATELET # BLD AUTO: 13 10E3/UL (ref 150–450)
POTASSIUM SERPL-SCNC: 3.5 MMOL/L (ref 3.4–5.3)
PROT SERPL-MCNC: 5.6 G/DL (ref 6.4–8.3)
RBC # BLD AUTO: 2.51 10E6/UL (ref 4.4–5.9)
RBC MORPH BLD: ABNORMAL
SODIUM SERPL-SCNC: 137 MMOL/L (ref 135–145)
SPECIMEN EXPIRATION DATE: NORMAL
SPECIMEN EXPIRATION DATE: NORMAL
TOXIC GRANULES BLD QL SMEAR: PRESENT
WBC # BLD AUTO: 1.1 10E3/UL (ref 4–11)

## 2023-10-01 PROCEDURE — 250N000011 HC RX IP 250 OP 636: Mod: JZ

## 2023-10-01 PROCEDURE — 86850 RBC ANTIBODY SCREEN: CPT | Performed by: PHYSICIAN ASSISTANT

## 2023-10-01 PROCEDURE — 250N000013 HC RX MED GY IP 250 OP 250 PS 637: Performed by: HOSPITALIST

## 2023-10-01 PROCEDURE — 84100 ASSAY OF PHOSPHORUS: CPT | Performed by: INTERNAL MEDICINE

## 2023-10-01 PROCEDURE — 86901 BLOOD TYPING SEROLOGIC RH(D): CPT | Performed by: PHYSICIAN ASSISTANT

## 2023-10-01 PROCEDURE — 85610 PROTHROMBIN TIME: CPT | Performed by: PHYSICIAN ASSISTANT

## 2023-10-01 PROCEDURE — 250N000013 HC RX MED GY IP 250 OP 250 PS 637: Performed by: PHYSICIAN ASSISTANT

## 2023-10-01 PROCEDURE — 97140 MANUAL THERAPY 1/> REGIONS: CPT | Mod: GO | Performed by: OCCUPATIONAL THERAPIST

## 2023-10-01 PROCEDURE — 85027 COMPLETE CBC AUTOMATED: CPT | Performed by: PHYSICIAN ASSISTANT

## 2023-10-01 PROCEDURE — 85384 FIBRINOGEN ACTIVITY: CPT | Performed by: PHYSICIAN ASSISTANT

## 2023-10-01 PROCEDURE — 80053 COMPREHEN METABOLIC PANEL: CPT | Performed by: PHYSICIAN ASSISTANT

## 2023-10-01 PROCEDURE — 83735 ASSAY OF MAGNESIUM: CPT | Performed by: INTERNAL MEDICINE

## 2023-10-01 PROCEDURE — 258N000003 HC RX IP 258 OP 636: Performed by: INTERNAL MEDICINE

## 2023-10-01 PROCEDURE — 250N000013 HC RX MED GY IP 250 OP 250 PS 637

## 2023-10-01 PROCEDURE — 250N000009 HC RX 250: Performed by: PHYSICIAN ASSISTANT

## 2023-10-01 PROCEDURE — 120N000002 HC R&B MED SURG/OB UMMC

## 2023-10-01 PROCEDURE — 258N000003 HC RX IP 258 OP 636: Performed by: PHYSICIAN ASSISTANT

## 2023-10-01 PROCEDURE — 85007 BL SMEAR W/DIFF WBC COUNT: CPT | Performed by: PHYSICIAN ASSISTANT

## 2023-10-01 PROCEDURE — 250N000011 HC RX IP 250 OP 636: Mod: JZ | Performed by: PHYSICIAN ASSISTANT

## 2023-10-01 PROCEDURE — 250N000009 HC RX 250: Performed by: INTERNAL MEDICINE

## 2023-10-01 PROCEDURE — 85730 THROMBOPLASTIN TIME PARTIAL: CPT | Performed by: PHYSICIAN ASSISTANT

## 2023-10-01 RX ORDER — FUROSEMIDE 20 MG
20 TABLET ORAL ONCE
Status: COMPLETED | OUTPATIENT
Start: 2023-10-01 | End: 2023-10-01

## 2023-10-01 RX ORDER — POTASSIUM PHOS IN 0.9 % NACL 15MMOL/250
15 PLASTIC BAG, INJECTION (ML) INTRAVENOUS ONCE
Status: COMPLETED | OUTPATIENT
Start: 2023-10-01 | End: 2023-10-01

## 2023-10-01 RX ADMIN — AZTREONAM 1 G: 1 INJECTION, POWDER, LYOPHILIZED, FOR SOLUTION INTRAMUSCULAR; INTRAVENOUS at 14:01

## 2023-10-01 RX ADMIN — DAPTOMYCIN 500 MG: 500 INJECTION, POWDER, LYOPHILIZED, FOR SOLUTION INTRAVENOUS at 18:39

## 2023-10-01 RX ADMIN — SODIUM CHLORIDE, PRESERVATIVE FREE 5 ML: 5 INJECTION INTRAVENOUS at 23:00

## 2023-10-01 RX ADMIN — CALCIUM CARBONATE (ANTACID) CHEW TAB 500 MG 500 MG: 500 CHEW TAB at 14:01

## 2023-10-01 RX ADMIN — VORICONAZOLE 200 MG: 200 TABLET ORAL at 20:21

## 2023-10-01 RX ADMIN — METRONIDAZOLE 500 MG: 500 INJECTION, SOLUTION INTRAVENOUS at 09:28

## 2023-10-01 RX ADMIN — SODIUM CHLORIDE, PRESERVATIVE FREE 5 ML: 5 INJECTION INTRAVENOUS at 05:40

## 2023-10-01 RX ADMIN — ACYCLOVIR 400 MG: 400 TABLET ORAL at 20:21

## 2023-10-01 RX ADMIN — AZTREONAM 1 G: 1 INJECTION, POWDER, LYOPHILIZED, FOR SOLUTION INTRAMUSCULAR; INTRAVENOUS at 05:41

## 2023-10-01 RX ADMIN — PHYTONADIONE 5 MG: 10 INJECTION, EMULSION INTRAMUSCULAR; INTRAVENOUS; SUBCUTANEOUS at 08:48

## 2023-10-01 RX ADMIN — TRIAMCINOLONE ACETONIDE: 1 OINTMENT TOPICAL at 08:47

## 2023-10-01 RX ADMIN — FUROSEMIDE 20 MG: 20 TABLET ORAL at 11:50

## 2023-10-01 RX ADMIN — VORICONAZOLE 200 MG: 200 TABLET ORAL at 08:48

## 2023-10-01 RX ADMIN — CALCIUM CARBONATE (ANTACID) CHEW TAB 500 MG 500 MG: 500 CHEW TAB at 20:21

## 2023-10-01 RX ADMIN — CALCIUM CARBONATE (ANTACID) CHEW TAB 500 MG 500 MG: 500 CHEW TAB at 08:48

## 2023-10-01 RX ADMIN — METRONIDAZOLE 500 MG: 500 INJECTION, SOLUTION INTRAVENOUS at 00:53

## 2023-10-01 RX ADMIN — HYDROXYZINE HYDROCHLORIDE 50 MG: 25 TABLET, FILM COATED ORAL at 20:21

## 2023-10-01 RX ADMIN — ACYCLOVIR 400 MG: 400 TABLET ORAL at 08:48

## 2023-10-01 RX ADMIN — METRONIDAZOLE 500 MG: 500 INJECTION, SOLUTION INTRAVENOUS at 17:11

## 2023-10-01 RX ADMIN — CETIRIZINE HYDROCHLORIDE 10 MG: 10 TABLET, FILM COATED ORAL at 08:47

## 2023-10-01 RX ADMIN — SODIUM CHLORIDE, PRESERVATIVE FREE 5 ML: 5 INJECTION INTRAVENOUS at 14:43

## 2023-10-01 RX ADMIN — TRIAMCINOLONE ACETONIDE: 1 OINTMENT TOPICAL at 20:21

## 2023-10-01 RX ADMIN — POTASSIUM PHOSPHATE, MONOBASIC POTASSIUM PHOSPHATE, DIBASIC 15 MMOL: 224; 236 INJECTION, SOLUTION, CONCENTRATE INTRAVENOUS at 08:48

## 2023-10-01 RX ADMIN — AZTREONAM 1 G: 1 INJECTION, POWDER, LYOPHILIZED, FOR SOLUTION INTRAMUSCULAR; INTRAVENOUS at 22:13

## 2023-10-01 ASSESSMENT — ACTIVITIES OF DAILY LIVING (ADL)
ADLS_ACUITY_SCORE: 30

## 2023-10-01 NOTE — PROGRESS NOTES
Date Admitted: 09/01/23    Reason For Admission: Induction with 7+3 (C1D1 09/02/23)    Hx:AML, brain abscess, HTN, BPH    Droplet ISO for +rhinovirus, Vitally stable, denies pain, nausea/vomiting, alert and oriented, RA, BLE/UE edema, lymph wraps to LEs, pt states that generalized rash is improving, skin peeling present, multiple BMs this shift, ambulating to bathroom with A1 with walker/GB, calls appropriately but Bed Alarm activated for precautions overnight, pt had assisted fall about a week ago, Phos 1.9, replaced IV x2 with improved count 3.0, AM labs pending, regular diet with [place] count 109/29-10/01, continue to monitor and intervene as appropriate

## 2023-10-01 NOTE — PLAN OF CARE
"Goal Outcome Evaluation:      Plan of Care Reviewed With: patient, spouse    Overall Patient Progress: no changeOverall Patient Progress: no change     0700 - 1500:   /68 (BP Location: Left arm)   Pulse 80   Temp 98.1  F (36.7  C) (Oral)   Resp 18   Ht 1.74 m (5' 8.5\")   Wt 94.1 kg (207 lb 6.4 oz)   SpO2 97%   BMI 31.08 kg/m      IV phos replaced for phos 2.3, recheck tomorrow.  A&O x 4, AVSS, denies pain/nausea/sob on RA.  Junaid cushion ordered d/t bottoms reddness blanchable, sacral mepilex applied.  PICC on HL. Good appetite.  Up assist x 1/walker, voiding spontaneously, had a soft bm today, washed up this morning.  Plan to do end of cycle bmbx in coming days when counts recovers.  Continue with poc...      "

## 2023-10-01 NOTE — PROGRESS NOTES
"[facility]    Hematology / Oncology Progress Note    Patient: Artie Fine  MRN: [id number]  Admission Date: 9/1/2023  Date of Service (when I saw the patient): 10/01/2023  Hospital Day # 30     Assessment & Plan   Artie Fine is a 68 year old male with a history of brain abscess (2021), HTN, and BPH. He was transferred from [facility] in [place], [place] to [facility] for work up of pancytopenia; he was noted to have circulating blasts concerning for acute leukemia, and was subsequently transferred to [facility]. He underwent a diagnostic bone marrow biopsy on 9/1/23 which confirmed a new diagnosis of AML. He was started on induction with 7+3 (C1D1=9/2/23). Hospital course has been complicated by rhinovirus infection, recurrent neutropenic fever,  S. mitis bacteremia, and S. epidermidis UTI.     Today:  - Day 30 from 7+3 induction chemotherapy  - Feeling \"the best I have felt in 4 weeks today.\"  Nasal congestion and cough are improved.  Denies weakness today and states he feels like he could walk with a walker without additional assistance.  He is looking forward to working with therapy this afternoon.  - RVP from 9/30, positive for human rhinovirus, though was (+) ~2 weeks prior when checked and completed isolation at that time.  Given PCR, suspect this is continued viral load from previous infection rather than new acute infection.  Droplet Precautions can be removed from my standpoint.  - Chest x-ray without evidence of pneumonia  - Afebrile for 24 hours.  We will continue antibiotics for today, consider transitioning in coming days pending fever curve and clinical stability.  - Under ID direction, antibiotics transitioned to aztreonam, daptomycin, Flagyl (9/26)  - Bcx 9/20/23 + streptococcus mitis (intermediate to penicillins, otherwise pansensitive)  - UCx + s. Epidermis (MRSE, noted susceptible to doxy)  - Chest CT showing basilar reticular opacities; " indeterminate, possible atypical infection vs. scarring/atelectasis.  - CT abdomen pelvis (WO contrast due to patient concern for allergy); noting distal esophageal wall thickening with adjacent borderline enlarged gastrohepatic lymph node   - 9/30: RVP, COVID, CXR unremarkable  - Generalized edema improving with compression wraps bilateral lower extremities and Lasix 20 mg PO yesterday.  Mild edema persisting to BUE.  Likely in setting of fluids (antibiotics), hypoalbuminemia, and third spacing.  - Lasix 20 mg PO ordered again today; monitoring daily for diuresis needs  - Strict I/O's and daily weights.  - Lymphedema consulted, appreciate assistance  - No respiratory symptoms, O2 sats 99% and breathing comfortably on room air  - Skin rash stable and overall improving.  Mild skin peeling noted, no dehiscence or sloughing.  Recommend moisturizing lotion, close monitoring.  Expect this will continue to improve in coming days.    - Dermatology has evaluated, consistent with morbilliform eruption likely component of toxic erythema of chemotherapy as well as component of drug eruption 2/2 cephalosporins. Cefepime and ceftriaxone added to allergy list.  - EBV, CMV, HHV PCR added per Derm recommendations triamcinolone 0.1% ointment twice daily to affected area added  - Hydrocortisone 2.5% cream twice daily to face added  - Continue to monitor with daily CBC/differential and CMP to monitor for progression to DIHS/DRESS  - Calcium low at 7.5; when corrected for hypoalbuminemia, ionized calcium 4.1.  - Calcium carbonate BID and will continue to monitor  - INR improved s/p vitamin K (will complete 3 day course 9/30-10/2)   - No transfusions required today  - Will need end of cycle bone marrow biopsy when evidence of count recovery, maybe in coming days? Pending clinical stability, may be able to discharge in next 1-5 days    HEME/ONC  # AML, adverse risk  Patient initially presented to [facility] in [place], [place] with 4-6  weeks of progressive bruising, weakness, fatigue, loss of appetite, and night sweats. He also noted a headache similar to his previous brain abscess. CBC notable for pancytopenia; WBC 2.7 (), Hgb 8.1, and plt 1k. He was transferred to [facility] and was found on peripheral flow w/ 11% circulating myeloid blasts. He was then transferred to [facility] for further work up and treatment of AML. Diagnostic BMBx performed 9/1 which showed AML with 30% blasts by morph and 47% by flow. P53 negative. FISH negative for MLLT10, NUP98, and KMT2A. Normal karyotype. NGS showed ASXL1, CEBPA, SRSF2, and STAG2 mutations. Started 7+3 (C1D1=9/2/23).   - Baseline TTE showing normal LVEF of 55-60%. Baseline EKG showed NSR and QTc of 434.   - Viral serologies: HepB/C-, HIV-. HSV1/2+, EBV IgG+, CMV IgG+  - PICC placed 9/1  - S/p diagnostic LP 9/8/23 for CNS leukemia work up as patient endorses headache upon admission w/ new AML diagnosis. CSF flow negative. MRI brain 9/11 negative, only showing chronic changes from h/o brain abscess.   - D14 BMBx w/o evidence of leukemia.   - HLA typing ordered. S/p BMT NT 9/22 with Dr. Cam Edward. Starting donor search. Plan for JIMMY in CR1.   - Will need end of cycle bone marrow biopsy when evidence of count recovery  - Clinic appointment scheduled for 10/11 with Dr. Garcia                   Treatment Plan: 7+3 (C1D1=9/2/23)                - Cytarabine 100 mg/m2 - D1-D7               - Daunorubicin 60 mg/m2 - D1-D3               - Supportive meds: Decadron 12 mg D1-D3, Zofran     # Low risk for TLS/DIC  - S/p allopurinol  - Montior TLS/DIC labs twice weekly    ID  # Neutropenic fever, improving  # BCx 9/20 1/2 positive for strep mitis  # UCx 9/20 with 10-50k MRSE  # Diarrhea, resolved  Tmax 102.7 on 9/20 evening. Pt has been having cold symptoms as below in the setting of positive rhinovirus on 9/16. Cold symptoms on improving trajectory at time of new fever. Pt had diarrhea around time of fever onset,  but had recently received MiraLax for constipation. Diarrhea persisted after MiraLax effect waned. Overnight 9/21-9/22, pt was too weak to return to bed from bathroom, called RN to assist.  Otherwise no localizing signs/symptoms of infection.  - Follow fever curve, repeat blood cultures for fever if not done in previous 24 hours  - Follow blood cultures  - Work up:   - BCx 9/20 1/2 positive for strep mitis, pan-sensitive  - BCx 9/21-9/28: NGTD  - UCx 9/20 grew 10-50k staph epi in the setting of bland UA and no urinary symptoms. Oxacillin resistant.  ID feels this is likely contaminant.  - CXR unremarkable  - C diff negative  - CT chest indeterminate - atypical pneumonia? Though ID not convinced for pneumonia  - CT abdomen pelvis with reticulonodular attenuation in lung bases, suspicious for infection versus aspiration, distal esophageal wall thickening with adjacent borderline enlarged gastrohepatic lymph node, digestive esophagitis though endoscopy recommended to exclude neoplasm  - ID consulted 9/26 in setting of persisting neutropenic fevers on antibiotics, appreciate input  - Discontinue cefepime and doxycycline (9/26) - concern for cephalosporin drug rash? (See below)  - Start aztreonam, Flagyl, daptomycin (9/26)  - CK 9/26 wnl, monitor with weekly labs while on daptomycin (ordered for weekly lab draw, to be collected next on 10/3)  - 9/30: continued cough, congestion, recurrent fevers, RVP/COVID/CXR ordered ? negative  - of note, RVP + for humanrhinovirus, though was (+) ~2 weeks prior when checked and completed isolation at that time.  Given PCR, suspect this is continued viral load from previous infection rather than new acute infection.  Droplet Precautions can be removed from my standpoint.  - Chest x-ray without evidence of pneumonia  - Antibiotics:   - Cefepime 2 g q 8hr x9/20-9/26/23  - S/p vancomycin 9/22-9/23, discontinued once BCx susceptibilities resulted & d/t diffuse red rash  - Doxycycline 100 mg  BID 9/23-9/26/23, started for possible MRSE UTI and empirically for atypical pneumonia  - Aztreonam 1g q8h 9/26-X  - Flagyl 500 mg q8h 9/26-X  - Daptomycin 6 mg/kg/day 9/26-X    # Rhinovirus, improved  # Post nasal drip, improving  # Cough, improving  Nasal congestion and post nasal drip 9/15 morning upon waking. H/o seasonal allergies. On 9/16, noted development of productive cough and worsening nasal congestion and post-nasal drip. One episode of blood-tinged sputum, now resolved. COVID/flu/RSV negative, but RVP positive for rhinovirus. Although new neutropenic fever noted 9/20, URI symptoms are improving.   - Continue PTA cetirizine  - Supportive: Tessalon Perles, Mucinex DM, Hurricaine spray    # Immunosuppressed 2/2 malignancy and chemotherapy  # ID PPX  -  mg BID  - Levofloxacin 250 mg daily - on hold with treatment dose antibiotics as above  - Voriconazole 200 mg BID x9/10     # H/o brain abscess (2021)  # Headache, resolved  Treated in [place]. S/p craniotomy for drainage of abscess July 2021. It is unclear what culture speciated to, but he completed a course of antibiotics with ceftriaxone then meropenem with resolution on imaging. Head CT 8/31 at OSH revealed no intracranial hemorrhage midline shift or mass effect. Postsurgical changes noted. Previous area of infection demonstrates trace amount of encephalomalacia but no mass effect or inflammatory changes to suggest new or acute infection. Otherwise unremarkable. Patient presented initially with headache and dizziness, which has now resolved.   - See work up above for CNS leukemia work-up, which was negative     GI  # Esophageal wall thickening, seen on CT  CT abd/pelvis 9/26 obtained in setting of recurrent neutropenic fevers, strep mitis bacteremia.  On scan, noted distal esophageal wall thickening with adjacent borderline enlarged gastrohepatic lymph node, digestive esophagitis though endoscopy recommended to exclude neoplasm  - Given current  pancytopenia s/p induction chemotherapy, concern for significant risk with endoscopy at this time.    - We will continue to monitor and defer for follow-up with repeat imaging/endoscopy when appropriate  - No GI symptoms to correlate  - Informed pt and wife (Aundrea) of these results with plan for close follow up as above    CHRONIC  # HTN  Noted during prior hospitalization for brain abscess in 2021. Previously on lisinopril, which he is no longer taking.  - Patient BP's have fluctuated this admission, w/occasionally elevated BPs. Continue to monitor.     # BPH  Notes difficulty with complete emptying of bladder. Previously on Flomax but states it did nothing, so he stopped it. He does note frequent urination at baseline, currently urination remains unchanged from baseline.  - Monitor clinically     MISC  # Mobiliform rash, improving  On 9/23 AM, pt noted to have ocular papular rash on upper thighs and trunk.  Likely drug rash? Patient endorsing mild pruritis to back rash, denies concerning symptoms; no evidence of angioedema, denies SOB.  Vancomycin discontinued 9/23 with concern this may be attributing.  - Benadryl and kenalog cream PRN  - Continue to monitor for improvement s/p discontinued vancomycin.  - Rash worsening as of 9/26/2023 - likely drug rash in setting of antibiotics vs chemotherapy vs other  - In discussion with Dr. Levine, given worsening of rash, dermatology consulted; appreciate recs  - Consistent with morbilliform eruption with likely multifactorial: component of toxic erythema of chemotherapy as well as drug eruption 2/2 cephalosporins  - drug eruption erythema improving w discontinuation of cefepime (added to allergy list); petechial rash//concern for JONATHAN stable  - EBV, CMV, HHV PCR added per Derm recommendations   - triamcinolone 0.1% ointment twice daily to affected area added  - Hydrocortisone 2.5% cream twice daily to face added  - Continue to monitor with daily CBC/differential and CMP to  "monitor for progression to DIHS/DRESS  - 10/1: with continued improvement in rash. Mild skin peeling noted, no dehiscence or sloughing.  Recommend moisturizing lotion, close monitoring.  Expect this will continue to improve in coming days.      # RUE edema  Noted ~9/25, unilateral RUE 1-2+ nonpitting edema. Denies pain associated.  On exam, edema right upper extremity noted, diffuse skin rash expanding from trunk/chest into bilateral upper extremities, though edema does not seem to be associated with the rash.  Nontender to palpation.  Range of motion intact at joint lines of upper extremity.  No pustular, vesicular lesions, open wounds, or dehiscence.  PICC in place to right upper extremity, functioning appropriately, nontender at site, and dressing is clean dry intact.  - US negative for DVT    # Bilateral lower and upper extremity edema, improving  Noted generalized edema bilateral upper and lower extremities on exam 9/27/2023.  Noted weight increase of approximately 10 pounds since prior day.  Likely third spacing in setting of hypoalbuminemia.  Not currently on IV fluids, though noted IV antibiotics administered with fluids.  Had previously been diuresed during admission, response to Lasix. No respiratory symptoms.   - Lymphedema consult placed for compression wraps  - BLE improving with compression  - Lasix 20 mg IV x1 (9/27), significant response with net -1600ml   - Will eval daily for diuresis needs -- quite sensitive to IV (dehydrated, triggered sepsis rapid 2/2 elevated lactate), seems to respond better to PO (20 mg)  - Lasix 20 mg PO 10/1/2023     # Weakness  # Deconditioning  Pt reports issues w/ balance after his brain abscess in 2021, exacerbated when ambulating. Deconditioning acute-on-chronic upon admission since 2021.   - PT consulted    # Episodes of Hypotension, resolved  # Dizziness, resolved  # Fall risk  Per chart review, patient had \"near fall\" on 9/24/23 when he attempted to get up from bed " "independently to ambulate to the restroom. He has been reported intermittent episodes of dizziness since admission, however, he notices this has increased in the past few days. He is also experiencing hypotensive episodes, requiring 1.5 L fluid support on 9/24-9/25.  - Feeling improved AM 9/26  - Continue to monitor closely  - Consider IVF bolus prn    # Weight loss  # Loss of appetite, improved  # Moderate malnutrition in the context of acute illness   Patient presented with loss of appetite 2/2 malignancy. He has been gradually losing weight throughout admission, although PO intake is overall sufficient. 9/29 discussed with patient and tells me he is eating about 1/2 of meals, TID. Encouragement provided  - RD consulted  - Armand counts  - Ensure BID between meals and PRN    # Hyponatremia likely 2/2 oral intake. Continue to monitor     # Hypophosphatemia  - Replete per protocol    # Hypocalcemia  Noted hypocalcemia trends on labs and downtrending 9/24. Ionized calcium low at 4.1 when corrected for hypoalbuminemia.   - calcium carbonate BID  - continue to monitor with daily labs     Clinically Significant Risk Factors              # Hypoalbuminemia: Lowest albumin = 2.5 g/dL at 9/30/2023  5:04 AM, will monitor as appropriate  # Coagulation Defect: INR = 1.31 (Ref range: 0.85 - 1.15) and/or PTT = 39 Seconds (Ref range: 22 - 38 Seconds), will monitor for bleeding    # Thrombocytopenia: Lowest platelets = 13 in last 2 days, will monitor for bleeding          # Obesity: Estimated body mass index is 31.08 kg/m  as calculated from the following:    Height as of this encounter: 1.74 m (5' 8.5\").    Weight as of this encounter: 94.1 kg (207 lb 6.4 oz).       # Severe Malnutrition: based on nutrition assessment           FEN  Diet: Regular Diet Adult   IVF: Bolus PRN   Lytes: Replete per protocol     PPX  VTE: None given thrombocytopenia  Bowel: Senna/MiraLax PRN  GI/PUD: None currently indicated     MISC  Code Status: Full " "Code   Lines/Drains: PICC  Dispo: Will remain inpatient through count recovery with safe discharge plan outpatient.   Follow Up: Pt will follow with Dr. Garcia, appointment scheduled for end of September. Pt would prefer labs and blood transfusions done closer to home in [facility].    Patient was seen and plan of care was discussed with attending physician Dr. Erickson    I spent >45 minutes face-to-face or coordinating care of Artie Babatunde. Over 50% of our time on the unit was spent counseling the patient and coordinating care.    Sheree Chowdhury PA-C  Hematology/Oncology  Pager #[phone]    Interval History   Nursing notes reviewed, afebrile x 24 hours. DONNA Alva is feeling \"the best I have felt in 4 weeks today.\"  Nasal congestion and cough are improved.  Denies weakness today and states he feels like he could walk with a walker without additional assistance.  He is looking forward to working with therapy this afternoon.  He notes skin rash seems to be improving, but now peeling.  Discussed using moisturizing lotion and continue to closely monitor.  He is agreeable with this plan.  We reviewed positive rhinovirus yesterday from a respiratory viral panel, though given PCR, likely residual from previous infection, I do not suspect acute rhinovirus infection, and discussed droplet precautions can be lifted from my standpoint.  He and his wife are appreciative.  He denies sore throat, cough, chest pain, fevers, chills, headache, dizziness, shortness of breath, abdominal pain, nausea, vomiting, diarrhea, urinary symptoms, numbness tingling or weakness.  We reviewed morning labs, no need for transfusions today, continued antibiotics and monitoring.  We will proceed with 1 dose of Lasix oral today for generalized edema, continue symptomatic supportive cares.  Possible discharge this week pending clinical stability.  PT has evaluated, appropriate for home with assist with PT/OT outpatient. He and his wife (Aundrea) are " agreeable with this plan.  Questions addressed at bedside.        Vital Signs with Ranges  Temp:  [97.5  F (36.4  C)-99.6  F (37.6  C)] 98.1  F (36.7  C)  Pulse:  [80-95] 80  Resp:  [18-20] 18  BP: (118-137)/(55-70) 137/68  FiO2 (%):  [21 %] 21 %  SpO2:  [96 %-99 %] 97 %  I/O last 3 completed shifts:  In: 1640 [P.O.:480; I.V.:860]  Out: -     Physical Exam     Constitutional: Awake and conversational. Non- toxic appearing. No acute distress.   HEENT: Normocephalic. Moist mucus membranes without lesions, thrush, or exudates appreciated  Lymph: Neck supple, no ridigity. No significant adenopathy noted.   Respiratory: Breathing comfortably on room air with no accessory muscle use. Speaking in full sentences, no evidence of respiratory distress.  Breath sounds diminished bilateral bases, otherwise clear to auscultation without stridor, wheeze, rhonchi, or rales.   Cardiovascular: Regular rate and rhythm. 2+ radial pulses bilaterally.  1+ nonpitting edema bilateral upper and lower extremities.    GI: Abdomen with normoactive bowel sounds, soft and non-tender throughout. No rebound, guarding, or peritoneal sign.   Skin: Skin is clean, dry, intact.  Improving diffuse maculopapular rash, now only present to distal bilateral upper and lower extremities with minimal involvement of chest/trunk and proximal extremities.  Mild skin peeling noted, no significant dehiscence and no sloughing.  No open wounds, lesions or sores.   No crepitus.    Neurologic: Alert with normal speech. Grossly nonfocal.  Moves extremities spontaneously.    Neuropsychiatric: Calm, affect congruent to situation.   Vascular access: PICC on RUE CDI      Medications    - MEDICATION INSTRUCTIONS -        acyclovir  400 mg Oral BID    aztreonam  1 g Intravenous Q8H    calcium carbonate  500 mg Oral TID    cetirizine  10 mg Oral Daily    DAPTOmycin (CUBICIN) 500 mg in sodium chloride 0.9 % 100 mL intermittent infusion  6 mg/kg Intravenous Q24H    furosemide   20 mg Oral Once    hydrOXYzine  50 mg Oral At Bedtime    [Held by provider] levofloxacin  250 mg Oral Daily    metroNIDAZOLE  500 mg Intravenous Q8H    phytonadione  5 mg Oral Daily    sodium phosphate  15 mmol Intravenous Once    triamcinolone   Topical BID    voriconazole  200 mg Oral Q12H [facility] (08/20)     Data   Results for orders placed or performed during the hospital encounter of 09/01/23 (from the past 24 hour(s))   XR Chest 2 Views    Narrative    XR CHEST 2 VIEWS  9/30/2023 11:54 AM     HISTORY:  67 yo with neutropenic fever, cough       COMPARISON:  9/25/2023    FINDINGS:   Frontal and lateral views of the chest. Right arm PICC tip projects  over superior cavoatrial junction.     Cardiac silhouette is within normal limits. Mild bibasilar opacities.  Mild blunting of bilateral costophrenic angles. No appreciable  pneumothorax. Degenerative changes of the spine.      Impression    IMPRESSION:   1. Mild bibasilar opacities, which may represent atelectasis/edema  and/or infection.  2. Small bilateral pleural effusions.     I have personally reviewed the examination and initial interpretation  and I agree with the findings.    SUMIT STEWART MD         SYSTEM ID:  [id number]   Phosphorus   Result Value Ref Range    Phosphorus 1.9 (L) 2.5 - 4.5 mg/dL   CBC with Platelets & Differential    Narrative    The following orders were created for panel order CBC with Platelets & Differential.  Procedure                               Abnormality         Status                     ---------                               -----------         ------                     CBC with platelets and d...[[id number]]  Abnormal            Final result               Manual Differential[[id number]]          Abnormal            Final result                 Please view results for these tests on the individual orders.   CBC with platelets and differential   Result Value Ref Range    WBC Count 1.1 (L) 4.0 - 11.0 10e3/uL    RBC Count 2.85  (L) 4.40 - 5.90 10e6/uL    Hemoglobin 8.2 (L) 13.3 - 17.7 g/dL    Hematocrit 24.4 (L) 40.0 - 53.0 %    MCV 86 78 - 100 fL    MCH 28.8 26.5 - 33.0 pg    MCHC 33.6 31.5 - 36.5 g/dL    RDW 14.3 10.0 - 15.0 %    Platelet Count 17 (LL) 150 - 450 10e3/uL   Manual Differential   Result Value Ref Range    % Neutrophils 34 %    % Lymphocytes 60 %    % Monocytes 6 %    % Eosinophils 0 %    % Basophils 0 %    Absolute Neutrophils 0.4 (LL) 1.6 - 8.3 10e3/uL    Absolute Lymphocytes 0.7 (L) 0.8 - 5.3 10e3/uL    Absolute Monocytes 0.1 0.0 - 1.3 10e3/uL    Absolute Eosinophils 0.0 0.0 - 0.7 10e3/uL    Absolute Basophils 0.0 0.0 - 0.2 10e3/uL    RBC Morphology Confirmed RBC Indices     Platelet Assessment  Automated Count Confirmed. Platelet morphology is normal.     Automated Count Confirmed. Platelet morphology is normal.    Toxic Neutrophils Present (A) None Seen   Phosphorus   Result Value Ref Range    Phosphorus 3.0 2.5 - 4.5 mg/dL   ABO/Rh type and screen    Narrative    The following orders were created for panel order ABO/Rh type and screen.  Procedure                               Abnormality         Status                     ---------                               -----------         ------                     Adult Type and Screen[[id number]]                            Final result                 Please view results for these tests on the individual orders.   CBC with platelets differential    Narrative    The following orders were created for panel order CBC with platelets differential.  Procedure                               Abnormality         Status                     ---------                               -----------         ------                     CBC with platelets and d...[[id number]]  Abnormal            Final result               Manual Differential[[id number]]          Abnormal            Final result                 Please view results for these tests on the individual orders.   Comprehensive metabolic  "panel   Result Value Ref Range    Sodium 137 135 - 145 mmol/L    Potassium 3.5 3.4 - 5.3 mmol/L    Carbon Dioxide (CO2) 20 (L) 22 - 29 mmol/L    Anion Gap 11 7 - 15 mmol/L    Urea Nitrogen 9.6 8.0 - 23.0 mg/dL    Creatinine 0.68 0.67 - 1.17 mg/dL    GFR Estimate >90 >60 mL/min/1.73m2    Calcium 7.5 (L) 8.8 - 10.2 mg/dL    Chloride 106 98 - 107 mmol/L    Glucose 144 (H) 70 - 99 mg/dL    Alkaline Phosphatase 53 40 - 129 U/L    AST 44 0 - 45 U/L    ALT 42 0 - 70 U/L    Protein Total 5.6 (L) 6.4 - 8.3 g/dL    Albumin 2.6 (L) 3.5 - 5.2 g/dL    Bilirubin Total 0.5 <=1.2 mg/dL   Fibrinogen activity   Result Value Ref Range    Fibrinogen Activity 494 (H) 170 - 490 mg/dL   INR   Result Value Ref Range    INR 1.31 (H) 0.85 - 1.15   Partial thromboplastin time   Result Value Ref Range    aPTT 39 (H) 22 - 38 Seconds   Magnesium   Result Value Ref Range    Magnesium 2.0 1.7 - 2.3 mg/dL   Adult Type and Screen   Result Value Ref Range    Antibody Screen Negative Negative    SPECIMEN EXPIRATION DATE [id number]    CBC with platelets and differential   Result Value Ref Range    WBC Count 1.1 (L) 4.0 - 11.0 10e3/uL    RBC Count 2.51 (L) 4.40 - 5.90 10e6/uL    Hemoglobin 7.4 (L) 13.3 - 17.7 g/dL    Hematocrit 21.6 (L) 40.0 - 53.0 %    MCV 86 78 - 100 fL    MCH 29.5 26.5 - 33.0 pg    MCHC 34.3 31.5 - 36.5 g/dL    RDW 14.5 10.0 - 15.0 %    Platelet Count 13 (LL) 150 - 450 10e3/uL    Narrative    Previously reported component [ NRBCs ] is no longer reported.\"  Previously reported component [ NRBCs Absolute ] is no longer reported.\"   Phosphorus   Result Value Ref Range    Phosphorus 2.3 (L) 2.5 - 4.5 mg/dL   Manual Differential   Result Value Ref Range    % Neutrophils 36 %    % Lymphocytes 60 %    % Monocytes 3 %    % Eosinophils 0 %    % Basophils 0 %    % Myelocytes 1 %    Absolute Neutrophils 0.4 (LL) 1.6 - 8.3 10e3/uL    Absolute Lymphocytes 0.7 (L) 0.8 - 5.3 10e3/uL    Absolute Monocytes 0.0 0.0 - 1.3 10e3/uL    Absolute " Eosinophils 0.0 0.0 - 0.7 10e3/uL    Absolute Basophils 0.0 0.0 - 0.2 10e3/uL    Absolute Myelocytes 0.0 <=0.0 10e3/uL    RBC Morphology Confirmed RBC Indices     Platelet Assessment  Automated Count Confirmed. Platelet morphology is normal.     Automated Count Confirmed. Platelet morphology is normal.    Toxic Neutrophils Present (A) None Seen   ABO/RH Type & Screen   Result Value Ref Range    SPECIMEN EXPIRATION DATE [id number]     ABORH A POS

## 2023-10-01 NOTE — PROGRESS NOTES
Calorie Count  Intake recorded for: 9/30  Total Kcals: 998 Total Protein: 26g  Kcals from Hospital Food: 324   Protein: 8g  Kcals from Outside Food (average):674 Protein: 18g  # Meals Ordered from Kitchen: 2 meals + outside food  # Meals Recorded: 1 meal + outside food  (Meal - 100% 2 orange sherbet, 1 8oz 1% milk)  (Outside - 100% 1 bowl of chicken noodle soup, 1 pack of [employer] cranberry health trail mix, 1 blueberry muffin, 15 grapes, 2 strawberries, 2 slices of apple, 1 8oz orange juice, 50% 1 banana)  # Supplements Recorded: 0

## 2023-10-02 ENCOUNTER — APPOINTMENT (OUTPATIENT)
Dept: PHYSICAL THERAPY | Facility: CLINIC | Age: 68
DRG: 835 | End: 2023-10-02
Attending: INTERNAL MEDICINE
Payer: COMMERCIAL

## 2023-10-02 LAB
ALBUMIN SERPL BCG-MCNC: 3 G/DL (ref 3.5–5.2)
ALP SERPL-CCNC: 61 U/L (ref 40–129)
ALT SERPL W P-5'-P-CCNC: 38 U/L (ref 0–70)
ANION GAP SERPL CALCULATED.3IONS-SCNC: 10 MMOL/L (ref 7–15)
APTT PPP: 43 SECONDS (ref 22–38)
AST SERPL W P-5'-P-CCNC: 47 U/L (ref 0–45)
BACTERIA BLD CULT: NO GROWTH
BACTERIA BLD CULT: NO GROWTH
BASOPHILS # BLD MANUAL: 0 10E3/UL (ref 0–0.2)
BASOPHILS NFR BLD MANUAL: 0 %
BILIRUB SERPL-MCNC: 0.5 MG/DL
BUN SERPL-MCNC: 9.9 MG/DL (ref 8–23)
CALCIUM SERPL-MCNC: 8.2 MG/DL (ref 8.8–10.2)
CHLORIDE SERPL-SCNC: 104 MMOL/L (ref 98–107)
CREAT SERPL-MCNC: 0.67 MG/DL (ref 0.67–1.17)
DEPRECATED HCO3 PLAS-SCNC: 22 MMOL/L (ref 22–29)
EBV DNA # SPEC NAA+PROBE: NOT DETECTED COPIES/ML
EGFRCR SERPLBLD CKD-EPI 2021: >90 ML/MIN/1.73M2
EOSINOPHIL # BLD MANUAL: 0 10E3/UL (ref 0–0.7)
EOSINOPHIL NFR BLD MANUAL: 0 %
ERYTHROCYTE [DISTWIDTH] IN BLOOD BY AUTOMATED COUNT: 14.5 % (ref 10–15)
FIBRINOGEN PPP-MCNC: 448 MG/DL (ref 170–490)
GLUCOSE SERPL-MCNC: 98 MG/DL (ref 70–99)
HCT VFR BLD AUTO: 23.9 % (ref 40–53)
HGB BLD-MCNC: 8 G/DL (ref 13.3–17.7)
HHV6 DNA # SPEC NAA+PROBE: NOT DETECTED COPIES/ML
INR PPP: 1.19 (ref 0.85–1.15)
LDH SERPL L TO P-CCNC: 226 U/L (ref 0–250)
LYMPHOCYTES # BLD MANUAL: 0.8 10E3/UL (ref 0.8–5.3)
LYMPHOCYTES NFR BLD MANUAL: 50 %
MAGNESIUM SERPL-MCNC: 2 MG/DL (ref 1.7–2.3)
MCH RBC QN AUTO: 29.9 PG (ref 26.5–33)
MCHC RBC AUTO-ENTMCNC: 33.5 G/DL (ref 31.5–36.5)
MCV RBC AUTO: 89 FL (ref 78–100)
MONOCYTES # BLD MANUAL: 0.1 10E3/UL (ref 0–1.3)
MONOCYTES NFR BLD MANUAL: 8 %
NEUTROPHILS # BLD MANUAL: 0.6 10E3/UL (ref 1.6–8.3)
NEUTROPHILS NFR BLD MANUAL: 42 %
NRBC # BLD AUTO: 0 10E3/UL
NRBC BLD MANUAL-RTO: 2 %
PHOSPHATE SERPL-MCNC: 2.8 MG/DL (ref 2.5–4.5)
PLAT MORPH BLD: ABNORMAL
PLATELET # BLD AUTO: 16 10E3/UL (ref 150–450)
POTASSIUM SERPL-SCNC: 3.8 MMOL/L (ref 3.4–5.3)
PROT SERPL-MCNC: 6.1 G/DL (ref 6.4–8.3)
RBC # BLD AUTO: 2.68 10E6/UL (ref 4.4–5.9)
RBC MORPH BLD: ABNORMAL
SODIUM SERPL-SCNC: 136 MMOL/L (ref 135–145)
URATE SERPL-MCNC: 1.8 MG/DL (ref 3.4–7)
WBC # BLD AUTO: 1.5 10E3/UL (ref 4–11)

## 2023-10-02 PROCEDURE — 85610 PROTHROMBIN TIME: CPT | Performed by: PHYSICIAN ASSISTANT

## 2023-10-02 PROCEDURE — 250N000013 HC RX MED GY IP 250 OP 250 PS 637

## 2023-10-02 PROCEDURE — 84100 ASSAY OF PHOSPHORUS: CPT

## 2023-10-02 PROCEDURE — 85730 THROMBOPLASTIN TIME PARTIAL: CPT | Performed by: PHYSICIAN ASSISTANT

## 2023-10-02 PROCEDURE — 85027 COMPLETE CBC AUTOMATED: CPT | Performed by: PHYSICIAN ASSISTANT

## 2023-10-02 PROCEDURE — 250N000013 HC RX MED GY IP 250 OP 250 PS 637: Performed by: PHYSICIAN ASSISTANT

## 2023-10-02 PROCEDURE — 250N000011 HC RX IP 250 OP 636: Mod: JZ

## 2023-10-02 PROCEDURE — 83615 LACTATE (LD) (LDH) ENZYME: CPT

## 2023-10-02 PROCEDURE — 99233 SBSQ HOSP IP/OBS HIGH 50: CPT | Mod: GC | Performed by: STUDENT IN AN ORGANIZED HEALTH CARE EDUCATION/TRAINING PROGRAM

## 2023-10-02 PROCEDURE — 97116 GAIT TRAINING THERAPY: CPT | Mod: GP

## 2023-10-02 PROCEDURE — 85384 FIBRINOGEN ACTIVITY: CPT | Performed by: PHYSICIAN ASSISTANT

## 2023-10-02 PROCEDURE — 250N000009 HC RX 250: Performed by: PHYSICIAN ASSISTANT

## 2023-10-02 PROCEDURE — 250N000013 HC RX MED GY IP 250 OP 250 PS 637: Performed by: HOSPITALIST

## 2023-10-02 PROCEDURE — 80053 COMPREHEN METABOLIC PANEL: CPT | Performed by: PHYSICIAN ASSISTANT

## 2023-10-02 PROCEDURE — 85007 BL SMEAR W/DIFF WBC COUNT: CPT | Performed by: PHYSICIAN ASSISTANT

## 2023-10-02 PROCEDURE — 250N000011 HC RX IP 250 OP 636: Mod: JZ | Performed by: PHYSICIAN ASSISTANT

## 2023-10-02 PROCEDURE — 83735 ASSAY OF MAGNESIUM: CPT

## 2023-10-02 PROCEDURE — 120N000002 HC R&B MED SURG/OB UMMC

## 2023-10-02 PROCEDURE — 84550 ASSAY OF BLOOD/URIC ACID: CPT

## 2023-10-02 RX ORDER — LEVOFLOXACIN 250 MG/1
500 TABLET, FILM COATED ORAL DAILY
Status: DISCONTINUED | OUTPATIENT
Start: 2023-10-02 | End: 2023-10-03 | Stop reason: HOSPADM

## 2023-10-02 RX ADMIN — METRONIDAZOLE 500 MG: 500 INJECTION, SOLUTION INTRAVENOUS at 00:25

## 2023-10-02 RX ADMIN — CETIRIZINE HYDROCHLORIDE 10 MG: 10 TABLET, FILM COATED ORAL at 08:15

## 2023-10-02 RX ADMIN — SODIUM CHLORIDE, PRESERVATIVE FREE 5 ML: 5 INJECTION INTRAVENOUS at 09:40

## 2023-10-02 RX ADMIN — ACYCLOVIR 400 MG: 400 TABLET ORAL at 20:26

## 2023-10-02 RX ADMIN — LEVOFLOXACIN 500 MG: 250 TABLET, FILM COATED ORAL at 12:24

## 2023-10-02 RX ADMIN — VORICONAZOLE 200 MG: 200 TABLET ORAL at 08:15

## 2023-10-02 RX ADMIN — METRONIDAZOLE 500 MG: 500 INJECTION, SOLUTION INTRAVENOUS at 08:16

## 2023-10-02 RX ADMIN — CALCIUM CARBONATE (ANTACID) CHEW TAB 500 MG 500 MG: 500 CHEW TAB at 08:15

## 2023-10-02 RX ADMIN — HYDROXYZINE HYDROCHLORIDE 50 MG: 25 TABLET, FILM COATED ORAL at 20:26

## 2023-10-02 RX ADMIN — PHYTONADIONE 5 MG: 10 INJECTION, EMULSION INTRAMUSCULAR; INTRAVENOUS; SUBCUTANEOUS at 08:16

## 2023-10-02 RX ADMIN — CALCIUM CARBONATE (ANTACID) CHEW TAB 500 MG 500 MG: 500 CHEW TAB at 20:27

## 2023-10-02 RX ADMIN — AZTREONAM 1 G: 1 INJECTION, POWDER, LYOPHILIZED, FOR SOLUTION INTRAMUSCULAR; INTRAVENOUS at 06:29

## 2023-10-02 RX ADMIN — CALCIUM CARBONATE (ANTACID) CHEW TAB 500 MG 500 MG: 500 CHEW TAB at 13:57

## 2023-10-02 RX ADMIN — ACYCLOVIR 400 MG: 400 TABLET ORAL at 08:16

## 2023-10-02 RX ADMIN — TRIAMCINOLONE ACETONIDE: 1 OINTMENT TOPICAL at 09:40

## 2023-10-02 RX ADMIN — VORICONAZOLE 200 MG: 200 TABLET ORAL at 20:26

## 2023-10-02 ASSESSMENT — ACTIVITIES OF DAILY LIVING (ADL)
ADLS_ACUITY_SCORE: 30
ADLS_ACUITY_SCORE: 27
ADLS_ACUITY_SCORE: 30
ADLS_ACUITY_SCORE: 30
ADLS_ACUITY_SCORE: 26
ADLS_ACUITY_SCORE: 27
ADLS_ACUITY_SCORE: 27
ADLS_ACUITY_SCORE: 30
ADLS_ACUITY_SCORE: 26

## 2023-10-02 NOTE — PROGRESS NOTES
[facility]    Transplant Infectious Diseases Inpatient Progress Note      Artie Fine MRN# [id number]   YOB: 1955 Age: 68 year old   Date of Admission and time: 9/1/2023  3:13 PM             Recommendations: 1.   The patient has an ANC >500 and has been afebrile for 48 hours; it is reasonable to stop antibiotics and switch back to PO antibacterial prophylaxis per your protocol  2. Defer to infection control regarding precautions in the setting of +rhinovirus on PCR, though are available if questions arise  3. Can continue HSV prophylaxis (acyclovir 400 mg po bid) and fungal prophylaxis (voriconazole 200 mg po bid) per protocol    Discussed with Dr. Leger, transplant ID staff.    James Lama MD PharmD  Adult Infectious Disease Fellow PGY5  Pager: [phone]    Patient was seen and examined by me with the ID Fellow. The note above reflects our joint assessment and recommendations. I have reviewed the medical record, labs, imaging, vitals signs and have discussed the patient with the ID fellow in detail.   Yumiko Leger MD  ID staff physician  Pager [phone]          Summary of Presentation:   This patient is a 68 year old male with recent diagnosis of AML s/p 7+3 therapy.   The course is complicated by rash and febrile neutropenia.         Active Problems and Infectious Diseases Issues: 1.   Febrile neutropenia.   2. Rash.   His febrile neutropenia has resolved and his morbilliform drug and/or chemotherapy-induced rash is improving - some peeling but no longer erythematous or symptomatic. Can stop empiric febrile neutropenia antibiotics. Reasonable to restart prophylaxis until further count recovery if team desires. Updated allergy list to include ceftriaxone and cefepime.    3. MRSE bacteriuria.   Without  instrumentation this is a contaminant.     4. S mitis in blood cx.   In one out of 2 sets from 9/20/23.   Very difficult to ascertain if this is due to GI  lucy translocation vs contamination.  He received approximately 10 days of vancomycin + daptomycin which would cover this organism and treat this infection.     5. Human Rinhovirus/Enterovirus infection  -has been positive since 9/16/23. The pt has no respiratory issues. Recommend supportive care.      Old Problems and Infectious Diseases Issues: 1.   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 Apparently developed rash on ceftriaxone and was switched to meropenem. MRI in 9/2021 and 10/2021 with decrease in size of abscess. He developed itching while on meropenem and was attributed to MRI contrast.      Other Infectious Disease issues include:  - QTc: 434 as of 9/4/23.   - PJP prophylaxis: none.   - ACV for HSV, voriconazole for fungal  - Serostatus: CMV+, EBV+, HSV1+/2+, VZV ?  - Gamma globulin status: ?    Discussed case with Dr. Leger, ID staff.    James Lama MD PharmD  Adult Infectious Disease Fellow PGY5  Pager: [phone]            Interim History:   Last fever was early Saturday morning. Patient reports feeling well and much improved since then - chills, fevers, sweats, and fatigue have resolved. He still has some phlegm in the back of his throat that he relates to dust exposure.    No nausea, vomiting, diarrhea (last episode was two days ago). Rash is no longer symptomatic.         History of Present Illness:   This patient is a 68 year old male who was diagnosed early 9/2023 with AML when he presented with weeks/months of easy bruisability and weakness and was found to be pancytopenic. BMBx confirmed AML and he received 7+3 cytarabine/daunorubicin starting 9/2/23.   On 9/20/23 fever occurred and was started on cefepime. Vancomycin was added 9/22/23 when blood cx grew S mitis then vancomycin was discontinued after susceptibilities resulted.   Fever persisted and doxycyline was added on 9/24/23 for atypical coverage.     The  patient started to develop rash that was first noted on the BiUE then spread. The patient stated that the rash improved after stopping one ABx and starting another; I am not sure if he meant stopping vanco and initiating doxycycline or stopping levaquin and initiating cefepime. Rash improved since switching to daptomycin + aztreonam + metronidazole.    The patient endorsed cough for two weeks, mostly non-productive. No shortness of breath.   Also endorses watery diarrhea.  No N/V and no abdominal pain.      Exposure History  Was born in [place]. Lives in [place]. Has a large garden that he cares for including planting.   Used to work in constructions.   Did travel in the past to [facility].   No international travels.   No institutionalization and no known TB exposure.           Review of Systems:      As mentioned in the HPI otherwise negative by reviewing constitutional symptoms, central and peripheral neurological systems, respiratory system, cardiac system, GI system,  system, musculoskeletal, skin, allergy, and lymphatics.                Immunizations:     Immunization History   Administered Date(s) Administered     COVID-19 Bivalent 12+ (Pfizer) 10/19/2022     COVID-19 MONOVALENT 12+ (Pfizer) 01/28/2021, 02/18/2021, 09/28/2021            Allergies:     Allergies   Allergen Reactions     Iodinated Contrast Media Rash     Cefepime Rash     Ceftriaxone Rash     Reported history at Outside hospital 2021     Ragweeds Other (See Comments)     Congestion              Medications:   Medications that Require Transfusion:     - MEDICATION INSTRUCTIONS -       Scheduled Medications:     acyclovir  400 mg Oral BID     calcium carbonate  500 mg Oral TID     cetirizine  10 mg Oral Daily     hydrOXYzine  50 mg Oral At Bedtime     [Held by provider] levofloxacin  250 mg Oral Daily     levofloxacin  500 mg Oral Daily     triamcinolone   Topical BID     voriconazole  200 mg Oral Q12H [facility] (08/20)             Physical  Exam:   Temp: 98.5  F (36.9  C) Temp src: Oral BP: 134/71 Pulse: 85   Resp: 18 SpO2: 99 % O2 Device: None (Room air)      Wt Readings from Last 4 Encounters:   10/02/23 92.4 kg (203 lb 9.6 oz)   09/01/23 86.2 kg (190 lb 1.6 oz)     Constitutional: awake, alert, cooperative, no apparent distress and appears at stated age, well nourished.   Head, ENT, Eyes, and Neck: Normocephalic, moist buccal mucosa without oral thrush   CVS: distant HS  Chest: crackles in the bases bilaterally.   Skin: no induration, fluctuation or discharge at the PICC line in the RUE. Desquamating rash on back, upper and lower extremities; no remaining erythema or petechiae.           Data:   No results found for: ACD4    Inflammatory Markers    No lab results found.    Immune Globulin Studies   No lab results found.    Metabolic Studies       Recent Labs   Lab Test 10/02/23  0632 10/01/23  0532 09/30/23  1353 09/30/23  0533 09/30/23  0504 09/29/23  0909 09/29/23  0415 09/28/23  0719 09/28/23  0348 09/27/23  1730 09/27/23  1610 09/27/23  0848 09/26/23  2212 09/26/23  1101    137  --   --  134*  --  133*  --  133*  --   --  132*   < >  --    POTASSIUM 3.8 3.5  --   --  3.7  --  3.5  --  3.5 3.8   < > 3.4   < >  --    CHLORIDE 104 106  --   --  105  --  104  --  103  --   --  102   < >  --    CO2 22 20*  --   --  20*  --  20*  --  18*  --   --  16*   < >  --    ANIONGAP 10 11  --   --  9  --  9  --  12  --   --  14   < >  --    BUN 9.9 9.6  --   --  12.1  --  10.8  --  12.1  --   --  10.7   < >  --    CR 0.67 0.68  --   --  0.72  --  0.85  --  0.88  --   --  0.85   < >  --    GFRESTIMATED >90 >90  --   --  >90  --  >90  --  >90  --   --  >90   < >  --    GLC 98 144*  --   --  106*  --  114*  --  123*  --   --  197*   < >  --    VENANCIO 8.2* 7.5*  --   --  7.5*  --  7.2*  --  7.5*  --   --  7.3*   < >  --    PHOS 2.8 2.3*   < >  --   --    < >  --   --  1.6* 1.4*   < > 1.3*   < > 1.4*   MAG 2.0 2.0  --   --  2.0   < >  --   --  1.9  --   --   --     < >  --    LACT  --   --   --  1.0  --   --  1.2   < > 2.5*  --   --   --    < > 1.1   CKT  --   --   --   --   --   --   --   --   --   --   --   --   --  80    < > = values in this interval not displayed.       Hepatic Studies    Recent Labs   Lab Test 10/02/23  0632 10/01/23  0532 09/30/23  0504 09/29/23  0415 09/28/23  0348 09/27/23  0848   BILITOTAL 0.5 0.5 0.5 0.6 0.7 0.6   ALKPHOS 61 53 52 46 49 49   ALBUMIN 3.0* 2.6* 2.5* 2.5* 2.7* 2.8*   AST 47* 44 69* 51* 53* 69*   ALT 38 42 51 43 54 61     --   --   --  216  --        Pancreatitis testing    No lab results found.    Hematology Studies      Recent Labs   Lab Test 10/02/23  0632 10/01/23  0532 09/30/23  1649 09/30/23  0504 09/29/23 0415 09/28/23  0348   WBC 1.5* 1.1* 1.1* 0.9* 0.7* 0.8*   ANEU 0.6* 0.4* 0.4* 0.2* 0.1* 0.1*   ALYM 0.8 0.7* 0.7* 0.6* 0.6* 0.7*   JAX 0.1 0.0 0.1 0.1 0.0 0.0   AEOS 0.0 0.0 0.0 0.0 0.0 0.0   HGB 8.0* 7.4* 8.2* 6.9* 6.5* 7.8*   HCT 23.9* 21.6* 24.4* 20.5* 18.7* 23.0*   PLT 16* 13* 17* 17* 21* 10*       Clotting Studies    Recent Labs   Lab Test 10/02/23  0632 10/01/23  0532 09/30/23  0504 09/29/23  0415   INR 1.19* 1.31* 1.97* 1.55*   PTT 43* 39* 61* 44*       Arterial Blood Gas Testing  No lab results found.     Urine Studies     Recent Labs   Lab Test 09/21/23  0009   URINEPH 6.5   NITRITE Negative   LEUKEST Negative   WBCU <1       Vancomycin Levels     No lab results found.    Invalid input(s): VANCO    Tobramycin levels     No lab results found.    Gentamicin levels    No lab results found.    Tacrolimus levels    Invalid input(s): TACROLIMUS, TAC, TACR      Latest Ref Rng & Units 10/2/2023     6:32 AM 10/1/2023     5:32 AM 9/30/2023     4:49 PM 9/30/2023     5:04 AM 9/29/2023     4:15 AM   Transplant Immunosuppression Labs   Creat 0.67 - 1.17 mg/dL 0.67  0.68   0.72  0.85    Urea Nitrogen 8.0 - 23.0 mg/dL 9.9  9.6   12.1  10.8    WBC 4.0 - 11.0 10e3/uL 1.5  1.1  1.1  0.9  0.7    Neutrophil % 42  36  34  25  16     ANEU 1.6 - 8.3 10e3/uL 0.6  0.4  0.4  0.2  0.1        Cyclosporine levels    Invalid input(s): CYCLOSPORINE, CYC    Mycophenolate levels    Invalid input(s): MYPA, MYP    Sirolimus levels    Invalid input(s): SIROLIMUS, SIR, RAPA    CSF testing     Recent Labs   Lab Test 09/08/23  1159 09/08/23  1158   CWBC 5  --    CRBC 1  --    CGLU  --  51   CTP  --  46.4*         Microbiology:  Blood cx negative.   Last check of C difficile  C Difficile Toxin B by PCR   Date Value Ref Range Status   09/22/2023 Negative Negative Final     Comment:     A negative result does not exclude actual disease due to C. difficile and may be due to improper collection, handling and storage of the specimen or the number of organisms in the specimen is below the detection limit of the assay.       Virology:  CMV viral loads  No results found for: CMVRESINST, 80260, 26551, 14723, 17556, CMVQAL  CMV viral loads  No lab results found.    CMV viral loads    CMV DNA IU/mL   Date Value Ref Range Status   09/27/2023 Not Detected Not Detected IU/mL Final       CMV resistance testing  No lab results found.  No results found for: CMVCID, CMVFOS, CMVGAN, CMVDRUGRES     No results found for: H6RES    No results found for: EBVDN, EBRES, EBVDN, EBVSP, EBVPC, EBVPCR    CMV Antibody IgG   Date Value Ref Range Status   09/02/2023 Positive, suggests recent or past exposure. (A) No detectable antibody.  Final   09/01/2023 Positive, suggests recent or past exposure. (A) No detectable antibody.  Final       No results found for: EBIG2, EBIGM, TOXG      Imaging:  CT chest WO 9/24/23   IMPRESSION:    1. Basilar reticular opacities are indeterminate, possibly atypical  infection versus scarring and subsegmental atelectasis.

## 2023-10-02 NOTE — PROGRESS NOTES
Care Management Follow Up    Length of Stay (days): 31    Expected Discharge Date: 10/04/2023     Concerns to be Addressed:    outpatient labs and PRN transfusions  Patient plan of care discussed at interdisciplinary rounds: Yes    Anticipated Discharge Disposition:  Home     Anticipated Discharge Services:  (TBD)  Anticipated Discharge DME:  (TBD)    Patient/family educated on Medicare website which has current facility and service quality ratings:  NA  Education Provided on the Discharge Plan: Yes  Patient/Family in Agreement with the Plan:  (n/a)    Referrals Placed by CM/SW:  (N/A)  Private pay costs discussed: Not applicable    Additional Information:  RNCC was asked by provider to assist with scheduling twice weekly labs and PRN transfusions for patient at [facility] in [place], [place]. Writer contacted [facility] oncology and was able to get pt scheduled for Monday/Thursday labs with PRN transfusions. Provider orders faxed. First lab draw scheduled for Thursday 10/5 at 0900.     [facility]  [address], [place] [address]  Phone: [phone]  Fax: [phone]  Monday/Thursday labs and as needed blood transfusions.   First lab appointment Thursday 10/5 at 9am.       Keisha Gilman RN   Nurse Coordinator    Covering for: 5A  Phone: *[phone]    Social Work and Care Management Department       SEARCHABLE in [facility] - search CARE COORDINATOR       [place] [facility] [place] ([phone]) Saturday & Sunday; ([phone]) [facility] Recognized Holidays     Units: 5A, 5B & 5C  Pager: [phone]    Units: 6B, 6C & 6D    Pager: [phone]    Units: 7A, 7B, 7C & 7D    Pager: [phone]    Units: 6A & ICU   Pager: [phone]    Units: 5 Ortho, 5MS & WB ED Pager: [phone]    Units: 6MS, 8A & 10 ICU  Pager [phone]      Keisha Gilman RN

## 2023-10-02 NOTE — PLAN OF CARE
"/70 (BP Location: Left arm, Cuff Size: Adult Regular)   Pulse 88   Temp 98.1  F (36.7  C) (Oral)   Resp 18   Ht 1.74 m (5' 8.5\")   Wt 94.1 kg (207 lb 6.4 oz)   SpO2 97%   BMI 31.08 kg/m       Pt A&O x4, able to make needs known. VSS on RA, no report of SOB, infrequent cough, and patient denied pain. Up with SBA and walker, ambulated in the room and to the bathroom. Regular diet, tolerating well with no report of nausea. Voiding without difficulty, passing gas, and had BM this shift. BLE with lymphedema wraps on, body rash still noted with mild redness. R double lumen PICC hep locked. Will need end of cycle bone marrow biopsy when evidence of count recovery, maybe in coming days. No replacement this shift. Continue POC.         "

## 2023-10-02 NOTE — PROGRESS NOTES
CLINICAL NUTRITION SERVICES - REASSESSMENT NOTE     Nutrition Prescription    RECOMMENDATIONS FOR MDs/PROVIDERS TO ORDER:   None at this time.     Malnutrition Status:   Moderate malnutrition in the context of acute illness.     Recommendations already ordered by Registered Dietitian (RD):   Encouraged additional snacks/supplements   Encouraged small frequent meals.     Future/Additional Recommendations:   Monitor weight/intake trends.      EVALUATION OF THE PROGRESS TOWARD GOALS   Diet: Regular  Nutrition Support: Special K Bar in chocolate @ 2 pm snack time.     Intake:    10/1: 1570 kcal and 67 g protein (1 meals + outside food, 100% x2 Special K Bar + 100% x1 Ensure Enlive)  9/30: 998 kcal and 26 g protein (2 meals + outside food, 0 supplements)  9/29: 477 kcal and 22 g protein (2 meals + outside homemade food that was unable to be calculated and included, 100% x1 Special K Bar)    2 day average PO intake (10/1+9/30) = 1284 kcal (68% minimum energy needs) and 31 g protein (34% minimum protein needs)    NEW FINDINGS   Visited with Artie in room. Pt states that he feels better day to day and feels that his intake has been improving over the past few days. Discussed recommendation to add an additional snack/supplement as tolerated as well as an additional entree or side to help with meeting nutrition needs. Pt stated that he is receiving/eating at least 1 special k bar and 1 ensure (either clear or enlive) daily.     Skin: Crow score 19 with nutrition marked as adequate per flowsheets. Trace to Mild edema + rash noted in flowsheets with no WOC notes/consult.    LABS   Labs Reviewed   10/01/23 05:32 10/02/23 06:32   Sodium 137 136   Potassium 3.5 3.8   Creatinine 0.68 0.67   GFR Estimate >90 >90   Calcium 7.5 (L) 8.2 (L)   Magnesium 2.0 2.0   Phosphorus 2.3 (L) 2.8   Albumin 2.6 (L) 3.0 (L)   Glucose 144 (H) 98   WBC 1.1 (L) 1.5 (L)   Hemoglobin 7.4 (L) 8.0 (L)     MEDICATIONS   Medications Reviewed  - Tums  -  Lasix 10/1 + 9/30  - Phytonadione  - Voriconazole  - Mucinex DM PRN    ANTHROPOMETRICS   Weight Trends:   Unable to assess weight trends due to weight fluctuation possibly related to fluid as patient received Lasix x2 and with Trace to Mild edema.     10/02/23 92.4 kg (203 lb 9.6 oz) Standing scale   10/01/23 94.1 kg (207 lb 6.4 oz) Standing scale   09/30/23 90.1 kg (198 lb 11.2 oz) Standing scale   09/30/23 93.2 kg (205 lb 8 oz) Standing scale   09/29/23 85 kg (187 lb 6.3 oz) Standing scale   09/28/23 91.9 kg (202 lb 9.6 oz) Standing scale   09/27/23 89 kg (196 lb 4.8 oz) Standing scale   09/26/23 84.5 kg (186 lb 3.2 oz) Standing scale   09/25/23 88.2 kg (194 lb 6.4 oz) Standing scale   09/23/23 85.9 kg (189 lb 6.4 oz) --   09/21/23 84.1 kg (185 lb 6.4 oz) Standing scale   09/20/23 84.9 kg (187 lb 1.6 oz) Standing scale   09/19/23 83.6 kg (184 lb 4.9 oz) Standing scale   09/18/23 85.3 kg (188 lb 1.6 oz) Standing scale   09/17/23 85.5 kg (188 lb 9.6 oz) Standing scale   09/16/23 85 kg (187 lb 6.4 oz) Standing scale   09/15/23 85 kg (187 lb 6.3 oz) Standing scale   09/14/23 86.9 kg (191 lb 8 oz) Standing scale   09/13/23 85.5 kg (188 lb 8 oz) Standing scale   09/12/23 83.7 kg (184 lb 8 oz) Standing scale   09/11/23 84.3 kg (185 lb 12.8 oz) Standing scale   09/10/23 84.2 kg (185 lb 9.6 oz) Standing scale   09/09/23 84.7 kg (186 lb 12.8 oz) Standing scale   09/08/23 85.3 kg (188 lb) Standing scale   09/07/23 87 kg (191 lb 14.4 oz) Standing scale   09/06/23 87 kg (191 lb 12.8 oz) --   09/05/23 88 kg (194 lb 1.6 oz) Standing scale   09/04/23 88.8 kg (195 lb 11.2 oz) Standing scale   09/03/23 87.5 kg (193 lb) Standing scale   09/02/23 86.8 kg (191 lb 4.8 oz) Standing scale   09/01/23 86.5 kg (190 lb 9.6 oz) Standing scale     Wt Readings from Last Encounters:   10/02/23 92.4 kg (203 lb 9.6 oz)   09/01/23 86.2 kg (190 lb 1.6 oz)     MALNUTRITION   % Intake: < 75% for > 7 days (moderate)  % Weight Loss: Unable to  assess  Subcutaneous Fat Loss: Facial region: Mild and Lower arm: Mild  Muscle Loss: Temporal: Moderate, Thoracic region (clavicle, acromium bone, deltoid, trapezius, pectoral): Mild  Fluid Accumulation/Edema: Mild  Malnutrition Diagnosis: Moderate malnutrition in the context of acute illness.     Previous Goals   Total avg nutritional intake to meet a minimum of 25 kcal/kg and 1.2 g PRO/kg daily (per dosing wt 75 kg).   Evaluation: Not met but improving.     Previous Nutrition Diagnosis   Inadequate oral intake related to decreased appetite as evidenced by pt self-report     Evaluation: Improving    CURRENT NUTRITION DIAGNOSIS   Inadequate oral intake related to decreased appetite as evidenced by pt self-report       INTERVENTIONS   Implementation   Encouraged additional snacks/supplements   Encouraged small frequent meals.     Goals   Total avg nutritional intake to meet a minimum of 25 kcal/kg and 1.2 g PRO/kg daily (per dosing wt 75 kg).    Monitoring/Evaluation   Progress toward goals will be monitored and evaluated per protocol.    Swati Wall RD, LD   Available on [facility] and Pager  5A [phone]) + 6A pager [phone]  Weekend pager [phone]

## 2023-10-02 NOTE — PROGRESS NOTES
Calorie Count  Intake recorded for: 10/1  Total Kcals: 1570 Total Protein: 67g  Kcals from Hospital Food: 1320   Protein: 63g  Kcals from Outside Food (average):250 Protein: 4g  # Meals Ordered from Kitchen: 1 meal + outside food  # Meals Recorded: 1 meal + outside food  (Meal - 100% 1 blueberry muffin, chicken noodle soup w/ crackers, 1 8oz 1% milk, 1 8oz orange juice, 50% 1 banana, 25% 1 apple)  (Outside - 100% 1 Snickers bar)  # Supplements Recorded: 100% 2 Special K bars, 1 Ensure Enlive

## 2023-10-02 NOTE — PROGRESS NOTES
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 Started 7+3 induction C1D1=9/2/23. Hospital course has been complicated by rhinovirus infection, recurrent neutropenic fever, S. mitis bacteremia, and S. epidermidis UTI.     Today:  - Day 31 of 7+3. Counts recovering.   - Afebrile x48 hours. After discussion with ID, de-escalated from IV antibiotics to levofloxacin 500 mg daily.   - After discussion with IP, removed droplet precautions for rhinovirus.   - Requested follow up including twice weekly labs/PRN transfusions and count recovery BMBx. If continues to do well and appointments are able to be scheduled, could discharge in the coming days.      HEME/ONC  # AML, adverse risk  Patient initially presented to [facility] in [place], [place] with 4-6 weeks of progressive bruising, weakness, fatigue, loss of appetite, and night sweats. He also noted a headache similar to his previous brain abscess. CBC notable for pancytopenia; WBC 2.7 (), Hgb 8.1, and plt 1k. He was transferred to [facility] and was found on peripheral flow w/ 11% circulating myeloid blasts. He was then transferred to [facility] for further work up and treatment of AML. Diagnostic BMBx performed 9/1/23 which showed AML with 30% blasts by morph and 47% by flow. P53 negative. FISH negative for MLLT10, NUP98, and KMT2A. Normal karyotype. NGS showed ASXL1, CEBPA, SRSF2, and STAG2 mutations. Started 7+3 (C1D1=9/2/23).   - Baseline TTE showing normal LVEF of 55-60%. Baseline EKG showed NSR and QTc of 434.   - Viral serologies: HepB/C-, HIV-. HSV1/2+, EBV IgG+, CMV IgG+  - PICC placed 9/1, plan to remove at discharge  -  S/p diagnostic LP 9/8/23 for CNS leukemia work up as patient endorses headache upon admission w/ new AML diagnosis. CSF flow negative. MRI brain 9/11 negative, only showing chronic changes from h/o brain abscess.   - D14 BMBx w/o evidence of leukemia.   - HLA typing ordered. S/p BMT NT 9/22 with Dr. Cam Edward. Starting donor search. Plan for JIMMY in CR1.   - Requested outpatient count recovery BMBx  - Clinic appointment scheduled for 10/11 with Dr. Garcia                   Treatment Plan: 7+3 (C1D1=9/2/23)                - Cytarabine 100 mg/m2 - D1-D7               - Daunorubicin 60 mg/m2 - D1-D3               - Supportive meds: Decadron 12 mg D1-D3, Zofran     # Pancytopenia  2/2 chemotherapy.   - Transfuse if Hgb <7 and plt <10k    # Elevated INR, improved  INR up to 1.97 on 9/30.   - S/p vitamin K (9/30-10/2)     # Low risk for TLS/DIC  - S/p allopurinol  - Montior TLS/DIC labs twice weekly     ID  # Neutropenic fever, resolved  # BCx 9/20 1/2 positive for strep mitis, resolved  # UCx 9/20 with 10-50k MRSE, resolved  # Diarrhea, resolved  Tmax 102.7 on 9/20 evening. Pt has been having cold symptoms as below in the setting of positive rhinovirus on 9/16. Cold symptoms on improving trajectory at time of new fever. Pt had diarrhea around time of fever onset, but had recently received MiraLax for constipation. Diarrhea persisted after MiraLax effect waned. Overnight 9/21-9/22, pt was too weak to return to bed from bathroom, called RN to assist.  Otherwise no localizing signs/symptoms of infection.  - Work up:   - BCx 9/20 1/2 positive for strep mitis, pan-sensitive  - BCx 9/21-9/28: NGTD  - UCx 9/20 grew 10-50k staph epi in the setting of bland UA and no urinary symptoms. Oxacillin resistant. ID feels this is likely contaminant.  - CXR unremarkable  - C diff negative  - CT chest indeterminate - atypical pneumonia? Though ID not convinced for pneumonia  - CT A/P with reticulonodular attenuation in lung bases,  suspicious for infection versus aspiration, distal esophageal wall thickening with adjacent borderline enlarged gastrohepatic lymph node, digestive esophagitis though endoscopy recommended to exclude neoplasm  - ID consulted 9/26 in setting of persisting neutropenic fevers on antibiotics  - Discontinued cefepime and doxycycline (x9/26) given concern for cephalosporin drug rash. Transitioned to aztreonam, Flagyl, daptomycin (x9/26).  - Antibiotics:   - S/p cefepime 2 g q8hr 9/20-9/26, discontinued d/t presumed drug rash  - S/p vancomycin 9/22-9/23, discontinued once BCx susceptibilities resulted  - Doxycycline 100 mg BID 9/23-9/26/23, started for possible MRSE UTI and empirically for atypical pneumonia  - Aztreonam 1g q8h 9/26-10/2  - Flagyl 500 mg q8h 9/26-10/2  - Daptomycin 6 mg/kg/day 9/26-10/2     # Rhinovirus, resolved  # Post nasal drip, improving  # Cough, improving  Nasal congestion and post nasal drip 9/15 morning upon waking. H/o seasonal allergies. On 9/16, noted development of productive cough and worsening nasal congestion and post-nasal drip. One episode of blood-tinged sputum, now resolved. COVID/flu/RSV negative, but RVP positive for rhinovirus. Although new neutropenic fever noted 9/20, URI symptoms are improving.   - Continue PTA cetirizine  - Supportive: Tessalon Perles, Mucinex DM, Hurricaine spray  - Discussed with IP x10/2, ok to discontinue droplet precautions     # Immunosuppressed 2/2 malignancy and chemotherapy  # ID PPX  -  mg BID  - Levofloxacin 500 mg daily  - Voriconazole 200 mg BID x9/10     # H/o brain abscess (2021)  # Headache, resolved  Treated in [place]. S/p craniotomy for drainage of abscess July 2021. It is unclear what culture speciated to, but he completed a course of antibiotics with ceftriaxone then meropenem with resolution on imaging. Head CT 8/31 at OSH revealed no intracranial hemorrhage midline shift or mass effect. Postsurgical changes noted. Previous area of  infection demonstrates trace amount of encephalomalacia but no mass effect or inflammatory changes to suggest new or acute infection. Otherwise unremarkable. Patient presented initially with headache and dizziness, which has now resolved.   - See work up above for CNS leukemia work-up, which was negative     GI  # Esophageal wall thickening, seen on CT  CT abd/pelvis 9/26 obtained in setting of recurrent neutropenic fevers, strep mitis bacteremia. On scan, noted distal esophageal wall thickening with adjacent borderline enlarged gastrohepatic lymph node, digestive esophagitis though endoscopy recommended to exclude neoplasm. No GI symptoms to correlate.   - Given current pancytopenia s/p induction chemotherapy, concern for significant risk with endoscopy at this time.    - We will continue to monitor and defer for follow-up with repeat imaging/endoscopy when appropriate.  - Informed pt and wife (Aundrea) of these results with plan for close follow up as above     CHRONIC  # HTN  Noted during prior hospitalization for brain abscess in 2021. Previously on lisinopril, which he is no longer taking.  - Patient BP's have fluctuated this admission, w/occasionally elevated BPs. Continue to monitor.     # BPH  Notes difficulty with complete emptying of bladder. Previously on Flomax but states it did nothing, so he stopped it. He does note frequent urination at baseline, currently urination remains unchanged from baseline.  - Monitor clinically     MISC  # Mobiliform rash, improving  On 9/23 AM, pt noted to have ocular papular rash on upper thighs and trunk. Presumed drug rash. Patient endorsing mild pruritis to back rash, denies concerning symptoms; no evidence of angioedema, denies SOB. Vancomycin discontinued 9/23 with concern this may be attributing, although continued to worsen on cefepime.   - Supportive cares: PO Benadryl, Kenalog cream to body, hydrocortisone 2.5% to face  - Dermatology consulted; appreciate recs  -  "Consistent with morbilliform eruption with likely multifactorial: component of toxic erythema of chemotherapy as well as drug eruption 2/2 cephalosporins  - HHV6 negative. EBV, CMVs PCR pending.   - Improving after discontinuation of cefepime, added to allergy list    # Bilateral lower and upper extremity edema, improving  Noted generalized edema bilateral upper and lower extremities on exam 9/27/2023. Noted weight increase of approximately 10 pounds since prior day. Likely third spacing in setting of hypoalbuminemia.  Not currently on IV fluids, though noted IV antibiotics administered with fluids. Had previously been diuresed during admission, responsed to Lasix. No respiratory symptoms.   - Lymphedema consulted placed for compression wraps, improving with compression  - S/p Lasix 20 mg IV x1 (9/27 and 10/1) with good response, repeat PRN     # Weakness  # Deconditioning  Pt reports issues w/ balance after his brain abscess in 2021, exacerbated when ambulating. Deconditioning acute-on-chronic upon admission since 2021.   - PT consulted     # Episodes of hypotension, resolved  # Dizziness, resolved  # Fall risk  Per chart review, patient had \"near fall\" on 9/24/23 when he attempted to get up from bed independently to ambulate to the restroom. He has been reported intermittent episodes of dizziness since admission; however, he notices this has increased in the past few days. He is also experiencing hypotensive episodes, requiring 1.5 L fluid support on 9/24-9/25.  - Continue to monitor closely  - Consider IVF bolus PRN     # Weight loss  # Loss of appetite, improved  # Moderate malnutrition in the context of acute illness   Patient presented with loss of appetite 2/2 malignancy. He has been gradually losing weight throughout admission, although PO intake is overall sufficient. 9/29 discussed with patient and tells me he is eating about 1/2 of meals, TID. Encouragement provided  - RD consulted  - Armand counts " 9/30-10/2  - Ensure BID between meals and PRN     # Hyponatremia, improved  Likely 2/2 low oral intake.   - Continue to monitor      # Hypophosphatemia, intermittent  - Replete per protocol     # Hypocalcemia  Noted hypocalcemia trends on labs and downtrending 9/24. Ionized calcium low at 4.1 when corrected for hypoalbuminemia.   - Calcium carbonate BID  - Monitor Ca daily    Clinically Significant Risk Factors              # Hypoalbuminemia: Lowest albumin = 2.5 g/dL at 9/30/2023  5:04 AM, will monitor as appropriate  # Coagulation Defect: INR = 1.19 (Ref range: 0.85 - 1.15) and/or PTT = 43 Seconds (Ref range: 22 - 38 Seconds), will monitor for bleeding  # Thrombocytopenia: Lowest platelets = 13 in last 2 days, will monitor for bleeding           # Severe Malnutrition: based on nutrition assessment             FEN  Diet: Regular Diet Adult   IVF: Bolus PRN   Lytes: Replete per protocol    PPX  VTE: None given thrombocytopenia  Bowel: Senna/MiraLax PRN  GI/PUD: None currently indicated    MISC  Code Status: Full Code   Lines/Drains: PICC  Dispo: Anticipate discharge this week if remains afebrile and follow up is arranged  Follow Up:   - RNCC to arrange twice weekly labs/PRN transfusions in [place]  - BMBx requested  - Appt with Dr. Garcia on 10/11    Patient was seen and plan of care was discussed with attending physician Dr. Erickson.    I spent 70 minutes in the care of this patient today, which included time necessary for review of interval events, obtaining history and physical exam, ordering medications/tests/procedures as medically indicated, review of pertinent medical literature, counseling of the patient, coordination of care, and documentation time. Over 50% of time was spent counseling the patient and/or coordinating care.    Yolis Sherman PA-C   Hematology/Oncology   Pager: [phone]  Desk phone: [phone]    Interval History   No acute events overnight. Patient is feeling great today. Counts recovering.  Rash is resolving. Cough is improved but still lingering intermittently. Pt thinks this is because of dust in room. He wishes he had an air purifier. Edema improving with leg wraps. Discussed current barriers to discharge and discharge timeline.     Vital Signs with Ranges  Temp:  [98.1  F (36.7  C)-98.7  F (37.1  C)] 98.5  F (36.9  C)  Pulse:  [80-88] 85  Resp:  [16-19] 18  BP: (134-152)/(69-75) 134/71  SpO2:  [95 %-100 %] 99 %  I/O last 3 completed shifts:  In: 1035 [P.O.:480; I.V.:555]  Out: -     Physical Exam   General: Sitting up in bed, alert, NAD. Pleasant and conversational.  Skin: No concerning lesions, rash, jaundice, cyanosis, erythema, or ecchymoses on exposed surfaces.   HEENT: NCAT. Anicteric sclera. Moist mucous membranes.  Respiratory: Non-labored breathing on room air, good air exchange, lungs clear to auscultation bilaterally.  Cardiovascular: RRR. No murmur or rub.   Gastrointestinal: Normoactive BS. Abdomen soft, ND, NT. No palpable masses.  Extremities: Leg wraps in place with no proximal edema.   Neurologic: A&O x 3, speech normal, no deficits grossly.    Medications    - MEDICATION INSTRUCTIONS -        acyclovir  400 mg Oral BID    calcium carbonate  500 mg Oral TID    cetirizine  10 mg Oral Daily    hydrOXYzine  50 mg Oral At Bedtime    [Held by provider] levofloxacin  250 mg Oral Daily    levofloxacin  500 mg Oral Daily    triamcinolone   Topical BID    voriconazole  200 mg Oral Q12H [facility] (08/20)     Data   Results for orders placed or performed during the hospital encounter of 09/01/23 (from the past 24 hour(s))   ABO/Rh type and screen *Canceled*    Narrative    The following orders were created for panel order ABO/Rh type and screen.  Procedure                               Abnormality         Status                     ---------                               -----------         ------                     Adult Type and Screen[[id number]]                                                          Please view results for these tests on the individual orders.   CBC with platelets differential    Narrative    The following orders were created for panel order CBC with platelets differential.  Procedure                               Abnormality         Status                     ---------                               -----------         ------                     CBC with platelets and d...[[id number]]  Abnormal            Final result               Manual Differential[[id number]]          Abnormal            Final result                 Please view results for these tests on the individual orders.   Lactate Dehydrogenase   Result Value Ref Range    Lactate Dehydrogenase 226 0 - 250 U/L   Magnesium   Result Value Ref Range    Magnesium 2.0 1.7 - 2.3 mg/dL   Phosphorus   Result Value Ref Range    Phosphorus 2.8 2.5 - 4.5 mg/dL   Uric acid   Result Value Ref Range    Uric Acid 1.8 (L) 3.4 - 7.0 mg/dL   Comprehensive metabolic panel   Result Value Ref Range    Sodium 136 135 - 145 mmol/L    Potassium 3.8 3.4 - 5.3 mmol/L    Carbon Dioxide (CO2) 22 22 - 29 mmol/L    Anion Gap 10 7 - 15 mmol/L    Urea Nitrogen 9.9 8.0 - 23.0 mg/dL    Creatinine 0.67 0.67 - 1.17 mg/dL    GFR Estimate >90 >60 mL/min/1.73m2    Calcium 8.2 (L) 8.8 - 10.2 mg/dL    Chloride 104 98 - 107 mmol/L    Glucose 98 70 - 99 mg/dL    Alkaline Phosphatase 61 40 - 129 U/L    AST 47 (H) 0 - 45 U/L    ALT 38 0 - 70 U/L    Protein Total 6.1 (L) 6.4 - 8.3 g/dL    Albumin 3.0 (L) 3.5 - 5.2 g/dL    Bilirubin Total 0.5 <=1.2 mg/dL   Fibrinogen activity   Result Value Ref Range    Fibrinogen Activity 448 170 - 490 mg/dL   INR   Result Value Ref Range    INR 1.19 (H) 0.85 - 1.15   Partial thromboplastin time   Result Value Ref Range    aPTT 43 (H) 22 - 38 Seconds   CBC with platelets and differential   Result Value Ref Range    WBC Count 1.5 (L) 4.0 - 11.0 10e3/uL    RBC Count 2.68 (L) 4.40 - 5.90 10e6/uL    Hemoglobin 8.0 (L) 13.3 - 17.7 g/dL    Hematocrit  "23.9 (L) 40.0 - 53.0 %    MCV 89 78 - 100 fL    MCH 29.9 26.5 - 33.0 pg    MCHC 33.5 31.5 - 36.5 g/dL    RDW 14.5 10.0 - 15.0 %    Platelet Count 16 (LL) 150 - 450 10e3/uL    Narrative    Previously reported component [ NRBCs ] is no longer reported.\"  Previously reported component [ NRBCs Absolute ] is no longer reported.\"   Manual Differential   Result Value Ref Range    % Neutrophils 42 %    % Lymphocytes 50 %    % Monocytes 8 %    % Eosinophils 0 %    % Basophils 0 %    NRBCs per 100 WBC 2 (H) <=0 %    Absolute Neutrophils 0.6 (L) 1.6 - 8.3 10e3/uL    Absolute Lymphocytes 0.8 0.8 - 5.3 10e3/uL    Absolute Monocytes 0.1 0.0 - 1.3 10e3/uL    Absolute Eosinophils 0.0 0.0 - 0.7 10e3/uL    Absolute Basophils 0.0 0.0 - 0.2 10e3/uL    Absolute NRBCs 0.0 <=0.0 10e3/uL    RBC Morphology Confirmed RBC Indices     Platelet Assessment  Automated Count Confirmed. Platelet morphology is normal.     Automated Count Confirmed. Platelet morphology is normal.       "

## 2023-10-02 NOTE — DISCHARGE INSTRUCTIONS
Philip Medeiros  [address] [facility] [place] [address]  Phone: [phone]  Fax: [phone]  Monday/Thursday labs and as needed blood transfusions.   First lab appointment Thursday 10/5 at 9am.

## 2023-10-02 NOTE — PROGRESS NOTES
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 at [phone])  [ X ] Type and cross PRN    TRANSFUSION PARAMETERS:   [ X ] Please transfuse 1 (one) unit PRBCs if Hgb is less than or equal to 7.  [ X ] Please transfuse 1 (one) unit platelets if plt count less than or equal to 10,000.   [ X ] If patient experiences transfusion reaction during transfusion:   [ X ] 25-50 mg benadryl IV x once PRN   [ X ] Tylenol 1000 mg PO x once PRN   [ X ] Hydrocortisone 100 mg IV x once PRN   [ X ] Zantac 150 mg IV x once PRN   [ X ] Notify provider covering infusion clinic per protocol    Please call the [facility] at [phone] for any questions, and ask to speak with the care coordinator for Dr. Garcia (patient's primary oncologist).    Thank you,         Yolis Sherman PA-C    [facility]  Department of Hematology/Oncology  [address] [place] [address]  Pager: [phone]

## 2023-10-02 NOTE — PLAN OF CARE
[id number]    A&Ox4. VSS on RA. Afebrile. Denies pain, nausea, and SOB. Fair appetite. Voids spontaneously with adequate urine output. Up with SBA and walker. Plan to discharge in next couple of days. Continue with plan of care.

## 2023-10-02 NOTE — PLAN OF CARE
"Time 3495-0850    /69 (BP Location: Left arm)   Pulse 85   Temp 98.3  F (36.8  C) (Oral)   Resp 18   Ht 1.74 m (5' 8.5\")   Wt 94.1 kg (207 lb 6.4 oz)   SpO2 96%   BMI 31.08 kg/m      Reason for admission: AML  Activity: SBA  Pain: denies  Neuro: WNL  Cardiac: WNL  Respiratory: WNL  GI/: LBM 10/2. Voiding adequately  Diet: Regular  Lines: right double-lumen PICC - HL between abx  Wounds: diffuse rash r/t vanco. Preventative mepilex to scarum  Labs/imaging: Conditional CBC pending      New changes this shift: Pt stable. Cares clustered per pt preference. No fevers this shift. BM getting more formed.     Plan: Fall prevention. Pain control. Ongoing treatment. BMB to be scheduled in near future.      Continue to monitor and follow POC  Goal Outcome Evaluation:      Plan of Care Reviewed With: patient          Outcome Evaluation: count recovery and FU BMB      "

## 2023-10-02 NOTE — TELEPHONE ENCOUNTER
[facility] family paperwork completed, checked for accuracy, signed and faxed to Aundrea Fine @ [phone]. A copy was made and put in the family folder.    Successful transmission verified in Right Fax.        Laurel Jerez

## 2023-10-03 ENCOUNTER — APPOINTMENT (OUTPATIENT)
Dept: OCCUPATIONAL THERAPY | Facility: CLINIC | Age: 68
DRG: 835 | End: 2023-10-03
Attending: INTERNAL MEDICINE
Payer: COMMERCIAL

## 2023-10-03 VITALS
HEART RATE: 87 BPM | TEMPERATURE: 97.9 F | SYSTOLIC BLOOD PRESSURE: 121 MMHG | RESPIRATION RATE: 18 BRPM | HEIGHT: 69 IN | WEIGHT: 196.3 LBS | OXYGEN SATURATION: 96 % | DIASTOLIC BLOOD PRESSURE: 64 MMHG | BODY MASS INDEX: 29.07 KG/M2

## 2023-10-03 LAB
ALBUMIN SERPL BCG-MCNC: 3.1 G/DL (ref 3.5–5.2)
ALP SERPL-CCNC: 62 U/L (ref 40–129)
ALT SERPL W P-5'-P-CCNC: 45 U/L (ref 0–70)
ANION GAP SERPL CALCULATED.3IONS-SCNC: 11 MMOL/L (ref 7–15)
APTT PPP: 30 SECONDS (ref 22–38)
AST SERPL W P-5'-P-CCNC: 61 U/L (ref 0–45)
BACTERIA BLD CULT: NO GROWTH
BACTERIA BLD CULT: NO GROWTH
BASO+EOS+MONOS # BLD AUTO: ABNORMAL 10*3/UL
BASO+EOS+MONOS NFR BLD AUTO: ABNORMAL %
BASOPHILS # BLD AUTO: ABNORMAL 10*3/UL
BASOPHILS # BLD MANUAL: 0 10E3/UL (ref 0–0.2)
BASOPHILS NFR BLD AUTO: ABNORMAL %
BASOPHILS NFR BLD MANUAL: 0 %
BILIRUB SERPL-MCNC: 0.5 MG/DL
BUN SERPL-MCNC: 9.8 MG/DL (ref 8–23)
CALCIUM SERPL-MCNC: 8.6 MG/DL (ref 8.8–10.2)
CHLORIDE SERPL-SCNC: 105 MMOL/L (ref 98–107)
CREAT SERPL-MCNC: 0.67 MG/DL (ref 0.67–1.17)
DEPRECATED HCO3 PLAS-SCNC: 23 MMOL/L (ref 22–29)
EGFRCR SERPLBLD CKD-EPI 2021: >90 ML/MIN/1.73M2
EOSINOPHIL # BLD AUTO: ABNORMAL 10*3/UL
EOSINOPHIL # BLD MANUAL: 0 10E3/UL (ref 0–0.7)
EOSINOPHIL NFR BLD AUTO: ABNORMAL %
EOSINOPHIL NFR BLD MANUAL: 0 %
ERYTHROCYTE [DISTWIDTH] IN BLOOD BY AUTOMATED COUNT: 14.4 % (ref 10–15)
FIBRINOGEN PPP-MCNC: 414 MG/DL (ref 170–490)
FRAGMENTS BLD QL SMEAR: SLIGHT
GLUCOSE SERPL-MCNC: 100 MG/DL (ref 70–99)
HCT VFR BLD AUTO: 25 % (ref 40–53)
HGB BLD-MCNC: 8.4 G/DL (ref 13.3–17.7)
IMM GRANULOCYTES # BLD: ABNORMAL 10*3/UL
IMM GRANULOCYTES NFR BLD: ABNORMAL %
INR PPP: 1.19 (ref 0.85–1.15)
LYMPHOCYTES # BLD AUTO: ABNORMAL 10*3/UL
LYMPHOCYTES # BLD MANUAL: 0.9 10E3/UL (ref 0.8–5.3)
LYMPHOCYTES NFR BLD AUTO: ABNORMAL %
LYMPHOCYTES NFR BLD MANUAL: 57 %
MCH RBC QN AUTO: 29.1 PG (ref 26.5–33)
MCHC RBC AUTO-ENTMCNC: 33.6 G/DL (ref 31.5–36.5)
MCV RBC AUTO: 87 FL (ref 78–100)
METAMYELOCYTES # BLD MANUAL: 0 10E3/UL
METAMYELOCYTES NFR BLD MANUAL: 1 %
MONOCYTES # BLD AUTO: ABNORMAL 10*3/UL
MONOCYTES # BLD MANUAL: 0.1 10E3/UL (ref 0–1.3)
MONOCYTES NFR BLD AUTO: ABNORMAL %
MONOCYTES NFR BLD MANUAL: 9 %
NEUTROPHILS # BLD AUTO: ABNORMAL 10*3/UL
NEUTROPHILS # BLD MANUAL: 0.5 10E3/UL (ref 1.6–8.3)
NEUTROPHILS NFR BLD AUTO: ABNORMAL %
NEUTROPHILS NFR BLD MANUAL: 33 %
NRBC # BLD AUTO: 0 10E3/UL
NRBC # BLD AUTO: 0 10E3/UL
NRBC BLD AUTO-RTO: 0 /100
NRBC BLD MANUAL-RTO: 1 %
PLAT MORPH BLD: ABNORMAL
PLATELET # BLD AUTO: 21 10E3/UL (ref 150–450)
POLYCHROMASIA BLD QL SMEAR: SLIGHT
POTASSIUM SERPL-SCNC: 3.7 MMOL/L (ref 3.4–5.3)
PROT SERPL-MCNC: 6.3 G/DL (ref 6.4–8.3)
RBC # BLD AUTO: 2.89 10E6/UL (ref 4.4–5.9)
RBC MORPH BLD: ABNORMAL
SODIUM SERPL-SCNC: 139 MMOL/L (ref 135–145)
WBC # BLD AUTO: 1.6 10E3/UL (ref 4–11)

## 2023-10-03 PROCEDURE — 250N000013 HC RX MED GY IP 250 OP 250 PS 637: Performed by: PHYSICIAN ASSISTANT

## 2023-10-03 PROCEDURE — 85384 FIBRINOGEN ACTIVITY: CPT | Performed by: PHYSICIAN ASSISTANT

## 2023-10-03 PROCEDURE — 97140 MANUAL THERAPY 1/> REGIONS: CPT | Mod: GO | Performed by: OCCUPATIONAL THERAPIST

## 2023-10-03 PROCEDURE — 85730 THROMBOPLASTIN TIME PARTIAL: CPT | Performed by: PHYSICIAN ASSISTANT

## 2023-10-03 PROCEDURE — 80053 COMPREHEN METABOLIC PANEL: CPT | Performed by: PHYSICIAN ASSISTANT

## 2023-10-03 PROCEDURE — 85007 BL SMEAR W/DIFF WBC COUNT: CPT | Performed by: PHYSICIAN ASSISTANT

## 2023-10-03 PROCEDURE — 250N000013 HC RX MED GY IP 250 OP 250 PS 637

## 2023-10-03 PROCEDURE — 85014 HEMATOCRIT: CPT | Performed by: PHYSICIAN ASSISTANT

## 2023-10-03 PROCEDURE — 250N000011 HC RX IP 250 OP 636: Mod: JZ

## 2023-10-03 PROCEDURE — 99233 SBSQ HOSP IP/OBS HIGH 50: CPT | Mod: GC | Performed by: STUDENT IN AN ORGANIZED HEALTH CARE EDUCATION/TRAINING PROGRAM

## 2023-10-03 PROCEDURE — 85610 PROTHROMBIN TIME: CPT | Performed by: PHYSICIAN ASSISTANT

## 2023-10-03 RX ORDER — VORICONAZOLE 200 MG/1
200 TABLET, FILM COATED ORAL EVERY 12 HOURS
Qty: 60 TABLET | Refills: 0 | Status: SHIPPED | OUTPATIENT
Start: 2023-10-03 | End: 2023-10-31

## 2023-10-03 RX ORDER — LEVOFLOXACIN 500 MG/1
500 TABLET, FILM COATED ORAL DAILY
Qty: 30 TABLET | Refills: 0 | Status: SHIPPED | OUTPATIENT
Start: 2023-10-04 | End: 2023-10-31

## 2023-10-03 RX ORDER — TRIAMCINOLONE ACETONIDE 1 MG/G
OINTMENT TOPICAL 2 TIMES DAILY PRN
Qty: 30 G | Refills: 0 | Status: ON HOLD | OUTPATIENT
Start: 2023-10-03 | End: 2023-11-17

## 2023-10-03 RX ORDER — ONDANSETRON 4 MG/1
4 TABLET, FILM COATED ORAL EVERY 8 HOURS PRN
Qty: 20 TABLET | Refills: 0 | Status: SHIPPED | OUTPATIENT
Start: 2023-10-03

## 2023-10-03 RX ORDER — PROCHLORPERAZINE MALEATE 5 MG
5 TABLET ORAL EVERY 6 HOURS PRN
Qty: 20 TABLET | Refills: 0 | Status: SHIPPED | OUTPATIENT
Start: 2023-10-03

## 2023-10-03 RX ORDER — CETIRIZINE HYDROCHLORIDE 10 MG/1
10 TABLET ORAL DAILY
COMMUNITY
Start: 2023-10-04

## 2023-10-03 RX ORDER — ACYCLOVIR 400 MG/1
400 TABLET ORAL 2 TIMES DAILY
Qty: 60 TABLET | Refills: 0 | Status: SHIPPED | OUTPATIENT
Start: 2023-10-03 | End: 2023-10-31

## 2023-10-03 RX ADMIN — VORICONAZOLE 200 MG: 200 TABLET ORAL at 07:55

## 2023-10-03 RX ADMIN — CETIRIZINE HYDROCHLORIDE 10 MG: 10 TABLET, FILM COATED ORAL at 07:55

## 2023-10-03 RX ADMIN — CALCIUM CARBONATE (ANTACID) CHEW TAB 500 MG 500 MG: 500 CHEW TAB at 10:25

## 2023-10-03 RX ADMIN — LEVOFLOXACIN 500 MG: 250 TABLET, FILM COATED ORAL at 07:55

## 2023-10-03 RX ADMIN — SODIUM CHLORIDE, PRESERVATIVE FREE 5 ML: 5 INJECTION INTRAVENOUS at 04:15

## 2023-10-03 RX ADMIN — TRIAMCINOLONE ACETONIDE: 1 OINTMENT TOPICAL at 10:23

## 2023-10-03 RX ADMIN — ACYCLOVIR 400 MG: 400 TABLET ORAL at 07:55

## 2023-10-03 ASSESSMENT — ACTIVITIES OF DAILY LIVING (ADL)
ADLS_ACUITY_SCORE: 27

## 2023-10-03 NOTE — PLAN OF CARE
Goal Outcome Evaluation:  [id number]    VSS on RA. Denies pain, nausea, SOB. Crackles in bases of lungs and dry cough. Steady to bathroom with walker. Cares clustered.

## 2023-10-03 NOTE — CARE PLAN
Lymphedema Therapy Discharge Summary    Reason for therapy discharge:    Discharged to home.    Progress towards therapy goal(s). See goals on Care Plan in [facility] electronic health record for goal details.  Goals partially met.  Barriers to achieving goals:   discharge from facility.    Therapy recommendation(s):    Continued therapy is recommended.  Rationale/Recommendations:  Continue use of gradient compression bandaging to promote fluid mobility in BLE. Transition in to compression stockings or other maintenance garments as appropriate.

## 2023-10-03 NOTE — DISCHARGE SUMMARY
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 Started 7+3 induction C1D1=9/2/23. Hospital course was complicated by rhinovirus infection, recurrent neutropenic fever, S. mitis bacteremia, and possible MRSE UTI. During course of admission, pt also developed drug rash, presumably from cefepime. He met with BMT during admission. Donor search is ongoing. Plan for JIMMY in CR1.      On day of discharge, patient is feeling well. Continues to have mild extremity edema that has been improving. Continues to have mild, intermittent cough that pt believes is from dust in room. Otherwise is feeling well.     Discharge and neutropenic education provide. Patient and wife voiced understanding. Patient was discharged to home in good condition with close outpatient follow up as below.     Discharge Medications  - PPX: levofloxacin, voriconazole, acyclovir  - Zyrtec for allergies  - PRN Zofran and Compazine  - Kenalog for resolving drug rash    Follow Up  - Mon-Thurs labs/PRN transfusions locally in [place], [place] through ~10/19  - BMBx on 10/9   - Referred to [facility] 10/9-10/11  - Dr. Garcia on 10/11    To Follow Outpatient  - Drug rash resolving  - Count recovery and BMBx results    Hospital Course  Artie Fine was admitted on 9/1/2023. The following problems were addressed during his hospitalization:    HEME/ONC  # AML, adverse risk  Patient initially presented to [facility] in [place], [place] with 4-6 weeks of progressive  bruising, weakness, fatigue, loss of appetite, and night sweats. He also noted a headache similar to his previous brain abscess. CBC notable for pancytopenia; WBC 2.7 (), Hgb 8.1, and plt 1k. He was transferred to [facility] and was found on peripheral flow w/ 11% circulating myeloid blasts. He was then transferred to [facility] for further work up and treatment of AML. Diagnostic BMBx performed 9/1/23 which showed AML with 30% blasts by morph and 47% by flow. P53 negative. FISH negative for MLLT10, NUP98, and KMT2A. Normal karyotype. NGS showed ASXL1, CEBPA, SRSF2, and STAG2 mutations. Started 7+3 (C1D1=9/2/23).   - Baseline TTE showing normal LVEF of 55-60%. Baseline EKG showed NSR and QTc of 434.   - Viral serologies: HepB/C-, HIV-. HSV1/2+, EBV IgG+, CMV IgG+  - PICC placed 9/1, plan to remove at discharge  - S/p diagnostic LP 9/8/23 for CNS leukemia work up as patient endorses headache upon admission w/ new AML diagnosis. CSF flow negative. MRI brain 9/11 negative, only showing chronic changes from h/o brain abscess.   - D14 BMBx w/o evidence of leukemia.   - HLA typing ordered. S/p BMT NT 9/22 with Dr. Cam Edward. Starting donor search. Plan for JIMMY in CR1.   - Outpatient count recovery BMBx scheduled for 10/9  - Clinic appointment scheduled for 10/11 with Dr. Garcia                   Treatment Plan: 7+3 (C1D1=9/2/23)                - Cytarabine 100 mg/m2 - D1-D7               - Daunorubicin 60 mg/m2 - D1-D3               - Supportive meds: Decadron 12 mg D1-D3, Zofran     # Pancytopenia  2/2 chemotherapy.   - Transfuse if Hgb <7 and plt <10k     # Elevated INR, improved  INR up to 1.97 on 9/30.   - S/p vitamin K (9/30-10/2)      ID  # Neutropenic fever, resolved  # BCx 9/20 1/2 positive for strep mitis, resolved  # UCx 9/20 with 10-50k MRSE, resolved  # Diarrhea, resolved  Tmax 102.7 on 9/20 evening. Pt has been having cold symptoms as below in the setting of positive rhinovirus on 9/16. Cold symptoms on  improving trajectory at time of new fever. Pt had diarrhea around time of fever onset, but had recently received MiraLax for constipation. Diarrhea persisted after MiraLax effect waned. Overnight 9/21-9/22, pt was too weak to return to bed from bathroom, called RN to assist.  Otherwise no localizing signs/symptoms of infection.  - Work up:   - BCx 9/20 1/2 positive for strep mitis, pan-sensitive  - BCx 9/21-9/28: NGTD  - UCx 9/20 grew 10-50k staph epi in the setting of bland UA and no urinary symptoms. Oxacillin resistant. ID feels this is likely contaminant.  - CXR unremarkable  - C diff negative  - CT chest indeterminate - atypical pneumonia? Though ID not convinced for pneumonia  - CT A/P with reticulonodular attenuation in lung bases, suspicious for infection versus aspiration, distal esophageal wall thickening with adjacent borderline enlarged gastrohepatic lymph node, digestive esophagitis though endoscopy recommended to exclude neoplasm  - ID consulted 9/26 in setting of persisting neutropenic fevers on antibiotics  - Discontinued cefepime and doxycycline (x9/26) given concern for cephalosporin drug rash. Transitioned to aztreonam, Flagyl, daptomycin (x9/26).  - Antibiotics:   - S/p cefepime 2 g q8hr 9/20-9/26, discontinued d/t presumed drug rash  - S/p vancomycin 9/22-9/23, discontinued once BCx susceptibilities resulted  - Doxycycline 100 mg BID 9/23-9/26/23, started for possible MRSE UTI and empirically for atypical pneumonia  - Aztreonam 1g q8h 9/26-10/2  - Flagyl 500 mg q8h 9/26-10/2  - Daptomycin 6 mg/kg/day 9/26-10/2     # Rhinovirus, resolved  # Post nasal drip, resolved  # Cough, improving  Nasal congestion and post nasal drip 9/15 morning upon waking. H/o seasonal allergies. On 9/16, noted development of productive cough and worsening nasal congestion and post-nasal drip. One episode of blood-tinged sputum, now resolved. COVID/flu/RSV negative, but RVP positive for rhinovirus. Although new  neutropenic fever noted 9/20, URI symptoms are improving.   - Continue PTA cetirizine  - Supportive: Tessalon Perles, Mucinex DM, Hurricaine spray  - Discussed with IP x10/2, ok to discontinue droplet precautions  - Pt feels that ongoing cough is due to dust allergies, could also be residual effects from rhinvirus     # Immunosuppressed 2/2 malignancy and chemotherapy  # ID PPX  -  mg BID  - Levofloxacin 500 mg daily  - Voriconazole 200 mg BID     # H/o brain abscess (2021)  # Headache, resolved  Treated in [place]. S/p craniotomy for drainage of abscess July 2021. It is unclear what culture speciated to, but he completed a course of antibiotics with ceftriaxone then meropenem with resolution on imaging. Head CT 8/31 at OSH revealed no intracranial hemorrhage midline shift or mass effect. Postsurgical changes noted. Previous area of infection demonstrates trace amount of encephalomalacia but no mass effect or inflammatory changes to suggest new or acute infection. Otherwise unremarkable. Patient presented initially with headache and dizziness, which has now resolved.   - See work up above for CNS leukemia work-up, which was negative     GI  # Esophageal wall thickening, seen on CT  CT abd/pelvis 9/26 obtained in setting of recurrent neutropenic fevers, strep mitis bacteremia. On scan, noted distal esophageal wall thickening with adjacent borderline enlarged gastrohepatic lymph node, digestive esophagitis though endoscopy recommended to exclude neoplasm. No GI symptoms to correlate.   - Given current pancytopenia s/p induction chemotherapy, concern for significant risk with endoscopy at this time.    - Referred to GI at discharge for possible endoscopy     CHRONIC  # HTN  Noted during prior hospitalization for brain abscess in 2021. Previously on lisinopril, which he is no longer taking.  - Occasionally elevated Bps during admission, but mostly normotensive     # BPH  Notes difficulty with complete emptying  "of bladder. Previously on Flomax but states it did nothing, so he stopped it. He does note frequent urination at baseline, currently urination remains unchanged from baseline.  - Monitor clinically     MISC  # Mobiliform rash, improving  On 9/23 AM, pt noted to have ocular papular rash on upper thighs and trunk. Presumed drug rash. Patient endorsing mild pruritis to back rash, denies concerning symptoms; no evidence of angioedema, denies SOB. Vancomycin discontinued 9/23 with concern this may be attributing, although continued to worsen on cefepime.   - Supportive cares: PO Benadryl, Kenalog cream to body, hydrocortisone 2.5% to face  - Dermatology consulted; appreciate recs  - Consistent with morbilliform eruption with likely multifactorial: component of toxic erythema of chemotherapy as well as drug eruption 2/2 cephalosporins  - HHV6 negative. EBV, CMVs PCR pending.   - Improving after discontinuation of cefepime, added to allergy list     # Bilateral lower and upper extremity edema, improving  Noted generalized edema bilateral upper and lower extremities on exam 9/27/2023. Noted weight increase of approximately 10 pounds since prior day. Likely third spacing in setting of hypoalbuminemia.  Not currently on IV fluids, though noted IV antibiotics administered with fluids. Had previously been diuresed during admission, responsed to Lasix. No respiratory symptoms.   - Lymphedema consulted placed for compression wraps, improving with compression  - S/p Lasix 20 mg IV x1 (9/27 and 10/1) with good response, repeat PRN     # Weakness  # Deconditioning  Pt reports issues w/ balance after his brain abscess in 2021, exacerbated when ambulating. Deconditioning acute-on-chronic upon admission since 2021.   - PT consulted, ok for home with assist  - Walker ordered at discharge     # Episodes of hypotension, resolved  # Dizziness, resolved  # Fall risk  Per chart review, patient had \"near fall\" on 9/24/23 when he attempted to " "get up from bed independently to ambulate to the restroom. He has been reported intermittent episodes of dizziness since admission; however, he notices this has increased in the past few days. He is also experiencing hypotensive episodes, requiring 1.5 L fluid support on 9/24-9/25.  - Continue to monitor closely  - Consider IVF bolus PRN     # Weight loss  # Loss of appetite, improved  # Moderate malnutrition in the context of acute illness   Patient presented with loss of appetite 2/2 malignancy. He has been gradually losing weight throughout admission, although PO intake is overall sufficient. 9/29 discussed with patient and tells me he is eating about 1/2 of meals, TID. Encouragement provided  - RD consulted  - Armand counts 9/30-10/2  - Ensure BID between meals and PRN     # Hyponatremia, resolved  Likely 2/2 low oral intake.   - Continue to monitor      # Hypophosphatemia, intermittent  - Replete per protocol     # Hypocalcemia  Noted hypocalcemia trends on labs and downtrending 9/24. Ionized calcium low at 4.1 when corrected for hypoalbuminemia.   - Calcium carbonate BID, did not prescribe at discharge with improving PO intake    Clinically Significant Risk Factors              # Hypoalbuminemia: Lowest albumin = 2.5 g/dL at 9/30/2023  5:04 AM, will monitor as appropriate  # Coagulation Defect: INR = 1.19 (Ref range: 0.85 - 1.15) and/or PTT = 30 Seconds (Ref range: 22 - 38 Seconds), will monitor for bleeding  # Thrombocytopenia: Lowest platelets = 16 in last 2 days, will monitor for bleeding           # Moderate Malnutrition: based on nutrition assessment           Day of Discharge Summary    Vitals  Blood pressure 121/64, pulse 87, temperature 97.9  F (36.6  C), temperature source Oral, resp. rate 18, height 1.74 m (5' 8.5\"), weight 89 kg (196 lb 4.8 oz), SpO2 96 %.    Physical Exam  General: Sitting up in chair, alert, NAD. Pleasant and conversational.  Skin: No concerning lesions, rash, jaundice, cyanosis, " erythema, or ecchymoses on exposed surfaces.   HEENT: NCAT. Anicteric sclera. MMM with no lesions, erythema, or thrush.   Respiratory: Non-labored breathing, good air exchange, lungs clear to auscultation bilaterally.  Cardiovascular: RRR. No murmur or rub.   Gastrointestinal: Normoactive BS. Abdomen soft, ND, NT. No palpable masses.  Extremities: No LE edema.   Neurologic: A&O x 3, speech normal, no deficits grossly.    Patient was seen and plan of care was discussed with attending physician Dr. Erickson.    I spent 80 minutes in the care of this patient today, which included time necessary for review of interval events, obtaining history and physical exam, ordering medications/tests/procedures as medically indicated, review of pertinent medical literature, counseling of the patient, coordination of care, and documentation time. Over 50% of time was spent counseling the patient and/or coordinating care.    Yolis Sherman PA-C   Hematology/Oncology   Pager: [phone]  Desk phone: [phone]    Pending Results   These results will be followed up by outpatient team.   Unresulted Labs Ordered in the Past 30 Days of this Admission       Date and Time Order Name Status Description    9/30/2023  5:29 AM Blood Culture Arm, Left Preliminary     9/30/2023  5:22 AM Blood Culture Line, venous Preliminary     9/25/2023  6:46 PM Transfusion Reaction Culture and Stain Preliminary     9/25/2023  4:30 PM Transfusion Reaction Extended In process     9/24/2023  9:05 AM Prepare red blood cells (unit) Preliminary     9/2/2023  6:40 PM CONDITIONAL Prepare red blood cells (unit) Preliminary     9/2/2023  2:59 PM CONDITIONAL Prepare red blood cells (unit) Preliminary     9/2/2023  2:07 PM Prepare pheresed platelets (unit) Preliminary             Code Status   Full Code    Time Spent on this Encounter   I, Yolis Sherman PA-C, personally saw the patient today and spent greater than 30 minutes discharging this patient.    Discharge  Disposition   Discharged to home  Condition at discharge: Stable    Consultations This Hospital Stay   PHARMACY LIAISON FOR MEDICATION COVERAGE CONSULT  NUTRITION SERVICES ADULT IP CONSULT  VASCULAR ACCESS FOR PICC PLACEMENT ADULT IP CONSULT  VASCULAR ACCESS FOR PICC PLACEMENT ADULT IP CONSULT  PHYSICAL THERAPY ADULT IP CONSULT  SOCIAL WORK IP CONSULT  INTERNAL MEDICINE PROCEDURE TEAM ADULT IP CONSULT [facility] - LUMBAR PUNCTURE  SOCIAL WORK IP CONSULT  PHARMACY TO DOSE VANCO  INFECTIOUS DISEASE TRANSPLANT HSCT/ HEME MALIG ADULT IP CONSULT  DERMATOLOGY IP CONSULT  LYMPHEDEMA THERAPY IP CONSULT  NURSING TO CONSULT FOR VASCULAR ACCESS CARE IP CONSULT    Discharge Orders      FOLLOW UP BMT GENERIC       Adult Oncology/Hematology  Referral      Adult Oncology/Hematology  Referral      Adult GI  Referral - Consult Only      Activity    Your activity upon discharge: activity as tolerated     Reason for your hospital stay    You were hospitalized for a new diagnosis of adverse risk acute myeloid leukemia (AML). You were treated with an induction chemotherapy regimen called 7+3. During your hospitalization, you had a neutropenic fever and multiple infectious including rhinovirus (common cold) and bacteremia (bloodstream infection).     When to contact your care team    [facility] [place] at [phone] for temp > or = 100.4, uncontrolled nausea/vomiting/diarrhea/constipation, unrelieved pain, bleeding not relieved with pressure, dizziness, chest pain, shortness of breath, loss of consciousness, and any new or concerning symptoms.     Discharge Instructions    - Take medications to prevent infection: levofloxacin (antibiotic), voriconazole (antifungal), and acyclovir (antiviral).   - You can keep taking Zyrtec for allergies.   - Use triamcinolone ointment as needed for irritation/rash.   - If you experience nausea, try taking Zofran. If that doesn't work, you can't try  Compazine.   - Minimize infectious risks, as discussed.     Adult [facility] Follow-up and recommended labs and tests    - Twice weekly lab checks in [place]. If you need a blood or platelet transfusion, they have an appointment held for you for a transfusion as well.   - Bone marrow biopsy Monday 10/9  - The  will talk to [facility] about you guchanelle staying there 10/9-10/11  - Appointment with Dr. Garcia on 10/11  - You were referred to GI for the inflammation seen in your esophagus on CT scan     Full Code     Hari GOFF, the undersigned, certify that the above prescribed supplies are medically necessary for this patient and is both reasonable and necessary in reference to accepted standards of medical and necessary in reference to accepted standards of medical practice in the treatment of this patient's condition and is not prescribed as a convenience.      Diet    Follow this diet upon discharge: Regular diet     Check Out Appointment Request    Please schedule bone marrow biopsy within the range 10/5-10/9     Discharge Medications   Current Discharge Medication List        START taking these medications    Details   acyclovir (ZOVIRAX) 400 MG tablet Take 1 tablet (400 mg) by mouth 2 times daily  Qty: 60 tablet, Refills: 0    Associated Diagnoses: Acute leukemia not having achieved remission (H)      cetirizine (ZYRTEC) 10 MG tablet Take 1 tablet (10 mg) by mouth daily      levofloxacin (LEVAQUIN) 500 MG tablet Take 1 tablet (500 mg) by mouth daily  Qty: 30 tablet, Refills: 0    Associated Diagnoses: Acute leukemia not having achieved remission (H)      ondansetron (ZOFRAN) 4 MG tablet Take 1 tablet (4 mg) by mouth every 8 hours as needed for nausea or vomiting  Qty: 20 tablet, Refills: 0    Associated Diagnoses: Acute leukemia not having achieved remission (H)      prochlorperazine (COMPAZINE) 5 MG tablet Take 1 tablet (5 mg) by mouth every 6 hours as needed for nausea or vomiting  Qty: 20  tablet, Refills: 0    Associated Diagnoses: Acute leukemia not having achieved remission (H)      triamcinolone (KENALOG) 0.1 % external ointment Apply topically 2 times daily as needed for irritation (rash)  Qty: 30 g, Refills: 0    Associated Diagnoses: Drug rash      voriconazole (VFEND) 200 MG tablet Take 1 tablet (200 mg) by mouth every 12 hours  Qty: 60 tablet, Refills: 0    Associated Diagnoses: Acute leukemia not having achieved remission (H)           Allergies   Allergies   Allergen Reactions    Iodinated Contrast Media Rash    Cefepime Rash    Ceftriaxone Rash     Reported history at Outside hospital 2021    R[employer] Other (See Comments)     Congestion      Data   Most Recent 3 CBC's:  Recent Labs   Lab Test 10/03/23  0407 10/02/23  0632 10/01/23  0532   WBC 1.6* 1.5* 1.1*   HGB 8.4* 8.0* 7.4*   MCV 87 89 86   PLT 21* 16* 13*      Most Recent 3 BMP's:  Recent Labs   Lab Test 10/03/23  0407 10/02/23  0632 10/01/23  0532    136 137   POTASSIUM 3.7 3.8 3.5   CHLORIDE 105 104 106   CO2 23 22 20*   BUN 9.8 9.9 9.6   CR 0.67 0.67 0.68   ANIONGAP 11 10 11   VENANCIO 8.6* 8.2* 7.5*   * 98 144*     Most Recent 2 LFT's:  Recent Labs   Lab Test 10/03/23  0407 10/02/23  0632   AST 61* 47*   ALT 45 38   ALKPHOS 62 61   BILITOTAL 0.5 0.5     Most Recent INR's and Anticoagulation Dosing History:  Anticoagulation Dose History  More data exists         Latest Ref Rng & Units 9/25/2023 9/28/2023 9/29/2023 9/30/2023 10/1/2023 10/2/2023 10/3/2023   Recent Dosing and Labs   INR 0.85 - 1.15 1.26  1.43  1.55  1.97  1.31  1.19  1.19      Most Recent 3 Troponin's:No lab results found.  Most Recent Cholesterol Panel:No lab results found.  Most Recent 6 Bacteria Isolates From Any Culture (See EPIC Reports for Culture Details):No lab results found.  Most Recent TSH, T4 and A1c Labs:No lab results found.    Results for orders placed or performed during the hospital encounter of 09/01/23   XR Chest Port 1 View    Narrative     Examination: XR CHEST PORT 1 VIEW 9/2/2023 12:10 PM    Indication: PICC placement    Comparison: None    Findings:  Right upper extremity PICC tip terminates over the low SVC. Trachea is  midline. Cardiac silhouette is within normal limits. No significant  pleural effusion or appreciable pneumothorax. No focal consolidation.  Unremarkable visualized upper abdomen. No acute osseous and amounted.      Impression    Impression:   1. Right extremity PICC tip terminates over the lower SVC.  2. No acute airspace opacity.    I have personally reviewed the examination and initial interpretation  and I agree with the findings.    KAYA ZUNIGA MD         SYSTEM ID:  [id number]   MR Brain w/o & w Contrast    Narrative    EXAM: MR BRAIN W/O & W CONTRAST  9/11/2023 10:30 AM     HISTORY: 68y.o. M with new diagnosis of AML, experiencing headache and  dizziness upon diagnosis, now behavioral shifts, please eval for  possible CNS leukemia involvement.    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    COMPARISON: None    TECHNIQUE: Sagittal and axial T1-weighted, axial diffusion,  multiplanar T2 FLAIR with fat saturation images were obtained without  intravenous contrast. Following intravenous gadolinium-based contrast  administration, axial T2-weighted, axial susceptibility, and  T1-weighted images (in multiple planes) were obtained.    CONTRAST: 8 ml Gadavist.    FINDINGS:  There is no mass effect, midline shift, or intracranial hemorrhage.  The ventricles are proportionate to the cerebral sulci. Diffusion  weighted images are negative for acute/focal abnormality. Major  intracranial vascular structures are within normal limits.  Periventricular and subcortical scattered T2 hyperintensities, likely  due to chronic small vessel ischemic disease. Postoperative changes of  right temporal craniotomy for drainage of abscess, including focus of  vertebral  encephalomalacia and T2 hyperintensity without associated  enhancement. Patchy areas of T2 hyperintensity overlying the right  frontotemporal lobes, measuring up to 4 mm in thickness over the  frontal lobe, and over the right parietal lobe, measuring 2 mm in  thickness. Leptomeningeal enhancement over the posterior right frontal  lobe, most evident image 21 of series 16.    No suspicious abnormality of the skull marrow signal. Clear paranasal  sinuses. Mastoid air cells are clear. No focal abnormality of the  pituitary gland, sella, skull base and upper cervical spinal  structures on sagittal images. The orbits are normal.      Impression    IMPRESSION: Postoperative changes of prior right temporal craniotomy  for drainage of a right temporal abscess. Areas of extra-axial T2  hyperintensity and enhancement over the right frontotemporal and right  parietal lobes are presumed to be chronic changes with dural  thickening and possible small chronic collections. Predominantly  posterior frontal leptomeningeal enhancement is also probably  secondary to the suspected chronic changes No definite findings to  suggest CNS lymphoma, although comparison with prior imaging would  likely be of benefit.    I have personally reviewed the examination and initial interpretation  and I agree with the findings.    LACIE CHAIDEZ MD         SYSTEM ID:  [id number]   XR Chest Port 1 View    Narrative    Exam: XR CHEST PORT 1 VIEW, 9/16/2023 11:58 AM    Comparison: Radiograph 9/2/2023    History: H/o AML on chemotherapy, productive cough, eval infiltrate    Findings:  Portable AP view of the chest. Right upper extremity PICC line tip  projects over the superior cavoatrial junction. Trachea is midline.  Cardiomediastinal silhouette is within normal limits. Minimally  increased streaky opacities in the left lung base. There is no  pneumothorax or pleural effusion. The upper abdomen is unremarkable.  No acute osseous abnormality.        Impression    Impression:   Minimally increased streaky opacities in the left lung base, may  represent atelectasis, although infection cannot fully be excluded.  Recommend continued attention on follow-up.    I have personally reviewed the examination and initial interpretation  and I agree with the findings.    PEDRO VILLASENOR MD         SYSTEM ID:  [id number]   XR Chest Port 1 View    Narrative    Portable chest    INDICATION: Febrile neutropenia    COMPARISON: 9/16/2023    FINDINGS: Right-sided PICC line tip in the SVC. Heart size normal.  Degenerative spurring in the thoracic spine. Lungs and pulmonary  vasculature appear unremarkable.      Impression    IMPRESSION: No evidence of consolidation.    AREN MAGALLON MD         SYSTEM ID:  [id number]   CT Chest w/o Contrast    Narrative    EXAMINATION: CT CHEST W/O CONTRAST  9/24/2023 12:23 PM      CLINICAL HISTORY: Persistent neutropenic fever with cough. Evaluate  for pneumonia.     COMPARISON: X-ray chest 9/27/2023    PROCEDURE COMMENTS: CT of the chest was performed without intravenous  contrast. Axial MIP, coronal and sagittal reformatted images were  obtained.    FINDINGS:   Support devices: Right arm PICC tip terminates in the low IVC.    The lung parenchyma demonstrates no large focal consolidations.  Scattered throughout the bases and Inferior lateral lung fields is  scattered reticular opacities. No cavitary lesions. Throughout the  lung bases there is a small amount of bronchiectasis distally. No  significant peribronchial thickening. No pleural effusion, no  pneumothorax. No pericardial effusion.     The trachea and central airways are clear.     Prominent subcarinal calcified lymph node measuring up to 1.3 x 3.1 cm  (series 2, image 32). Otherwise no significant or abnormally enlarged  axillary, hilar or mediastinal lymph nodes.     The heart and great vessels are normal in appearance. .    Osseous structures: Normal.    Upper abdomen: Normal  noncontrast appearance.      Impression    IMPRESSION:    1. Basilar reticular opacities are indeterminate, possibly atypical  infection versus scarring and subsegmental atelectasis.    I have personally reviewed the examination and initial interpretation  and I agree with the findings.    RAO LAU DO         SYSTEM ID:  [id number]   XR Chest Port 1 View    Narrative    Exam: XR CHEST PORT 1 VIEW, 9/25/2023 7:00 PM    Indication: Check PICC position, currently unable to flush or get  blood return    Comparison: 9/24/2023 chest CT    Findings:   Right upper extremity PICC tip at the mid to lower SVC. The  cardiomediastinal silhouette and pulmonary vasculature are within  normal limits. No appreciable pleural effusion or pneumothorax. No new  focal airspace opacity.      Impression    Impression: Right upper extremity PICC tip at the mid to low SVC.    AURA BUCKLEY DO         SYSTEM ID:  [id number]   US Upper Extremity Venous Duplex Right    Narrative    EXAMINATION: DOPPLER VENOUS ULTRASOUND OF THE RIGHT UPPER EXTREMITY,  9/26/2023 4:09 PM     COMPARISON: None.    HISTORY: AML. PICC in right upper extremity with unilateral right  upper extremity edema. Evaluate for thrombosis.    TECHNIQUE:  Gray-scale evaluation with compression, spectral flow and  color Doppler assessment of the deep venous system of the right upper  extremity.    FINDINGS:  Right: Normal blood flow and waveforms are demonstrated in the  internal jugular, innominate, subclavian, and axillary veins. There is  normal compressibility of the brachial, basilic and cephalic veins. A  PICC catheter in the right brachial vein.      Impression    IMPRESSION:  1.  No evidence of right upper extremity deep venous thrombosis.  2.  Right upper extremity PICC in place.    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    COMPARISON: Chest CT 9/24/2023.    PROCEDURE COMMENTS: CT of the abdomen was performed without the use of  intravenous contrast. Coronal and sagittal reformatted images were  obtained.    FINDINGS:    LOWER THORAX: Reticulonodular attenuation in the posterior lung bases,  grossly unchanged from chest CT 9/24/2023. No pericardial effusion.  Dense mitral annular calcifications. Small hiatal hernia  circumferential distal esophageal wall thickening (series 5, image  13).    LIVER: No focal liver mass.    BILIARY: No intra or extrahepatic biliary dilation. No cholelithiasis.    PANCREAS: Within normal limits.    SPLEEN: Within normal limits.    ADRENAL GLANDS: Within normal limits.    URINARY TRACT: No evidence for focal renal mass. No hydronephrosis.  Mild perinephric fat stranding bilaterally.    REPRODUCTIVE ORGANS: Small fat-containing left inguinal hernia. Coarse  prostate calcifications.    STOMACH: Small hiatal hernia. No abnormal wall thickening.    BOWEL: Normal caliber of the small and large bowel. Appendix is within  normal limits. Small periampullary duodenal diverticulum.    PERITONEUM/FLUID: Tiny amount of pelvic free fluid.    VESSELS: Calcific atherosclerotic plaques of the large vessels. No  aneurysmal dilatation of the abdominal aorta.    LYMPH NODES: Prominent gastrohepatic lymph node measuring 11 mm  (series 5, image 44).     BONES/SOFT TISSUES: Chronic-appearing superior endplate compression  deformity at T12. Degenerative changes at the spinous processes of L3  and L4. Well-healed posterior rib fractures of the right 10th and 11th  ribs. Severe degenerative changes at the pubic symphysis. No  aggressive osseous lesions.        Impression    IMPRESSION:  1. Reticulonodular attenuation in the lung bases, suspicious for  infection or aspiration.  2. Distal esophageal wall thickening with adjacent borderline  enlarged  gastrohepatic lymph node. This may represent esophagitis although  endoscopy should be considered to exclude neoplasm.  3. Chronic incidental findings as above.    I have personally reviewed the examination and initial interpretation  and I agree with the findings.    ANGELLA RUSSELL MD         SYSTEM ID:  [id number]   XR Chest 2 Views    Narrative    XR CHEST 2 VIEWS  2023 11:54 AM     HISTORY:  69 yo with neutropenic fever, cough       COMPARISON:  2023    FINDINGS:   Frontal and lateral views of the chest. Right arm PICC tip projects  over superior cavoatrial junction.     Cardiac silhouette is within normal limits. Mild bibasilar opacities.  Mild blunting of bilateral costophrenic angles. No appreciable  pneumothorax. Degenerative changes of the spine.      Impression    IMPRESSION:   1. Mild bibasilar opacities, which may represent atelectasis/edema  and/or infection.  2. Small bilateral pleural effusions.     I have personally reviewed the examination and initial interpretation  and I agree with the findings.    SUMIT STEWART MD         SYSTEM ID:  [id number]   Echocardiogram Complete     Value    LVEF  55-60%    3D LVEF 59%    Narrative    [id number]  [id number]  [id number]  [id number]^DOMO^FOREST^YOANDY     [facility],[place]  Echocardiography Laboratory  [address] [place] [address]     Name: CHELSI CRAWFORD  MRN: [id number]  : 1955  Study Date: 2023 07:21 AM  Age: 68 yrs  Gender: Male  Patient Location: [facility]  Reason For Study: Chemo  Ordering Physician: FOREST OLIVEROS  Referring Physician: PATRICIO CHRISTIANSON  Performed By: Cleo Goode     BSA: 2.0 m2  Height: 69 in  Weight: 190 lb  ______________________________________________________________________________  Procedure  Complete Portable Echo Adult.  ______________________________________________________________________________  Interpretation Summary  Global and regional left and  right ventricular function is normal with an EF  of 55-60%. 3D LVEF volumetric analysis is 59%. Global peak LV longitudinal  strain is averaged at -21.6%. This is within reported normal limits (normal <-  18%).  No significant valvular abnormalities were noted.  No pericardial effusion is present.  Previous study not available for comparison.  ______________________________________________________________________________  Left Ventricle  Left ventricular size is normal. Left ventricular wall thickness is normal.  Global and regional left ventricular function is normal with an EF of 55-60%.  3D LVEF volumetric analysis is 59%. Left ventricular diastolic function is  normal. Diastolic Doppler findings (E/E' ratio and/or other parameters)  suggest left ventricular filling pressures are normal. Global peak LV  longitudinal strain is averaged at -21.6%. This is within reported normal  limits (normal <-18%).     Right Ventricle  Right ventricular function, chamber size, wall motion, and thickness are  normal.     Atria  The right atria appears normal. Mild left atrial enlargement is present.     Mitral Valve  Mild mitral annular calcification is present. Mild mitral insufficiency is  present.     Aortic Valve  Trileaflet aortic sclerosis without stenosis.     Tricuspid Valve  The tricuspid valve is normal. Trace tricuspid insufficiency is present.  Pulmonary artery systolic pressure is normal. The right ventricular systolic  pressure is approximated at 12.5 mmHg plus the right atrial pressure.     Pulmonic Valve  The valve leaflets are not well visualized. On Doppler interrogation, there is  no significant stenosis or regurgitation.     Vessels  The inferior vena cava was normal in size with preserved respiratory  variability. Dilated aortic root and proximal ascending aorta measuring 4.2 cm  (2.1 cm/m2) and 4 cm (2 cm/m2) respectively.     Pericardium  No pericardial effusion is present.     Compared to Previous  Study  Previous study not available for comparison.  ______________________________________________________________________________  MMode/2D Measurements & Calculations  IVSd: 1.1 cm  LVIDd: 5.2 cm  LVIDs: 3.4 cm  LVPWd: 1.0 cm  FS: 34.8 %  LV mass(C)d: 214.0 grams  LV mass(C)dI: 105.9 grams/m2  Ao root diam: 4.2 cm  asc Aorta Diam: 4.0 cm  LVOT diam: 2.5 cm  LVOT area: 5.1 cm2  Ao root diam Index (cm/m2): 2.1  asc Aorta Diam Index (cm/m2): 2.0  LA Volume (BP): 81.3 ml     LA Volume Index (BP): 40.2 ml/m2  RWT: 0.40     Doppler Measurements & Calculations  MV E max sarwat: 86.4 cm/sec  MV A max sarwat: 84.6 cm/sec  MV E/A: 1.0  MV dec time: 0.20 sec  TR max sarwat: 176.5 cm/sec  TR max P.5 mmHg  E/E' avg: 10.0  Lateral E/e': 8.4  Medial E/e': 11.7  RV S Sarwat: 13.5 cm/sec     ______________________________________________________________________________  Report approved by: Srinivasa Negron 2023 12:04 PM

## 2023-10-03 NOTE — PROGRESS NOTES
"Assumed care of patient at 1500    Blood pressure (!) 142/72, pulse 85, temperature 98.6  F (37  C), temperature source Oral, resp. rate 18, height 1.74 m (5' 8.5\"), weight 92.4 kg (203 lb 9.6 oz), SpO2 96 %.    Pt alert and oriented x4, pleasant.  VSS.  Afebrile.  Pt thought he was discharging today but understands that it will be tomorrow at earliest.  Denies any pain, nausea, abdominal sxs, but endorses fatigue with exertion.  Up with SBA and walker in room.  Cont with POC.  "

## 2023-10-03 NOTE — PROGRESS NOTES
"  0700 - 1300:   /64 (BP Location: Left arm)   Pulse 87   Temp 97.9  F (36.6  C) (Oral)   Resp 18   Ht 1.74 m (5' 8.5\")   Wt 89 kg (196 lb 4.8 oz)   SpO2 96%   BMI 29.41 kg/m      A&O x 4, AVSS, denies pain/nausea/sob on RA.   PICC removed for discharge home.  Rash is much improved.   Up sba, voiding spontaneously, had a normal bm per pt.          Discharge  D: Orders for discharge and outpatient medications written.  I: Home medications and return to clinic schedule reviewed with patient. Discharge instructions and parameters for calling Health Care Provider reviewed. Patient left at 1330 accompanied by his wife, PICC removed before discharge, picked up discharge med's, took his personal belongings.   A: Patient/family verbalized understanding and was ready for discharge.   P: Patient instructed to  medications in Pharmacy. Follow up as scheduled 10/9 at clinic for bmbx.     "

## 2023-10-03 NOTE — PROGRESS NOTES
Documentation of Face to Face and Certification for DME    I certify that patient: Artie Fine is under my care and that I, or a nurse practitioner or physician's assistant working with me, had a face-to-face encounter that meets the physician face-to-face encounter requirements with this patient on: 10/3/2023.    This encounter with the patient was in whole, or in part, for the following medical condition, which is the primary reason for DME: Physical deconditioning secondary to malignancy.    The patient was assessed and it was determined the patient is in need of the following listed DME Supplies/Equipment. Please complete supporting documentation below to demonstrate medical necessity.      DME All Other Item(s) Documentation  List reason for need and supporting documentation for medical necessity below for each DME item.     1. Front wheeled walker due to physical deconditioning secondary to malignancy.     Attending hospital physician (the Medicare certified P[facility] provider): Patric Erickson MD  Physician Signature: See electronic signature associated with these discharge orders.  Date: 10/3/2023

## 2023-10-03 NOTE — PROGRESS NOTES
[facility]    Transplant Infectious Diseases Inpatient Progress Note      Artie Fine MRN# [id number]   YOB: 1955 Age: 68 year old   Date of Admission and time: 9/1/2023  3:13 PM             Recommendations: 1.   The patient has an ANC >500 and has been afebrile for 72 hours; agree with stopping treatment antibiotics and switching to prophylaxis per protocol  2. Defer to infection control regarding precautions in the setting of +rhinovirus on PCR, though are available if questions arise  3. Can continue HSV prophylaxis (acyclovir 400 mg po bid) and fungal prophylaxis (voriconazole 200 mg po bid) per protocol  4. Patient would be due for COVID, influenza, RSV, PCV-20, Shingrix, and Tdap vaccinations on our review of [facility]. Defer to oncology team regarding timing of these vaccines.    Discussed with Dr. Leger, transplant ID staff.    James Lama MD PharmD  Adult Infectious Disease Fellow PGY5  Pager: [phone]    Patient was seen and examined by me with the ID Fellow. The note above reflects our joint assessment and recommendations. I have reviewed the medical record, labs, imaging, vitals signs and have discussed the patient with the ID fellow in detail.   Yumiko Leger MD  ID staff physician  Pager [phone]          Summary of Presentation:   This patient is a 68 year old male with recent diagnosis of AML s/p 7+3 therapy.   The course is complicated by rash and febrile neutropenia.         Active Problems and Infectious Diseases Issues: 1.   Febrile neutropenia.   2. Rash.   His febrile neutropenia has resolved and his morbilliform drug and/or chemotherapy-induced rash is improving - some peeling but no longer erythematous or symptomatic. Can stop empiric febrile neutropenia antibiotics. Reasonable to restart prophylaxis until further count recovery if team desires. Updated allergy list to include ceftriaxone and cefepime.    3. MRSE bacteriuria.   Without   instrumentation this is a contaminant.     4. S mitis in blood cx.   In one out of 2 sets from 9/20/23.   Very difficult to ascertain if this is due to GI lucy translocation vs contamination.  He received approximately 10 days of vancomycin + daptomycin which would cover this organism and treat this infection.     5. Human Rinhovirus/Enterovirus infection  Has been positive since 9/16/23. The pt has no respiratory issues. Recommend supportive care.        Old Problems and Infectious Diseases Issues: 1.   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 Apparently developed rash on ceftriaxone and was switched to meropenem. MRI in 9/2021 and 10/2021 with decrease in size of abscess. He developed itching while on meropenem and was attributed to MRI contrast.      Other Infectious Disease issues include:  - QTc: 434 as of 9/4/23.   - PJP prophylaxis: none.   - ACV for HSV, voriconazole for fungal  - Serostatus: CMV+, EBV+, HSV1+/2+, VZV ?  - Gamma globulin status: ?            Interim History:   No complaints or new symptoms today. He was seen before discharge. He denies any fevers, chills, sob, chest pain, abd pain, n/v/d or dysuria at this time.          History of Present Illness:   This patient is a 68 year old male who was diagnosed early 9/2023 with AML when he presented with weeks/months of easy bruisability and weakness and was found to be pancytopenic. BMBx confirmed AML and he received 7+3 cytarabine/daunorubicin starting 9/2/23.   On 9/20/23 fever occurred and was started on cefepime. Vancomycin was added 9/22/23 when blood cx grew S mitis then vancomycin was discontinued after susceptibilities resulted.   Fever persisted and doxycyline was added on 9/24/23 for atypical coverage.     The patient started to develop rash that was first noted on the BiUE then spread. The patient stated that the rash improved after stopping one ABx and starting  another; I am not sure if he meant stopping vanco and initiating doxycycline or stopping levaquin and initiating cefepime. Rash improved since switching to daptomycin + aztreonam + metronidazole.    The patient endorsed cough for two weeks, mostly non-productive. No shortness of breath.   Also endorses watery diarrhea. No N/V and no abdominal pain.      Exposure History  Was born in [place]. Lives in [place]. Has a large garden that he cares for including planting.   Used to work in constructions.   Did travel in the past to [facility].   No international travels.   No institutionalization and no known TB exposure.           Review of Systems:      As mentioned in the HPI otherwise negative by reviewing constitutional symptoms, central and peripheral neurological systems, respiratory system, cardiac system, GI system,  system, musculoskeletal, skin, allergy, and lymphatics.                Immunizations:     Immunization History   Administered Date(s) Administered     COVID-19 Bivalent 12+ (Pfizer) 10/19/2022     COVID-19 MONOVALENT 12+ (Pfizer) 01/28/2021, 02/18/2021, 09/28/2021            Allergies:     Allergies   Allergen Reactions     Iodinated Contrast Media Rash     Cefepime Rash     Ceftriaxone Rash     Reported history at Outside hospital 2021     Ragweeds Other (See Comments)     Congestion              Medications:   Medications that Require Transfusion:     - MEDICATION INSTRUCTIONS -       Scheduled Medications:     acyclovir  400 mg Oral BID     calcium carbonate  500 mg Oral TID     cetirizine  10 mg Oral Daily     hydrOXYzine  50 mg Oral At Bedtime     [Held by provider] levofloxacin  250 mg Oral Daily     levofloxacin  500 mg Oral Daily     triamcinolone   Topical BID     voriconazole  200 mg Oral Q12H [facility] (08/20)             Physical Exam:   Temp: 97.9  F (36.6  C) Temp src: Oral BP: 121/64 Pulse: 87   Resp: 18 SpO2: 96 % O2 Device: None (Room air)      Wt Readings from Last 4 Encounters:    10/03/23 89 kg (196 lb 4.8 oz)   09/01/23 86.2 kg (190 lb 1.6 oz)     Constitutional: awake, alert, cooperative, no apparent distress and appears at stated age, well nourished.   Head, ENT, Eyes, and Neck: Normocephalic, moist buccal mucosa without oral thrush   Skin: no induration, fluctuation or discharge at the PICC site (now removed). Desquamating rash on back, upper and lower extremities; no remaining erythema or petechiae.           Data:   No results found for: ACD4    Inflammatory Markers    No lab results found.    Immune Globulin Studies   No lab results found.    Metabolic Studies       Recent Labs   Lab Test 10/03/23  0407 10/02/23  0632 10/01/23  0532 09/30/23  1353 09/30/23  0533 09/30/23  0504 09/29/23  0909 09/29/23  0415 09/28/23  0719 09/28/23  0348 09/26/23  2212 09/26/23  1101    136 137  --   --  134*  --  133*  --  133*   < >  --    POTASSIUM 3.7 3.8 3.5  --   --  3.7  --  3.5  --  3.5   < >  --    CHLORIDE 105 104 106  --   --  105  --  104  --  103   < >  --    CO2 23 22 20*  --   --  20*  --  20*  --  18*   < >  --    ANIONGAP 11 10 11  --   --  9  --  9  --  12   < >  --    BUN 9.8 9.9 9.6  --   --  12.1  --  10.8  --  12.1   < >  --    CR 0.67 0.67 0.68  --   --  0.72  --  0.85  --  0.88   < >  --    GFRESTIMATED >90 >90 >90  --   --  >90  --  >90  --  >90   < >  --    * 98 144*  --   --  106*  --  114*  --  123*   < >  --    VENANCIO 8.6* 8.2* 7.5*  --   --  7.5*  --  7.2*  --  7.5*   < >  --    PHOS  --  2.8 2.3*   < >  --   --    < >  --   --  1.6*   < > 1.4*   MAG  --  2.0 2.0  --   --  2.0   < >  --   --  1.9   < >  --    LACT  --   --   --   --  1.0  --   --  1.2   < > 2.5*   < > 1.1   CKT  --   --   --   --   --   --   --   --   --   --   --  80    < > = values in this interval not displayed.       Hepatic Studies    Recent Labs   Lab Test 10/03/23  0407 10/02/23  0632 10/01/23  0532 09/30/23  0504 09/29/23  0415 09/28/23  0348   BILITOTAL 0.5 0.5 0.5 0.5 0.6 0.7   ALKPHOS  62 61 53 52 46 49   ALBUMIN 3.1* 3.0* 2.6* 2.5* 2.5* 2.7*   AST 61* 47* 44 69* 51* 53*   ALT 45 38 42 51 43 54   LDH  --  226  --   --   --  216       Pancreatitis testing    No lab results found.    Hematology Studies      Recent Labs   Lab Test 10/03/23  0407 10/02/23  0632 10/01/23  0532 09/30/23  1649 09/30/23  0504 09/29/23  0415   WBC 1.6* 1.5* 1.1* 1.1* 0.9* 0.7*   ANEU 0.5* 0.6* 0.4* 0.4* 0.2* 0.1*   ALYM 0.9 0.8 0.7* 0.7* 0.6* 0.6*   JAX 0.1 0.1 0.0 0.1 0.1 0.0   AEOS 0.0 0.0 0.0 0.0 0.0 0.0   HGB 8.4* 8.0* 7.4* 8.2* 6.9* 6.5*   HCT 25.0* 23.9* 21.6* 24.4* 20.5* 18.7*   PLT 21* 16* 13* 17* 17* 21*       Clotting Studies    Recent Labs   Lab Test 10/03/23  0407 10/02/23  0632 10/01/23  0532 09/30/23  0504   INR 1.19* 1.19* 1.31* 1.97*   PTT 30 43* 39* 61*       Arterial Blood Gas Testing  No lab results found.     Urine Studies     Recent Labs   Lab Test 09/21/23  0009   URINEPH 6.5   NITRITE Negative   LEUKEST Negative   WBCU <1       Vancomycin Levels     No lab results found.    Invalid input(s): VANCO    Tobramycin levels     No lab results found.    Gentamicin levels    No lab results found.    Tacrolimus levels    Invalid input(s): TACROLIMUS, TAC, TACR      Latest Ref Rng & Units 10/3/2023     4:07 AM 10/2/2023     6:32 AM 10/1/2023     5:32 AM 9/30/2023     4:49 PM 9/30/2023     5:04 AM   Transplant Immunosuppression Labs   Creat 0.67 - 1.17 mg/dL 0.67  0.67  0.68   0.72    Urea Nitrogen 8.0 - 23.0 mg/dL 9.8  9.9  9.6   12.1    WBC 4.0 - 11.0 10e3/uL 1.6  1.5  1.1  1.1  0.9    Neutrophil % 33  42  36  34  25    ANEU 1.6 - 8.3 10e3/uL 0.5  0.6  0.4  0.4  0.2        Cyclosporine levels    Invalid input(s): CYCLOSPORINE, CYC    Mycophenolate levels    Invalid input(s): MYPA, MYP    Sirolimus levels    Invalid input(s): SIROLIMUS, SIR, RAPA    CSF testing     Recent Labs   Lab Test 09/08/23  1159 09/08/23  1158   CWBC 5  --    CRBC 1  --    CGLU  --  51   CTP  --  46.4*         Microbiology:  Blood cx  negative.   Last check of C difficile  C Difficile Toxin B by PCR   Date Value Ref Range Status   09/22/2023 Negative Negative Final     Comment:     A negative result does not exclude actual disease due to C. difficile and may be due to improper collection, handling and storage of the specimen or the number of organisms in the specimen is below the detection limit of the assay.       Virology:  CMV viral loads  No results found for: CMVRESINST, 79223, 30775, 98444, 77595, CMVQAL  CMV viral loads  No lab results found.    CMV viral loads    CMV DNA IU/mL   Date Value Ref Range Status   09/27/2023 Not Detected Not Detected IU/mL Final       CMV resistance testing  No lab results found.  No results found for: CMVCID, CMVFOS, CMVGAN, CMVDRUGRES     HHV-6 DNA copies/mL   Date Value Ref Range Status   09/29/2023 Not Detected Not Detected copies/mL Final       EBV DNA Copies/mL   Date Value Ref Range Status   09/29/2023 Not Detected Not Detected copies/mL Final       CMV Antibody IgG   Date Value Ref Range Status   09/02/2023 Positive, suggests recent or past exposure. (A) No detectable antibody.  Final   09/01/2023 Positive, suggests recent or past exposure. (A) No detectable antibody.  Final       No results found for: EBIG2, EBIGM, TOXG      Imaging:  CT chest WO 9/24/23   IMPRESSION:    1. Basilar reticular opacities are indeterminate, possibly atypical  infection versus scarring and subsegmental atelectasis.

## 2023-10-03 NOTE — PROGRESS NOTES
Care Management Discharge Note    Discharge Date: 10/04/2023       Discharge Disposition:      Discharge Services:  (TBD)    Discharge DME:  (TBD)    Discharge Transportation: family or friend will provide    Private pay costs discussed: Not applicable    Does the patient's insurance plan have a 3 day qualifying hospital stay waiver?  No    PAS Confirmation Code:  NA  Patient/family educated on Medicare website which has current facility and service quality ratings:  MA    Education Provided on the Discharge Plan: Yes  Persons Notified of Discharge Plans: Pt, spouse  Patient/Family in Agreement with the Plan:  (n/a)    Handoff Referral Completed: Yes    Additional Information:  \  RNCC notified pt medically ready for discharge. Labs and infusion set up (see last note for details). RNCC notified that pt will need walker at discharge. RNCC contacted pt's spouse Aundrea who informed writer they were able to borrow a walker and did not need assist. Aundrea to transport pt home. No further discharge needs identified.       Keisha Gilman, RN   Nurse Coordinator    Covering for: 5A  Phone: *[phone]    Social Work and Care Management Department       SEARCHABLE in [facility] - search CARE COORDINATOR       [place] ([phone]) Saturday & Sunday; ([phone]) [facility] Recognized Holidays     Units: 5A, 5B & 5C  Pager: [phone]    Units: 6B, 6C & 6D    Pager: [phone]    Units: 7A, 7B, 7C & 7D    Pager: [phone]    Units: 6A & ICU   Pager: [phone]    Units: 5 Ortho, 5MS & WB ED Pager: [phone]    Units: 6MS, 8A & 10 ICU  Pager [phone]

## 2023-10-04 ENCOUNTER — TELEPHONE (OUTPATIENT)
Dept: GASTROENTEROLOGY | Facility: CLINIC | Age: 68
End: 2023-10-04
Payer: COMMERCIAL

## 2023-10-04 NOTE — PLAN OF CARE
Physical Therapy Discharge Summary    Reason for therapy discharge:    Discharged to home with outpatient therapy.    Progress towards therapy goal(s). See goals on Care Plan in [facility] electronic health record for goal details.  Goals partially met.  Barriers to achieving goals:   discharge from facility.    Therapy recommendation(s):    Continued therapy is recommended.  Rationale/Recommendations:  Recommend continued PT through OP PT to continue improving impairments.

## 2023-10-05 DIAGNOSIS — C95.00 ACUTE LEUKEMIA NOT HAVING ACHIEVED REMISSION (H): Primary | ICD-10-CM

## 2023-10-05 LAB
BACTERIA BLD CULT: NO GROWTH
BACTERIA BLD CULT: NO GROWTH

## 2023-10-06 NOTE — PROGRESS NOTES
BMT ONC Adult Bone Marrow Biopsy Procedure Note  October 6, 2023  /76 (BP Location: Right arm, Patient Position: Sitting, Cuff Size: Adult Regular)   Pulse 110   Temp 97.7  F (36.5  C) (Oral)   Resp 20   Wt 84.1 kg (185 lb 8 oz)   SpO2 99%   BMI 27.79 kg/m       Learning needs assessment complete within 12 months? YES    DIAGNOSIS: AML     PROCEDURE: Unilateral Bone Marrow Biopsy and Unilateral Aspirate    [place] [facility] [place] [facility] [place]    Patient s identification was positively verified by verbal identification and invasive procedure safety checklist was completed. Informed consent was obtained. Following the administration of  no  pre-medication, patient was placed in the prone position and prepped and draped in a sterile manner. Approximately 10 cc of 1% Lidocaine was used over the right posterior iliac spine. Following this a 3 mm incision was made. Trephine bone marrow core(s) was (were) obtained from the [facility]. Bone marrow aspirates were obtained from the [facility]. Aspirates were sent for morphology, immunophenotyping, cytogenetics, and molecular diagnostics --myeloid malignancy. A total of approximately 22 ml of marrow was aspirated. Following this procedure a sterile dressing was applied to the bone marrow biopsy site(s). The patient was placed in the supine position to maintain pressure on the biopsy site. Post-procedure wound care instructions were given.     Complications: NO    Of note, patient was consented for tissue banking today but due to discomfort with obtaining aspirates, he declined to send a sample today.     Post-procedural pain assessment: None    Interventions: NO    Length of procedure:20 minutes or less      Procedure performed by: Amber Scheierl, GEORGINA

## 2023-10-06 NOTE — TELEPHONE ENCOUNTER
Writer called to offer Pt a sooner appointment with Dr. Artie Martinez on 10/24/2023 at 10 AM. Pt declined the appointment. Pt said that Pt is waiting for a bone marrow transplant. Pt requested to keep appointment as scheduled for 2/5/2024. Writer expressed understanding and let Pt know that if Pt would like a sooner appointment down the road, Pt can always call back and we would gladly help with rescheduling. Pt agreed with the plan. Closing encounter.

## 2023-10-09 ENCOUNTER — OFFICE VISIT (OUTPATIENT)
Dept: ONCOLOGY | Facility: CLINIC | Age: 68
End: 2023-10-09
Attending: STUDENT IN AN ORGANIZED HEALTH CARE EDUCATION/TRAINING PROGRAM
Payer: COMMERCIAL

## 2023-10-09 ENCOUNTER — APPOINTMENT (OUTPATIENT)
Dept: LAB | Facility: CLINIC | Age: 68
End: 2023-10-09
Attending: STUDENT IN AN ORGANIZED HEALTH CARE EDUCATION/TRAINING PROGRAM
Payer: COMMERCIAL

## 2023-10-09 VITALS
WEIGHT: 185.5 LBS | HEART RATE: 88 BPM | DIASTOLIC BLOOD PRESSURE: 76 MMHG | OXYGEN SATURATION: 99 % | RESPIRATION RATE: 20 BRPM | TEMPERATURE: 97.7 F | BODY MASS INDEX: 27.79 KG/M2 | SYSTOLIC BLOOD PRESSURE: 116 MMHG

## 2023-10-09 DIAGNOSIS — C95.00 ACUTE LEUKEMIA NOT HAVING ACHIEVED REMISSION (H): ICD-10-CM

## 2023-10-09 LAB
BACTERIA BLD CULT: NO GROWTH
BASO+EOS+MONOS # BLD AUTO: ABNORMAL 10*3/UL
BASO+EOS+MONOS NFR BLD AUTO: ABNORMAL %
BASOPHILS # BLD AUTO: ABNORMAL 10*3/UL
BASOPHILS # BLD MANUAL: 0 10E3/UL (ref 0–0.2)
BASOPHILS NFR BLD AUTO: ABNORMAL %
BASOPHILS NFR BLD MANUAL: 0 %
EOSINOPHIL # BLD AUTO: ABNORMAL 10*3/UL
EOSINOPHIL # BLD MANUAL: 0 10E3/UL (ref 0–0.7)
EOSINOPHIL NFR BLD AUTO: ABNORMAL %
EOSINOPHIL NFR BLD MANUAL: 0 %
ERYTHROCYTE [DISTWIDTH] IN BLOOD BY AUTOMATED COUNT: 15.1 % (ref 10–15)
GRAM STAIN RESULT: NORMAL
HCT VFR BLD AUTO: 32.6 % (ref 40–53)
HGB BLD-MCNC: 10.2 G/DL (ref 13.3–17.7)
IMM GRANULOCYTES # BLD: ABNORMAL 10*3/UL
IMM GRANULOCYTES NFR BLD: ABNORMAL %
INTERPRETATION: NORMAL
LYMPHOCYTES # BLD AUTO: ABNORMAL 10*3/UL
LYMPHOCYTES # BLD MANUAL: 1.8 10E3/UL (ref 0.8–5.3)
LYMPHOCYTES NFR BLD AUTO: ABNORMAL %
LYMPHOCYTES NFR BLD MANUAL: 38 %
MCH RBC QN AUTO: 28.8 PG (ref 26.5–33)
MCHC RBC AUTO-ENTMCNC: 31.3 G/DL (ref 31.5–36.5)
MCV RBC AUTO: 92 FL (ref 78–100)
MONOCYTES # BLD AUTO: ABNORMAL 10*3/UL
MONOCYTES # BLD MANUAL: 1.4 10E3/UL (ref 0–1.3)
MONOCYTES NFR BLD AUTO: ABNORMAL %
MONOCYTES NFR BLD MANUAL: 29 %
NEUTROPHILS # BLD AUTO: ABNORMAL 10*3/UL
NEUTROPHILS # BLD MANUAL: 1.6 10E3/UL (ref 1.6–8.3)
NEUTROPHILS NFR BLD AUTO: ABNORMAL %
NEUTROPHILS NFR BLD MANUAL: 33 %
NRBC # BLD AUTO: 0 10E3/UL
NRBC BLD AUTO-RTO: 0 /100
PATH REPORT.COMMENTS IMP SPEC: NORMAL
PATH REPORT.FINAL DX SPEC: NORMAL
PATH REPORT.MICROSCOPIC SPEC OTHER STN: NORMAL
PATH REPORT.RELEVANT HX SPEC: NORMAL
PLAT MORPH BLD: ABNORMAL
PLATELET # BLD AUTO: 177 10E3/UL (ref 150–450)
POLYCHROMASIA BLD QL SMEAR: SLIGHT
RBC # BLD AUTO: 3.54 10E6/UL (ref 4.4–5.9)
RBC MORPH BLD: ABNORMAL
SIGNIFICANT RESULTS: NORMAL
SPECIMEN DESCRIPTION: NORMAL
TEST DETAILS, MDL: NORMAL
WBC # BLD AUTO: 4.7 10E3/UL (ref 4–11)

## 2023-10-09 PROCEDURE — 88311 DECALCIFY TISSUE: CPT | Mod: 26 | Performed by: PATHOLOGY

## 2023-10-09 PROCEDURE — 85027 COMPLETE CBC AUTOMATED: CPT | Performed by: STUDENT IN AN ORGANIZED HEALTH CARE EDUCATION/TRAINING PROGRAM

## 2023-10-09 PROCEDURE — 88305 TISSUE EXAM BY PATHOLOGIST: CPT | Mod: 26 | Performed by: PATHOLOGY

## 2023-10-09 PROCEDURE — 38222 DX BONE MARROW BX & ASPIR: CPT | Performed by: STUDENT IN AN ORGANIZED HEALTH CARE EDUCATION/TRAINING PROGRAM

## 2023-10-09 PROCEDURE — 88305 TISSUE EXAM BY PATHOLOGIST: CPT | Mod: TC | Performed by: STUDENT IN AN ORGANIZED HEALTH CARE EDUCATION/TRAINING PROGRAM

## 2023-10-09 PROCEDURE — 88341 IMHCHEM/IMCYTCHM EA ADD ANTB: CPT | Mod: 26 | Performed by: PATHOLOGY

## 2023-10-09 PROCEDURE — 36415 COLL VENOUS BLD VENIPUNCTURE: CPT | Performed by: STUDENT IN AN ORGANIZED HEALTH CARE EDUCATION/TRAINING PROGRAM

## 2023-10-09 PROCEDURE — 85097 BONE MARROW INTERPRETATION: CPT | Performed by: PATHOLOGY

## 2023-10-09 PROCEDURE — 88271 CYTOGENETICS DNA PROBE: CPT | Performed by: STUDENT IN AN ORGANIZED HEALTH CARE EDUCATION/TRAINING PROGRAM

## 2023-10-09 PROCEDURE — 81450 HL NEO GSAP 5-50DNA/DNA&RNA: CPT | Performed by: STUDENT IN AN ORGANIZED HEALTH CARE EDUCATION/TRAINING PROGRAM

## 2023-10-09 PROCEDURE — G0452 MOLECULAR PATHOLOGY INTERPR: HCPCS | Mod: 26 | Performed by: PATHOLOGY

## 2023-10-09 PROCEDURE — 88237 TISSUE CULTURE BONE MARROW: CPT | Performed by: STUDENT IN AN ORGANIZED HEALTH CARE EDUCATION/TRAINING PROGRAM

## 2023-10-09 PROCEDURE — 88342 IMHCHEM/IMCYTCHM 1ST ANTB: CPT | Mod: 26 | Performed by: PATHOLOGY

## 2023-10-09 PROCEDURE — 85007 BL SMEAR W/DIFF WBC COUNT: CPT | Performed by: STUDENT IN AN ORGANIZED HEALTH CARE EDUCATION/TRAINING PROGRAM

## 2023-10-09 PROCEDURE — 88185 FLOWCYTOMETRY/TC ADD-ON: CPT | Performed by: STUDENT IN AN ORGANIZED HEALTH CARE EDUCATION/TRAINING PROGRAM

## 2023-10-09 PROCEDURE — 88264 CHROMOSOME ANALYSIS 20-25: CPT | Performed by: STUDENT IN AN ORGANIZED HEALTH CARE EDUCATION/TRAINING PROGRAM

## 2023-10-09 PROCEDURE — 88189 FLOWCYTOMETRY/READ 16 & >: CPT | Mod: GC | Performed by: PATHOLOGY

## 2023-10-09 ASSESSMENT — PAIN SCALES - GENERAL: PAINLEVEL: NO PAIN (0)

## 2023-10-09 NOTE — NURSING NOTE
"Oncology Rooming Note    October 9, 2023 10:17 AM   Artie Fine is a 68 year old male who presents for:    Chief Complaint   Patient presents with    Bone Marrow Biopsy     Bmbx; hx AML     Initial Vitals: /76 (BP Location: Right arm, Patient Position: Sitting, Cuff Size: Adult Regular)   Pulse 110   Temp 97.7  F (36.5  C) (Oral)   Resp 20   Wt 84.1 kg (185 lb 8 oz)   SpO2 99%   BMI 27.79 kg/m   Estimated body mass index is 27.79 kg/m  as calculated from the following:    Height as of 9/1/23: 1.74 m (5' 8.5\").    Weight as of this encounter: 84.1 kg (185 lb 8 oz). Body surface area is 2.02 meters squared.  No Pain (0) Comment: Data Unavailable   No LMP for male patient.  Allergies reviewed: Yes  Medications reviewed: Yes    Medications: Medication refills not needed today.  Pharmacy name entered into EPIC: Data Unavailable    Clinical concerns: None.       Brittaney Weiss RN              "

## 2023-10-09 NOTE — NURSING NOTE
BMBX Teaching and Assessment       Teaching concerns addressed: Bone marrow biopsy and infection prevention.     Person(s) involved in teaching: Patient  Motivation Level  Asks Questions: Yes  Eager to Learn: Yes  Cooperative: Yes  Receptive (willing/able to accept information): Yes    Patient demonstrates understanding of the following:     Reason for the appointment, diagnosis and treatment plan: Yes  Knowledge of proper use of medications and conditions for which they are ordered (with special attention to potential side effects or drug interactions): Yes  Which situations necessitate calling provider and whom to contact: Yes    Teaching concerns addressed:   Reviewed activity restrictions if received premeds, potential for bleeding and actions to take if develops any of the issues below    Pain management techniques: Yes  Patient instructed on hand hygiene: Yes  How and/when to access community resources: Yes    Infection Control:  Patient demonstrates understanding of the following:   Bone marrow procedure site care taught: Yes  Signs and symptoms of infection taught: Yes       Instructional Materials Used/Given: Pt instructed to keep bmbx site clean and dry for 24hrs. Pt educated to monitor site for signs of infection such as redness, rash, oozing, purulent drainage, bleeding, pain, and elevated temp. Pt instructed to go to call the [facility] triage line or go to the ER if any signs of infection should occur. Pt educated to not operate machinery if receiving versed. Pt and spouse verbalize understanding.       Pre-procedure labs drawn via venipuncture. VSS. Meds and allergies reviewed.     Post procedure: Patient ambulatory and site is clean, dry and intact prior to discharge. Pt discharged with spouse as .      Brittaney Weiss RN

## 2023-10-09 NOTE — LETTER
10/9/2023         RE: Artie Fine  [address] [address] [address]        Dear Colleague,    Thank you for referring your patient, Artie Fine, to the [facility]. Please see a copy of my visit note below.        BMT ONC Adult Bone Marrow Biopsy Procedure Note  October 6, 2023  /76 (BP Location: Right arm, Patient Position: Sitting, Cuff Size: Adult Regular)   Pulse 110   Temp 97.7  F (36.5  C) (Oral)   Resp 20   Wt 84.1 kg (185 lb 8 oz)   SpO2 99%   BMI 27.79 kg/m       Learning needs assessment complete within 12 months? YES    DIAGNOSIS: AML     PROCEDURE: Unilateral Bone Marrow Biopsy and Unilateral Aspirate    [place] [facility] [place] [facility]    Patient s identification was positively verified by verbal identification and invasive procedure safety checklist was completed. Informed consent was obtained. Following the administration of  no  pre-medication, patient was placed in the prone position and prepped and draped in a sterile manner. Approximately 10 cc of 1% Lidocaine was used over the right posterior iliac spine. Following this a 3 mm incision was made. Trephine bone marrow core(s) was (were) obtained from the [facility]. Bone marrow aspirates were obtained from the [facility]. Aspirates were sent for morphology, immunophenotyping, cytogenetics, and molecular diagnostics --myeloid malignancy. A total of approximately 22 ml of marrow was aspirated. Following this procedure a sterile dressing was applied to the bone marrow biopsy site(s). The patient was placed in the supine position to maintain pressure on the biopsy site. Post-procedure wound care instructions were given.     Complications: NO    Of note, patient was consented for tissue banking today but due to discomfort with obtaining aspirates, he declined to send a sample today.     Post-procedural pain assessment: None    Interventions: NO    Length of procedure:20 minutes or less      Procedure performed by: Rosanna  Scheierl, CNP

## 2023-10-11 ENCOUNTER — PRE VISIT (OUTPATIENT)
Dept: ONCOLOGY | Facility: CLINIC | Age: 68
End: 2023-10-11
Payer: COMMERCIAL

## 2023-10-11 ENCOUNTER — PATIENT OUTREACH (OUTPATIENT)
Dept: ONCOLOGY | Facility: CLINIC | Age: 68
End: 2023-10-11
Payer: COMMERCIAL

## 2023-10-11 ENCOUNTER — ONCOLOGY VISIT (OUTPATIENT)
Dept: ONCOLOGY | Facility: CLINIC | Age: 68
DRG: 838 | End: 2023-10-11
Payer: COMMERCIAL

## 2023-10-11 VITALS
TEMPERATURE: 98 F | OXYGEN SATURATION: 97 % | SYSTOLIC BLOOD PRESSURE: 127 MMHG | DIASTOLIC BLOOD PRESSURE: 74 MMHG | WEIGHT: 185.7 LBS | BODY MASS INDEX: 27.82 KG/M2 | HEART RATE: 99 BPM

## 2023-10-11 DIAGNOSIS — C92.01 ACUTE MYELOID LEUKEMIA IN REMISSION (H): Primary | ICD-10-CM

## 2023-10-11 DIAGNOSIS — C95.00 ACUTE LEUKEMIA NOT HAVING ACHIEVED REMISSION (H): ICD-10-CM

## 2023-10-11 PROCEDURE — G0463 HOSPITAL OUTPT CLINIC VISIT: HCPCS | Performed by: STUDENT IN AN ORGANIZED HEALTH CARE EDUCATION/TRAINING PROGRAM

## 2023-10-11 PROCEDURE — 99205 OFFICE O/P NEW HI 60 MIN: CPT | Performed by: STUDENT IN AN ORGANIZED HEALTH CARE EDUCATION/TRAINING PROGRAM

## 2023-10-11 RX ORDER — PROCHLORPERAZINE MALEATE 5 MG
5-10 TABLET ORAL EVERY 6 HOURS PRN
Status: CANCELLED
Start: 2023-10-13

## 2023-10-11 RX ORDER — PREDNISOLONE ACETATE 10 MG/ML
2 SUSPENSION/ DROPS OPHTHALMIC 4 TIMES DAILY
Status: CANCELLED | OUTPATIENT
Start: 2023-10-13

## 2023-10-11 RX ORDER — LORAZEPAM 0.5 MG/1
.5-1 TABLET ORAL EVERY 6 HOURS PRN
Status: CANCELLED
Start: 2023-10-13

## 2023-10-11 RX ORDER — DIPHENHYDRAMINE HYDROCHLORIDE 50 MG/ML
50 INJECTION INTRAMUSCULAR; INTRAVENOUS
Status: CANCELLED
Start: 2023-10-13

## 2023-10-11 RX ORDER — METHYLPREDNISOLONE SODIUM SUCCINATE 125 MG/2ML
125 INJECTION, POWDER, LYOPHILIZED, FOR SOLUTION INTRAMUSCULAR; INTRAVENOUS
Status: CANCELLED
Start: 2023-10-13

## 2023-10-11 RX ORDER — ONDANSETRON 8 MG/1
8 TABLET, FILM COATED ORAL EVERY 12 HOURS
Status: CANCELLED
Start: 2023-10-13

## 2023-10-11 RX ORDER — DIPHENHYDRAMINE HCL 25 MG
25 CAPSULE ORAL ONCE
Qty: 1 CAPSULE | Refills: 0 | Status: SHIPPED | OUTPATIENT
Start: 2023-10-11 | End: 2023-10-11

## 2023-10-11 RX ORDER — ALBUTEROL SULFATE 90 UG/1
1-2 AEROSOL, METERED RESPIRATORY (INHALATION)
Status: CANCELLED
Start: 2023-10-13

## 2023-10-11 RX ORDER — EPINEPHRINE 1 MG/ML
0.3 INJECTION, SOLUTION INTRAMUSCULAR; SUBCUTANEOUS EVERY 5 MIN PRN
Status: CANCELLED | OUTPATIENT
Start: 2023-10-13

## 2023-10-11 RX ORDER — ALBUTEROL SULFATE 0.83 MG/ML
2.5 SOLUTION RESPIRATORY (INHALATION)
Status: CANCELLED | OUTPATIENT
Start: 2023-10-13

## 2023-10-11 RX ORDER — MEPERIDINE HYDROCHLORIDE 25 MG/ML
25 INJECTION INTRAMUSCULAR; INTRAVENOUS; SUBCUTANEOUS EVERY 30 MIN PRN
Status: CANCELLED | OUTPATIENT
Start: 2023-10-13

## 2023-10-11 RX ORDER — ALLOPURINOL 300 MG/1
300 TABLET ORAL DAILY
Status: CANCELLED | OUTPATIENT
Start: 2023-10-13

## 2023-10-11 RX ORDER — DEXAMETHASONE 4 MG/1
4 TABLET ORAL EVERY 12 HOURS
Status: CANCELLED
Start: 2023-10-13

## 2023-10-11 RX ORDER — LORAZEPAM 2 MG/ML
.5-1 INJECTION INTRAMUSCULAR EVERY 6 HOURS PRN
Status: CANCELLED | OUTPATIENT
Start: 2023-10-13

## 2023-10-11 ASSESSMENT — PAIN SCALES - GENERAL: PAINLEVEL: NO PAIN (0)

## 2023-10-11 NOTE — LETTER
10/11/2023         RE: Artie Fine  [address] [address] [facility] [address]        Dear Colleague,    Thank you for referring your patient, Artie Fine, to the [facility]. Please see a copy of my visit note below.    [facility]  HEMATOLOGY & ONCOLOGY  NEW PATIENT VISIT    PATIENT NAME: Artie Fine          MRN # [id number]  YOB: 1955  DATE OF VISIT: October 11, 2023            REFERRING PROVIDER:   Mr. Artie Fine was seen at the request of [facility] for further evaluation and/or management.       History of Presenting Illness  Mr. Artie Fine is a pleasant 68 year old male with a past medical history significant for hernia surgery in 2021 followed by a brain abscess 2 months later and who noticed increased fatigue since mid June and presented to the hospital and was found to have pancytopenia and circulating blasts. A bone marrow biopsy on 9/1/2023 confirmed a diagnosis of AML with normal karyotype and NGS with ASXL1 , SRSF2, STAG2, CEBPA now s/p 7+3 started on 9/2/2023 with day 14 marrow on 9/15 showing hypocellular marrow with no increased blast and now a recovery marrow on day 38 on 10/9 showing no increased blasts with hypocellular marrow 20-30%. Cytogenetics and NGS pending. He presents today for further evaluation of AML. He was diagnosed with AML in 9/1/2023 and is here to establish at the [facility].      Subjective  Patient is here with his wife Aundrea and notes that he is now back to his baseline from this spring and has been up and about and involved inactivities for more than 50% of the time. Denies any fevers or chills and bleeding from anywhere including BRBPR or melena. Compliant on all of his medications.      Past Medical History  History reviewed. No pertinent past medical history.    Family History  History reviewed. No pertinent family history.  Mother Lung cancer, breast cancer  Brother Lung cancer,   2 sisters one Breast  cancer, another sister Colon cancer and passed away this year with brain mets    Social History  Smoking: Former smoker:  1 packs/day for 10-15 years:  Quit:  40 yrs back  Alcohol use: rarely drinks alcohol  Status:  43 years.   Children/grandchildren: 3 children, 2 daughters 36 (never pregnant) and son 39. No grandchildern.  Occupation: Contractor/ furniture retired 2017    Current Outpatient Medications  Current Outpatient Medications   Medication Sig Dispense Refill    diphenhydrAMINE (BENADRYL ALLERGY) 25 MG capsule Take 1 capsule (25 mg) by mouth once for 1 dose Administer 30 min - 2 hours pre - IV contrast injection and Patient must have a . 1 capsule 0    acyclovir (ZOVIRAX) 400 MG tablet Take 1 tablet (400 mg) by mouth 2 times daily 60 tablet 0    cetirizine (ZYRTEC) 10 MG tablet Take 1 tablet (10 mg) by mouth daily      levofloxacin (LEVAQUIN) 500 MG tablet Take 1 tablet (500 mg) by mouth daily 30 tablet 0    ondansetron (ZOFRAN) 4 MG tablet Take 1 tablet (4 mg) by mouth every 8 hours as needed for nausea or vomiting 20 tablet 0    prochlorperazine (COMPAZINE) 5 MG tablet Take 1 tablet (5 mg) by mouth every 6 hours as needed for nausea or vomiting 20 tablet 0    triamcinolone (KENALOG) 0.1 % external ointment Apply topically 2 times daily as needed for irritation (rash) 30 g 0    voriconazole (VFEND) 200 MG tablet Take 1 tablet (200 mg) by mouth every 12 hours 60 tablet 0       Allergies  Allergies   Allergen Reactions    Iodinated Contrast Media Rash    Cefepime Rash    Ceftriaxone Rash     Reported history at Outside hospital 2021    [employer] Other (See Comments)     Congestion        Review of systems  A complete ROS was performed and was negative except as mentioned in HPI    Physical Exam  Vitals:    10/11/23 0954   BP: 127/74   BP Location: Right arm   Patient Position: Sitting   Cuff Size: Adult Regular   Pulse: 99   Temp: 98  F (36.7  C)   TempSrc: Oral   SpO2: 97%   Weight: 84.2 kg  "(185 lb 11.2 oz)     Wt Readings from Last 3 Encounters:   10/11/23 84.2 kg (185 lb 11.2 oz)   10/09/23 84.1 kg (185 lb 8 oz)   10/03/23 89 kg (196 lb 4.8 oz)       ECO   male sitting in chair in NAD  HEET: NC/AT, EOMI w/ PERRL, anicteric sclera. MMM. No mouth sores.   Neck: supple no JVP  Lymph: No cervical, supraclavicular, axillary or inguinal LAD  CV: normal S1,S2 with RRR no m/r/g  Resp: lungs CTA bilaterally with adequate air movement. No wheezes or crackles  Abd: soft, NTND, no organomegaly or masses. BS normoactive.   Ext: WWP no edema or cyanosis  Skin: no concerning lesions or rashes or petechiae  Neuro: A&Ox4, no lateralizing sx. Grossly nonfocal. Strength appears preserved.     Labs and Imaging  WBC 4.7, Hgb 10.2, Plts 177, ANC 1600    BMBX 10/9/2023  - Hypocellular marrow (10-20% estimated cellularity), with trilineage hematopoesis; no morphologic or immunophenotypic evidence of recurrent/persistent acute myeloid leukemia  - Peripheral blood showing moderate normocytic hypochromic anemia; slight monocytosis.   2.0% cells in the blast gate (CD45 dim and low side scatter blast gate). There is no aberrant immunophenotype on the myeloid blasts.     BMBx 2023  Acute myeloid leukemia with the following characteristics:  - Hypercellular marrow (70-80% estimated cellularity) with residual trilineage hematopoiesis, dysplastic granulocytes, diminished and atypical megakaryocytes, and 30% blasts  - Peripheral blood showing marked normochromic normocytic anemia, slight leukopenia, marked thrombocytopenia, and 11% circulating blasts  Chromosomal analysis: 46,XY[20]   FISH: No evidence of MLLT10, NUP98, or KMT2A rearrangement  1) ASXL1 E635fs VAF 31.3%  2) CEBPA E59*  VAF 84.72% (\"NOT an inframe insertion or deletion in the C-terminal DNA-binding or basic leucine zipper region (bZIP) and was not found in biallelic configuration with a CEBPA bZIP mutation\")  3) SRSF2 P95H VAF 47.21%  4) STAG2 Q656* " VAF 89.96%     Assessment and Plan  Mr. Artie Fien is a 68 year old male who is here for further evaluation and/or management of Acute myeloid leukemia.    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     I discussed the pathophysiology and natural history of AML with Mr. Artie Fine today. We went over the current prognostic models and scoring systems that are used in clinical practice . We went over the current FDA approved treatments for AML .  His disease is High risk and will discuss about the bone marrow transplant during this admission or soon thereafter with the BMT team.     Plan:  he has had a molecular panel, we will obtain that today.  Continue with acyclovir and levoflox and voriconazole  PICC placement in anticipation for admission for HiDAC  Admit for HIDAC consolidation ideally 10/13.   Twice weekly labs and as needed transfusions and MARIEL in 1 week and MD appt 3 weeks from discharge     Hematology:  Anemia/Thrombocytopenia:   Transfuse leukocyte reduced and irradiated blood products: 1 unit pRBC if hgb is 7.0-7.9, 2 units pRBCs if Hgb 6.0-6.9 g/dl, 1 unit platelets if PLT count is <10,000 or <50,000 with clinical bleeding.    Immunocompromised:  As a result of his disease and/or previous treatment. Mr. Artie Fine is immunocompromised. He will continue with levoflox for now. Can be stopped and admission and restarted at discharge.     Cardiac:  Mr. Artie Fine has no history of heart disease.     Renal:  Creatinine results reviewed today. Mr. Artie Fine does not have any renal disease.    Hepatic:  Liver function tests reviewed today.    Psychosocial:  Mr. Artie Fine is coping well with his diagnosis.     All other questions and concerns were  addressed and answered to Mr. Artie Fine's satisfaction.    Today, I spent a total of 80 minutes of face-to-face and non face-to-face time with . Artie Fine. Time spent included review and discussion of diagnostic test results, patient counseling, and coordination of care.    Donis Garcia MD    Division of Hematology, Oncology and Transplantation  [facility]  P: [phone]

## 2023-10-11 NOTE — PROGRESS NOTES
[facility]: Cancer Care Initial Note                                    Discussion with Patient:                                                      Met with Artie and spouse Aundrea after office visit/consult with Dr. Garcia. Introduced self as RN Care Coordinator. Went over contact information for [facility]. Discussed roles of RNCC, MD, MARIEL, nurse triage line, and clinic coordinators. Discussed how to get a hold of different team members via [employer] and at [phone]. Authorization to discuss information form signed, gave permission to communicate with spouse Aundrea.       Assessment:                                                      Initial  Current living arrangement:: I live in a private home with spouse  Type of residence:: Private home - no stairs  Informal Support system:: Family  Equipment Currently Used at Home: none  Bed or wheelchair confined:: No  Mobility Status: Independent  Transportation means:: Regular car  Medication adherence problem (GOAL):: No  Knowledgeable about how to use meds:: Yes  Medication side effects suspected:: No  Referrals Placed: None      Intervention/Education provided during outreach:                                                       Plan:   Admission for 7+3 consolidation, currently staying at [facility]. If admission has to wait until next week he will go home to [place] for the weekend.    Patient to follow up as scheduled at next appt  Patient to call/[employer] message with updates  Confirmed patient has clinic and triage numbers      Susan Santos, RN, BSN  RN Care Coordinator   [address] [place] [id number]   [phone]

## 2023-10-11 NOTE — NURSING NOTE
"Oncology Rooming Note    October 11, 2023 9:56 AM   Artie Fine is a 68 year old male who presents for:    Chief Complaint   Patient presents with    Oncology Clinic Visit     Acute leukemia     Initial Vitals: /74 (BP Location: Right arm, Patient Position: Sitting, Cuff Size: Adult Regular)   Pulse 99   Temp 98  F (36.7  C) (Oral)   Wt 84.2 kg (185 lb 11.2 oz)   SpO2 97%   BMI 27.82 kg/m   Estimated body mass index is 27.82 kg/m  as calculated from the following:    Height as of 9/1/23: 1.74 m (5' 8.5\").    Weight as of this encounter: 84.2 kg (185 lb 11.2 oz). Body surface area is 2.02 meters squared.  No Pain (0) Comment: Data Unavailable   No LMP for male patient.  Allergies reviewed: Yes  Medications reviewed: Yes    Medications: Medication refills not needed today.  Pharmacy name entered into eyetok: [facility], [place] - [address] [place] [address] [phone]    Clinical concerns: none      Maura Hutton, EMT  10/11/2023              "

## 2023-10-11 NOTE — PROGRESS NOTES
[facility]  HEMATOLOGY & ONCOLOGY  NEW PATIENT VISIT    PATIENT NAME: Artie Fine          MRN # [id number]  YOB: 1955  DATE OF VISIT: October 11, 2023            REFERRING PROVIDER:   Mr. Artie Fine was seen at the request of Tye for further evaluation and/or management.       History of Presenting Illness  Mr. Artie Fine is a pleasant 68 year old male with a past medical history significant for hernia surgery in 2021 followed by a brain abscess 2 months later and who noticed increased fatigue since mid June and presented to the hospital and was found to have pancytopenia and circulating blasts. A bone marrow biopsy on 9/1/2023 confirmed a diagnosis of AML with normal karyotype and NGS with ASXL1 , SRSF2, STAG2, CEBPA now s/p 7+3 started on 9/2/2023 with day 14 marrow on 9/15 showing hypocellular marrow with no increased blast and now a recovery marrow on day 38 on 10/9 showing no increased blasts with hypocellular marrow 20-30%. Cytogenetics and NGS pending. He presents today for further evaluation of AML. He was diagnosed with AML in 9/1/2023 and is here to establish at the [facility].      Subjective  Patient is here with his wife Aundrea and notes that he is now back to his baseline from this spring and has been up and about and involved inactivities for more than 50% of the time. Denies any fevers or chills and bleeding from anywhere including BRBPR or melena. Compliant on all of his medications.      Past Medical History  History reviewed. No pertinent past medical history.    Family History  History reviewed. No pertinent family history.  Mother Lung cancer, breast cancer  Brother Lung cancer,   2 sisters one Breast cancer, another sister Colon cancer and passed away this year with brain mets    Social History  Smoking: Former smoker:  1 packs/day for 10-15 years:  Quit:  40 yrs back  Alcohol use: rarely drinks alcohol  Status:  43 years.   Children/grandchildren:  3 children, 2 daughters 36 (never pregnant) and son 39. No grandchildern.  Occupation: Contractor/ furniture retired     Current Outpatient Medications  Current Outpatient Medications   Medication Sig Dispense Refill    diphenhydrAMINE (BENADRYL ALLERGY) 25 MG capsule Take 1 capsule (25 mg) by mouth once for 1 dose Administer 30 min - 2 hours pre - IV contrast injection and Patient must have a . 1 capsule 0    acyclovir (ZOVIRAX) 400 MG tablet Take 1 tablet (400 mg) by mouth 2 times daily 60 tablet 0    cetirizine (ZYRTEC) 10 MG tablet Take 1 tablet (10 mg) by mouth daily      levofloxacin (LEVAQUIN) 500 MG tablet Take 1 tablet (500 mg) by mouth daily 30 tablet 0    ondansetron (ZOFRAN) 4 MG tablet Take 1 tablet (4 mg) by mouth every 8 hours as needed for nausea or vomiting 20 tablet 0    prochlorperazine (COMPAZINE) 5 MG tablet Take 1 tablet (5 mg) by mouth every 6 hours as needed for nausea or vomiting 20 tablet 0    triamcinolone (KENALOG) 0.1 % external ointment Apply topically 2 times daily as needed for irritation (rash) 30 g 0    voriconazole (VFEND) 200 MG tablet Take 1 tablet (200 mg) by mouth every 12 hours 60 tablet 0       Allergies  Allergies   Allergen Reactions    Iodinated Contrast Media Rash    Cefepime Rash    Ceftriaxone Rash     Reported history at Outside hospital     Ragweeds Other (See Comments)     Congestion        Review of systems  A complete ROS was performed and was negative except as mentioned in HPI    Physical Exam  Vitals:    10/11/23 0954   BP: 127/74   BP Location: Right arm   Patient Position: Sitting   Cuff Size: Adult Regular   Pulse: 99   Temp: 98  F (36.7  C)   TempSrc: Oral   SpO2: 97%   Weight: 84.2 kg (185 lb 11.2 oz)     Wt Readings from Last 3 Encounters:   10/11/23 84.2 kg (185 lb 11.2 oz)   10/09/23 84.1 kg (185 lb 8 oz)   10/03/23 89 kg (196 lb 4.8 oz)       ECO   male sitting in chair in NAD  HEET: NC/AT, EOMI w/ PERRL, anicteric sclera.  "MMM. No mouth sores.   Neck: supple no JVP  Lymph: No cervical, supraclavicular, axillary or inguinal LAD  CV: normal S1,S2 with RRR no m/r/g  Resp: lungs CTA bilaterally with adequate air movement. No wheezes or crackles  Abd: soft, NTND, no organomegaly or masses. BS normoactive.   Ext: WWP no edema or cyanosis  Skin: no concerning lesions or rashes or petechiae  Neuro: A&Ox4, no lateralizing sx. Grossly nonfocal. Strength appears preserved.     Labs and Imaging  WBC 4.7, Hgb 10.2, Plts 177, ANC 1600    BMBX 10/9/2023  - Hypocellular marrow (10-20% estimated cellularity), with trilineage hematopoesis; no morphologic or immunophenotypic evidence of recurrent/persistent acute myeloid leukemia  - Peripheral blood showing moderate normocytic hypochromic anemia; slight monocytosis.   2.0% cells in the blast gate (CD45 dim and low side scatter blast gate). There is no aberrant immunophenotype on the myeloid blasts.     BMBx 9/1/2023  Acute myeloid leukemia with the following characteristics:  - Hypercellular marrow (70-80% estimated cellularity) with residual trilineage hematopoiesis, dysplastic granulocytes, diminished and atypical megakaryocytes, and 30% blasts  - Peripheral blood showing marked normochromic normocytic anemia, slight leukopenia, marked thrombocytopenia, and 11% circulating blasts  Chromosomal analysis: 46,XY[20]   FISH: No evidence of MLLT10, NUP98, or KMT2A rearrangement  1) ASXL1 E635fs VAF 31.3%  2) CEBPA E59*  VAF 84.72% (\"NOT an inframe insertion or deletion in the C-terminal DNA-binding or basic leucine zipper region (bZIP) and was not found in biallelic configuration with a CEBPA bZIP mutation\")  3) SRSF2 P95H VAF 47.21%  4) STAG2 Q656* VAF 89.96%     Assessment and Plan  Mr. Artie Fine is a 68 year old male who is here for further evaluation and/or management of Acute myeloid leukemia.    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     I discussed the pathophysiology and natural history of AML with Mr. Artie Fine today. We went over the current prognostic models and scoring systems that are used in clinical practice . We went over the current FDA approved treatments for AML .  His disease is High risk and will discuss about the bone marrow transplant during this admission or soon thereafter with the BMT team.     Plan:  he has had a molecular panel, we will obtain that today.  Continue with acyclovir and levoflox and voriconazole  PICC placement in anticipation for admission for HiDAC  Admit for HIDAC consolidation ideally 10/13.   Twice weekly labs and as needed transfusions and MARIEL in 1 week and MD appt 3 weeks from discharge     Hematology:  Anemia/Thrombocytopenia:   Transfuse leukocyte reduced and irradiated blood products: 1 unit pRBC if hgb is 7.0-7.9, 2 units pRBCs if Hgb 6.0-6.9 g/dl, 1 unit platelets if PLT count is <10,000 or <50,000 with clinical bleeding.    Immunocompromised:  As a result of his disease and/or previous treatment. Mr. Artie Fine is immunocompromised. He will continue with levoflox for now. Can be stopped and admission and restarted at discharge.     Cardiac:  Mr. Artie Fine has no history of heart disease.     Renal:  Creatinine results reviewed today. Mr. Artie Fine does not have any renal disease.    Hepatic:  Liver function tests reviewed today.    Psychosocial:  Mr. Artie Fine is coping well with his diagnosis.     All other questions and concerns were addressed and answered to Mr. Artie Fine's satisfaction.    Today, I spent a total of 80 minutes of face-to-face and non face-to-face time with Mr. Artie Fine. Time spent included review and discussion of diagnostic test results, patient counseling, and  coordination of care.    Donis Garcia MD    Division of Hematology, Oncology and Transplantation  [facility]  P: [phone]

## 2023-10-13 ENCOUNTER — HOSPITAL ENCOUNTER (OUTPATIENT)
Dept: VASCULAR ULTRASOUND | Facility: CLINIC | Age: 68
Discharge: HOME | DRG: 838 | End: 2023-10-13
Attending: STUDENT IN AN ORGANIZED HEALTH CARE EDUCATION/TRAINING PROGRAM | Admitting: STUDENT IN AN ORGANIZED HEALTH CARE EDUCATION/TRAINING PROGRAM
Payer: COMMERCIAL

## 2023-10-13 ENCOUNTER — HOSPITAL ENCOUNTER (INPATIENT)
Facility: CLINIC | Age: 68
LOS: 3 days | Discharge: HOME | DRG: 838 | End: 2023-10-16
Attending: STUDENT IN AN ORGANIZED HEALTH CARE EDUCATION/TRAINING PROGRAM | Admitting: STUDENT IN AN ORGANIZED HEALTH CARE EDUCATION/TRAINING PROGRAM
Payer: COMMERCIAL

## 2023-10-13 ENCOUNTER — APPOINTMENT (OUTPATIENT)
Dept: CT IMAGING | Facility: CLINIC | Age: 68
DRG: 838 | End: 2023-10-13
Attending: PHYSICIAN ASSISTANT
Payer: COMMERCIAL

## 2023-10-13 DIAGNOSIS — C92.01 ACUTE MYELOID LEUKEMIA IN REMISSION (H): ICD-10-CM

## 2023-10-13 DIAGNOSIS — C95.00 ACUTE LEUKEMIA NOT HAVING ACHIEVED REMISSION (H): ICD-10-CM

## 2023-10-13 DIAGNOSIS — C92.01 ACUTE MYELOID LEUKEMIA IN REMISSION (H): Primary | ICD-10-CM

## 2023-10-13 PROBLEM — C92.00 AML (ACUTE MYELOBLASTIC LEUKEMIA) (H): Status: ACTIVE | Noted: 2023-10-13

## 2023-10-13 LAB
ABO/RH TYPE: NORMAL
ALBUMIN SERPL BCG-MCNC: 3.8 G/DL (ref 3.5–5.2)
ALP SERPL-CCNC: 101 U/L (ref 40–129)
ALT SERPL W P-5'-P-CCNC: 20 U/L (ref 0–70)
ANION GAP SERPL CALCULATED.3IONS-SCNC: 12 MMOL/L (ref 7–15)
ANTIBODY SCREEN: NEGATIVE
APTT PPP: 23 SECONDS (ref 22–38)
AST SERPL W P-5'-P-CCNC: 20 U/L (ref 0–45)
BASO+EOS+MONOS # BLD AUTO: ABNORMAL 10*3/UL
BASO+EOS+MONOS NFR BLD AUTO: ABNORMAL %
BASOPHILS # BLD AUTO: ABNORMAL 10*3/UL
BASOPHILS # BLD MANUAL: 0 10E3/UL (ref 0–0.2)
BASOPHILS NFR BLD AUTO: ABNORMAL %
BASOPHILS NFR BLD MANUAL: 0 %
BILIRUB SERPL-MCNC: 0.2 MG/DL
BUN SERPL-MCNC: 10.7 MG/DL (ref 8–23)
CALCIUM SERPL-MCNC: 8.7 MG/DL (ref 8.8–10.2)
CHLORIDE SERPL-SCNC: 102 MMOL/L (ref 98–107)
CREAT SERPL-MCNC: 0.75 MG/DL (ref 0.67–1.17)
CULTURE HARVEST COMPLETE DATE: NORMAL
DACRYOCYTES BLD QL SMEAR: SLIGHT
DEPRECATED HCO3 PLAS-SCNC: 23 MMOL/L (ref 22–29)
EGFRCR SERPLBLD CKD-EPI 2021: >90 ML/MIN/1.73M2
EOSINOPHIL # BLD AUTO: ABNORMAL 10*3/UL
EOSINOPHIL # BLD MANUAL: 0 10E3/UL (ref 0–0.7)
EOSINOPHIL NFR BLD AUTO: ABNORMAL %
EOSINOPHIL NFR BLD MANUAL: 1 %
ERYTHROCYTE [DISTWIDTH] IN BLOOD BY AUTOMATED COUNT: 16.3 % (ref 10–15)
FIBRINOGEN PPP-MCNC: 385 MG/DL (ref 170–490)
GLUCOSE SERPL-MCNC: 87 MG/DL (ref 70–99)
HCT VFR BLD AUTO: 29.1 % (ref 40–53)
HGB BLD-MCNC: 9.3 G/DL (ref 13.3–17.7)
IMM GRANULOCYTES # BLD: ABNORMAL 10*3/UL
IMM GRANULOCYTES NFR BLD: ABNORMAL %
INR PPP: 1.13 (ref 0.85–1.15)
INTERPRETATION: NORMAL
LDH SERPL L TO P-CCNC: 209 U/L (ref 0–250)
LYMPHOCYTES # BLD AUTO: ABNORMAL 10*3/UL
LYMPHOCYTES # BLD MANUAL: 1.1 10E3/UL (ref 0.8–5.3)
LYMPHOCYTES NFR BLD AUTO: ABNORMAL %
LYMPHOCYTES NFR BLD MANUAL: 25 %
MAGNESIUM SERPL-MCNC: 1.9 MG/DL (ref 1.7–2.3)
MCH RBC QN AUTO: 29.5 PG (ref 26.5–33)
MCHC RBC AUTO-ENTMCNC: 32 G/DL (ref 31.5–36.5)
MCV RBC AUTO: 92 FL (ref 78–100)
MONOCYTES # BLD AUTO: ABNORMAL 10*3/UL
MONOCYTES # BLD MANUAL: 1.5 10E3/UL (ref 0–1.3)
MONOCYTES NFR BLD AUTO: ABNORMAL %
MONOCYTES NFR BLD MANUAL: 33 %
NEUTROPHILS # BLD AUTO: ABNORMAL 10*3/UL
NEUTROPHILS # BLD MANUAL: 1.8 10E3/UL (ref 1.6–8.3)
NEUTROPHILS NFR BLD AUTO: ABNORMAL %
NEUTROPHILS NFR BLD MANUAL: 41 %
NRBC # BLD AUTO: 0 10E3/UL
NRBC BLD AUTO-RTO: 0 /100
PHOSPHATE SERPL-MCNC: 3.2 MG/DL (ref 2.5–4.5)
PLAT MORPH BLD: ABNORMAL
PLATELET # BLD AUTO: 243 10E3/UL (ref 150–450)
POLYCHROMASIA BLD QL SMEAR: SLIGHT
POTASSIUM SERPL-SCNC: 3.9 MMOL/L (ref 3.4–5.3)
PROT SERPL-MCNC: 6.9 G/DL (ref 6.4–8.3)
RBC # BLD AUTO: 3.15 10E6/UL (ref 4.4–5.9)
RBC MORPH BLD: ABNORMAL
SODIUM SERPL-SCNC: 137 MMOL/L (ref 135–145)
SPECIMEN EXPIRATION DATE: NORMAL
SPECIMEN EXPIRATION DATE: NORMAL
URATE SERPL-MCNC: 3.8 MG/DL (ref 3.4–7)
WBC # BLD AUTO: 4.4 10E3/UL (ref 4–11)

## 2023-10-13 PROCEDURE — 250N000009 HC RX 250: Performed by: STUDENT IN AN ORGANIZED HEALTH CARE EDUCATION/TRAINING PROGRAM

## 2023-10-13 PROCEDURE — 84550 ASSAY OF BLOOD/URIC ACID: CPT | Performed by: PHYSICIAN ASSISTANT

## 2023-10-13 PROCEDURE — 99223 1ST HOSP IP/OBS HIGH 75: CPT | Mod: AI | Performed by: STUDENT IN AN ORGANIZED HEALTH CARE EDUCATION/TRAINING PROGRAM

## 2023-10-13 PROCEDURE — 258N000003 HC RX IP 258 OP 636: Performed by: STUDENT IN AN ORGANIZED HEALTH CARE EDUCATION/TRAINING PROGRAM

## 2023-10-13 PROCEDURE — 80053 COMPREHEN METABOLIC PANEL: CPT | Performed by: PHYSICIAN ASSISTANT

## 2023-10-13 PROCEDURE — 36569 INSJ PICC 5 YR+ W/O IMAGING: CPT

## 2023-10-13 PROCEDURE — 85610 PROTHROMBIN TIME: CPT | Performed by: PHYSICIAN ASSISTANT

## 2023-10-13 PROCEDURE — 3E04305 INTRODUCTION OF OTHER ANTINEOPLASTIC INTO CENTRAL VEIN, PERCUTANEOUS APPROACH: ICD-10-PCS | Performed by: STUDENT IN AN ORGANIZED HEALTH CARE EDUCATION/TRAINING PROGRAM

## 2023-10-13 PROCEDURE — 99418 PROLNG IP/OBS E/M EA 15 MIN: CPT | Performed by: STUDENT IN AN ORGANIZED HEALTH CARE EDUCATION/TRAINING PROGRAM

## 2023-10-13 PROCEDURE — 85384 FIBRINOGEN ACTIVITY: CPT | Performed by: PHYSICIAN ASSISTANT

## 2023-10-13 PROCEDURE — 83735 ASSAY OF MAGNESIUM: CPT | Performed by: PHYSICIAN ASSISTANT

## 2023-10-13 PROCEDURE — 85027 COMPLETE CBC AUTOMATED: CPT | Performed by: PHYSICIAN ASSISTANT

## 2023-10-13 PROCEDURE — 272N000451 HC KIT SHRLOCK 5FR POWER PICC DOUBLE LUMEN

## 2023-10-13 PROCEDURE — 250N000011 HC RX IP 250 OP 636: Mod: JZ | Performed by: STUDENT IN AN ORGANIZED HEALTH CARE EDUCATION/TRAINING PROGRAM

## 2023-10-13 PROCEDURE — 70486 CT MAXILLOFACIAL W/O DYE: CPT

## 2023-10-13 PROCEDURE — 250N000011 HC RX IP 250 OP 636: Mod: JZ | Performed by: PHYSICIAN ASSISTANT

## 2023-10-13 PROCEDURE — 84100 ASSAY OF PHOSPHORUS: CPT | Performed by: PHYSICIAN ASSISTANT

## 2023-10-13 PROCEDURE — 70486 CT MAXILLOFACIAL W/O DYE: CPT | Mod: 26 | Performed by: RADIOLOGY

## 2023-10-13 PROCEDURE — 250N000013 HC RX MED GY IP 250 OP 250 PS 637: Performed by: STUDENT IN AN ORGANIZED HEALTH CARE EDUCATION/TRAINING PROGRAM

## 2023-10-13 PROCEDURE — 250N000012 HC RX MED GY IP 250 OP 636 PS 637: Performed by: STUDENT IN AN ORGANIZED HEALTH CARE EDUCATION/TRAINING PROGRAM

## 2023-10-13 PROCEDURE — 85730 THROMBOPLASTIN TIME PARTIAL: CPT | Performed by: PHYSICIAN ASSISTANT

## 2023-10-13 PROCEDURE — 85007 BL SMEAR W/DIFF WBC COUNT: CPT | Performed by: PHYSICIAN ASSISTANT

## 2023-10-13 PROCEDURE — 120N000002 HC R&B MED SURG/OB UMMC

## 2023-10-13 PROCEDURE — 250N000013 HC RX MED GY IP 250 OP 250 PS 637: Performed by: PHYSICIAN ASSISTANT

## 2023-10-13 PROCEDURE — 83615 LACTATE (LD) (LDH) ENZYME: CPT | Performed by: PHYSICIAN ASSISTANT

## 2023-10-13 PROCEDURE — 272N000019 HC KIT OPEN ENDED DOUBLE LUMEN

## 2023-10-13 PROCEDURE — 86901 BLOOD TYPING SEROLOGIC RH(D): CPT | Performed by: PHYSICIAN ASSISTANT

## 2023-10-13 PROCEDURE — 86850 RBC ANTIBODY SCREEN: CPT | Performed by: PHYSICIAN ASSISTANT

## 2023-10-13 RX ORDER — DEXTROSE MONOHYDRATE 50 MG/ML
10-20 INJECTION, SOLUTION INTRAVENOUS
Status: DISCONTINUED | OUTPATIENT
Start: 2023-10-13 | End: 2023-10-16 | Stop reason: HOSPADM

## 2023-10-13 RX ORDER — ALBUTEROL SULFATE 0.83 MG/ML
2.5 SOLUTION RESPIRATORY (INHALATION)
Status: DISCONTINUED | OUTPATIENT
Start: 2023-10-13 | End: 2023-10-16 | Stop reason: HOSPADM

## 2023-10-13 RX ORDER — PREDNISOLONE ACETATE 10 MG/ML
2 SUSPENSION/ DROPS OPHTHALMIC 4 TIMES DAILY
Qty: 20 ML | Refills: 0 | Status: DISCONTINUED | OUTPATIENT
Start: 2023-10-13 | End: 2023-10-16 | Stop reason: HOSPADM

## 2023-10-13 RX ORDER — ACETAMINOPHEN 325 MG/1
650 TABLET ORAL EVERY 4 HOURS PRN
Status: DISCONTINUED | OUTPATIENT
Start: 2023-10-13 | End: 2023-10-16 | Stop reason: HOSPADM

## 2023-10-13 RX ORDER — ACYCLOVIR 400 MG/1
400 TABLET ORAL 2 TIMES DAILY
Status: DISCONTINUED | OUTPATIENT
Start: 2023-10-13 | End: 2023-10-16 | Stop reason: HOSPADM

## 2023-10-13 RX ORDER — EPINEPHRINE 1 MG/ML
0.3 INJECTION, SOLUTION, CONCENTRATE INTRAVENOUS EVERY 5 MIN PRN
Status: DISCONTINUED | OUTPATIENT
Start: 2023-10-13 | End: 2023-10-16 | Stop reason: HOSPADM

## 2023-10-13 RX ORDER — METHYLPREDNISOLONE SODIUM SUCCINATE 125 MG/2ML
125 INJECTION, POWDER, LYOPHILIZED, FOR SOLUTION INTRAMUSCULAR; INTRAVENOUS
Status: DISCONTINUED | OUTPATIENT
Start: 2023-10-13 | End: 2023-10-16 | Stop reason: HOSPADM

## 2023-10-13 RX ORDER — ONDANSETRON 8 MG/1
8 TABLET, FILM COATED ORAL EVERY 12 HOURS
Qty: 10 TABLET | Refills: 0 | Status: DISCONTINUED | OUTPATIENT
Start: 2023-10-13 | End: 2023-10-16 | Stop reason: HOSPADM

## 2023-10-13 RX ORDER — PROCHLORPERAZINE MALEATE 5 MG
5-10 TABLET ORAL EVERY 6 HOURS PRN
Status: DISCONTINUED | OUTPATIENT
Start: 2023-10-13 | End: 2023-10-13

## 2023-10-13 RX ORDER — AMOXICILLIN 250 MG
2 CAPSULE ORAL 2 TIMES DAILY PRN
Status: DISCONTINUED | OUTPATIENT
Start: 2023-10-13 | End: 2023-10-16 | Stop reason: HOSPADM

## 2023-10-13 RX ORDER — DEXAMETHASONE 4 MG/1
4 TABLET ORAL EVERY 12 HOURS
Qty: 6 TABLET | Refills: 0 | Status: COMPLETED | OUTPATIENT
Start: 2023-10-13 | End: 2023-10-16

## 2023-10-13 RX ORDER — LORAZEPAM 2 MG/ML
.5-1 INJECTION INTRAMUSCULAR EVERY 6 HOURS PRN
Status: DISCONTINUED | OUTPATIENT
Start: 2023-10-13 | End: 2023-10-16 | Stop reason: HOSPADM

## 2023-10-13 RX ORDER — POLYETHYLENE GLYCOL 3350 17 G/17G
17 POWDER, FOR SOLUTION ORAL DAILY PRN
Status: DISCONTINUED | OUTPATIENT
Start: 2023-10-13 | End: 2023-10-13

## 2023-10-13 RX ORDER — ENOXAPARIN SODIUM 100 MG/ML
40 INJECTION SUBCUTANEOUS EVERY 24 HOURS
Status: DISCONTINUED | OUTPATIENT
Start: 2023-10-13 | End: 2023-10-16 | Stop reason: HOSPADM

## 2023-10-13 RX ORDER — HEPARIN SODIUM,PORCINE 10 UNIT/ML
5-20 VIAL (ML) INTRAVENOUS EVERY 24 HOURS
Status: DISCONTINUED | OUTPATIENT
Start: 2023-10-13 | End: 2023-10-16 | Stop reason: HOSPADM

## 2023-10-13 RX ORDER — CETIRIZINE HYDROCHLORIDE 10 MG/1
10 TABLET ORAL DAILY
Status: DISCONTINUED | OUTPATIENT
Start: 2023-10-13 | End: 2023-10-16 | Stop reason: HOSPADM

## 2023-10-13 RX ORDER — LORAZEPAM 0.5 MG/1
.5-1 TABLET ORAL EVERY 6 HOURS PRN
Status: DISCONTINUED | OUTPATIENT
Start: 2023-10-13 | End: 2023-10-16 | Stop reason: HOSPADM

## 2023-10-13 RX ORDER — MEPERIDINE HYDROCHLORIDE 25 MG/ML
25 INJECTION INTRAMUSCULAR; INTRAVENOUS; SUBCUTANEOUS EVERY 30 MIN PRN
Status: DISCONTINUED | OUTPATIENT
Start: 2023-10-13 | End: 2023-10-16 | Stop reason: HOSPADM

## 2023-10-13 RX ORDER — HEPARIN SODIUM,PORCINE 10 UNIT/ML
5-20 VIAL (ML) INTRAVENOUS
Status: DISCONTINUED | OUTPATIENT
Start: 2023-10-13 | End: 2023-10-16 | Stop reason: HOSPADM

## 2023-10-13 RX ORDER — DIPHENHYDRAMINE HYDROCHLORIDE 50 MG/ML
50 INJECTION INTRAMUSCULAR; INTRAVENOUS
Status: DISCONTINUED | OUTPATIENT
Start: 2023-10-13 | End: 2023-10-16 | Stop reason: HOSPADM

## 2023-10-13 RX ORDER — ONDANSETRON 4 MG/1
4 TABLET, FILM COATED ORAL EVERY 8 HOURS PRN
Status: DISCONTINUED | OUTPATIENT
Start: 2023-10-13 | End: 2023-10-13

## 2023-10-13 RX ORDER — AMOXICILLIN 250 MG
1 CAPSULE ORAL 2 TIMES DAILY PRN
Status: DISCONTINUED | OUTPATIENT
Start: 2023-10-13 | End: 2023-10-16 | Stop reason: HOSPADM

## 2023-10-13 RX ORDER — ONDANSETRON 4 MG/1
4 TABLET, ORALLY DISINTEGRATING ORAL EVERY 8 HOURS PRN
Status: DISCONTINUED | OUTPATIENT
Start: 2023-10-13 | End: 2023-10-13

## 2023-10-13 RX ORDER — ONDANSETRON 2 MG/ML
4 INJECTION INTRAMUSCULAR; INTRAVENOUS EVERY 8 HOURS PRN
Status: DISCONTINUED | OUTPATIENT
Start: 2023-10-13 | End: 2023-10-13

## 2023-10-13 RX ORDER — ALLOPURINOL 300 MG/1
300 TABLET ORAL DAILY
Status: DISCONTINUED | OUTPATIENT
Start: 2023-10-13 | End: 2023-10-16 | Stop reason: HOSPADM

## 2023-10-13 RX ORDER — PROCHLORPERAZINE MALEATE 5 MG
5 TABLET ORAL EVERY 6 HOURS PRN
Status: DISCONTINUED | OUTPATIENT
Start: 2023-10-13 | End: 2023-10-16 | Stop reason: HOSPADM

## 2023-10-13 RX ORDER — POLYETHYLENE GLYCOL 3350 17 G/17G
17 POWDER, FOR SOLUTION ORAL DAILY
Status: DISCONTINUED | OUTPATIENT
Start: 2023-10-14 | End: 2023-10-16 | Stop reason: HOSPADM

## 2023-10-13 RX ORDER — ALBUTEROL SULFATE 90 UG/1
1-2 AEROSOL, METERED RESPIRATORY (INHALATION)
Status: DISCONTINUED | OUTPATIENT
Start: 2023-10-13 | End: 2023-10-16 | Stop reason: HOSPADM

## 2023-10-13 RX ORDER — VORICONAZOLE 200 MG/1
200 TABLET, FILM COATED ORAL EVERY 12 HOURS
Status: DISCONTINUED | OUTPATIENT
Start: 2023-10-13 | End: 2023-10-16 | Stop reason: HOSPADM

## 2023-10-13 RX ADMIN — DEXAMETHASONE 4 MG: 4 TABLET ORAL at 15:43

## 2023-10-13 RX ADMIN — PREDNISOLONE ACETATE 2 DROP: 10 SUSPENSION/ DROPS OPHTHALMIC at 17:41

## 2023-10-13 RX ADMIN — ALLOPURINOL 300 MG: 300 TABLET ORAL at 13:45

## 2023-10-13 RX ADMIN — CETIRIZINE HYDROCHLORIDE 10 MG: 10 TABLET, FILM COATED ORAL at 15:43

## 2023-10-13 RX ADMIN — Medication 5 ML: at 20:13

## 2023-10-13 RX ADMIN — VORICONAZOLE 200 MG: 200 TABLET ORAL at 20:15

## 2023-10-13 RX ADMIN — CETIRIZINE HYDROCHLORIDE 10 MG: 10 TABLET, FILM COATED ORAL at 20:15

## 2023-10-13 RX ADMIN — CYTARABINE 3000 MG: 100 INJECTION, SOLUTION INTRATHECAL; INTRAVENOUS; SUBCUTANEOUS at 16:16

## 2023-10-13 RX ADMIN — ACYCLOVIR 400 MG: 400 TABLET ORAL at 20:15

## 2023-10-13 RX ADMIN — Medication 10 ML: at 15:43

## 2023-10-13 RX ADMIN — PREDNISOLONE ACETATE 2 DROP: 10 SUSPENSION/ DROPS OPHTHALMIC at 20:16

## 2023-10-13 RX ADMIN — ONDANSETRON 8 MG: 8 TABLET, FILM COATED ORAL at 15:43

## 2023-10-13 RX ADMIN — ENOXAPARIN SODIUM 40 MG: 40 INJECTION SUBCUTANEOUS at 20:15

## 2023-10-13 ASSESSMENT — ACTIVITIES OF DAILY LIVING (ADL)
ADLS_ACUITY_SCORE: 35
ADLS_ACUITY_SCORE: 20
ADLS_ACUITY_SCORE: 35
ADLS_ACUITY_SCORE: 20
ADLS_ACUITY_SCORE: 35
ADLS_ACUITY_SCORE: 35
ADLS_ACUITY_SCORE: 20

## 2023-10-13 NOTE — H&P
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 Started 7+3 induction C1D1=9/2/23, overall tolerated induction chemo okay.  Course had been complicated by neutropenic fevers, strep mitis bacteremia, and drug eruption rash (likely due to cephalosporins, +/- JONATHAN). He is admitted for scheduled chemotherapy HiDAC on 10/13/2023.     HEME  # AML, adverse risk  Follows with Dr. Garcia. Patient initially presented to [facility] in [place], [place] with 4-6 weeks of progressive bruising, weakness, fatigue, loss of appetite, and night sweats. He also noted a headache similar to his previous brain abscess (see more below). CBC notable for pancytopenia; WBC 2.7 (), Hgb 8.1, and plt 1k. He was transferred to [facility] and was found on peripheral flow w/ 11% circulating myeloid blasts. He was then transferred to [facility] for further work up and treatment of AML. Diagnostic BMBx performed 9/1/23 which showed AML with 30% blasts by morph and 47% by flow. P53 negative. FISH negative for MLLT10, NUP98, and KMT2A. Normal karyotype. NGS showed ASXL1, CEBPA, SRSF2, and STAG2 mutations. Started 7+3 (C1D1=9/2/23). Diagnostic LP done 9/8/23 due to presenting sx including headache, without evidence of CNS involvement. MRI brain 9/11 negative, only showing chronic changes from h/o brain abscess. Count recovery BMBx 10/9/23, without evidence of leukemia. Admitted for cycle 1 HiDAC consolidation with ultimate goal for BMT.   - PICC placed just prior to admission  - Baseline TTE showing normal LVEF of 55-60%. Baseline EKG showed NSR and QTc of 434.   - Viral serologies: HepB/C-, HIV-. HSV1/2+, EBV IgG+, CMV IgG+  - Of note, patient  developed a rash approximately 2 weeks after 7+3 induction chemotherapy.  He was seen by dermatology, and thought to be multifactorial to cephalosporins and considered a component of toxic erythema of chemotherapy (see more below).  Artie tells me the rash was minimally itchy, and otherwise was not bothersome- denied pain, blistering lesions, or open wounds.  He was treated with Kenalog topical ointment and with time, the rash improved.  At time of hospital admission (10/13), there is no evidence of erythematous or maculopapular rash.  Mild skin peeling seen to the bilateral palms and bilateral shins, which Artie reports is improving when compared with prior.  We discussed possible risk of developing rash/toxic erythema of chemotherapy given treatment plan including high-dose cytarabine and possible previous reaction, though it still remains somewhat unclear of etiology of previous rash.  I discussed the above with patient's primary oncologist, Dr. Garcia, and he agrees recommends to proceed with HiDAC as scheduled.  Discussed risks with patient and his wife Aundrea, who expressed understanding, and are agreeable to proceed with chemotherapy as recommended and prescribed by his outpatient oncologist. Consider dermatology consult if new or changing skin rashes present.     Treatment Plan: HiDAC (C1D1=10/13/2023)   - Cytarabine 1.5 g/m2 q12 hours x 6 doses  - Pre-meds: allopurinol 300 mg daily, zofran 8mg q12 hours x 10 doses, dexamethasone 4 mg q12 hours x 6 doses  - Supportive meds: prednisolone acetate ophthalmic drops, filgrastim daily starting D5 (to be requested outpatient)    # Risk for pancytopenia   No current cytopenias on admission. Anticipated cytopenias 2/2 chemotherapy and underlying disease.   - Transfuse irradiated products to maintain Hgb >7, Plt >10K  - Type & Screen MWF    ID  # Prophylaxis  Patient is not currently neutropenic.  - PTA Acyclovir 400 mg BID  - Hold antibiotic/antifungal ppx, start if  ANC is <1000  - PTA levofloxacin 500 mg daily  - PTA voriconazole 200 mg BID    # H/o morbiliform rash, improving  Noted during previous hospital admission (9/23/2023 AM).  Developed approximately 2 weeks after 7+3 induction chemotherapy and 2-3 days after cephalosporins. Presumed drug rash.  Evaluated by dermatology.  [place] most likely multifactorial and secondary to cephalosporins, considering a component of toxic erythema of chemotherapy (JONATHAN).  Oncology attending at the time did not feel JONATHAN/cytarabine as likely related given the presentation.  Rash significantly improved after discontinuation of cefepime which was added to allergy list.    - At time of hospital admission (10/13), there is no evidence of erythematous or maculopapular rash.  Mild skin peeling seen to the bilateral palms and bilateral shins, which Artie reports is improving when compared with prior.  We discussed possible risk of developing rash/toxic erythema of chemotherapy given treatment plan including high-dose cytarabine and possible previous reaction, though it still remains somewhat unclear of etiology of previous rash.  I discussed the above with patient's primary oncologist, Dr. Garcia, and he agrees recommends to proceed with HiDAC as scheduled.  Discussed risks with patient and his wife Aundrea, who expressed understanding, and are agreeable to proceed with chemotherapy as recommended and prescribed by his outpatient oncologist. Consider dermatology consult if new or changing skin rashes present.    # Dental pain  Patient noting left upper molar painful over the last 1 week prior to admission.  Been taking Tylenol with relief.  On exam, there is mild gingival edema and erythema surrounding tooth #16, with tenderness associated.  No loose or avulsed dentition.  No palpable fluctuance, evidence of periapical abscess.  - CT dental ordered  - Consider antibiotics and dental consult if concern for infection    Chronic/MISC  # HTN  Noted  "during prior hospitalization for brain abscess in 2021. Previously on lisinopril, which he is no longer taking.  - monitor during admission    # BPH  Notes difficulty with complete emptying of bladder. Previously on Flomax but states it did nothing, so he stopped it. He does note frequent urination at baseline, currently urination remains unchanged from baseline.  - Monitor clinically    FEN   - IVF per chemotherapy regimen, IVF bolus prn   - PRN lyte replacement per standing protocol  - Regular diet as tolerated     Lines/Drains: PICC  DVT ppx: Lovenox, hold if Plt <50K   Consults: TBD  DISPO: Anticipate 3-4 day stay for chemotherapy. Tentative discharge 10/16-10/17 pending completion of chemo and barring any clinical complications.   Follow-up/Referrals: Receives labs/transfusions locally in [place], [place] - will need to be requested/set for 2x weekly upon discharge; APPT18 requested for MARIEL visit in 1 week and MD appt 3 weeks from discharge.  Primary oncologist is Dr. Garcia    Clinically Significant Risk Factors Present on Admission                       # Overweight: Estimated body mass index is 28.47 kg/m  as calculated from the following:    Height as of this encounter: 1.695 m (5' 6.73\").    Weight as of this encounter: 81.8 kg (180 lb 4.8 oz).                Code Status   Full Code      Patient and plan of care was discussed with attending physician Dr. Velázquez.    >90 minutes spent on the date of the encounter. Over 50% of time was spent counseling the patient and/or coordinating care.     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 Started 7+3 induction C1D1=9/2/23, overall " "tolerated induction chemo okay.  Course had been complicated by neutropenic fevers, strep mitis bacteremia, and drug eruption rash (likely due to cephalosporins, +/- JONATHAN). He is admitted for scheduled chemotherapy HiDAC on 10/13/2023.     Artie tells me he is feeling \"the best I have felt since the spring.\"  He notes since hospital discharge, his strength is continued to improve.  He has been very pleased with his stay at the [facility] nearby.  He is feeling motivated to get chemotherapy and ultimately will go for transplant.  He notes over the last 1 week, he has had left upper tooth pain that is relieved with Tylenol.  He also notes seasonal allergies with a cough that is present only when he is outside.  No persisting cough, runny nose, sore throat, or URI symptoms.  He denies fevers, chills, headache, dizziness, vision changes, sore throat, runny nose, chest pain, shortness of breath, abdominal pain, nausea, vomiting, diarrhea, urinary symptoms, new skin rashes, numbness tingling or weakness.  He has been eating and drinking well prior to admission.  He notes in the last couple of days he has had mild constipation with \"harder than usual\" bowel movements, but is still having bowel movements regularly.  Will schedule MiraLAX while in the hospital.    We also discussed the skin rash she developed during recent hospitalization. He tells me the rash was minimally itchy, and otherwise was not bothersome- denied pain, blistering lesions, or open wounds.  He was treated with Kenalog topical ointment and with time, the rash improved. Today there is no evidence of erythematous or maculopapular rash.  Mild skin peeling seen to the bilateral palms and bilateral shins, which he reports is improving when compared with prior.  We discussed possible risk of developing rash/toxic erythema of chemotherapy given treatment plan including high-dose cytarabine and possible previous reaction, though it still remains somewhat unclear " of etiology of previous rash. Both patient and his wife Aundrea expressed understanding of these risks, and would like to proceed with chemotherapy as recommended and prescribed by his outpatient oncologist.     Past Medical History    I have reviewed this patient's medical history and updated it with pertinent information if needed.   No past medical history on file.    Past Surgical History   I have reviewed this patient's surgical history and updated it with pertinent information if needed.  Past Surgical History:   Procedure Laterality Date    PICC Right 09/02/2023    Placed R basilic 46 cm 1 external  without problem    PICC Right 10/13/2023    right basilic 5 fr dl power picc 40 cm       Prior to Admission Medications   Prior to Admission Medications   Prescriptions Last Dose Informant Patient Reported? Taking?   acyclovir (ZOVIRAX) 400 MG tablet   No No   Sig: Take 1 tablet (400 mg) by mouth 2 times daily   cetirizine (ZYRTEC) 10 MG tablet   Yes No   Sig: Take 1 tablet (10 mg) by mouth daily   levofloxacin (LEVAQUIN) 500 MG tablet   No No   Sig: Take 1 tablet (500 mg) by mouth daily   ondansetron (ZOFRAN) 4 MG tablet   No No   Sig: Take 1 tablet (4 mg) by mouth every 8 hours as needed for nausea or vomiting   prochlorperazine (COMPAZINE) 5 MG tablet   No No   Sig: Take 1 tablet (5 mg) by mouth every 6 hours as needed for nausea or vomiting   triamcinolone (KENALOG) 0.1 % external ointment   No No   Sig: Apply topically 2 times daily as needed for irritation (rash)   voriconazole (VFEND) 200 MG tablet   No No   Sig: Take 1 tablet (200 mg) by mouth every 12 hours      Facility-Administered Medications: None     Allergies   Allergies   Allergen Reactions    Iodinated Contrast Media Rash    Cefepime Rash    Ceftriaxone Rash     Reported history at Outside hospital 2021    [place] Other (See Comments)     Congestion        Social History   I have reviewed this patient's social history and updated it with pertinent  information if needed. Artie Fine      Family History   I have reviewed this patient's family history and updated it with pertinent information if needed.   No family history on file.    Review of Systems   The 10 point Review of Systems is negative other than noted in the HPI or here.       Physical Exam   Temp: 97.8  F (36.6  C) Temp src: Oral BP: 126/70 Pulse: 90   Resp: 16 SpO2: 98 % O2 Device: None (Room air)    Vital Signs with Ranges  Temp:  [97.8  F (36.6  C)-98.1  F (36.7  C)] 97.8  F (36.6  C)  Pulse:  [90-98] 90  Resp:  [16] 16  BP: (112-126)/(70-72) 126/70  SpO2:  [98 %] 98 %  180 lbs 4.8 oz    Constitutional: Awake and conversational. Non- toxic appearing. No acute distress. Well developed, hydrated, and nourished.   HEENT: NCAT. Sclerae anicteric. PERRLA. EOM intact. Moist mucus membranes without lesions, thrush, or exudates appreciated. Mild gingival erythema and edema surrounding tooth #16, mildly tender to palpation.  No palpable fluctuance or evidence of periapical abscess.  Floor mouth soft, handling secretions.  Remainder of dentition intact.  Lymph: Neck supple, no ridigity. No significant adenopathy noted.   Respiratory: Breathing comfortably on room air with no accessory muscle use. Speaking in full sentences, no evidence of respiratory distress. Lungs CTAB without stridor, wheeze, rhonchi, or rales.   Cardiovascular: Regular rate and rhythm. 2+ radial pulses bilaterally. No peripheral edema.    GI: Abdomen with normoactive bowel sounds, soft and non-tender throughout. No rebound, guarding, or peritoneal sign.   Skin: Skin is clean, dry, intact.  Peeling noted to bilateral palms of hands, and minimally over anterior bilateral legs.  No evidence of maculopapular erythematous rash on exposed surfaces.    Neurologic: alert and oriented with normal speech. Grossly nonfocal. Memory and thought process preserved. Motor function normal with 5/5 muscle strength bilaterally to UE and LE. Sensation  intact to light touch bilaterally.   Neuropsychiatric: Calm, affect congruent to situation. Appropriate mood and affect.  Data   Results for orders placed or performed during the hospital encounter of 10/13/23 (from the past 24 hour(s))   Double Lumen PICC Placement    Narrative    Amy Pearce RN     10/13/2023 10:53 AM  [facility]    Double Lumen PICC Placement    Date/Time: 10/13/2023 10:48 AM    Performed by: Amy Pearce RN  Authorized by: Maldonado Velázquez MD  Indications: vascular access      UNIVERSAL PROTOCOL   Site Marked: Yes  Prior Images Obtained and Reviewed:  Yes  Required items: Required blood products, implants, devices and special   equipment available    Patient identity confirmed:  Verbally with patient and arm band  NA - No sedation, light sedation, or local anesthesia  Confirmation Checklist:  Patient's identity using two indicators and   relevant allergies  Time out: Immediately prior to the procedure a time out was called    Universal Protocol: the Joint Commission Universal Protocol was followed    Preparation: Patient was prepped and draped in usual sterile fashion       ANESTHESIA    Anesthesia:  Local infiltration  Local Anesthetic:  Lidocaine 1% without epinephrine  Anesthetic Total (mL):  5      SEDATION    Patient Sedated: No    Vital signs: Vital signs monitored during sedation        Preparation: skin prepped with ChloraPrep  Skin prep agent: skin prep agent completely dried prior to procedure  Sterile barriers: maximum sterile barriers were used: cap, mask, sterile   gown, sterile gloves, and large sterile sheet  Hand hygiene: hand hygiene performed prior to central venous catheter   insertion  Type of line used: PICC  Catheter type: double lumen  Lumen type: non-valved and power PICC  Lumen Identification: Purple and Red  Catheter size: 5 Fr  Brand: Bard  Lot number: [id number]  Placement method:  venipuncture, MST, ultrasound and tip navigation system  Number of attempts: 1  Difficulty threading catheter: no  Successful placement: yes  Orientation: right  Catheter to Vein (%): 38  Location: basilic vein (vd 0.48 cm)  Tip Location: SVC  Arm circumference: adults 10 cm  Extremity circumference: 30  Visible catheter length: 1  Total catheter length: 40  Dressing and securement: adhesive securement device, alcohol impregnated   caps, chlorhexidine disc applied, gloves changed prior to final dressing,   securement device, site cleansed and statlock  Post procedure assessment: blood return through all ports, placement   verified by 3CG technology and free fluid flow  PROCEDURE   Patient Tolerance:  Patient tolerated the procedure well with no immediate   complicationsDescribe Procedure: PICC ok to use  Disposal: sharps and needle count correct at the end of procedure, needles   and guidewire disposed in sharps container   CBC with platelets differential    Narrative    The following orders were created for panel order CBC with platelets differential.  Procedure                               Abnormality         Status                     ---------                               -----------         ------                     CBC with platelets and d...[[id number]]  Abnormal            Final result               Manual Differential[[id number]]          Abnormal            Final result                 Please view results for these tests on the individual orders.   Comprehensive metabolic panel   Result Value Ref Range    Sodium 137 135 - 145 mmol/L    Potassium 3.9 3.4 - 5.3 mmol/L    Carbon Dioxide (CO2) 23 22 - 29 mmol/L    Anion Gap 12 7 - 15 mmol/L    Urea Nitrogen 10.7 8.0 - 23.0 mg/dL    Creatinine 0.75 0.67 - 1.17 mg/dL    GFR Estimate >90 >60 mL/min/1.73m2    Calcium 8.7 (L) 8.8 - 10.2 mg/dL    Chloride 102 98 - 107 mmol/L    Glucose 87 70 - 99 mg/dL    Alkaline Phosphatase 101 40 - 129 U/L    AST 20 0 - 45 U/L     ALT 20 0 - 70 U/L    Protein Total 6.9 6.4 - 8.3 g/dL    Albumin 3.8 3.5 - 5.2 g/dL    Bilirubin Total 0.2 <=1.2 mg/dL   Magnesium   Result Value Ref Range    Magnesium 1.9 1.7 - 2.3 mg/dL   Phosphorus   Result Value Ref Range    Phosphorus 3.2 2.5 - 4.5 mg/dL   ABO/RH Type and Screen     Narrative    The following orders were created for panel order ABO/RH Type and Screen .  Procedure                               Abnormality         Status                     ---------                               -----------         ------                     Adult Type and Screen[[id number]]                            Final result                 Please view results for these tests on the individual orders.   Uric acid   Result Value Ref Range    Uric Acid 3.8 3.4 - 7.0 mg/dL   Lactate Dehydrogenase   Result Value Ref Range    Lactate Dehydrogenase 209 0 - 250 U/L   INR   Result Value Ref Range    INR 1.13 0.85 - 1.15   Fibrinogen activity   Result Value Ref Range    Fibrinogen Activity 385 170 - 490 mg/dL   Partial thromboplastin time   Result Value Ref Range    aPTT 23 22 - 38 Seconds   CBC with platelets and differential   Result Value Ref Range    WBC Count 4.4 4.0 - 11.0 10e3/uL    RBC Count 3.15 (L) 4.40 - 5.90 10e6/uL    Hemoglobin 9.3 (L) 13.3 - 17.7 g/dL    Hematocrit 29.1 (L) 40.0 - 53.0 %    MCV 92 78 - 100 fL    MCH 29.5 26.5 - 33.0 pg    MCHC 32.0 31.5 - 36.5 g/dL    RDW 16.3 (H) 10.0 - 15.0 %    Platelet Count 243 150 - 450 10e3/uL    % Neutrophils      % Lymphocytes      % Monocytes      Mids % (Monos, Eos, Basos)      % Eosinophils      % Basophils      % Immature Granulocytes      NRBCs per 100 WBC 0 <1 /100    Absolute Neutrophils      Absolute Lymphocytes      Absolute Monocytes      Mids Abs (Monos, Eos, Basos)      Absolute Eosinophils      Absolute Basophils      Absolute Immature Granulocytes      Absolute NRBCs 0.0 10e3/uL   Adult Type and Screen   Result Value Ref Range    Antibody Screen Negative  Negative    SPECIMEN EXPIRATION DATE [id number]    Manual Differential   Result Value Ref Range    % Neutrophils 41 %    % Lymphocytes 25 %    % Monocytes 33 %    % Eosinophils 1 %    % Basophils 0 %    Absolute Neutrophils 1.8 1.6 - 8.3 10e3/uL    Absolute Lymphocytes 1.1 0.8 - 5.3 10e3/uL    Absolute Monocytes 1.5 (H) 0.0 - 1.3 10e3/uL    Absolute Eosinophils 0.0 0.0 - 0.7 10e3/uL    Absolute Basophils 0.0 0.0 - 0.2 10e3/uL    RBC Morphology Confirmed RBC Indices     Platelet Assessment  Automated Count Confirmed. Platelet morphology is normal.     Automated Count Confirmed. Platelet morphology is normal.    Polychromasia Slight (A) None Seen    Teardrop Cells Slight (A) None Seen   ABO/RH Type & Screen   Result Value Ref Range    SPECIMEN EXPIRATION DATE [id number]     ABORH A POS    CT Dental wo Contrast    Narrative    EXAM: CT DENTAL WO CONTRAST 10/13/2023 2:46 PM    HISTORY:  67 yo male with left upper dental pain    COMPARISON: None available.    TECHNIQUE: 3-D reconstruction by the technologists, with curved  multiplanar reformat of thin section imaging through the mandible and  maxilla obtained without intravenous contrast.    FINDINGS:  Dental hardware including multiple root canals, crowns and fillings.  Associated artifact, primarily involving the bilateral maxillary and  mandibular molars. Mild periapical lucency surrounding the roots of  the first right maxillary molar.    Mild mucosal thickening along the floor of the maxillary sinuses  bilaterally.    No significant soft tissue swelling or mass. Normal facial bone  alignment. No bony erosion. Normal temporomandibular joints. Partially  evaluated right craniotomy changes.      Impression    IMPRESSION:   Periapical disease of the right first maxillary molar.    I have personally reviewed the examination and initial interpretation  and I agree with the findings.    ANGELLA MOTTA MD         SYSTEM ID:  [id number]

## 2023-10-13 NOTE — PROGRESS NOTES

## 2023-10-13 NOTE — PLAN OF CARE
"Goal Outcome Evaluation:        /70 (BP Location: Left arm)   Pulse 90   Temp 97.8  F (36.6  C) (Oral)   Resp 16   Ht 1.695 m (5' 6.73\")   Wt 81.8 kg (180 lb 4.8 oz)   SpO2 98%   BMI 28.47 kg/m      Reason for admission: Consolidation chemo with HiDAC  Activity: UAL  Pain: denies  Neuro: WNL  Cardiac: WNL  Respiratory: WNL  GI/: WNL  Diet: Regular  Lines: new DL PICC  Wounds: n/a  Labs/imaging: see chart      New changes this shift: Artie was admitted this shift for scheduled chemotherapy.  He had a dbl lumen PICC placed and chemo was started.  Cerebellar assessment intact.     Plan: Continue to administer chemotherapy on schedule and provide supportive cares.      Continue to monitor and follow POC    "

## 2023-10-13 NOTE — PROCEDURES
[facility]    Double Lumen PICC Placement    Date/Time: 10/13/2023 10:48 AM    Performed by: Amy Pearce RN  Authorized by: Maldonado Velázquez MD  Indications: vascular access      UNIVERSAL PROTOCOL   Site Marked: Yes  Prior Images Obtained and Reviewed:  Yes  Required items: Required blood products, implants, devices and special equipment available    Patient identity confirmed:  Verbally with patient and arm band  NA - No sedation, light sedation, or local anesthesia  Confirmation Checklist:  Patient's identity using two indicators and relevant allergies  Time out: Immediately prior to the procedure a time out was called    Universal Protocol: the Joint Commission Universal Protocol was followed    Preparation: Patient was prepped and draped in usual sterile fashion       ANESTHESIA    Anesthesia:  Local infiltration  Local Anesthetic:  Lidocaine 1% without epinephrine  Anesthetic Total (mL):  5      SEDATION    Patient Sedated: No    Vital signs: Vital signs monitored during sedation        Preparation: skin prepped with ChloraPrep  Skin prep agent: skin prep agent completely dried prior to procedure  Sterile barriers: maximum sterile barriers were used: cap, mask, sterile gown, sterile gloves, and large sterile sheet  Hand hygiene: hand hygiene performed prior to central venous catheter insertion  Type of line used: PICC  Catheter type: double lumen  Lumen type: non-valved and power PICC  Lumen Identification: Purple and Red  Catheter size: 5 Fr  Brand: Bard  Lot number: [id number]  Placement method: venipuncture, MST, ultrasound and tip navigation system  Number of attempts: 1  Difficulty threading catheter: no  Successful placement: yes  Orientation: right  Catheter to Vein (%): 38  Location: basilic vein (vd 0.48 cm)  Tip Location: SVC  Arm circumference: adults 10 cm  Extremity circumference: 30  Visible catheter length: 1  Total catheter length:  40  Dressing and securement: adhesive securement device, alcohol impregnated caps, chlorhexidine disc applied, gloves changed prior to final dressing, securement device, site cleansed and statlock  Post procedure assessment: blood return through all ports, placement verified by 3CG technology and free fluid flow  PROCEDURE   Patient Tolerance:  Patient tolerated the procedure well with no immediate complicationsDescribe Procedure: PICC ok to use  Disposal: sharps and needle count correct at the end of procedure, needles and guidewire disposed in sharps container

## 2023-10-13 NOTE — LETTER
Artie Fine MRN# [id number]   YOB: 1955 Age: 68 year old     Date of Admission:  10/13/23  Date of Discharge:  10/16/2023  Admitting Physician:  Maldonado Velázquez MD  Discharging Physician: Maldonado Velázquez MD   Discharging Service:  Hematology / Oncology  Hospitalization Status: Inpatient     Primary Care Clinic:  [facility]  Primary Care Provider: Feroz Barger     Dear Dr. Barger:            You have been identified as the Primary Care Provider for Artie Fine, who was recently admitted to the [facility].  Thank you for the referral to our hospital.  It is our goal to provide the highest quality of care for our patients, including planning for seamless continuity of care by providing you with timely, accurate and concise information.  After reviewing the following combined discharge summary and final progress note, please contact us if you have any remaining questions.  The Discharging Physician will be the best informed, with their contact information listed above.  If unable to reach them, or if you have received this letter in error, please call [phone] and someone will try to help you.

## 2023-10-14 LAB
ALBUMIN SERPL BCG-MCNC: 3.9 G/DL (ref 3.5–5.2)
ALP SERPL-CCNC: 106 U/L (ref 40–129)
ALT SERPL W P-5'-P-CCNC: 20 U/L (ref 0–70)
ANION GAP SERPL CALCULATED.3IONS-SCNC: 13 MMOL/L (ref 7–15)
AST SERPL W P-5'-P-CCNC: 20 U/L (ref 0–45)
BASO+EOS+MONOS # BLD AUTO: ABNORMAL 10*3/UL
BASO+EOS+MONOS NFR BLD AUTO: ABNORMAL %
BASOPHILS # BLD AUTO: 0 10E3/UL (ref 0–0.2)
BASOPHILS NFR BLD AUTO: 0 %
BILIRUB SERPL-MCNC: 0.2 MG/DL
BUN SERPL-MCNC: 15.2 MG/DL (ref 8–23)
CALCIUM SERPL-MCNC: 9.4 MG/DL (ref 8.8–10.2)
CHLORIDE SERPL-SCNC: 102 MMOL/L (ref 98–107)
CREAT SERPL-MCNC: 0.69 MG/DL (ref 0.67–1.17)
DEPRECATED HCO3 PLAS-SCNC: 23 MMOL/L (ref 22–29)
EGFRCR SERPLBLD CKD-EPI 2021: >90 ML/MIN/1.73M2
EOSINOPHIL # BLD AUTO: 0 10E3/UL (ref 0–0.7)
EOSINOPHIL NFR BLD AUTO: 0 %
ERYTHROCYTE [DISTWIDTH] IN BLOOD BY AUTOMATED COUNT: 16.4 % (ref 10–15)
FIBRINOGEN PPP-MCNC: 377 MG/DL (ref 170–490)
GLUCOSE SERPL-MCNC: 136 MG/DL (ref 70–99)
HCT VFR BLD AUTO: 30.8 % (ref 40–53)
HGB BLD-MCNC: 10 G/DL (ref 13.3–17.7)
IMM GRANULOCYTES # BLD: 0 10E3/UL
IMM GRANULOCYTES NFR BLD: 1 %
INR PPP: 1.04 (ref 0.85–1.15)
LYMPHOCYTES # BLD AUTO: 0.5 10E3/UL (ref 0.8–5.3)
LYMPHOCYTES NFR BLD AUTO: 9 %
MAGNESIUM SERPL-MCNC: 2.1 MG/DL (ref 1.7–2.3)
MCH RBC QN AUTO: 29.8 PG (ref 26.5–33)
MCHC RBC AUTO-ENTMCNC: 32.5 G/DL (ref 31.5–36.5)
MCV RBC AUTO: 92 FL (ref 78–100)
MONOCYTES # BLD AUTO: 0.6 10E3/UL (ref 0–1.3)
MONOCYTES NFR BLD AUTO: 12 %
NEUTROPHILS # BLD AUTO: 3.9 10E3/UL (ref 1.6–8.3)
NEUTROPHILS NFR BLD AUTO: 78 %
NRBC # BLD AUTO: 0 10E3/UL
NRBC BLD AUTO-RTO: 0 /100
PHOSPHATE SERPL-MCNC: 4 MG/DL (ref 2.5–4.5)
PLATELET # BLD AUTO: 291 10E3/UL (ref 150–450)
POTASSIUM SERPL-SCNC: 4.3 MMOL/L (ref 3.4–5.3)
PROT SERPL-MCNC: 7.2 G/DL (ref 6.4–8.3)
RBC # BLD AUTO: 3.36 10E6/UL (ref 4.4–5.9)
SODIUM SERPL-SCNC: 138 MMOL/L (ref 135–145)
URATE SERPL-MCNC: 3.4 MG/DL (ref 3.4–7)
WBC # BLD AUTO: 5 10E3/UL (ref 4–11)

## 2023-10-14 PROCEDURE — 80053 COMPREHEN METABOLIC PANEL: CPT | Performed by: PHYSICIAN ASSISTANT

## 2023-10-14 PROCEDURE — 120N000002 HC R&B MED SURG/OB UMMC

## 2023-10-14 PROCEDURE — 84550 ASSAY OF BLOOD/URIC ACID: CPT | Performed by: PHYSICIAN ASSISTANT

## 2023-10-14 PROCEDURE — 250N000012 HC RX MED GY IP 250 OP 636 PS 637: Performed by: STUDENT IN AN ORGANIZED HEALTH CARE EDUCATION/TRAINING PROGRAM

## 2023-10-14 PROCEDURE — 83735 ASSAY OF MAGNESIUM: CPT | Performed by: PHYSICIAN ASSISTANT

## 2023-10-14 PROCEDURE — 250N000013 HC RX MED GY IP 250 OP 250 PS 637: Performed by: STUDENT IN AN ORGANIZED HEALTH CARE EDUCATION/TRAINING PROGRAM

## 2023-10-14 PROCEDURE — 250N000011 HC RX IP 250 OP 636: Mod: JZ | Performed by: STUDENT IN AN ORGANIZED HEALTH CARE EDUCATION/TRAINING PROGRAM

## 2023-10-14 PROCEDURE — 999N000147 HC STATISTIC PT IP EVAL DEFER

## 2023-10-14 PROCEDURE — 85025 COMPLETE CBC W/AUTO DIFF WBC: CPT | Performed by: PHYSICIAN ASSISTANT

## 2023-10-14 PROCEDURE — 85384 FIBRINOGEN ACTIVITY: CPT | Performed by: PHYSICIAN ASSISTANT

## 2023-10-14 PROCEDURE — 250N000011 HC RX IP 250 OP 636: Mod: JZ | Performed by: PHYSICIAN ASSISTANT

## 2023-10-14 PROCEDURE — 258N000003 HC RX IP 258 OP 636: Performed by: STUDENT IN AN ORGANIZED HEALTH CARE EDUCATION/TRAINING PROGRAM

## 2023-10-14 PROCEDURE — 85610 PROTHROMBIN TIME: CPT | Performed by: PHYSICIAN ASSISTANT

## 2023-10-14 PROCEDURE — 84100 ASSAY OF PHOSPHORUS: CPT | Performed by: PHYSICIAN ASSISTANT

## 2023-10-14 PROCEDURE — 99232 SBSQ HOSP IP/OBS MODERATE 35: CPT | Mod: GC | Performed by: STUDENT IN AN ORGANIZED HEALTH CARE EDUCATION/TRAINING PROGRAM

## 2023-10-14 PROCEDURE — 250N000009 HC RX 250: Performed by: STUDENT IN AN ORGANIZED HEALTH CARE EDUCATION/TRAINING PROGRAM

## 2023-10-14 PROCEDURE — 250N000013 HC RX MED GY IP 250 OP 250 PS 637: Performed by: PHYSICIAN ASSISTANT

## 2023-10-14 RX ORDER — ACETAMINOPHEN 325 MG/1
650 TABLET ORAL EVERY 6 HOURS PRN
COMMUNITY

## 2023-10-14 RX ADMIN — Medication 5 ML: at 06:39

## 2023-10-14 RX ADMIN — ONDANSETRON 8 MG: 8 TABLET, FILM COATED ORAL at 15:39

## 2023-10-14 RX ADMIN — Medication 5 ML: at 08:54

## 2023-10-14 RX ADMIN — CYTARABINE 3000 MG: 100 INJECTION, SOLUTION INTRATHECAL; INTRAVENOUS; SUBCUTANEOUS at 16:05

## 2023-10-14 RX ADMIN — VORICONAZOLE 200 MG: 200 TABLET ORAL at 20:06

## 2023-10-14 RX ADMIN — PREDNISOLONE ACETATE 2 DROP: 10 SUSPENSION/ DROPS OPHTHALMIC at 20:07

## 2023-10-14 RX ADMIN — ONDANSETRON 8 MG: 8 TABLET, FILM COATED ORAL at 03:43

## 2023-10-14 RX ADMIN — DEXAMETHASONE 4 MG: 4 TABLET ORAL at 03:43

## 2023-10-14 RX ADMIN — VORICONAZOLE 200 MG: 200 TABLET ORAL at 08:02

## 2023-10-14 RX ADMIN — CYTARABINE 3000 MG: 100 INJECTION, SOLUTION INTRATHECAL; INTRAVENOUS; SUBCUTANEOUS at 04:39

## 2023-10-14 RX ADMIN — ALLOPURINOL 300 MG: 300 TABLET ORAL at 08:02

## 2023-10-14 RX ADMIN — POLYETHYLENE GLYCOL 3350 17 G: 17 POWDER, FOR SOLUTION ORAL at 08:03

## 2023-10-14 RX ADMIN — CETIRIZINE HYDROCHLORIDE 10 MG: 10 TABLET, FILM COATED ORAL at 20:06

## 2023-10-14 RX ADMIN — PREDNISOLONE ACETATE 2 DROP: 10 SUSPENSION/ DROPS OPHTHALMIC at 16:07

## 2023-10-14 RX ADMIN — ACYCLOVIR 400 MG: 400 TABLET ORAL at 20:07

## 2023-10-14 RX ADMIN — ENOXAPARIN SODIUM 40 MG: 40 INJECTION SUBCUTANEOUS at 20:06

## 2023-10-14 RX ADMIN — DEXAMETHASONE 4 MG: 4 TABLET ORAL at 15:39

## 2023-10-14 RX ADMIN — ACYCLOVIR 400 MG: 400 TABLET ORAL at 08:02

## 2023-10-14 RX ADMIN — PREDNISOLONE ACETATE 2 DROP: 10 SUSPENSION/ DROPS OPHTHALMIC at 13:28

## 2023-10-14 ASSESSMENT — ACTIVITIES OF DAILY LIVING (ADL)
ADLS_ACUITY_SCORE: 20

## 2023-10-14 NOTE — PROGRESS NOTES
Care Management Follow Up    Length of Stay (days): 1    Expected Discharge Date: 10/15/2023     Concerns to be Addressed:  Health plan benefit and Lab draws   Patient plan of care discussed at interdisciplinary rounds: Yes    Anticipated Discharge Disposition:  Home     Anticipated Discharge Services:  Clinic schedules   Anticipated Discharge DME:  N/A    Patient/family educated on Medicare website which has current facility and service quality ratings: N/A   Education Provided on the Discharge Plan: Yes   Patient/Family in Agreement with the Plan: Yes     Referrals Placed by CM/SW:  No  Private pay costs discussed: Not applicable    Additional Information:  CM was consulted due to pt's concern about his Medicare Part D Plan. CM provided education and resources on Health Care Choices to guide pt towards making informed decisions about his health plan.     Pt/spouse requested that pt's 2x/week lab draws be scheduled with primary care clinic close to home as oppose [place] [facility] [place] [facility]. Pt/family expressed the need to spend some time at their own home before his next [place] of chemo. Hematology/Oncology team paged. No further concern at this time.    Arianna Mckeon, 5A [facility]  P:   Pager: [phone]  F: [phone]  Weekend Pager: [phone]  Weekend Coverage: 5A; 5B; 5C

## 2023-10-14 NOTE — PHARMACY-ADMISSION MEDICATION HISTORY
Pharmacy Intern Admission Medication History    Admission medication history is complete. The information provided in this note is only as accurate as the sources available at the time of the update.    Information Source(s): Patient, Family member, and CareEverywhere/SureScripts via in-person    Pertinent Information: Patient and spouse were accurate historians of the patient's medications. Per patient's spouse, the patient has ondansetron and prochlorperazine at home but has never had to use either of them. The patient stated that his rash cleared up last weekend so he is not using his triamcinolone ointment anymore.      Changes made to PTA medication list:  Added:   acetaminophen (TYLENOL) 325 MG tablet  Deleted: None  Changed: None      Allergies reviewed with patient and updates made in EHR: yes    Medication History Completed By: Bebo Stevens 10/14/2023 4:51 PM    Prior to Admission medications    Medication Sig Last Dose Taking? Auth Provider Long Term End Date   acetaminophen (TYLENOL) 325 MG tablet Take 650 mg by mouth every 6 hours as needed for mild pain Past Week Yes Unknown, Entered By History No    acyclovir (ZOVIRAX) 400 MG tablet Take 1 tablet (400 mg) by mouth 2 times daily 10/12/2023 Yes Yolis Sherman PA-C Yes    cetirizine (ZYRTEC) 10 MG tablet Take 1 tablet (10 mg) by mouth daily 10/12/2023 Yes Yolis Sherman PA-C     levofloxacin (LEVAQUIN) 500 MG tablet Take 1 tablet (500 mg) by mouth daily 10/12/2023 Yes Yolis Sherman PA-C     triamcinolone (KENALOG) 0.1 % external ointment Apply topically 2 times daily as needed for irritation (rash) Past Month Yes Yolis Sherman PA-C     voriconazole (VFEND) 200 MG tablet Take 1 tablet (200 mg) by mouth every 12 hours 10/12/2023 Yes Yolis Sherman PA-C     ondansetron (ZOFRAN) 4 MG tablet Take 1 tablet (4 mg) by mouth every 8 hours as needed for nausea or vomiting  Patient not taking: Reported on 10/14/2023 Not Taking   Yolis Sherman PA-C     prochlorperazine (COMPAZINE) 5 MG tablet Take 1 tablet (5 mg) by mouth every 6 hours as needed for nausea or vomiting  Patient not taking: Reported on 10/14/2023 Not Taking  Yolis Sherman PA-C

## 2023-10-14 NOTE — PROGRESS NOTES
Nursing Focus: Chemotherapy  D: Positive blood return via PICC. Insertion site is clean/dry/intact, dressing intact with no complaints of pain.  Urine output is recorded in intake in Doc Flowsheet.    I: Premedications given per order (see electronic medical administration record). Dose #3 of cytarabine started to infuse over 3 hours. Reviewed pt teaching on chemotherapy side effects.  Pt denies need for further teaching. Chemotherapy double checked per protocol by two chemotherapy competent RN's.   A: Tolerating procedure well. Denies nausea and or pain.   P: Continue to monitor urine output and symptoms of nausea. Screen for symptoms of toxicity.

## 2023-10-14 NOTE — PLAN OF CARE
"Goal Outcome Evaluation:    0700-1930    /69 (BP Location: Left arm)   Pulse 89   Temp 97.8  F (36.6  C) (Oral)   Resp 18   Ht 1.695 m (5' 6.73\")   Wt 82.9 kg (182 lb 11.2 oz)   SpO2 97%   BMI 28.85 kg/m      Reason for admission: Scheduled consolidation with HiDAC  Activity: UAL, intermittently walking throughout halls.  Pain: Denies  Neuro: WNL, cerebellar assessment intact and unchanged  Cardiac: WNL  Respiratory: WNL  GI/: VNS, awaiting BM but taking stool softeners  Diet: Regular, tolerating good PO intake  Lines: dbl lumen PICC  Wounds: n/a  Labs/imaging: see chart      New changes this shift: Artie is doing well today.  He is in good spirits and denies any p/n/v.  Was seen by SW to answer some medicare questions.  Also reported to pt library.  Resting and walking in between cares.     Plan: Continue with chemo regiment and provide supportive cares.      Continue to monitor and follow POC    "

## 2023-10-14 NOTE — PLAN OF CARE
Orders received and acknowledged. Per chart review, discussion with care team, and reason for admission, no acute inpatient PT needs identified. Per discussion with pt, pt is highly active and motivated, remains IND for all mobility and is now mobilizing without an AD. Anticipate short stay with planned discharge Monday. Will complete orders at this time. Should pt demonstrate a change in mobility status, please reconsult as appropriate. Thank you for this referral.     Madina Richard, PT, DPT

## 2023-10-14 NOTE — PROGRESS NOTES
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 Started 7+3 induction C1D1=9/2/23, overall tolerated induction chemo okay.  Course had been complicated by neutropenic fevers, strep mitis bacteremia, and drug eruption rash (likely due to cephalosporins, +/- JONATHAN). He is admitted for scheduled chemotherapy HiDAC on 10/13/2023.       Today:  - C1D2  - no major changes to plan       HEME  # AML, adverse risk  Follows with Dr. Garcia. Patient initially presented to [facility] in [place], [place] with 4-6 weeks of progressive bruising, weakness, fatigue, loss of appetite, and night sweats. He also noted a headache similar to his previous brain abscess (see more below). CBC notable for pancytopenia; WBC 2.7 (), Hgb 8.1, and plt 1k. He was transferred to [facility] and was found on peripheral flow w/ 11% circulating myeloid blasts. He was then transferred to [facility] for further work up and treatment of AML. Diagnostic BMBx performed 9/1/23 which showed AML with 30% blasts by morph and 47% by flow. P53 negative. FISH negative for MLLT10, NUP98, and KMT2A. Normal karyotype. NGS showed ASXL1, CEBPA, SRSF2, and STAG2 mutations. Started 7+3 (C1D1=9/2/23). Diagnostic LP done 9/8/23 due to presenting sx including headache, without evidence of CNS involvement. MRI brain 9/11 negative, only showing chronic changes from h/o brain abscess. Count recovery BMBx 10/9/23, without evidence of leukemia. Admitted for cycle 1 HiDAC consolidation with ultimate goal for BMT.   - PICC placed just prior to admission  - Baseline TTE showing normal LVEF of 55-60%. Baseline EKG showed NSR and QTc of 434.   - Viral serologies: HepB/C-, HIV-. HSV1/2+, EBV IgG+,  CMV IgG+  - Of note, patient developed a rash approximately 2 weeks after 7+3 induction chemotherapy.  He was seen by dermatology, and thought to be multifactorial to cephalosporins and considered a component of toxic erythema of chemotherapy (see more below).  Artie tells me the rash was minimally itchy, and otherwise was not bothersome- denied pain, blistering lesions, or open wounds.  He was treated with Kenalog topical ointment and with time, the rash improved.  At time of hospital admission (10/13), there is no evidence of erythematous or maculopapular rash.  Mild skin peeling seen to the bilateral palms and bilateral shins, which Artie reports is improving when compared with prior.  We discussed possible risk of developing rash/toxic erythema of chemotherapy given treatment plan including high-dose cytarabine and possible previous reaction, though it still remains somewhat unclear of etiology of previous rash.  I discussed the above with patient's primary oncologist, Dr. Garcia, and he agrees recommends to proceed with HiDAC as scheduled.  Discussed risks with patient and his wife Aundrea, who expressed understanding, and are agreeable to proceed with chemotherapy as recommended and prescribed by his outpatient oncologist. Consider dermatology consult if new or changing skin rashes present.     Treatment Plan: HiDAC (C1D1=10/13/2023)   - Cytarabine 1.5 g/m2 q12 hours x 6 doses  - Pre-meds: allopurinol 300 mg daily, zofran 8mg q12 hours x 10 doses, dexamethasone 4 mg q12 hours x 6 doses  - Supportive meds: prednisolone acetate ophthalmic drops, filgrastim daily starting D5 (to be requested outpatient)     # Risk for pancytopenia   No current cytopenias on admission. Anticipated cytopenias 2/2 chemotherapy and underlying disease.   - Transfuse irradiated products to maintain Hgb >7, Plt >10K  - Type & Screen MWF     ID  # Prophylaxis  Patient is not currently neutropenic.  - PTA Acyclovir 400 mg BID  - Hold  antibiotic/antifungal ppx, start if ANC is <1000  - PTA levofloxacin 500 mg daily  - PTA voriconazole 200 mg BID     # H/o morbiliform rash, improving  Noted during previous hospital admission (9/23/2023 AM).  Developed approximately 2 weeks after 7+3 induction chemotherapy and 2-3 days after cephalosporins. Presumed drug rash.  Evaluated by dermatology.  Warner most likely multifactorial and secondary to cephalosporins, considering a component of toxic erythema of chemotherapy (JONATHAN).  Oncology attending at the time did not feel JONATHAN/cytarabine as likely related given the presentation.  Rash significantly improved after discontinuation of cefepime which was added to allergy list.    - At time of hospital admission (10/13), there is no evidence of erythematous or maculopapular rash.  Mild skin peeling seen to the bilateral palms and bilateral shins, which Artie reports is improving when compared with prior.  We discussed possible risk of developing rash/toxic erythema of chemotherapy given treatment plan including high-dose cytarabine and possible previous reaction, though it still remains somewhat unclear of etiology of previous rash.  I discussed the above with patient's primary oncologist, Dr. Garcia, and he agrees recommends to proceed with HiDAC as scheduled.  Discussed risks with patient and his wife Aundrea, who expressed understanding, and are agreeable to proceed with chemotherapy as recommended and prescribed by his outpatient oncologist. Consider dermatology consult if new or changing skin rashes present.     # Dental pain  Patient noting left upper molar painful over the last 1 week prior to admission.  Been taking Tylenol with relief.  On exam, there is mild gingival edema and erythema surrounding tooth #16, with tenderness associated.  No loose or avulsed dentition.  No palpable fluctuance, evidence of periapical abscess.  - CT dental ordered  - Consider antibiotics and dental consult if concern for  "infection     Chronic/MISC  # HTN  Noted during prior hospitalization for brain abscess in 2021. Previously on lisinopril, which he is no longer taking.  - monitor during admission     # BPH  Notes difficulty with complete emptying of bladder. Previously on Flomax but states it did nothing, so he stopped it. He does note frequent urination at baseline, currently urination remains unchanged from baseline.  - Monitor clinically     FEN   - IVF per chemotherapy regimen, IVF bolus prn   - PRN lyte replacement per standing protocol  - Regular diet as tolerated      Lines/Drains: PICC  DVT ppx: Lovenox, hold if Plt <50K   Consults: TBD  DISPO: Anticipate 3-4 day stay for chemotherapy. Tentative discharge 10/16-10/17 pending completion of chemo and barring any clinical complications.   Follow-up/Referrals: Receives labs/transfusions locally in [place], [place] - will need to be requested/set for 2x weekly upon discharge; APPT18 requested for MARIEL visit in 1 week and MD appt 3 weeks from discharge.  Primary oncologist is Dr. Jose Fonseca  PGY4  Hematology, Oncology, and Transplantation  p   ___________________________________________________________________    Subjective & Interval History:    No acute events noted overnight.     Slept well and feeling well.   Appetite and energy are preserved     4-pt ROS negative.      Physical Exam:    Blood pressure 108/69, pulse 89, temperature 97.8  F (36.6  C), temperature source Oral, resp. rate 18, height 1.695 m (5' 6.73\"), weight 82.9 kg (182 lb 11.2 oz), SpO2 97%.    Constitutional: Awake and conversational. Non- toxic appearing. No acute distress. Well developed, hydrated, and nourished. Sitting in bedside chair.  HEENT: NCAT. Sclerae anicteric. PERRLA. EOM intact. Moist mucus membranes without lesions, thrush, or exudates appreciated. Mild gingival erythema and edema surrounding tooth #16, mildly tender to palpation.  No palpable fluctuance or evidence of " periapical abscess.  Floor mouth soft, handling secretions.  Remainder of dentition intact.  Lymph: Neck supple, no ridigity. No significant adenopathy noted.   Respiratory: Breathing comfortably on room air with no accessory muscle use. Speaking in full sentences, no evidence of respiratory distress. Lungs CTAB without stridor, wheeze, rhonchi, or rales.   Cardiovascular: Regular rate and rhythm. 2+ radial pulses bilaterally. No peripheral edema.    GI: Abdomen with normoactive bowel sounds, soft and non-tender throughout. No rebound, guarding, or peritoneal sign.   Skin: Skin is clean, dry, intact.  Peeling noted to bilateral palms of hands, and minimally over anterior bilateral legs.  No evidence of maculopapular erythematous rash on exposed surfaces.    Neurologic: alert and oriented with normal speech. Grossly nonfocal. Memory and thought process preserved. Motor function normal with 5/5 muscle strength bilaterally to UE and LE. Sensation intact to light touch bilaterally.   Neuropsychiatric: Calm, affect congruent to situation. Appropriate mood and affect.    Labs & Studies: I personally reviewed the following studies:  ROUTINE LABS (Last four results):  CMP  Recent Labs   Lab 10/14/23  0345 10/13/23  1057    137   POTASSIUM 4.3 3.9   CHLORIDE 102 102   CO2 23 23   ANIONGAP 13 12   * 87   BUN 15.2 10.7   CR 0.69 0.75   GFRESTIMATED >90 >90   VENANCIO 9.4 8.7*   MAG 2.1 1.9   PHOS 4.0 3.2   PROTTOTAL 7.2 6.9   ALBUMIN 3.9 3.8   BILITOTAL 0.2 0.2   ALKPHOS 106 101   AST 20 20   ALT 20 20     CBC  Recent Labs   Lab 10/14/23  0345 10/13/23  1057 10/09/23  1004   WBC 5.0 4.4 4.7   RBC 3.36* 3.15* 3.54*   HGB 10.0* 9.3* 10.2*   HCT 30.8* 29.1* 32.6*   MCV 92 92 92   MCH 29.8 29.5 28.8   MCHC 32.5 32.0 31.3*   RDW 16.4* 16.3* 15.1*    243 177     INR  Recent Labs   Lab 10/14/23  0345 10/13/23  1057   INR 1.04 1.13       Medications list for reference:  Current Facility-Administered Medications    Medication    acetaminophen (TYLENOL) tablet 650 mg    acyclovir (ZOVIRAX) tablet 400 mg    albuterol (PROVENTIL HFA/VENTOLIN HFA) inhaler    albuterol (PROVENTIL) neb solution 2.5 mg    allopurinol (ZYLOPRIM) tablet 300 mg    cetirizine (zyrTEC) tablet 10 mg    cytarabine (PF) (CYTOSAR) 3,000 mg in D5W 305 mL infusion    dexAMETHasone (DECADRON) tablet 4 mg    dextrose 5 % flush PRE/POST medication    And    [START ON 10/17/2023] filgrastim-aafi (NIVESTYM) 420 mcg in D5W 28 mL infusion    And    dextrose 5 % flush PRE/POST medication    diphenhydrAMINE (BENADRYL) injection 50 mg    enoxaparin ANTICOAGULANT (LOVENOX) injection 40 mg    EPINEPHrine PF (ADRENALIN) injection 0.3 mg    famotidine (PEPCID) injection 20 mg    heparin lock flush 10 UNIT/ML injection 5-20 mL    heparin lock flush 10 UNIT/ML injection 5-20 mL    LORazepam (ATIVAN) injection 0.5-1 mg    LORazepam (ATIVAN) tablet 0.5-1 mg    meperidine (DEMEROL) injection 25 mg    methylPREDNISolone sodium succinate (solu-MEDROL) injection 125 mg    No rectal suppositories if WBC less than 1000/microliters or platelets less than 50,000/ L    ondansetron (ZOFRAN) tablet 8 mg    polyethylene glycol (MIRALAX) Packet 17 g    prednisoLONE acetate (PRED FORTE) 1 % ophthalmic susp 2 drop    prochlorperazine (COMPAZINE) tablet 5 mg    Or    prochlorperazine (COMPAZINE) injection 5 mg    senna-docusate (SENOKOT-S/PERICOLACE) 8.6-50 MG per tablet 1 tablet    Or    senna-docusate (SENOKOT-S/PERICOLACE) 8.6-50 MG per tablet 2 tablet    sodium chloride (PF) 0.9% PF flush 10-20 mL    sodium chloride (PF) 0.9% PF flush 10-40 mL    sodium chloride 0.9% BOLUS 500 mL    voriconazole (VFEND) tablet 200 mg

## 2023-10-14 NOTE — PLAN OF CARE
Goal Outcome Evaluation:      Plan of Care Reviewed With: patient    Overall Patient Progress: no changeOverall Patient Progress: no change       Time: 7471-4004    Reason for admission: Consolidation chemo with HiDAC   Activity: UAL  Pain: denies  Neuro: WDL  Cardiac: WDL  Respiratory: WDL  GI/: WDL, LBM today, voiding spontaneously, not saving  Diet: reg diet  Lines: PICC  Labs/imaging: Reviewed, no replacements indicated this shift.   Vitals: VSS      This Shift: No acute events this shift. Cares clustered. 2 RN skin check completed this shift with Elidia PALENCIA RN.       Continue POC..

## 2023-10-14 NOTE — PROGRESS NOTES
Nursing Focus: Chemotherapy  D: Positive brisk bright red blood return via PICC. Insertion site is clean/dry/intact, dressing intact with no complaints of pain.  Urine output is recorded in intake Doc Flowsheet.    I: Premedications given per order (see electronic medical administration record). Dose #2 of HD Cytarabine started to infuse over 3 hours. Reviewed pt teaching on chemotherapy side effects.  Pt denies need for further teaching. Chemotherapy double checked per protocol by two chemotherapy competent RN's.   A: Tolerating chemo well. Denies nausea.   P: Continue to monitor urine output and symptoms of nausea. Screen for symptoms of toxicity.

## 2023-10-15 LAB
ALBUMIN SERPL BCG-MCNC: 3.8 G/DL (ref 3.5–5.2)
ALP SERPL-CCNC: 99 U/L (ref 40–129)
ALT SERPL W P-5'-P-CCNC: 20 U/L (ref 0–70)
ANION GAP SERPL CALCULATED.3IONS-SCNC: 9 MMOL/L (ref 7–15)
AST SERPL W P-5'-P-CCNC: 18 U/L (ref 0–45)
BASO+EOS+MONOS # BLD AUTO: ABNORMAL 10*3/UL
BASO+EOS+MONOS NFR BLD AUTO: ABNORMAL %
BASOPHILS # BLD AUTO: 0 10E3/UL (ref 0–0.2)
BASOPHILS NFR BLD AUTO: 0 %
BILIRUB SERPL-MCNC: 0.3 MG/DL
BUN SERPL-MCNC: 18 MG/DL (ref 8–23)
CALCIUM SERPL-MCNC: 9.2 MG/DL (ref 8.8–10.2)
CHLORIDE SERPL-SCNC: 104 MMOL/L (ref 98–107)
CREAT SERPL-MCNC: 0.72 MG/DL (ref 0.67–1.17)
DEPRECATED HCO3 PLAS-SCNC: 25 MMOL/L (ref 22–29)
EGFRCR SERPLBLD CKD-EPI 2021: >90 ML/MIN/1.73M2
EOSINOPHIL # BLD AUTO: 0 10E3/UL (ref 0–0.7)
EOSINOPHIL NFR BLD AUTO: 0 %
ERYTHROCYTE [DISTWIDTH] IN BLOOD BY AUTOMATED COUNT: 16.5 % (ref 10–15)
FIBRINOGEN PPP-MCNC: 342 MG/DL (ref 170–490)
GLUCOSE SERPL-MCNC: 141 MG/DL (ref 70–99)
HCT VFR BLD AUTO: 28.2 % (ref 40–53)
HGB BLD-MCNC: 9.1 G/DL (ref 13.3–17.7)
IMM GRANULOCYTES # BLD: 0.1 10E3/UL
IMM GRANULOCYTES NFR BLD: 1 %
INR PPP: 1.1 (ref 0.85–1.15)
LYMPHOCYTES # BLD AUTO: 0.3 10E3/UL (ref 0.8–5.3)
LYMPHOCYTES NFR BLD AUTO: 4 %
MAGNESIUM SERPL-MCNC: 2.3 MG/DL (ref 1.7–2.3)
MCH RBC QN AUTO: 29.7 PG (ref 26.5–33)
MCHC RBC AUTO-ENTMCNC: 32.3 G/DL (ref 31.5–36.5)
MCV RBC AUTO: 92 FL (ref 78–100)
MONOCYTES # BLD AUTO: 0.5 10E3/UL (ref 0–1.3)
MONOCYTES NFR BLD AUTO: 7 %
NEUTROPHILS # BLD AUTO: 6.6 10E3/UL (ref 1.6–8.3)
NEUTROPHILS NFR BLD AUTO: 88 %
NRBC # BLD AUTO: 0 10E3/UL
NRBC BLD AUTO-RTO: 0 /100
PHOSPHATE SERPL-MCNC: 4.2 MG/DL (ref 2.5–4.5)
PLATELET # BLD AUTO: 290 10E3/UL (ref 150–450)
POTASSIUM SERPL-SCNC: 4.1 MMOL/L (ref 3.4–5.3)
PROT SERPL-MCNC: 7 G/DL (ref 6.4–8.3)
RBC # BLD AUTO: 3.06 10E6/UL (ref 4.4–5.9)
SODIUM SERPL-SCNC: 138 MMOL/L (ref 135–145)
URATE SERPL-MCNC: 3.7 MG/DL (ref 3.4–7)
WBC # BLD AUTO: 7.4 10E3/UL (ref 4–11)

## 2023-10-15 PROCEDURE — 258N000003 HC RX IP 258 OP 636: Performed by: STUDENT IN AN ORGANIZED HEALTH CARE EDUCATION/TRAINING PROGRAM

## 2023-10-15 PROCEDURE — 84550 ASSAY OF BLOOD/URIC ACID: CPT | Performed by: PHYSICIAN ASSISTANT

## 2023-10-15 PROCEDURE — 250N000013 HC RX MED GY IP 250 OP 250 PS 637: Performed by: PHYSICIAN ASSISTANT

## 2023-10-15 PROCEDURE — 99233 SBSQ HOSP IP/OBS HIGH 50: CPT | Mod: GC | Performed by: STUDENT IN AN ORGANIZED HEALTH CARE EDUCATION/TRAINING PROGRAM

## 2023-10-15 PROCEDURE — 84100 ASSAY OF PHOSPHORUS: CPT | Performed by: PHYSICIAN ASSISTANT

## 2023-10-15 PROCEDURE — 85610 PROTHROMBIN TIME: CPT | Performed by: PHYSICIAN ASSISTANT

## 2023-10-15 PROCEDURE — 250N000011 HC RX IP 250 OP 636: Performed by: STUDENT IN AN ORGANIZED HEALTH CARE EDUCATION/TRAINING PROGRAM

## 2023-10-15 PROCEDURE — 250N000011 HC RX IP 250 OP 636: Mod: JZ | Performed by: PHYSICIAN ASSISTANT

## 2023-10-15 PROCEDURE — 80053 COMPREHEN METABOLIC PANEL: CPT | Performed by: PHYSICIAN ASSISTANT

## 2023-10-15 PROCEDURE — 83735 ASSAY OF MAGNESIUM: CPT | Performed by: PHYSICIAN ASSISTANT

## 2023-10-15 PROCEDURE — 85025 COMPLETE CBC W/AUTO DIFF WBC: CPT | Performed by: PHYSICIAN ASSISTANT

## 2023-10-15 PROCEDURE — 250N000013 HC RX MED GY IP 250 OP 250 PS 637: Performed by: STUDENT IN AN ORGANIZED HEALTH CARE EDUCATION/TRAINING PROGRAM

## 2023-10-15 PROCEDURE — 250N000012 HC RX MED GY IP 250 OP 636 PS 637: Performed by: STUDENT IN AN ORGANIZED HEALTH CARE EDUCATION/TRAINING PROGRAM

## 2023-10-15 PROCEDURE — 85384 FIBRINOGEN ACTIVITY: CPT | Performed by: PHYSICIAN ASSISTANT

## 2023-10-15 PROCEDURE — 120N000002 HC R&B MED SURG/OB UMMC

## 2023-10-15 RX ORDER — NALOXONE HYDROCHLORIDE 0.4 MG/ML
0.2 INJECTION, SOLUTION INTRAMUSCULAR; INTRAVENOUS; SUBCUTANEOUS
Status: DISCONTINUED | OUTPATIENT
Start: 2023-10-15 | End: 2023-10-16 | Stop reason: HOSPADM

## 2023-10-15 RX ORDER — NALOXONE HYDROCHLORIDE 0.4 MG/ML
0.4 INJECTION, SOLUTION INTRAMUSCULAR; INTRAVENOUS; SUBCUTANEOUS
Status: DISCONTINUED | OUTPATIENT
Start: 2023-10-15 | End: 2023-10-16 | Stop reason: HOSPADM

## 2023-10-15 RX ADMIN — PREDNISOLONE ACETATE 2 DROP: 10 SUSPENSION/ DROPS OPHTHALMIC at 07:43

## 2023-10-15 RX ADMIN — POLYETHYLENE GLYCOL 3350 17 G: 17 POWDER, FOR SOLUTION ORAL at 07:43

## 2023-10-15 RX ADMIN — CYTARABINE 3000 MG: 100 INJECTION, SOLUTION INTRATHECAL; INTRAVENOUS; SUBCUTANEOUS at 16:09

## 2023-10-15 RX ADMIN — ALLOPURINOL 300 MG: 300 TABLET ORAL at 07:43

## 2023-10-15 RX ADMIN — CETIRIZINE HYDROCHLORIDE 10 MG: 10 TABLET, FILM COATED ORAL at 19:47

## 2023-10-15 RX ADMIN — ACYCLOVIR 400 MG: 400 TABLET ORAL at 19:47

## 2023-10-15 RX ADMIN — Medication 5 ML: at 03:46

## 2023-10-15 RX ADMIN — DEXAMETHASONE 4 MG: 4 TABLET ORAL at 15:32

## 2023-10-15 RX ADMIN — ENOXAPARIN SODIUM 40 MG: 40 INJECTION SUBCUTANEOUS at 19:47

## 2023-10-15 RX ADMIN — VORICONAZOLE 200 MG: 200 TABLET ORAL at 19:47

## 2023-10-15 RX ADMIN — PREDNISOLONE ACETATE 2 DROP: 10 SUSPENSION/ DROPS OPHTHALMIC at 15:32

## 2023-10-15 RX ADMIN — ONDANSETRON 8 MG: 8 TABLET, FILM COATED ORAL at 15:32

## 2023-10-15 RX ADMIN — ONDANSETRON 8 MG: 8 TABLET, FILM COATED ORAL at 03:34

## 2023-10-15 RX ADMIN — VORICONAZOLE 200 MG: 200 TABLET ORAL at 07:43

## 2023-10-15 RX ADMIN — ACYCLOVIR 400 MG: 400 TABLET ORAL at 07:43

## 2023-10-15 RX ADMIN — PREDNISOLONE ACETATE 2 DROP: 10 SUSPENSION/ DROPS OPHTHALMIC at 19:47

## 2023-10-15 RX ADMIN — DEXAMETHASONE 4 MG: 4 TABLET ORAL at 03:34

## 2023-10-15 RX ADMIN — PREDNISOLONE ACETATE 2 DROP: 10 SUSPENSION/ DROPS OPHTHALMIC at 12:16

## 2023-10-15 RX ADMIN — Medication 5 ML: at 19:47

## 2023-10-15 RX ADMIN — CYTARABINE 3000 MG: 100 INJECTION, SOLUTION INTRATHECAL; INTRAVENOUS; SUBCUTANEOUS at 04:02

## 2023-10-15 ASSESSMENT — ACTIVITIES OF DAILY LIVING (ADL)
ADLS_ACUITY_SCORE: 20

## 2023-10-15 NOTE — PROGRESS NOTES
Nursing Focus: Chemotherapy  D: Positive brisk bright red blood return via PICC. Insertion site is clean/dry/intact, dressing intact with no complaints of pain.  Urine output is recorded in intake Doc Flowsheet.    I: Premedications given per order (see electronic medical administration record). Dose #4 of HD Cytarabine started to infuse over 3 hours. Reviewed pt teaching on chemotherapy side effects.  Pt denies need for further teaching. Chemotherapy double checked per protocol by two chemotherapy competent RN's.   A: Tolerating chemo well. Denies nausea.   P: Continue to monitor urine output and symptoms of nausea. Screen for symptoms of toxicity.

## 2023-10-15 NOTE — PROGRESS NOTES
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 Started 7+3 induction C1D1=9/2/23, overall tolerated induction chemo okay.  Course had been complicated by neutropenic fevers, strep mitis bacteremia, and drug eruption rash (likely due to cephalosporins, +/- JONATHAN). He is admitted for scheduled chemotherapy HiDAC on 10/13/2023.       Today:  - C1D3  - no major changes to plan  - anticipating discharge 10/16; patient prefers lab testing and potential transfusions be completed near home in [place], [place], through [facility]   > will need twice weekly labs and contingency transfusion plans   > will need pegfilgrastim (Neulasta) on 10/17 or 10/18   > will need RTC ca. 11/03/2023 with Dr Garcia       HEME  # AML, adverse risk  Follows with Dr. Garcia. Patient initially presented to [facility] in [place], [place] with 4-6 weeks of progressive bruising, weakness, fatigue, loss of appetite, and night sweats. He also noted a headache similar to his previous brain abscess (see more below). CBC notable for pancytopenia; WBC 2.7 (), Hgb 8.1, and plt 1k. He was transferred to [facility] and was found on peripheral flow w/ 11% circulating myeloid blasts. He was then transferred to [facility] for further work up and treatment of AML. Diagnostic BMBx performed 9/1/23 which showed AML with 30% blasts by morph and 47% by flow. P53 negative. FISH negative for MLLT10, NUP98, and KMT2A. Normal karyotype. NGS showed ASXL1, CEBPA, SRSF2, and STAG2 mutations. Started 7+3 (C1D1=9/2/23). Diagnostic LP done 9/8/23 due to presenting sx including headache, without evidence of CNS involvement. MRI brain 9/11 negative, only showing chronic changes from h/o  brain abscess. Count recovery BMBx 10/9/23, without evidence of leukemia. Admitted for cycle 1 HiDAC consolidation with ultimate goal for BMT.   - PICC placed just prior to admission  - Baseline TTE showing normal LVEF of 55-60%. Baseline EKG showed NSR and QTc of 434.   - Viral serologies: HepB/C-, HIV-. HSV1/2+, EBV IgG+, CMV IgG+  - Of note, patient developed a rash approximately 2 weeks after 7+3 induction chemotherapy.  He was seen by dermatology, and thought to be multifactorial to cephalosporins and considered a component of toxic erythema of chemotherapy (see more below).  Artie tells me the rash was minimally itchy, and otherwise was not bothersome- denied pain, blistering lesions, or open wounds.  He was treated with Kenalog topical ointment and with time, the rash improved.  At time of hospital admission (10/13), there is no evidence of erythematous or maculopapular rash.  Mild skin peeling seen to the bilateral palms and bilateral shins, which Artie reports is improving when compared with prior.  We discussed possible risk of developing rash/toxic erythema of chemotherapy given treatment plan including high-dose cytarabine and possible previous reaction, though it still remains somewhat unclear of etiology of previous rash.  I discussed the above with patient's primary oncologist, Dr. Garcia, and he agrees recommends to proceed with HiDAC as scheduled.  Discussed risks with patient and his wife Aundrea, who expressed understanding, and are agreeable to proceed with chemotherapy as recommended and prescribed by his outpatient oncologist. Consider dermatology consult if new or changing skin rashes present.     Treatment Plan: HiDAC (C1D1=10/13/2023)   - Cytarabine 1.5 g/m2 q12 hours x 6 doses  - Pre-meds: allopurinol 300 mg daily, zofran 8mg q12 hours x 10 doses, dexamethasone 4 mg q12 hours x 6 doses  - Supportive meds: prednisolone acetate ophthalmic drops, filgrastim daily starting D5 (to be requested  outpatient)     # Risk for pancytopenia   No current cytopenias on admission. Anticipated cytopenias 2/2 chemotherapy and underlying disease.   - Transfuse irradiated products to maintain Hgb >7, Plt >10K  - Type & Screen MWF     ID  # Prophylaxis  Patient is not currently neutropenic.  - PTA Acyclovir 400 mg BID  - Hold antibiotic/antifungal ppx, start if ANC is <1000  - PTA levofloxacin 500 mg daily  - PTA voriconazole 200 mg BID     # H/o morbiliform rash, improving  Noted during previous hospital admission (9/23/2023 AM).  Developed approximately 2 weeks after 7+3 induction chemotherapy and 2-3 days after cephalosporins. Presumed drug rash.  Evaluated by dermatology.  [place] most likely multifactorial and secondary to cephalosporins, considering a component of toxic erythema of chemotherapy (JONATHAN).  Oncology attending at the time did not feel JONATHAN/cytarabine as likely related given the presentation.  Rash significantly improved after discontinuation of cefepime which was added to allergy list.    - At time of hospital admission (10/13), there is no evidence of erythematous or maculopapular rash.  Mild skin peeling seen to the bilateral palms and bilateral shins, which Artie reports is improving when compared with prior.  We discussed possible risk of developing rash/toxic erythema of chemotherapy given treatment plan including high-dose cytarabine and possible previous reaction, though it still remains somewhat unclear of etiology of previous rash.  I discussed the above with patient's primary oncologist, Dr. Garcia, and he agrees recommends to proceed with HiDAC as scheduled.  Discussed risks with patient and his wife Aundrea, who expressed understanding, and are agreeable to proceed with chemotherapy as recommended and prescribed by his outpatient oncologist. Consider dermatology consult if new or changing skin rashes present.     # Dental pain  Patient noting left upper molar painful over the last 1 week prior to  "admission.  Been taking Tylenol with relief.  On exam, there is mild gingival edema and erythema surrounding tooth #16, with tenderness associated.  No loose or avulsed dentition.  No palpable fluctuance, evidence of periapical abscess.  - CT dental ordered  - Consider antibiotics and dental consult if concern for infection     Chronic/MISC  # HTN  Noted during prior hospitalization for brain abscess in 2021. Previously on lisinopril, which he is no longer taking.  - monitor during admission     # BPH  Notes difficulty with complete emptying of bladder. Previously on Flomax but states it did nothing, so he stopped it. He does note frequent urination at baseline, currently urination remains unchanged from baseline.  - Monitor clinically     FEN   - IVF per chemotherapy regimen, IVF bolus prn   - PRN lyte replacement per standing protocol  - Regular diet as tolerated      Lines/Drains: PICC  DVT ppx: Lovenox, hold if Plt <50K   Consults: TBD  DISPO: Anticipate 3-4 day stay for chemotherapy. Tentative discharge 10/16-10/17 pending completion of chemo and barring any clinical complications.   Follow-up/Referrals: Receives labs/transfusions locally in [place], [place] - will need to be requested/set for 2x weekly upon discharge; APPT18 requested for MARIEL visit in 1 week and MD appt 3 weeks from discharge.  Primary oncologist is Dr. Jose Fonseca  PGY4  Hematology, Oncology, and Transplantation  p   ___________________________________________________________________    Subjective & Interval History:    No acute events noted overnight.     Slept well and feeling well. Walking around and watching football game in good spirits.     Appetite and energy are preserved     4-pt ROS negative.      Physical Exam:    Blood pressure 118/77, pulse 101, temperature 98  F (36.7  C), temperature source Oral, resp. rate 18, height 1.695 m (5' 6.73\"), weight 82.1 kg (181 lb), SpO2 97%.    Constitutional: Awake and " conversational. Non- toxic appearing. No acute distress. Well developed, hydrated, and nourished. Sitting in bedside chair.  HEENT: NCAT. Sclerae anicteric. PERRLA. EOM intact. Moist mucus membranes without lesions, thrush, or exudates appreciated. Mild gingival erythema and edema surrounding tooth #16, mildly tender to palpation.  No palpable fluctuance or evidence of periapical abscess.  Floor mouth soft, handling secretions.  Remainder of dentition intact.  Lymph: Neck supple, no ridigity. No significant adenopathy noted.   Respiratory: Breathing comfortably on room air with no accessory muscle use. Speaking in full sentences, no evidence of respiratory distress. Lungs CTAB without stridor, wheeze, rhonchi, or rales.   Cardiovascular: Regular rate and rhythm. 2+ radial pulses bilaterally. No peripheral edema.    GI: Abdomen with normoactive bowel sounds, soft and non-tender throughout. No rebound, guarding, or peritoneal sign.   Skin: Skin is clean, dry, intact.  Peeling noted to bilateral palms of hands, and minimally over anterior bilateral legs.  No evidence of maculopapular erythematous rash on exposed surfaces.    Neurologic: alert and oriented with normal speech. Grossly nonfocal. Memory and thought process preserved. Motor function normal with 5/5 muscle strength bilaterally to UE and LE. Sensation intact to light touch bilaterally.   Neuropsychiatric: Calm, affect congruent to situation. Appropriate mood and affect.    Labs & Studies: I personally reviewed the following studies:  ROUTINE LABS (Last four results):  CMP  Recent Labs   Lab 10/15/23  0333 10/14/23  0345 10/13/23  1057    138 137   POTASSIUM 4.1 4.3 3.9   CHLORIDE 104 102 102   CO2 25 23 23   ANIONGAP 9 13 12   * 136* 87   BUN 18.0 15.2 10.7   CR 0.72 0.69 0.75   GFRESTIMATED >90 >90 >90   VENANCOI 9.2 9.4 8.7*   MAG 2.3 2.1 1.9   PHOS 4.2 4.0 3.2   PROTTOTAL 7.0 7.2 6.9   ALBUMIN 3.8 3.9 3.8   BILITOTAL 0.3 0.2 0.2   ALKPHOS 99 106  101   AST 18 20 20   ALT 20 20 20     CBC  Recent Labs   Lab 10/15/23  0333 10/14/23  0345 10/13/23  1057 10/09/23  1004   WBC 7.4 5.0 4.4 4.7   RBC 3.06* 3.36* 3.15* 3.54*   HGB 9.1* 10.0* 9.3* 10.2*   HCT 28.2* 30.8* 29.1* 32.6*   MCV 92 92 92 92   MCH 29.7 29.8 29.5 28.8   MCHC 32.3 32.5 32.0 31.3*   RDW 16.5* 16.4* 16.3* 15.1*    291 243 177     INR  Recent Labs   Lab 10/15/23  0333 10/14/23  0345 10/13/23  1057   INR 1.10 1.04 1.13       Medications list for reference:  Current Facility-Administered Medications   Medication    acetaminophen (TYLENOL) tablet 650 mg    acyclovir (ZOVIRAX) tablet 400 mg    albuterol (PROVENTIL HFA/VENTOLIN HFA) inhaler    albuterol (PROVENTIL) neb solution 2.5 mg    allopurinol (ZYLOPRIM) tablet 300 mg    cetirizine (zyrTEC) tablet 10 mg    cytarabine (PF) (CYTOSAR) 3,000 mg in D5W 305 mL infusion    dexAMETHasone (DECADRON) tablet 4 mg    dextrose 5 % flush PRE/POST medication    And    [START ON 10/17/2023] filgrastim-aafi (NIVESTYM) 420 mcg in D5W 28 mL infusion    And    dextrose 5 % flush PRE/POST medication    diphenhydrAMINE (BENADRYL) injection 50 mg    enoxaparin ANTICOAGULANT (LOVENOX) injection 40 mg    EPINEPHrine PF (ADRENALIN) injection 0.3 mg    famotidine (PEPCID) injection 20 mg    heparin lock flush 10 UNIT/ML injection 5-20 mL    heparin lock flush 10 UNIT/ML injection 5-20 mL    LORazepam (ATIVAN) injection 0.5-1 mg    LORazepam (ATIVAN) tablet 0.5-1 mg    meperidine (DEMEROL) injection 25 mg    methylPREDNISolone sodium succinate (solu-MEDROL) injection 125 mg    naloxone (NARCAN) injection 0.2 mg    Or    naloxone (NARCAN) injection 0.4 mg    Or    naloxone (NARCAN) injection 0.2 mg    Or    naloxone (NARCAN) injection 0.4 mg    No rectal suppositories if WBC less than 1000/microliters or platelets less than 50,000/ L    ondansetron (ZOFRAN) tablet 8 mg    polyethylene glycol (MIRALAX) Packet 17 g    prednisoLONE acetate (PRED FORTE) 1 % ophthalmic  susp 2 drop    prochlorperazine (COMPAZINE) tablet 5 mg    Or    prochlorperazine (COMPAZINE) injection 5 mg    senna-docusate (SENOKOT-S/PERICOLACE) 8.6-50 MG per tablet 1 tablet    Or    senna-docusate (SENOKOT-S/PERICOLACE) 8.6-50 MG per tablet 2 tablet    sodium chloride (PF) 0.9% PF flush 10-20 mL    sodium chloride (PF) 0.9% PF flush 10-40 mL    sodium chloride 0.9% BOLUS 500 mL    voriconazole (VFEND) tablet 200 mg

## 2023-10-15 NOTE — PROGRESS NOTES
Nursing Focus: Chemotherapy  D: Positive blood return via PICC. Insertion site is clean/dry/intact, dressing intact with no complaints of pain.  Urine output is recorded in intake in Doc Flowsheet.    I: Premedications given per order (see electronic medical administration record). Cerebellar exam WDL. Dose #5 of HD Cytarabine started to infuse over 3hours. Reviewed pt teaching on chemotherapy side effects.  Pt denies need for further teaching. Chemotherapy double checked per protocol by two chemotherapy competent RN's.   A: Tolerating procedure well. Denies nausea and or pain.   P: Continue to monitor urine output and symptoms of nausea. Screen for symptoms of toxicity.

## 2023-10-15 NOTE — PLAN OF CARE
Goal Outcome Evaluation:      Plan of Care Reviewed With: patient    Overall Patient Progress: no changeOverall Patient Progress: no change       Time: 9837-9968     Reason for admission: Consolidation chemo with HiDAC   Activity: UAL  Pain: denies  Neuro: WDL  Cardiac: WDL  Respiratory: WDL  GI/: WDL, LBM 10/13, voiding spontaneously, not saving  Diet: reg diet  Lines: PICC  Labs/imaging: Reviewed, no replacements indicated this shift.   Vitals: VSS      This Shift: No acute events this shift. Cares clustered.       Continue POC..

## 2023-10-16 VITALS
SYSTOLIC BLOOD PRESSURE: 128 MMHG | TEMPERATURE: 97.5 F | RESPIRATION RATE: 16 BRPM | HEART RATE: 85 BPM | HEIGHT: 67 IN | WEIGHT: 184 LBS | OXYGEN SATURATION: 98 % | BODY MASS INDEX: 28.88 KG/M2 | DIASTOLIC BLOOD PRESSURE: 70 MMHG

## 2023-10-16 LAB
ABO/RH(D): NORMAL
ALBUMIN SERPL BCG-MCNC: 3.8 G/DL (ref 3.5–5.2)
ALP SERPL-CCNC: 94 U/L (ref 40–129)
ALT SERPL W P-5'-P-CCNC: 24 U/L (ref 0–70)
ANION GAP SERPL CALCULATED.3IONS-SCNC: 12 MMOL/L (ref 7–15)
ANTIBODY SCREEN: NEGATIVE
AST SERPL W P-5'-P-CCNC: 25 U/L (ref 0–45)
BASO+EOS+MONOS # BLD AUTO: ABNORMAL 10*3/UL
BASO+EOS+MONOS NFR BLD AUTO: ABNORMAL %
BASOPHILS # BLD AUTO: 0 10E3/UL (ref 0–0.2)
BASOPHILS NFR BLD AUTO: 0 %
BILIRUB SERPL-MCNC: 0.2 MG/DL
BUN SERPL-MCNC: 18.3 MG/DL (ref 8–23)
CALCIUM SERPL-MCNC: 8.9 MG/DL (ref 8.8–10.2)
CHLORIDE SERPL-SCNC: 103 MMOL/L (ref 98–107)
CREAT SERPL-MCNC: 0.66 MG/DL (ref 0.67–1.17)
DEPRECATED HCO3 PLAS-SCNC: 25 MMOL/L (ref 22–29)
EGFRCR SERPLBLD CKD-EPI 2021: >90 ML/MIN/1.73M2
EOSINOPHIL # BLD AUTO: 0 10E3/UL (ref 0–0.7)
EOSINOPHIL NFR BLD AUTO: 0 %
ERYTHROCYTE [DISTWIDTH] IN BLOOD BY AUTOMATED COUNT: 16.6 % (ref 10–15)
FIBRINOGEN PPP-MCNC: 267 MG/DL (ref 170–490)
GLUCOSE SERPL-MCNC: 158 MG/DL (ref 70–99)
HCT VFR BLD AUTO: 27.6 % (ref 40–53)
HGB BLD-MCNC: 8.9 G/DL (ref 13.3–17.7)
IMM GRANULOCYTES # BLD: 0.1 10E3/UL
IMM GRANULOCYTES NFR BLD: 1 %
INR PPP: 1.02 (ref 0.85–1.15)
LYMPHOCYTES # BLD AUTO: 0.2 10E3/UL (ref 0.8–5.3)
LYMPHOCYTES NFR BLD AUTO: 2 %
MAGNESIUM SERPL-MCNC: 2.2 MG/DL (ref 1.7–2.3)
MCH RBC QN AUTO: 29.7 PG (ref 26.5–33)
MCHC RBC AUTO-ENTMCNC: 32.2 G/DL (ref 31.5–36.5)
MCV RBC AUTO: 92 FL (ref 78–100)
MONOCYTES # BLD AUTO: 0.1 10E3/UL (ref 0–1.3)
MONOCYTES NFR BLD AUTO: 1 %
NEUTROPHILS # BLD AUTO: 8.4 10E3/UL (ref 1.6–8.3)
NEUTROPHILS NFR BLD AUTO: 96 %
NRBC # BLD AUTO: 0 10E3/UL
NRBC BLD AUTO-RTO: 0 /100
PATH REPORT.ADDENDUM SPEC: NORMAL
PATH REPORT.COMMENTS IMP SPEC: NORMAL
PATH REPORT.COMMENTS IMP SPEC: NORMAL
PATH REPORT.FINAL DX SPEC: NORMAL
PATH REPORT.GROSS SPEC: NORMAL
PATH REPORT.MICROSCOPIC SPEC OTHER STN: NORMAL
PATH REPORT.MICROSCOPIC SPEC OTHER STN: NORMAL
PATH REPORT.RELEVANT HX SPEC: NORMAL
PHOSPHATE SERPL-MCNC: 3.3 MG/DL (ref 2.5–4.5)
PLATELET # BLD AUTO: 292 10E3/UL (ref 150–450)
POTASSIUM SERPL-SCNC: 3.9 MMOL/L (ref 3.4–5.3)
PROT SERPL-MCNC: 6.6 G/DL (ref 6.4–8.3)
RBC # BLD AUTO: 3 10E6/UL (ref 4.4–5.9)
SODIUM SERPL-SCNC: 140 MMOL/L (ref 135–145)
SPECIMEN EXPIRATION DATE: NORMAL
URATE SERPL-MCNC: 3.1 MG/DL (ref 3.4–7)
WBC # BLD AUTO: 8.8 10E3/UL (ref 4–11)

## 2023-10-16 PROCEDURE — 86850 RBC ANTIBODY SCREEN: CPT | Performed by: PHYSICIAN ASSISTANT

## 2023-10-16 PROCEDURE — 250N000011 HC RX IP 250 OP 636: Mod: JZ | Performed by: PHYSICIAN ASSISTANT

## 2023-10-16 PROCEDURE — 250N000013 HC RX MED GY IP 250 OP 250 PS 637: Performed by: PHYSICIAN ASSISTANT

## 2023-10-16 PROCEDURE — 250N000012 HC RX MED GY IP 250 OP 636 PS 637: Performed by: STUDENT IN AN ORGANIZED HEALTH CARE EDUCATION/TRAINING PROGRAM

## 2023-10-16 PROCEDURE — 84100 ASSAY OF PHOSPHORUS: CPT | Performed by: PHYSICIAN ASSISTANT

## 2023-10-16 PROCEDURE — 83735 ASSAY OF MAGNESIUM: CPT | Performed by: PHYSICIAN ASSISTANT

## 2023-10-16 PROCEDURE — 84550 ASSAY OF BLOOD/URIC ACID: CPT | Performed by: PHYSICIAN ASSISTANT

## 2023-10-16 PROCEDURE — 85025 COMPLETE CBC W/AUTO DIFF WBC: CPT | Performed by: PHYSICIAN ASSISTANT

## 2023-10-16 PROCEDURE — 250N000013 HC RX MED GY IP 250 OP 250 PS 637: Performed by: STUDENT IN AN ORGANIZED HEALTH CARE EDUCATION/TRAINING PROGRAM

## 2023-10-16 PROCEDURE — 250N000011 HC RX IP 250 OP 636: Performed by: STUDENT IN AN ORGANIZED HEALTH CARE EDUCATION/TRAINING PROGRAM

## 2023-10-16 PROCEDURE — 86901 BLOOD TYPING SEROLOGIC RH(D): CPT | Performed by: PHYSICIAN ASSISTANT

## 2023-10-16 PROCEDURE — 258N000003 HC RX IP 258 OP 636: Performed by: STUDENT IN AN ORGANIZED HEALTH CARE EDUCATION/TRAINING PROGRAM

## 2023-10-16 PROCEDURE — 85610 PROTHROMBIN TIME: CPT | Performed by: PHYSICIAN ASSISTANT

## 2023-10-16 PROCEDURE — 85384 FIBRINOGEN ACTIVITY: CPT | Performed by: PHYSICIAN ASSISTANT

## 2023-10-16 PROCEDURE — 99239 HOSP IP/OBS DSCHRG MGMT >30: CPT | Mod: FS | Performed by: STUDENT IN AN ORGANIZED HEALTH CARE EDUCATION/TRAINING PROGRAM

## 2023-10-16 PROCEDURE — 80053 COMPREHEN METABOLIC PANEL: CPT | Performed by: PHYSICIAN ASSISTANT

## 2023-10-16 RX ORDER — PREDNISOLONE ACETATE 10 MG/ML
2 SUSPENSION/ DROPS OPHTHALMIC 4 TIMES DAILY
Qty: 20 ML | Refills: 0 | Status: ON HOLD | OUTPATIENT
Start: 2023-10-16 | End: 2023-11-17

## 2023-10-16 RX ADMIN — ACYCLOVIR 400 MG: 400 TABLET ORAL at 08:07

## 2023-10-16 RX ADMIN — ONDANSETRON 8 MG: 8 TABLET, FILM COATED ORAL at 03:05

## 2023-10-16 RX ADMIN — CYTARABINE 3000 MG: 100 INJECTION, SOLUTION INTRATHECAL; INTRAVENOUS; SUBCUTANEOUS at 04:01

## 2023-10-16 RX ADMIN — Medication 5 ML: at 03:07

## 2023-10-16 RX ADMIN — PREDNISOLONE ACETATE 2 DROP: 10 SUSPENSION/ DROPS OPHTHALMIC at 08:07

## 2023-10-16 RX ADMIN — ALLOPURINOL 300 MG: 300 TABLET ORAL at 08:07

## 2023-10-16 RX ADMIN — DEXAMETHASONE 4 MG: 4 TABLET ORAL at 03:05

## 2023-10-16 RX ADMIN — PREDNISOLONE ACETATE 2 DROP: 10 SUSPENSION/ DROPS OPHTHALMIC at 13:20

## 2023-10-16 RX ADMIN — VORICONAZOLE 200 MG: 200 TABLET ORAL at 08:07

## 2023-10-16 ASSESSMENT — ACTIVITIES OF DAILY LIVING (ADL)
ADLS_ACUITY_SCORE: 20

## 2023-10-16 NOTE — DISCHARGE SUMMARY
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 Started 7+3 induction C1D1=9/2/23, overall tolerated induction chemo okay.  Course had been complicated by neutropenic fevers, strep mitis bacteremia, and drug eruption rash (likely due to cephalosporins, +/- JONATHAN). He was admitted for scheduled chemotherapy HiDAC on 10/13/2023 and tolerated well.  He is resumed on his anti-infective prophylaxis and scheduled for outpatient Neulasta/pegfilgrastim with labs/transfusions 2 times weekly at local clinic.  MARIEL visit in 1 week and MD return visit scheduled in 3 weeks.    On the day of discharge, Artie is feeling quite well, hemodynamically stable, and appears well.  He is seen ambulating in the halls with a steady gait. Prior to discharge, I reviewed with Artie the plan of care, including upcoming follow-up appointments and new medications. Appropriate prescriptions were sent to the discharge pharmacy, as needed. We reviewed strict discharge precautions, including reasons to call clinic triage or present to the ED, and he voiced understanding. He was provided with the clinic triage number, as well as written discharge instructions, in their discharge paperwork. Patient had an opportunity to ask questions, all of which were answered to their stated satisfaction. On the day of discharge, patient was overall well-appearing, hemodynamically stable, and felt safe and comfortable with the plans for  discharge to home with follow-up as described.    Outpatient follow-up issues:  - Left maxillary dental pain reported about 1 week prior to admission. CT dental 10/13 with periapical disease of right first maxillary molar (no associated symptoms). Please follow up with patient re: dentistry follow up outpatient when appropriate  - H/o rash during previous admission, no rashes or new skin findings noted during admission.  Dermatology had seen patient and felt rash was likely secondary to drug eruption (improved after discontinuation of cefepime) though noted possible component of toxic erythema of chemotherapy (related to cytarabine?).  Attending heme/oncologist on service at the time felt JONATHAN was less likely; reviewed this all with Dr. Garcia prior to chemotherapy and recommended to proceed with HiDAC as scheduled. Patient remained well without skin rashes during admission.  Please follow up with patient regarding rash/skin findings after cycle of chemotherapy.    Discharge medications:  - Levofloxacin 500 mg daily  - Voriconazole 200 mg twice daily  - Acyclovir 400 mg twice daily  - Pred forte eyedrops 4 times daily (through 10/18/2023)  - Cetirizine 10 mg daily  - Zofran 4 mg every 8 hours as needed for nausea  - Compazine 5 mg every 6 hours as needed for nausea     Upcoming follow-up:  - Neulasta to be obtained outpatient clinic 10/17 or 10/18 (request faxed to [facility], [place], [place])  - Labs/transfusions locally ([place], [place]) 2x weekly. Orders faxed to [facility].  - MARIEL visit 10/23/23  - MD visit with Dr. Garcia 11/10/23      Hospital Course   Artie Fine was admitted on 10/13/2023.  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 Garcia. Patient initially presented to [facility] in [place], [place] with 4-6 weeks of progressive bruising, weakness, fatigue, loss of appetite, and night sweats. He also noted a headache  similar to his previous brain abscess (see more below). CBC notable for pancytopenia; WBC 2.7 (), Hgb 8.1, and plt 1k. He was transferred to [facility] and was found on peripheral flow w/ 11% circulating myeloid blasts. He was then transferred to [facility] for further work up and treatment of AML. Diagnostic BMBx performed 9/1/23 which showed AML with 30% blasts by morph and 47% by flow. P53 negative. FISH negative for MLLT10, NUP98, and KMT2A. Normal karyotype. NGS showed ASXL1, CEBPA, SRSF2, and STAG2 mutations. Started 7+3 (C1D1=9/2/23). Diagnostic LP done 9/8/23 due to presenting sx including headache, without evidence of CNS involvement. MRI brain 9/11 negative, only showing chronic changes from h/o brain abscess. Count recovery BMBx 10/9/23, without evidence of leukemia. Admitted for cycle 1 HiDAC consolidation with ultimate goal for BMT.   - PICC placed just prior to admission  - Baseline TTE showing normal LVEF of 55-60%. Baseline EKG showed NSR and QTc of 434.   - Viral serologies: HepB/C-, HIV-. HSV1/2+, EBV IgG+, CMV IgG+  - Of note, patient developed a rash approximately 2 weeks after 7+3 induction chemotherapy.  He was seen by dermatology, and thought to be multifactorial to cephalosporins and considered a component of toxic erythema of chemotherapy (see more below).  Artie tells me the rash was minimally itchy, and otherwise was not bothersome- denied pain, blistering lesions, or open wounds.  He was treated with Kenalog topical ointment and with time, the rash improved.  At time of hospital admission (10/13), there is no evidence of erythematous or maculopapular rash.  Mild skin peeling seen to the bilateral palms and bilateral shins, which Artie reports is improving when compared with prior.  We discussed possible risk of developing rash/toxic erythema of chemotherapy given treatment plan including high-dose cytarabine and possible previous reaction, though it still remains somewhat unclear of  etiology of previous rash.  I discussed the above with patient's primary oncologist, Dr. Garcia, and he agrees recommends to proceed with HiDAC as scheduled.  Discussed risks with patient and his wife Aundrea, who expressed understanding, and are agreeable to proceed with chemotherapy as recommended and prescribed by his outpatient oncologist. Consider dermatology consult if new or changing skin rashes present.  - Monitor closely; no pruritus or rashes noted during inpatient admission     Treatment Plan: HiDAC (C1D1=10/13/2023)   - Cytarabine 1.5 g/m2 q12 hours x 6 doses  - Pre-meds: allopurinol 300 mg daily, zofran 8mg q12 hours x 10 doses, dexamethasone 4 mg q12 hours x 6 doses  - Supportive meds: prednisolone acetate ophthalmic drops, filgrastim daily starting D5 (to be requested outpatient)     # Risk for pancytopenia   No current cytopenias on admission. Anticipated cytopenias 2/2 chemotherapy and underlying disease.   - Transfuse irradiated products: 1 unit pRBC if hgb is 7.0-7.9, 2 units pRBCs if Hgb 6.0-6.9 g/dl, 1 unit platelets if PLT count is <10,000 or <50,000 with clinical bleeding.   - Type & Screen MWF     ID  # Prophylaxis  Patient is not currently neutropenic.  - PTA Acyclovir 400 mg BID  - PTA voriconazole 200 mg BID  - Hold antibiotic/antifungal ppx, start if ANC is <1000; restarted per Dr. Garcia's OP note/plan at time of discharge  - PTA levofloxacin 500 mg daily     # H/o morbiliform rash, improving  Noted during previous hospital admission (9/23/2023 AM).  Developed approximately 2 weeks after 7+3 induction chemotherapy and 2-3 days after cephalosporins. Presumed drug rash.  Evaluated by dermatology.  Grandy most likely multifactorial and secondary to cephalosporins, considering a component of toxic erythema of chemotherapy (JONATHAN).  Oncology attending at the time did not feel JONATHAN/cytarabine as likely related given the presentation.  Rash significantly improved after discontinuation of cefepime  which was added to allergy list.    - At time of hospital admission (10/13), there is no evidence of erythematous or maculopapular rash.  Mild skin peeling seen to the bilateral palms and bilateral shins, which Artie reports is improving when compared with prior.  We discussed possible risk of developing rash/toxic erythema of chemotherapy given treatment plan including high-dose cytarabine and possible previous reaction, though it still remains somewhat unclear of etiology of previous rash.  I discussed the above with patient's primary oncologist, Dr. Garcia, and he agrees recommends to proceed with HiDAC as scheduled.  Discussed risks with patient and his wife Aundrea, who expressed understanding, and are agreeable to proceed with chemotherapy as recommended and prescribed by his outpatient oncologist. Consider dermatology consult if new or changing skin rashes present.  - Monitor closely; no pruritus or rashes noted during inpatient admission     # Dental pain, resolved  Patient noting left upper molar painful over the last 1 week prior to admission.  Been taking Tylenol with relief.  On exam, there is mild gingival edema and erythema surrounding tooth #16, with tenderness associated.  No loose or avulsed dentition.  No palpable fluctuance, evidence of periapical abscess.  - CT dental ordered: 10/13 with periapical disease of right first maxillary molar (no associated symptoms)  - Follow up with dental as outpatient when appropriate      Chronic/MISC  # HTN  Noted during prior hospitalization for brain abscess in 2021. Previously on lisinopril, which he is no longer taking.  - monitor during admission     # BPH  Notes difficulty with complete emptying of bladder. Previously on Flomax but states it did nothing, so he stopped it. He does note frequent urination at baseline, currently urination remains unchanged from baseline.  - Monitor clinically     FEN   - IVF per chemotherapy regimen, IVF bolus prn   - PRN lyte  "replacement per standing protocol  - Regular diet as tolerated       Patient was staffed with Dr. Melania Chowdhury PA-C  Hematology/Oncology  Page: #[phone]    I spent >75 minutes in the care of this patient today, which included time necessary for review of interval events, obtaining history and physical exam, ordering medication(s)/test(s) as medically indicated, discussion with interdisciplinary/consult team(s), and documentation time. Over 50% of time was spent face-to-face and/or coordinating care.      Pending Results   These results will be followed up by outpatient heme/onc team  Unresulted Labs Ordered in the Past 30 Days of this Admission       Date and Time Order Name Status Description    9/25/2023  4:30 PM Transfusion Reaction Extended In process     9/24/2023  9:05 AM Prepare red blood cells (unit) Preliminary     9/2/2023  6:40 PM CONDITIONAL Prepare red blood cells (unit) Preliminary     9/2/2023  2:59 PM CONDITIONAL Prepare red blood cells (unit) Preliminary     9/2/2023  2:07 PM Prepare pheresed platelets (unit) Preliminary             Code Status   Full Code    Primary Care Physician   Feroz Barger    /70 (BP Location: Left arm)   Pulse 85   Temp 97.5  F (36.4  C) (Oral)   Resp 16   Ht 1.695 m (5' 6.73\")   Wt 83.5 kg (184 lb)   SpO2 98%   BMI 29.05 kg/m      Constitutional: Awake and conversational. Non- toxic appearing. No acute distress. Well developed, hydrated, and nourished. Sitting in bedside chair.  HEENT: NCAT. Sclerae anicteric. PERRLA. EOM intact. Moist mucus membranes without lesions, thrush, or exudates appreciated. No gingival erythema or edema; dentition intact. No palpable fluctuance or evidence of periapical abscess.  Floor mouth soft, handling secretions.   Lymph: Neck supple, no ridigity. No significant adenopathy noted.   Respiratory: Breathing comfortably on room air with no accessory muscle use. Speaking in full sentences, no evidence of respiratory " distress. Lungs CTAB without stridor, wheeze, rhonchi, or rales.   Cardiovascular: Regular rate and rhythm. 2+ radial pulses bilaterally. No peripheral edema.    GI: Abdomen with normoactive bowel sounds, soft and non-tender throughout. No rebound, guarding, or peritoneal sign.   Skin: Skin is clean, dry, intact.  Peeling noted to bilateral palms of hands, and minimally over anterior bilateral legs.  No evidence of maculopapular erythematous rash on exposed surfaces.    Neurologic: alert and oriented with normal speech. Grossly nonfocal. Memory and thought process preserved. Motor function normal with 5/5 muscle strength bilaterally to UE and LE. Sensation intact to light touch bilaterally.   Neuropsychiatric: Calm, affect congruent to situation. Appropriate mood and affect      Time Spent on this Encounter   Sheree GOFF PA-C, personally saw the patient today and spent greater than 30 minutes discharging this patient.    Discharge Disposition   Discharged to home  Condition at discharge: Stable    Consultations This Hospital Stay   PHYSICAL THERAPY ADULT IP CONSULT    Discharge Orders      Reason for your hospital stay    You were in the hospital for scheduled chemotherapy (HiDAC). We are happy you have tolerated this well thus far.     Activity    Your activity upon discharge: activity as tolerated     When to contact your care team    Please call the [facility] at [phone] if you develop temperature above 100.4, shortness of breath, chest pain, headaches, vision changes, bleeding, uncontrolled nausea, vomiting, diarrhea, pain, or any other signs or symptoms of concern. If you are concerned that your symptoms are life-threatening, don't hesitate to call 911 or go to the nearest Emergency Room.     [facility]ult [facility] Follow-up and recommended labs and tests    Follow up for labs/transfusions at [facility] in [place]  Follow up for Neulasta injection in [place]  10/17**  Follow up with virtual visit for hospital follow up 10/23/23   Follow up with Dr. Garcia 11/10    Appointments on [place] and/or [facility] (with [facility] or [facility] provider or service). Call [phone] if you haven't heard regarding these appointments within 7 days of discharge.     Discharge Instructions    You were admitted for scheduled chemotherapy (HiDAC). We are happy you have tolerated well thus far.     Please take your medications as prescribed.   - Levofloxacin 500 mg daily   - Voriconazole 200 mg twice daily  - Acyclovir 400 mg twice daily  - PredForte eye drops 4 times daily through 10/18/23    Follow up appointments:  - Labs/transfusions 2x/weekly in [place]  - Neulasta injection 10/17**  - Virtual visit for hospital follow up 10/23  - Follow up with Dr. Garcia 11/10    Contact the [facility] ([phone]) Monday - Friday during regular business hours (8-4:30), or call the main hospital number ([phone]) and request to speak with the on-call hematology/oncology physician (after hours and weekends)  for temperature > or = 100.4, shortness of breath, chest pain, headaches, dizziness, loss of consciousness, vision changes, bleeding, uncontrolled nausea, vomiting, diarrhea, pain, or any other new or concerning symptoms.      Sheree Salazar PA-C     Diet    Follow this diet upon discharge:     Regular Diet Adult     Check Out Appointment Request    Please schedule MARIEL visit ~10/23 and MD appt ~11/6. Primary oncologist is Dr. Garcia     Discharge Medications   Discharge Medication List as of 10/16/2023  2:13 PM        START taking these medications    Details   prednisoLONE acetate (PRED FORTE) 1 % ophthalmic suspension Place 2 drops into both eyes 4 times daily To continue through 10/18/2023, Disp-20 mL, R-0, E-Prescribe           CONTINUE these medications which have NOT CHANGED    Details   acetaminophen (TYLENOL) 325 MG tablet Take 650 mg by mouth every 6  hours as needed for mild pain, Historical      acyclovir (ZOVIRAX) 400 MG tablet Take 1 tablet (400 mg) by mouth 2 times daily, Disp-60 tablet, R-0, E-Prescribe      cetirizine (ZYRTEC) 10 MG tablet Take 1 tablet (10 mg) by mouth daily, Historical      levofloxacin (LEVAQUIN) 500 MG tablet Take 1 tablet (500 mg) by mouth daily, Disp-30 tablet, R-0, E-Prescribe      ondansetron (ZOFRAN) 4 MG tablet Take 1 tablet (4 mg) by mouth every 8 hours as needed for nausea or vomiting, Disp-20 tablet, R-0, E-Prescribe      prochlorperazine (COMPAZINE) 5 MG tablet Take 1 tablet (5 mg) by mouth every 6 hours as needed for nausea or vomiting, Disp-20 tablet, R-0, E-Prescribe      triamcinolone (KENALOG) 0.1 % external ointment Apply topically 2 times daily as needed for irritation (rash)Disp-30 g, [id number]      voriconazole (VFEND) 200 MG tablet Take 1 tablet (200 mg) by mouth every 12 hours, Disp-60 tablet, R-0, E-Prescribe           Allergies   Allergies   Allergen Reactions    Iodinated Contrast Media Rash    Cefepime Rash    Ceftriaxone Rash     Reported history at Outside hospital 2021    Ragweeds Other (See Comments)     Congestion      Data   Most Recent 3 CBC's:  Recent Labs   Lab Test 10/16/23  0305 10/15/23  0333 10/14/23  0345   WBC 8.8 7.4 5.0   HGB 8.9* 9.1* 10.0*   MCV 92 92 92    290 291      Most Recent 3 BMP's:  Recent Labs   Lab Test 10/16/23  0305 10/15/23  0333 10/14/23  0345    138 138   POTASSIUM 3.9 4.1 4.3   CHLORIDE 103 104 102   CO2 25 25 23   BUN 18.3 18.0 15.2   CR 0.66* 0.72 0.69   ANIONGAP 12 9 13   VENANCIO 8.9 9.2 9.4   * 141* 136*     Most Recent 2 LFT's:  Recent Labs   Lab Test 10/16/23  0305 10/15/23  0333   AST 25 18   ALT 24 20   ALKPHOS 94 99   BILITOTAL 0.2 0.3     Most Recent INR's and Anticoagulation Dosing History:  Anticoagulation Dose History  More data exists         Latest Ref Rng & Units 10/1/2023 10/2/2023 10/3/2023 10/13/2023 10/14/2023 10/15/2023 10/16/2023    Recent Dosing and Labs   INR 0.85 - 1.15 1.31  1.19  1.19  1.13  1.04  1.10  1.02      Most Recent 3 Troponin's:No lab results found.  Most Recent Cholesterol Panel:No lab results found.  Most Recent 6 Bacteria Isolates From Any Culture (See EPIC Reports for Culture Details):No lab results found.  Most Recent TSH, T4 and A1c Labs:No lab results found.  Results for orders placed or performed during the hospital encounter of 10/13/23   CT Dental wo Contrast    Narrative    EXAM: CT DENTAL WO CONTRAST 10/13/2023 2:46 PM    HISTORY:  67 yo male with left upper dental pain    COMPARISON: None available.    TECHNIQUE: 3-D reconstruction by the technologists, with curved  multiplanar reformat of thin section imaging through the mandible and  maxilla obtained without intravenous contrast.    FINDINGS:  Dental hardware including multiple root canals, crowns and fillings.  Associated artifact, primarily involving the bilateral maxillary and  mandibular molars. Mild periapical lucency surrounding the roots of  the first right maxillary molar.    Mild mucosal thickening along the floor of the maxillary sinuses  bilaterally.    No significant soft tissue swelling or mass. Normal facial bone  alignment. No bony erosion. Normal temporomandibular joints. Partially  evaluated right craniotomy changes.      Impression    IMPRESSION:   Periapical disease of the right first maxillary molar.    I have personally reviewed the examination and initial interpretation  and I agree with the findings.    ANGELLA MOTTA MD         SYSTEM ID:  [id number]

## 2023-10-16 NOTE — PLAN OF CARE
Goal Outcome Evaluation:      Plan of Care Reviewed With: patient    Overall Patient Progress: no changeOverall Patient Progress: no change         Time: 3419-6183     Reason for admission: Consolidation chemo with HiDAC   Activity: UAL  Pain: denies  Neuro: WDL  Cardiac: WDL  Respiratory: WDL  GI/: WDL, LBM 10/15, voiding spontaneously, not saving  Diet: reg diet  Lines: PICC  Labs/imaging: Reviewed, no replacements indicated this shift.   Vitals: VSS      This Shift: No acute events this shift. Cares clustered.       Continue POC..

## 2023-10-16 NOTE — PLAN OF CARE
"Goal Outcome Evaluation:    0700-1930    /66 (BP Location: Left arm)   Pulse 89   Temp 98  F (36.7  C) (Oral)   Resp 18   Ht 1.695 m (5' 6.73\")   Wt 82.1 kg (181 lb)   SpO2 96%   BMI 28.58 kg/m      Reason for admission: consolidation chemo with HiDAC  Activity: UAL  Pain: denies  Neuro: WNL, cerebellar assessment unchanges  Cardiac: WNL  Respiratory: WNL  GI/: WNL  Diet: regular, tolerating good PO intake  Lines:  dbl PICC  Wounds: n/a  Labs/imaging: see chart      New changes this shift: No acute changes, Artie continues to be in good spirits and is tolerating his chemo well.  Frequently walking.     Plan: Continue with consolidation and provide supportive cares.      Continue to monitor and follow POC    "

## 2023-10-16 NOTE — PROGRESS NOTES
Nursing Focus: Chemotherapy  D: Positive brisk bright red blood return via PICC. Insertion site is clean/dry/intact, dressing intact with no complaints of pain.  Urine output is recorded in intake Doc Flowsheet.    I: Premedications given per order (see electronic medical administration record). Dose #6 of HD Cytarabine started to infuse over 3 hours. Reviewed pt teaching on chemotherapy side effects.  Pt denies need for further teaching. Chemotherapy double checked per protocol by two chemotherapy competent RN's.   A: Tolerating chemo well. Denies nausea.   P: Continue to monitor urine output and symptoms of nausea. Screen for symptoms of toxicity.

## 2023-10-16 NOTE — PLAN OF CARE
Problem: Care Coordination Assessment  Goal: Care Coordinator Assessment  Description: -Safe discharge placement   Goal Outcome Evaluation:  Outcome: Met

## 2023-10-16 NOTE — PROGRESS NOTES
Discharge to Home:    D: Orders for discharge and outpatient medications written. Pt verbalized readiness to discharge to home this afternoon.  I: PICC line removed per MD order and in accordance to protocol. Home medications and return to clinic schedule reviewed with patient and wife Aundrea. Discharge instructions and parameters for calling Health Care Provider reviewed. Patient ambulated off of the unit at 1420 accompanied by his wife Aundrea.   A: Patient/wife verbalized understanding of all discharge medications and discharge paperwork and was ready for discharge.   P: Patient instructed to  medications at the discharge pharmacy prior to leaving the hospital. Follow up as scheduled/documented on discharge paperwork.

## 2023-10-16 NOTE — PROGRESS NOTES
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    LABS:  [ X ] Check CBC with differential and CMP twice a week starting 10/18/23 and continue through 2023 (fax labs to [facility] at [phone])  [ X ] Type and cross PRN    TRANSFUSION PARAMETERS:   [ X ] Please transfuse 1 (one) units irradiated PRBCs if Hgb is 7.0-7.9, 2 units irradiated pRBCs if Hgb 6.0-6.9 g/dl.  [ X ] Please transfuse 1 (one) unit irradiated platelets if plt count less than or equal to 10.   [ X ] If patient experiences transfusion reaction during transfusion:   [ X ] 25-50 mg benadryl IV x once PRN   [ X ] Tylenol 1000 mg PO x once PRN   [ X ] Hydrocortisone 100 mg IV x once PRN   [ X ] Pepcid 40 mg IV x once PRN   [ X ] Notify provider covering infusion clinic per protocol      Please call the [facility] at [phone] for any questions, and ask to speak with the care coordinator for Dr. Garcia (patient's primary oncologist).    Thank you,         Sheree Chowdhury PA-C    [facility]  Department of Hematology/Oncology  [address] [place] [id number]  Pager [phone]

## 2023-10-16 NOTE — PROGRESS NOTES
Care Management Discharge Note    Discharge Date: 10/16/2023       Discharge Disposition:  home with family    Discharge Services:  outpatient labs & transfusions, outpatient neulasta injection to happen on 10/17/23 or 10/18/23.    Discharge DME:  outpatient lab & transfusion supplies, neulasta medication & supplies, supplied by [facility]    Discharge Transportation:  friend/family to provide    Private pay costs discussed: Not applicable    Does the patient's insurance plan have a 3 day qualifying hospital stay waiver?  No    PAS Confirmation Code:  n/a  Patient/family educated on Medicare website which has current facility and service quality ratings:  n/a    Education Provided on the Discharge Plan:  yes  Persons Notified of Discharge Plans: [facility]  Patient/Family in Agreement with the Plan:  yes    Handoff Referral Completed: Yes    Additional Information:  Patient needing labs & PRN transfusions twice weekly, and also needing neulasta injection on 10/17/23 or 10/18/23.  Per ARI Interiano, patient can also start twice weekly labs/PRN transfusions the same day he gets his neulasta injection.  Writer called [facility] and spoke to chemo nurse, who is aware patient is an established patient with them.  Chemo nurse requested writer fax hard copy signed order for neulasta, labs & PRN transfusions.  Writer faxed order & confirmed receipt via RightFax.  Chemo nurse states that once they have prior authorization they will call patient to schedule.  ARI Bentley, would like to be alerted once prior authorization completed by [facility].  Writer requested call when prior authorization is complete (writer included this request on fax cover sheet for faxed order).      RNCC will verify with [facility] that prior authorization prior to patient discharge, as requested by ARI Bentley.     [facility]  [address] [place] [address]  Phone: [phone]  Fax:  [phone]  RNCC will continue to follow for discharge planning.     1:45 PM--[facility] called writer back stating they have received prior authorization for this patient's needed medical services.  Writer updated ARI Bentley, and bedside nurse, Tram.       Josie George, RN, BSN  RN Care Coordinator    5A Medical Oncology/5C non-BMT  5A beds 7827-6293  5C beds 6457-2199 (non-BMT)  [address] [address]  [email or web address]  Gender pronouns: she/her  Pager: [phone]

## 2023-10-17 ENCOUNTER — PATIENT OUTREACH (OUTPATIENT)
Dept: CARE COORDINATION | Facility: CLINIC | Age: 68
End: 2023-10-17
Payer: COMMERCIAL

## 2023-10-17 ENCOUNTER — PATIENT OUTREACH (OUTPATIENT)
Dept: ONCOLOGY | Facility: CLINIC | Age: 68
End: 2023-10-17
Payer: COMMERCIAL

## 2023-10-17 NOTE — PROGRESS NOTES
"[facility]: Post-Discharge Note  SITUATION                                                      Admission:    Admission Date: 10/13/23   Reason for Admission: chemotherapy  Discharge:   Discharge Date: 10/16/23  Discharge Diagnosis: AML    BACKGROUND                                                      Per hospital discharge summary and inpatient provider notes.      ASSESSMENT           Discharge Assessment  How are you doing now that you are home?: \"Feeling great, feeling no side effects as of yet\"  How are your symptoms? (Red Flag symptoms escalate to triage hotline per guidelines): Unchanged  Do you feel your condition is stable enough to be safe at home until your provider visit?: Yes  Does the patient have their discharge instructions? : Yes  Does the patient have questions regarding their discharge instructions? : No  Were you started on any new medications or were there changes to any of your previous medications? : No  Does the patient have all of their medications?: Yes  Do you have questions regarding any of your medications? : No  Do you have all of your needed medical supplies or equipment (DME)?  (i.e. oxygen tank, CPAP, cane, etc.): Yes  Discharge follow-up appointment scheduled within 14 calendar days? : Yes  Discharge Follow Up Appointment Date: 10/23/23        PLAN                                                      Outpatient Plan:      Future Appointments   Date Time Provider Department Center   10/23/2023  1:30 PM Lyndsey Eng APRN CNP [facility]   11/10/2023  1:00 PM Donis Garcia MD [facility]   2/5/2024  1:00 PM Ramon Carbajal DO [facility]         For any urgent concerns, please contact our 24 hour clinic line:   [place]: [phone]       Susan Santos, RN, BSN  RN Care Coordinator   [address] [place] [address]   [phone]               "

## 2023-10-17 NOTE — PROGRESS NOTES
[facility]    Background: Transitional Care Management program auto-identified and prompting a chart review by [facility] team.    Assessment: Upon chart review, [facility] Team member will Enroll this episode of Transitional Care Management program due to reason below:    Upon chart review, patient is followed by [facility] team who follow their patients closely. [facility] will not conduct outreach to avoid duplication of outreach to patient.     Plan: Transitional Care Management episode enrolled per reason above.      *Connected Care Resource Team does NOT follow patient ongoing. Referrals are identified based on internal discharge reports and the outreach is to ensure patient has an understanding of their discharge instructions.

## 2023-10-22 ENCOUNTER — HEALTH MAINTENANCE LETTER (OUTPATIENT)
Age: 68
End: 2023-10-22

## 2023-10-23 ENCOUNTER — PATIENT OUTREACH (OUTPATIENT)
Dept: ONCOLOGY | Facility: CLINIC | Age: 68
End: 2023-10-23
Payer: COMMERCIAL

## 2023-10-23 ENCOUNTER — VIRTUAL VISIT (OUTPATIENT)
Dept: ONCOLOGY | Facility: CLINIC | Age: 68
End: 2023-10-23
Attending: NURSE PRACTITIONER
Payer: COMMERCIAL

## 2023-10-23 VITALS — WEIGHT: 186.25 LBS | HEIGHT: 67 IN | BODY MASS INDEX: 29.23 KG/M2

## 2023-10-23 DIAGNOSIS — C95.00 ACUTE LEUKEMIA NOT HAVING ACHIEVED REMISSION (H): Primary | ICD-10-CM

## 2023-10-23 DIAGNOSIS — C92.01 ACUTE MYELOID LEUKEMIA IN REMISSION (H): Primary | ICD-10-CM

## 2023-10-23 PROCEDURE — 99496 TRANSJ CARE MGMT HIGH F2F 7D: CPT | Mod: 95 | Performed by: NURSE PRACTITIONER

## 2023-10-23 ASSESSMENT — PAIN SCALES - GENERAL: PAINLEVEL: NO PAIN (0)

## 2023-10-23 NOTE — NURSING NOTE
Is the patient currently in the state of [place]? YES    Visit mode:VIDEO    If the visit is dropped, the patient can be reconnected by: VIDEO VISIT: Text to cell phone:   Telephone Information:   Mobile [phone]       Will anyone else be joining the visit? NO    How would you like to obtain your AVS? MyChart    Are changes needed to the allergy or medication list? Pt stated no changes to allergies and Pt stated no med changes    Reason for visit: DAWOOD Kemp LPN

## 2023-10-23 NOTE — PROGRESS NOTES
Virtual Visit Details    Type of service:  Video Visit   Video Start Time: 13:30  Video End Time: 14:30  Originating Location (pt. Location): Home  Distant Location (provider location):  On-site  Platform used for Video Visit: David

## 2023-10-23 NOTE — LETTER
10/23/2023         RE: Artie Fine  [address]  [place] [address]        Dear Colleague,    Thank you for referring your patient, Artie Fine, to the [facility]. Please see a copy of my visit note below.    Virtual Visit Details    Type of service:  Video Visit   Video Start Time: 13:30  Video End Time: 14:30  Originating Location (pt. Location): Home  Distant Location (provider location):  On-site  Platform used for Video Visit: [employer]            [facility]    Hematology/Oncology Virtual Visit Note    October 23, 2023      Reason for Office Visit   Follow up post hospitalization for consolidation chemo    Cancer Diagnosis    AML, adverse risk, 9/1/23    Oncology History of Present Illness   Follows with Dr. Garcia.   Initially presented to [facility] in [place], [place] with 4-6 weeks of progressive bruising, weakness, fatigue, loss of appetite, and night sweats. He also noted a headache similar to his previous brain abscess CBC notable for pancytopenia; WBC 2.7 (), Hgb 8.1, and plt 1k. He was transferred to [facility] and was found on peripheral flow w/ 11% circulating myeloid blasts. He was then transferred to [facility] for further work up and treatment of AML.   9/1/23 BMBx performed 9/1/23 which showed AML with 30% blasts by morph and 47% by flow. P53 negative. FISH negative for MLLT10, NUP98, and KMT2A. Normal karyotype. NGS showed ASXL1, CEBPA, SRSF2, and STAG2 mutations.   9/2/23 started 7+3   9/8/23 diagnostic LP due to presenting sx including headache, without evidence of CNS involvement.   9/11/23 MRI brain negative, only showing chronic changes from h/o brain abscess.  Count recovery BMBx 10/9/23, without evidence of leukemia.   10/13/23  HiDAC (C1D1=10/13/2023)   Cytarabine 1.5 g/m2 q12 hours x 6 doses  Neulasta/pegfilgrastim with labs/transfusions 2 times weekly at local clinic.      Interval History   Discharged  home 10/16/23.   Doing well since being at home.    Appetite good.   Pred forte eyedrops 4 times daily (through 10/18/2023)  Received Neulasta.    Going to local clinic 2 x week for labs and potential transfusions.   Denies fevers/chills/n/v/d.     Past Medical History   No past medical history on file.      Past Surgical History:      Past Surgical History:   Procedure Laterality Date    PICC Right 2023    Placed R basilic 46 cm 1 external  without problem    PICC Right 10/13/2023    right basilic 5 fr dl power picc 40 cm     Social History     Socioeconomic History    Marital status:      Spouse name: None    Number of children: None    Years of education: None    Highest education level: None   Tobacco Use    Smoking status: Former     Types: Cigarettes     Quit date: 1983     Years since quittin.5    Smokeless tobacco: Current     Types: Snuff    Tobacco comments:     1 can per week       Allergies:     Allergies   Allergen Reactions    Iodinated Contrast Media Rash    Cefepime Rash    Ceftriaxone Rash     Reported history at Outside hospital     Ragweeds Other (See Comments)     Congestion        Medications:     Current Outpatient Medications   Medication    acetaminophen (TYLENOL) 325 MG tablet    acyclovir (ZOVIRAX) 400 MG tablet    cetirizine (ZYRTEC) 10 MG tablet    levofloxacin (LEVAQUIN) 500 MG tablet    ondansetron (ZOFRAN) 4 MG tablet    prednisoLONE acetate (PRED FORTE) 1 % ophthalmic suspension    prochlorperazine (COMPAZINE) 5 MG tablet    triamcinolone (KENALOG) 0.1 % external ointment    voriconazole (VFEND) 200 MG tablet     No current facility-administered medications for this visit.     Physical Exam   Objective:  General: patient appears well in no acute distress, alert and oriented, speech clear and fluid  Skin: no visualized rash or lesions on visualized skin  Resp: Appears to be breathing comfortably without accessory muscle usage, speaking in full sentences, no  audible wheezes or cough.  Psych: Coherent speech, normal rate and volume, able to articulate logical thoughts, able to abstract reason, no tangential thoughts, no hallucinations or delusions  Patient's affect is appropriate.    Data     Most Recent 3 CBC's:  Recent Labs   Lab Test 10/16/23  0305 10/15/23  0333 10/14/23  0345   WBC 8.8 7.4 5.0   HGB 8.9* 9.1* 10.0*   MCV 92 92 92    290 291   ANEUTAUTO 8.4* 6.6 3.9     Most Recent 3 BMP's:  Recent Labs   Lab Test 10/16/23  0305 10/15/23  0333 10/14/23  0345    138 138   POTASSIUM 3.9 4.1 4.3   CHLORIDE 103 104 102   CO2 25 25 23   BUN 18.3 18.0 15.2   CR 0.66* 0.72 0.69   ANIONGAP 12 9 13   VENANCIO 8.9 9.2 9.4   * 141* 136*   PROTTOTAL 6.6 7.0 7.2   ALBUMIN 3.8 3.8 3.9    Most Recent 3 LFT's:  Recent Labs   Lab Test 10/16/23  0305 10/15/23  0333 10/14/23  0345   AST 25 18 20   ALT 24 20 20   ALKPHOS 94 99 106   BILITOTAL 0.2 0.3 0.2     CT Dental wo Contrast  Narrative: EXAM: CT DENTAL WO CONTRAST 10/13/2023 2:46 PM    HISTORY:  67 yo male with left upper dental pain    COMPARISON: None available.    TECHNIQUE: 3-D reconstruction by the technologists, with curved  multiplanar reformat of thin section imaging through the mandible and  maxilla obtained without intravenous contrast.    FINDINGS:  Dental hardware including multiple root canals, crowns and fillings.  Associated artifact, primarily involving the bilateral maxillary and  mandibular molars. Mild periapical lucency surrounding the roots of  the first right maxillary molar.    Mild mucosal thickening along the floor of the maxillary sinuses  bilaterally.    No significant soft tissue swelling or mass. Normal facial bone  alignment. No bony erosion. Normal temporomandibular joints. Partially  evaluated right craniotomy changes.  Impression: IMPRESSION:   Periapical disease of the right first maxillary molar.    I have personally reviewed the examination and initial interpretation  and I agree  with the findings.    ANGELLA MOTTA MD         SYSTEM ID:  [id number]      Assessment/Plan   Acute Myeloid Leukemia  Completed Pred forte eyedrops 4 times daily   S/p day 11 consolidation HiDAC    Blood Transfusion Supportive Care  Transfuse irradiated products  1 unit pRBC if hgb is 7.0-7.9  2 units pRBCs if Hgb 6.0-6.9 g/dl  1 unit platelets if PLT count is <10,000 or <50,000 with clinical bleeding.   Type & Screen MWF    Immunosuppressed 2/2 malignancy and chemotherapy   ID Prophylaxis   Levofloxacin 500 mg daily  Voriconazole 200 mg twice daily  Acyclovir 400 mg twice daily    Plan from today's clinical encounter  Patient receiving labs/transfusions locally in [place], [place] 2x weekly. Orders faxed to [facility] by [facility].  MD visit with Dr. Garcia 11/10/23  Increase lab evaluations and transfusion support locally to 3 x week. RNCC to facilitate order.    Transition Care Management Services  Admission Date: 10/13/23    Discharge Date: 10/16/23    Discharge Diagnosis: AML    Interactive contact date: 10/17/2023  Face-to-face visit date: 10/23/2023  Medical complexity decision making: High complexity ([id number])      50 minutes spent on the date of the encounter doing chart review, review of outside records, review of test results, interpretation of tests, patient visit, documentation, discussion with other provider(s), and discussion with family     Lyndsey CHAPMAN, ANP-BC, AOCNP  [facility] [address] [address] [phone] (pager)

## 2023-10-23 NOTE — PROGRESS NOTES
[facility]: Cancer Care                                                                                          Lab orders faxed to [facility] for 3x weekly labs.   Called patient to notify that orders have been sent and he can schedule 3x weekly lab appointments with [facility].         Susan Santos, RN, BSN  RN Care Coordinator   [address] [place] [address] [phone]

## 2023-10-23 NOTE — PROGRESS NOTES
[facility]    Hematology/Oncology Virtual Visit Note    October 23, 2023      Reason for Office Visit   Follow up post hospitalization for consolidation chemo    Cancer Diagnosis    AML, adverse risk, 9/1/23    Oncology History of Present Illness   Follows with Dr. Garcia.   Initially presented to [facility] in [place], [place] with 4-6 weeks of progressive bruising, weakness, fatigue, loss of appetite, and night sweats. He also noted a headache similar to his previous brain abscess CBC notable for pancytopenia; WBC 2.7 (), Hgb 8.1, and plt 1k. He was transferred to [facility] and was found on peripheral flow w/ 11% circulating myeloid blasts. He was then transferred to [facility] for further work up and treatment of AML.   9/1/23 BMBx performed 9/1/23 which showed AML with 30% blasts by morph and 47% by flow. P53 negative. FISH negative for MLLT10, NUP98, and KMT2A. Normal karyotype. NGS showed ASXL1, CEBPA, SRSF2, and STAG2 mutations.   9/2/23 started 7+3   9/8/23 diagnostic LP due to presenting sx including headache, without evidence of CNS involvement.   9/11/23 MRI brain negative, only showing chronic changes from h/o brain abscess.  Count recovery BMBx 10/9/23, without evidence of leukemia.   10/13/23  HiDAC (C1D1=10/13/2023)   Cytarabine 1.5 g/m2 q12 hours x 6 doses  Neulasta/pegfilgrastim with labs/transfusions 2 times weekly at local clinic.      Interval History   Discharged home 10/16/23.   Doing well since being at home.    Appetite good.   Pred forte eyedrops 4 times daily (through 10/18/2023)  Received Neulasta.    Going to local clinic 2 x week for labs and potential transfusions.   Denies fevers/chills/n/v/d.     Past Medical History   No past medical history on file.      Past Surgical History:      Past Surgical History:   Procedure Laterality Date    PICC Right 09/02/2023    Placed R basilic 46 cm 1 external  without problem    PICC Right 10/13/2023     right basilic 5 fr dl power picc 40 cm     Social History     Socioeconomic History    Marital status:      Spouse name: None    Number of children: None    Years of education: None    Highest education level: None   Tobacco Use    Smoking status: Former     Types: Cigarettes     Quit date: 1983     Years since quittin.5    Smokeless tobacco: Current     Types: Snuff    Tobacco comments:     1 can per week       Allergies:     Allergies   Allergen Reactions    Iodinated Contrast Media Rash    Cefepime Rash    Ceftriaxone Rash     Reported history at Outside hospital     Ragweeds Other (See Comments)     Congestion        Medications:     Current Outpatient Medications   Medication    acetaminophen (TYLENOL) 325 MG tablet    acyclovir (ZOVIRAX) 400 MG tablet    cetirizine (ZYRTEC) 10 MG tablet    levofloxacin (LEVAQUIN) 500 MG tablet    ondansetron (ZOFRAN) 4 MG tablet    prednisoLONE acetate (PRED FORTE) 1 % ophthalmic suspension    prochlorperazine (COMPAZINE) 5 MG tablet    triamcinolone (KENALOG) 0.1 % external ointment    voriconazole (VFEND) 200 MG tablet     No current facility-administered medications for this visit.     Physical Exam   Objective:  General: patient appears well in no acute distress, alert and oriented, speech clear and fluid  Skin: no visualized rash or lesions on visualized skin  Resp: Appears to be breathing comfortably without accessory muscle usage, speaking in full sentences, no audible wheezes or cough.  Psych: Coherent speech, normal rate and volume, able to articulate logical thoughts, able to abstract reason, no tangential thoughts, no hallucinations or delusions  Patient's affect is appropriate.    Data     Most Recent 3 CBC's:  Recent Labs   Lab Test 10/16/23  0305 10/15/23  0333 10/14/23  0345   WBC 8.8 7.4 5.0   HGB 8.9* 9.1* 10.0*   MCV 92 92 92    290 291   ANEUTAUTO 8.4* 6.6 3.9     Most Recent 3 BMP's:  Recent Labs   Lab Test 10/16/23  0305  10/15/23  0333 10/14/23  0345    138 138   POTASSIUM 3.9 4.1 4.3   CHLORIDE 103 104 102   CO2 25 25 23   BUN 18.3 18.0 15.2   CR 0.66* 0.72 0.69   ANIONGAP 12 9 13   VENANCIO 8.9 9.2 9.4   * 141* 136*   PROTTOTAL 6.6 7.0 7.2   ALBUMIN 3.8 3.8 3.9    Most Recent 3 LFT's:  Recent Labs   Lab Test 10/16/23  0305 10/15/23  0333 10/14/23  0345   AST 25 18 20   ALT 24 20 20   ALKPHOS 94 99 106   BILITOTAL 0.2 0.3 0.2     CT Dental wo Contrast  Narrative: EXAM: CT DENTAL WO CONTRAST 10/13/2023 2:46 PM    HISTORY:  69 yo male with left upper dental pain    COMPARISON: None available.    TECHNIQUE: 3-D reconstruction by the technologists, with curved  multiplanar reformat of thin section imaging through the mandible and  maxilla obtained without intravenous contrast.    FINDINGS:  Dental hardware including multiple root canals, crowns and fillings.  Associated artifact, primarily involving the bilateral maxillary and  mandibular molars. Mild periapical lucency surrounding the roots of  the first right maxillary molar.    Mild mucosal thickening along the floor of the maxillary sinuses  bilaterally.    No significant soft tissue swelling or mass. Normal facial bone  alignment. No bony erosion. Normal temporomandibular joints. Partially  evaluated right craniotomy changes.  Impression: IMPRESSION:   Periapical disease of the right first maxillary molar.    I have personally reviewed the examination and initial interpretation  and I agree with the findings.    ANGELLA MOTTA MD         SYSTEM ID:  [id number]      Assessment/Plan   Acute Myeloid Leukemia  Completed Pred forte eyedrops 4 times daily   S/p day 11 consolidation HiDAC    Blood Transfusion Supportive Care  Transfuse irradiated products  1 unit pRBC if hgb is 7.0-7.9  2 units pRBCs if Hgb 6.0-6.9 g/dl  1 unit platelets if PLT count is <10,000 or <50,000 with clinical bleeding.   Type & Screen MWF    Immunosuppressed 2/2 malignancy and chemotherapy   ID  Prophylaxis   Levofloxacin 500 mg daily  Voriconazole 200 mg twice daily  Acyclovir 400 mg twice daily    Plan from today's clinical encounter  Patient receiving labs/transfusions locally in [place], [place] 2x weekly. Orders faxed to [facility] by [facility].  MD visit with Dr. Garcia 11/10/23  Increase lab evaluations and transfusion support locally to 3 x week. RNCC to facilitate order.    Transition Care Management Services  Admission Date: 10/13/23    Discharge Date: 10/16/23    Discharge Diagnosis: AML    Interactive contact date: 10/17/2023  Face-to-face visit date: 10/23/2023  Medical complexity decision making: High complexity ([id number])      50 minutes spent on the date of the encounter doing chart review, review of outside records, review of test results, interpretation of tests, patient visit, documentation, discussion with other provider(s), and discussion with family     Lyndsey CHAPMAN, ANP-BC, AOCNP  [facility]  [address] [place] [id number] [phone] (pager)

## 2023-10-25 ENCOUNTER — NURSE TRIAGE (OUTPATIENT)
Dept: ONCOLOGY | Facility: CLINIC | Age: 68
End: 2023-10-25
Payer: COMMERCIAL

## 2023-10-25 NOTE — TELEPHONE ENCOUNTER
DATE:  10/25/2023   TIME OF RECEIPT FROM LAB:  0746, this writer advised that caller notify local oncology clinic about the low platelets.  Critical lab value:  WBC: 1.3, plts: 5; Other Hgb: 8.6, preliminary.   Last lab values:  10/16/23: WBC: 8.8, Plts: 292, Hgb: 8.9, ANC: 8.4  Provider Notified: Yes  Provider Name: Lyndsey Eng CNP  Date/Time lab value reported to provider: 10/25/23 0753  Mechanism of provider notification: secure chat  Provider response: Per RNCC, pt was called and he is still at the clinic where he had his labs drawn. He is aware that his platelets are low and stated to the RNCC that the chemotherapy nurse is just coming out to talk to him. States he will get the platelet transfusion today. RNCC informed pt that if he does notcannot get plt transfusion today, he should call back to RNCC. Pt states he will get the transfusion today.  Message routed to Lyndsey Eng CNP and Susan Santos RNCC

## 2023-10-27 ENCOUNTER — PATIENT OUTREACH (OUTPATIENT)
Dept: ONCOLOGY | Facility: CLINIC | Age: 68
End: 2023-10-27
Payer: COMMERCIAL

## 2023-10-27 NOTE — PROGRESS NOTES
[facility]: Cancer Care                                                                                          [facility] cancer clinic returned phone call to writer. Stated that she has scheduled Artie for labs and transfusions 3x week for next week and that they schedule on a weekly basis.   She stated that their fax machine doesn't always get faxes. Will fax new order for 3x weekly labs to fax number provided.         Susan Santos, RN, BSN  RN Care Coordinator   [address] [place] [address] [phone]

## 2023-10-27 NOTE — PROGRESS NOTES
[facility]: Cancer Care Follow-Up Note                                    Discussion with Patient:                                                      Reviewed labs. Platelets of Jemal Alva is sitting at [facility] waiting for his lab results. Informed patient that he should get a platelet transfusion today before the weekend. He verbalized understanding that he would.   Stated that [facility] does not receive faxes and requested that RNCC call them for orders.         Dates of Treatment:                                                      Infusion given in last 28 days       Administered MAR Action Medication Dose Rate Visit    10/13/2023 16:16 New Bag cytarabine (PF) (CYTOSAR) 3,000 mg in D5W 305 mL infusion 3,000 mg 101.7 mL/hr Admission (Discharged) on 10/13/2023 in [address] [facility]    10/14/2023 04:39 New Bag cytarabine (PF) (CYTOSAR) 3,000 mg in D5W 305 mL infusion 3,000 mg 101.7 mL/hr Admission (Discharged) on 10/13/2023 in [address] [facility]    10/14/2023 05:38 Restarted cytarabine (PF) (CYTOSAR) 3,000 mg in D5W 305 mL infusion   101.7 mL/hr Admission (Discharged) on 10/13/2023 in [address] [facility]    10/14/2023 16:05 New Bag cytarabine (PF) (CYTOSAR) 3,000 mg in D5W 305 mL infusion 3,000 mg 101.7 mL/hr Admission (Discharged) on 10/13/2023 in [address] [facility]    10/15/2023 04:02 New Bag cytarabine (PF) (CYTOSAR) 3,000 mg in D5W 305 mL infusion 3,000 mg 101.7 mL/hr Admission (Discharged) on 10/13/2023 in [address] [facility]    10/15/2023 16:09 New Bag cytarabine (PF) (CYTOSAR) 3,000 mg in D5W 305 mL infusion 3,000 mg 101.7 mL/hr Admission (Discharged) on 10/13/2023 in [address] [facility]    10/16/2023 04:01 New Bag cytarabine (PF) (CYTOSAR) 3,000 mg in D5W 305 mL infusion 3,000 mg 101.7 mL/hr Admission (Discharged) on 10/13/2023 in [facility]            Assessment:                                                       See discussion above      Intervention/Education provided during outreach:                                                       Writer called [facility] oncology and was only able to reach a voicemail. Left a message and requested return phone call to talk about Artie's appointments for 3x week labs and transfusion needs as well as inquiring about if they have received our faxed orders and if not where is the best place to send those orders. Left RNCC direct line on voicemail.     Susan Santos, RN, BSN  RN Care Coordinator   [address] [place] [id number] [phone]

## 2023-10-31 ENCOUNTER — PATIENT OUTREACH (OUTPATIENT)
Dept: ONCOLOGY | Facility: CLINIC | Age: 68
End: 2023-10-31
Payer: COMMERCIAL

## 2023-10-31 DIAGNOSIS — C95.00 ACUTE LEUKEMIA NOT HAVING ACHIEVED REMISSION (H): ICD-10-CM

## 2023-10-31 RX ORDER — VORICONAZOLE 200 MG/1
200 TABLET, FILM COATED ORAL EVERY 12 HOURS
Qty: 60 TABLET | Refills: 0 | Status: ON HOLD | OUTPATIENT
Start: 2023-10-31 | End: 2023-11-19

## 2023-10-31 RX ORDER — LEVOFLOXACIN 500 MG/1
500 TABLET, FILM COATED ORAL DAILY
Qty: 30 TABLET | Refills: 0 | Status: ON HOLD | OUTPATIENT
Start: 2023-10-31 | End: 2023-11-19

## 2023-10-31 RX ORDER — ACYCLOVIR 400 MG/1
400 TABLET ORAL 2 TIMES DAILY
Qty: 60 TABLET | Refills: 0 | Status: ON HOLD | OUTPATIENT
Start: 2023-10-31 | End: 2023-11-19

## 2023-10-31 NOTE — PROGRESS NOTES
[facility]: Cancer Care                                                                                          Call from [facility] stating that Artie is requesting refills on his prophylactic medications.   Refills sent to [facility].     Susan Santos, RN, BSN  RN Care Coordinator   [address] [place] [id number] [phone]

## 2023-11-08 ENCOUNTER — PATIENT OUTREACH (OUTPATIENT)
Dept: ONCOLOGY | Facility: CLINIC | Age: 68
End: 2023-11-08
Payer: COMMERCIAL

## 2023-11-08 NOTE — PROGRESS NOTES
[facility]: Cancer Care                                                                                          Artie calls and leaves a message asking about his upcoming appointment with Dr. Garcia on Friday.  Artie has to travel 2-1/2 hours and wonders if he will be staying and being admitted.  He needs to coordinate his transportation.    I called Artie back to let him know that Dr. Garcia was not in the office today.  I have a message out to Dr. Garcia to confirm the plan.  We will call him back as soon as we are able     Signature:  Beverly Ravi RN

## 2023-11-09 ENCOUNTER — TELEPHONE (OUTPATIENT)
Dept: TRANSPLANT | Facility: CLINIC | Age: 68
End: 2023-11-09
Payer: COMMERCIAL

## 2023-11-09 ENCOUNTER — PATIENT OUTREACH (OUTPATIENT)
Dept: ONCOLOGY | Facility: CLINIC | Age: 68
End: 2023-11-09
Payer: COMMERCIAL

## 2023-11-09 DIAGNOSIS — Z86.2 PERSONAL HISTORY OF DISEASES OF BLOOD AND BLOOD-FORMING ORGANS: ICD-10-CM

## 2023-11-09 DIAGNOSIS — Z11.59 SCREENING FOR VIRAL DISEASE: ICD-10-CM

## 2023-11-09 DIAGNOSIS — C92.01 ACUTE MYELOID LEUKEMIA IN REMISSION (H): Primary | ICD-10-CM

## 2023-11-09 DIAGNOSIS — Z01.818 PREOP EXAMINATION: ICD-10-CM

## 2023-11-09 DIAGNOSIS — Z76.82 STEM CELL TRANSPLANT CANDIDATE: ICD-10-CM

## 2023-11-09 NOTE — PROGRESS NOTES
[facility]: Cancer Care Short Note                                                                                          Outgoing Call: RNCC contacted patient to update him that he will not need to be admitted after his office visit with Dr. Garcia tomorrow for further treatment. Tomorrow will be an office visit only to discuss plan of care.     RNCC also reviewed patients labs from Care Everywhere. Counts look to be improving and patient was pleased with these results.     RADHA HuitronN, RN  RN Care Coordinator   [phone]

## 2023-11-09 NOTE — TELEPHONE ENCOUNTER
BMT CSW Telephone Encounter  Clinical Social Work  [facility]      Focus: Lodging/[facility] Referral    Data: Pt is a 68 year old male diagnosed with AML who will be undergoing allogeneic transplant workup next week.    Interventions: Clinical  (CSW) spoke w/ pt via phone on 11/9/23 to assist with [facility] referral.  Pt shared he and his wife Aundrea would like to stay at [facility] during workup week (11/14-11/21). He also shared that Aundrea will likely want to stay at [facility] during pt's hospitalization. SW agreed to send [facility] referral for 11/14-11/21 and explained that another referral can be sent once pt's admit date is confirmed and after pt discusses further with Aundrea. SW explained that pt will meet with primary JAVID Mast during work up week and relocation can be further discussed at this time if other questions or requests come up. Pt expressed understanding and appreciation.  Pt mentioned that he has been wondering about BMT insurance coverage. SW provided financial  and encouraged pt to call or e-mail w/ transplant specific insurance questions. Pt was also encouraged to contact CSW for further support, questions and/or resources. Pt did not have any further questions or concerns at this time.    [facility] referral completed for 11/14/23-11/21/23.     Assessment: Pt presented as friendly and appreciative.  Pt appears to be coping well at this time. Pt continues to be supported by his wife Aundrea.     Plan: CSW will continue to work with Pt and family to provide supportive counseling and assist with resources as needed. CSW will continue to collaborate with multidisciplinary team regarding pt's plan of care.     Tawana George, POLO, [facility]   Clinical   [facility]  Adult Blood and Marrow Transplant and Cellular Therapy Program  Phone: [phone]  Pager: [phone]

## 2023-11-10 ENCOUNTER — VIRTUAL VISIT (OUTPATIENT)
Dept: ONCOLOGY | Facility: CLINIC | Age: 68
End: 2023-11-10
Attending: PHYSICIAN ASSISTANT
Payer: COMMERCIAL

## 2023-11-10 ENCOUNTER — TELEPHONE (OUTPATIENT)
Dept: TRANSPLANT | Facility: CLINIC | Age: 68
End: 2023-11-10

## 2023-11-10 VITALS — HEIGHT: 68 IN | BODY MASS INDEX: 29.7 KG/M2 | WEIGHT: 196 LBS

## 2023-11-10 DIAGNOSIS — C92.01 ACUTE MYELOID LEUKEMIA IN REMISSION (H): Primary | ICD-10-CM

## 2023-11-10 PROCEDURE — 99215 OFFICE O/P EST HI 40 MIN: CPT | Mod: 95 | Performed by: STUDENT IN AN ORGANIZED HEALTH CARE EDUCATION/TRAINING PROGRAM

## 2023-11-10 ASSESSMENT — PAIN SCALES - GENERAL: PAINLEVEL: NO PAIN (0)

## 2023-11-10 NOTE — LETTER
11/10/2023         RE: Artie Fine  [address]  [place] [address]        Dear Colleague,    Thank you for referring your patient, Artie Fine, to the [facility]. Please see a copy of my visit note below.    Virtual Visit Details    Type of service:  Video Visit     Originating Location (pt. Location): Home    Distant Location (provider location):  On-site  Platform used for Video Visit: [employer]  Total time 18 mins.    [facility]  HEMATOLOGY & ONCOLOGY  FOLLOW UP VISIT    PATIENT NAME: Artie Fine          MRN # [id number]  YOB: 1955  DATE OF VISIT: 11/10/2023              REFERRING PROVIDER:   Mr. Artie Fine was seen at the request of [facility] for further evaluation and/or management.       History of Presenting Illness  Mr. Artie Fine is a pleasant 68 year old male with a past medical history significant for hernia surgery in 2021 followed by a brain abscess 2 months later and who noticed increased fatigue since mid June and presented to the hospital and was found to have pancytopenia and circulating blasts. A bone marrow biopsy on 9/1/2023 confirmed a diagnosis of AML with normal karyotype and NGS with ASXL1 , SRSF2, STAG2, CEBPA now s/p 7+3 started on 9/2/2023 with day 14 marrow on 9/15 showing hypocellular marrow with no increased blast and now a recovery marrow on day 38 on 10/9 showing no increased blasts with hypocellular marrow 20-30%. Cytogenetics and NGS pending. He presents today for further evaluation of AML. He was diagnosed with AML in 9/1/2023 and is here to establish at the [facility].      Subjective  This is a video visit. Patient participates in the conversation with his wife Aundrea.  He states that he has been continuing to do well and has been up and about and doing multiple activities.  However he has not started any regimented exercise regimen at this point of time.  Denies any fevers or chills and bleeding from  anywhere including BRBPR or melena. Compliant on all of his medications.      Past Medical History  No past medical history on file.    Family History  No family history on file.  Mother Lung cancer, breast cancer  Brother Lung cancer,   2 sisters one Breast cancer, another sister Colon cancer and passed away this year with brain mets    Social History  Smoking: Former smoker:  1 packs/day for 10-15 years:  Quit:  40 yrs back  Alcohol use: rarely drinks alcohol  Status:  43 years.   Children/grandchildren: 3 children, 2 daughters 36 (never pregnant) and son 39. No grandchildern.  Occupation: Contractor/ furniture retired 2017    Current Outpatient Medications  Current Outpatient Medications   Medication Sig Dispense Refill    acetaminophen (TYLENOL) 325 MG tablet Take 650 mg by mouth every 6 hours as needed for mild pain      acyclovir (ZOVIRAX) 400 MG tablet Take 1 tablet (400 mg) by mouth 2 times daily 60 tablet 0    cetirizine (ZYRTEC) 10 MG tablet Take 1 tablet (10 mg) by mouth daily      levofloxacin (LEVAQUIN) 500 MG tablet Take 1 tablet (500 mg) by mouth daily 30 tablet 0    ondansetron (ZOFRAN) 4 MG tablet Take 1 tablet (4 mg) by mouth every 8 hours as needed for nausea or vomiting 20 tablet 0    prednisoLONE acetate (PRED FORTE) 1 % ophthalmic suspension Place 2 drops into both eyes 4 times daily To continue through 10/18/2023 20 mL 0    prochlorperazine (COMPAZINE) 5 MG tablet Take 1 tablet (5 mg) by mouth every 6 hours as needed for nausea or vomiting 20 tablet 0    triamcinolone (KENALOG) 0.1 % external ointment Apply topically 2 times daily as needed for irritation (rash) 30 g 0    voriconazole (VFEND) 200 MG tablet Take 1 tablet (200 mg) by mouth every 12 hours 60 tablet 0       Allergies  Allergies   Allergen Reactions    Iodinated Contrast Media Rash    Cefepime Rash    Ceftriaxone Rash     Reported history at Outside hospital 2021    Ragweeds Other (See Comments)     Congestion        Review  "of systems  A complete ROS was performed and was negative except as mentioned in HPI    Physical Exam  Vitals:    11/10/23 1238   Weight: 88.9 kg (196 lb)   Height: 1.715 m (5' 7.5\")     Wt Readings from Last 3 Encounters:   11/10/23 88.9 kg (196 lb)   10/23/23 84.5 kg (186 lb 4 oz)   10/16/23 83.5 kg (184 lb)       Virtual visit.  Patient appears comfortable, no increased work of breathing and no accessory muscle use.    Labs and Imaging  Labs reviewed from care everywhere  WBC 5.8, Hgb 9.5, platelets 309, ANC 2500  Creatinine 0.61  LFTs normal    BMBX 10/9/2023  - Hypocellular marrow (10-20% estimated cellularity), with trilineage hematopoesis; no morphologic or immunophenotypic evidence of recurrent/persistent acute myeloid leukemia  - Peripheral blood showing moderate normocytic hypochromic anemia; slight monocytosis.   2.0% cells in the blast gate (CD45 dim and low side scatter blast gate). There is no aberrant immunophenotype on the myeloid blasts.   ASXL1 E635fs  SRSF2 P95H      BMBx 9/1/2023  Acute myeloid leukemia with the following characteristics:  - Hypercellular marrow (70-80% estimated cellularity) with residual trilineage hematopoiesis, dysplastic granulocytes, diminished and atypical megakaryocytes, and 30% blasts  - Peripheral blood showing marked normochromic normocytic anemia, slight leukopenia, marked thrombocytopenia, and 11% circulating blasts  Chromosomal analysis: 46,XY[20]   FISH: No evidence of MLLT10, NUP98, or KMT2A rearrangement  1) ASXL1 E635fs VAF 31.3%  2) CEBPA E59*  VAF 84.72% (\"NOT an inframe insertion or deletion in the C-terminal DNA-binding or basic leucine zipper region (bZIP) and was not found in biallelic configuration with a CEBPA bZIP mutation\")  3) SRSF2 P95H VAF 47.21%  4) STAG2 Q656* VAF 89.96%     Assessment and Plan  Mr. Artie Fine is a 68 year old male who is here for further evaluation and/or management of Acute myeloid leukemia.    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    I had discussed the pathophysiology and natural history of AML with Mr. Artie Fine previously. We went over the current prognostic models and scoring systems that are used in clinical practice . We went over the current FDA approved treatments for AML .  His disease is High risk and he is planning to move forward with a bone marrow transplant.    He is s/p induction and is currently in complete remission and is now s/p HiDAC cycle 1.    He is planning to move forward with bone marrow transplant with a repeat bone marrow biopsy next week.  We will forego in the next consolidation cycle for right now.      Plan:  Continue with acyclovir and voriconazole  Continue with work-up for bone marrow transplant with PFTs, repeat bone marrow biopsy and CT chest.    Hematology:  Anemia/Thrombocytopenia:   Transfuse leukocyte reduced and irradiated blood products: 1 unit pRBC if hgb is 7.0-7.9, 2 units pRBCs if Hgb 6.0-6.9 g/dl, 1 unit platelets if PLT count is <10,000 or <50,000 with clinical bleeding.    Immunocompromised:  As a result of his disease and/or previous treatment. Mr. Artie Fien is immunocompromised. He will continue with levoflox for now. Can be stopped and admission and restarted at discharge.     Cardiac:  Mr. Artie Fine has no history of heart disease.     Renal:  Creatinine results reviewed today. Mr. Artie Fine does not have any renal disease.    Hepatic:  Liver function tests reviewed today.    Psychosocial:  Mr. Artie Fine is coping well with his diagnosis.     All other questions and concerns were addressed and answered to Mr. Artie Fine's satisfaction.    Today, I spent a total of 40 minutes of face-to-face and non  face-to-face time with Mr. Artie Fine. Time spent included review and discussion of diagnostic test results, patient counseling, and coordination of care.    Donis Garcia MD    Division of Hematology, Oncology and Transplantation  [facility]  P: [phone]

## 2023-11-10 NOTE — NURSING NOTE
Is the patient currently in the state of [place]? YES    Visit mode:VIDEO    If the visit is dropped, the patient can be reconnected by: VIDEO VISIT: Text to cell phone:   Telephone Information:   Mobile [phone]       Will anyone else be joining the visit? NO  (If patient encounters technical issues they should call [phone]    How would you like to obtain your AVS? MyChart    Are changes needed to the allergy or medication list? Pt stated no changes to allergies and Pt stated no med changes    Reason for visit: RECHECK    Jose SHEIKH

## 2023-11-10 NOTE — PROGRESS NOTES
Virtual Visit Details    Type of service:  Video Visit     Originating Location (pt. Location): Home    Distant Location (provider location):  On-site  Platform used for Video Visit: [employer]  Total time 18 mins.

## 2023-11-11 NOTE — PROGRESS NOTES
[facility]  HEMATOLOGY & ONCOLOGY  FOLLOW UP VISIT    PATIENT NAME: Artie Fine          MRN # [id number]  YOB: 1955  DATE OF VISIT: 11/10/2023              REFERRING PROVIDER:   Mr. Artie Fine was seen at the request of [facility] for further evaluation and/or management.       History of Presenting Illness  Mr. Artie Fine is a pleasant 68 year old male with a past medical history significant for hernia surgery in 2021 followed by a brain abscess 2 months later and who noticed increased fatigue since mid June and presented to the hospital and was found to have pancytopenia and circulating blasts. A bone marrow biopsy on 9/1/2023 confirmed a diagnosis of AML with normal karyotype and NGS with ASXL1 , SRSF2, STAG2, CEBPA now s/p 7+3 started on 9/2/2023 with day 14 marrow on 9/15 showing hypocellular marrow with no increased blast and now a recovery marrow on day 38 on 10/9 showing no increased blasts with hypocellular marrow 20-30%. Cytogenetics and NGS pending. He presents today for further evaluation of AML. He was diagnosed with AML in 9/1/2023 and is here to establish at the [facility].      Subjective  This is a video visit. Patient participates in the conversation with his wife Aundrea.  He states that he has been continuing to do well and has been up and about and doing multiple activities.  However he has not started any regimented exercise regimen at this point of time.  Denies any fevers or chills and bleeding from anywhere including BRBPR or melena. Compliant on all of his medications.      Past Medical History  No past medical history on file.    Family History  No family history on file.  Mother Lung cancer, breast cancer  Brother Lung cancer,   2 sisters one Breast cancer, another sister Colon cancer and passed away this year with brain mets    Social History  Smoking: Former smoker:  1 packs/day for 10-15 years:  Quit:  40 yrs back  Alcohol use: rarely drinks  "alcohol  Status:  43 years.   Children/grandchildren: 3 children, 2 daughters 36 (never pregnant) and son 39. No grandchildern.  Occupation: Contractor/ furniture retired 2017    Current Outpatient Medications  Current Outpatient Medications   Medication Sig Dispense Refill    acetaminophen (TYLENOL) 325 MG tablet Take 650 mg by mouth every 6 hours as needed for mild pain      acyclovir (ZOVIRAX) 400 MG tablet Take 1 tablet (400 mg) by mouth 2 times daily 60 tablet 0    cetirizine (ZYRTEC) 10 MG tablet Take 1 tablet (10 mg) by mouth daily      levofloxacin (LEVAQUIN) 500 MG tablet Take 1 tablet (500 mg) by mouth daily 30 tablet 0    ondansetron (ZOFRAN) 4 MG tablet Take 1 tablet (4 mg) by mouth every 8 hours as needed for nausea or vomiting 20 tablet 0    prednisoLONE acetate (PRED FORTE) 1 % ophthalmic suspension Place 2 drops into both eyes 4 times daily To continue through 10/18/2023 20 mL 0    prochlorperazine (COMPAZINE) 5 MG tablet Take 1 tablet (5 mg) by mouth every 6 hours as needed for nausea or vomiting 20 tablet 0    triamcinolone (KENALOG) 0.1 % external ointment Apply topically 2 times daily as needed for irritation (rash) 30 g 0    voriconazole (VFEND) 200 MG tablet Take 1 tablet (200 mg) by mouth every 12 hours 60 tablet 0       Allergies  Allergies   Allergen Reactions    Iodinated Contrast Media Rash    Cefepime Rash    Ceftriaxone Rash     Reported history at Outside hospital 2021    Ragweeds Other (See Comments)     Congestion        Review of systems  A complete ROS was performed and was negative except as mentioned in HPI    Physical Exam  Vitals:    11/10/23 1238   Weight: 88.9 kg (196 lb)   Height: 1.715 m (5' 7.5\")     Wt Readings from Last 3 Encounters:   11/10/23 88.9 kg (196 lb)   10/23/23 84.5 kg (186 lb 4 oz)   10/16/23 83.5 kg (184 lb)       Virtual visit.  Patient appears comfortable, no increased work of breathing and no accessory muscle use.    Labs and Imaging  Labs reviewed " "from care everywhere  WBC 5.8, Hgb 9.5, platelets 309, ANC 2500  Creatinine 0.61  LFTs normal    BMBX 10/9/2023  - Hypocellular marrow (10-20% estimated cellularity), with trilineage hematopoesis; no morphologic or immunophenotypic evidence of recurrent/persistent acute myeloid leukemia  - Peripheral blood showing moderate normocytic hypochromic anemia; slight monocytosis.   2.0% cells in the blast gate (CD45 dim and low side scatter blast gate). There is no aberrant immunophenotype on the myeloid blasts.   ASXL1 E635fs  SRSF2 P95H      BMBx 9/1/2023  Acute myeloid leukemia with the following characteristics:  - Hypercellular marrow (70-80% estimated cellularity) with residual trilineage hematopoiesis, dysplastic granulocytes, diminished and atypical megakaryocytes, and 30% blasts  - Peripheral blood showing marked normochromic normocytic anemia, slight leukopenia, marked thrombocytopenia, and 11% circulating blasts  Chromosomal analysis: 46,XY[20]   FISH: No evidence of MLLT10, NUP98, or KMT2A rearrangement  1) ASXL1 E635fs VAF 31.3%  2) CEBPA E59*  VAF 84.72% (\"NOT an inframe insertion or deletion in the C-terminal DNA-binding or basic leucine zipper region (bZIP) and was not found in biallelic configuration with a CEBPA bZIP mutation\")  3) SRSF2 P95H VAF 47.21%  4) STAG2 Q656* VAF 89.96%     Assessment and Plan  Mr. Artie Fine is a 68 year old male who is here for further evaluation and/or management of Acute myeloid leukemia.    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    I had discussed the pathophysiology and natural history of AML with Mr. Artie Fine previously. We went over the current prognostic models and scoring systems that are used in clinical " practice . We went over the current FDA approved treatments for AML .  His disease is High risk and he is planning to move forward with a bone marrow transplant.    He is s/p induction and is currently in complete remission and is now s/p HiDAC cycle 1.    He is planning to move forward with bone marrow transplant with a repeat bone marrow biopsy next week.  We will forego in the next consolidation cycle for right now.      Plan:  Continue with acyclovir and voriconazole  Continue with work-up for bone marrow transplant with PFTs, repeat bone marrow biopsy and CT chest.    Hematology:  Anemia/Thrombocytopenia:   Transfuse leukocyte reduced and irradiated blood products: 1 unit pRBC if hgb is 7.0-7.9, 2 units pRBCs if Hgb 6.0-6.9 g/dl, 1 unit platelets if PLT count is <10,000 or <50,000 with clinical bleeding.    Immunocompromised:  As a result of his disease and/or previous treatment. Mr. Artie Fine is immunocompromised. He will continue with levoflox for now. Can be stopped and admission and restarted at discharge.     Cardiac:  Mr. Artie Fine has no history of heart disease.     Renal:  Creatinine results reviewed today. Mr. Artie Fine does not have any renal disease.    Hepatic:  Liver function tests reviewed today.    Psychosocial:  Mr. Artie Fine is coping well with his diagnosis.     All other questions and concerns were addressed and answered to Mr. Artie Fine's satisfaction.    Today, I spent a total of 40 minutes of face-to-face and non face-to-face time with Mr. Artie Fine. Time spent included review and discussion of diagnostic test results, patient counseling, and coordination of care.    Donis Garcia MD    Division of Hematology, Oncology and Transplantation  [facility]  P: [phone]

## 2023-11-13 LAB
ABO/RH(D): NORMAL
ANTIBODY SCREEN: NEGATIVE
SPECIMEN EXPIRATION DATE: NORMAL

## 2023-11-13 NOTE — TELEPHONE ENCOUNTER
November 13, 2023 11:20 AM MEÑO   Internal Referral, Per call to PT, confirmed no previous radiation or outside records needed

## 2023-11-14 ENCOUNTER — OFFICE VISIT (OUTPATIENT)
Dept: PULMONOLOGY | Facility: CLINIC | Age: 68
End: 2023-11-14
Payer: COMMERCIAL

## 2023-11-14 ENCOUNTER — LAB (OUTPATIENT)
Dept: LAB | Facility: CLINIC | Age: 68
End: 2023-11-14
Payer: COMMERCIAL

## 2023-11-14 ENCOUNTER — ALLIED HEALTH/NURSE VISIT (OUTPATIENT)
Dept: TRANSPLANT | Facility: CLINIC | Age: 68
DRG: 838 | End: 2023-11-14
Attending: INTERNAL MEDICINE
Payer: COMMERCIAL

## 2023-11-14 ENCOUNTER — MEDICAL CORRESPONDENCE (OUTPATIENT)
Dept: TRANSPLANT | Facility: CLINIC | Age: 68
End: 2023-11-14

## 2023-11-14 DIAGNOSIS — C92.01 ACUTE MYELOID LEUKEMIA IN REMISSION (H): ICD-10-CM

## 2023-11-14 DIAGNOSIS — Z01.818 PREOP EXAMINATION: ICD-10-CM

## 2023-11-14 DIAGNOSIS — Z11.59 SCREENING FOR VIRAL DISEASE: ICD-10-CM

## 2023-11-14 DIAGNOSIS — C95.00 ACUTE LEUKEMIA NOT HAVING ACHIEVED REMISSION (H): ICD-10-CM

## 2023-11-14 DIAGNOSIS — Z86.2 PERSONAL HISTORY OF DISEASES OF BLOOD AND BLOOD-FORMING ORGANS: ICD-10-CM

## 2023-11-14 DIAGNOSIS — C92.00 AML (ACUTE MYELOBLASTIC LEUKEMIA) (H): Primary | ICD-10-CM

## 2023-11-14 DIAGNOSIS — Z01.818 PREOP EXAMINATION: Primary | ICD-10-CM

## 2023-11-14 LAB
ALBUMIN SERPL BCG-MCNC: 4.6 G/DL (ref 3.5–5.2)
ALBUMIN UR-MCNC: NEGATIVE MG/DL
ALP SERPL-CCNC: 106 U/L (ref 40–150)
ALT SERPL W P-5'-P-CCNC: 10 U/L (ref 0–70)
AMORPH CRY #/AREA URNS HPF: ABNORMAL /HPF
ANION GAP SERPL CALCULATED.3IONS-SCNC: 12 MMOL/L (ref 7–15)
APPEARANCE UR: ABNORMAL
APTT PPP: 26 SECONDS (ref 22–38)
AST SERPL W P-5'-P-CCNC: 31 U/L (ref 0–45)
BASOPHILS # BLD AUTO: ABNORMAL 10*3/UL
BASOPHILS # BLD MANUAL: 0 10E3/UL (ref 0–0.2)
BASOPHILS NFR BLD AUTO: ABNORMAL %
BASOPHILS NFR BLD MANUAL: 0 %
BILIRUB SERPL-MCNC: 0.2 MG/DL
BILIRUB UR QL STRIP: NEGATIVE
BMT WORKUP IRRADIATED BLOOD REQUIRED: NORMAL
BUN SERPL-MCNC: 16.9 MG/DL (ref 8–23)
CALCIUM SERPL-MCNC: 9.7 MG/DL (ref 8.8–10.2)
CHLORIDE SERPL-SCNC: 104 MMOL/L (ref 98–107)
COLOR UR AUTO: ABNORMAL
CREAT SERPL-MCNC: 0.79 MG/DL (ref 0.67–1.17)
CREAT SERPL-MCNC: 0.79 MG/DL (ref 0.67–1.17)
DEPRECATED HCO3 PLAS-SCNC: 24 MMOL/L (ref 22–29)
EGFRCR SERPLBLD CKD-EPI 2021: >90 ML/MIN/1.73M2
EOSINOPHIL # BLD AUTO: ABNORMAL 10*3/UL
EOSINOPHIL # BLD MANUAL: 0 10E3/UL (ref 0–0.7)
EOSINOPHIL NFR BLD AUTO: ABNORMAL %
EOSINOPHIL NFR BLD MANUAL: 0 %
ERYTHROCYTE [DISTWIDTH] IN BLOOD BY AUTOMATED COUNT: 23.3 % (ref 10–15)
FERRITIN SERPL-MCNC: 2214 NG/ML (ref 31–409)
GLUCOSE SERPL-MCNC: 99 MG/DL (ref 70–99)
GLUCOSE UR STRIP-MCNC: NEGATIVE MG/DL
HCT VFR BLD AUTO: 32.5 % (ref 40–53)
HGB BLD-MCNC: 10.3 G/DL (ref 13.3–17.7)
HGB UR QL STRIP: NEGATIVE
IMM GRANULOCYTES # BLD: ABNORMAL 10*3/UL
IMM GRANULOCYTES NFR BLD: ABNORMAL %
INR PPP: 1.13 (ref 0.85–1.15)
KETONES UR STRIP-MCNC: NEGATIVE MG/DL
LEUKOCYTE ESTERASE UR QL STRIP: NEGATIVE
LYMPHOCYTES # BLD AUTO: ABNORMAL 10*3/UL
LYMPHOCYTES # BLD MANUAL: 1.5 10E3/UL (ref 0.8–5.3)
LYMPHOCYTES NFR BLD AUTO: ABNORMAL %
LYMPHOCYTES NFR BLD MANUAL: 26 %
MCH RBC QN AUTO: 31.5 PG (ref 26.5–33)
MCHC RBC AUTO-ENTMCNC: 31.7 G/DL (ref 31.5–36.5)
MCV RBC AUTO: 99 FL (ref 78–100)
MONOCYTES # BLD AUTO: ABNORMAL 10*3/UL
MONOCYTES # BLD MANUAL: 1.3 10E3/UL (ref 0–1.3)
MONOCYTES NFR BLD AUTO: ABNORMAL %
MONOCYTES NFR BLD MANUAL: 22 %
MYELOCYTES # BLD MANUAL: 0.1 10E3/UL
MYELOCYTES NFR BLD MANUAL: 1 %
NEUTROPHILS # BLD AUTO: ABNORMAL 10*3/UL
NEUTROPHILS # BLD MANUAL: 3 10E3/UL (ref 1.6–8.3)
NEUTROPHILS NFR BLD AUTO: ABNORMAL %
NEUTROPHILS NFR BLD MANUAL: 51 %
NITRATE UR QL: NEGATIVE
NRBC # BLD AUTO: 0 10E3/UL
NRBC BLD AUTO-RTO: 0 /100
PH UR STRIP: 7.5 [PH] (ref 5–7)
PLAT MORPH BLD: ABNORMAL
PLATELET # BLD AUTO: 299 10E3/UL (ref 150–450)
POLYCHROMASIA BLD QL SMEAR: SLIGHT
POTASSIUM SERPL-SCNC: 4.4 MMOL/L (ref 3.4–5.3)
PROT SERPL-MCNC: 7.7 G/DL (ref 6.4–8.3)
RBC # BLD AUTO: 3.27 10E6/UL (ref 4.4–5.9)
RBC MORPH BLD: ABNORMAL
RBC URINE: 0 /HPF
SODIUM SERPL-SCNC: 140 MMOL/L (ref 135–145)
SP GR UR STRIP: 1.01 (ref 1–1.03)
SPECIMEN EXPIRATION DATE: NORMAL
URATE SERPL-MCNC: 6.1 MG/DL (ref 3.4–7)
UROBILINOGEN UR STRIP-MCNC: NORMAL MG/DL
WBC # BLD AUTO: 5.9 10E3/UL (ref 4–11)
WBC URINE: 0 /HPF

## 2023-11-14 PROCEDURE — 81001 URINALYSIS AUTO W/SCOPE: CPT | Performed by: PATHOLOGY

## 2023-11-14 PROCEDURE — 86706 HEP B SURFACE ANTIBODY: CPT | Performed by: INTERNAL MEDICINE

## 2023-11-14 PROCEDURE — 86790 VIRUS ANTIBODY NOS: CPT

## 2023-11-14 PROCEDURE — 86777 TOXOPLASMA ANTIBODY: CPT

## 2023-11-14 PROCEDURE — 86644 CMV ANTIBODY: CPT | Performed by: INTERNAL MEDICINE

## 2023-11-14 PROCEDURE — 85007 BL SMEAR W/DIFF WBC COUNT: CPT | Performed by: PATHOLOGY

## 2023-11-14 PROCEDURE — 84550 ASSAY OF BLOOD/URIC ACID: CPT

## 2023-11-14 PROCEDURE — 86901 BLOOD TYPING SEROLOGIC RH(D): CPT | Performed by: INTERNAL MEDICINE

## 2023-11-14 PROCEDURE — 86803 HEPATITIS C AB TEST: CPT | Performed by: INTERNAL MEDICINE

## 2023-11-14 PROCEDURE — 94375 RESPIRATORY FLOW VOLUME LOOP: CPT | Performed by: INTERNAL MEDICINE

## 2023-11-14 PROCEDURE — 81372 HLA I TYPING COMPLETE LR: CPT | Performed by: INTERNAL MEDICINE

## 2023-11-14 PROCEDURE — 93005 ELECTROCARDIOGRAM TRACING: CPT

## 2023-11-14 PROCEDURE — 94726 PLETHYSMOGRAPHY LUNG VOLUMES: CPT | Performed by: INTERNAL MEDICINE

## 2023-11-14 PROCEDURE — 81382 HLA II TYPING 1 LOC HR: CPT | Performed by: INTERNAL MEDICINE

## 2023-11-14 PROCEDURE — 93010 ELECTROCARDIOGRAM REPORT: CPT | Performed by: INTERNAL MEDICINE

## 2023-11-14 PROCEDURE — 82728 ASSAY OF FERRITIN: CPT

## 2023-11-14 PROCEDURE — 86828 HLA CLASS I&II ANTIBODY QUAL: CPT | Performed by: INTERNAL MEDICINE

## 2023-11-14 PROCEDURE — 999N001093 HLA VIRTUAL CROSSMATCH (VXM), LIVING DONOR: Performed by: INTERNAL MEDICINE

## 2023-11-14 PROCEDURE — 87389 HIV-1 AG W/HIV-1&-2 AB AG IA: CPT | Performed by: INTERNAL MEDICINE

## 2023-11-14 PROCEDURE — 85610 PROTHROMBIN TIME: CPT

## 2023-11-14 PROCEDURE — 86704 HEP B CORE ANTIBODY TOTAL: CPT | Performed by: INTERNAL MEDICINE

## 2023-11-14 PROCEDURE — 87521 HEPATITIS C PROBE&RVRS TRNSC: CPT | Performed by: INTERNAL MEDICINE

## 2023-11-14 PROCEDURE — 85027 COMPLETE CBC AUTOMATED: CPT | Performed by: PATHOLOGY

## 2023-11-14 PROCEDURE — 81265 STR MARKERS SPECIMEN ANAL: CPT | Performed by: INTERNAL MEDICINE

## 2023-11-14 PROCEDURE — 86696 HERPES SIMPLEX TYPE 2 TEST: CPT | Performed by: INTERNAL MEDICINE

## 2023-11-14 PROCEDURE — 85730 THROMBOPLASTIN TIME PARTIAL: CPT

## 2023-11-14 PROCEDURE — 86833 HLA CLASS II HIGH DEFIN QUAL: CPT | Performed by: INTERNAL MEDICINE

## 2023-11-14 PROCEDURE — 81375 HLA II TYPING AG EQUIV LR: CPT | Performed by: INTERNAL MEDICINE

## 2023-11-14 PROCEDURE — 94729 DIFFUSING CAPACITY: CPT | Performed by: INTERNAL MEDICINE

## 2023-11-14 PROCEDURE — 80053 COMPREHEN METABOLIC PANEL: CPT | Performed by: PATHOLOGY

## 2023-11-14 PROCEDURE — 86753 PROTOZOA ANTIBODY NOS: CPT | Performed by: INTERNAL MEDICINE

## 2023-11-14 PROCEDURE — 87340 HEPATITIS B SURFACE AG IA: CPT | Performed by: INTERNAL MEDICINE

## 2023-11-14 PROCEDURE — 86780 TREPONEMA PALLIDUM: CPT | Performed by: INTERNAL MEDICINE

## 2023-11-14 PROCEDURE — 86665 EPSTEIN-BARR CAPSID VCA: CPT | Performed by: INTERNAL MEDICINE

## 2023-11-14 PROCEDURE — 36415 COLL VENOUS BLD VENIPUNCTURE: CPT

## 2023-11-14 NOTE — NURSING NOTE
EKG was performed today per order written by Dr. Cam Edward.  Name and  verified with patient. Patient tolerated well without incident. File transmitted to bhumika.    Siat Arriaga on 2023 at 1:07 PM

## 2023-11-14 NOTE — LETTER
11/14/2023         RE: Artie Fine  [address] [address]  [facility] [address]        Dear Colleague,    Thank you for referring your patient, Artie Fine, to the [facility] BLOOD [facility]ANS[facility] [place]. Please see a copy of my visit note below.    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    Teaching Topic: work up routine  Person(s) involved: Patient and Daughter    Patient demonstrates understanding of the following:  - Reason for the appointment, diagnosis and treatment plan: Yes    Reviewed calendar and locations of procedures. Patient understands to check in for appointments at the  and to contact the BMT Office [phone] if there are schedule questions.    24 hr urine collection needed? Yes   If yes: Patient instructed to  collection container in lab. Patient will drop off container on 11/16/23 and will have corresponding lab appointment scheduled on 11/16/23.    Instructional Materials Used/Given: copy of calendar, map of campus    Specific Concerns: Yes: Patient had concerns regarding appointment on 11/21/23 and dental work that his dentist recommend to be done this week. Concerns will be relayed to the right department.      Time spent with patient: 30 minutes       Again, thank you for allowing me to participate in the care of your patient.        Sincerely,        BMT Nurse Coordinator

## 2023-11-14 NOTE — PROGRESS NOTES
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    Teaching Topic: work up routine  Person(s) involved: Patient and Daughter    Patient demonstrates understanding of the following:  - Reason for the appointment, diagnosis and treatment plan: Yes    Reviewed calendar and locations of procedures. Patient understands to check in for appointments at the  and to contact the BMT Office [phone] if there are schedule questions.    24 hr urine collection needed? Yes   If yes: Patient instructed to  collection container in lab. Patient will drop off container on 11/16/23 and will have corresponding lab appointment scheduled on 11/16/23.    Instructional Materials Used/Given: copy of calendar, map of campus    Specific Concerns: Yes: Patient had concerns regarding appointment on 11/21/23 and dental work that his dentist recommend to be done this week. Concerns will be relayed to the right department.      Time spent with patient: 30 minutes

## 2023-11-14 NOTE — NURSING NOTE
KAROLINE JOHNSON Frailty assessment completed with patient in clinic. Patient had no questions and showed understanding of what assessment was being done. Paperwork placed in completed frailty folder in [facility].    Sita Arriaga on 11/14/2023 at 1:25 PM

## 2023-11-15 ENCOUNTER — TELEPHONE (OUTPATIENT)
Dept: TRANSPLANT | Facility: CLINIC | Age: 68
End: 2023-11-15

## 2023-11-15 ENCOUNTER — OFFICE VISIT (OUTPATIENT)
Dept: TRANSPLANT | Facility: CLINIC | Age: 68
DRG: 838 | End: 2023-11-15
Attending: NURSE PRACTITIONER
Payer: COMMERCIAL

## 2023-11-15 VITALS
RESPIRATION RATE: 20 BRPM | SYSTOLIC BLOOD PRESSURE: 123 MMHG | DIASTOLIC BLOOD PRESSURE: 71 MMHG | HEART RATE: 83 BPM | BODY MASS INDEX: 30.82 KG/M2 | WEIGHT: 199.7 LBS | OXYGEN SATURATION: 96 %

## 2023-11-15 DIAGNOSIS — Z86.2 PERSONAL HISTORY OF DISEASES OF BLOOD AND BLOOD-FORMING ORGANS: ICD-10-CM

## 2023-11-15 DIAGNOSIS — Z01.818 PREOP EXAMINATION: ICD-10-CM

## 2023-11-15 DIAGNOSIS — C92.01 ACUTE MYELOID LEUKEMIA IN REMISSION (H): ICD-10-CM

## 2023-11-15 DIAGNOSIS — Z11.59 SCREENING FOR VIRAL DISEASE: ICD-10-CM

## 2023-11-15 DIAGNOSIS — Z76.82 STEM CELL TRANSPLANT CANDIDATE: ICD-10-CM

## 2023-11-15 DIAGNOSIS — C95.00 ACUTE LEUKEMIA NOT HAVING ACHIEVED REMISSION (H): ICD-10-CM

## 2023-11-15 LAB
ABSSPTEST METHOD: NORMAL
ACANTHOCYTES BLD QL SMEAR: NORMAL
ATRIAL RATE - MUSE: 86 BPM
AUER BODIES BLD QL SMEAR: NORMAL
BASO STIPL BLD QL SMEAR: NORMAL
BASOPHILS # BLD AUTO: 0 10E3/UL (ref 0–0.2)
BASOPHILS NFR BLD AUTO: 1 %
BITE CELLS BLD QL SMEAR: NORMAL
BLISTER CELLS BLD QL SMEAR: NORMAL
BURR CELLS BLD QL SMEAR: NORMAL
CMV IGG SERPL IA-ACNC: 4.5 U/ML
CMV IGG SERPL IA-ACNC: ABNORMAL
DACRYOCYTES BLD QL SMEAR: NORMAL
DIASTOLIC BLOOD PRESSURE - MUSE: NORMAL MMHG
DLCOCOR-%PRED-PRE: 107 %
DLCOCOR-PRE: 25.67 ML/MIN/MMHG
DLCOUNC-%PRED-PRE: 92 %
DLCOUNC-PRE: 21.91 ML/MIN/MMHG
DLCOUNC-PRED: 23.78 ML/MIN/MMHG
DRSSPDPA1*: NORMAL
DRSSPDPA1*LOCUS: NORMAL
DRSSPDPB1*2 NMDP: NORMAL
DRSSPDPB1*2: NORMAL
DRSSPDPB1*LOCUS: NORMAL
DRSSPDPB1*LOCUSNMDP: NORMAL
DRSSPTEST METHOD: NORMAL
EBV VCA IGG SER IA-ACNC: >750 U/ML
EBV VCA IGG SER IA-ACNC: POSITIVE
ELLIPTOCYTES BLD QL SMEAR: NORMAL
EOSINOPHIL # BLD AUTO: 0 10E3/UL (ref 0–0.7)
EOSINOPHIL NFR BLD AUTO: 1 %
ERV-%PRED-PRE: 65 %
ERV-PRE: 0.86 L
ERV-PRED: 1.3 L
ERYTHROCYTE [DISTWIDTH] IN BLOOD BY AUTOMATED COUNT: 22.6 % (ref 10–15)
EXPTIME-PRE: 7.28 SEC
FEF2575-%PRED-PRE: 125 %
FEF2575-PRE: 2.78 L/SEC
FEF2575-PRED: 2.22 L/SEC
FEFMAX-%PRED-PRE: 117 %
FEFMAX-PRE: 9.18 L/SEC
FEFMAX-PRED: 7.81 L/SEC
FEV1-%PRED-PRE: 126 %
FEV1-PRE: 3.47 L
FEV1FEV6-PRE: 77 %
FEV1FEV6-PRED: 78 %
FEV1FVC-PRE: 76 %
FEV1FVC-PRED: 78 %
FEV1SVC-PRE: 76 %
FEV1SVC-PRED: 70 %
FIFMAX-PRE: 6.33 L/SEC
FRAGMENTS BLD QL SMEAR: NORMAL
FVC-%PRED-PRE: 129 %
FVC-PRE: 4.57 L
FVC-PRED: 3.54 L
HBV CORE AB SERPL QL IA: NONREACTIVE
HBV SURFACE AB SERPL IA-ACNC: 15.75 M[IU]/ML
HBV SURFACE AB SERPL IA-ACNC: REACTIVE M[IU]/ML
HBV SURFACE AG SERPL QL IA: NONREACTIVE
HCT VFR BLD AUTO: 33 % (ref 40–53)
HCV AB SERPL QL IA: NONREACTIVE
HGB BLD-MCNC: 10.3 G/DL (ref 13.3–17.7)
HGB C CRYSTALS: NORMAL
HIV 1+2 AB+HIV1 P24 AG SERPL QL IA: NONREACTIVE
HOWELL-JOLLY BOD BLD QL SMEAR: NORMAL
HSV1 IGG SERPL QL IA: 16.1 INDEX
HSV1 IGG SERPL QL IA: ABNORMAL
HSV2 IGG SERPL QL IA: 3.78 INDEX
HSV2 IGG SERPL QL IA: ABNORMAL
IC-%PRED-PRE: 142 %
IC-PRE: 3.71 L
IC-PRED: 2.61 L
IMM GRANULOCYTES # BLD: 0 10E3/UL
IMM GRANULOCYTES NFR BLD: 0 %
INTERPRETATION ECG - MUSE: NORMAL
INTERPRETATION: NORMAL
LYMPHOCYTES # BLD AUTO: 1.9 10E3/UL (ref 0.8–5.3)
LYMPHOCYTES NFR BLD AUTO: 31 %
MCH RBC QN AUTO: 31.4 PG (ref 26.5–33)
MCHC RBC AUTO-ENTMCNC: 31.2 G/DL (ref 31.5–36.5)
MCV RBC AUTO: 101 FL (ref 78–100)
MONOCYTES # BLD AUTO: 1.8 10E3/UL (ref 0–1.3)
MONOCYTES NFR BLD AUTO: 29 %
NEUTROPHILS # BLD AUTO: 2.4 10E3/UL (ref 1.6–8.3)
NEUTROPHILS NFR BLD AUTO: 38 %
NEUTS HYPERSEG BLD QL SMEAR: NORMAL
NRBC # BLD AUTO: 0 10E3/UL
NRBC BLD AUTO-RTO: 0 /100
P AXIS - MUSE: 26 DEGREES
PLAT MORPH BLD: NORMAL
PLATELET # BLD AUTO: 281 10E3/UL (ref 150–450)
POLYCHROMASIA BLD QL SMEAR: NORMAL
PR INTERVAL - MUSE: 184 MS
QRS DURATION - MUSE: 94 MS
QT - MUSE: 372 MS
QTC - MUSE: 445 MS
R AXIS - MUSE: -35 DEGREES
RBC # BLD AUTO: 3.28 10E6/UL (ref 4.4–5.9)
RBC AGGLUT BLD QL: NORMAL
RBC MORPH BLD: NORMAL
ROULEAUX BLD QL SMEAR: NORMAL
SICKLE CELLS BLD QL SMEAR: NORMAL
SIGNIFICANT RESULTS: NORMAL
SMUDGE CELLS BLD QL SMEAR: NORMAL
SPECIMEN DESCRIPTION: NORMAL
SPHEROCYTES BLD QL SMEAR: NORMAL
SSPA* LOCUS: NORMAL
SSPB* LOCUS: NORMAL
SSPB*: NORMAL
SSPBW-1: NORMAL
SSPC* LOCUS: NORMAL
SSPC*: NORMAL
SSPDQA1*: NORMAL
SSPDQA1*LOCUS: NORMAL
SSPDQB1*: NORMAL
SSPDQB1*LOCUS: NORMAL
SSPDRB1* LOCUS: NORMAL
SSPDRB1*: NORMAL
SSPDRB4* LOCUS: NORMAL
SSPDRB5*: NORMAL
SSPTEST METHOD: NORMAL
STOMATOCYTES BLD QL SMEAR: NORMAL
SYSTOLIC BLOOD PRESSURE - MUSE: NORMAL MMHG
T AXIS - MUSE: -2 DEGREES
T PALLIDUM AB SER QL: NONREACTIVE
TARGETS BLD QL SMEAR: NORMAL
TEST DETAILS, MDL: NORMAL
TOXIC GRANULES BLD QL SMEAR: NORMAL
VA-%PRED-PRE: 114 %
VA-PRE: 6.6 L
VARIANT LYMPHS BLD QL SMEAR: NORMAL
VC-%PRED-PRE: 116 %
VC-PRE: 4.56 L
VC-PRED: 3.9 L
VENTRICULAR RATE- MUSE: 86 BPM
WBC # BLD AUTO: 6.2 10E3/UL (ref 4–11)
ZZZABSSP COMMENTS: NORMAL
ZZZDRSSP COMMENTS: NORMAL
ZZZSSP COMMENTS: NORMAL

## 2023-11-15 PROCEDURE — 81450 HL NEO GSAP 5-50DNA/DNA&RNA: CPT

## 2023-11-15 PROCEDURE — 88341 IMHCHEM/IMCYTCHM EA ADD ANTB: CPT | Mod: 26 | Performed by: STUDENT IN AN ORGANIZED HEALTH CARE EDUCATION/TRAINING PROGRAM

## 2023-11-15 PROCEDURE — 88275 CYTOGENETICS 100-300: CPT

## 2023-11-15 PROCEDURE — 85097 BONE MARROW INTERPRETATION: CPT | Mod: GC | Performed by: STUDENT IN AN ORGANIZED HEALTH CARE EDUCATION/TRAINING PROGRAM

## 2023-11-15 PROCEDURE — 88185 FLOWCYTOMETRY/TC ADD-ON: CPT

## 2023-11-15 PROCEDURE — G0452 MOLECULAR PATHOLOGY INTERPR: HCPCS | Mod: 26 | Performed by: PATHOLOGY

## 2023-11-15 PROCEDURE — 88305 TISSUE EXAM BY PATHOLOGIST: CPT | Mod: TC

## 2023-11-15 PROCEDURE — 88189 FLOWCYTOMETRY/READ 16 & >: CPT | Mod: GC | Performed by: STUDENT IN AN ORGANIZED HEALTH CARE EDUCATION/TRAINING PROGRAM

## 2023-11-15 PROCEDURE — 88311 DECALCIFY TISSUE: CPT | Mod: TC

## 2023-11-15 PROCEDURE — 88311 DECALCIFY TISSUE: CPT | Mod: 26 | Performed by: STUDENT IN AN ORGANIZED HEALTH CARE EDUCATION/TRAINING PROGRAM

## 2023-11-15 PROCEDURE — 38222 DX BONE MARROW BX & ASPIR: CPT | Performed by: PHYSICIAN ASSISTANT

## 2023-11-15 PROCEDURE — 36415 COLL VENOUS BLD VENIPUNCTURE: CPT

## 2023-11-15 PROCEDURE — 88305 TISSUE EXAM BY PATHOLOGIST: CPT | Mod: 26 | Performed by: STUDENT IN AN ORGANIZED HEALTH CARE EDUCATION/TRAINING PROGRAM

## 2023-11-15 PROCEDURE — 38222 DX BONE MARROW BX & ASPIR: CPT | Mod: LT | Performed by: PHYSICIAN ASSISTANT

## 2023-11-15 PROCEDURE — 07DR3ZX EXTRACTION OF ILIAC BONE MARROW, PERCUTANEOUS APPROACH, DIAGNOSTIC: ICD-10-PCS | Performed by: PHYSICIAN ASSISTANT

## 2023-11-15 PROCEDURE — 88237 TISSUE CULTURE BONE MARROW: CPT

## 2023-11-15 PROCEDURE — 88342 IMHCHEM/IMCYTCHM 1ST ANTB: CPT | Mod: 26 | Performed by: STUDENT IN AN ORGANIZED HEALTH CARE EDUCATION/TRAINING PROGRAM

## 2023-11-15 PROCEDURE — 85025 COMPLETE CBC W/AUTO DIFF WBC: CPT

## 2023-11-15 ASSESSMENT — PAIN SCALES - GENERAL: PAINLEVEL: NO PAIN (0)

## 2023-11-15 NOTE — NURSING NOTE
"Oncology Rooming Note    November 15, 2023 11:59 AM   Artie Fine is a 68 year old male who presents for:    Chief Complaint   Patient presents with    RECHECK     AML here for bmbx     Initial Vitals: /71   Pulse 83   Resp 20   Wt 90.6 kg (199 lb 11.2 oz)   SpO2 96%   BMI 30.82 kg/m   Estimated body mass index is 30.82 kg/m  as calculated from the following:    Height as of 11/10/23: 1.715 m (5' 7.5\").    Weight as of this encounter: 90.6 kg (199 lb 11.2 oz). Body surface area is 2.08 meters squared.  No Pain (0) Comment: Data Unavailable   No LMP for male patient.  Allergies reviewed: Yes  Medications reviewed: Yes    Medications: Medication refills not needed today.  Pharmacy name entered into EPIC:    [facility] [place], [place] - [phone]  [facility] [place], [place] - [address] [place] [address] [id number]    Clinical concerns: None      Ancelmo Bean RN              "

## 2023-11-15 NOTE — NURSING NOTE
BMBX Teaching and Assessment       Teaching concerns addressed: Bone marrow biopsy and infection prevention.     Person(s) involved in teaching: Patient  Motivation Level  Asks Questions: Yes  Eager to Learn: Yes  Cooperative: Yes  Receptive (willing/able to accept information): Yes    Patient demonstrates understanding of the following:     Reason for the appointment, diagnosis and treatment plan: Yes  Knowledge of proper use of medications and conditions for which they are ordered (with special attention to potential side effects or drug interactions): Yes  Which situations necessitate calling provider and whom to contact: Yes    Teaching concerns addressed:   Reviewed activity restrictions if received premeds, potential for bleeding and actions to take if develops any of the issues below    Pain management techniques: Yes  Patient instructed on hand hygiene: Yes  How and/when to access community resources: Yes    Infection Control:  Patient demonstrates understanding of the following:   Bone marrow procedure site care taught: Yes  Signs and symptoms of infection taught: Yes       Instructional Materials Used/Given: Pt instructed to keep bmbx site clean and dry for 24hrs. Pt educated to monitor site for signs of infection such as redness, rash, oozing, puss, bleeding, pain, and elevated temp. Pt instructed to go to call the [facility] triage line or go to the ER if any signs of infection should occur.  Pt and wife verbalize understanding.     Pre-procedure labs drawn via venipuncture. Post procedure: Patient ambulating, site is clean, dry and intact prior to discharge. Pt discharged with wife as .

## 2023-11-15 NOTE — PROGRESS NOTES
BMT ONC Adult Bone Marrow Biopsy Procedure Note  November 15, 2023  /71   Pulse 83   Resp 20   Wt 90.6 kg (199 lb 11.2 oz)   SpO2 96%   BMI 30.82 kg/m       Learning needs assessment complete within 12 months? YES    DIAGNOSIS: AML     PROCEDURE: Unilateral Bone Marrow Biopsy and Unilateral Aspirate    LOCATION: [facility] 2nd Floor    Patient s identification was positively verified by verbal identification and invasive procedure safety checklist was completed. Informed consent was obtained. Following the administration of  no pre-medications per patient preference,  patient was placed in the prone position and prepped and draped in a sterile manner. Approximately 20 cc of 1% Lidocaine was used over the left posterior iliac spine. Following this a 3 mm incision was made. Trephine bone marrow core(s) was (were) obtained from the [facility]. Bone marrow aspirates were obtained from the IC. Aspirates were sent for morphology, immunophenotyping, cytogenetics, and molecular diagnostics RFLP. A total of approximately 20 ml of marrow was aspirated. Following this procedure a sterile dressing was applied to the bone marrow biopsy site(s). The patient was placed in the supine position to maintain pressure on the biopsy site. Post-procedure wound care instructions were given.     Complications: NO    Pre-procedural pain: 0 out of 10 on the numeric pain rating scale.     Procedural pain: minimal pain    Post-procedural pain assessment: 0 out of 10 on the numeric pain rating scale.     Interventions: NO    Length of procedure:20 minutes or less      Procedure performed by: Anne Degroot PA-C

## 2023-11-15 NOTE — TELEPHONE ENCOUNTER
[facility] word this [facility] from [facility] that Art[facility]' donor has fallen through and is now unavailable. Message sent to Dr Cam Edward and Dr Garcia (referring) is aware.     He will get his bone marrow biopsy today but all other BMT appointments are cancelled. We will work on a new donor timeline.    RNCC will reschedule Artie with Dr Garcia for bone marrow biopsy follow up and discussion of next steps. He likely will need an additional round of chemotherapy while a donor is arranged. Artie expressed that he would like to get this done locally if at all possible.    Artie will be scheduled for a repeat NT visit with Dr Cam Edward on 12/5 to further discuss transplant. He also will need to have his tooth issue addressed before coming back for BMT workup.    RNCC discussed this all with Artie this morning. He is aware of next steps and was understanding about the delay.

## 2023-11-16 ENCOUNTER — PATIENT OUTREACH (OUTPATIENT)
Dept: CARE COORDINATION | Facility: CLINIC | Age: 68
End: 2023-11-16

## 2023-11-16 ENCOUNTER — PATIENT OUTREACH (OUTPATIENT)
Dept: ONCOLOGY | Facility: CLINIC | Age: 68
End: 2023-11-16

## 2023-11-16 LAB
FLOWPRA1 CELL: NORMAL
FLOWPRA1 RESULT: NORMAL
FLOWPRA1 TEST METHOD: NORMAL
FLOWPRA2 CELL: NORMAL
FLOWPRA2 RESULT: NORMAL
FLOWPRA2 TEST METHOD: NORMAL
HBV DNA SERPL QL NAA+PROBE: NORMAL
HCV RNA SERPL QL NAA+PROBE: NORMAL
HIV1+2 RNA SERPL QL NAA+PROBE: NORMAL
SA 2 CELL: NORMAL
SA 2 TEST METHOD: NORMAL
SA2 HI RISK ABY: NORMAL
SA2 MOD RISK ABY: NORMAL
T GONDII IGG SER-ACNC: <3 IU/ML
T GONDII IGM SER-ACNC: <3 AU/ML
TRYPANOSOMA CRUZI: NORMAL
WNV RNA SERPL DONR QL NAA+PROBE: NORMAL
ZZZFLOWPRA1 COMMENTS: NORMAL
ZZZFLOWPRA2 COMMENTS: NORMAL
ZZZSA 2 COMMENTS: NORMAL

## 2023-11-16 NOTE — PROGRESS NOTES
[facility]: Cancer Care Follow-Up Note                                    Discussion with Patient:                                                      Spoke with Artie about admission for next cycle of treatment due to BMT work up being delayed. Planning for 11/20/23 or 11/21/23. Artie stated he would really like to celebrate thanksgiving at home and was wondering if delaying admission until Friday 11/24/23 would be possible.     Dates of Treatment:                                                      Infusion given in last 28 days       None            Assessment:                                                      Artie states he has been feeling really well lately. He states that he has a good appetite, has gained some weight back, and is going out on walks.       Intervention/Education provided during outreach:                                                       Coordinating car with Dr. Garcia and with scheduling about possibility of a later admission.     ADDENDUM 11/16/23 11:45 AM  Plan to admit Friday 11/17/23. Confirmed with inpatient team that they can accept him and Artie is agreeable to come back to [facility] tomorrow for treatment.   Artie made aware of plan to  see Dr. Garcia 11/17/23 at 10AM with labs prior and plan to admit to the hospital after.     Patient to follow up as scheduled at next appt  Patient to call/Blekkot message with updates  Confirmed patient has clinic and triage numbers    Signature:  Susan Santos, RN

## 2023-11-16 NOTE — PROGRESS NOTES
Social Work Note  [facility]    Social Work - Intervention  [facility]  Data/Intervention:    Patient Name: Artie Fine Goes By: Artie HALLB/Age: 1955 (68 year old)     Visit Type: telephone  Referral Source: care team  Reason for Referral: [facility] reservation needed    Collaborated With:    -patient  -     Psychosocial Information/Concerns:  Patient requesting a [facility] reservation for upcoming appointment.     Intervention/Education/Resources Provided:  Per Patient's request,  completed and securely emailed [facility] request for lodging dates 23 - 23.      Assessment/Plan:  [facility] will contact Patient for confirmation of reservation.  will continue to provide support as needed.    Maria Fernanda CUELLAR, [facility]  - Oncology  Phone : [phone]  Pager: [phone]

## 2023-11-17 ENCOUNTER — PRE VISIT (OUTPATIENT)
Dept: RADIATION ONCOLOGY | Facility: CLINIC | Age: 68
End: 2023-11-17
Payer: COMMERCIAL

## 2023-11-17 ENCOUNTER — APPOINTMENT (OUTPATIENT)
Dept: GENERAL RADIOLOGY | Facility: CLINIC | Age: 68
DRG: 838 | End: 2023-11-17
Attending: PHYSICIAN ASSISTANT
Payer: COMMERCIAL

## 2023-11-17 ENCOUNTER — DOCUMENTATION ONLY (OUTPATIENT)
Dept: OTHER | Facility: CLINIC | Age: 68
End: 2023-11-17

## 2023-11-17 ENCOUNTER — LAB (OUTPATIENT)
Dept: LAB | Facility: CLINIC | Age: 68
DRG: 838 | End: 2023-11-17
Attending: STUDENT IN AN ORGANIZED HEALTH CARE EDUCATION/TRAINING PROGRAM
Payer: COMMERCIAL

## 2023-11-17 ENCOUNTER — HOSPITAL ENCOUNTER (INPATIENT)
Facility: CLINIC | Age: 68
LOS: 3 days | Discharge: HOME | DRG: 838 | End: 2023-11-20
Attending: INTERNAL MEDICINE | Admitting: INTERNAL MEDICINE
Payer: COMMERCIAL

## 2023-11-17 ENCOUNTER — ONCOLOGY VISIT (OUTPATIENT)
Dept: ONCOLOGY | Facility: CLINIC | Age: 68
DRG: 838 | End: 2023-11-17
Attending: STUDENT IN AN ORGANIZED HEALTH CARE EDUCATION/TRAINING PROGRAM
Payer: COMMERCIAL

## 2023-11-17 VITALS
RESPIRATION RATE: 16 BRPM | SYSTOLIC BLOOD PRESSURE: 148 MMHG | HEART RATE: 89 BPM | TEMPERATURE: 97.9 F | DIASTOLIC BLOOD PRESSURE: 79 MMHG | WEIGHT: 201.2 LBS | OXYGEN SATURATION: 99 % | BODY MASS INDEX: 31.05 KG/M2

## 2023-11-17 DIAGNOSIS — C92.01 ACUTE MYELOID LEUKEMIA IN REMISSION (H): Primary | ICD-10-CM

## 2023-11-17 DIAGNOSIS — C95.00 ACUTE LEUKEMIA NOT HAVING ACHIEVED REMISSION (H): ICD-10-CM

## 2023-11-17 DIAGNOSIS — Z76.89 POOR DENTITION REQUIRING REFERRAL TO DENTISTRY: ICD-10-CM

## 2023-11-17 PROBLEM — C92.00 ACUTE MYELOID LEUKEMIA (H): Status: ACTIVE | Noted: 2023-11-17

## 2023-11-17 LAB
ACANTHOCYTES BLD QL SMEAR: ABNORMAL
ALBUMIN SERPL BCG-MCNC: 4.1 G/DL (ref 3.5–5.2)
ALBUMIN SERPL BCG-MCNC: 4.3 G/DL (ref 3.5–5.2)
ALP SERPL-CCNC: 106 U/L (ref 40–150)
ALP SERPL-CCNC: 99 U/L (ref 40–150)
ALT SERPL W P-5'-P-CCNC: 6 U/L (ref 0–70)
ALT SERPL W P-5'-P-CCNC: <5 U/L (ref 0–70)
ANION GAP SERPL CALCULATED.3IONS-SCNC: 10 MMOL/L (ref 7–15)
ANION GAP SERPL CALCULATED.3IONS-SCNC: 11 MMOL/L (ref 7–15)
AST SERPL W P-5'-P-CCNC: 19 U/L (ref 0–45)
AST SERPL W P-5'-P-CCNC: 21 U/L (ref 0–45)
AUER BODIES BLD QL SMEAR: ABNORMAL
BASO STIPL BLD QL SMEAR: ABNORMAL
BASOPHILS # BLD AUTO: 0.1 10E3/UL (ref 0–0.2)
BASOPHILS # BLD AUTO: 0.1 10E3/UL (ref 0–0.2)
BASOPHILS NFR BLD AUTO: 1 %
BASOPHILS NFR BLD AUTO: 1 %
BILIRUB SERPL-MCNC: 0.2 MG/DL
BILIRUB SERPL-MCNC: 0.2 MG/DL
BITE CELLS BLD QL SMEAR: ABNORMAL
BLISTER CELLS BLD QL SMEAR: ABNORMAL
BUN SERPL-MCNC: 12 MG/DL (ref 8–23)
BUN SERPL-MCNC: 13.3 MG/DL (ref 8–23)
BURR CELLS BLD QL SMEAR: ABNORMAL
CALCIUM SERPL-MCNC: 9.4 MG/DL (ref 8.8–10.2)
CALCIUM SERPL-MCNC: 9.5 MG/DL (ref 8.8–10.2)
CHLORIDE SERPL-SCNC: 105 MMOL/L (ref 98–107)
CHLORIDE SERPL-SCNC: 107 MMOL/L (ref 98–107)
CREAT SERPL-MCNC: 0.84 MG/DL (ref 0.67–1.17)
CREAT SERPL-MCNC: 0.84 MG/DL (ref 0.67–1.17)
CROSSMATCHDATEVXM: NORMAL
DACRYOCYTES BLD QL SMEAR: SLIGHT
DEPRECATED HCO3 PLAS-SCNC: 23 MMOL/L (ref 22–29)
DEPRECATED HCO3 PLAS-SCNC: 23 MMOL/L (ref 22–29)
DONOR VXM: NORMAL
DSA VXM B1: NORMAL
DSA VXM B2: NORMAL
EGFRCR SERPLBLD CKD-EPI 2021: >90 ML/MIN/1.73M2
EGFRCR SERPLBLD CKD-EPI 2021: >90 ML/MIN/1.73M2
ELLIPTOCYTES BLD QL SMEAR: ABNORMAL
EOSINOPHIL # BLD AUTO: 0 10E3/UL (ref 0–0.7)
EOSINOPHIL # BLD AUTO: 0.1 10E3/UL (ref 0–0.7)
EOSINOPHIL NFR BLD AUTO: 1 %
EOSINOPHIL NFR BLD AUTO: 1 %
ERYTHROCYTE [DISTWIDTH] IN BLOOD BY AUTOMATED COUNT: 22.1 % (ref 10–15)
ERYTHROCYTE [DISTWIDTH] IN BLOOD BY AUTOMATED COUNT: 22.5 % (ref 10–15)
FRAGMENTS BLD QL SMEAR: ABNORMAL
GLUCOSE SERPL-MCNC: 91 MG/DL (ref 70–99)
GLUCOSE SERPL-MCNC: 94 MG/DL (ref 70–99)
HCT VFR BLD AUTO: 33 % (ref 40–53)
HCT VFR BLD AUTO: 34.1 % (ref 40–53)
HGB BLD-MCNC: 10.5 G/DL (ref 13.3–17.7)
HGB BLD-MCNC: 10.7 G/DL (ref 13.3–17.7)
HGB C CRYSTALS: ABNORMAL
HOLD SPECIMEN: NORMAL
HOLD SPECIMEN: NORMAL
HOWELL-JOLLY BOD BLD QL SMEAR: ABNORMAL
HTLV I+II AB SER QL IA: NEGATIVE
IMM GRANULOCYTES # BLD: 0 10E3/UL
IMM GRANULOCYTES # BLD: 0 10E3/UL
IMM GRANULOCYTES NFR BLD: 0 %
IMM GRANULOCYTES NFR BLD: 1 %
LDH SERPL L TO P-CCNC: 211 U/L (ref 0–250)
LYMPHOCYTES # BLD AUTO: 1.9 10E3/UL (ref 0.8–5.3)
LYMPHOCYTES # BLD AUTO: 2 10E3/UL (ref 0.8–5.3)
LYMPHOCYTES NFR BLD AUTO: 29 %
LYMPHOCYTES NFR BLD AUTO: 29 %
MCH RBC QN AUTO: 31.4 PG (ref 26.5–33)
MCH RBC QN AUTO: 32 PG (ref 26.5–33)
MCHC RBC AUTO-ENTMCNC: 31.4 G/DL (ref 31.5–36.5)
MCHC RBC AUTO-ENTMCNC: 31.8 G/DL (ref 31.5–36.5)
MCV RBC AUTO: 100 FL (ref 78–100)
MCV RBC AUTO: 101 FL (ref 78–100)
MONOCYTES # BLD AUTO: 1.7 10E3/UL (ref 0–1.3)
MONOCYTES # BLD AUTO: 1.8 10E3/UL (ref 0–1.3)
MONOCYTES NFR BLD AUTO: 26 %
MONOCYTES NFR BLD AUTO: 26 %
NEUTROPHILS # BLD AUTO: 2.9 10E3/UL (ref 1.6–8.3)
NEUTROPHILS # BLD AUTO: 3 10E3/UL (ref 1.6–8.3)
NEUTROPHILS NFR BLD AUTO: 42 %
NEUTROPHILS NFR BLD AUTO: 43 %
NEUTS HYPERSEG BLD QL SMEAR: ABNORMAL
NRBC # BLD AUTO: 0 10E3/UL
NRBC # BLD AUTO: 0 10E3/UL
NRBC BLD AUTO-RTO: 0 /100
NRBC BLD AUTO-RTO: 0 /100
PATH REPORT.COMMENTS IMP SPEC: NORMAL
PATH REPORT.FINAL DX SPEC: NORMAL
PATH REPORT.FINAL DX SPEC: NORMAL
PATH REPORT.GROSS SPEC: NORMAL
PATH REPORT.MICROSCOPIC SPEC OTHER STN: NORMAL
PATH REPORT.RELEVANT HX SPEC: NORMAL
PATH REPORT.RELEVANT HX SPEC: NORMAL
PHOSPHATE SERPL-MCNC: 3.5 MG/DL (ref 2.5–4.5)
PLAT MORPH BLD: ABNORMAL
PLATELET # BLD AUTO: 274 10E3/UL (ref 150–450)
PLATELET # BLD AUTO: 277 10E3/UL (ref 150–450)
POLYCHROMASIA BLD QL SMEAR: ABNORMAL
POTASSIUM SERPL-SCNC: 4.2 MMOL/L (ref 3.4–5.3)
POTASSIUM SERPL-SCNC: 4.3 MMOL/L (ref 3.4–5.3)
PROT SERPL-MCNC: 6.9 G/DL (ref 6.4–8.3)
PROT SERPL-MCNC: 7.3 G/DL (ref 6.4–8.3)
RBC # BLD AUTO: 3.28 10E6/UL (ref 4.4–5.9)
RBC # BLD AUTO: 3.41 10E6/UL (ref 4.4–5.9)
RBC AGGLUT BLD QL: ABNORMAL
RBC MORPH BLD: ABNORMAL
RESULT VXM B1: NORMAL
RESULT VXM B2: NORMAL
RESULT VXM T1: NORMAL
RESULT VXM T2: NORMAL
ROULEAUX BLD QL SMEAR: ABNORMAL
SERUM DATE VXM B1: NORMAL
SERUM DATE VXM B2: NORMAL
SERUM DATE VXM T1: NORMAL
SERUM DATE VXM T2: NORMAL
SICKLE CELLS BLD QL SMEAR: ABNORMAL
SMUDGE CELLS BLD QL SMEAR: ABNORMAL
SODIUM SERPL-SCNC: 138 MMOL/L (ref 135–145)
SODIUM SERPL-SCNC: 141 MMOL/L (ref 135–145)
SPHEROCYTES BLD QL SMEAR: ABNORMAL
STOMATOCYTES BLD QL SMEAR: ABNORMAL
TARGETS BLD QL SMEAR: ABNORMAL
TOXIC GRANULES BLD QL SMEAR: ABNORMAL
URATE SERPL-MCNC: 5.8 MG/DL (ref 3.4–7)
VARIANT LYMPHS BLD QL SMEAR: ABNORMAL
WBC # BLD AUTO: 6.6 10E3/UL (ref 4–11)
WBC # BLD AUTO: 7 10E3/UL (ref 4–11)
ZZZCOMMENT VXMB1: NORMAL
ZZZCOMMENT VXMB2: NORMAL
ZZZCOMMENT VXMT1: NORMAL
ZZZCOMMENT VXMT2: NORMAL

## 2023-11-17 PROCEDURE — 120N000002 HC R&B MED SURG/OB UMMC

## 2023-11-17 PROCEDURE — 250N000013 HC RX MED GY IP 250 OP 250 PS 637: Performed by: PHYSICIAN ASSISTANT

## 2023-11-17 PROCEDURE — 258N000003 HC RX IP 258 OP 636: Performed by: STUDENT IN AN ORGANIZED HEALTH CARE EDUCATION/TRAINING PROGRAM

## 2023-11-17 PROCEDURE — 36415 COLL VENOUS BLD VENIPUNCTURE: CPT

## 2023-11-17 PROCEDURE — 84550 ASSAY OF BLOOD/URIC ACID: CPT | Performed by: STUDENT IN AN ORGANIZED HEALTH CARE EDUCATION/TRAINING PROGRAM

## 2023-11-17 PROCEDURE — 272N000451 HC KIT SHRLOCK 5FR POWER PICC DOUBLE LUMEN

## 2023-11-17 PROCEDURE — 272N000473 HC KIT, VPS RHYTHM STYLET

## 2023-11-17 PROCEDURE — 250N000011 HC RX IP 250 OP 636: Mod: JZ | Performed by: PHYSICIAN ASSISTANT

## 2023-11-17 PROCEDURE — 84100 ASSAY OF PHOSPHORUS: CPT | Performed by: STUDENT IN AN ORGANIZED HEALTH CARE EDUCATION/TRAINING PROGRAM

## 2023-11-17 PROCEDURE — 999N000065 XR CHEST PORT 1 VIEW

## 2023-11-17 PROCEDURE — 36415 COLL VENOUS BLD VENIPUNCTURE: CPT | Performed by: INTERNAL MEDICINE

## 2023-11-17 PROCEDURE — 84155 ASSAY OF PROTEIN SERUM: CPT | Performed by: STUDENT IN AN ORGANIZED HEALTH CARE EDUCATION/TRAINING PROGRAM

## 2023-11-17 PROCEDURE — 3E04305 INTRODUCTION OF OTHER ANTINEOPLASTIC INTO CENTRAL VEIN, PERCUTANEOUS APPROACH: ICD-10-PCS | Performed by: RADIOLOGY

## 2023-11-17 PROCEDURE — 99215 OFFICE O/P EST HI 40 MIN: CPT | Performed by: STUDENT IN AN ORGANIZED HEALTH CARE EDUCATION/TRAINING PROGRAM

## 2023-11-17 PROCEDURE — 250N000009 HC RX 250: Performed by: STUDENT IN AN ORGANIZED HEALTH CARE EDUCATION/TRAINING PROGRAM

## 2023-11-17 PROCEDURE — 02HV33Z INSERTION OF INFUSION DEVICE INTO SUPERIOR VENA CAVA, PERCUTANEOUS APPROACH: ICD-10-PCS | Performed by: RADIOLOGY

## 2023-11-17 PROCEDURE — 83615 LACTATE (LD) (LDH) ENZYME: CPT | Performed by: STUDENT IN AN ORGANIZED HEALTH CARE EDUCATION/TRAINING PROGRAM

## 2023-11-17 PROCEDURE — 250N000012 HC RX MED GY IP 250 OP 636 PS 637: Performed by: STUDENT IN AN ORGANIZED HEALTH CARE EDUCATION/TRAINING PROGRAM

## 2023-11-17 PROCEDURE — G0463 HOSPITAL OUTPT CLINIC VISIT: HCPCS | Performed by: STUDENT IN AN ORGANIZED HEALTH CARE EDUCATION/TRAINING PROGRAM

## 2023-11-17 PROCEDURE — 80053 COMPREHEN METABOLIC PANEL: CPT

## 2023-11-17 PROCEDURE — 85025 COMPLETE CBC W/AUTO DIFF WBC: CPT | Performed by: STUDENT IN AN ORGANIZED HEALTH CARE EDUCATION/TRAINING PROGRAM

## 2023-11-17 PROCEDURE — 71045 X-RAY EXAM CHEST 1 VIEW: CPT | Mod: 26 | Performed by: RADIOLOGY

## 2023-11-17 PROCEDURE — 85025 COMPLETE CBC W/AUTO DIFF WBC: CPT

## 2023-11-17 PROCEDURE — 250N000011 HC RX IP 250 OP 636: Performed by: STUDENT IN AN ORGANIZED HEALTH CARE EDUCATION/TRAINING PROGRAM

## 2023-11-17 PROCEDURE — 84450 TRANSFERASE (AST) (SGOT): CPT | Performed by: STUDENT IN AN ORGANIZED HEALTH CARE EDUCATION/TRAINING PROGRAM

## 2023-11-17 PROCEDURE — 36569 INSJ PICC 5 YR+ W/O IMAGING: CPT

## 2023-11-17 RX ORDER — ONDANSETRON 4 MG/1
4 TABLET, ORALLY DISINTEGRATING ORAL EVERY 8 HOURS PRN
Status: DISCONTINUED | OUTPATIENT
Start: 2023-11-17 | End: 2023-11-20 | Stop reason: HOSPADM

## 2023-11-17 RX ORDER — HEPARIN SODIUM,PORCINE 10 UNIT/ML
5-20 VIAL (ML) INTRAVENOUS
Status: DISCONTINUED | OUTPATIENT
Start: 2023-11-17 | End: 2023-11-20 | Stop reason: HOSPADM

## 2023-11-17 RX ORDER — LORAZEPAM 0.5 MG/1
.5-1 TABLET ORAL EVERY 6 HOURS PRN
Status: DISCONTINUED | OUTPATIENT
Start: 2023-11-17 | End: 2023-11-17

## 2023-11-17 RX ORDER — LORAZEPAM 2 MG/ML
.5-1 INJECTION INTRAMUSCULAR EVERY 6 HOURS PRN
Status: DISCONTINUED | OUTPATIENT
Start: 2023-11-17 | End: 2023-11-17

## 2023-11-17 RX ORDER — DIPHENHYDRAMINE HYDROCHLORIDE 50 MG/ML
50 INJECTION INTRAMUSCULAR; INTRAVENOUS
Status: DISCONTINUED | OUTPATIENT
Start: 2023-11-17 | End: 2023-11-20 | Stop reason: HOSPADM

## 2023-11-17 RX ORDER — HEPARIN SODIUM,PORCINE 10 UNIT/ML
5-20 VIAL (ML) INTRAVENOUS EVERY 24 HOURS
Status: DISCONTINUED | OUTPATIENT
Start: 2023-11-17 | End: 2023-11-20 | Stop reason: HOSPADM

## 2023-11-17 RX ORDER — ONDANSETRON 8 MG/1
8 TABLET, FILM COATED ORAL EVERY 12 HOURS
Qty: 10 TABLET | Refills: 0 | Status: DISCONTINUED | OUTPATIENT
Start: 2023-11-17 | End: 2023-11-20 | Stop reason: HOSPADM

## 2023-11-17 RX ORDER — ONDANSETRON 8 MG/1
8 TABLET, FILM COATED ORAL EVERY 12 HOURS
Status: CANCELLED
Start: 2023-11-17

## 2023-11-17 RX ORDER — AMOXICILLIN 250 MG
2 CAPSULE ORAL 2 TIMES DAILY PRN
Status: DISCONTINUED | OUTPATIENT
Start: 2023-11-17 | End: 2023-11-20 | Stop reason: HOSPADM

## 2023-11-17 RX ORDER — ENOXAPARIN SODIUM 100 MG/ML
40 INJECTION SUBCUTANEOUS EVERY 24 HOURS
Status: DISCONTINUED | OUTPATIENT
Start: 2023-11-18 | End: 2023-11-20 | Stop reason: HOSPADM

## 2023-11-17 RX ORDER — ONDANSETRON 4 MG/1
4 TABLET, FILM COATED ORAL EVERY 8 HOURS PRN
Status: DISCONTINUED | OUTPATIENT
Start: 2023-11-17 | End: 2023-11-20 | Stop reason: HOSPADM

## 2023-11-17 RX ORDER — PREDNISOLONE ACETATE 10 MG/ML
2 SUSPENSION/ DROPS OPHTHALMIC 4 TIMES DAILY
Status: CANCELLED | OUTPATIENT
Start: 2023-11-17

## 2023-11-17 RX ORDER — EPINEPHRINE 1 MG/ML
0.3 INJECTION, SOLUTION INTRAMUSCULAR; SUBCUTANEOUS EVERY 5 MIN PRN
Status: CANCELLED | OUTPATIENT
Start: 2023-11-17

## 2023-11-17 RX ORDER — ACYCLOVIR 400 MG/1
400 TABLET ORAL 2 TIMES DAILY
Status: DISCONTINUED | OUTPATIENT
Start: 2023-11-17 | End: 2023-11-20 | Stop reason: HOSPADM

## 2023-11-17 RX ORDER — ONDANSETRON 2 MG/ML
4 INJECTION INTRAMUSCULAR; INTRAVENOUS EVERY 8 HOURS PRN
Status: DISCONTINUED | OUTPATIENT
Start: 2023-11-17 | End: 2023-11-20 | Stop reason: HOSPADM

## 2023-11-17 RX ORDER — METHYLPREDNISOLONE SODIUM SUCCINATE 125 MG/2ML
125 INJECTION, POWDER, LYOPHILIZED, FOR SOLUTION INTRAMUSCULAR; INTRAVENOUS
Status: CANCELLED
Start: 2023-11-17

## 2023-11-17 RX ORDER — PROCHLORPERAZINE MALEATE 5 MG
5-10 TABLET ORAL EVERY 6 HOURS PRN
Status: CANCELLED
Start: 2023-11-17

## 2023-11-17 RX ORDER — CETIRIZINE HYDROCHLORIDE 10 MG/1
10 TABLET ORAL EVERY EVENING
Status: DISCONTINUED | OUTPATIENT
Start: 2023-11-17 | End: 2023-11-20 | Stop reason: HOSPADM

## 2023-11-17 RX ORDER — ENOXAPARIN SODIUM 100 MG/ML
40 INJECTION SUBCUTANEOUS EVERY 24 HOURS
Status: DISCONTINUED | OUTPATIENT
Start: 2023-11-17 | End: 2023-11-17

## 2023-11-17 RX ORDER — ALBUTEROL SULFATE 0.83 MG/ML
2.5 SOLUTION RESPIRATORY (INHALATION)
Status: DISCONTINUED | OUTPATIENT
Start: 2023-11-17 | End: 2023-11-20 | Stop reason: HOSPADM

## 2023-11-17 RX ORDER — EPINEPHRINE 1 MG/ML
0.3 INJECTION, SOLUTION, CONCENTRATE INTRAVENOUS EVERY 5 MIN PRN
Status: DISCONTINUED | OUTPATIENT
Start: 2023-11-17 | End: 2023-11-20 | Stop reason: HOSPADM

## 2023-11-17 RX ORDER — PROCHLORPERAZINE MALEATE 5 MG
5-10 TABLET ORAL EVERY 6 HOURS PRN
Status: DISCONTINUED | OUTPATIENT
Start: 2023-11-17 | End: 2023-11-20 | Stop reason: HOSPADM

## 2023-11-17 RX ORDER — METHYLPREDNISOLONE SODIUM SUCCINATE 125 MG/2ML
125 INJECTION, POWDER, LYOPHILIZED, FOR SOLUTION INTRAMUSCULAR; INTRAVENOUS
Status: DISCONTINUED | OUTPATIENT
Start: 2023-11-17 | End: 2023-11-20 | Stop reason: HOSPADM

## 2023-11-17 RX ORDER — DEXAMETHASONE 4 MG/1
4 TABLET ORAL EVERY 12 HOURS
Status: CANCELLED
Start: 2023-11-17

## 2023-11-17 RX ORDER — ACETAMINOPHEN 325 MG/1
650 TABLET ORAL EVERY 6 HOURS PRN
Status: DISCONTINUED | OUTPATIENT
Start: 2023-11-17 | End: 2023-11-20 | Stop reason: HOSPADM

## 2023-11-17 RX ORDER — LORAZEPAM 0.5 MG/1
.5-1 TABLET ORAL EVERY 6 HOURS PRN
Status: CANCELLED
Start: 2023-11-17

## 2023-11-17 RX ORDER — DEXAMETHASONE 4 MG/1
4 TABLET ORAL EVERY 12 HOURS
Qty: 6 TABLET | Refills: 0 | Status: COMPLETED | OUTPATIENT
Start: 2023-11-17 | End: 2023-11-20

## 2023-11-17 RX ORDER — LORAZEPAM 2 MG/ML
.5-1 INJECTION INTRAMUSCULAR EVERY 6 HOURS PRN
Status: DISCONTINUED | OUTPATIENT
Start: 2023-11-17 | End: 2023-11-20 | Stop reason: HOSPADM

## 2023-11-17 RX ORDER — LORAZEPAM 0.5 MG/1
.5-1 TABLET ORAL EVERY 6 HOURS PRN
Status: DISCONTINUED | OUTPATIENT
Start: 2023-11-17 | End: 2023-11-20 | Stop reason: HOSPADM

## 2023-11-17 RX ORDER — ALBUTEROL SULFATE 0.83 MG/ML
2.5 SOLUTION RESPIRATORY (INHALATION)
Status: CANCELLED | OUTPATIENT
Start: 2023-11-17

## 2023-11-17 RX ORDER — LIDOCAINE 40 MG/G
CREAM TOPICAL
Status: DISCONTINUED | OUTPATIENT
Start: 2023-11-17 | End: 2023-11-20 | Stop reason: HOSPADM

## 2023-11-17 RX ORDER — DIPHENHYDRAMINE HYDROCHLORIDE 50 MG/ML
50 INJECTION INTRAMUSCULAR; INTRAVENOUS
Status: CANCELLED
Start: 2023-11-17

## 2023-11-17 RX ORDER — ALBUTEROL SULFATE 90 UG/1
1-2 AEROSOL, METERED RESPIRATORY (INHALATION)
Status: DISCONTINUED | OUTPATIENT
Start: 2023-11-17 | End: 2023-11-20 | Stop reason: HOSPADM

## 2023-11-17 RX ORDER — LORAZEPAM 2 MG/ML
.5-1 INJECTION INTRAMUSCULAR EVERY 6 HOURS PRN
Status: CANCELLED | OUTPATIENT
Start: 2023-11-17

## 2023-11-17 RX ORDER — ALBUTEROL SULFATE 90 UG/1
1-2 AEROSOL, METERED RESPIRATORY (INHALATION)
Status: CANCELLED
Start: 2023-11-17

## 2023-11-17 RX ORDER — POLYETHYLENE GLYCOL 3350 17 G/17G
17 POWDER, FOR SOLUTION ORAL DAILY PRN
Status: DISCONTINUED | OUTPATIENT
Start: 2023-11-17 | End: 2023-11-20 | Stop reason: HOSPADM

## 2023-11-17 RX ORDER — AMOXICILLIN 250 MG
1 CAPSULE ORAL 2 TIMES DAILY PRN
Status: DISCONTINUED | OUTPATIENT
Start: 2023-11-17 | End: 2023-11-20 | Stop reason: HOSPADM

## 2023-11-17 RX ORDER — MEPERIDINE HYDROCHLORIDE 25 MG/ML
25 INJECTION INTRAMUSCULAR; INTRAVENOUS; SUBCUTANEOUS EVERY 30 MIN PRN
Status: CANCELLED | OUTPATIENT
Start: 2023-11-17

## 2023-11-17 RX ORDER — PROCHLORPERAZINE MALEATE 5 MG
5 TABLET ORAL EVERY 6 HOURS PRN
Status: DISCONTINUED | OUTPATIENT
Start: 2023-11-17 | End: 2023-11-17

## 2023-11-17 RX ORDER — PREDNISOLONE ACETATE 10 MG/ML
2 SUSPENSION/ DROPS OPHTHALMIC 4 TIMES DAILY
Status: DISCONTINUED | OUTPATIENT
Start: 2023-11-17 | End: 2023-11-20 | Stop reason: HOSPADM

## 2023-11-17 RX ORDER — MEPERIDINE HYDROCHLORIDE 25 MG/ML
25 INJECTION INTRAMUSCULAR; INTRAVENOUS; SUBCUTANEOUS EVERY 30 MIN PRN
Status: DISCONTINUED | OUTPATIENT
Start: 2023-11-17 | End: 2023-11-20 | Stop reason: HOSPADM

## 2023-11-17 RX ADMIN — PREDNISOLONE ACETATE 2 DROP: 10 SUSPENSION/ DROPS OPHTHALMIC at 14:09

## 2023-11-17 RX ADMIN — HEPARIN, PORCINE (PF) 10 UNIT/ML INTRAVENOUS SYRINGE 5 ML: at 16:48

## 2023-11-17 RX ADMIN — DEXAMETHASONE 4 MG: 4 TABLET ORAL at 16:03

## 2023-11-17 RX ADMIN — ACYCLOVIR 400 MG: 400 TABLET ORAL at 20:37

## 2023-11-17 RX ADMIN — HEPARIN, PORCINE (PF) 10 UNIT/ML INTRAVENOUS SYRINGE 5 ML: at 20:36

## 2023-11-17 RX ADMIN — ONDANSETRON HYDROCHLORIDE 8 MG: 8 TABLET, FILM COATED ORAL at 16:02

## 2023-11-17 RX ADMIN — CYTARABINE 3000 MG: 100 INJECTION, SOLUTION INTRATHECAL; INTRAVENOUS; SUBCUTANEOUS at 16:41

## 2023-11-17 RX ADMIN — ENOXAPARIN SODIUM 40 MG: 40 INJECTION SUBCUTANEOUS at 14:09

## 2023-11-17 RX ADMIN — PREDNISOLONE ACETATE 2 DROP: 10 SUSPENSION/ DROPS OPHTHALMIC at 20:37

## 2023-11-17 RX ADMIN — CETIRIZINE HYDROCHLORIDE 10 MG: 10 TABLET, FILM COATED ORAL at 20:37

## 2023-11-17 ASSESSMENT — ACTIVITIES OF DAILY LIVING (ADL)
ADLS_ACUITY_SCORE: 20

## 2023-11-17 ASSESSMENT — PAIN SCALES - GENERAL: PAINLEVEL: NO PAIN (0)

## 2023-11-17 NOTE — PHARMACY-ADMISSION MEDICATION HISTORY
Pharmacy Intern Admission Medication History    Admission medication history is complete. The information provided in this note is only as accurate as the sources available at the time of the update.    Information Source(s): Patient, Family member, and CareEverywhere/SureScripts via in-person    Pertinent Information:   Per patient & spouse, levofloxacin temporarily on hold per doctor's orders. Last dose was 11/10/23.     Changes made to PTA medication list:  Added: None  Deleted:   Prednisolone acetate (Pred Forte) 1% ophthalmic suspension, place 2 drops into both eyes 4 times daily. Per patient, no longer using.  Triamcinolone (Kenalog) 0.1% external ointment, apply topically 2 times daily as needed for irritation (rash). Per patient, no longer using.  Changed: None       Allergies reviewed with patient and updates made in EHR: yes    Medication History Completed By: Yuly Watts, 4th year pharmacy student, 11/17/2023 11:46 AM    Prior to Admission medications    Medication Sig Last Dose Taking? Auth Provider Long Term End Date   acetaminophen (TYLENOL) 325 MG tablet Take 650 mg by mouth every 6 hours as needed for mild pain Unknown at PRN Yes Unknown, Entered By History No    acyclovir (ZOVIRAX) 400 MG tablet Take 1 tablet (400 mg) by mouth 2 times daily 11/17/2023 at 8AM Yes Donis Garcia MD Yes    cetirizine (ZYRTEC) 10 MG tablet Take 1 tablet (10 mg) by mouth daily 11/16/2023 at PM Yes Yolis Sherman PA-C     ondansetron (ZOFRAN) 4 MG tablet Take 1 tablet (4 mg) by mouth every 8 hours as needed for nausea or vomiting Unknown at PRN Yes Yolis Sherman PA-C     prochlorperazine (COMPAZINE) 5 MG tablet Take 1 tablet (5 mg) by mouth every 6 hours as needed for nausea or vomiting Unknown at PRN Yes Yolis Sherman PA-C     voriconazole (VFEND) 200 MG tablet Take 1 tablet (200 mg) by mouth every 12 hours 11/17/2023 at 8AM Yes Donis Garcia MD     levofloxacin (LEVAQUIN) 500 MG  tablet Take 1 tablet (500 mg) by mouth daily  Patient not taking: Reported on 11/15/2023   Donis Garcia MD

## 2023-11-17 NOTE — LETTER
11/17/2023         RE: Artie Fine  [address] [address] [address]        Dear Colleague,    Thank you for referring your patient, Artie Fine, to the [facility]. Please see a copy of my visit note below.    [facility]  HEMATOLOGY & ONCOLOGY  FOLLOW UP VISIT    PATIENT NAME: Artie Fine          MRN # [id number]  YOB: 1955  DATE OF VISIT: 11/10/2023              REFERRING PROVIDER:   Mr. Artie Fine was seen at the request of [facility] for further evaluation and/or management.       History of Presenting Illness  Mr. Artie Fine is a pleasant 68 year old male with a past medical history significant for hernia surgery in 2021 followed by a brain abscess 2 months later and who noticed increased fatigue since mid June and presented to the hospital and was found to have pancytopenia and circulating blasts. A bone marrow biopsy on 9/1/2023 confirmed a diagnosis of AML with normal karyotype and NGS with ASXL1 , SRSF2, STAG2, CEBPA now s/p 7+3 started on 9/2/2023 with day 14 marrow on 9/15 showing hypocellular marrow with no increased blast and now a recovery marrow on day 38 on 10/9 showing no increased blasts with hypocellular marrow 20-30%. Cytogenetics and NGS pending. He presents today for further evaluation of AML. He was diagnosed with AML in 9/1/2023 and is here to establish at the [facility].      Subjective  Patients donor for the bone marrow transplant fell through.  We will move forward with another cycle of chemo.  He is here with his wife Aundrea.  He states that he has been continuing to do well and has bee active.  Denies any fevers or chills and bleeding from anywhere including BRBPR or melena. Compliant on all of his medications. Hasn't been able to get his left upper molar fixed as his dentist is on vacation.       Past Medical History  History reviewed. No pertinent past medical history.    Family History  History reviewed. No  pertinent family history.  Mother Lung cancer, breast cancer  Brother Lung cancer,   2 sisters one Breast cancer, another sister Colon cancer and passed away this year with brain mets    Social History  Smoking: Former smoker:  1 packs/day for 10-15 years:  Quit:  40 yrs back  Alcohol use: rarely drinks alcohol  Status:  43 years.   Children/grandchildren: 3 children, 2 daughters 36 (never pregnant) and son 39. No grandchildern.  Occupation: Contractor/ furniture retired 2017    Current Outpatient Medications  Current Outpatient Medications   Medication Sig Dispense Refill    acetaminophen (TYLENOL) 325 MG tablet Take 650 mg by mouth every 6 hours as needed for mild pain      acyclovir (ZOVIRAX) 400 MG tablet Take 1 tablet (400 mg) by mouth 2 times daily 60 tablet 0    cetirizine (ZYRTEC) 10 MG tablet Take 1 tablet (10 mg) by mouth daily      levofloxacin (LEVAQUIN) 500 MG tablet Take 1 tablet (500 mg) by mouth daily (Patient not taking: Reported on 11/15/2023) 30 tablet 0    ondansetron (ZOFRAN) 4 MG tablet Take 1 tablet (4 mg) by mouth every 8 hours as needed for nausea or vomiting 20 tablet 0    prednisoLONE acetate (PRED FORTE) 1 % ophthalmic suspension Place 2 drops into both eyes 4 times daily To continue through 10/18/2023 (Patient not taking: Reported on 11/15/2023) 20 mL 0    prochlorperazine (COMPAZINE) 5 MG tablet Take 1 tablet (5 mg) by mouth every 6 hours as needed for nausea or vomiting 20 tablet 0    triamcinolone (KENALOG) 0.1 % external ointment Apply topically 2 times daily as needed for irritation (rash) (Patient not taking: Reported on 11/15/2023) 30 g 0    voriconazole (VFEND) 200 MG tablet Take 1 tablet (200 mg) by mouth every 12 hours 60 tablet 0       Allergies  Allergies   Allergen Reactions    Iodinated Contrast Media Rash    Cefepime Rash    Ceftriaxone Rash     Reported history at Outside hospital 2021    R[employer] Other (See Comments)     Congestion        Review of systems  A  complete ROS was performed and was negative except as mentioned in HPI    Physical Exam  Vitals:    23 0924   BP: (!) 148/79   Pulse: 89   Resp: 16   Temp: 97.9  F (36.6  C)   TempSrc: Oral   SpO2: 99%   Weight: 91.3 kg (201 lb 3.2 oz)     Wt Readings from Last 3 Encounters:   23 91.3 kg (201 lb 3.2 oz)   11/15/23 90.6 kg (199 lb 11.2 oz)   11/10/23 88.9 kg (196 lb)     ECO    male sitting in chair in NAD  HEET: NC/AT, EOMI w/ PERRL, anicteric sclera. MMM. No mouth sores.   Neck: supple no JVP  CV: normal S1,S2 with RRR no m/r/g  Resp: good air movement b/l. No wheezes or crackles  Abd: soft, NTND, no organomegaly or masses. BS normoactive.   Ext: WWP no edema or cyanosis  Neuro: A&Ox4, no lateralizing sx. Grossly nonfocal. Strength appears preserved.   Lymph: No cervical, supraclavicular, axillary or inguinal LAD  Skin: no concerning lesions or rashes or petechiae    Labs and Imaging  Labs reviewed from care everywhere  WBC 7, Hgb 10.7, platelets 277, ANC 3000  Creatinine 0.84  LFTs normal    BMBx 11/15/2023 pending.     BMBX 10/9/2023  - Hypocellular marrow (10-20% estimated cellularity), with trilineage hematopoesis; no morphologic or immunophenotypic evidence of recurrent/persistent acute myeloid leukemia  - Peripheral blood showing moderate normocytic hypochromic anemia; slight monocytosis.   2.0% cells in the blast gate (CD45 dim and low side scatter blast gate). There is no aberrant immunophenotype on the myeloid blasts.   ASXL1 E635fs  SRSF2 P95H      BMBx 2023  Acute myeloid leukemia with the following characteristics:  - Hypercellular marrow (70-80% estimated cellularity) with residual trilineage hematopoiesis, dysplastic granulocytes, diminished and atypical megakaryocytes, and 30% blasts  - Peripheral blood showing marked normochromic normocytic anemia, slight leukopenia, marked thrombocytopenia, and 11% circulating blasts  Chromosomal analysis: 46,XY[20]   FISH: No evidence  "of MLLT10, NUP98, or KMT2A rearrangement  1) ASXL1 E635fs VAF 31.3%  2) CEBPA E59*  VAF 84.72% (\"NOT an inframe insertion or deletion in the C-terminal DNA-binding or basic leucine zipper region (bZIP) and was not found in biallelic configuration with a CEBPA bZIP mutation\")  3) SRSF2 P95H VAF 47.21%  4) STAG2 Q656* VAF 89.96%     Assessment and Plan  Mr. Artie Fine is a 68 year old male who is here for further evaluation and/or management of Acute myeloid leukemia.    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 C/w cycle 2.     I had discussed the pathophysiology and natural history of AML with Mr. Artie Fine previously. We went over the current prognostic models and scoring systems that are used in clinical practice . We went over the current FDA approved treatments for AML .  His disease is High risk and he is planning to move forward with a bone marrow transplant.    He is s/p induction and is currently in complete remission and is now s/p HiDAC cycle 1. His bone marrow transplant donor fell through so we will continue with cycle 2 of the HiDAC.     We will continue donor search in the meanwhile.       Plan:  - Admission today for HiDAC cycle 2.   - c/w labs twice weekly and acyclovir, levoflox and voriconazole   - Neulasta at [place].  - Repeat CT chest while inpatient given previous basilar reticular opacities.     Hematology:  Anemia/Thrombocytopenia:   Transfuse leukocyte reduced and irradiated blood products: 1 unit pRBC if hgb is 7.0-7.9, 2 units pRBCs if Hgb 6.0-6.9 g/dl, 1 unit platelets if PLT count is <10,000 or <50,000 with clinical bleeding.    Immunocompromised:  As a result of his disease and/or previous treatment. Mr. Artie Fine is " immunocompromised. He will restart levoflox at discharge.    Cardiac:  Mr. Artie Fine has no history of heart disease.     Renal:  Creatinine results reviewed today. Mr. Artie Fine does not have any renal disease.    Hepatic:  Liver function tests reviewed today.    Psychosocial:  Mr. Artie Fine is coping well with his diagnosis.     All other questions and concerns were addressed and answered to Mr. Artie Fine's satisfaction.    Today, I spent a total of 40 minutes of face-to-face and non face-to-face time with Mr. Artie Fine. Time spent included review and discussion of diagnostic test results, patient counseling, and coordination of care.    Donis Garcia MD    Division of Hematology, Oncology and Transplantation  [facility]  P: [phone]

## 2023-11-17 NOTE — PROGRESS NOTES
[facility]  HEMATOLOGY & ONCOLOGY  FOLLOW UP VISIT    PATIENT NAME: Artie Fine          MRN # [id number]  YOB: 1955  DATE OF VISIT: 11/17/2023              REFERRING PROVIDER:   Mr. Artie Fine was seen at the request of Tye for further evaluation and/or management.       History of Presenting Illness  Mr. Artie Fine is a pleasant 68 year old male with a past medical history significant for hernia surgery in 2021 followed by a brain abscess 2 months later and who noticed increased fatigue since mid June and presented to the hospital and was found to have pancytopenia and circulating blasts. A bone marrow biopsy on 9/1/2023 confirmed a diagnosis of AML with normal karyotype and NGS with ASXL1 , SRSF2, STAG2, CEBPA now s/p 7+3 started on 9/2/2023 with day 14 marrow on 9/15 showing hypocellular marrow with no increased blast and now a recovery marrow on day 38 on 10/9 showing no increased blasts with hypocellular marrow 20-30%. Cytogenetics and NGS pending. He presents today for further evaluation of AML. He was diagnosed with AML in 9/1/2023 and is here to establish at the [facility].      Subjective  Patients donor for the bone marrow transplant fell through.  We will move forward with another cycle of chemo.  He is here with his wife Aundrea.  He states that he has been continuing to do well and has bee active.  Denies any fevers or chills and bleeding from anywhere including BRBPR or melena. Compliant on all of his medications. Hasn't been able to get his left upper molar fixed as his dentist is on vacation.       Past Medical History  History reviewed. No pertinent past medical history.    Family History  History reviewed. No pertinent family history.  Mother Lung cancer, breast cancer  Brother Lung cancer,   2 sisters one Breast cancer, another sister Colon cancer and passed away this year with brain mets    Social History  Smoking: Former smoker:  1 packs/day for 10-15 years:   Quit:  40 yrs back  Alcohol use: rarely drinks alcohol  Status:  43 years.   Children/grandchildren: 3 children, 2 daughters 36 (never pregnant) and son 39. No grandchildern.  Occupation: Contractor/ furniture retired 2017    Current Outpatient Medications  Current Outpatient Medications   Medication Sig Dispense Refill    acetaminophen (TYLENOL) 325 MG tablet Take 650 mg by mouth every 6 hours as needed for mild pain      acyclovir (ZOVIRAX) 400 MG tablet Take 1 tablet (400 mg) by mouth 2 times daily 60 tablet 0    cetirizine (ZYRTEC) 10 MG tablet Take 1 tablet (10 mg) by mouth daily      levofloxacin (LEVAQUIN) 500 MG tablet Take 1 tablet (500 mg) by mouth daily (Patient not taking: Reported on 11/15/2023) 30 tablet 0    ondansetron (ZOFRAN) 4 MG tablet Take 1 tablet (4 mg) by mouth every 8 hours as needed for nausea or vomiting 20 tablet 0    prednisoLONE acetate (PRED FORTE) 1 % ophthalmic suspension Place 2 drops into both eyes 4 times daily To continue through 10/18/2023 (Patient not taking: Reported on 11/15/2023) 20 mL 0    prochlorperazine (COMPAZINE) 5 MG tablet Take 1 tablet (5 mg) by mouth every 6 hours as needed for nausea or vomiting 20 tablet 0    triamcinolone (KENALOG) 0.1 % external ointment Apply topically 2 times daily as needed for irritation (rash) (Patient not taking: Reported on 11/15/2023) 30 g 0    voriconazole (VFEND) 200 MG tablet Take 1 tablet (200 mg) by mouth every 12 hours 60 tablet 0       Allergies  Allergies   Allergen Reactions    Iodinated Contrast Media Rash    Cefepime Rash    Ceftriaxone Rash     Reported history at Outside hospital 2021    Ragweeds Other (See Comments)     Congestion        Review of systems  A complete ROS was performed and was negative except as mentioned in HPI    Physical Exam  Vitals:    11/17/23 0924   BP: (!) 148/79   Pulse: 89   Resp: 16   Temp: 97.9  F (36.6  C)   TempSrc: Oral   SpO2: 99%   Weight: 91.3 kg (201 lb 3.2 oz)     Wt Readings  "from Last 3 Encounters:   23 91.3 kg (201 lb 3.2 oz)   11/15/23 90.6 kg (199 lb 11.2 oz)   11/10/23 88.9 kg (196 lb)     ECO    male sitting in chair in NAD  HEET: NC/AT, EOMI w/ PERRL, anicteric sclera. MMM. No mouth sores.   Neck: supple no JVP  CV: normal S1,S2 with RRR no m/r/g  Resp: good air movement b/l. No wheezes or crackles  Abd: soft, NTND, no organomegaly or masses. BS normoactive.   Ext: WWP no edema or cyanosis  Neuro: A&Ox4, no lateralizing sx. Grossly nonfocal. Strength appears preserved.   Lymph: No cervical, supraclavicular, axillary or inguinal LAD  Skin: no concerning lesions or rashes or petechiae    Labs and Imaging  Labs reviewed from care everywhere  WBC 7, Hgb 10.7, platelets 277, ANC 3000  Creatinine 0.84  LFTs normal    BMBx 11/15/2023 pending.     BMBX 10/9/2023  - Hypocellular marrow (10-20% estimated cellularity), with trilineage hematopoesis; no morphologic or immunophenotypic evidence of recurrent/persistent acute myeloid leukemia  - Peripheral blood showing moderate normocytic hypochromic anemia; slight monocytosis.   2.0% cells in the blast gate (CD45 dim and low side scatter blast gate). There is no aberrant immunophenotype on the myeloid blasts.   ASXL1 E635fs  SRSF2 P95H      BMBx 2023  Acute myeloid leukemia with the following characteristics:  - Hypercellular marrow (70-80% estimated cellularity) with residual trilineage hematopoiesis, dysplastic granulocytes, diminished and atypical megakaryocytes, and 30% blasts  - Peripheral blood showing marked normochromic normocytic anemia, slight leukopenia, marked thrombocytopenia, and 11% circulating blasts  Chromosomal analysis: 46,XY[20]   FISH: No evidence of MLLT10, NUP98, or KMT2A rearrangement  1) ASXL1 E635fs VAF 31.3%  2) CEBPA E59*  VAF 84.72% (\"NOT an inframe insertion or deletion in the C-terminal DNA-binding or basic leucine zipper region (bZIP) and was not found in biallelic configuration with a " "CEBPA bZIP mutation\")  3) SRSF2 P95H VAF 47.21%  4) STAG2 Q656* VAF 89.96%     Assessment and Plan  Mr. Artie Fine is a 68 year old male who is here for further evaluation and/or management of Acute myeloid leukemia.    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 C/w cycle 2.     I had discussed the pathophysiology and natural history of AML with Mr. Artie Fine previously. We went over the current prognostic models and scoring systems that are used in clinical practice . We went over the current FDA approved treatments for AML .  His disease is High risk and he is planning to move forward with a bone marrow transplant.    He is s/p induction and is currently in complete remission and is now s/p HiDAC cycle 1. His bone marrow transplant donor fell through so we will continue with cycle 2 of the HiDAC.     We will continue donor search in the meanwhile.       Plan:  - Admission today for HiDAC cycle 2.   - c/w labs twice weekly and acyclovir, levoflox and voriconazole   - Neulasta at [facility].  - Repeat CT chest while inpatient given previous basilar reticular opacities.     Hematology:  Anemia/Thrombocytopenia:   Transfuse leukocyte reduced and irradiated blood products: 1 unit pRBC if hgb is 7.0-7.9, 2 units pRBCs if Hgb 6.0-6.9 g/dl, 1 unit platelets if PLT count is <10,000 or <50,000 with clinical bleeding.    Immunocompromised:  As a result of his disease and/or previous treatment. Mr. Artie Fine is immunocompromised. He will restart levoflox at discharge.    Cardiac:  Mr. Artie Fine has no history of heart disease.     Renal:  Creatinine results reviewed today. Mr. Artie Fine does not have any renal disease.    Hepatic:  Liver function tests reviewed " today.    Psychosocial:  Mr. Artie Fine is coping well with his diagnosis.     All other questions and concerns were addressed and answered to Mr. Artie Fine's satisfaction.    Today, I spent a total of 40 minutes of face-to-face and non face-to-face time with Mr. Artie Fine. Time spent included review and discussion of diagnostic test results, patient counseling, and coordination of care.    Donis Garcia MD    Division of Hematology, Oncology and Transplantation  [facility]  P: [phone]

## 2023-11-17 NOTE — PROGRESS NOTES
Care Management Follow Up     CHW/Andres was asked to meet with patient to have them sign and notarize their Health Care Directive. CHW met with patient to notarize HCD.HCD form was verbally reviewed and verified with patient by CHW Pt signed and dated form and CHW notarized form. HCD was emailed to [facility] and a copy was placed in the pt's chart. The original HCD was given to the pt.      Mark Mike   [facility] Community Health Worker and Andres   [facility] 7C & 7D

## 2023-11-17 NOTE — H&P
[facility]    History & Physical  Hematology / Oncology     Date of Admission:  11/17/2023  Date of Service (when I saw the patient):  11/17/2023    Assessment & Plan   Artie Fine is a pleasant 68 year old male with a past medical history significant for brain abscess and recently diagnosed high-risk AML with ASXL1 and SRSF2 mutations s/p 7+3 induction and iDAC consolidation 1 and currently in CR1 who was admitted on 11/17/23 for C2 of iDAC as a bridge to eventual alloHSCT.    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 Garcia. Patient initially presented to [facility] in [place], [place] with 4-6 weeks of progressive bruising, weakness, fatigue, loss of appetite, and night sweats. He also noted a headache similar to his previous brain abscess (see more below). CBC notable for pancytopenia; WBC 2.7 (), Hgb 8.1, and plt 1k. He was transferred to [facility] and was found on peripheral flow w/ 11% circulating myeloid blasts. He was then transferred to [facility] for further work up and treatment of AML. Diagnostic BMBx performed 9/1/23 which showed AML with 30% blasts by morph and 47% by flow. P53 negative. FISH negative for MLLT10, NUP98, and KMT2A. Normal karyotype. NGS showed ASXL1, CEBPA, SRSF2, and STAG2 mutations. Underwent induction with 7+3 (Day 1=9/2/23). Diagnostic LP done 9/8/23 due to presenting sx including headache without evidence of CNS involvement. MRI brain 9/11 negative, only showing chronic changes from h/o brain abscess. Count recovery BMBx done 10/9/23 without evidence of leukemia. Admitted for cycle 1 iDAC consolidation (C1D1=10/13/23) as a bridge to BMT. 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   - PICC to be placed on admission  - Will require Neulasta on 11/21 or 11/22 as well as 3x weekly labs/transfusions; orders written and  "delivered to [facility], who will coordinate with [facility] to schedule     Treatment Plan: iDAC (C1D1=10/13/2023)   - Cytarabine 1.5 g/m2 q12 hours x 6 doses - D1-3  - Neulasta 6 mg x1 dose - D5 (requested at [facility] on 11/21 or 11/22)  - Pre-meds: dexamethasone 4 mg q12 hours x 6 doses  - Supportive meds: Pred-Forte ophthalmic drops     # Macrocytic anemia  Present on admission, baseline ~10. Presumed to underlying leukemia plus recent chemotherapy.  - Follow daily CBC with differential  - Transfuse to keep Hgb >7, plt >10K  - Of note: patient requires irradiated blood products given planned alloBMT     ID  # ID prophylaxis  Patient is not currently neutropenic.  - PTA Acyclovir 400 mg BID  - Hold bacterial/fungal ppx given ANC >1000; resume PTA voriconazole and levofloxacin ppx on discharge    HEENT  # Left upper molar dental pain/possible fracture  Noted prior to C1 iDAC; tooth #16 noted to have mild gingival edema and tenderness. Patient reportedly saw his own dentist in consultation on 11/10, who noted that there was a \"hole between his wisdom tooth and the adjacent tooth\" (?fracture). However, he has been out of town since and was unable to do any procedural intervention to fix this. Patient is asymptomatic from the tooth at this point, but notes that he was told by his transplant team that this should be fixed prior to his eventual alloBMT. Discussed timing of procedure and recommended he try to have this done 2-3 weeks from this admission.  - Urgent referral to [facility] dent[facility] placed; await scheduling. Notified patient/family of referral so that they can call to schedule ASAP.  - Monitor clinically for s/sx of infection; none present on admission     Chronic/MISC  # HTN  Noted during prior hospitalization for brain abscess in 2021. Previously on lisinopril, which he is no longer taking.  - Monitor during admission     # BPH  Has mild LUTS (urinary frequency); had minimal benefit from Flomax. Not " "currently on any medications.  - Monitor clinically    FEN:  - No mIVF; bolus PRN  - Lyte replacement per protocol  - Regular diet as tolerated    Prophy/Misc:  - GI: No current indication for stress ulcer ppx  - DVT: Enoxaparin 40 mg daily; hold for plt <50K or prior to procedures    Clinically Significant Risk Factors Present on Admission                       # Obesity: Estimated body mass index is 30.96 kg/m  as calculated from the following:    Height as of this encounter: 1.72 m (5' 7.72\").    Weight as of this encounter: 91.6 kg (201 lb 15.1 oz).            Disposition: Inpatient admission to [facility] service for planned iDAC consolidation. Anticipate discharge to home on Monday, 11/20/23.    Renetta Smyth PA-C  Hematology/Oncology  Pager: [phone]    I spent 55 minutes in the care of this patient today, which included time necessary for review of interval events, obtaining history and physical exam, ordering medication(s)/test(s) as medically indicated, discussion with interdisciplinary/consult team(s), and documentation time. Over 50% of time was spent face-to-face and/or coordinating care.     Code Status : Full Code    Primary Care Physician   Feroz Barger    History of Present Illness   History obtained from chart and discussed with the patient.    Artie Fine is a pleasant 68 year old male with a past medical history significant for brain abscess and recently diagnosed high-risk AML with ASXL1 and SRSF2 mutations s/p 7+3 induction and iDAC consolidation 1 and currently in CR1 who was admitted on 11/17/23 for C2 of iDAC as a bridge to eventual alloHSCT.    On admission, patient reports doing well. He remembers me from previous stays and he and Aundrea greeted me warmly. He states he has been loving being at home and has been spending lots of time outside. He denies any fevers, chills, or infectious concerns. He is so disappointed about his transplant being delayed, but is coping okay and is " happy that at least he'll be home for Thanksgiving. He is comfortable with the chemo protocol and denies any questions or need for further teaching. He has some questions about the timing of his Neulasta and if he could see a dentist at [facility] for a tooth concern.    Past Medical History    No past medical history on file.    Past Surgical History   Past Surgical History:   Procedure Laterality Date    PICC Right 09/02/2023    Placed R basilic 46 cm 1 external  without problem    PICC Right 10/13/2023    right basilic 5 fr dl power picc 40 cm       Prior to Admission Medications   Prior to Admission Medications   Prescriptions Last Dose Informant Patient Reported? Taking?   acetaminophen (TYLENOL) 325 MG tablet   Yes No   Sig: Take 650 mg by mouth every 6 hours as needed for mild pain   acyclovir (ZOVIRAX) 400 MG tablet   No No   Sig: Take 1 tablet (400 mg) by mouth 2 times daily   cetirizine (ZYRTEC) 10 MG tablet   Yes No   Sig: Take 1 tablet (10 mg) by mouth daily   levofloxacin (LEVAQUIN) 500 MG tablet   No No   Sig: Take 1 tablet (500 mg) by mouth daily   Patient not taking: Reported on 11/15/2023   ondansetron (ZOFRAN) 4 MG tablet   No No   Sig: Take 1 tablet (4 mg) by mouth every 8 hours as needed for nausea or vomiting   prednisoLONE acetate (PRED FORTE) 1 % ophthalmic suspension   No No   Sig: Place 2 drops into both eyes 4 times daily To continue through 10/18/2023   Patient not taking: Reported on 11/15/2023   prochlorperazine (COMPAZINE) 5 MG tablet   No No   Sig: Take 1 tablet (5 mg) by mouth every 6 hours as needed for nausea or vomiting   triamcinolone (KENALOG) 0.1 % external ointment   No No   Sig: Apply topically 2 times daily as needed for irritation (rash)   Patient not taking: Reported on 11/15/2023   voriconazole (VFEND) 200 MG tablet   No No   Sig: Take 1 tablet (200 mg) by mouth every 12 hours      Facility-Administered Medications: None     Allergies   Allergies   Allergen Reactions     Iodinated Contrast Media Rash    Cefepime Rash    Ceftriaxone Rash     Reported history at Outside hospital     Ragweeds Other (See Comments)     Congestion        Social History   Social History     Socioeconomic History    Marital status:      Spouse name: Not on file    Number of children: Not on file    Years of education: Not on file    Highest education level: Not on file   Occupational History    Not on file   Tobacco Use    Smoking status: Former     Types: Cigarettes     Quit date: 1983     Years since quittin.5    Smokeless tobacco: Current     Types: Snuff    Tobacco comments:     1 can per week   Substance and Sexual Activity    Alcohol use: Not on file    Drug use: Not on file    Sexual activity: Not on file   Other Topics Concern    Not on file   Social History Narrative    Not on file     Social Determinants of Health     Financial Resource Strain: Not on file   Food Insecurity: Not on file   Transportation Needs: Not on file   Physical Activity: Not on file   Stress: Not on file   Social Connections: Not on file   Interpersonal Safety: Not on file   Housing Stability: Not on file       Family History   No family history on file.    Review of Systems   A 14-point ROS is negative unless otherwise noted above in the HPI.    Physical Exam   Vital Signs with Ranges  Temp:  [97.9  F (36.6  C)-98.2  F (36.8  C)] 98.2  F (36.8  C)  Pulse:  [89] 89  Resp:  [16-20] 20  BP: (144-148)/(79) 144/79  SpO2:  [99 %] 99 %  201 lbs 15.06 oz    Constitutional: Pleasant and cooperative male. Awake, alert, NAD.  HEENT: NC/AT, EOMI, sclera clear, conjunctiva normal, OP with MMM  Respiratory: No increased work of breathing, CTAB, no crackles or wheezing.  Cardiovascular: RRR, no murmur noted. No peripheral edema.  GI: Normal bowel sounds, soft, non-distended and non-tender.  MSK: No large joint effusions or gross deformities.  Skin: Warm, dry, well-perfused. No bruising, bleeding, rashes, or lesions on  limited exam.  Neurologic: A&O. Answers questions appropriately. Moves all extremities spontaneously.  Psych: Calm, appropriate affect    Recent Labs  CBC   Recent Labs   Lab 11/17/23  0923 11/15/23  1200 11/14/23  1446   WBC 7.0 6.2 5.9   RBC 3.41* 3.28* 3.27*   HGB 10.7* 10.3* 10.3*   HCT 34.1* 33.0* 32.5*    101* 99   MCH 31.4 31.4 31.5   MCHC 31.4* 31.2* 31.7   RDW 22.1* 22.6* 23.3*    281 299       CMP   Recent Labs   Lab 11/17/23  0923 11/14/23  1446    140   POTASSIUM 4.3 4.4   CHLORIDE 107 104   CO2 23 24   ANIONGAP 11 12   GLC 94 99   BUN 12.0 16.9   CR 0.84 0.79  0.79   GFRESTIMATED >90 >90   VENANCIO 9.5 9.7   PROTTOTAL 7.3 7.7   ALBUMIN 4.3 4.6   BILITOTAL 0.2 0.2   ALKPHOS 106 106   AST 21 31   ALT <5 10       LFTs:   Recent Labs   Lab 11/17/23  0923   PROTTOTAL 7.3   ALBUMIN 4.3   BILITOTAL 0.2   ALKPHOS 106   AST 21   ALT <5       Coagulation Studies:   Recent Labs   Lab 11/14/23  1446   INR 1.13   PTT 26

## 2023-11-17 NOTE — PROGRESS NOTES
Labs and Transfusion Orders:  Date: 2023    Patient: Artie Fine  : 1955    Diagnosis: Acute myeloid leukemia (C95.00)    Medication administration:  [ x ] Neulasta (pegfilgrastim) injection 6 mg x1 subcutaneous on  or .    LABS:  [ x ] Check CBC with differential and CMP three times a week (fax labs to [facility] at [phone]) beginning week of  and continuing through 12/15  [  x] Type and cross PRN    TRANSFUSION PARAMETERS:   [ x ] Please transfuse 1 (one) units PRBCs if Hgb is less than or equal to 7.  [  x] Please transfuse 1 (one) unit platelets if plt count less than or equal to 20K.   [  ] If patient experiences transfusion reaction during transfusion:   [ x ] 25-50 mg benadryl IV x once PRN   [ x ] Tylenol 1000 mg PO x once PRN   [ x ] Hydrocortisone 100 mg IV x once PRN   [ x ] Pepcid 40 mg IV x once PRN   [ x ] Notify provider covering infusion clinic per protocol      Please call the [facility] at [phone] for any questions, and ask to speak with the care coordinator for Dr. Donis Garcia (patient's primary oncologist).    Thank you,         Renetta Smyth PA-C    [facility]  Department of Hematology/Oncology  [address] [place] [address]  Pager: [phone]       ADDENDUM MADE 2023  Due to need for physical signed orders to fax to outpatient clinic, I have addended the above note with changes to name/signature.    Labs and Transfusion Orders:  Date: 2023    Patient: Artie Fine  : 1955    Diagnosis: Acute myeloid leukemia (C95.00)    Medication administration:  [ x ] Neulasta (pegfilgrastim) injection 6 mg x1 subcutaneous on  or .    LABS:  [ x ] Check CBC with differential and CMP three times a week (fax labs to [facility] at [phone]) beginning week of  and continuing through 12/15  [  x] Type and cross PRN    TRANSFUSION  PARAMETERS:   [ x ] Please transfuse 1 (one) units PRBCs if Hgb is less than or equal to 7.  [  x] Please transfuse 1 (one) unit platelets if plt count less than or equal to 20K.   [  ] If patient experiences transfusion reaction during transfusion:   [ x ] 25-50 mg benadryl IV x once PRN   [ x ] Tylenol 1000 mg PO x once PRN   [ x ] Hydrocortisone 100 mg IV x once PRN   [ x ] Pepcid 40 mg IV x once PRN   [ x ] Notify provider covering infusion clinic per protocol      Please call the [facility] at [phone] for any questions, and ask to speak with the care coordinator for Dr. Donis Garcia (patient's primary oncologist).    Thank you,         Sheree Chowdhury PA-C    [facility]  Department of Hematology/Oncology  [address] [place] [address]  Pager: [phone]

## 2023-11-17 NOTE — PLAN OF CARE
"Goal Outcome Evaluation:    BP (!) 144/79 (BP Location: Right arm)   Pulse 89   Temp 98.2  F (36.8  C) (Oral)   Resp 20   Ht 1.72 m (5' 7.72\")   Wt 91.6 kg (201 lb 15.1 oz)   BMI 30.96 kg/m      Patient VSS on RA, A & O x 4, up ad shana, denies pain, denies nausea, PICC needs to be placed, 2 RN skin check needs to be completed.    .Gia Quinteros RN on 11/17/2023 at 3:04 PM        ..                            "

## 2023-11-17 NOTE — PROCEDURES
[facility]    Double Lumen PICC Placement    Date/Time: 11/17/2023 4:24 PM    Performed by: Balbina Jack RN  Authorized by: Renetta Smyth PA-C  Indications: vascular access      UNIVERSAL PROTOCOL   Site Marked: Yes  Prior Images Obtained and Reviewed:  Yes  Required items: Required blood products, implants, devices and special equipment available    Patient identity confirmed:  Verbally with patient, arm band, provided demographic data, hospital-assigned identification number and anonymous protocol, patient vented/unresponsive  NA - No sedation, light sedation, or local anesthesia  Confirmation Checklist:  Patient's identity using two indicators, relevant allergies, procedure was appropriate and matched the consent or emergent situation and correct equipment/implants were available  Time out: Immediately prior to the procedure a time out was called    Universal Protocol: the Joint Commission Universal Protocol was followed    Preparation: Patient was prepped and draped in usual sterile fashion       ANESTHESIA    Anesthesia:  Local infiltration  Local Anesthetic:  Lidocaine 1% without epinephrine  Anesthetic Total (mL):  3.5      SEDATION    Patient Sedated: No        Preparation: skin prepped with ChloraPrep  Skin prep agent: skin prep agent completely dried prior to procedure  Sterile barriers: maximum sterile barriers were used: cap, mask, sterile gown, sterile gloves, and large sterile sheet  Hand hygiene: hand hygiene performed prior to central venous catheter insertion  Type of line used: PICC  Catheter type: double lumen  Lumen type: non-valved and power PICC  Lumen Identification: Purple and Red  Catheter size: 5 Fr  Brand: Bard  Lot number: [id number]  Placement method: venipuncture, MST and ultrasound  Number of attempts: 1  Difficulty threading catheter: no  Successful placement: yes  Orientation: right  Catheter to Vein (%):  29  Location: basilic vein (0.62 cm vein diameter)  Tip Location: SVC (mid)  Arm circumference: adults 10 cm  Extremity circumference: 32  Visible catheter length: 0  Total catheter length: 43  Dressing and securement: adhesive securement device, alcohol impregnated caps, dressing applied, blood cleaned with CHG, chlorhexidine disc applied, transparent dressing, site cleansed, statlock, sterile dressing applied, glue and gloves changed prior to final dressing  Post procedure assessment: blood return through all ports, free fluid flow and placement verified by x-ray  PROCEDURE   Disposal: sharps and needle count correct at the end of procedure, needles and guidewire disposed in sharps container

## 2023-11-17 NOTE — PROGRESS NOTES
Care Management Follow Up    Length of Stay (days): 0  Expected Discharge Date: 11/19/2023  Concerns to be Addressed: discharge planning      Patient plan of care discussed at interdisciplinary rounds: Yes  Anticipated Discharge Disposition:  Home  Anticipated Discharge Services:  NA  Anticipated Discharge DME:  NA  Patient/family educated on Medicare website which has current facility and service quality ratings:  NA  Education Provided on the Discharge Plan:  yes  Patient/Family in Agreement with the Plan:  yes  Referrals Placed by CM/SW:  NA  Private pay costs discussed: Not applicable    Additional Information:  RNCC was notified by primary team that pt needed Neulasta injection appointment set up for 11/21 or 11/22 and have updated lab and transfusion orders. RNCC faxed orders to [facility] in [place]. RNCC called [facility] regarding need for appointments. Infusion RN stated they would coordinate and call pt to get those appointments scheduled. They will call RNCC back if they need anything on our end.     [facility]  [address] [place] [address]  Phone: [phone]  Fax: [phone]    Jeovany Clay RN BSN  5A RN Care Coordinator   Ph: [phone]  Pager: [phone]

## 2023-11-17 NOTE — LETTER
Transition Communication Hand-off for Care Transitions to Next Level of Care Provider    Name: Artie Fine  : 1955  MRN #: [id number]  Primary Care Provider: Feroz Barger     Primary Clinic: [address] [place] [id number]     Reason for Hospitalization:  Acute myeloid leukemia (H) [C92.00]  Admit Date/Time: 2023 11:15 AM  Discharge Date: 23    Payor Source: Payor: [facility] / Plan: [facility] MEDICARE / Product Type: HMO /     Readmission Assessment Measure (LULU) Risk Score/category: 27%         Reason for Communication Hand-off Referral: Care coordination     Discharge Plan: Chemo admit. Follow-up outpatient     Concern for non-adherence with plan of care:   Y/N No  Discharge Needs Assessment:  Needs      Flowsheet Row Most Recent Value   Equipment Currently Used at Home none            Follow-up plan:    Future Appointments   Date Time Provider Department Center   2023  1:30 PM Lyndsey Eng APRN [facility]   2023  3:30 PM Brodie Blancas MD [facility]   2023  4:00 PM Enma Perera RN [facility]   2024  1:00 PM Ramon Carbajal DO [facility]   JENNIFER [facility] Oncology & 5B non BMT [facility]  P:   Pager: [phone]  F: [phone]  Weekend & FV Recognized Holidays Pager: [phone]  Weekend Coverage: 5A; 5B; 5C      AVS/Discharge Summary is the source of truth; this is a helpful guide for improved communication of patient story

## 2023-11-17 NOTE — NURSING NOTE
"Oncology Rooming Note    November 17, 2023 9:47 AM   Artie Fine is a 68 year old male who presents for:    Chief Complaint   Patient presents with    Blood Draw     Labs obtained via venipuncture 23 gauge butterfly needle, VS taken, checked into next appt    Oncology Clinic Visit     AML     Initial Vitals: BP (!) 148/79   Pulse 89   Temp 97.9  F (36.6  C) (Oral)   Resp 16   Wt 91.3 kg (201 lb 3.2 oz)   SpO2 99%   BMI 31.05 kg/m   Estimated body mass index is 31.05 kg/m  as calculated from the following:    Height as of 11/10/23: 1.715 m (5' 7.5\").    Weight as of this encounter: 91.3 kg (201 lb 3.2 oz). Body surface area is 2.09 meters squared.  No Pain (0) Comment: Data Unavailable   No LMP for male patient.  Allergies reviewed: Yes  Medications reviewed: Yes    Medications: Medication refills not needed today.  Pharmacy name entered into EPIC:    [facility] [place], [place] - [id number]  [facility] [place], [place] - [address] [place] [address] [address]    Clinical concerns:        Lu Tellez              "

## 2023-11-17 NOTE — NURSING NOTE
Chief Complaint   Patient presents with    Blood Draw     Labs obtained via venipuncture 23 gauge butterfly needle, VS taken, checked into next appt

## 2023-11-18 LAB
ALBUMIN SERPL BCG-MCNC: 4.4 G/DL (ref 3.5–5.2)
ALP SERPL-CCNC: 110 U/L (ref 40–150)
ALT SERPL W P-5'-P-CCNC: 13 U/L (ref 0–70)
ANION GAP SERPL CALCULATED.3IONS-SCNC: 13 MMOL/L (ref 7–15)
AST SERPL W P-5'-P-CCNC: 22 U/L (ref 0–45)
BASOPHILS # BLD AUTO: 0 10E3/UL (ref 0–0.2)
BASOPHILS NFR BLD AUTO: 0 %
BILIRUB SERPL-MCNC: 0.3 MG/DL
BUN SERPL-MCNC: 14 MG/DL (ref 8–23)
CALCIUM SERPL-MCNC: 9.6 MG/DL (ref 8.8–10.2)
CHLORIDE SERPL-SCNC: 103 MMOL/L (ref 98–107)
CREAT SERPL-MCNC: 0.67 MG/DL (ref 0.67–1.17)
DEPRECATED HCO3 PLAS-SCNC: 22 MMOL/L (ref 22–29)
EGFRCR SERPLBLD CKD-EPI 2021: >90 ML/MIN/1.73M2
EOSINOPHIL # BLD AUTO: 0 10E3/UL (ref 0–0.7)
EOSINOPHIL NFR BLD AUTO: 0 %
ERYTHROCYTE [DISTWIDTH] IN BLOOD BY AUTOMATED COUNT: 21.2 % (ref 10–15)
FIBRINOGEN PPP-MCNC: 415 MG/DL (ref 170–490)
GLUCOSE SERPL-MCNC: 137 MG/DL (ref 70–99)
HCT VFR BLD AUTO: 36.8 % (ref 40–53)
HGB BLD-MCNC: 11.5 G/DL (ref 13.3–17.7)
IMM GRANULOCYTES # BLD: 0 10E3/UL
IMM GRANULOCYTES NFR BLD: 0 %
INR PPP: 1.03 (ref 0.85–1.15)
LYMPHOCYTES # BLD AUTO: 1 10E3/UL (ref 0.8–5.3)
LYMPHOCYTES NFR BLD AUTO: 15 %
MAGNESIUM SERPL-MCNC: 2.1 MG/DL (ref 1.7–2.3)
MCH RBC QN AUTO: 31.1 PG (ref 26.5–33)
MCHC RBC AUTO-ENTMCNC: 31.3 G/DL (ref 31.5–36.5)
MCV RBC AUTO: 100 FL (ref 78–100)
MONOCYTES # BLD AUTO: 0.4 10E3/UL (ref 0–1.3)
MONOCYTES NFR BLD AUTO: 6 %
NEUTROPHILS # BLD AUTO: 5.4 10E3/UL (ref 1.6–8.3)
NEUTROPHILS NFR BLD AUTO: 79 %
NRBC # BLD AUTO: 0 10E3/UL
NRBC BLD AUTO-RTO: 0 /100
PHOSPHATE SERPL-MCNC: 3.9 MG/DL (ref 2.5–4.5)
PLATELET # BLD AUTO: 291 10E3/UL (ref 150–450)
POTASSIUM SERPL-SCNC: 4 MMOL/L (ref 3.4–5.3)
PROT SERPL-MCNC: 7.5 G/DL (ref 6.4–8.3)
RBC # BLD AUTO: 3.7 10E6/UL (ref 4.4–5.9)
SODIUM SERPL-SCNC: 138 MMOL/L (ref 135–145)
URATE SERPL-MCNC: 7 MG/DL (ref 3.4–7)
WBC # BLD AUTO: 6.8 10E3/UL (ref 4–11)

## 2023-11-18 PROCEDURE — 250N000013 HC RX MED GY IP 250 OP 250 PS 637: Performed by: PHYSICIAN ASSISTANT

## 2023-11-18 PROCEDURE — 85610 PROTHROMBIN TIME: CPT | Performed by: PHYSICIAN ASSISTANT

## 2023-11-18 PROCEDURE — 250N000011 HC RX IP 250 OP 636: Mod: JZ | Performed by: STUDENT IN AN ORGANIZED HEALTH CARE EDUCATION/TRAINING PROGRAM

## 2023-11-18 PROCEDURE — 250N000011 HC RX IP 250 OP 636: Performed by: PHYSICIAN ASSISTANT

## 2023-11-18 PROCEDURE — 84550 ASSAY OF BLOOD/URIC ACID: CPT | Performed by: PHYSICIAN ASSISTANT

## 2023-11-18 PROCEDURE — 120N000002 HC R&B MED SURG/OB UMMC

## 2023-11-18 PROCEDURE — 83735 ASSAY OF MAGNESIUM: CPT | Performed by: PHYSICIAN ASSISTANT

## 2023-11-18 PROCEDURE — 85025 COMPLETE CBC W/AUTO DIFF WBC: CPT | Performed by: PHYSICIAN ASSISTANT

## 2023-11-18 PROCEDURE — 258N000003 HC RX IP 258 OP 636: Performed by: STUDENT IN AN ORGANIZED HEALTH CARE EDUCATION/TRAINING PROGRAM

## 2023-11-18 PROCEDURE — 250N000012 HC RX MED GY IP 250 OP 636 PS 637: Performed by: STUDENT IN AN ORGANIZED HEALTH CARE EDUCATION/TRAINING PROGRAM

## 2023-11-18 PROCEDURE — 85384 FIBRINOGEN ACTIVITY: CPT | Performed by: PHYSICIAN ASSISTANT

## 2023-11-18 PROCEDURE — 84100 ASSAY OF PHOSPHORUS: CPT | Performed by: PHYSICIAN ASSISTANT

## 2023-11-18 PROCEDURE — 80053 COMPREHEN METABOLIC PANEL: CPT | Performed by: PHYSICIAN ASSISTANT

## 2023-11-18 RX ADMIN — PREDNISOLONE ACETATE 2 DROP: 10 SUSPENSION/ DROPS OPHTHALMIC at 12:31

## 2023-11-18 RX ADMIN — DEXAMETHASONE 4 MG: 4 TABLET ORAL at 03:25

## 2023-11-18 RX ADMIN — CYTARABINE 3000 MG: 100 INJECTION, SOLUTION INTRATHECAL; INTRAVENOUS; SUBCUTANEOUS at 04:06

## 2023-11-18 RX ADMIN — HEPARIN, PORCINE (PF) 10 UNIT/ML INTRAVENOUS SYRINGE 5 ML: at 09:09

## 2023-11-18 RX ADMIN — PREDNISOLONE ACETATE 2 DROP: 10 SUSPENSION/ DROPS OPHTHALMIC at 16:30

## 2023-11-18 RX ADMIN — POLYETHYLENE GLYCOL 3350 17 G: 17 POWDER, FOR SOLUTION ORAL at 09:05

## 2023-11-18 RX ADMIN — ONDANSETRON HYDROCHLORIDE 8 MG: 8 TABLET, FILM COATED ORAL at 03:25

## 2023-11-18 RX ADMIN — ONDANSETRON HYDROCHLORIDE 8 MG: 8 TABLET, FILM COATED ORAL at 15:49

## 2023-11-18 RX ADMIN — CETIRIZINE HYDROCHLORIDE 10 MG: 10 TABLET, FILM COATED ORAL at 20:10

## 2023-11-18 RX ADMIN — PREDNISOLONE ACETATE 2 DROP: 10 SUSPENSION/ DROPS OPHTHALMIC at 09:08

## 2023-11-18 RX ADMIN — PREDNISOLONE ACETATE 2 DROP: 10 SUSPENSION/ DROPS OPHTHALMIC at 20:10

## 2023-11-18 RX ADMIN — ENOXAPARIN SODIUM 40 MG: 40 INJECTION SUBCUTANEOUS at 20:10

## 2023-11-18 RX ADMIN — DEXAMETHASONE 4 MG: 4 TABLET ORAL at 15:49

## 2023-11-18 RX ADMIN — HEPARIN, PORCINE (PF) 10 UNIT/ML INTRAVENOUS SYRINGE 5 ML: at 20:13

## 2023-11-18 RX ADMIN — ACYCLOVIR 400 MG: 400 TABLET ORAL at 09:08

## 2023-11-18 RX ADMIN — ACYCLOVIR 400 MG: 400 TABLET ORAL at 20:10

## 2023-11-18 RX ADMIN — CYTARABINE 3000 MG: 100 INJECTION, SOLUTION INTRATHECAL; INTRAVENOUS; SUBCUTANEOUS at 16:20

## 2023-11-18 RX ADMIN — HEPARIN, PORCINE (PF) 10 UNIT/ML INTRAVENOUS SYRINGE 5 ML: at 12:32

## 2023-11-18 ASSESSMENT — ACTIVITIES OF DAILY LIVING (ADL)
ADLS_ACUITY_SCORE: 20

## 2023-11-18 NOTE — PLAN OF CARE
"Goal Outcome Evaluation:    /67 (BP Location: Left arm)   Pulse 90   Temp 97.8  F (36.6  C) (Oral)   Resp 18   Ht 1.72 m (5' 7.72\")   Wt 89.6 kg (197 lb 8 oz)   SpO2 95%   BMI 30.28 kg/m      [id number]    Patient VSS on RA, A & O x 4, up ad shana, denies pain, denies nausea                          "

## 2023-11-18 NOTE — PLAN OF CARE
Goal Outcome Evaluation:  6407-5429.  AVSS, alert and oriented x 4, denies pain/nausea/sob. Neuro's are intcat.  Dose # 3 cytarabine got today.  Up independent, voiding adequately, lbM 11/18  Continue to monitor care.

## 2023-11-18 NOTE — PLAN OF CARE
Goal Outcome Evaluation:  Time: 0393-5348  Alert and oriented x4, VSS on RA. Denies pain, nausea or vomiting. Dose #2 of HD Cytarabine infusing, pt tolerating well. Cerebellar exam intact. Pt up to the bathroom voiding spontaneously without difficulty. No acute events overnight, continue with POC.

## 2023-11-18 NOTE — PLAN OF CARE
Nursing Focus: Admission    D: Patient admitted/transferred from home via driving for chemo Hidac.      I: Upon arrival to the unit patient was oriented to room, unit, and call light. Patient s height, weight, and vital signs were obtained. Allergies reviewed and allergy band applied. MD notified of patient s arrival on the unit. Adult AVS completed. Head to toe assessment completed. Education assessment completed. Care plan initiated.     A: Vital signs stable upon admission. Patient rates pain at 0/10. Two RN skin assessment completed Yes. Second RN was Justino HUGHES. Significant Skin Findings include skin are intact, but skin is peeling on both feet/heel. [facility] Nurse Consult Ordered No. Bed Algorithm can be found in PCS flow sheets (Support Surface Algorithm) and on IP [facility] NURSE RESOURCE TAB, was this used during this assessment?  Yes.    P: Continue to monitor patient s chemo side effects and intervene as needed. Continue with plan of care. Notify MD with any concerns or changes in patient status.

## 2023-11-18 NOTE — PLAN OF CARE
Goal Outcome Evaluation:  0894-9845  AVSS, alert and oriented x 4, denies pain/nausea/sob.  Getting dose # 1 cytarabine, tolerating good with good return.   Up independent, voiding adequately, LBM 11/17  Continue to monitor care.

## 2023-11-18 NOTE — PROGRESS NOTES
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    Today:  - C2D2 iDAC; tolerating well. Continue chemo as ordered.  - Continue ppx antimicrobials and best supportive cares.  - Anticipate discharge to home on Monday, 11/20 AM.     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 Garcia. Patient initially presented to [facility] in [place], [place] with 4-6 weeks of progressive bruising, weakness, fatigue, loss of appetite, and night sweats. He also noted a headache similar to his previous brain abscess (see more below). CBC notable for pancytopenia; WBC 2.7 (), Hgb 8.1, and plt 1k. He was transferred to [facility] and was found on peripheral flow w/ 11% circulating myeloid blasts. He was then transferred to [facility] for further work up and treatment of AML. Diagnostic BMBx performed 9/1/23 which showed AML with 30% blasts by morph and 47% by flow. P53 negative. FISH negative for MLLT10, NUP98, and KMT2A. Normal karyotype. NGS showed ASXL1, CEBPA, SRSF2, and STAG2 mutations. Underwent induction with 7+3 (Day 1=9/2/23). Diagnostic LP done 9/8/23 due to presenting sx including headache without evidence of CNS involvement. MRI brain 9/11 negative, only showing chronic changes from h/o brain abscess. Count recovery BMBx done 10/9/23 without evidence of leukemia. Admitted for cycle 1 iDAC consolidation (C1D1=10/13/23) as a bridge to BMT. Unfortunately, patient's MUD fell through, and so his transplant was  "delayed; thus, he was admitted on 11/17/23 for C2 of iDAC.   - PICC to be placed on admission  - Will require Neulasta on 11/21 or 11/22 as well as 3x weekly labs/transfusions; orders written and delivered to RNCC, who will coordinate with [facility] to schedule     Treatment Plan: iDAC (C1D1=10/13/2023)   - Cytarabine 1.5 g/m2 q12 hours x 6 doses - D1-3  - Neulasta 6 mg x1 dose - D5 (requested at [facility] on 11/21 or 11/22)  - Pre-meds: dexamethasone 4 mg q12 hours x 6 doses  - Supportive meds: Pred-Forte ophthalmic drops     # Macrocytic anemia  Present on admission, baseline ~10. Presumed to underlying leukemia plus recent chemotherapy.  - Follow daily CBC with differential  - Transfuse to keep Hgb >7, plt >10K  - Of note: patient requires irradiated blood products given planned alloBMT     ID  # ID prophylaxis  Patient is not currently neutropenic.  - PTA Acyclovir 400 mg BID  - Hold bacterial/fungal ppx given ANC >1000; resume PTA voriconazole and levofloxacin ppx on discharge     HEENT  # Left upper molar dental pain/possible fracture  Noted prior to C1 iDAC; tooth #16 noted to have mild gingival edema and tenderness. Patient reportedly saw his own dentist in consultation on 11/10, who noted that there was a \"hole between his wisdom tooth and the adjacent tooth\" (?fracture). However, he has been out of town since and was unable to do any procedural intervention to fix this. Patient is asymptomatic from the tooth at this point, but notes that he was told by his transplant team that this should be fixed prior to his eventual alloBMT. Discussed timing of procedure and recommended he try to have this done 2-3 weeks from this admission.  - Urgent referral to [facility] dentistry placed; await scheduling. Notified patient/family of referral so that they can call to schedule ASAP.  - Monitor clinically for s/sx of infection; none present on admission     Chronic/MISC  # HTN  Noted during prior hospitalization for " "brain abscess in 2021. Previously on lisinopril, which he is no longer taking.  - Monitor during admission     # BPH  Has mild LUTS (urinary frequency); had minimal benefit from Flomax. Not currently on any medications.  - Monitor clinically     FEN:  - No mIVF; bolus PRN  - Lyte replacement per protocol  - Regular diet as tolerated     Prophy/Misc:  - GI: No current indication for stress ulcer ppx  - DVT: Enoxaparin 40 mg daily; hold for plt <50K or prior to procedures    Clinically Significant Risk Factors                         # Obesity: Estimated body mass index is 30.28 kg/m  as calculated from the following:    Height as of this encounter: 1.72 m (5' 7.72\").    Weight as of this encounter: 89.6 kg (197 lb 8 oz)., PRESENT ON ADMISSION          Code Status: FULL  Disposition: Inpatient admission to [facility]; anticipate discharge to home on Monday, 11/20 AM, following completion of the chemo regimen.  Follow up: Neulasta on 11/22;     Staffed with Dr. Erickson.    Renetta Smyth PA-C  Hematology/Oncology  Pager: #[phone]    I spent 45 minutes in the care of this patient today, which included time necessary for review of interval events, obtaining history and physical exam, ordering medication(s)/test(s) as medically indicated, discussion with interdisciplinary/consult team(s), and documentation time. Over 50% of time was spent face-to-face and/or coordinating care.     Interval History   No acute overnight events. Patient is feeling well. He reports tolerating chemo well, though he has been up since about 3:30 AM. He is hoping for a nap today. Patient denies headache, neck pain, cough, CP, SOB, abdominal pain, N/V/D, rashes, or edema. His biggest concern is that he is having a hard time getting on the Wifi. All questions/concerns addressed at bedside.    A comprehensive review of symptoms was performed and was negative except as detailed in the interval history above.    Physical Exam   Vital Signs with " Ranges  Temp:  [97.7  F (36.5  C)-98.2  F (36.8  C)] 98.2  F (36.8  C)  Pulse:  [77-97] 93  Resp:  [16-20] 16  BP: (110-139)/(61-80) 116/61  SpO2:  [94 %-98 %] 94 %    No intake/output data recorded.    Vitals:    11/17/23 1124 11/18/23 0839   Weight: 91.6 kg (201 lb 15.1 oz) 89.6 kg (197 lb 8 oz)       Constitutional: Pleasant and cooperative male. Awake, alert, NAD.  HEENT: NC/AT, EOMI, sclera clear, conjunctiva normal, OP with MMM  Respiratory: No increased work of breathing, CTAB, no crackles or wheezing.  Cardiovascular: RRR, no murmur noted. No peripheral edema.  GI: Normal bowel sounds, soft, non-distended and non-tender.  Skin: Warm, dry, well-perfused. No bruising, bleeding, rashes, or lesions on limited exam.  Neurologic: A&O. Answers questions appropriately. Moves all extremities spontaneously.  Psych: Calm, appropriate affect  Vascular access:  PICC on RUE CDI, non-tender, no surrounding erythema.    Medications    - MEDICATION INSTRUCTIONS -          acyclovir  400 mg Oral BID    cetirizine  10 mg Oral QPM    cytarabine (PF) (CYTOSAR) 3,000 mg in D5W 305 mL infusion  3,000 mg Intravenous Q12H    dexAMETHasone  4 mg Oral Q12H    enoxaparin ANTICOAGULANT  40 mg Subcutaneous Q24H    [START ON 11/21/2023] filgrastim-aafi  5 mcg/kg (Treatment Plan Recorded) Subcutaneous Daily at 8 pm    heparin lock flush  5-20 mL Intracatheter Q24H    heparin lock flush  5-20 mL Intracatheter Q24H    ondansetron  8 mg Oral Q12H    prednisoLONE acetate  2 drop Both Eyes 4x Daily    sodium chloride (PF)  10-40 mL Intracatheter Q8H    sodium chloride (PF)  10-40 mL Intracatheter Q8H    sodium chloride (PF)  10-40 mL Intracatheter Q8H    sodium chloride (PF)  10-40 mL Intracatheter Q8H       Antiinfectives  Anti-infectives (From now, onward)      Start     Dose/Rate Route Frequency Ordered Stop    11/17/23 2000  acyclovir (ZOVIRAX) tablet 400 mg        Note to Pharmacy: JINNY Sig:Take 1 tablet (400 mg) by mouth 2 times daily       400 mg Oral 2 TIMES DAILY 11/17/23 1122              Data   CBC   Recent Labs   Lab 11/18/23  0346 11/17/23  1141 11/17/23  0923 11/15/23  1200   WBC 6.8 6.6 7.0 6.2   RBC 3.70* 3.28* 3.41* 3.28*   HGB 11.5* 10.5* 10.7* 10.3*   HCT 36.8* 33.0* 34.1* 33.0*    101* 100 101*   MCH 31.1 32.0 31.4 31.4   MCHC 31.3* 31.8 31.4* 31.2*   RDW 21.2* 22.5* 22.1* 22.6*    274 277 281       CMP   Recent Labs   Lab 11/18/23  0346 11/17/23  1141 11/17/23  0923 11/14/23  1446    138 141 140   POTASSIUM 4.0 4.2 4.3 4.4   CHLORIDE 103 105 107 104   CO2 22 23 23 24   ANIONGAP 13 10 11 12   * 91 94 99   BUN 14.0 13.3 12.0 16.9   CR 0.67 0.84 0.84 0.79  0.79   GFRESTIMATED >90 >90 >90 >90   VENANCIO 9.6 9.4 9.5 9.7   MAG 2.1  --   --   --    PHOS 3.9 3.5  --   --    PROTTOTAL 7.5 6.9 7.3 7.7   ALBUMIN 4.4 4.1 4.3 4.6   BILITOTAL 0.3 0.2 0.2 0.2   ALKPHOS 110 99 106 106   AST 22 19 21 31   ALT 13 6 <5 10       LFTs   Recent Labs   Lab 11/18/23  0346   PROTTOTAL 7.5   ALBUMIN 4.4   BILITOTAL 0.3   ALKPHOS 110   AST 22   ALT 13       Coagulation Studies   Recent Labs   Lab 11/18/23 0346 11/14/23  1446   INR 1.03 1.13   PTT  --  26

## 2023-11-18 NOTE — PROGRESS NOTES
Nursing Focus: Chemotherapy  D: Positive blood return via PICC. Insertion site is clean/dry/intact, dressing intact with no complaints of pain. Urine output is recorded in intake in Doc Flowsheet.    I: Premedications given per order (see electronic medical administration record). Dose #2 of HD Cytarabine started to infuse over 3 hours. Reviewed pt teaching on chemotherapy side effects. Pt denies need for further teaching. Chemotherapy double checked per protocol by two chemotherapy competent RN's. Cerebellar exam intact.  A: Tolerating procedure well. Denies nausea and or pain.   P: Continue to monitor urine output and symptoms of nausea. Screen for symptoms of toxicity.

## 2023-11-19 LAB
ALBUMIN SERPL BCG-MCNC: 4.3 G/DL (ref 3.5–5.2)
ALP SERPL-CCNC: 91 U/L (ref 40–150)
ALT SERPL W P-5'-P-CCNC: 9 U/L (ref 0–70)
ANION GAP SERPL CALCULATED.3IONS-SCNC: 11 MMOL/L (ref 7–15)
AST SERPL W P-5'-P-CCNC: 16 U/L (ref 0–45)
BASOPHILS # BLD AUTO: 0 10E3/UL (ref 0–0.2)
BASOPHILS NFR BLD AUTO: 0 %
BILIRUB SERPL-MCNC: 0.4 MG/DL
BUN SERPL-MCNC: 16 MG/DL (ref 8–23)
CALCIUM SERPL-MCNC: 9.4 MG/DL (ref 8.8–10.2)
CHLORIDE SERPL-SCNC: 106 MMOL/L (ref 98–107)
CREAT SERPL-MCNC: 0.68 MG/DL (ref 0.67–1.17)
DEPRECATED HCO3 PLAS-SCNC: 24 MMOL/L (ref 22–29)
EGFRCR SERPLBLD CKD-EPI 2021: >90 ML/MIN/1.73M2
EOSINOPHIL # BLD AUTO: 0 10E3/UL (ref 0–0.7)
EOSINOPHIL NFR BLD AUTO: 0 %
ERYTHROCYTE [DISTWIDTH] IN BLOOD BY AUTOMATED COUNT: 22 % (ref 10–15)
FIBRINOGEN PPP-MCNC: 347 MG/DL (ref 170–490)
GLUCOSE SERPL-MCNC: 123 MG/DL (ref 70–99)
HCT VFR BLD AUTO: 32.5 % (ref 40–53)
HGB BLD-MCNC: 10.4 G/DL (ref 13.3–17.7)
IMM GRANULOCYTES # BLD: 0.1 10E3/UL
IMM GRANULOCYTES NFR BLD: 1 %
INR PPP: 1.04 (ref 0.85–1.15)
LYMPHOCYTES # BLD AUTO: 0.3 10E3/UL (ref 0.8–5.3)
LYMPHOCYTES NFR BLD AUTO: 2 %
MAGNESIUM SERPL-MCNC: 2.2 MG/DL (ref 1.7–2.3)
MCH RBC QN AUTO: 31.3 PG (ref 26.5–33)
MCHC RBC AUTO-ENTMCNC: 32 G/DL (ref 31.5–36.5)
MCV RBC AUTO: 98 FL (ref 78–100)
MONOCYTES # BLD AUTO: 1.1 10E3/UL (ref 0–1.3)
MONOCYTES NFR BLD AUTO: 7 %
NEUTROPHILS # BLD AUTO: 13.6 10E3/UL (ref 1.6–8.3)
NEUTROPHILS NFR BLD AUTO: 90 %
NRBC # BLD AUTO: 0 10E3/UL
NRBC BLD AUTO-RTO: 0 /100
PHOSPHATE SERPL-MCNC: 4.5 MG/DL (ref 2.5–4.5)
PLATELET # BLD AUTO: 287 10E3/UL (ref 150–450)
POTASSIUM SERPL-SCNC: 3.9 MMOL/L (ref 3.4–5.3)
PROT SERPL-MCNC: 7 G/DL (ref 6.4–8.3)
RBC # BLD AUTO: 3.32 10E6/UL (ref 4.4–5.9)
SODIUM SERPL-SCNC: 141 MMOL/L (ref 135–145)
URATE SERPL-MCNC: 7 MG/DL (ref 3.4–7)
WBC # BLD AUTO: 15.1 10E3/UL (ref 4–11)

## 2023-11-19 PROCEDURE — 250N000012 HC RX MED GY IP 250 OP 636 PS 637: Performed by: STUDENT IN AN ORGANIZED HEALTH CARE EDUCATION/TRAINING PROGRAM

## 2023-11-19 PROCEDURE — 80053 COMPREHEN METABOLIC PANEL: CPT | Performed by: PHYSICIAN ASSISTANT

## 2023-11-19 PROCEDURE — 83735 ASSAY OF MAGNESIUM: CPT | Performed by: PHYSICIAN ASSISTANT

## 2023-11-19 PROCEDURE — 258N000003 HC RX IP 258 OP 636: Performed by: STUDENT IN AN ORGANIZED HEALTH CARE EDUCATION/TRAINING PROGRAM

## 2023-11-19 PROCEDURE — 84550 ASSAY OF BLOOD/URIC ACID: CPT | Performed by: PHYSICIAN ASSISTANT

## 2023-11-19 PROCEDURE — 250N000013 HC RX MED GY IP 250 OP 250 PS 637: Performed by: PHYSICIAN ASSISTANT

## 2023-11-19 PROCEDURE — 120N000002 HC R&B MED SURG/OB UMMC

## 2023-11-19 PROCEDURE — 84100 ASSAY OF PHOSPHORUS: CPT | Performed by: PHYSICIAN ASSISTANT

## 2023-11-19 PROCEDURE — 250N000011 HC RX IP 250 OP 636: Performed by: STUDENT IN AN ORGANIZED HEALTH CARE EDUCATION/TRAINING PROGRAM

## 2023-11-19 PROCEDURE — 85610 PROTHROMBIN TIME: CPT | Performed by: PHYSICIAN ASSISTANT

## 2023-11-19 PROCEDURE — 250N000011 HC RX IP 250 OP 636: Mod: JZ | Performed by: PHYSICIAN ASSISTANT

## 2023-11-19 PROCEDURE — 85025 COMPLETE CBC W/AUTO DIFF WBC: CPT | Performed by: PHYSICIAN ASSISTANT

## 2023-11-19 PROCEDURE — 85384 FIBRINOGEN ACTIVITY: CPT | Performed by: PHYSICIAN ASSISTANT

## 2023-11-19 RX ORDER — ACYCLOVIR 400 MG/1
400 TABLET ORAL 2 TIMES DAILY
Qty: 60 TABLET | Refills: 11 | Status: SHIPPED | OUTPATIENT
Start: 2023-11-19 | End: 2023-11-19

## 2023-11-19 RX ORDER — ACYCLOVIR 400 MG/1
400 TABLET ORAL 2 TIMES DAILY
Qty: 60 TABLET | Refills: 11 | Status: SHIPPED | OUTPATIENT
Start: 2023-11-19 | End: 2024-01-02

## 2023-11-19 RX ORDER — VORICONAZOLE 200 MG/1
200 TABLET, FILM COATED ORAL EVERY 12 HOURS
Qty: 60 TABLET | Refills: 3 | Status: SHIPPED | OUTPATIENT
Start: 2023-11-19 | End: 2024-01-02

## 2023-11-19 RX ORDER — LEVOFLOXACIN 500 MG/1
500 TABLET, FILM COATED ORAL DAILY
Qty: 30 TABLET | Refills: 3 | Status: SHIPPED | OUTPATIENT
Start: 2023-11-19 | End: 2023-11-19

## 2023-11-19 RX ORDER — VORICONAZOLE 200 MG/1
200 TABLET, FILM COATED ORAL EVERY 12 HOURS
Qty: 60 TABLET | Refills: 3 | Status: SHIPPED | OUTPATIENT
Start: 2023-11-19 | End: 2023-11-19

## 2023-11-19 RX ORDER — PREDNISOLONE ACETATE 10 MG/ML
2 SUSPENSION/ DROPS OPHTHALMIC 4 TIMES DAILY
Qty: 5 ML | Refills: 0 | Status: SHIPPED | OUTPATIENT
Start: 2023-11-19 | End: 2023-11-19

## 2023-11-19 RX ORDER — PREDNISOLONE ACETATE 10 MG/ML
2 SUSPENSION/ DROPS OPHTHALMIC 4 TIMES DAILY
Qty: 5 ML | Refills: 0 | Status: SHIPPED | OUTPATIENT
Start: 2023-11-19

## 2023-11-19 RX ORDER — LEVOFLOXACIN 500 MG/1
500 TABLET, FILM COATED ORAL DAILY
Qty: 30 TABLET | Refills: 3 | Status: SHIPPED | OUTPATIENT
Start: 2023-11-19 | End: 2024-01-02

## 2023-11-19 RX ADMIN — ENOXAPARIN SODIUM 40 MG: 40 INJECTION SUBCUTANEOUS at 20:01

## 2023-11-19 RX ADMIN — HEPARIN, PORCINE (PF) 10 UNIT/ML INTRAVENOUS SYRINGE 5 ML: at 19:00

## 2023-11-19 RX ADMIN — ACYCLOVIR 400 MG: 400 TABLET ORAL at 19:56

## 2023-11-19 RX ADMIN — PREDNISOLONE ACETATE 2 DROP: 10 SUSPENSION/ DROPS OPHTHALMIC at 11:40

## 2023-11-19 RX ADMIN — HEPARIN, PORCINE (PF) 10 UNIT/ML INTRAVENOUS SYRINGE 5 ML: at 11:40

## 2023-11-19 RX ADMIN — CYTARABINE 3000 MG: 100 INJECTION, SOLUTION INTRATHECAL; INTRAVENOUS; SUBCUTANEOUS at 15:32

## 2023-11-19 RX ADMIN — CETIRIZINE HYDROCHLORIDE 10 MG: 10 TABLET, FILM COATED ORAL at 19:56

## 2023-11-19 RX ADMIN — ONDANSETRON HYDROCHLORIDE 8 MG: 8 TABLET, FILM COATED ORAL at 03:26

## 2023-11-19 RX ADMIN — POLYETHYLENE GLYCOL 3350 17 G: 17 POWDER, FOR SOLUTION ORAL at 07:29

## 2023-11-19 RX ADMIN — ACYCLOVIR 400 MG: 400 TABLET ORAL at 08:01

## 2023-11-19 RX ADMIN — PREDNISOLONE ACETATE 2 DROP: 10 SUSPENSION/ DROPS OPHTHALMIC at 19:56

## 2023-11-19 RX ADMIN — DEXAMETHASONE 4 MG: 4 TABLET ORAL at 14:54

## 2023-11-19 RX ADMIN — ONDANSETRON HYDROCHLORIDE 8 MG: 8 TABLET, FILM COATED ORAL at 14:55

## 2023-11-19 RX ADMIN — DEXAMETHASONE 4 MG: 4 TABLET ORAL at 03:26

## 2023-11-19 RX ADMIN — PREDNISOLONE ACETATE 2 DROP: 10 SUSPENSION/ DROPS OPHTHALMIC at 08:00

## 2023-11-19 RX ADMIN — PREDNISOLONE ACETATE 2 DROP: 10 SUSPENSION/ DROPS OPHTHALMIC at 15:28

## 2023-11-19 RX ADMIN — CYTARABINE 3000 MG: 100 INJECTION, SOLUTION INTRATHECAL; INTRAVENOUS; SUBCUTANEOUS at 04:13

## 2023-11-19 ASSESSMENT — ACTIVITIES OF DAILY LIVING (ADL)
ADLS_ACUITY_SCORE: 20

## 2023-11-19 NOTE — PLAN OF CARE
Goal Outcome Evaluation:  Time: 8299-5320  Alert and oriented x4, VSS on RA. Denies pain, nausea or vomiting. HD Cytarabine infused without any incidents. Voiding spontaneously without difficulty. Continue with POC.

## 2023-11-19 NOTE — PROGRESS NOTES
Nursing Focus: Chemotherapy  D: Positive blood return via PICC. Insertion site is clean/dry/intact, dressing intact with no complaints of pain. Urine output is recorded in intake in Doc Flowsheet.    I: Premedications given per order (see electronic medical administration record). Dose #3 of HD Cytarabine started to infuse over 3 hours. Reviewed pt teaching on chemotherapy side effects. Pt denies need for further teaching. Chemotherapy double checked per protocol by two chemotherapy competent RN's.   A: Tolerating procedure well. Denies nausea and or pain.   P: Continue to monitor urine output and symptoms of nausea. Screen for symptoms of toxicity.

## 2023-11-19 NOTE — PROGRESS NOTES
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    Today:  - C2D3 iDAC; tolerating well. Continue chemo as ordered.  - Continue ppx antimicrobials and best supportive cares.  - Anticipate discharge to home on Monday, 11/20 AM.     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 Garcia. Patient initially presented to [facility] in [place], [place] with 4-6 weeks of progressive bruising, weakness, fatigue, loss of appetite, and night sweats. He also noted a headache similar to his previous brain abscess (see more below). CBC notable for pancytopenia; WBC 2.7 (), Hgb 8.1, and plt 1k. He was transferred to [facility] and was found on peripheral flow w/ 11% circulating myeloid blasts. He was then transferred to [facility] for further work up and treatment of AML. Diagnostic BMBx performed 9/1/23 which showed AML with 30% blasts by morph and 47% by flow. P53 negative. FISH negative for MLLT10, NUP98, and KMT2A. Normal karyotype. NGS showed ASXL1, CEBPA, SRSF2, and STAG2 mutations. Underwent induction with 7+3 (Day 1=9/2/23). Diagnostic LP done 9/8/23 due to presenting sx including headache without evidence of CNS involvement. MRI brain 9/11 negative, only showing chronic changes from h/o brain abscess. Count recovery BMBx done 10/9/23 without evidence of leukemia. Admitted for cycle 1 iDAC consolidation (C1D1=10/13/23) as a bridge to BMT. Unfortunately, patient's MUD fell through, and so his transplant was  "delayed; thus, he was admitted on 11/17/23 for C2 of iDAC.   - PICC placed on admission; will remove on discharge  - Neulasta scheduled at [facility] on 11/22. New orders also written for 3x weekly labs/transfusions and faxed x11/17. Paper copy also provided to patient/wife as a back-up.    Treatment Plan: iDAC (C1D1=10/13/2023)   - Cytarabine 1.5 g/m2 q12 hours x 6 doses - D1-3  - Neulasta 6 mg x1 dose - D5; scheduled at [facility] on 11/22/23  - Pre-meds: dexamethasone 4 mg q12 hours x 6 doses  - Supportive meds: Pred-Forte ophthalmic drops     # Normocytic anemia  Present on admission, baseline ~10. Presumed to underlying leukemia plus recent chemotherapy.  - Follow daily CBC with differential  - Transfuse to keep Hgb >7, plt >10K  - Of note: patient requires irradiated blood products given planned alloBMT     ID  # ID prophylaxis  Patient is not currently neutropenic.  - PTA Acyclovir 400 mg BID  - Hold bacterial/fungal ppx given ANC >1000; resume PTA voriconazole and levofloxacin ppx on discharge     HEENT  # Left upper molar dental pain/possible fracture  Noted prior to C1 iDAC; tooth #16 noted to have mild gingival edema and tenderness. Patient reportedly saw his own dentist in consultation on 11/10, who noted that there was a \"hole between his wisdom tooth and the adjacent tooth\" (?fracture). However, he has been out of town since and was unable to do any procedural intervention to fix this. Patient is asymptomatic from the tooth at this point, but notes that he was told by his transplant team that this should be fixed prior to his eventual alloBMT. Discussed timing of procedure and recommended he try to have this done 2-3 weeks from this admission.  - Urgent referral to [facility] dentistry placed. Have been communicating with BMT [facility] (Enma Perera) and dentistry schedulers to transfer records and schedule as appropriate.  - Monitor clinically for s/sx of infection; none present on admission   " "  Chronic/MISC  # HTN  Noted during prior hospitalization for brain abscess in 2021. Previously on lisinopril, which he is no longer taking.  - Monitor during admission     # BPH  Has mild LUTS (urinary frequency); had minimal benefit from Flomax. Not currently on any medications.  - Monitor clinically     FEN:  - No mIVF; bolus PRN  - Lyte replacement per protocol  - Regular diet as tolerated     Prophy/Misc:  - GI: No current indication for stress ulcer ppx  - DVT: Enoxaparin 40 mg daily; hold for plt <50K or prior to procedures    Clinically Significant Risk Factors                         # Obesity: Estimated body mass index is 30.28 kg/m  as calculated from the following:    Height as of this encounter: 1.72 m (5' 7.72\").    Weight as of this encounter: 89.6 kg (197 lb 8 oz)., PRESENT ON ADMISSION          Code Status: FULL  Disposition: Discharge to home on Monday, 11/20 AM, following completion of the chemotherapy regimen.  Follow up: Neulasta on 11/22; 3x weekly labs/transfusions ordered and will be arranged directly with patient.    Staffed with Dr. Erickson.    Renetta Smyth PA-C  Hematology/Oncology  Pager: #[phone]    I spent 45 minutes in the care of this patient today, which included time necessary for review of interval events, obtaining history and physical exam, ordering medication(s)/test(s) as medically indicated, discussion with interdisciplinary/consult team(s), and documentation time. Over 50% of time was spent face-to-face and/or coordinating care.     Interval History   No acute overnight events. Patient continues to feel very well. He slept from 2146-3649, then woke up for 4 AM chemo. He denies fevers, chills, mucositis, N/V, diarrhea, neuropathy, coordination deficits, or other concerns. He is listening to his favorite \"Sunday morning\" music (70s acoustic), which he pauses in order to play me a couple of [employer] videos. Enjoyed conversation around current events and musical " interests. Helped him get his dental records off his tablet and emailed to me so I could pass them along to the dental clinic. All questions/concerns addressed at bedside.    A comprehensive review of symptoms was performed and was negative except as detailed in the interval history above.    Physical Exam   Vital Signs with Ranges  Temp:  [97.8  F (36.6  C)-98.3  F (36.8  C)] 98.2  F (36.8  C)  Pulse:  [74-93] 83  Resp:  [14-18] 18  BP: (115-133)/(52-69) 115/52  SpO2:  [94 %-97 %] 97 %    No intake/output data recorded.    Vitals:    11/17/23 1124 11/18/23 0839   Weight: 91.6 kg (201 lb 15.1 oz) 89.6 kg (197 lb 8 oz)     Constitutional: Pleasant and cooperative male. Awake, alert, NAD. Seated in a chair, smiling and conversational, well-appearing.  HEENT: NC/AT, EOMI, sclera clear, conjunctiva normal, OP with MMM  Respiratory: No increased work of breathing, CTAB, no crackles or wheezing.  Cardiovascular: RRR, no murmur noted. No peripheral edema.  GI: Normal bowel sounds, soft, non-distended and non-tender.  Skin: Warm, dry, well-perfused. No bruising, bleeding, rashes, or lesions on limited exam.  Neurologic: A&O. Answers questions appropriately. Moves all extremities spontaneously.  Psych: Calm, appropriate affect  Vascular access:  PICC on RUE CDI, non-tender, no surrounding erythema.    Medications    - MEDICATION INSTRUCTIONS -          acyclovir  400 mg Oral BID    cetirizine  10 mg Oral QPM    cytarabine (PF) (CYTOSAR) 3,000 mg in D5W 305 mL infusion  3,000 mg Intravenous Q12H    dexAMETHasone  4 mg Oral Q12H    enoxaparin ANTICOAGULANT  40 mg Subcutaneous Q24H    [START ON 11/21/2023] filgrastim-aafi  5 mcg/kg (Treatment Plan Recorded) Subcutaneous Daily at 8 pm    heparin lock flush  5-20 mL Intracatheter Q24H    heparin lock flush  5-20 mL Intracatheter Q24H    ondansetron  8 mg Oral Q12H    prednisoLONE acetate  2 drop Both Eyes 4x Daily    sodium chloride (PF)  10-40 mL Intracatheter Q8H    sodium  chloride (PF)  10-40 mL Intracatheter Q8H    sodium chloride (PF)  10-40 mL Intracatheter Q8H    sodium chloride (PF)  10-40 mL Intracatheter Q8H       Antiinfectives  Anti-infectives (From now, onward)      Start     Dose/Rate Route Frequency Ordered Stop    11/17/23 2000  acyclovir (ZOVIRAX) tablet 400 mg        Note to Pharmacy: [facility]:Take 1 tablet (400 mg) by mouth 2 times daily      400 mg Oral 2 TIMES DAILY 11/17/23 1122              Data   CBC   Recent Labs   Lab 11/19/23 0412 11/18/23 0346 11/17/23  1141 11/17/23  0923   WBC 15.1* 6.8 6.6 7.0   RBC 3.32* 3.70* 3.28* 3.41*   HGB 10.4* 11.5* 10.5* 10.7*   HCT 32.5* 36.8* 33.0* 34.1*   MCV 98 100 101* 100   MCH 31.3 31.1 32.0 31.4   MCHC 32.0 31.3* 31.8 31.4*   RDW 22.0* 21.2* 22.5* 22.1*    291 274 277       CMP   Recent Labs   Lab 11/19/23 0412 11/18/23 0346 11/17/23 1141 11/17/23  0923    138 138 141   POTASSIUM 3.9 4.0 4.2 4.3   CHLORIDE 106 103 105 107   CO2 24 22 23 23   ANIONGAP 11 13 10 11   * 137* 91 94   BUN 16.0 14.0 13.3 12.0   CR 0.68 0.67 0.84 0.84   GFRESTIMATED >90 >90 >90 >90   VENANCIO 9.4 9.6 9.4 9.5   MAG 2.2 2.1  --   --    PHOS 4.5 3.9 3.5  --    PROTTOTAL 7.0 7.5 6.9 7.3   ALBUMIN 4.3 4.4 4.1 4.3   BILITOTAL 0.4 0.3 0.2 0.2   ALKPHOS 91 110 99 106   AST 16 22 19 21   ALT 9 13 6 <5       LFTs   Recent Labs   Lab 11/19/23 0412   PROTTOTAL 7.0   ALBUMIN 4.3   BILITOTAL 0.4   ALKPHOS 91   AST 16   ALT 9       Coagulation Studies   Recent Labs   Lab 11/19/23  0412 11/18/23  0346 11/14/23  1446   INR 1.04   < > 1.13   PTT  --   --  26    < > = values in this interval not displayed.

## 2023-11-19 NOTE — PLAN OF CARE
Goal Outcome Evaluation:  [id number]  AVSS, alert and oriented x 4, nacho pain/nausea/sob. Neuro's are intact. Pt got dose # 3 cytarabine today.   Up independent, voiding adequately, not saving urine, LBM 11/19  Pt discharging tomorrow around 11 am after his last chemo.  Continue to monitor care.

## 2023-11-19 NOTE — PROGRESS NOTES
Nursing Focus: Chemotherapy     D: Positive blood return via PICC. Insertion site is clean/dry/intact, dressing intact with no complaints of pain.  Urine output is recorded in intake in Doc Flowsheet.  Cerebella exam intact.    I: Premedications given per order (see electronic medical administration record). Dose #4 of 6 started to infuse over 3 hours.  Reviewed pt teaching on chemotherapy side effects.  Pt denies need for further teaching. Chemotherapy double checked per protocol by two chemotherapy competent RN's.   A: Tolerating procedure well. Denies nausea and or pain.   P: Continue to monitor urine output and symptoms of nausea. Screen for symptoms of toxicity.

## 2023-11-19 NOTE — PLAN OF CARE
"Goal Outcome Evaluation:    /52 (BP Location: Left arm)   Pulse 83   Temp 98.2  F (36.8  C) (Oral)   Resp 18   Ht 1.72 m (5' 7.72\")   Wt 91.9 kg (202 lb 9.6 oz)   SpO2 97%   BMI 31.06 kg/m  '    [id number]    Patient VSS on RA, up ad shana, A & O x 4, denies pain, denies nausea, patient will discharge to home tomorrow if medically stable    .Gia Quinteros RN on 11/19/2023 at 1:38 PM                          "

## 2023-11-19 NOTE — PLAN OF CARE
Goal Outcome Evaluation:    00:00-07:00 am  AF with stable VS  A&Ox4   Denied nausea or pain.  Cerebella exam remains intact.  Dose # 4 of HD Cytarabine given at 04:13 am infusing over 3 hrs via picc line with good blood return. Tolerating chemo well thus far.  UAL  Voiding spontaneously well, not saving urine and LBM 11/18.  Pt endorses feeling well and no new acute event overnight.  Continue w/POPC

## 2023-11-20 VITALS
WEIGHT: 200.9 LBS | HEART RATE: 87 BPM | HEIGHT: 68 IN | TEMPERATURE: 98.1 F | OXYGEN SATURATION: 97 % | RESPIRATION RATE: 18 BRPM | DIASTOLIC BLOOD PRESSURE: 61 MMHG | BODY MASS INDEX: 30.45 KG/M2 | SYSTOLIC BLOOD PRESSURE: 115 MMHG

## 2023-11-20 LAB
ABO/RH(D): NORMAL
ALBUMIN SERPL BCG-MCNC: 4 G/DL (ref 3.5–5.2)
ALP SERPL-CCNC: 78 U/L (ref 40–150)
ALT SERPL W P-5'-P-CCNC: 12 U/L (ref 0–70)
ANION GAP SERPL CALCULATED.3IONS-SCNC: 9 MMOL/L (ref 7–15)
ANTIBODY SCREEN: NEGATIVE
AST SERPL W P-5'-P-CCNC: 19 U/L (ref 0–45)
BASOPHILS # BLD AUTO: 0 10E3/UL (ref 0–0.2)
BASOPHILS NFR BLD AUTO: 0 %
BILIRUB SERPL-MCNC: 0.5 MG/DL
BUN SERPL-MCNC: 16 MG/DL (ref 8–23)
CALCIUM SERPL-MCNC: 9 MG/DL (ref 8.8–10.2)
CHLORIDE SERPL-SCNC: 108 MMOL/L (ref 98–107)
CREAT SERPL-MCNC: 0.64 MG/DL (ref 0.67–1.17)
CULTURE HARVEST COMPLETE DATE: NORMAL
DEPRECATED HCO3 PLAS-SCNC: 26 MMOL/L (ref 22–29)
EGFRCR SERPLBLD CKD-EPI 2021: >90 ML/MIN/1.73M2
EOSINOPHIL # BLD AUTO: 0 10E3/UL (ref 0–0.7)
EOSINOPHIL NFR BLD AUTO: 0 %
ERYTHROCYTE [DISTWIDTH] IN BLOOD BY AUTOMATED COUNT: 21.2 % (ref 10–15)
FIBRINOGEN PPP-MCNC: 282 MG/DL (ref 170–490)
GLUCOSE SERPL-MCNC: 106 MG/DL (ref 70–99)
HCT VFR BLD AUTO: 31.2 % (ref 40–53)
HGB BLD-MCNC: 9.8 G/DL (ref 13.3–17.7)
IMM GRANULOCYTES # BLD: 0.1 10E3/UL
IMM GRANULOCYTES NFR BLD: 1 %
INR PPP: 1.11 (ref 0.85–1.15)
INTERPRETATION: NORMAL
INTERPRETATION: NORMAL
LYMPHOCYTES # BLD AUTO: 0.2 10E3/UL (ref 0.8–5.3)
LYMPHOCYTES NFR BLD AUTO: 2 %
MAGNESIUM SERPL-MCNC: 2.2 MG/DL (ref 1.7–2.3)
MCH RBC QN AUTO: 31.6 PG (ref 26.5–33)
MCHC RBC AUTO-ENTMCNC: 31.4 G/DL (ref 31.5–36.5)
MCV RBC AUTO: 101 FL (ref 78–100)
MONOCYTES # BLD AUTO: 0.2 10E3/UL (ref 0–1.3)
MONOCYTES NFR BLD AUTO: 2 %
NEUTROPHILS # BLD AUTO: 12 10E3/UL (ref 1.6–8.3)
NEUTROPHILS NFR BLD AUTO: 95 %
NRBC # BLD AUTO: 0 10E3/UL
NRBC BLD AUTO-RTO: 0 /100
PHOSPHATE SERPL-MCNC: 3.6 MG/DL (ref 2.5–4.5)
PLATELET # BLD AUTO: 240 10E3/UL (ref 150–450)
POTASSIUM SERPL-SCNC: 3.7 MMOL/L (ref 3.4–5.3)
PROT SERPL-MCNC: 6.3 G/DL (ref 6.4–8.3)
RBC # BLD AUTO: 3.1 10E6/UL (ref 4.4–5.9)
SODIUM SERPL-SCNC: 143 MMOL/L (ref 135–145)
SPECIMEN EXPIRATION DATE: NORMAL
URATE SERPL-MCNC: 5.9 MG/DL (ref 3.4–7)
WBC # BLD AUTO: 12.5 10E3/UL (ref 4–11)

## 2023-11-20 PROCEDURE — 250N000013 HC RX MED GY IP 250 OP 250 PS 637: Performed by: PHYSICIAN ASSISTANT

## 2023-11-20 PROCEDURE — 85610 PROTHROMBIN TIME: CPT | Performed by: PHYSICIAN ASSISTANT

## 2023-11-20 PROCEDURE — 84100 ASSAY OF PHOSPHORUS: CPT | Performed by: PHYSICIAN ASSISTANT

## 2023-11-20 PROCEDURE — 86901 BLOOD TYPING SEROLOGIC RH(D): CPT | Performed by: PHYSICIAN ASSISTANT

## 2023-11-20 PROCEDURE — 250N000012 HC RX MED GY IP 250 OP 636 PS 637: Performed by: STUDENT IN AN ORGANIZED HEALTH CARE EDUCATION/TRAINING PROGRAM

## 2023-11-20 PROCEDURE — 83735 ASSAY OF MAGNESIUM: CPT | Performed by: PHYSICIAN ASSISTANT

## 2023-11-20 PROCEDURE — 80053 COMPREHEN METABOLIC PANEL: CPT | Performed by: PHYSICIAN ASSISTANT

## 2023-11-20 PROCEDURE — 86850 RBC ANTIBODY SCREEN: CPT | Performed by: PHYSICIAN ASSISTANT

## 2023-11-20 PROCEDURE — 258N000003 HC RX IP 258 OP 636: Performed by: STUDENT IN AN ORGANIZED HEALTH CARE EDUCATION/TRAINING PROGRAM

## 2023-11-20 PROCEDURE — 85025 COMPLETE CBC W/AUTO DIFF WBC: CPT | Performed by: PHYSICIAN ASSISTANT

## 2023-11-20 PROCEDURE — 84550 ASSAY OF BLOOD/URIC ACID: CPT | Performed by: PHYSICIAN ASSISTANT

## 2023-11-20 PROCEDURE — 85384 FIBRINOGEN ACTIVITY: CPT | Performed by: PHYSICIAN ASSISTANT

## 2023-11-20 PROCEDURE — 250N000011 HC RX IP 250 OP 636: Mod: JZ | Performed by: STUDENT IN AN ORGANIZED HEALTH CARE EDUCATION/TRAINING PROGRAM

## 2023-11-20 RX ADMIN — ACYCLOVIR 400 MG: 400 TABLET ORAL at 07:54

## 2023-11-20 RX ADMIN — CYTARABINE 3000 MG: 100 INJECTION, SOLUTION INTRATHECAL; INTRAVENOUS; SUBCUTANEOUS at 04:10

## 2023-11-20 RX ADMIN — DEXAMETHASONE 4 MG: 4 TABLET ORAL at 03:40

## 2023-11-20 RX ADMIN — ONDANSETRON HYDROCHLORIDE 8 MG: 8 TABLET, FILM COATED ORAL at 03:40

## 2023-11-20 RX ADMIN — PREDNISOLONE ACETATE 2 DROP: 10 SUSPENSION/ DROPS OPHTHALMIC at 07:55

## 2023-11-20 ASSESSMENT — ACTIVITIES OF DAILY LIVING (ADL)
ADLS_ACUITY_SCORE: 20

## 2023-11-20 NOTE — DISCHARGE SUMMARY
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 Tolerated chemotherapy well and stable for discharge 11/20/2023. On the day of discharge, patient is feeling well at baseline health and looking forward to going home with his wife Aundrea. He is seen ambulating with a steady gait.     Prior to discharge, I reviewed with Julio the plan of care, including upcoming follow-up appointments and new medications. Appropriate prescriptions were sent to the discharge pharmacy, as needed. We reviewed strict discharge precautions, including reasons to call clinic triage or present to the ED, and he voiced understanding. He was provided with the clinic triage number, as well as written discharge instructions, in their discharge paperwork. Patient had an opportunity to ask questions, all of which were answered to their stated satisfaction. On the day of discharge, patient was overall well-appearing, hemodynamically stable, and felt safe and comfortable with the plans for discharge to home with follow-up as described.      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 Garcia. Patient initially presented to [facility] in [place], [place] with 4-6 weeks of progressive bruising, weakness, fatigue, loss of appetite, and night sweats. He also noted a headache similar to his previous brain abscess (see more below). CBC notable for pancytopenia; WBC 2.7 (), Hgb 8.1, and plt 1k. He was transferred to [facility] and was found on peripheral flow w/ 11% circulating myeloid blasts. He was then transferred to [facility] for further work up and treatment of AML. Diagnostic BMBx performed 9/1/23 which showed AML with 30% blasts by morph and 47% by flow. P53 negative. FISH negative for MLLT10, NUP98, and KMT2A. Normal karyotype. NGS showed ASXL1, CEBPA, SRSF2, and STAG2 mutations. Underwent induction with 7+3 (Day 1=9/2/23). Diagnostic LP done 9/8/23 due to presenting sx including headache without evidence of CNS involvement. MRI brain 9/11 negative, only showing chronic changes from h/o brain abscess. Count recovery BMBx done 10/9/23 without evidence of leukemia. Admitted for cycle 1 iDAC consolidation (C1D1=10/13/23) as a bridge to BMT. 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   - PICC placed on admission  "and removed on discharge  - Neulasta scheduled at [facility] on 11/22. New orders also written for 3x weekly labs/transfusions and faxed x11/17. Paper copy also provided to patient/wife as a back-up.     Treatment Plan: iDAC (F4G1=8811/22/2023)   - Cytarabine 1.5 g/m2 q12 hours x 6 doses - D1-3  - Neulasta 6 mg x1 dose - D5; scheduled at [facility] on 11/22/23  - Pre-meds: dexamethasone 4 mg q12 hours x 6 doses  - Supportive meds: Pred-Forte ophthalmic drops     # Normocytic anemia  Present on admission, baseline ~10. Presumed to underlying leukemia plus recent chemotherapy.  - Follow daily CBC with differential  - Transfuse to keep Hgb >7, plt >10K  - Of note: patient requires irradiated blood products given planned alloBMT     ID  # ID prophylaxis  Patient is not currently neutropenic.  - PTA Acyclovir 400 mg BID  - Hold bacterial/fungal ppx given ANC >1000; PTA voriconazole and levofloxacin ppx resumed on discharge     HEENT  # Left upper molar dental pain/possible fracture  Noted prior to C1 iDAC; tooth #16 noted to have mild gingival edema and tenderness. Patient reportedly saw his own dentist in consultation on 11/10, who noted that there was a \"hole between his wisdom tooth and the adjacent tooth\" (?fracture). However, he has been out of town since and was unable to do any procedural intervention to fix this. Patient is asymptomatic from the tooth at this point, but notes that he was told by his transplant team that this should be fixed prior to his eventual alloBMT. Discussed timing of procedure and recommended he try to have this done 2-3 weeks from this admission.  - Urgent referral to [facility] dentistry placed and clinic phone number provided to patient. Have been communicating with BMT RNCC (Enma Perera) and dentistry schedulers to transfer records and schedule as appropriate.  - Monitor clinically for s/sx of infection; none present on admission     Chronic/MISC  # HTN  Noted during prior " "hospitalization for brain abscess in 2021. Previously on lisinopril, which he is no longer taking.  - Monitor during admission     # BPH  Has mild LUTS (urinary frequency); had minimal benefit from Flomax. Not currently on any medications.  - Monitor clinically     FEN:  - No mIVF; bolus PRN  - Lyte replacement per protocol  - Regular diet as tolerated     Prophy/Misc:  - GI: No current indication for stress ulcer ppx  - DVT: Enoxaparin 40 mg daily; hold for plt <50K or prior to procedures      Patient was staffed with Dr. Dre Chowdhury PA-C  Hematology/Oncology  Page: #[phone]    I spent >65 minutes in the care of this patient today, which included time necessary for review of interval events, obtaining history and physical exam, ordering medication(s)/test(s) as medically indicated, discussion with interdisciplinary/consult team(s), and documentation time. Over 50% of time was spent face-to-face and/or coordinating care.        Pending Results   These results will be followed up by outpatient heme/onc team  Unresulted Labs Ordered in the Past 30 Days of this Admission       No orders found from 10/18/2023 to 11/18/2023.            Code Status   Full Code    Primary Care Physician   Feroz Barger    /61 (BP Location: Left arm)   Pulse 87   Temp 98.1  F (36.7  C) (Oral)   Resp 18   Ht 1.72 m (5' 7.72\")   Wt 91.1 kg (200 lb 14.4 oz)   SpO2 97%   BMI 30.80 kg/m      Constitutional: Awake and conversational. Non- toxic appearing. No acute distress. Well developed, hydrated, and nourished.  HEENT: NCAT. Moist mucus membranes without lesions, thrush, or exudates appreciated  Lymph: Neck supple, no ridigity. No significant adenopathy noted.   Respiratory: Breathing comfortably on room air with no accessory muscle use. Speaking in full sentences, no evidence of respiratory distress. Lungs CTAB without stridor, wheeze, rhonchi, or rales.   Cardiovascular: Regular rate and rhythm. 2+ radial pulses " "bilaterally. No peripheral edema.    GI: Abdomen with normoactive bowel sounds, soft and non-tender throughout. No rebound, guarding, or peritoneal sign.  Skin: Skin is clean, dry, intact. No jaundice or significant rashes appreciated on exposed areas.   Neurologic: Alert and oriented with normal speech. Grossly nonfocal.  Moves extremities spontaneously.  Memory and thought process preserved. Motor function normal and moves all extremities spontaneously.   Neuropsychiatric: Calm, affect congruent to situation. Appropriate mood and affect.      Time Spent on this Encounter   Sheree GOFF PA-C, personally saw the patient today and spent greater than 30 minutes discharging this patient.    Discharge Disposition   Discharged to home  Condition at discharge: Stable    Consultations This Hospital Stay   VASCULAR ACCESS FOR PICC PLACEMENT ADULT IP CONSULT    Discharge Orders      Dental Referral      Dental Referral      Reason for your hospital stay    Scheduled chemotherapy (\"iDAC\" regimen) for AML     Activity    Your activity upon discharge: activity as tolerated     When to contact your care team    Please call the [facility] at [phone] if you develop temperature above 100.4, shortness of breath, chest pain, headaches, vision changes, bleeding, uncontrolled nausea, vomiting, diarrhea, pain, or any other signs or symptoms of concern. If you are concerned that your symptoms are life-threatening, don't hesitate to call 911 or go to the nearest Emergency Room.     Discharge Instructions    Resume taking your preventative levofloxacin and voriconazole on discharge. Take as prescribed until instructed to stop by your outpatient team.     Follow Up and recommended labs and tests    An appointment for hospital follow up was requested for you. If it is not scheduled by the time you discharge you will be contacted with the date and time. You may call clinic to makes " changes to this appointment if needed.    If you don't hear by Wednesday with an MARIEL virtual visit, please call the clinic to touch base on schedule.  Currently scheduled for Monday 11/27 virtual visit with Mindy STOVALL [facility]: [phone]    Follow up with 3x weekly labs/transfusions in [place].  Neulasta infusion on 11/22 in [place].     Diet    Follow this diet upon discharge: Regular     Check Out Appointment Request    Please schedule post-hospital follow up appointment with MARIEL (virtual visit) sometime during week of 11/27     Discharge Medications   Discharge Medication List as of 11/20/2023 10:26 AM        CONTINUE these medications which have CHANGED    Details   acyclovir (ZOVIRAX) 400 MG tablet Take 1 tablet (400 mg) by mouth 2 times daily, Disp-60 tablet, R-11, E-Prescribe      levofloxacin (LEVAQUIN) 500 MG tablet Take 1 tablet (500 mg) by mouth daily, Disp-30 tablet, R-3, E-Prescribe      prednisoLONE acetate (PRED FORTE) 1 % ophthalmic suspension Place 2 drops into both eyes 4 times daily . Continue through 11/22, then stop., Disp-5 mL, R-0, E-Prescribe      voriconazole (VFEND) 200 MG tablet Take 1 tablet (200 mg) by mouth every 12 hours, Disp-60 tablet, R-3, E-Prescribe           CONTINUE these medications which have NOT CHANGED    Details   acetaminophen (TYLENOL) 325 MG tablet Take 650 mg by mouth every 6 hours as needed for mild pain, Historical      cetirizine (ZYRTEC) 10 MG tablet Take 1 tablet (10 mg) by mouth daily, Historical      ondansetron (ZOFRAN) 4 MG tablet Take 1 tablet (4 mg) by mouth every 8 hours as needed for nausea or vomiting, Disp-20 tablet, R-0, E-Prescribe      prochlorperazine (COMPAZINE) 5 MG tablet Take 1 tablet (5 mg) by mouth every 6 hours as needed for nausea or vomiting, Disp-20 tablet, R-0, E-Prescribe           Allergies   Allergies   Allergen Reactions    Iodinated Contrast Media Rash    Cefepime Rash    Ceftriaxone Rash     Reported history at  Outside hospital 2021    [employer] Other (See Comments)     Congestion      Data   Most Recent 3 CBC's:  Recent Labs   Lab Test 11/20/23 0342 11/19/23 0412 11/18/23 0346   WBC 12.5* 15.1* 6.8   HGB 9.8* 10.4* 11.5*   * 98 100    287 291      Most Recent 3 BMP's:  Recent Labs   Lab Test 11/20/23 0342 11/19/23 0412 11/18/23 0346    141 138   POTASSIUM 3.7 3.9 4.0   CHLORIDE 108* 106 103   CO2 26 24 22   BUN 16.0 16.0 14.0   CR 0.64* 0.68 0.67   ANIONGAP 9 11 13   VENANCIO 9.0 9.4 9.6   * 123* 137*     Most Recent 2 LFT's:  Recent Labs   Lab Test 11/20/23 0342 11/19/23 0412   AST 19 16   ALT 12 9   ALKPHOS 78 91   BILITOTAL 0.5 0.4     Most Recent INR's and Anticoagulation Dosing History:  Anticoagulation Dose History  More data exists         Latest Ref Rng & Units 10/14/2023 10/15/2023 10/16/2023 11/14/2023 11/18/2023 11/19/2023 11/20/2023   Recent Dosing and Labs   INR 0.85 - 1.15 1.04  1.10  1.02  1.13  1.03  1.04  1.11      Most Recent 3 Troponin's:No lab results found.  Most Recent Cholesterol Panel:No lab results found.  Most Recent 6 Bacteria Isolates From Any Culture (See EPIC Reports for Culture Details):No lab results found.  Most Recent TSH, T4 and A1c Labs:No lab results found.  Results for orders placed or performed during the hospital encounter of 11/17/23   XR Chest Port 1 View    Narrative    Examination:  XR CHEST PORT 1 VIEW    Date:  11/17/2023 4:05 PM     Clinical Information: PICC placement     Additional Information: none    Comparison: Chest x-ray 9/30/2023    Findings:     AP view of the chest. Right upper extremity PICC line tip in the mid  SVC. Normal heart size. No pleural effusion or pneumothorax. No focal  opacities. No acute osseous lesions.      Impression    Impression:  Right upper extremity PICC line tip in the mid SVC.    I have personally reviewed the examination and initial interpretation  and I agree with the findings.    MURPHY COREAS MD          SYSTEM ID:  [id number]

## 2023-11-20 NOTE — PLAN OF CARE
Goal Outcome Evaluation:  Time: 1817-6633  Alert and oriented x4, VSS on RA. Denies pain, nausea or vomiting. Dose #6 of HD Cytarabine infusing via PICC with good blood return. Cerebellar exam intact. Voiding spontaneously without difficulty, not saving. Plan is to discharge home today after chemo completion. Continue to monitor with POC.

## 2023-11-20 NOTE — PROGRESS NOTES
Care Management Discharge Note    Discharge Date: 11/20/2023  Discharge Disposition:  Home  Discharge Services:  NA  Discharge DME:  NA  Discharge Transportation:  Family providing  Private pay costs discussed: Not applicable  Does the patient's insurance plan have a 3 day qualifying hospital stay waiver?  No  PAS Confirmation Code:  NA  Patient/family educated on Medicare website which has current facility and service quality ratings:  NA  Education Provided on the Discharge Plan:  yes  Persons Notified of Discharge Plans: Patient  Patient/Family in Agreement with the Plan:  yes    Handoff Referral Completed: Yes    Additional Information:  RNCC received a phone call from [facility] stating they never received the Neulasta orders. RNCC spoke with primary team and obtained another signed order and [facility]. RNCC called [facility] a few hours later and spoke with RN who confirmed they receive the fax and pt is scheduled for Neulasta appointment on 11/21/22. Family providing discharge transportation. No other discharge needs identified.    Jeovany Clay RN BSN  5A RN Care Coordinator   Ph: [phone]  Pager: [phone]      Jeovany Clay RN

## 2023-11-20 NOTE — PROGRESS NOTES
Nursing Focus: Chemotherapy  D: Positive blood return via PICC. Insertion site is clean/dry/intact, dressing intact with no complaints of pain.  Urine output is recorded in intake in Doc Flowsheet.    I: Premedications given per order (see electronic medical administration record). Dose #6 of HD Cytarabine started to infuse over 3 hours. Reviewed pt teaching on chemotherapy side effects.  Pt denies need for further teaching. Chemotherapy double checked per protocol by two chemotherapy competent RN's.   A: Tolerating procedure well. Denies nausea and or pain.   P: Continue to monitor urine output and symptoms of nausea. Screen for symptoms of toxicity.

## 2023-11-20 NOTE — PLAN OF CARE
Goal Outcome Evaluation:      Plan of Care Reviewed With: patient, spouse    Overall Patient Progress: improvingOverall Patient Progress: improving       2636-1557: Chemotherapy completed. PICC removed per policy. Sterile dressing applied and patient remained supine for 30 min. Discharge paperwork reviewed with patient and his wife. They are picking up discharge meds at local pharmacy. Pt has phone numbers to call if problems arise. Ambulated independently from the unit with his wife.

## 2023-11-21 ENCOUNTER — PATIENT OUTREACH (OUTPATIENT)
Dept: CARE COORDINATION | Facility: CLINIC | Age: 68
End: 2023-11-21
Payer: COMMERCIAL

## 2023-11-21 ENCOUNTER — PATIENT OUTREACH (OUTPATIENT)
Dept: ONCOLOGY | Facility: CLINIC | Age: 68
End: 2023-11-21
Payer: COMMERCIAL

## 2023-11-21 NOTE — PROGRESS NOTES
[facility]    Background: Transitional Care Management program identified per system criteria and reviewed by [facility] team for possible outreach.    Assessment: Upon chart review, CCR Team member will not proceed with patient outreach related to this episode of Transitional Care Management program due to reason below:    Patient has active communication with a nurse, provider or care team for reason of post-hospital follow up plan.  Outreach call by CCR team not indicated to minimize duplicative efforts.     Plan: Transitional Care Management episode addressed appropriately per reason noted above.      Randi Andrade RN  [facility] Resource [place], [facility]    *Connected Care Resource Team does NOT follow patient ongoing. Referrals are identified based on internal discharge reports and the outreach is to ensure patient has an understanding of their discharge instructions.

## 2023-11-21 NOTE — PROGRESS NOTES
[facility]: Post-Discharge Note  SITUATION                                                      Admission:    Admission Date: 11/17/23   Reason for Admission: HiDAC  Discharge:   Discharge Date: 11/20/23  Discharge Diagnosis: AML    BACKGROUND                                                      Per hospital discharge summary and inpatient provider notes.      ASSESSMENT        Discharge Assessment  How are you doing now that you are home?: States that he is doing great, and feeling fine yet today, expects to slow down after a few days  How are your symptoms? (Red Flag symptoms escalate to triage hotline per guidelines): Unchanged  Do you feel your condition is stable enough to be safe at home until your provider visit?: Yes  Does the patient have their discharge instructions? : Yes  Does the patient have questions regarding their discharge instructions? : No  Were you started on any new medications or were there changes to any of your previous medications? : No  Does the patient have all of their medications?: Yes  Do you have questions regarding any of your medications? : No  Do you have all of your needed medical supplies or equipment (DME)?  (i.e. oxygen tank, CPAP, cane, etc.): Yes  Discharge follow-up appointment scheduled within 14 calendar days? : Yes  Discharge Follow Up Appointment Date: 11/27/23  Discharge Follow Up Appointment Scheduled with?: Specialty Care Provider             PLAN                                                      Outpatient Plan:  Neulasta appointment at [facility] scheduled for tomorrow morning,   3x weekly labs scheduled at [facility] dental referral sent while Artie was inpatient, waiting to hear from them. Otherwise continue monitoring blood counts and re-evaluate visiting his dentist when counts recover.     Future Appointments   Date Time Provider Department Center   11/27/2023  1:30 PM [facility]dsey Eng APRN [facility]NP [facility]   12/5/2023  3:30 PM El  Brodie Edward MD [facility]   12/5/2023  4:00 PM Enma Perera RN [facility]   2/5/2024  1:00 PM Ramon Carbajal DO [facility] [place]         For any urgent concerns, please contact our 24 hour clinic line:   [place]: [phone]       Susan Santos RN

## 2023-11-27 ENCOUNTER — TELEPHONE (OUTPATIENT)
Dept: TRANSPLANT | Facility: CLINIC | Age: 68
End: 2023-11-27
Payer: COMMERCIAL

## 2023-11-27 ENCOUNTER — VIRTUAL VISIT (OUTPATIENT)
Dept: ONCOLOGY | Facility: CLINIC | Age: 68
End: 2023-11-27
Attending: NURSE PRACTITIONER
Payer: COMMERCIAL

## 2023-11-27 ENCOUNTER — NURSE TRIAGE (OUTPATIENT)
Dept: ONCOLOGY | Facility: CLINIC | Age: 68
End: 2023-11-27
Payer: COMMERCIAL

## 2023-11-27 ENCOUNTER — PATIENT OUTREACH (OUTPATIENT)
Dept: ONCOLOGY | Facility: CLINIC | Age: 68
End: 2023-11-27
Payer: COMMERCIAL

## 2023-11-27 VITALS — HEIGHT: 68 IN | BODY MASS INDEX: 30.4 KG/M2 | WEIGHT: 200.6 LBS

## 2023-11-27 DIAGNOSIS — Z76.89 POOR DENTITION REQUIRING REFERRAL TO DENTISTRY: ICD-10-CM

## 2023-11-27 DIAGNOSIS — D61.818 PANCYTOPENIA (H): ICD-10-CM

## 2023-11-27 DIAGNOSIS — C92.01 ACUTE MYELOID LEUKEMIA IN REMISSION (H): Primary | ICD-10-CM

## 2023-11-27 PROCEDURE — 99496 TRANSJ CARE MGMT HIGH F2F 7D: CPT | Mod: 95 | Performed by: NURSE PRACTITIONER

## 2023-11-27 ASSESSMENT — PAIN SCALES - GENERAL: PAINLEVEL: NO PAIN (0)

## 2023-11-27 NOTE — PROGRESS NOTES
Virtual Visit Details    Type of service:  Video Visit   Video Start Time:  1330  Video End Time: 1345    Originating Location (pt. 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 He also noted a headache similar to his previous brain abscess CBC notable for pancytopenia; WBC 2.7 (), Hgb 8.1, and plt 1k. He was transferred to [facility] and was found on peripheral flow w/ 11% circulating myeloid blasts. He was then transferred to [facility] for further work up and treatment of AML.   9/1/23 BMBx performed 9/1/23 which showed AML with 30% blasts by morph and 47% by flow. P53 negative. FISH negative for MLLT10, NUP98, and KMT2A. Normal karyotype. NGS showed ASXL1, CEBPA, SRSF2, and STAG2 mutations.   9/2/23 started 7+3   9/8/23 diagnostic LP due to presenting sx including headache, without evidence of CNS involvement.   9/11/23 MRI brain negative, only showing chronic changes from h/o brain abscess.  Count recovery BMBx 10/9/23, without evidence of leukemia.   10/13/23  HiDAC (C1D1=10/13/2023)   Cytarabine 1.5 g/m2 q12 hours x 6 doses  Neulasta/pegfilgrastim with labs/transfusions 2 times weekly at local clinic.    11/17/23  HiDAC (W1J8=8711/17/2023)   Cytarabine 1.5 g/m2 q12 hours x 6 doses  Neulasta/pegfilgrastim with labs/transfusions 3 times weekly at local clinic.      Interval History   Discharged home 11/20/23. PICC removed prior to discharge.  Doing well since being at home.    Taking all prophy  medications.  Appetite good.   Pred forte eyedrops 4 times daily   Going to local clinic 3 x week for labs and potential transfusions.   Denies fevers/chills/n/v/d.  Needs left upper molar extracted.  Planning for the week of 23.  Wants to proceed with Allo URD transplant  Denies fevers, chills, nausea, vomiting, diarrhea, constipation.No bruising or bleeding    Past Medical History   No past medical history on file.      Past Surgical History:      Past Surgical History:   Procedure Laterality Date    PICC Right 2023    Placed R basilic 46 cm 1 external  without problem    PICC Right 10/13/2023    right basilic 5 fr dl power picc 40 cm    PICC DOUBLE LUMEN PLACEMENT Right 2023    Basilic, 43 cm, 0 cm external length     Social History     Socioeconomic History    Marital status:      Spouse name: None    Number of children: None    Years of education: None    Highest education level: None   Tobacco Use    Smoking status: Former     Types: Cigarettes     Quit date: 1983     Years since quittin.5    Smokeless tobacco: Current     Types: Snuff    Tobacco comments:     1 can per week       Allergies:     Allergies   Allergen Reactions    Iodinated Contrast Media Rash    Cefepime Rash    Ceftriaxone Rash     Reported history at Outside hospital     Ragweeds Other (See Comments)     Congestion        Medications:     Current Outpatient Medications   Medication    acetaminophen (TYLENOL) 325 MG tablet    acyclovir (ZOVIRAX) 400 MG tablet    cetirizine (ZYRTEC) 10 MG tablet    levofloxacin (LEVAQUIN) 500 MG tablet    ondansetron (ZOFRAN) 4 MG tablet    prednisoLONE acetate (PRED FORTE) 1 % ophthalmic suspension    prochlorperazine (COMPAZINE) 5 MG tablet    voriconazole (VFEND) 200 MG tablet     No current facility-administered medications for this visit.     Physical Exam   Objective:  General: patient appears well in no acute distress, alert and oriented, speech clear and  fluid  Skin: no visualized rash or lesions on visualized skin  Resp: Appears to be breathing comfortably without accessory muscle usage, speaking in full sentences, no audible wheezes or cough.  Psych: Coherent speech, normal rate and volume, able to articulate logical thoughts, able to abstract reason, no tangential thoughts, no hallucinations or delusions  Patient's affect is appropriate.    Data     Most Recent 3 CBC's:  Recent Labs   Lab Test 11/20/23 0342 11/19/23 0412 11/18/23 0346   WBC 12.5* 15.1* 6.8   HGB 9.8* 10.4* 11.5*   * 98 100    287 291   ANEUTAUTO 12.0* 13.6* 5.4    Most Recent 3 BMP's:  Recent Labs   Lab Test 11/20/23 0342 11/19/23 0412 11/18/23 0346    141 138   POTASSIUM 3.7 3.9 4.0   CHLORIDE 108* 106 103   CO2 26 24 22   BUN 16.0 16.0 14.0   CR 0.64* 0.68 0.67   ANIONGAP 9 11 13   VENANCIO 9.0 9.4 9.6   * 123* 137*   PROTTOTAL 6.3* 7.0 7.5   ALBUMIN 4.0 4.3 4.4    Most Recent 3 LFT's:  Recent Labs   Lab Test 11/20/23 0342 11/19/23 0412 11/18/23 0346   AST 19 16 22   ALT 12 9 13   ALKPHOS 78 91 110   BILITOTAL 0.5 0.4 0.3    Most Recent 2 TSH and T4:No lab results found.  I reviewed the above labs today.  XR Chest Port 1 View  Narrative: Examination:  XR CHEST PORT 1 VIEW    Date:  11/17/2023 4:05 PM     Clinical Information: PICC placement     Additional Information: none    Comparison: Chest x-ray 9/30/2023    Findings:     AP view of the chest. Right upper extremity PICC line tip in the mid  SVC. Normal heart size. No pleural effusion or pneumothorax. No focal  opacities. No acute osseous lesions.  Impression: Impression:  Right upper extremity PICC line tip in the mid SVC.    I have personally reviewed the examination and initial interpretation  and I agree with the findings.    MURPHY COREAS MD         SYSTEM ID:  [id number]    Assessment/Plan   Acute Myeloid Leukemia  Completed Pred forte eyedrops 4 times daily   S/p day 11 consolidation HiDAC 2  CBC today  "from OSH  WBC 0.6  Plt 13 Hgb 10.1  ANC 0  Follow up with BMT 12/5/23. Patient verbalized he wants to proceed.    Blood Transfusion Supportive Care  Transfuse irradiated products  1 unit pRBC if hgb is 7.0-7.9  2 units pRBCs if Hgb 6.0-6.9 g/dl  1 unit platelets if PLT count is <10,000 or < 50,000 with clinical bleeding.   Type & Screen MWF  11/27/23 Updated orders to refelct above transfusion parameters    Immunosuppressed 2/2 malignancy and chemotherapy   ID Prophylaxis   Levofloxacin 500 mg daily  Voriconazole 200 mg twice daily  Acyclovir 400 mg twice daily    Left upper molar dental pain/possible fracture  Noted prior to C1 iDAC; tooth #16 noted to have mild gingival edema and tenderness. Patient reportedly saw his own dentist in consultation on 11/10/23, who noted that there was a \"hole between his wisdom tooth and the adjacent tooth\" (?fracture).   Asymptomatic and due to have extraction by oral surgeon locally 12/13/23.    Plan from today's clinical encounter  Patient receiving labs/transfusions locally in [place], [place] 3x weekly. Orders faxed to [facility] by [facility].  Plt transfusion today at [facility]  Follow up with BMT Team 12/5/23: Dr. Cam Edward and BMT coordinator. Per patient he wants to proceed  Message sent to [facility] BMT coordinator per patient request patient to set up request at [facility] for evening on 12/5/23.  MD visit with Dr. Jose HOWARD     Transition Care Management Services  Admission Date: 11/17/23    Discharge Date: 11/20/23    Discharge Diagnosis: AML    Interactive contact date: 11/21/2023  Face-to-face visit date: 11/27/2023        Medical complexity decision making: High complexity ([id number])     40 minutes spent on the date of the encounter doing chart review, review of outside records, review of test results, interpretation of tests, patient visit, documentation, discussion with other provider(s), and discussion with family     Lyndsey CHAPMAN, ANP-BC, " AOCNP  [facility]  [address], [place] [id number] [phone] (pager)

## 2023-11-27 NOTE — TELEPHONE ENCOUNTER
BMT CSW Telephone Encounter  Clinical Social Work  [facility]      Focus: Supportive Counseling /Resources    Data: Pt is a 68 year old male diagnosed with AML who is currently undergoing Allogeneic stem cell transplant discussion/future work up.     Interventions: Clinical  (CSW) spoke w/ Pt via phone on 11/27/2023 to assist with [facility] Referral.  Pt shared that he and his wife would like to check in on 12/5/2023 and check out on 12/6/2023.  SW encouraged Pt to contact CSW for support, questions and/or resources.    CSW sent [facility] referral via email for pts requested dates on 11/27/2023.    Assessment: Pt presented as open and pleasant.  Pt appears to be coping appropriately at this time. Pt continues to be supported by spouse Aundrea.     Plan: CSW will continue to work with Pt and family to provide supportive counseling and assist with resources as needed. CSW will continue to collaborate with multidisciplinary team regarding Pt's plan of care.     POLO Louis, SW  [facility]  Adult Blood & Marrow Transplant   Phone: [phone]  Pager: [phone]

## 2023-11-27 NOTE — PROGRESS NOTES
[facility]: Cancer Care                                                                                          Updated blood plan orders faxed to [facility] in [place].         Susan Santos, RN, BSN  RN Care Coordinator   [address] [place] [id number] [phone]     Neck , no lymphadenopathy

## 2023-11-27 NOTE — TELEPHONE ENCOUNTER
DATE:  11/27/2023   TIME OF RECEIPT FROM LAB:  0756  Critical lab value:  WBC: 0.6, Plt: 13; Other Hgb: 10.1, ANC: 0.0  Last lab values:  11/20 WBC: 12.5, Plts: 240, Hgb: 9.8, ANC: 12  No blood plan  Apt w/Lyndsey today.  Provider Notified: yes  Provider Name: Lyndsey Eng CNP  Date/Time lab value reported to provider: 11/27/23 at 0814  Mechanism of provider notification: secure chat  Provider response: 0814: acknowledged by Lyndsey. No further action required from Triage.  Routed to Lyndsey Eng CNP and Susan Santos, RANGELCC

## 2023-11-27 NOTE — NURSING NOTE
Is the patient currently in the state of [place]? YES    Visit mode:VIDEO    If the visit is dropped, the patient can be reconnected by: VIDEO VISIT: Text to cell phone:   Telephone Information:   Mobile [phone]       Will anyone else be joining the visit? NO  (If patient encounters technical issues they should call [phone]    How would you like to obtain your AVS? MyChart    Are changes needed to the allergy or medication list? Pt stated no changes to allergies and Pt stated no med changes    Reason for visit: RECHECK    Jose SHEIKH

## 2023-11-27 NOTE — LETTER
11/27/2023         RE: Artie Fine  [address]  [place] [address]        Dear Colleague,    Thank you for referring your patient, Artie Fine, to the [facility]. Please see a copy of my visit note below.    Virtual Visit Details    Type of service:  Video Visit   Video Start Time:  1330  Video End Time: 1345    Originating Location (pt. 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 He also noted a headache similar to his previous brain abscess CBC notable for pancytopenia; WBC 2.7 (), Hgb 8.1, and plt 1k. He was transferred to [facility] and was found on peripheral flow w/ 11% circulating myeloid blasts. He was then transferred to [facility] for further work up and treatment of AML.   9/1/23 BMBx performed 9/1/23 which showed AML with 30% blasts by morph and 47% by flow. P53 negative. FISH negative for MLLT10, NUP98, and KMT2A. Normal karyotype. NGS showed ASXL1, CEBPA, SRSF2, and STAG2 mutations.   9/2/23 started 7+3   9/8/23 diagnostic LP due to presenting sx including headache, without evidence of CNS involvement.   9/11/23 MRI brain negative, only showing chronic changes from h/o brain abscess.  Count recovery BMBx 10/9/23, without evidence of leukemia.   10/13/23  HiDAC (C1D1=10/13/2023)   Cytarabine 1.5 g/m2 q12 hours x 6 doses  Neulasta/pegfilgrastim with labs/transfusions 2 times weekly at local clinic.    11/17/23  HiDAC (R6P1=2011/17/2023)   Cytarabine 1.5 g/m2  q12 hours x 6 doses  Neulasta/pegfilgrastim with labs/transfusions 3 times weekly at local clinic.      Interval History   Discharged home 23. PICC removed prior to discharge.  Doing well since being at home.    Taking all prophy medications.  Appetite good.   Pred forte eyedrops 4 times daily   Going to local clinic 3 x week for labs and potential transfusions.   Denies fevers/chills/n/v/d.  Needs left upper molar extracted.  Planning for the week of 23.  Wants to proceed with Allo URD transplant  Denies fevers, chills, nausea, vomiting, diarrhea, constipation.No bruising or bleeding    Past Medical History   No past medical history on file.      Past Surgical History:      Past Surgical History:   Procedure Laterality Date    PICC Right 2023    Placed R basilic 46 cm 1 external  without problem    PICC Right 10/13/2023    right basilic 5 fr dl power picc 40 cm    PICC DOUBLE LUMEN PLACEMENT Right 2023    Basilic, 43 cm, 0 cm external length     Social History     Socioeconomic History    Marital status:      Spouse name: None    Number of children: None    Years of education: None    Highest education level: None   Tobacco Use    Smoking status: Former     Types: Cigarettes     Quit date: 1983     Years since quittin.5    Smokeless tobacco: Current     Types: Snuff    Tobacco comments:     1 can per week       Allergies:     Allergies   Allergen Reactions    Iodinated Contrast Media Rash    Cefepime Rash    Ceftriaxone Rash     Reported history at Outside hospital     Ragweeds Other (See Comments)     Congestion        Medications:     Current Outpatient Medications   Medication    acetaminophen (TYLENOL) 325 MG tablet    acyclovir (ZOVIRAX) 400 MG tablet    cetirizine (ZYRTEC) 10 MG tablet    levofloxacin (LEVAQUIN) 500 MG tablet    ondansetron (ZOFRAN) 4 MG tablet    prednisoLONE acetate (PRED FORTE) 1 % ophthalmic suspension    prochlorperazine (COMPAZINE) 5 MG tablet     voriconazole (VFEND) 200 MG tablet     No current facility-administered medications for this visit.     Physical Exam   Objective:  General: patient appears well in no acute distress, alert and oriented, speech clear and fluid  Skin: no visualized rash or lesions on visualized skin  Resp: Appears to be breathing comfortably without accessory muscle usage, speaking in full sentences, no audible wheezes or cough.  Psych: Coherent speech, normal rate and volume, able to articulate logical thoughts, able to abstract reason, no tangential thoughts, no hallucinations or delusions  Patient's affect is appropriate.    Data     Most Recent 3 CBC's:  Recent Labs   Lab Test 11/20/23 0342 11/19/23 0412 11/18/23 0346   WBC 12.5* 15.1* 6.8   HGB 9.8* 10.4* 11.5*   * 98 100    287 291   ANEUTAUTO 12.0* 13.6* 5.4    Most Recent 3 BMP's:  Recent Labs   Lab Test 11/20/23 0342 11/19/23 0412 11/18/23 0346    141 138   POTASSIUM 3.7 3.9 4.0   CHLORIDE 108* 106 103   CO2 26 24 22   BUN 16.0 16.0 14.0   CR 0.64* 0.68 0.67   ANIONGAP 9 11 13   VENANCIO 9.0 9.4 9.6   * 123* 137*   PROTTOTAL 6.3* 7.0 7.5   ALBUMIN 4.0 4.3 4.4    Most Recent 3 LFT's:  Recent Labs   Lab Test 11/20/23 0342 11/19/23 0412 11/18/23 0346   AST 19 16 22   ALT 12 9 13   ALKPHOS 78 91 110   BILITOTAL 0.5 0.4 0.3    Most Recent 2 TSH and T4:No lab results found.  I reviewed the above labs today.  XR Chest Port 1 View  Narrative: Examination:  XR CHEST PORT 1 VIEW    Date:  11/17/2023 4:05 PM     Clinical Information: PICC placement     Additional Information: none    Comparison: Chest x-ray 9/30/2023    Findings:     AP view of the chest. Right upper extremity PICC line tip in the mid  SVC. Normal heart size. No pleural effusion or pneumothorax. No focal  opacities. No acute osseous lesions.  Impression: Impression:  Right upper extremity PICC line tip in the mid SVC.    I have personally reviewed the examination and initial  "interpretation  and I agree with the findings.    MURPHY COREAS MD         SYSTEM ID:  [id number]    Assessment/Plan   Acute Myeloid Leukemia  Completed Pred forte eyedrops 4 times daily   S/p day 11 consolidation HiDAC 2  CBC today from OSH  WBC 0.6  Plt 13 Hgb 10.1  ANC 0  Follow up with BMT 12/5/23. Patient verbalized he wants to proceed.    Blood Transfusion Supportive Care  Transfuse irradiated products  1 unit pRBC if hgb is 7.0-7.9  2 units pRBCs if Hgb 6.0-6.9 g/dl  1 unit platelets if PLT count is <10,000 or < 50,000 with clinical bleeding.   Type & Screen MWF  11/27/23 Updated orders to refelct above transfusion parameters    Immunosuppressed 2/2 malignancy and chemotherapy   ID Prophylaxis   Levofloxacin 500 mg daily  Voriconazole 200 mg twice daily  Acyclovir 400 mg twice daily    Left upper molar dental pain/possible fracture  Noted prior to C1 iDAC; tooth #16 noted to have mild gingival edema and tenderness. Patient reportedly saw his own dentist in consultation on 11/10/23, who noted that there was a \"hole between his wisdom tooth and the adjacent tooth\" (?fracture).   Asymptomatic and due to have extraction by oral surgeon locally 12/13/23.    Plan from today's clinical encounter  Patient receiving labs/transfusions locally in [place], [place] 3x weekly. Orders faxed to [facility] by [facility].  Plt transfusion today at [facility]  Follow up with BMT Team 12/5/23: Dr. Cam Edward and BMT coordinator. Per patient he wants to proceed  Message sent to [facility] BMT coordinator per patient request patient to [facility] on 12/5/23.  MD visit with Dr. Jose HOWARD     Transition Care Management Services  Admission Date: 11/17/23    Discharge Date: 11/20/23    Discharge Diagnosis: AML    Interactive contact date: 11/21/2023  Face-to-face visit date: 11/27/2023        Medical complexity decision making: High complexity ([id number])     40 minutes spent on the date of the " encounter doing chart review, review of outside records, review of test results, interpretation of tests, patient visit, documentation, discussion with other provider(s), and discussion with family     Lyndsey CHAPMAN, ANP-BC, AOCNP  [facility] [address] [address] [phone] (pager)

## 2023-11-29 ENCOUNTER — TELEPHONE (OUTPATIENT)
Dept: TRANSPLANT | Facility: CLINIC | Age: 68
End: 2023-11-29
Payer: COMMERCIAL

## 2023-11-29 NOTE — TELEPHONE ENCOUNTER
BMT CSW Telephone Encounter  Clinical Social Work  [facility]      Focus: Supportive Counseling    Data: Pt is a 68 year old male diagnosed with AML who is currently undergoing Allogeneic stem cell transplant discussion/future work up.     Interventions: Clinical  (CSW) spoke w/ Pt via phone on 11/29/2023 to assist with some concerns/inquiries.  CSW addressed all questions in this encounter. CSW assessed coping and provided supportive counseling.  SW encouraged Pt to contact CSW for support, questions and/or resources.    Assessment: Pt presented as pleasant.  Pt appears to be coping appropriately at this time. Pt continues to be supported by spouse Aundrea.     Plan: CSW will continue to work with Pt and family to provide supportive counseling and assist with resources as needed. CSW will continue to collaborate with multidisciplinary team regarding Pt's plan of care.     POLO Louis, [facility]  Adult Blood & Marrow Transplant   Phone: [phone]  Pager: [phone]

## 2023-12-04 NOTE — PROGRESS NOTES
BMT / Cell Therapy Consultation      Artie Fine is a 68 year old male referred by Dr. Garcia for AML.        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 MRI brain with no evidence of CNS involvement and LP negative for CSF leukemia.      Presentation: 68 year old male with a history of brain abscess (2021), HTN, and BPH. He was transferred from [facility] in [place], [place] with 4-6 weeks of progressive bruising, weakness, fatigue, loss of appetite, and night sweats. CBC notable for pancytopenia; WBC 2.7 (), Hgb 8.1, and plt 1k. He was transferred to [facility] and was found on peripheral flow w/ 11% circulating myeloid blasts. He was then transferred to [facility] for further work up and treatment of AML. Diagnostic BMBx performed 9/1 which showed AML with 30% blasts by morph and 47% by flow. P53 negative. FISH negative for MLLT10, NUP98, and KMT2A. Normal karyotype. NGS showed ASXL1, CEBPA, SRSF2, and STAG2 mutations. S/p diagnostic LP 9/8/23 for CNS leukemia work up as patient endorses headache upon admission w/ new AML diagnosis. CSF flow negative. MRI brain 9/11 negative, only showing chronic changes from h/o brain abscess.         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    10/13/23  HiDAC (C1D1=10/13/2023)   Cytarabine 1.5 g/m2 q12 hours x 6 doses  Neulasta/pegfilgrastim with labs/transfusions 2 times weekly at local clinic.    11/17/23  HiDAC (W8U2=3411/17/2023)   Cytarabine 1.5 g/m2 q12 hours x 6 doses  Neulasta/pegfilgrastim with labs/transfusions 3 times weekly at local clinic.       Date Treatment Name Response Side Effects / Toxicities  "  C1D1 9/2/23 7+3 (cytarabine, daunorubicin 60mg/m2 D14 BMBx w/o evidence of leukemia     neutropenic fever, rhinovirus, GPC bactremia    10/13/23                11/17/23  HiDAC (C1D1) Cytarabine 1.5 g/m2 q12 hours x 6 doses            HiDAC (C2D1) Cytarabine 1.5 g/m2 q12 hours x 6 doses  11/15/2023 BMB CR morph/IFN  NGS  ASXL1 gene p.E635fs currently at 31% VAF, and SRSF2 gene p.P95H currently at 2% VAF were detected  Left upper molar dental pain/possible fracture  Noted prior to C1 iDAC; tooth #16 noted to have mild gingival edema and tenderness. Patient reportedly saw his own dentist in consultation on 11/10/23, who noted that there was a \"hole between his wisdom tooth and the adjacent tooth\" (?fracture).   Asymptomatic and due to have extraction by oral surgeon locally 12/13/23.                                HPI:  Please see my entry above for hematologic history.    Seen with his wife, appears comfortable, discussed his course so far and reviewed the indication and risk with transplant. He does blanca in energy in th middle of cycles but then recovers and is fully functional and independent with independence in ADLs ans great QOL.        ASSESSMENT AND PLAN:  68 year old male, history of brain abscess HTN, with new diagnosis of AML with normal karyotype and ASXL1, CEBPA (NOT in bZIP region), SRSF2, and STAG2 mutations, no known CNS disease on presentation, referred for consideration of consolidation with allogenic hematopoietic cell transplantation.     I discussed with the patient the implications and prognosis of his disease and the risks and benefits of proceeding with an allogeneic stem cell transplantation for AML that is also depending on disease risk and balancing the risk and benefit accordingly per ELN classification and risk stratification.     I discussed with the patient the timeline of an allogeneic bone marrow transplant procedure, rationale, risks and benefits. After discharge, patients are " required to live near the transplant center for frequent visits, lab tests, and medication adjustments for many months.  Patients must have caregiver support at home to help with self care. Side effects of the preparative regimen can include fatigue, nausea, vomiting, mouth sores, diarrhea, lung damage, liver damage, hair loss, and death.  Most of these are reversible, but occasionally, there are permanent.  Patients are usually infertile after transplant. There are long term risks of increased secondary cancers, including blood cancers as well.  There is also the possibility of engraftment failure. We discussed graft vs host disease as a short and long term risk that can affect many different parts of the body, including the skin, gut, and liver leading to rashes, mouth sores, diarrhea, nausea, jaundice.  This can be an acute or chronic complication and some patients will develop chronic graft versus host disease, and can be fatal.  Patients have to take immunosuppressive therapies possibly for many months after transplant. There are also risks of infection that occur both with the transplant and with immunosuppression.  Medications are used to help reduce the risk of severe infection.  We discussed that overall risks of mortality 30-40% to be further determined based on workup testing results.  We discussed risk of acute GVHD and chronic GVHD, that additionally depends on donor source, and that on our protocol with posttransplant cyclophosphamide as oipid-hmvkoc-qkwh disease prophylaxis the results of 10-15% risk of severe acute nytqr-qlcjnt-jnxx disease or immunosuppression requiring chronic efydc-nndigs-kxgo disease.        Recommendations:   -Matched unrelated donor JIMMY transplantation in CR1 after C2 consolidation, aiming for December   -Discussed the timeline of transplantation, smoking cessation, relocation needed  -2.5 hour drive     I spent 80 minutes in the care of this patient today, which included time  necessary for preparation for the visit, obtaining history, ordering medications/tests/procedures as medically indicated, review of pertinent medical literature, counseling of the patient, communication of recommendations to the care team, and documentation time.    Brodie Edward MD        ROS:    10 point ROS neg other than the symptoms noted above in the HPI.        No past medical history on file.    Past Surgical History:   Procedure Laterality Date    PICC Right 2023    Placed R basilic 46 cm 1 external  without problem    PICC Right 10/13/2023    right basilic 5 fr dl power picc 40 cm    PICC DOUBLE LUMEN PLACEMENT Right 2023    Basilic, 43 cm, 0 cm external length       No family history on file.    Social History     Tobacco Use    Smoking status: Former     Types: Cigarettes     Quit date: 1983     Years since quittin.6    Smokeless tobacco: Current     Types: Snuff    Tobacco comments:     1 can per week         Allergies   Allergen Reactions    Iodinated Contrast Media Rash    Cefepime Rash    Ceftriaxone Rash     Reported history at Outside hospital     Ragweeds Other (See Comments)     Congestion         Current Outpatient Medications   Medication Sig Dispense Refill    acetaminophen (TYLENOL) 325 MG tablet Take 650 mg by mouth every 6 hours as needed for mild pain      acyclovir (ZOVIRAX) 400 MG tablet Take 1 tablet (400 mg) by mouth 2 times daily 60 tablet 11    cetirizine (ZYRTEC) 10 MG tablet Take 1 tablet (10 mg) by mouth daily      levofloxacin (LEVAQUIN) 500 MG tablet Take 1 tablet (500 mg) by mouth daily 30 tablet 3    ondansetron (ZOFRAN) 4 MG tablet Take 1 tablet (4 mg) by mouth every 8 hours as needed for nausea or vomiting 20 tablet 0    prednisoLONE acetate (PRED FORTE) 1 % ophthalmic suspension Place 2 drops into both eyes 4 times daily . Continue through , then stop. 5 mL 0    prochlorperazine (COMPAZINE) 5 MG tablet Take 1 tablet (5 mg) by mouth every 6  "hours as needed for nausea or vomiting 20 tablet 0    voriconazole (VFEND) 200 MG tablet Take 1 tablet (200 mg) by mouth every 12 hours 60 tablet 3         Physical Exam:     Vital Signs: /80 (BP Location: Right arm, Patient Position: Sitting, Cuff Size: Adult Large)   Pulse 107   Temp 98.4  F (36.9  C) (Oral)   Resp 18   Ht 1.715 m (5' 7.52\")   Wt 91.9 kg (202 lb 8 oz)   SpO2 96%   BMI 31.23 kg/m        KPS:  70              Blood Counts       Recent Labs   Lab Test 11/20/23 0342 11/19/23 0412 11/18/23 0346 09/11/23  0436 09/10/23  0445 09/09/23  0445 09/08/23  0523   HGB 9.8* 10.4* 11.5*   < > 8.1* 7.5* 7.8*   HCT 31.2* 32.5* 36.8*   < > 25.6* 24.7* 25.6*   WBC 12.5* 15.1* 6.8   < > 0.8* 0.9* 1.5*   ANEUTAUTO 12.0* 13.6* 5.4   < >  --   --   --    ALYMPAUTO 0.2* 0.3* 1.0   < >  --   --   --    AMONOAUTO 0.2 1.1 0.4   < >  --   --   --    AEOSAUTO 0.0 0.0 0.0   < >  --   --   --    ABSBASO 0.0 0.0 0.0   < >  --   --   --    NRBCMAN 0.0 0.0 0.0   < >  --   --   --    ABLA  --   --   --   --  0.0 0.0 0.1*    287 291   < > 26* 33* 51*    < > = values in this interval not displayed.       ABO/RH    Recent Labs   Lab Test 11/20/23 0342   ABORH A POS         Chemistries     Basic Panel  Recent Labs   Lab Test 11/20/23 0342 11/19/23 0412 11/18/23 0346    141 138   POTASSIUM 3.7 3.9 4.0   CHLORIDE 108* 106 103   CO2 26 24 22   BUN 16.0 16.0 14.0   CR 0.64* 0.68 0.67   * 123* 137*        Calcium, Magnesium, Phosphorus  Recent Labs   Lab Test 11/20/23  0342 11/19/23  0412 11/18/23  0346   VENANCIO 9.0 9.4 9.6   MAG 2.2 2.2 2.1   PHOS 3.6 4.5 3.9        LFTs  Recent Labs   Lab Test 11/20/23  0342 11/19/23  0412 11/18/23  0346   BILITOTAL 0.5 0.4 0.3   ALKPHOS 78 91 110   AST 19 16 22   ALT 12 9 13   ALBUMIN 4.0 4.3 4.4       LDH  Recent Labs   Lab Test 11/17/23  1141 10/13/23  1057 10/02/23  0632    209 226       B2-Microglobulin  No lab results found.        Immunoglobulins     No " lab results found.    No lab results found.    No lab results found.      Monocloncal Protein Studies     M spike    Recent Labs   Lab Test 08/31/23  1603   ELPM 0.0       Neopit FLC    Recent Labs   Lab Test 08/31/23  1603   KFLCA 3.86*       Lambda FLC    Recent Labs   Lab Test 08/31/23  1603   LFLCA 2.56       FLC Ratio    Recent Labs   Lab Test 08/31/23  1603   KLRA 1.51       Infectious Disease Markers     [facility] IDM    Recent Labs   Lab Test 11/14/23  1446   TCRUZI Non-Reactive         Hepatitis and HIV    Recent Labs   Lab Test 11/14/23  1446   HEPBANG Nonreactive   HBCAB Nonreactive   AUSAB 15.75   HCVAB Nonreactive   HIAGAB Nonreactive         CMV  Recent Labs   Lab Test 11/14/23  1446   CMVIGG Positive, suggests recent or past exposure.*         EBV    Recent Labs   Lab Test 11/14/23  1446   EBVCAG Positive*       Bone Marrow Biopsy       Morphology    Results for orders placed or performed in visit on 11/15/23 (from the past 8760 hour(s))   Bone marrow biopsy   Result Value    Final Diagnosis      Bone marrow, posterior iliac crest, left decalcified trephine biopsy and touch imprint; left direct aspirate smear and concentrate aspirate smear; and peripheral blood smear:    - Normocellular marrow for age (overall 30% to 40%) with trilineage hematopoiesis, relatively increased monocytes, no overt dysplasia, and less than 2% blasts  - Peripheral blood showing moderate macrocytic normochromic anemia and relative and absolute monocytosis  - See comment      Comment      Flow cytometry analysis on concurrent specimen ([id number]) showed no increase in myeloid blasts and no abnormal myeloid blast immunophenotype.    Overall, there is no morphologic or immunophenotypic evidence of acute myeloid leukemia or high-grade myeloid neoplasm.  In addition, there is no overt dysplasia; however, there is increased proportion and number of monocytes in both bone marrow and peripheral blood.  Given the molecular  profile of leukemia at the time of diagnosis with ASXL1, SRSF2, STAG2, CEBPA mutations, correlation with molecular studies could be helpful for accurate assessment of possible residual disease, clonal hematopoiesis, or clonal monocytosis.    Concurrent ancillary studies are in progress and will be reported separately. Correlation with these results, especially molecular findings, and clinical information is recommended.       Clinical Information      Per [facility] electronic medical records; a 68 year old male with a history of acute myeloid leukemia, myelodysplasia-related (ASXL1, SRSF2, STAG2, CEBPA mutations) s/p 7+3 induction. His last bone marrow ([id number]; 10/9/2023) showed no morphologic evidence of disease; however, molecular testing detected ASXL1 E635fs at a VAF of 30% (previously at 31%) and SRSF2 P95H with a VAF of 3% (previously at 47%); the mutations in CEBPA and STAG2 were not detected. This is pre-bone marrow transplantation bone marrow biopsy.      Peripheral Hematologic Data      CBC WITH DIFFERENTIAL (11/15/2023 12:17 PM CST):     RESULT VALUE REF. RANGE UNITS   WBC Count   Hemoglobin    Hematocrit   Platelet Count   RBC Count    MCV   MCH   MCHC    RDW  6.2 (NORMAL)     10.3  ( L )      33.0  ( L )  281 (NORMAL)   3.28  ( L )        101  ( H )     31.4 (NORMAL)      31.2  ( L )      22.6  ( H ) 4.0-11.0  13.3-17.7  40.0-53.0  150-450  4.40-5.90    26.5-33.0  31.5-36.5  10.0-15.0 10e3/uL  g/dL  %  10e3/uL  10e6/uL  fL  pg  g/dL  %   % Neutrophils  % Lymphocytes  % Monocytes  % Eosinophils  % Basophils  % Immature Granulocytes  Absolute Neutrophils  Absolute Lymphocytes   Absolute Monocytes   Absolute Eosinophils  Absolute Basophils  Absolute Immature Granulocytes  NRBCs per 100 WBC  Absolute NRBCs 38  31  29  1  1  0  2.4 (NORMAL)  1.9 (NORMAL)   1.8  ( H )  0.0 (NORMAL)  0.0 (NORMAL)  0.0 (NORMAL)  0 (NORMAL)  0.0 ()  N/A  N/A  N/A  N/A  N/A  N/A  1.6-8.3  0.8-5.3  0.0-1.3  0.0-0.7  0.0-0.2  <=0.4  <1  <=0.0 %  %  %  %  %  %  10e3/uL  10e3/uL  10e3/uL  10e3/uL  10e3/uL  10e3/uL  /100  10e3/uL          Microscopic Description      PERIPHERAL BLOOD SMEAR MORPHOLOGY:  The red blood cells appear normochromic.  Poikilocytosis is minimal.  Polychromasia is not increased.  Rouleaux formation is not increased. Granulocytes show appropriate nuclear segmentation and cytoplasmic characteristics. Lymphocytes are polymorphous. Monocytes are increased showing overall mature and unremarkable morphology; a subset have unusual nuclear segmentation. The morphology of the platelets is unremarkable.  Circulating blasts are not identified.    Bone marrow aspirates and trephine core biopsy touch imprints are reviewed.    BONE MARROW DIFFERENTIAL   (500 cells on bone marrow biopsy touch imprints  by )  Percent (%) Cell Population Reference Range (%)   1.0 Blasts  (0 - 1)   3.0 Neutrophil promyelocytes (2 - 4)   60.8 Neutrophils and precursors (54 - 63)   16.6 Erythroid precursors (18 - 24)   4.2 Monocytes (1 - 1.5)   1.2 Eosinophils (1 - 3)   0.4 Basophils (0 - 1)   12.0 Lymphocytes (8 - 12)   0.8 Plasma cells (0 - 1.5)     (200 cells on bone marrow biopsy aspirate smears  by MT)  Percent (%) Cell Population Reference Range (%)   2.0 Blasts  (0 - 1)   3.0 Neutrophil promyelocytes (2 - 4)   51.0 Neutrophils and precursors (54 - 63)   16.0 Erythroid precursors (18 - 24)   15.0 Monocytes (1 - 1.5)   1.0 Eosinophils (1 - 3)   1.0 Basophils (0 - 1)   11.0 Lymphocytes (8 - 12)   0.0 Plasma cells (0 - 1.5)     Neutrophil maturation is complete; no overt dysplasia is seen. Erythroid maturation is complete; no overt dysplasia is seen. Megakaryocytes with unremarkable morphology are presentThere are increased monocytes; some appear abnormal with unusual nuclear segmentation or open chromatin.     TREPHINE SECTIONS:  Hematoxylin and eosin stains are reviewed.  The trephine core biopsy is partly subcortical. The quality of the trephine core biopsy is adequate. The marrow cellularity in subcortical are is overall 10% to 20% and the rest of the biopsy is estimated at 30% to 40%. The cellular composition reflects the differential. The number of megakaryocytes appears slightly decreased for the degree of marrow cellularity; the morphology and distribution of megakaryocytes are overall unremarkable. No overt infiltration of immature mononuclear cells is seen.    IMMUNOHISTOCHEMISTRY:  Immunohistochemical stains are performed on the paraffin-embedded trephine core with appropriate controls.    CD34 highlights rare scattered immature myeloid cells; no aggregates or clusters of CD34 positive cells seen.   mostly highlights scattered immature myeloid  and erythroid cells as well as monocytes.   CD61 highlights a few megakaryocytes; a minor subset appear small megakaryocytes yet the proportion is not overtly increased.     Note: These immunohistochemical stains are deemed medically necessary. Some of the antigens may also be evaluated by flow cytometry. Concurrent evaluation by immunohistochemistry on clot and/or trephine sections is indicated in this case in order to correlate immunophenotype with cell morphology and determine extent of involvement, spatial pattern, and focality of potential disease distribution.      Case was reviewed by the following:  Pathology Fellow: Kvng Osman MD  A resident or fellow in a training program was involved in the initial review, preparation, and/or interpretation of this case.  I, as the senior physician, attest that I have personally reviewed all specimens and or slides, including the listed special stains, and used them with my medical judgement to determine the final diagnosis.              Gross Description      Procedure/Gross Description   Aspirate(s) and trephine(s) procured by jose Ferraro    Specimen sent for Special  Studies:         Flow Cytometry: left aspirate        Cytogenetics: left aspirate        Molecular Diagnostics: left aspirate          Biopsy aspiration site: left posterior iliac crest                                                      (Reference Range)          Amount of aspirate           4.2   mL        Fat and P.V. cell layer        TRACE    %               (1 - 3)        Particles                             0   %        Myeloid-erythroid layer    1    %               (5 - 8)          Clot Section: NO    Trephine biopsy site: left posterior iliac crest    Designated LEFT posterior iliac crest are 2 cylinders of gritty tissue, labeled with the patient's name and hospital number, obtained with 11 gauge needle and an aggregate length of 18 mm; entirely submitted in 1 cassette; acetic zinc formalin fixed, decalcified, processed, and stained for hematoxylin and eosin per laboratory protocol.        Performing Labs      The technical component of this testing was completed at [facility] and [facility]           Molecular Studies/NGS    Significant Results    Detected Alterations of Known or Potential Pathogenicity: ASXL1 E635fs  ASXL1 G645fs  SRSF2 P95H     TMB Score: None   Interpretation    The following pathogenic/likely pathogenic variants in ASXL1 gene p.E635fs currently at 31% VAF, and SRSF2 gene p.P95H currently at 2% VAF were detected in this sample. These variants were previously reported in a sample form this patient ([id number]).      No other clinically significant variants were detected in the analyzed genes.      Clinical management should not be based on this assay and interpretation alone. Correlation with clinical information, histology and other diagnostic tests is indicated.  ---------------------------------------------------------------------     Genes tested by Custom Panel V3 ([employer]):  ASXL1; CEBPA; SRSF2; STAG2           Chest CT  without Contrast       Results for orders placed during the hospital encounter of 09/01/23    CT CHEST W/O CONTRAST    Status: Normal 9/24/2023    Narrative  EXAMINATION: CT CHEST W/O CONTRAST  9/24/2023 12:23 PM    CLINICAL HISTORY: Persistent neutropenic fever with cough. Evaluate  for pneumonia.    COMPARISON: X-ray chest 9/27/2023    PROCEDURE COMMENTS: CT of the chest was performed without intravenous  contrast. Axial MIP, coronal and sagittal reformatted images were  obtained.    FINDINGS:  Support devices: Right arm PICC tip terminates in the low IVC.    The lung parenchyma demonstrates no large focal consolidations.  Scattered throughout the bases and Inferior lateral lung fields is  scattered reticular opacities. No cavitary lesions. Throughout the  lung bases there is a small amount of bronchiectasis distally. No  significant peribronchial thickening. No pleural effusion, no  pneumothorax. No pericardial effusion.    The trachea and central airways are clear.    Prominent subcarinal calcified lymph node measuring up to 1.3 x 3.1 cm  (series 2, image 32). Otherwise no significant or abnormally enlarged  axillary, hilar or mediastinal lymph nodes.    The heart and great vessels are normal in appearance. .    Osseous structures: Normal.    Upper abdomen: Normal noncontrast appearance.    Impression  IMPRESSION:  1. Basilar reticular opacities are indeterminate, possibly atypical  infection versus scarring and subsegmental atelectasis.    I have personally reviewed the examination and initial interpretation  and I agree with the findings.    RAO LAU DO      SYSTEM ID:  [id number]            Results for orders placed during the hospital encounter of 09/01/23    ECHOCARDIOGRAM COMPLETE    Status: Normal 9/2/2023    Narrative  [id number]  [id number]  [id number]  [id number]^DOMO^FOREST^YOANDY    [facility],[place]  Echocardiography Laboratory  [address] [place] [address]  [id number]    Name: CHELSI CRAWFORD  MRN: [id number]  : 1955  Study Date: 2023 07:21 AM  Age: 68 yrs  Gender: Male  Patient Location: [facility]  Reason For Study: Chemo  Ordering Physician: FOREST OLIVEROS  Referring Physician: PATRICIO CHRISTIANSON  Performed By: Cleo Goode    BSA: 2.0 m2  Height: 69 in  Weight: 190 lb  ______________________________________________________________________________  Procedure  Complete Portable Echo Adult.  ______________________________________________________________________________  Interpretation Summary  Global and regional left and right ventricular function is normal with an EF  of 55-60%. 3D LVEF volumetric analysis is 59%. Global peak LV longitudinal  strain is averaged at -21.6%. This is within reported normal limits (normal <-  18%).  No significant valvular abnormalities were noted.  No pericardial effusion is present.  Previous study not available for comparison.  ______________________________________________________________________________  Left Ventricle  Left ventricular size is normal. Left ventricular wall thickness is normal.  Global and regional left ventricular function is normal with an EF of 55-60%.  3D LVEF volumetric analysis is 59%. Left ventricular diastolic function is  normal. Diastolic Doppler findings (E/E' ratio and/or other parameters)  suggest left ventricular filling pressures are normal. Global peak LV  longitudinal strain is averaged at -21.6%. This is within reported normal  limits (normal <-18%).    Right Ventricle  Right ventricular function, chamber size, wall motion, and thickness are  normal.    Atria  The right atria appears normal. Mild left atrial enlargement is present.    Mitral Valve  Mild mitral annular calcification is present. Mild mitral insufficiency is  present.    Aortic Valve  Trileaflet aortic sclerosis without stenosis.    Tricuspid Valve  The tricuspid valve is normal. Trace tricuspid insufficiency is  present.  Pulmonary artery systolic pressure is normal. The right ventricular systolic  pressure is approximated at 12.5 mmHg plus the right atrial pressure.    Pulmonic Valve  The valve leaflets are not well visualized. On Doppler interrogation, there is  no significant stenosis or regurgitation.    Vessels  The inferior vena cava was normal in size with preserved respiratory  variability. Dilated aortic root and proximal ascending aorta measuring 4.2 cm  (2.1 cm/m2) and 4 cm (2 cm/m2) respectively.    Pericardium  No pericardial effusion is present.    Compared to Previous Study  Previous study not available for comparison.  ______________________________________________________________________________  MMode/2D Measurements & Calculations  IVSd: 1.1 cm  LVIDd: 5.2 cm  LVIDs: 3.4 cm  LVPWd: 1.0 cm  FS: 34.8 %  LV mass(C)d: 214.0 grams  LV mass(C)dI: 105.9 grams/m2  Ao root diam: 4.2 cm  asc Aorta Diam: 4.0 cm  LVOT diam: 2.5 cm  LVOT area: 5.1 cm2  Ao root diam Index (cm/m2): 2.1  asc Aorta Diam Index (cm/m2): 2.0  LA Volume (BP): 81.3 ml    LA Volume Index (BP): 40.2 ml/m2  RWT: 0.40    Doppler Measurements & Calculations  MV E max sarwat: 86.4 cm/sec  MV A max sarwat: 84.6 cm/sec  MV E/A: 1.0  MV dec time: 0.20 sec  TR max sarwat: 176.5 cm/sec  TR max P.5 mmHg  E/E' avg: 10.0  Lateral E/e': 8.4  Medial E/e': 11.7  RV S Sarwat: 13.5 cm/sec    ______________________________________________________________________________  Report approved by: Srinivasa Negron 2023 12:04 PM        PET Scan       No results found for this or any previous visit.         MRI Brain       Results for orders placed during the hospital encounter of 23    MR BRAIN W/O & W CONTRAST    Status: Normal 2023    Narrative  EXAM: MR BRAIN W/O & W CONTRAST  2023 10:30 AM    HISTORY: 68y.o. M with new diagnosis of AML, experiencing headache and  dizziness upon diagnosis, now behavioral shifts, please eval for  possible CNS  leukemia involvement.  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    COMPARISON: None    TECHNIQUE: Sagittal and axial T1-weighted, axial diffusion,  multiplanar T2 FLAIR with fat saturation images were obtained without  intravenous contrast. Following intravenous gadolinium-based contrast  administration, axial T2-weighted, axial susceptibility, and  T1-weighted images (in multiple planes) were obtained.    CONTRAST: 8 ml Gadavist.    FINDINGS:  There is no mass effect, midline shift, or intracranial hemorrhage.  The ventricles are proportionate to the cerebral sulci. Diffusion  weighted images are negative for acute/focal abnormality. Major  intracranial vascular structures are within normal limits.  Periventricular and subcortical scattered T2 hyperintensities, likely  due to chronic small vessel ischemic disease. Postoperative changes of  right temporal craniotomy for drainage of abscess, including focus of  vertebral encephalomalacia and T2 hyperintensity without associated  enhancement. Patchy areas of T2 hyperintensity overlying the right  frontotemporal lobes, measuring up to 4 mm in thickness over the  frontal lobe, and over the right parietal lobe, measuring 2 mm in  thickness. Leptomeningeal enhancement over the posterior right frontal  lobe, most evident image 21 of series 16.    No suspicious abnormality of the skull marrow signal. Clear paranasal  sinuses. Mastoid air cells are clear. No focal abnormality of the  pituitary gland, sella, skull base and upper cervical spinal  structures on sagittal images. The orbits are normal.    Impression  IMPRESSION: Postoperative changes of prior right temporal craniotomy  for drainage of a right temporal abscess. Areas of extra-axial T2  hyperintensity and enhancement over the right frontotemporal and right  parietal lobes are presumed to be chronic changes with dural  thickening and  possible small chronic collections. Predominantly  posterior frontal leptomeningeal enhancement is also probably  secondary to the suspected chronic changes No definite findings to  suggest CNS lymphoma, although comparison with prior imaging would  likely be of benefit.    I have personally reviewed the examination and initial interpretation  and I agree with the findings.    LACIE CHAIDEZ MD      SYSTEM ID:  [id number]         CSF Studies       Recent Labs   Lab Test 09/08/23  1159   CCOL Colorless   CAPP Clear   CWBC 5   CRBC 1   CCOM Peripheral blood elements present. Negative for blasts.     Correlate with concurrent flow cytometry. ([id number])    Kvng Osman MD on 9/11/2023 at 3:50 PM  Reviewed by [id number] MD, Hematopathology Fellow       Recent Labs   Lab Test 09/08/23  1158   CGLU 51       Recent Labs   Lab Test 09/08/23  1158   CTP 46.4*       Artie understood the above assessment and recommendations.  Multiple questions answered.  No barriers to learning identified.       Known issues that I take into account for medical decisions, with salient changes to the plan considering these complexities noted above.    Patient Active Problem List   Diagnosis    Pancytopenia (H)    Acute leukemia (H)    Acute myeloid leukemia in remission (H)    AML (acute myeloblastic leukemia) (H)    Acute myeloid leukemia (H)       ------------------------------------------------------------------------------------------------------------------------------------------------    Patient Care Team         Relationship Specialty Notifications Start End    Feroz Barger MD PCP - General   10/4/23     Phone: [phone] Fax: [phone] [address] [facility] [address]    Kezia Cuadra MSW    9/22/23     Phone: [phone] Pager: [phone]        Donis Garcia MD MD Hematology & Oncology  9/26/23     Phone: [phone] Pager: [phone] Fax: [phone]        [address] [place] [address]  [id number]    Cleo Burleson PA-C Physician Assistant Gastroenterology  10/4/23     Phone: [phone] Fax: [phone]         [address] [facility] [address]    Yolis Sherman PA-C Physician Assistant   10/4/23     Phone: [phone] Pager: [phone] Fax: [phone]        [address] [place] [address]    Susan Santos, RN Specialty Care Coordinator Hematology & Oncology Admissions 10/11/23     Donis Garcia MD Assigned Cancer Care Provider   10/21/23     Phone: [phone] Pager: [phone] Fax: [phone]        [address] [place] [address]    Lyndsey Eng APRN CNP Nurse Practitioner Hematology & Oncology All results, Admissions 10/23/23     Phone: [phone] Pager: [phone] Fax: [phone]        [address] [facility] [address]    Sylvia Sanches RN BMT Nurse Coordinator  Admissions 11/13/23     Brodie Blancas MD BMT Physician Transplant  11/14/23     Phone: [phone] Fax: [phone]         [address] [place] [address]

## 2023-12-05 ENCOUNTER — ALLIED HEALTH/NURSE VISIT (OUTPATIENT)
Dept: TRANSPLANT | Facility: CLINIC | Age: 68
End: 2023-12-05
Attending: INTERNAL MEDICINE
Payer: COMMERCIAL

## 2023-12-05 VITALS
HEIGHT: 68 IN | HEART RATE: 107 BPM | RESPIRATION RATE: 18 BRPM | DIASTOLIC BLOOD PRESSURE: 80 MMHG | TEMPERATURE: 98.4 F | WEIGHT: 202.5 LBS | SYSTOLIC BLOOD PRESSURE: 125 MMHG | OXYGEN SATURATION: 96 % | BODY MASS INDEX: 30.69 KG/M2

## 2023-12-05 DIAGNOSIS — C92.01 ACUTE MYELOID LEUKEMIA IN REMISSION (H): Primary | ICD-10-CM

## 2023-12-05 PROCEDURE — G0463 HOSPITAL OUTPT CLINIC VISIT: HCPCS | Performed by: INTERNAL MEDICINE

## 2023-12-05 PROCEDURE — 99215 OFFICE O/P EST HI 40 MIN: CPT | Performed by: INTERNAL MEDICINE

## 2023-12-05 PROCEDURE — 99417 PROLNG OP E/M EACH 15 MIN: CPT | Performed by: INTERNAL MEDICINE

## 2023-12-05 ASSESSMENT — PAIN SCALES - GENERAL: PAINLEVEL: NO PAIN (0)

## 2023-12-05 NOTE — NURSING NOTE
"Oncology Rooming Note    December 5, 2023 3:43 PM   Artie Fine is a 68 year old male who presents for:    Chief Complaint   Patient presents with    Oncology Clinic Visit     Acute myeloid leukemia      Initial Vitals: /80 (BP Location: Right arm, Patient Position: Sitting, Cuff Size: Adult Large)   Pulse 107   Temp 98.4  F (36.9  C) (Oral)   Resp 18   Ht 1.715 m (5' 7.52\")   Wt 91.9 kg (202 lb 8 oz)   SpO2 96%   BMI 31.23 kg/m   Estimated body mass index is 31.23 kg/m  as calculated from the following:    Height as of this encounter: 1.715 m (5' 7.52\").    Weight as of this encounter: 91.9 kg (202 lb 8 oz). Body surface area is 2.09 meters squared.  No Pain (0) Comment: Data Unavailable   No LMP for male patient.  Allergies reviewed: Yes  Medications reviewed: Yes    Medications: Medication refills not needed today.  Pharmacy name entered into EPIC:    [facility] [place], [place] - [address]  [facility] [place], [place] - [address] [place] [address] [id number]    Clinical concerns: Patient states there are no new concerns to discuss with provider.        Fabricio Howard              "

## 2023-12-05 NOTE — LETTER
12/5/2023         RE: Artie Fine  [address] [address]  [facility] [address]        Dear Colleague,    Thank you for referring your patient, Artie Fine, to the [facility] [place]. Please see a copy of my visit note below.           BMT / Cell Therapy Consultation      Artie Fine is a 68 year old male referred by Dr. Garcia for AML.        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 MRI brain with no evidence of CNS involvement and LP negative for CSF leukemia.      Presentation: 68 year old male with a history of brain abscess (2021), HTN, and BPH. He was transferred from [facility] in [place], [place] with 4-6 weeks of progressive bruising, weakness, fatigue, loss of appetite, and night sweats. CBC notable for pancytopenia; WBC 2.7 (), Hgb 8.1, and plt 1k. He was transferred to [facility] and was found on peripheral flow w/ 11% circulating myeloid blasts. He was then transferred to [facility] for further work up and treatment of AML. Diagnostic BMBx performed 9/1 which showed AML with 30% blasts by morph and 47% by flow. P53 negative. FISH negative for MLLT10, NUP98, and KMT2A. Normal karyotype. NGS showed ASXL1, CEBPA, SRSF2, and STAG2 mutations. S/p diagnostic LP 9/8/23 for CNS leukemia work up as patient endorses headache upon admission w/ new AML diagnosis. CSF flow negative. MRI brain 9/11 negative, only showing chronic changes from h/o brain abscess.         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    10/13/23  HiDAC (C1D1=10/13/2023)   Cytarabine 1.5 g/m2 q12 hours x 6  "doses  Neulasta/pegfilgrastim with labs/transfusions 2 times weekly at local clinic.    11/17/23  HiDAC (R8M8=4611/17/2023)   Cytarabine 1.5 g/m2 q12 hours x 6 doses  Neulasta/pegfilgrastim with labs/transfusions 3 times weekly at local clinic.       Date Treatment Name Response Side Effects / Toxicities   C1D1 9/2/23 7+3 (cytarabine, daunorubicin 60mg/m2 D14 BMBx w/o evidence of leukemia     neutropenic fever, rhinovirus, GPC bactremia    10/13/23                11/17/23  HiDAC (C1D1) Cytarabine 1.5 g/m2 q12 hours x 6 doses            HiDAC (C2D1) Cytarabine 1.5 g/m2 q12 hours x 6 doses  11/15/2023 BMB CR morph/IFN  NGS  ASXL1 gene p.E635fs currently at 31% VAF, and SRSF2 gene p.P95H currently at 2% VAF were detected  Left upper molar dental pain/possible fracture  Noted prior to C1 iDAC; tooth #16 noted to have mild gingival edema and tenderness. Patient reportedly saw his own dentist in consultation on 11/10/23, who noted that there was a \"hole between his wisdom tooth and the adjacent tooth\" (?fracture).   Asymptomatic and due to have extraction by oral surgeon locally 12/13/23.                                HPI:  Please see my entry above for hematologic history.    Seen with his wife, appears comfortable, discussed his course so far and reviewed the indication and risk with transplant. He does blanca in energy in th middle of cycles but then recovers and is fully functional and independent with independence in ADLs ans great QOL.        ASSESSMENT AND PLAN:  68 year old male, history of brain abscess HTN, with new diagnosis of AML with normal karyotype and ASXL1, CEBPA (NOT in bZIP region), SRSF2, and STAG2 mutations, no known CNS disease on presentation, referred for consideration of consolidation with allogenic hematopoietic cell transplantation.     I discussed with the patient the implications and prognosis of his disease and the risks and benefits of proceeding with an allogeneic stem cell transplantation " for AML that is also depending on disease risk and balancing the risk and benefit accordingly per ELN classification and risk stratification.     I discussed with the patient the timeline of an allogeneic bone marrow transplant procedure, rationale, risks and benefits. After discharge, patients are required to live near the transplant center for frequent visits, lab tests, and medication adjustments for many months.  Patients must have caregiver support at home to help with self care. Side effects of the preparative regimen can include fatigue, nausea, vomiting, mouth sores, diarrhea, lung damage, liver damage, hair loss, and death.  Most of these are reversible, but occasionally, there are permanent.  Patients are usually infertile after transplant. There are long term risks of increased secondary cancers, including blood cancers as well.  There is also the possibility of engraftment failure. We discussed graft vs host disease as a short and long term risk that can affect many different parts of the body, including the skin, gut, and liver leading to rashes, mouth sores, diarrhea, nausea, jaundice.  This can be an acute or chronic complication and some patients will develop chronic graft versus host disease, and can be fatal.  Patients have to take immunosuppressive therapies possibly for many months after transplant. There are also risks of infection that occur both with the transplant and with immunosuppression.  Medications are used to help reduce the risk of severe infection.  We discussed that overall risks of mortality 30-40% to be further determined based on workup testing results.  We discussed risk of acute GVHD and chronic GVHD, that additionally depends on donor source, and that on our protocol with posttransplant cyclophosphamide as uyjwz-xdnpvg-urvp disease prophylaxis the results of 10-15% risk of severe acute hcggf-uelwll-rwex disease or immunosuppression requiring chronic nnddy-tvuqmm-mzag  disease.        Recommendations:   -Matched unrelated donor JIMMY transplantation in CR1 after C2 consolidation, aiming for December   -Discussed the timeline of transplantation, smoking cessation, relocation needed  -2.5 hour drive     I spent 80 minutes in the care of this patient today, which included time necessary for preparation for the visit, obtaining history, ordering medications/tests/procedures as medically indicated, review of pertinent medical literature, counseling of the patient, communication of recommendations to the care team, and documentation time.    Brodie Edward MD        ROS:    10 point ROS neg other than the symptoms noted above in the HPI.        No past medical history on file.    Past Surgical History:   Procedure Laterality Date    PICC Right 2023    Placed R basilic 46 cm 1 external  without problem    PICC Right 10/13/2023    right basilic 5 fr dl power picc 40 cm    PICC DOUBLE LUMEN PLACEMENT Right 2023    Basilic, 43 cm, 0 cm external length       No family history on file.    Social History     Tobacco Use    Smoking status: Former     Types: Cigarettes     Quit date: 1983     Years since quittin.6    Smokeless tobacco: Current     Types: Snuff    Tobacco comments:     1 can per week         Allergies   Allergen Reactions    Iodinated Contrast Media Rash    Cefepime Rash    Ceftriaxone Rash     Reported history at Outside hospital     Ragweeds Other (See Comments)     Congestion         Current Outpatient Medications   Medication Sig Dispense Refill    acetaminophen (TYLENOL) 325 MG tablet Take 650 mg by mouth every 6 hours as needed for mild pain      acyclovir (ZOVIRAX) 400 MG tablet Take 1 tablet (400 mg) by mouth 2 times daily 60 tablet 11    cetirizine (ZYRTEC) 10 MG tablet Take 1 tablet (10 mg) by mouth daily      levofloxacin (LEVAQUIN) 500 MG tablet Take 1 tablet (500 mg) by mouth daily 30 tablet 3    ondansetron (ZOFRAN) 4 MG tablet Take 1 tablet  "(4 mg) by mouth every 8 hours as needed for nausea or vomiting 20 tablet 0    prednisoLONE acetate (PRED FORTE) 1 % ophthalmic suspension Place 2 drops into both eyes 4 times daily . Continue through 11/22, then stop. 5 mL 0    prochlorperazine (COMPAZINE) 5 MG tablet Take 1 tablet (5 mg) by mouth every 6 hours as needed for nausea or vomiting 20 tablet 0    voriconazole (VFEND) 200 MG tablet Take 1 tablet (200 mg) by mouth every 12 hours 60 tablet 3         Physical Exam:     Vital Signs: /80 (BP Location: Right arm, Patient Position: Sitting, Cuff Size: Adult Large)   Pulse 107   Temp 98.4  F (36.9  C) (Oral)   Resp 18   Ht 1.715 m (5' 7.52\")   Wt 91.9 kg (202 lb 8 oz)   SpO2 96%   BMI 31.23 kg/m        KPS:  70              Blood Counts       Recent Labs   Lab Test 11/20/23  0342 11/19/23  0412 11/18/23  0346 09/11/23  0436 09/10/23  0445 09/09/23  0445 09/08/23  0523   HGB 9.8* 10.4* 11.5*   < > 8.1* 7.5* 7.8*   HCT 31.2* 32.5* 36.8*   < > 25.6* 24.7* 25.6*   WBC 12.5* 15.1* 6.8   < > 0.8* 0.9* 1.5*   ANEUTAUTO 12.0* 13.6* 5.4   < >  --   --   --    ALYMPAUTO 0.2* 0.3* 1.0   < >  --   --   --    AMONOAUTO 0.2 1.1 0.4   < >  --   --   --    AEOSAUTO 0.0 0.0 0.0   < >  --   --   --    ABSBASO 0.0 0.0 0.0   < >  --   --   --    NRBCMAN 0.0 0.0 0.0   < >  --   --   --    ABLA  --   --   --   --  0.0 0.0 0.1*    287 291   < > 26* 33* 51*    < > = values in this interval not displayed.       ABO/RH    Recent Labs   Lab Test 11/20/23 0342   ABORH A POS         Chemistries     Basic Panel  Recent Labs   Lab Test 11/20/23 0342 11/19/23 0412 11/18/23 0346    141 138   POTASSIUM 3.7 3.9 4.0   CHLORIDE 108* 106 103   CO2 26 24 22   BUN 16.0 16.0 14.0   CR 0.64* 0.68 0.67   * 123* 137*        Calcium, Magnesium, Phosphorus  Recent Labs   Lab Test 11/20/23 0342 11/19/23 0412 11/18/23 0346   VENANCIO 9.0 9.4 9.6   MAG 2.2 2.2 2.1   PHOS 3.6 4.5 3.9        LFTs  Recent Labs   Lab Test " 11/20/23  0342 11/19/23  0412 11/18/23  0346   BILITOTAL 0.5 0.4 0.3   ALKPHOS 78 91 110   AST 19 16 22   ALT 12 9 13   ALBUMIN 4.0 4.3 4.4       LDH  Recent Labs   Lab Test 11/17/23  1141 10/13/23  1057 10/02/23  0632    209 226       B2-Microglobulin  No lab results found.        Immunoglobulins     No lab results found.    No lab results found.    No lab results found.      Monocloncal Protein Studies     M spike    Recent Labs   Lab Test 08/31/23  1603   ELPM 0.0       [place] FLC    Recent Labs   Lab Test 08/31/23  1603   KFLCA 3.86*       [place] FLC    Recent Labs   Lab Test 08/31/23  1603   LFLCA 2.56       FLC Ratio    Recent Labs   Lab Test 08/31/23  1603   KLRA 1.51       Infectious Disease Markers     [facility] IDM    Recent Labs   Lab Test 11/14/23  1446   TCRUZI Non-Reactive         Hepatitis and HIV    Recent Labs   Lab Test 11/14/23  1446   HEPBANG Nonreactive   HBCAB Nonreactive   AUSAB 15.75   HCVAB Nonreactive   HIAGAB Nonreactive         CMV  Recent Labs   Lab Test 11/14/23  1446   CMVIGG Positive, suggests recent or past exposure.*         EBV    Recent Labs   Lab Test 11/14/23  1446   EBVCAG Positive*       Bone Marrow Biopsy       Morphology    Results for orders placed or performed in visit on 11/15/23 (from the past 8760 hour(s))   Bone marrow biopsy   Result Value    Final Diagnosis      Bone marrow, posterior iliac crest, left decalcified trephine biopsy and touch imprint; left direct aspirate smear and concentrate aspirate smear; and peripheral blood smear:    - Normocellular marrow for age (overall 30% to 40%) with trilineage hematopoiesis, relatively increased monocytes, no overt dysplasia, and less than 2% blasts  - Peripheral blood showing moderate macrocytic normochromic anemia and relative and absolute monocytosis  - See comment      Comment      Flow cytometry analysis on concurrent specimen ([id number]) showed no increase in myeloid blasts and no abnormal myeloid  blast immunophenotype.    Overall, there is no morphologic or immunophenotypic evidence of acute myeloid leukemia or high-grade myeloid neoplasm.  In addition, there is no overt dysplasia; however, there is increased proportion and number of monocytes in both bone marrow and peripheral blood.  Given the molecular profile of leukemia at the time of diagnosis with ASXL1, SRSF2, STAG2, CEBPA mutations, correlation with molecular studies could be helpful for accurate assessment of possible residual disease, clonal hematopoiesis, or clonal monocytosis.    Concurrent ancillary studies are in progress and will be reported separately. Correlation with these results, especially molecular findings, and clinical information is recommended.       Clinical Information      Per [facility] electronic medical records; a 68 year old male with a history of acute myeloid leukemia, myelodysplasia-related (ASXL1, SRSF2, STAG2, CEBPA mutations) s/p 7+3 induction. His last bone marrow ([id number]; 10/9/2023) showed no morphologic evidence of disease; however, molecular testing detected ASXL1 E635fs at a VAF of 30% (previously at 31%) and SRSF2 P95H with a VAF of 3% (previously at 47%); the mutations in CEBPA and STAG2 were not detected. This is pre-bone marrow transplantation bone marrow biopsy.      Peripheral Hematologic Data      CBC WITH DIFFERENTIAL (11/15/2023 12:17 PM CST):     RESULT VALUE REF. RANGE UNITS   WBC Count   Hemoglobin    Hematocrit   Platelet Count   RBC Count    MCV   MCH   MCHC    RDW  6.2 (NORMAL)     10.3  ( L )      33.0  ( L )  281 (NORMAL)   3.28  ( L )        101  ( H )     31.4 (NORMAL)      31.2  ( L )      22.6  ( H ) 4.0-11.0  13.3-17.7  40.0-53.0  150-450  4.40-5.90    26.5-33.0  31.5-36.5  10.0-15.0 10e3/uL  g/dL  %  10e3/uL  10e6/uL  fL  pg  g/dL  %   % Neutrophils  % Lymphocytes  % Monocytes  % Eosinophils  % Basophils  % Immature Granulocytes  Absolute Neutrophils  Absolute Lymphocytes   Absolute  Monocytes   Absolute Eosinophils  Absolute Basophils  Absolute Immature Granulocytes  NRBCs per 100 WBC  Absolute NRBCs 38  31  29  1  1  0  2.4 (NORMAL)  1.9 (NORMAL)   1.8  ( H )  0.0 (NORMAL)  0.0 (NORMAL)  0.0 (NORMAL)  0 (NORMAL)  0.0 () N/A  N/A  N/A  N/A  N/A  N/A  1.6-8.3  0.8-5.3  0.0-1.3  0.0-0.7  0.0-0.2  <=0.4  <1  <=0.0 %  %  %  %  %  %  10e3/uL  10e3/uL  10e3/uL  10e3/uL  10e3/uL  10e3/uL  /100  10e3/uL          Microscopic Description      PERIPHERAL BLOOD SMEAR MORPHOLOGY:  The red blood cells appear normochromic.  Poikilocytosis is minimal.  Polychromasia is not increased.  Rouleaux formation is not increased. Granulocytes show appropriate nuclear segmentation and cytoplasmic characteristics. Lymphocytes are polymorphous. Monocytes are increased showing overall mature and unremarkable morphology; a subset have unusual nuclear segmentation. The morphology of the platelets is unremarkable.  Circulating blasts are not identified.    Bone marrow aspirates and trephine core biopsy touch imprints are reviewed.    BONE MARROW DIFFERENTIAL   (500 cells on bone marrow biopsy touch imprints  by )  Percent (%) Cell Population Reference Range (%)   1.0 Blasts  (0 - 1)   3.0 Neutrophil promyelocytes (2 - 4)   60.8 Neutrophils and precursors (54 - 63)   16.6 Erythroid precursors (18 - 24)   4.2 Monocytes (1 - 1.5)   1.2 Eosinophils (1 - 3)   0.4 Basophils (0 - 1)   12.0 Lymphocytes (8 - 12)   0.8 Plasma cells (0 - 1.5)     (200 cells on bone marrow biopsy aspirate smears  by MT)  Percent (%) Cell Population Reference Range (%)   2.0 Blasts  (0 - 1)   3.0 Neutrophil promyelocytes (2 - 4)   51.0 Neutrophils and precursors (54 - 63)   16.0 Erythroid precursors (18 - 24)   15.0 Monocytes (1 - 1.5)   1.0 Eosinophils (1 - 3)   1.0 Basophils (0 - 1)   11.0 Lymphocytes (8 - 12)   0.0 Plasma cells (0 - 1.5)     Neutrophil maturation is complete; no overt dysplasia is seen. Erythroid maturation is complete; no overt  dysplasia is seen. Megakaryocytes with unremarkable morphology are presentThere are increased monocytes; some appear abnormal with unusual nuclear segmentation or open chromatin.     TREPHINE SECTIONS:  Hematoxylin and eosin stains are reviewed. The trephine core biopsy is partly subcortical. The quality of the trephine core biopsy is adequate. The marrow cellularity in subcortical are is overall 10% to 20% and the rest of the biopsy is estimated at 30% to 40%. The cellular composition reflects the differential. The number of megakaryocytes appears slightly decreased for the degree of marrow cellularity; the morphology and distribution of megakaryocytes are overall unremarkable. No overt infiltration of immature mononuclear cells is seen.    IMMUNOHISTOCHEMISTRY:  Immunohistochemical stains are performed on the paraffin-embedded trephine core with appropriate controls.    CD34 highlights rare scattered immature myeloid cells; no aggregates or clusters of CD34 positive cells seen.   mostly highlights scattered immature myeloid  and erythroid cells as well as monocytes.   CD61 highlights a few megakaryocytes; a minor subset appear small megakaryocytes yet the proportion is not overtly increased.     Note: These immunohistochemical stains are deemed medically necessary. Some of the antigens may also be evaluated by flow cytometry. Concurrent evaluation by immunohistochemistry on clot and/or trephine sections is indicated in this case in order to correlate immunophenotype with cell morphology and determine extent of involvement, spatial pattern, and focality of potential disease distribution.      Case was reviewed by the following:  Pathology Fellow: Kvng Osman MD  A resident or fellow in a training program was involved in the initial review, preparation, and/or interpretation of this case.  I, as the senior physician, attest that I have personally reviewed all specimens and or slides, including the listed  special stains, and used them with my medical judgement to determine the final diagnosis.              Gross Description      Procedure/Gross Description   Aspirate(s) and trephine(s) procured by jose Ferraro    Specimen sent for Special Studies:         Flow Cytometry: left aspirate        Cytogenetics: left aspirate        Molecular Diagnostics: left aspirate          Biopsy aspiration site: left posterior iliac crest                                                      (Reference Range)          Amount of aspirate           4.2   mL        Fat and P.V. cell layer        TRACE    %               (1 - 3)        Particles                             0   %        Myeloid-erythroid layer    1    %               (5 - 8)          Clot Section: NO    Trephine biopsy site: left posterior iliac crest    Designated LEFT posterior iliac crest are 2 cylinders of gritty tissue, labeled with the patient's name and hospital number, obtained with 11 gauge needle and an aggregate length of 18 mm; entirely submitted in 1 cassette; acetic zinc formalin fixed, decalcified, processed, and stained for hematoxylin and eosin per laboratory protocol.        Performing Labs      The technical component of this testing was completed at [facility]           Molecular Studies/NGS    Significant Results    Detected Alterations of Known or Potential Pathogenicity: ASXL1 E635fs  ASXL1 G645fs  SRSF2 P95H     TMB Score: None   Interpretation    The following pathogenic/likely pathogenic variants in ASXL1 gene p.E635fs currently at 31% VAF, and SRSF2 gene p.P95H currently at 2% VAF were detected in this sample. These variants were previously reported in a sample form this patient ([id number]).      No other clinically significant variants were detected in the analyzed genes.      Clinical management should not be based on this assay and interpretation alone. Correlation  with clinical information, histology and other diagnostic tests is indicated.  ---------------------------------------------------------------------     Genes tested by Custom Panel V3 ([employer]):  ASXL1; CEBPA; SRSF2; STAG2           Chest CT without Contrast       Results for orders placed during the hospital encounter of 09/01/23    CT CHEST W/O CONTRAST    Status: Normal 9/24/2023    Narrative  EXAMINATION: CT CHEST W/O CONTRAST  9/24/2023 12:23 PM    CLINICAL HISTORY: Persistent neutropenic fever with cough. Evaluate  for pneumonia.    COMPARISON: X-ray chest 9/27/2023    PROCEDURE COMMENTS: CT of the chest was performed without intravenous  contrast. Axial MIP, coronal and sagittal reformatted images were  obtained.    FINDINGS:  Support devices: Right arm PICC tip terminates in the low IVC.    The lung parenchyma demonstrates no large focal consolidations.  Scattered throughout the bases and Inferior lateral lung fields is  scattered reticular opacities. No cavitary lesions. Throughout the  lung bases there is a small amount of bronchiectasis distally. No  significant peribronchial thickening. No pleural effusion, no  pneumothorax. No pericardial effusion.    The trachea and central airways are clear.    Prominent subcarinal calcified lymph node measuring up to 1.3 x 3.1 cm  (series 2, image 32). Otherwise no significant or abnormally enlarged  axillary, hilar or mediastinal lymph nodes.    The heart and great vessels are normal in appearance. .    Osseous structures: Normal.    Upper abdomen: Normal noncontrast appearance.    Impression  IMPRESSION:  1. Basilar reticular opacities are indeterminate, possibly atypical  infection versus scarring and subsegmental atelectasis.    I have personally reviewed the examination and initial interpretation  and I agree with the findings.    RAO LAU DO      SYSTEM ID:  [id number]            Results for orders placed during the hospital encounter of  23    ECHOCARDIOGRAM COMPLETE    Status: Normal 2023    [facility]  [id number]  [id number]  [id number]  [id number]^DOMO^FOREST^YOANDY    [facility],[place]  Echocardiography Laboratory  [address] [place] [id number]    Name: CHELSI CRAWFORD  MRN: [id number]  : 1955  Study Date: 2023 07:21 AM  Age: 68 yrs  Gender: Male  Patient Location: [facility]  Reason For Study: Chemo  Ordering Physician: FOREST OLIVEROS  Referring Physician: PATRICIO CHRISTIANSON  Performed By: Cleo Goode    BSA: 2.0 m2  Height: 69 in  Weight: 190 lb  ______________________________________________________________________________  Procedure  Complete Portable Echo Adult.  ______________________________________________________________________________  Interpretation Summary  Global and regional left and right ventricular function is normal with an EF  of 55-60%. 3D LVEF volumetric analysis is 59%. Global peak LV longitudinal  strain is averaged at -21.6%. This is within reported normal limits (normal <-  18%).  No significant valvular abnormalities were noted.  No pericardial effusion is present.  Previous study not available for comparison.  ______________________________________________________________________________  Left Ventricle  Left ventricular size is normal. Left ventricular wall thickness is normal.  Global and regional left ventricular function is normal with an EF of 55-60%.  3D LVEF volumetric analysis is 59%. Left ventricular diastolic function is  normal. Diastolic Doppler findings (E/E' ratio and/or other parameters)  suggest left ventricular filling pressures are normal. Global peak LV  longitudinal strain is averaged at -21.6%. This is within reported normal  limits (normal <-18%).    Right Ventricle  Right ventricular function, chamber size, wall motion, and thickness are  normal.    Atria  The right atria appears normal. Mild left atrial enlargement is present.    Mitral  Valve  Mild mitral annular calcification is present. Mild mitral insufficiency is  present.    Aortic Valve  Trileaflet aortic sclerosis without stenosis.    Tricuspid Valve  The tricuspid valve is normal. Trace tricuspid insufficiency is present.  Pulmonary artery systolic pressure is normal. The right ventricular systolic  pressure is approximated at 12.5 mmHg plus the right atrial pressure.    Pulmonic Valve  The valve leaflets are not well visualized. On Doppler interrogation, there is  no significant stenosis or regurgitation.    Vessels  The inferior vena cava was normal in size with preserved respiratory  variability. Dilated aortic root and proximal ascending aorta measuring 4.2 cm  (2.1 cm/m2) and 4 cm (2 cm/m2) respectively.    Pericardium  No pericardial effusion is present.    Compared to Previous Study  Previous study not available for comparison.  ______________________________________________________________________________  MMode/2D Measurements & Calculations  IVSd: 1.1 cm  LVIDd: 5.2 cm  LVIDs: 3.4 cm  LVPWd: 1.0 cm  FS: 34.8 %  LV mass(C)d: 214.0 grams  LV mass(C)dI: 105.9 grams/m2  Ao root diam: 4.2 cm  asc Aorta Diam: 4.0 cm  LVOT diam: 2.5 cm  LVOT area: 5.1 cm2  Ao root diam Index (cm/m2): 2.1  asc Aorta Diam Index (cm/m2): 2.0  LA Volume (BP): 81.3 ml    LA Volume Index (BP): 40.2 ml/m2  RWT: 0.40    Doppler Measurements & Calculations  MV E max sarwat: 86.4 cm/sec  MV A max sarwat: 84.6 cm/sec  MV E/A: 1.0  MV dec time: 0.20 sec  TR max sarwat: 176.5 cm/sec  TR max P.5 mmHg  E/E' avg: 10.0  Lateral E/e': 8.4  Medial E/e': 11.7  RV S Sarwat: 13.5 cm/sec    ______________________________________________________________________________  Report approved by: Srinivasa Negron 2023 12:04 PM        PET Scan       No results found for this or any previous visit.         MRI Brain       Results for orders placed during the hospital encounter of 23    MR BRAIN W/O & W CONTRAST    Status:  Normal 9/11/2023    Narrative  EXAM: MR BRAIN W/O & W CONTRAST  9/11/2023 10:30 AM    HISTORY: 68y.o. M with new diagnosis of AML, experiencing headache and  dizziness upon diagnosis, now behavioral shifts, please eval for  possible CNS leukemia involvement.  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    COMPARISON: None    TECHNIQUE: Sagittal and axial T1-weighted, axial diffusion,  multiplanar T2 FLAIR with fat saturation images were obtained without  intravenous contrast. Following intravenous gadolinium-based contrast  administration, axial T2-weighted, axial susceptibility, and  T1-weighted images (in multiple planes) were obtained.    CONTRAST: 8 ml Gadavist.    FINDINGS:  There is no mass effect, midline shift, or intracranial hemorrhage.  The ventricles are proportionate to the cerebral sulci. Diffusion  weighted images are negative for acute/focal abnormality. Major  intracranial vascular structures are within normal limits.  Periventricular and subcortical scattered T2 hyperintensities, likely  due to chronic small vessel ischemic disease. Postoperative changes of  right temporal craniotomy for drainage of abscess, including focus of  vertebral encephalomalacia and T2 hyperintensity without associated  enhancement. Patchy areas of T2 hyperintensity overlying the right  frontotemporal lobes, measuring up to 4 mm in thickness over the  frontal lobe, and over the right parietal lobe, measuring 2 mm in  thickness. Leptomeningeal enhancement over the posterior right frontal  lobe, most evident image 21 of series 16.    No suspicious abnormality of the skull marrow signal. Clear paranasal  sinuses. Mastoid air cells are clear. No focal abnormality of the  pituitary gland, sella, skull base and upper cervical spinal  structures on sagittal images. The orbits are normal.    Impression  IMPRESSION: Postoperative changes of prior right  temporal craniotomy  for drainage of a right temporal abscess. Areas of extra-axial T2  hyperintensity and enhancement over the right frontotemporal and right  parietal lobes are presumed to be chronic changes with dural  thickening and possible small chronic collections. Predominantly  posterior frontal leptomeningeal enhancement is also probably  secondary to the suspected chronic changes No definite findings to  suggest CNS lymphoma, although comparison with prior imaging would  likely be of benefit.    I have personally reviewed the examination and initial interpretation  and I agree with the findings.    LACIE CHAIDEZ MD      SYSTEM ID:  [id number]         CSF Studies       Recent Labs   Lab Test 09/08/23  1159   CCOL Colorless   CAPP Clear   CWBC 5   CRBC 1   CCOM Peripheral blood elements present. Negative for blasts.     Correlate with concurrent flow cytometry. ([id number])    Kvng Osman MD on 9/11/2023 at 3:50 PM  Reviewed by [id number] MD, Hematopathology Fellow       Recent Labs   Lab Test 09/08/23  1158   CGLU 51       Recent Labs   Lab Test 09/08/23  1158   CTP 46.4*       Artie understood the above assessment and recommendations.  Multiple questions answered.  No barriers to learning identified.       Known issues that I take into account for medical decisions, with salient changes to the plan considering these complexities noted above.    Patient Active Problem List   Diagnosis    Pancytopenia (H)    Acute leukemia (H)    Acute myeloid leukemia in remission (H)    AML (acute myeloblastic leukemia) (H)    Acute myeloid leukemia (H)       ------------------------------------------------------------------------------------------------------------------------------------------------    Patient Care Team         Relationship Specialty Notifications Start End    Feroz Barger MD PCP - General   10/4/23     Phone: [phone] Fax: [phone]         [address] [place] [address]    Kezia Cuadra MSW     9/22/23     Phone: [phone] Pager: [phone]        Donis Garcia MD MD Hematology & Oncology  9/26/23     Phone: [phone] Pager: [phone] Fax: [phone]        [address] [place] [address]    Cleo Burleson PA-C Physician Assistant Gastroenterology  10/4/23     Phone: [phone] Fax: [phone]         [address] [place] [address]    Yolis Sherman PA-C Physician Assistant   10/4/23     Phone: [phone] Pager: [phone] Fax: [phone]        [address] [place] [address]    Susan Santos RN Specialty Care Coordinator Hematology & Oncology Admissions 10/11/23     Donis Garcia MD Assigned Cancer Care Provider   10/21/23     Phone: [phone] Pager: [phone] Fax: [phone]        [address] [place] [address]    Lyndsey Eng APRN CNP Nurse Practitioner Hematology & Oncology All results, Admissions 10/23/23     Phone: [phone] Pager: [phone] Fax: [phone]         [facility] [address]    Sylvia Sanches RN BMT Nurse Coordinator  Admissions 11/13/23     Brodie Blancas MD BMT Physician Transplant  11/14/23     Phone: [phone] Fax: [phone]         [address] [place] [address]              Brodie Edward MD

## 2023-12-05 NOTE — NURSING NOTE
"Oncology Rooming Note    December 5, 2023 3:44 PM   Artie Fine is a 68 year old male who presents for:    Chief Complaint   Patient presents with    Oncology Clinic Visit     Acute myeloid leukemia      Initial Vitals: /80 (BP Location: Right arm, Patient Position: Sitting, Cuff Size: Adult Large)   Pulse 107   Temp 98.4  F (36.9  C) (Oral)   Resp 18   Ht 1.715 m (5' 7.52\")   Wt 91.9 kg (202 lb 8 oz)   SpO2 96%   BMI 31.23 kg/m   Estimated body mass index is 31.23 kg/m  as calculated from the following:    Height as of this encounter: 1.715 m (5' 7.52\").    Weight as of this encounter: 91.9 kg (202 lb 8 oz). Body surface area is 2.09 meters squared.  No Pain (0) Comment: Data Unavailable   No LMP for male patient.  Allergies reviewed: Yes  Medications reviewed: Yes    Medications: Medication refills not needed today.  Pharmacy name entered into EPIC:    [facility] [place], [place] - [address]  [facility] [place], [place] - [address] [place] [address] [address]    Clinical concerns: New patient consult for Acute Myeloid Leukemia.        Fabricio Howard              "

## 2023-12-06 NOTE — PROGRESS NOTES
Blood and Marrow Transplant - New Evaluation Appointment    Spoke with Artie following visit with Dr. Cam Edward. I explained the role of the nurse coordinator throughout the process, as well as general time line and expectations for next steps. We discussed the necessity of a caregiver and the program's proximity requirements. All questions were answered.     Plan: Allogeneic Transplant, target JOHNSON week of 12/18    Timeline Notes:This was a repeat NT. Donor plan already in place. Targeting workup week of 12/18.    Contact information provided for :  no    Allo:  HLA typing drawn: N/A    PRA typing drawn:  N/A    CMV-IgG and ABO-Rh drawn or in record: N/A    Contact information provided for : yes    Will sibling typing kits need to be sent? N/A    Financial Release for URD search obtained:  N/A      Phase Status updated: yes

## 2023-12-07 DIAGNOSIS — C92.01 ACUTE MYELOID LEUKEMIA IN REMISSION (H): Primary | ICD-10-CM

## 2023-12-07 DIAGNOSIS — Z11.59 SCREENING FOR VIRAL DISEASE: ICD-10-CM

## 2023-12-07 DIAGNOSIS — Z86.2 PERSONAL HISTORY OF DISEASES OF BLOOD AND BLOOD-FORMING ORGANS: ICD-10-CM

## 2023-12-07 DIAGNOSIS — Z01.818 PREOP EXAMINATION: ICD-10-CM

## 2023-12-13 ENCOUNTER — TELEPHONE (OUTPATIENT)
Dept: TRANSPLANT | Facility: CLINIC | Age: 68
End: 2023-12-13
Payer: COMMERCIAL

## 2023-12-13 NOTE — TELEPHONE ENCOUNTER
Action Taken  Date/Description  TJ     Pre Visit December 13, 2023  3:51 PM   Records from [facility] are in CE  Images are in PACS  Bx 11.4.10 - SPEC #: [id number]       Other records are in Epic:  BMBx's:    [id number]  [id number]  [id number]  [id number]  Per call to PT, confirmed no previous radiation or other outside records needed

## 2023-12-15 ENCOUNTER — TELEPHONE (OUTPATIENT)
Dept: TRANSPLANT | Facility: CLINIC | Age: 68
End: 2023-12-15
Payer: COMMERCIAL

## 2023-12-15 NOTE — TELEPHONE ENCOUNTER
RN[facility] notified Artie that his donor did not clear. We will have to delay workup until we get a new donor plan in place. Artie is aware that his workup will be cancelled next week. He did ask that we send lab orders to his local clinic since his orders are expiring. [facility] will send biweekly CBC and CMP orders to [facility] lab in [place]. Also told Artie we will be in touch regarding a new donor plan.

## 2023-12-18 ENCOUNTER — MEDICAL CORRESPONDENCE (OUTPATIENT)
Dept: TRANSPLANT | Facility: CLINIC | Age: 68
End: 2023-12-18
Payer: COMMERCIAL

## 2023-12-20 ENCOUNTER — PRE VISIT (OUTPATIENT)
Dept: RADIATION ONCOLOGY | Facility: CLINIC | Age: 68
End: 2023-12-20
Payer: COMMERCIAL

## 2023-12-28 ENCOUNTER — TELEPHONE (OUTPATIENT)
Dept: TRANSPLANT | Facility: CLINIC | Age: 68
End: 2023-12-28
Payer: COMMERCIAL

## 2023-12-28 ENCOUNTER — TELEPHONE (OUTPATIENT)
Dept: TRANSPLANT | Facility: CLINIC | Age: 68
End: 2023-12-28

## 2023-12-28 DIAGNOSIS — C92.00 LEUKEMIA, ACUTE MYELOID (H): Primary | ICD-10-CM

## 2023-12-28 DIAGNOSIS — Z01.818 PREOP EXAMINATION: ICD-10-CM

## 2023-12-28 DIAGNOSIS — D64.9 ANEMIA, UNSPECIFIED: ICD-10-CM

## 2023-12-28 DIAGNOSIS — Z11.59 SCREENING FOR VIRAL DISEASE: ICD-10-CM

## 2023-12-28 DIAGNOSIS — Z86.2 PERSONAL HISTORY OF DISEASES OF BLOOD AND BLOOD-FORMING ORGANS: ICD-10-CM

## 2023-12-28 NOTE — TELEPHONE ENCOUNTER
BMT CSW Telephone Encounter  Clinical Social Work  [facility]     Focus: Resources     Data: Pt is a 68 year old male diagnosed with AML who is currently undergoing Allogeneic stem cell transplant discussion/future work up.     Interventions: Clinical  (CSW) spoke w/ Pt via phone on 12/28/2023 to assist with [facility] Referral for work up week. Pt shared that he and his wife would like to check in on 1/1/2024 and stay through 1/9/2024 (possible admission). Pt reports end date may change. I let him know he or his primary SW can always update the [facility] as needed. SW encouraged Pt to contact CSW for support, questions and/or resources.     CSW sent [facility] referral via email for pts requested dates on 12/28/2024.     Assessment: Pt presented as open and pleasant.  Pt appears to be coping appropriately at this time. Pt continues to be supported by spouse Aundrea.      Plan: CSW will continue to work with Pt and family to provide supportive counseling and assist with resources as needed. CSW will continue to collaborate with multidisciplinary team regarding Pt's plan of care.      POLO Dickinson, [facility]  [facility]/CF Float      Covering for:   POLO Louis, [facility]  Adult Blood & Marrow Transplant   Phone: [phone]  Pager: [phone]

## 2024-01-01 LAB
ABO/RH(D): NORMAL
ANTIBODY SCREEN: NEGATIVE
SPECIMEN EXPIRATION DATE: NORMAL

## 2024-01-02 ENCOUNTER — OFFICE VISIT (OUTPATIENT)
Dept: TRANSPLANT | Facility: CLINIC | Age: 69
End: 2024-01-02
Attending: PHYSICIAN ASSISTANT
Payer: COMMERCIAL

## 2024-01-02 ENCOUNTER — APPOINTMENT (OUTPATIENT)
Dept: LAB | Facility: CLINIC | Age: 69
End: 2024-01-02
Attending: PHYSICIAN ASSISTANT
Payer: COMMERCIAL

## 2024-01-02 VITALS
OXYGEN SATURATION: 98 % | DIASTOLIC BLOOD PRESSURE: 84 MMHG | BODY MASS INDEX: 32.54 KG/M2 | TEMPERATURE: 98.3 F | HEART RATE: 90 BPM | SYSTOLIC BLOOD PRESSURE: 125 MMHG | WEIGHT: 211 LBS | RESPIRATION RATE: 19 BRPM

## 2024-01-02 DIAGNOSIS — C92.01 ACUTE MYELOID LEUKEMIA IN REMISSION (H): ICD-10-CM

## 2024-01-02 DIAGNOSIS — C95.00 ACUTE LEUKEMIA NOT HAVING ACHIEVED REMISSION (H): Primary | ICD-10-CM

## 2024-01-02 DIAGNOSIS — Z11.59 SCREENING FOR VIRAL DISEASE: ICD-10-CM

## 2024-01-02 DIAGNOSIS — Z01.818 PREOP EXAMINATION: ICD-10-CM

## 2024-01-02 DIAGNOSIS — Z86.2 PERSONAL HISTORY OF DISEASES OF BLOOD AND BLOOD-FORMING ORGANS: ICD-10-CM

## 2024-01-02 LAB
ALBUMIN SERPL BCG-MCNC: 4.9 G/DL (ref 3.5–5.2)
ALBUMIN UR-MCNC: NEGATIVE MG/DL
ALP SERPL-CCNC: 112 U/L (ref 40–150)
ALT SERPL W P-5'-P-CCNC: 13 U/L (ref 0–70)
ANION GAP SERPL CALCULATED.3IONS-SCNC: 11 MMOL/L (ref 7–15)
APPEARANCE UR: CLEAR
APTT PPP: 25 SECONDS (ref 22–38)
AST SERPL W P-5'-P-CCNC: 22 U/L (ref 0–45)
BASOPHILS # BLD AUTO: ABNORMAL 10*3/UL
BASOPHILS # BLD MANUAL: 0 10E3/UL (ref 0–0.2)
BASOPHILS NFR BLD AUTO: ABNORMAL %
BASOPHILS NFR BLD MANUAL: 0 %
BILIRUB SERPL-MCNC: 0.2 MG/DL
BILIRUB UR QL STRIP: NEGATIVE
BMT WORKUP IRRADIATED BLOOD REQUIRED: NORMAL
BUN SERPL-MCNC: 13.9 MG/DL (ref 8–23)
CALCIUM SERPL-MCNC: 9.8 MG/DL (ref 8.8–10.2)
CHLORIDE SERPL-SCNC: 104 MMOL/L (ref 98–107)
CMV IGG SERPL IA-ACNC: 3.8 U/ML
CMV IGG SERPL IA-ACNC: ABNORMAL
COLOR UR AUTO: NORMAL
CREAT SERPL-MCNC: 0.88 MG/DL (ref 0.67–1.17)
DEPRECATED HCO3 PLAS-SCNC: 25 MMOL/L (ref 22–29)
EBV VCA IGG SER IA-ACNC: 721 U/ML
EBV VCA IGG SER IA-ACNC: POSITIVE
EGFRCR SERPLBLD CKD-EPI 2021: >90 ML/MIN/1.73M2
EOSINOPHIL # BLD AUTO: ABNORMAL 10*3/UL
EOSINOPHIL # BLD MANUAL: 0.2 10E3/UL (ref 0–0.7)
EOSINOPHIL NFR BLD AUTO: ABNORMAL %
EOSINOPHIL NFR BLD MANUAL: 2 %
ERYTHROCYTE [DISTWIDTH] IN BLOOD BY AUTOMATED COUNT: 17.5 % (ref 10–15)
FERRITIN SERPL-MCNC: 1961 NG/ML (ref 31–409)
GLUCOSE SERPL-MCNC: 84 MG/DL (ref 70–99)
GLUCOSE UR STRIP-MCNC: NEGATIVE MG/DL
HBV CORE AB SERPL QL IA: NONREACTIVE
HBV SURFACE AB SERPL IA-ACNC: 45.5 M[IU]/ML
HBV SURFACE AB SERPL IA-ACNC: REACTIVE M[IU]/ML
HBV SURFACE AG SERPL QL IA: NONREACTIVE
HCT VFR BLD AUTO: 41.8 % (ref 40–53)
HCV AB SERPL QL IA: NONREACTIVE
HGB BLD-MCNC: 13.5 G/DL (ref 13.3–17.7)
HGB UR QL STRIP: NEGATIVE
HIV 1+2 AB+HIV1 P24 AG SERPL QL IA: NONREACTIVE
HSV1 IGG SERPL QL IA: 17.4 INDEX
HSV1 IGG SERPL QL IA: ABNORMAL
HSV2 IGG SERPL QL IA: 3.94 INDEX
HSV2 IGG SERPL QL IA: ABNORMAL
IMM GRANULOCYTES # BLD: ABNORMAL 10*3/UL
IMM GRANULOCYTES NFR BLD: ABNORMAL %
INR PPP: 1.05 (ref 0.85–1.15)
INTERPRETATION: NORMAL
KETONES UR STRIP-MCNC: NEGATIVE MG/DL
LEUKOCYTE ESTERASE UR QL STRIP: NEGATIVE
LYMPHOCYTES # BLD AUTO: ABNORMAL 10*3/UL
LYMPHOCYTES # BLD MANUAL: 1.5 10E3/UL (ref 0.8–5.3)
LYMPHOCYTES NFR BLD AUTO: ABNORMAL %
LYMPHOCYTES NFR BLD MANUAL: 20 %
MCH RBC QN AUTO: 33.6 PG (ref 26.5–33)
MCHC RBC AUTO-ENTMCNC: 32.3 G/DL (ref 31.5–36.5)
MCV RBC AUTO: 104 FL (ref 78–100)
MONOCYTES # BLD AUTO: ABNORMAL 10*3/UL
MONOCYTES # BLD MANUAL: 2.3 10E3/UL (ref 0–1.3)
MONOCYTES NFR BLD AUTO: ABNORMAL %
MONOCYTES NFR BLD MANUAL: 30 %
NEUTROPHILS # BLD AUTO: ABNORMAL 10*3/UL
NEUTROPHILS # BLD MANUAL: 3.6 10E3/UL (ref 1.6–8.3)
NEUTROPHILS NFR BLD AUTO: ABNORMAL %
NEUTROPHILS NFR BLD MANUAL: 48 %
NITRATE UR QL: NEGATIVE
NRBC # BLD AUTO: 0 10E3/UL
NRBC BLD AUTO-RTO: 0 /100
PATH REPORT.COMMENTS IMP SPEC: NORMAL
PATH REPORT.FINAL DX SPEC: NORMAL
PATH REPORT.MICROSCOPIC SPEC OTHER STN: NORMAL
PATH REPORT.RELEVANT HX SPEC: NORMAL
PH UR STRIP: 6.5 [PH] (ref 5–7)
PLAT MORPH BLD: ABNORMAL
PLATELET # BLD AUTO: 240 10E3/UL (ref 150–450)
POTASSIUM SERPL-SCNC: 4.1 MMOL/L (ref 3.4–5.3)
PROT SERPL-MCNC: 7.6 G/DL (ref 6.4–8.3)
RBC # BLD AUTO: 4.02 10E6/UL (ref 4.4–5.9)
RBC MORPH BLD: ABNORMAL
RBC URINE: 0 /HPF
SIGNIFICANT RESULTS: NORMAL
SODIUM SERPL-SCNC: 140 MMOL/L (ref 135–145)
SP GR UR STRIP: 1.02 (ref 1–1.03)
SPECIMEN DESCRIPTION: NORMAL
SPECIMEN EXPIRATION DATE: NORMAL
T PALLIDUM AB SER QL: NONREACTIVE
TEST DETAILS, MDL: NORMAL
URATE SERPL-MCNC: 6.4 MG/DL (ref 3.4–7)
UROBILINOGEN UR STRIP-MCNC: NORMAL MG/DL
WBC # BLD AUTO: 7.6 10E3/UL (ref 4–11)
WBC URINE: <1 /HPF

## 2024-01-02 PROCEDURE — G0452 MOLECULAR PATHOLOGY INTERPR: HCPCS | Mod: 26 | Performed by: STUDENT IN AN ORGANIZED HEALTH CARE EDUCATION/TRAINING PROGRAM

## 2024-01-02 PROCEDURE — 86900 BLOOD TYPING SEROLOGIC ABO: CPT

## 2024-01-02 PROCEDURE — 86828 HLA CLASS I&II ANTIBODY QUAL: CPT

## 2024-01-02 PROCEDURE — 85610 PROTHROMBIN TIME: CPT

## 2024-01-02 PROCEDURE — 86753 PROTOZOA ANTIBODY NOS: CPT

## 2024-01-02 PROCEDURE — 86828 HLA CLASS I&II ANTIBODY QUAL: CPT | Mod: XU

## 2024-01-02 PROCEDURE — 86706 HEP B SURFACE ANTIBODY: CPT

## 2024-01-02 PROCEDURE — 86833 HLA CLASS II HIGH DEFIN QUAL: CPT

## 2024-01-02 PROCEDURE — 38222 DX BONE MARROW BX & ASPIR: CPT | Mod: LT | Performed by: PHYSICIAN ASSISTANT

## 2024-01-02 PROCEDURE — 81001 URINALYSIS AUTO W/SCOPE: CPT

## 2024-01-02 PROCEDURE — 88237 TISSUE CULTURE BONE MARROW: CPT

## 2024-01-02 PROCEDURE — 88305 TISSUE EXAM BY PATHOLOGIST: CPT | Mod: 26 | Performed by: STUDENT IN AN ORGANIZED HEALTH CARE EDUCATION/TRAINING PROGRAM

## 2024-01-02 PROCEDURE — 88341 IMHCHEM/IMCYTCHM EA ADD ANTB: CPT | Mod: 26 | Performed by: STUDENT IN AN ORGANIZED HEALTH CARE EDUCATION/TRAINING PROGRAM

## 2024-01-02 PROCEDURE — 84550 ASSAY OF BLOOD/URIC ACID: CPT

## 2024-01-02 PROCEDURE — 88185 FLOWCYTOMETRY/TC ADD-ON: CPT | Mod: XU

## 2024-01-02 PROCEDURE — 80053 COMPREHEN METABOLIC PANEL: CPT

## 2024-01-02 PROCEDURE — 86780 TREPONEMA PALLIDUM: CPT

## 2024-01-02 PROCEDURE — 87389 HIV-1 AG W/HIV-1&-2 AB AG IA: CPT

## 2024-01-02 PROCEDURE — 88342 IMHCHEM/IMCYTCHM 1ST ANTB: CPT | Mod: 26 | Performed by: STUDENT IN AN ORGANIZED HEALTH CARE EDUCATION/TRAINING PROGRAM

## 2024-01-02 PROCEDURE — 85730 THROMBOPLASTIN TIME PARTIAL: CPT

## 2024-01-02 PROCEDURE — 86665 EPSTEIN-BARR CAPSID VCA: CPT

## 2024-01-02 PROCEDURE — 87516 HEPATITIS B DNA AMP PROBE: CPT

## 2024-01-02 PROCEDURE — 86803 HEPATITIS C AB TEST: CPT

## 2024-01-02 PROCEDURE — 86790 VIRUS ANTIBODY NOS: CPT

## 2024-01-02 PROCEDURE — 87340 HEPATITIS B SURFACE AG IA: CPT

## 2024-01-02 PROCEDURE — 88311 DECALCIFY TISSUE: CPT | Mod: 26 | Performed by: STUDENT IN AN ORGANIZED HEALTH CARE EDUCATION/TRAINING PROGRAM

## 2024-01-02 PROCEDURE — 85027 COMPLETE CBC AUTOMATED: CPT

## 2024-01-02 PROCEDURE — 86696 HERPES SIMPLEX TYPE 2 TEST: CPT

## 2024-01-02 PROCEDURE — 86704 HEP B CORE ANTIBODY TOTAL: CPT

## 2024-01-02 PROCEDURE — 81450 HL NEO GSAP 5-50DNA/DNA&RNA: CPT

## 2024-01-02 PROCEDURE — 86644 CMV ANTIBODY: CPT

## 2024-01-02 PROCEDURE — 88311 DECALCIFY TISSUE: CPT | Mod: TC

## 2024-01-02 PROCEDURE — 38222 DX BONE MARROW BX & ASPIR: CPT | Performed by: PHYSICIAN ASSISTANT

## 2024-01-02 PROCEDURE — 86777 TOXOPLASMA ANTIBODY: CPT

## 2024-01-02 PROCEDURE — 36415 COLL VENOUS BLD VENIPUNCTURE: CPT

## 2024-01-02 PROCEDURE — 82728 ASSAY OF FERRITIN: CPT

## 2024-01-02 PROCEDURE — 85097 BONE MARROW INTERPRETATION: CPT | Performed by: STUDENT IN AN ORGANIZED HEALTH CARE EDUCATION/TRAINING PROGRAM

## 2024-01-02 PROCEDURE — 88189 FLOWCYTOMETRY/READ 16 & >: CPT | Mod: GC | Performed by: STUDENT IN AN ORGANIZED HEALTH CARE EDUCATION/TRAINING PROGRAM

## 2024-01-02 PROCEDURE — 85007 BL SMEAR W/DIFF WBC COUNT: CPT

## 2024-01-02 RX ORDER — VORICONAZOLE 200 MG/1
200 TABLET, FILM COATED ORAL EVERY 12 HOURS
Qty: 60 TABLET | Refills: 3 | Status: SHIPPED | OUTPATIENT
Start: 2024-01-02

## 2024-01-02 RX ORDER — ACYCLOVIR 400 MG/1
400 TABLET ORAL 2 TIMES DAILY
Qty: 60 TABLET | Refills: 11 | Status: SHIPPED | OUTPATIENT
Start: 2024-01-02

## 2024-01-02 RX ORDER — LEVOFLOXACIN 500 MG/1
TABLET, FILM COATED ORAL
COMMUNITY
Start: 2024-01-02

## 2024-01-02 RX ORDER — LEVOFLOXACIN 500 MG/1
TABLET, FILM COATED ORAL
COMMUNITY
Start: 2024-01-02 | End: 2024-01-02

## 2024-01-02 ASSESSMENT — PAIN SCALES - GENERAL: PAINLEVEL: NO PAIN (0)

## 2024-01-02 NOTE — LETTER
1/2/2024         RE: Artie Fine  [address]  [place] [address]        Dear Colleague,    Thank you for referring your patient, Artie Fine, to the [facility] BLOOD AND MARROW TRANSPLANT PROGRAM [place]. Please see a copy of my visit note below.    BMT ONC Adult Bone Marrow Biopsy Procedure Note  January 2, 2024    Learning needs assessment complete within 12 months? YES    DIAGNOSIS: AML pre-BMT     PROCEDURE: Unilateral Bone Marrow Biopsy and Unilateral Aspirate    LOCATION: [facility] 2nd Floor    Patient s identification was positively verified by verbal identification and invasive procedure safety checklist was completed. Informed consent was obtained. Declined premeds. Patient was placed in the prone position and prepped and draped in a sterile manner. Approximately 10 cc of 1% Lidocaine was used over the left posterior iliac spine. Following this a 3 mm incision was made. Trephine bone marrow core(s) was (were) obtained from the [facility]. Bone marrow aspirates were obtained from the [facility]. Aspirates were sent for morphology, immunophenotyping, cytogenetics, and molecular diagnostics NGS. A total of approximately 18 ml of marrow was aspirated. Following this procedure a sterile dressing was applied to the bone marrow biopsy site(s). The patient was placed in the supine position to maintain pressure on the biopsy site. Post-procedure wound care instructions were given.     Complications: NO    Tolerated well with minimal discomfort.     Interventions: NO    Length of procedure:20 minutes or less      Procedure performed by: Oneida Phillips PA-C

## 2024-01-02 NOTE — PROGRESS NOTES
BMT ONC Adult Bone Marrow Biopsy Procedure Note  January 2, 2024    Learning needs assessment complete within 12 months? YES    DIAGNOSIS: AML pre-BMT     PROCEDURE: Unilateral Bone Marrow Biopsy and Unilateral Aspirate    LOCATION: [facility] 2nd Floor    Patient s identification was positively verified by verbal identification and invasive procedure safety checklist was completed. Informed consent was obtained. Declined premeds. Patient was placed in the prone position and prepped and draped in a sterile manner. Approximately 10 cc of 1% Lidocaine was used over the left posterior iliac spine. Following this a 3 mm incision was made. Trephine bone marrow core(s) was (were) obtained from the [facility]. Bone marrow aspirates were obtained from the [facility]. Aspirates were sent for morphology, immunophenotyping, cytogenetics, and molecular diagnostics NGS. A total of approximately 18 ml of marrow was aspirated. Following this procedure a sterile dressing was applied to the bone marrow biopsy site(s). The patient was placed in the supine position to maintain pressure on the biopsy site. Post-procedure wound care instructions were given.     Complications: NO    Tolerated well with minimal discomfort.     Interventions: NO    Length of procedure:20 minutes or less      Procedure performed by: Oneida Phillips PA-C

## 2024-01-02 NOTE — NURSING NOTE
Chief Complaint   Patient presents with    Blood Draw     Labs drawn via  by RN.      Labs drawn with  by RN. Vitals taken. Urine collected. Patient checked into next appointment.    Rupal Dumont RN

## 2024-01-02 NOTE — NURSING NOTE
BMBX Teaching and Assessment       Teaching concerns addressed: Bone marrow biopsy and infection prevention.     Person(s) involved in teaching: Patient  Motivation Level  Asks Questions: Yes  Eager to Learn: Yes  Cooperative: Yes  Receptive (willing/able to accept information): Yes    Patient demonstrates understanding of the following:     Reason for the appointment, diagnosis and treatment plan: Yes  Knowledge of proper use of medications and conditions for which they are ordered (with special attention to potential side effects or drug interactions): Yes  Which situations necessitate calling provider and whom to contact: Yes    Teaching concerns addressed:   Reviewed activity restrictions if received premeds, potential for bleeding and actions to take if develops any of the issues below    Pain management techniques: Yes  Patient instructed on hand hygiene: Yes  How and/when to access community resources: Yes    Infection Control:  Patient demonstrates understanding of the following:   Bone marrow procedure site care taught: Yes  Signs and symptoms of infection taught: Yes       Instructional Materials Used/Given: Pt instructed to keep bmbx site clean and dry for 24hrs. Pt educated to monitor site for signs of infection such as redness, rash, oozing, puss, bleeding, pain, and elevated temp. Pt instructed to go to or call the [facility] triage line, or go to the ER if any signs of infection should occur. Pt educated to not operate machinery if receiving versed. Pt and Wife verbalize understanding.     Pre-procedure labs drawn via VPT in Lab.     Post procedure: Patient vital signs stable, ambulating, site is clean, dry and intact prior to discharge. Pt denies Pain. Pt discharged with Wife as - headed back to [facility].

## 2024-01-02 NOTE — NURSING NOTE
"Oncology Rooming Note    January 2, 2024 8:46 AM   Artie Fine is a 68 year old male who presents for:    Chief Complaint   Patient presents with    Blood Draw     Labs drawn via  by RN.     RECHECK     Pt here for BMBX and Labs as part of Workup for AML.      Initial Vitals: /84 (BP Location: Right arm, Patient Position: Sitting, Cuff Size: Adult Large)   Pulse 90   Temp 98.3  F (36.8  C) (Oral)   Resp 19   Wt 95.7 kg (211 lb)   SpO2 98%   BMI 32.54 kg/m   Estimated body mass index is 32.54 kg/m  as calculated from the following:    Height as of 12/5/23: 1.715 m (5' 7.52\").    Weight as of this encounter: 95.7 kg (211 lb). Body surface area is 2.14 meters squared.  No Pain (0) Comment: Data Unavailable   No LMP for male patient.  Allergies reviewed: Yes  Medications reviewed: Yes    Medications: MEDICATION REFILLS NEEDED TODAY. Provider was notified.  Pharmacy name entered into ThisClicks:    [facility] [place], [place] - [address]  [place], [place] - [address] [place] [address] [address]    Frailty Screening:   Is the patient here for a new oncology consult visit in cancer care? 2. No      Clinical concerns: Pt here for BMBX today. Pt feeling well. Here with his Wife. Pt does not want Versed with today's procedure. Not taking any blood thinners.   ARI Reed was notified.      Laurel Buitrago RN              "

## 2024-01-03 ENCOUNTER — OFFICE VISIT (OUTPATIENT)
Dept: TRANSPLANT | Facility: CLINIC | Age: 69
End: 2024-01-03
Attending: INTERNAL MEDICINE
Payer: COMMERCIAL

## 2024-01-03 VITALS
BODY MASS INDEX: 32.14 KG/M2 | TEMPERATURE: 98.4 F | OXYGEN SATURATION: 94 % | SYSTOLIC BLOOD PRESSURE: 123 MMHG | DIASTOLIC BLOOD PRESSURE: 84 MMHG | RESPIRATION RATE: 20 BRPM | HEART RATE: 85 BPM | WEIGHT: 208.4 LBS

## 2024-01-03 DIAGNOSIS — C92.01 ACUTE MYELOID LEUKEMIA IN REMISSION (H): Primary | ICD-10-CM

## 2024-01-03 DIAGNOSIS — Z11.59 SCREENING FOR VIRAL DISEASE: ICD-10-CM

## 2024-01-03 DIAGNOSIS — Z01.818 PREOP EXAMINATION: ICD-10-CM

## 2024-01-03 DIAGNOSIS — Z86.2 PERSONAL HISTORY OF DISEASES OF BLOOD AND BLOOD-FORMING ORGANS: ICD-10-CM

## 2024-01-03 DIAGNOSIS — C92.01 ACUTE MYELOID LEUKEMIA IN REMISSION (H): ICD-10-CM

## 2024-01-03 DIAGNOSIS — Z71.9 VISIT FOR COUNSELING: Primary | ICD-10-CM

## 2024-01-03 LAB
COLLECT DURATION TIME UR: 24 H
CREAT 24H UR-MRATE: 1.55 G/SPEC (ref 0.98–2.2)
CREAT CL 24H UR+SERPL-VRATE: 123 ML/MIN (ref 66–143)
CREAT CL/1.73 SQ M 24H UR+SERPL-ARVRAT: 99 ML/MIN/1.7M2 (ref 110–180)
CREAT SERPL-MCNC: 0.88 MG/DL (ref 0.67–1.17)
CREAT UR-MCNC: 73.3 MG/DL
HBV DNA SERPL QL NAA+PROBE: NORMAL
HCV RNA SERPL QL NAA+PROBE: NORMAL
HIV1+2 RNA SERPL QL NAA+PROBE: NORMAL
PATH REPORT.COMMENTS IMP SPEC: NORMAL
PATH REPORT.COMMENTS IMP SPEC: NORMAL
PATH REPORT.FINAL DX SPEC: NORMAL
PATH REPORT.GROSS SPEC: NORMAL
PATH REPORT.MICROSCOPIC SPEC OTHER STN: NORMAL
PATH REPORT.MICROSCOPIC SPEC OTHER STN: NORMAL
PATH REPORT.RELEVANT HX SPEC: NORMAL
SPECIMEN VOL UR: 2121 ML
T GONDII IGG SER-ACNC: <3 IU/ML
T GONDII IGM SER-ACNC: <3 AU/ML
TRYPANOSOMA CRUZI: NORMAL
WNV RNA SERPL DONR QL NAA+PROBE: NORMAL

## 2024-01-03 PROCEDURE — 93010 ELECTROCARDIOGRAM REPORT: CPT | Performed by: INTERNAL MEDICINE

## 2024-01-03 PROCEDURE — 82575 CREATININE CLEARANCE TEST: CPT

## 2024-01-03 PROCEDURE — 99214 OFFICE O/P EST MOD 30 MIN: CPT

## 2024-01-03 PROCEDURE — G0463 HOSPITAL OUTPT CLINIC VISIT: HCPCS

## 2024-01-03 PROCEDURE — 93005 ELECTROCARDIOGRAM TRACING: CPT

## 2024-01-03 ASSESSMENT — PATIENT HEALTH QUESTIONNAIRE - PHQ9
SUM OF ALL RESPONSES TO PHQ QUESTIONS 1-9: 1
5. POOR APPETITE OR OVEREATING: NOT AT ALL

## 2024-01-03 ASSESSMENT — ANXIETY QUESTIONNAIRES
2. NOT BEING ABLE TO STOP OR CONTROL WORRYING: SEVERAL DAYS
6. BECOMING EASILY ANNOYED OR IRRITABLE: SEVERAL DAYS
3. WORRYING TOO MUCH ABOUT DIFFERENT THINGS: NOT AT ALL
GAD7 TOTAL SCORE: 3
1. FEELING NERVOUS, ANXIOUS, OR ON EDGE: SEVERAL DAYS
5. BEING SO RESTLESS THAT IT IS HARD TO SIT STILL: NOT AT ALL
7. FEELING AFRAID AS IF SOMETHING AWFUL MIGHT HAPPEN: NOT AT ALL
GAD7 TOTAL SCORE: 3

## 2024-01-03 ASSESSMENT — PAIN SCALES - GENERAL: PAINLEVEL: NO PAIN (0)

## 2024-01-03 NOTE — LETTER
1/3/2024         RE: Artie Fine  [address]  [place] [address]        Dear Colleague,    Thank you for referring your patient, Artie Fine, to the [facility] BLOOD AND MARROW TRANSPLANT PROGRAM [place]. Please see a copy of my visit note below.    [facility]  BMTCT OPEN VISIT    January 3, 2024      Artie Fine is a 68 year old male undergoing evaluation prior to hematopoietic cell transplant or immune effector cell therapy.    Reason for BMTCT: AML    Recent chemotherapy:   HiDAC (C2D1) Cytarabine 1.5 g/m2 q12 hours x 6 doses     Recent infections: No    Blood thinner use? If yes, why? No    Treatment for diabetes? No      Today, the patient notes the following symptoms:  Review Of Systems  Skin: left wrist scab, improving  Eyes: dry eyes with discharge overnight, tenderness with dryness, now no sx x1 week  Ears/Nose/Throat: none  Respiratory: has slight cough, clears throat with congestion, every morning brings up clear sputum  Cardiovascular: none  Gastrointestinal: none  Genitourinary: none  Musculoskeletal: negative with the exception of foot pain from bunions   Neurologic: none   Psychiatric: none  Hematologic/Lymphatic/Immunologic: none  Endocrine: none      Artie Fine's History    No past medical history on file.    Past Surgical History:   Procedure Laterality Date    PICC Right 2023    Placed R basilic 46 cm 1 external  without problem    PICC Right 10/13/2023    right basilic 5 fr dl power picc 40 cm    PICC DOUBLE LUMEN PLACEMENT Right 2023    Basilic, 43 cm, 0 cm external length       No family history on file.    Social History     Tobacco Use    Smoking status: Former     Types: Cigarettes     Quit date: 1983     Years since quittin.7     Passive exposure: Past    Smokeless tobacco: Current     Types: Snuff    Tobacco comments:     1 can per week   Substance Use Topics    Alcohol use: Yes           Artie Fine's Medications and  Allergies    Current Outpatient Medications   Medication    acetaminophen (TYLENOL) 325 MG tablet    acyclovir (ZOVIRAX) 400 MG tablet    cetirizine (ZYRTEC) 10 MG tablet    ondansetron (ZOFRAN) 4 MG tablet    voriconazole (VFEND) 200 MG tablet    levofloxacin (LEVAQUIN) 500 MG tablet    prednisoLONE acetate (PRED FORTE) 1 % ophthalmic suspension    prochlorperazine (COMPAZINE) 5 MG tablet     No current facility-administered medications for this visit.          Allergies   Allergen Reactions    Iodinated Contrast Media Rash    Cefepime Rash    Ceftriaxone Rash     Reported history at Outside hospital 2021    [employer] Other (See Comments)     Congestion            Physical Examination    /84 (BP Location: Right arm, Patient Position: Sitting, Cuff Size: Adult Large)   Pulse 85   Temp 98.4  F (36.9  C) (Oral)   Resp 20   Wt 94.5 kg (208 lb 6.4 oz)   SpO2 94%   BMI 32.14 kg/m      Exam:  Constitutional: healthy, alert, and no distress  Head: Normocephalic. No masses, lesions, tenderness or abnormalities  ENT: ENT exam normal, no neck nodes or sinus tenderness  Cardiovascular: negative. RRR. No murmurs, clicks gallops or rub  Respiratory: negative Good diaphragmatic excursion. Lungs clear  Gastrointestinal: Abdomen soft, non-tender. BS normal. No masses, organomegaly  : Deferred  Musculoskeletal: extremities normal- no gross deformities noted, gait normal, and normal muscle tone  Skin: no suspicious lesions or rashes  Neurologic: Gait normal. Reflexes normal and symmetric. Sensation grossly WNL.  Psychiatric: mentation appears normal and affect normal/bright  Hematologic/Lymphatic/Immunologic: Normal cervical lymph nodes      Frailty Screening  BMT Fried Frailty          1/3/2024    13:10   Fried Frailty   Lost>10 pounds unintenionally last year N   Exhaustion Score 0   Slowness Score 0   Weakness/ Strength Score 1   Low Activity Level Score 0   Final Score Not Frail   Final Score Number 1   Sit Stand  Assessment   Patient is able to perform stand with Feet Side by Side? Y   First attempt (in seconds): 10   Patient is able to perform Semi-Tandem Stand? Y   First attemp (in seconds): 10   Patient is able to perform Tandem Stand? Y   First attemp (in seconds): 10         Overall Assessment    I have reviewed the diagnostic data, medications, frailty screening, and general processes prior to BMTCT.  I have notified the Primary BMT Physician/and or Attending Physician in the clinic of any issues. We also discussed in detail the database and biorepository research for which Artie Fine is eligible. We discussed the potential risks and potential benefits of each of these protocols individually. We explained potential alternatives to the protocols discussed. We explained to the patient that participation is voluntary and that consent may be withdrawn at any time.       Consents Signed:   Blood transfusion consent form  Ethnicity form  [facility]  [facility] biorepository   [facility] BMTCT Database    Present during the discussion were Artie Fine and his wife Aundrea Fine. Copies of the signed consent forms will be provided to the patient on admission. No procedures specific to any studies were performed prior to the patient signing the consent form.    Artie Fine had the opportunity to ask questions, and I answered all of the questions to the best of my ability.      Shanique Arroyo PA-C

## 2024-01-03 NOTE — NURSING NOTE
EKG was performed today per order written by Dr. Jose Dykes.  Name and  verified with patient. Patient tolerated well without incident. File transmitted to bhumika.    Sita Arriaga on 1/3/2024 at 1:24 PM

## 2024-01-03 NOTE — NURSING NOTE
[facility] Frailty assessment completed with patient in clinic. Patient had no questions and showed understanding of what assessment was being done.     Sita Arriaga on 1/3/2024 at 1:25 PM

## 2024-01-03 NOTE — NURSING NOTE
"Oncology Rooming Note    January 3, 2024 1:00 PM   Artie Fine is a 68 year old male who presents for:    Chief Complaint   Patient presents with    Oncology Clinic Visit     Acute Myeloid leukemia      Initial Vitals: /84 (BP Location: Right arm, Patient Position: Sitting, Cuff Size: Adult Large)   Resp 20   Wt 94.5 kg (208 lb 6.4 oz)   BMI 32.14 kg/m   Estimated body mass index is 32.14 kg/m  as calculated from the following:    Height as of 12/5/23: 1.715 m (5' 7.52\").    Weight as of this encounter: 94.5 kg (208 lb 6.4 oz). Body surface area is 2.12 meters squared.  No Pain (0) Comment: Data Unavailable   No LMP for male patient.  Allergies reviewed: Yes  Medications reviewed: Yes    Medications: Medication refills not needed today.  Pharmacy name entered into EPIC:    [facility] [place], [place] - [address] [facility] [address]  [place], [place] - [address] [place] [address] [address]    Frailty Screening:   Is the patient here for a new oncology consult visit in cancer care? 2. No      Clinical concerns: Has scab on arm that won't heal      Sita Arriaga              "

## 2024-01-03 NOTE — PROGRESS NOTES
Pharmacy Assessment - Pre-Stem Cell Transplant    Assessments & Recommendations:  1) Hold Voriconazole at least 48 hours prior to starting conditioning chemotherapy, consider bridging with micafungin.   2) B-complex vitamin for neuropathy  3) Avoid Gardening, live plants until immune system recovered.   4) 24 hour urine crcl 123 ml/min  5) Letermovir starting day + 14, consider monitoring voriconazole levels or changing to another azole if needed.   6) Consider Nicotine patch if needed for cravings   7) Consider Zosyn for neutropenic fever treatment, considering allergy to cephalosporins.   8) Sirolimus goal 8-12 , if cytopenic > + 60 then reduce goal 3-8.     History of Present Illness:  Artie Fine is a 68 year old year old male diagnosed with AML.  He has been treated with 7+3  then HiDAC.  He is now being work up for JIMMY allo Stem Cell Transplant on protocol [id number], which utilizes Cy/Flu/TBI as a conditioning regimen.    Pertinent labs/tests:  Viral Testing:  CMV(+) / HSV(pending) / EBV(+) / VZV ( +)  Ejection Fraction: ______% (future date)  QTc: 445 msec (11/14)    Weights:   Wt Readings from Last 3 Encounters:   01/02/24 95.7 kg (211 lb)   12/05/23 91.9 kg (202 lb 8 oz)   11/27/23 91 kg (200 lb 9.6 oz)   Ideal body weight: 67.3 kg (148 lb 5.8 oz)  Adjusted ideal body weight: 78.7 kg (173 lb 6.7 oz)  % IBW:  142  There is no height or weight on file to calculate BMI.    Primary BMT Physician: Dr. Cam Edward  BMT RN Coordinator:  Enma Perera    Past Medical History:  No past medical history on file.    Medication Allergies:  Allergies   Allergen Reactions    Iodinated Contrast Media Rash    Cefepime Rash    Ceftriaxone Rash     Reported history at Outside hospital 2021    [facility] Other (See Comments)     Congestion        Current Medications (pre-admit):  Current Outpatient Medications   Medication Sig Dispense Refill    acetaminophen (TYLENOL) 325 MG tablet Take 650 mg by mouth every 6 hours as needed for  mild pain      acyclovir (ZOVIRAX) 400 MG tablet Take 1 tablet (400 mg) by mouth 2 times daily 60 tablet 11    cetirizine (ZYRTEC) 10 MG tablet Take 1 tablet (10 mg) by mouth daily      levofloxacin (LEVAQUIN) 500 MG tablet Hold while not neutropenic      ondansetron (ZOFRAN) 4 MG tablet Take 1 tablet (4 mg) by mouth every 8 hours as needed for nausea or vomiting 20 tablet 0    prednisoLONE acetate (PRED FORTE) 1 % ophthalmic suspension Place 2 drops into both eyes 4 times daily . Continue through 11/22, then stop. (Patient not taking: Reported on 1/2/2024) 5 mL 0    prochlorperazine (COMPAZINE) 5 MG tablet Take 1 tablet (5 mg) by mouth every 6 hours as needed for nausea or vomiting (Patient not taking: Reported on 12/5/2023) 20 tablet 0    voriconazole (VFEND) 200 MG tablet Take 1 tablet (200 mg) by mouth every 12 hours 60 tablet 3       Herbal Medication/Nutritional Supplements:  Avoid inhaled Marijuana.     Smoking/Past Drug Use:  Smokeless tobacco ( 1 can per week)     Nausea/Vomiting, Pain, or other issues:  No issues     Summary:  I met with Artie MENA Babatunde and his wife (RN) for approximately 45 minutes.  We discussed his current and transplant medications including the conditioning chemotherapy (Cyclophosphamide, fludarabine and TBI), antiemetics (ondansetron, prochlorperazine, lorazepam, dexamethasone), prophylactic antibiotics (levofloxacin, acyclovir, letermovir, bactrim, micafungin, voriconazole) and miscellaneous medications ( ursodiol, pantoprazole, filgrastim, claritin and vaccinations) .

## 2024-01-03 NOTE — PROGRESS NOTES
Blood and Marrow Transplant   Psychosocial Assessment with   Clinical     Assessment completed on 1/3/2024 of Pt's living situation, support system, financial status, functional status, coping, stressors, need for resources and social work intervention provided as needed.  Information for this assessment was provided by Pt and Pts spouse Aundrea's report in addition to medical chart review and consultation with medical team.     Present at Assessment:   Patient: Artie Fine  Spouse: Aundrea Babatunde  : POLO Louis LGSW    Diagnosis: AML     Date of Diagnosis: 9/2023    Transplant type: Allogeneic    Donor: Unrelated allogeneic donor stem cell transplant     Physician: Brodie Edward MD    Nurse Coordinator: Enma Perera RN    Social Workers: POLO Louis LGSW    Permanent Address:   [address] [place] [address]    Living Situation: Pt lives in [place], [place] with spouse Aundrea.    Local Address:   [id number] [place] [address] main desk: [phone]    Contact Information:  Pt's Home Phone: [phone]  Pt's Cell Phone: [phone]  Pt Email: [email or web address]  Pt's Spouse Aundrea's Phone: [phone]    Presenting Information:  Artie is a 68 year old male diagnosed with AML who presents for evaluation for Allogeneic transplant at the [facility] ([facility] Pt was accompanied to today's visit by spouse Aundrea.     Decision Making: Self    Health Care Directive:   Yes. Pt has a health care directive already on file.     Relationship Status: . Pt and pt's spouse have been  for 40 years. Pt described relationship as stable/supportive.     Special Needs: Local Lodging Needed-currently staying at [facility].    Family/Support System: Pt endorsed a strong support system including family and close friends who will be available to support pt throughout transplant process.     Family Information:  Spouse:  Aundrea Fine  Children: Son Cyril (ND) and Twin Daughter Luis Miguel (36-Eliazar)  Siblings: 2 sisters and 2 brothers (1  brother lives in [place])  Parents:   Grandchildren: N/A  Friends:  Pt endorsed a good friend support system.    Caregiver: SW discussed with pt and pt's spouse the caregiver role and expectation at length. Pt is agreeable to having a full time caregiver for a minimum of 100 days until cleared by the BMT physician. Pt and pt's spouse confirmed understanding of the caregiver requirement. Pt's primary caregiver will be spouse Aundrea with other family members as a secondary or back-up to assist as needed. Pt reviewed and signed the caregiver contract which will be scanned into the EMR. Caregiver education and resources provided. No caregiver concerns identified.     Caregiver Contact Information:  Spouse Aundrea Fine: [phone]    Transportation Mode: Private vehicle to be primary source of transportation anticipated. Pt is aware of driving restrictions post-BMT and the need for the caregiver is to drive until cleared to drive by the BMT physician. SW provided information on parking info and monthly parking pass options. Pt will utilize the [employer] for transportation to and from the [facility] and BMT Clinic/Hospital.    Insurance: Pt has [employer] MEDICARE health insurance. Pt denied specific insurance concerns at this time. SW reiterated information about the BMT Financial  should specific insurance questions arise as pt moves through transplant process.     Sources of Income: Pt's source of income is social security nursing home and salary/wages from Aundrea's employment. No financial concerns identified at this time. SW discussed marisel options and asked pt to let SW know if they would like to apply in the future.     Employment:   Currently employed: No. Retired.  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     Spouse/Partner Employed: Yes.   Employer: [employer]  Occupation: LPN Nurse for 45 years    Mental Health: Pt denied a history of mental health concerns, specific diagnoses or medications at this time. We discussed how many patients may see an increase in feelings of anxiety or depression while hospitalized for extended periods of time and pursue  isolation precautions post-BMT. Encouraged pt and spouse to let us know if they are noticing an increase in symptoms. Pt believes he would be able to identify symptoms of depression/anxiety throughout the transplant process. We talked about the variety of modalities available to use as coping mechanisms (including but not limited to guided imagery, relaxation techniques, progressive muscle relaxation, counseling/talk therapy and medication).    PHQ-9:  Pt scored a 1 which indicates none on the depression severity scale. Pt endorses this is an accurate reflection of his emotional state.    GAD7:  Pt scored a 3 which indicates no sign of anxiety on the Generalized Anxiety Disorder Questionnaire. Pt endorses this is an accurate reflection of his emotional state.    Chemical Use:   Tobacco: Began smoking at the age of 15. Ceased regular usage of tobacco approximately 15-20 years ago, however last usage of cigarettes was last summer. Has not smoked since then.  Alcohol: Before custodial, drank approximately a 6 pack of beer a daily.  Significantly decreased usage of alcohol and currently consumes occasional flower drink once in awhile.  Denies any concern with ceasing alcohol consumption during transplant process.  Marijuana: Hx of marijuana use but shared that it has been \"quite awhile ago\". Did not specify a timeline.  Other Drugs: No hx reported.  Based on the information provided, there appear to be no specific risks or concerns identified at this time.     Trauma/Loss/Abuse History: Multiple losses associated with cancer diagnosis and treatment, including health, employment, changes to physical appearance, etc. " "    Spirituality: Pt identified as a Islam. SW explained that there are Chaplains on the unit and pt can request to meet with a  at anytime.    Coping: Pt noted that he is currently feeling \"prepared, nervous, and ready to begin\". Pt shared that his main coping mechanisms are \"prayer/spiritual practices, and exercise\". Pt noted that he enjoys \"the outdoors, being active, gardening, cycling, hiking, camping, and woodworking\". SW and pt discussed additional positive coping mechanisms that pt can utilize while in the hospital. While hospitalized, pt plans to \"bring his laptop, read (books and Bible)\".     Caregiver Coping: Pt's spouse noted that she is feeling \"positive, hopeful, prepared, and ready to begin, and focused\" at this time. Pt's spouse noted that she grace by \"talking with friends/family, prayer/spiritual practices, and exercise\". Caregiver resources offered/reviewed.     Education Provided: Transplant process expectations, Caregiver requirements, Caregiver self-care, Financial issues related to transplant, Financial resources/grants available, Common psychosocial stressors pre/post transplant, Support group(s) available, Hospital resources available, Web site information, Social Work role and Resources for children/siblings    Interventions Provided: Psychosocial Support and Education     Assessment and Recommendations for Team:  Pt is a 68 year old male diagnosed with AML who is here undergoing preparation for a planned Allogeneic transplant.     Pt is pleasant, calm and able to articulate concerns/coping mechanisms in an appropriate manner. During our meeting pt was alert, he was interactive, affect was full, he displayed appropriate eye contact, memory and thought processes. Pt feels comfortable communicating with the medical team. Pt has a strong supportive network of family and friends who are involved. Pt has developed strong coping mechanisms.     Pt will benefit from ongoing " psychosocial support in regards to coping with the adjustment to the BMT process. CSW has discussed  psychosocial support options in regards to coping with the adjustment to the BMT process and support groups opportunities.      Pt has a strong support system and a confirmed caregiver plan. Pt verbalizes understanding of the transplant process and wanting to proceed. SW provided contact information and encouraged pt to contact SW with questions, concerns, resources and for support.    Per this assessment, SW did not identify any barriers to this patient moving forward with transplant.    Important Information:   -Pt would like a bicycle in his room.    Follow up Planned:   -Psychosocial support  -Pt would like CSW to email him the contact information for the financial -emailed on 1/3/2023.    POLO Louis, LGSW  Adult Blood & Marrow Transplant   Phone: [phone]  Pager: [phone]

## 2024-01-03 NOTE — LETTER
1/3/2024         RE: Artie Fine  [address] [address]  [place] [address]        Dear Colleague,    Thank you for referring your patient, Artie Fine, to the [facility] BLOOD AND MARROW TRANSPLANT PRO[facility] [place]. Please see a copy of my visit note below.    Pharmacy Assessment - Pre-Stem Cell Transplant    Assessments & Recommendations:  1) Hold Voriconazole at least 48 hours prior to starting conditioning chemotherapy, consider bridging with micafungin.   2) B-complex vitamin for neuropathy  3) Avoid Gardening, live plants until immune system recovered.   4) 24 hour urine crcl 123 ml/min  5) Letermovir starting day + 14, consider monitoring voriconazole levels or changing to another azole if needed.   6) Consider Nicotine patch if needed for cravings   7) Consider Zosyn for neutropenic fever treatment, considering allergy to cephalosporins.   8) Sirolimus goal 8-12 , if cytopenic > + 60 then reduce goal 3-8.     History of Present Illness:  Artie Fine is a 68 year old year old male diagnosed with AML.  He has been treated with 7+3  then HiDAC.  He is now being work up for JIMMY allo Stem Cell Transplant on protocol [id number], which utilizes Cy/Flu/TBI as a conditioning regimen.    Pertinent labs/tests:  Viral Testing:  CMV(+) / HSV(pending) / EBV(+) / VZV ( +)  Ejection Fraction: ______% (future date)  QTc: 445 msec (11/14)    Weights:   Wt Readings from Last 3 Encounters:   01/02/24 95.7 kg (211 lb)   12/05/23 91.9 kg (202 lb 8 oz)   11/27/23 91 kg (200 lb 9.6 oz)   Ideal body weight: 67.3 kg (148 lb 5.8 oz)  Adjusted ideal body weight: 78.7 kg (173 lb 6.7 oz)  % IBW:  142  There is no height or weight on file to calculate BMI.    Primary BMT Physician: Dr. Cam Edward  BMT RN Coordinator:  Enma Perera    Past Medical History:  No past medical history on file.    Medication Allergies:  Allergies   Allergen Reactions    Iodinated Contrast Media Rash    Cefepime Rash    Ceftriaxone Rash     Reported  history at Outside hospital 2021    [facility] Other (See Comments)     Congestion        Current Medications (pre-admit):  Current Outpatient Medications   Medication Sig Dispense Refill    acetaminophen (TYLENOL) 325 MG tablet Take 650 mg by mouth every 6 hours as needed for mild pain      acyclovir (ZOVIRAX) 400 MG tablet Take 1 tablet (400 mg) by mouth 2 times daily 60 tablet 11    cetirizine (ZYRTEC) 10 MG tablet Take 1 tablet (10 mg) by mouth daily      levofloxacin (LEVAQUIN) 500 MG tablet Hold while not neutropenic      ondansetron (ZOFRAN) 4 MG tablet Take 1 tablet (4 mg) by mouth every 8 hours as needed for nausea or vomiting 20 tablet 0    prednisoLONE acetate (PRED FORTE) 1 % ophthalmic suspension Place 2 drops into both eyes 4 times daily . Continue through 11/22, then stop. (Patient not taking: Reported on 1/2/2024) 5 mL 0    prochlorperazine (COMPAZINE) 5 MG tablet Take 1 tablet (5 mg) by mouth every 6 hours as needed for nausea or vomiting (Patient not taking: Reported on 12/5/2023) 20 tablet 0    voriconazole (VFEND) 200 MG tablet Take 1 tablet (200 mg) by mouth every 12 hours 60 tablet 3       Herbal Medication/Nutritional Supplements:  Avoid inhaled Marijuana.     Smoking/Past Drug Use:  Smokeless tobacco ( 1 can per week)     Nausea/Vomiting, Pain, or other issues:  No issues     Summary:  I met with Artie Fine and his wife (RN) for approximately 45 minutes.  We discussed his current and transplant medications including the conditioning chemotherapy (Cyclophosphamide, fludarabine and TBI), antiemetics (ondansetron, prochlorperazine, lorazepam, dexamethasone), prophylactic antibiotics (levofloxacin, acyclovir, letermovir, bactrim, micafungin, voriconazole) and miscellaneous medications ( ursodiol, pantoprazole, filgrastim, claritin and vaccinations) .           BMT Pharm D, RP

## 2024-01-03 NOTE — PROGRESS NOTES
[facility]  BMTCT OPEN VISIT    January 3, 2024      Artie Fine is a 68 year old male undergoing evaluation prior to hematopoietic cell transplant or immune effector cell therapy.    Reason for BMTCT: AML    Recent chemotherapy:   HiDAC (C2D1) Cytarabine 1.5 g/m2 q12 hours x 6 doses     Recent infections: No    Blood thinner use? If yes, why? No    Treatment for diabetes? No      Today, the patient notes the following symptoms:  Review Of Systems  Skin: left wrist scab, improving  Eyes: dry eyes with discharge overnight, tenderness with dryness, now no sx x1 week  Ears/Nose/Throat: none  Respiratory: has slight cough, clears throat with congestion, every morning brings up clear sputum  Cardiovascular: none  Gastrointestinal: none  Genitourinary: none  Musculoskeletal: negative with the exception of foot pain from bunions   Neurologic: none   Psychiatric: none  Hematologic/Lymphatic/Immunologic: none  Endocrine: none      Artie Fine's History    No past medical history on file.    Past Surgical History:   Procedure Laterality Date    PICC Right 2023    Placed R basilic 46 cm 1 external  without problem    PICC Right 10/13/2023    right basilic 5 fr dl power picc 40 cm    PICC DOUBLE LUMEN PLACEMENT Right 2023    Basilic, 43 cm, 0 cm external length       No family history on file.    Social History     Tobacco Use    Smoking status: Former     Types: Cigarettes     Quit date: 1983     Years since quittin.7     Passive exposure: Past    Smokeless tobacco: Current     Types: Snuff    Tobacco comments:     1 can per week   Substance Use Topics    Alcohol use: Yes           Artie Fine's Medications and Allergies    Current Outpatient Medications   Medication    acetaminophen (TYLENOL) 325 MG tablet    acyclovir (ZOVIRAX) 400 MG tablet    cetirizine (ZYRTEC) 10 MG tablet    ondansetron (ZOFRAN) 4 MG tablet    voriconazole (VFEND) 200 MG tablet    levofloxacin (LEVAQUIN) 500 MG tablet     prednisoLONE acetate (PRED FORTE) 1 % ophthalmic suspension    prochlorperazine (COMPAZINE) 5 MG tablet     No current facility-administered medications for this visit.          Allergies   Allergen Reactions    Iodinated Contrast Media Rash    Cefepime Rash    Ceftriaxone Rash     Reported history at Outside hospital 2021    Rag[facility]eds Other (See Comments)     Congestion            Physical Examination    /84 (BP Location: Right arm, Patient Position: Sitting, Cuff Size: Adult Large)   Pulse 85   Temp 98.4  F (36.9  C) (Oral)   Resp 20   Wt 94.5 kg (208 lb 6.4 oz)   SpO2 94%   BMI 32.14 kg/m      Exam:  Constitutional: healthy, alert, and no distress  Head: Normocephalic. No masses, lesions, tenderness or abnormalities  ENT: ENT exam normal, no neck nodes or sinus tenderness  Cardiovascular: negative. RRR. No murmurs, clicks gallops or rub  Respiratory: negative Good diaphragmatic excursion. Lungs clear  Gastrointestinal: Abdomen soft, non-tender. BS normal. No masses, organomegaly  : Deferred  Musculoskeletal: extremities normal- no gross deformities noted, gait normal, and normal muscle tone  Skin: no suspicious lesions or rashes  Neurologic: Gait normal. Reflexes normal and symmetric. Sensation grossly WNL.  Psychiatric: mentation appears normal and affect normal/bright  Hematologic/Lymphatic/Immunologic: Normal cervical lymph nodes      Frailty Screening  BMT Fried Frailty          1/3/2024    13:10   Fried Frailty   Lost>10 pounds unintenionally last year N   Exhaustion Score 0   Slowness Score 0   Weakness/ Strength Score 1   Low Activity Level Score 0   Final Score Not Frail   Final Score Number 1   Sit Stand Assessment   Patient is able to perform stand with Feet Side by Side? Y   First attempt (in seconds): 10   Patient is able to perform Semi-Tandem Stand? Y   First attemp (in seconds): 10   Patient is able to perform Tandem Stand? Y   First attemp (in seconds): 10         Overall  Assessment    I have reviewed the diagnostic data, medications, frailty screening, and general processes prior to BMTCT.  I have notified the Primary BMT Physician/and or Attending Physician in the clinic of any issues. We also discussed in detail the database and biorepository research for which Artie Fine is eligible. We discussed the potential risks and potential benefits of each of these protocols individually. We explained potential alternatives to the protocols discussed. We explained to the patient that participation is voluntary and that consent may be withdrawn at any time.       Consents Signed:   Blood transfusion consent form  Ethnicity form  [facility] database  [facility] biorepository   [facility] BMTCT Database    Present during the discussion were Artie Fine and his wife Aundrea Fine. Copies of the signed consent forms will be provided to the patient on admission. No procedures specific to any studies were performed prior to the patient signing the consent form.    Artie Fine had the opportunity to ask questions, and I answered all of the questions to the best of my ability.      Shanique Arroyo PA-C

## 2024-01-03 NOTE — LETTER
1/3/2024         RE: Artie Fine  [address] [address]  [place] [address]        Dear Colleague,    Thank you for referring your patient, Artie Fine, to the [facility] BLOOD AND MARROW TRANSPLANT PROGRAM [place]. Please see a copy of my visit note below.    BMT Teaching Flowsheet    Artie Fine is a 68 year old male  The encounter diagnosis was Acute myeloid leukemia in remission (H).    Teaching Topic: Allo BMT Workup Binder     Person(s) involved in teaching: Patient and Spouse    Motivation Level  Asks Questions: Yes  Eager to Learn: Yes  Cooperative: Yes  Receptive (willing/able to accept information): Yes  Any cultural factors/Quaker beliefs that may influence understanding or compliance? No    Patient and Family demonstrates understanding of the following:   Reason for the appointment, diagnosis and treatment plan: Yes  Knowledge of proper use of medications and conditions for which they are ordered (with special attention to potential side effects or drug interactions): Yes  Which situations necessitate calling provider and whom to contact: Yes  Proper use and care of (medical equipment, care aids, etc.) Yes  Pain management techniques: Yes  How and/when to access community resources: Yes    Teaching/ learning concerns addressed: No concerns identified. Patient and spouse were engaged and asked questions throughout teach.     Infection Control:  Patient and Family instructed on hand hygiene: Yes  Signs and symptoms of infection taught: Yes    Instructional Materials Used/Given:   Allo BMT workup binder.    For CAR-T patient: Product specific wallet card was given. Patient instructed to remain within close proximity of facility (within 30-40 minutes per program standard) for at least four weeks post infusion; Must remain within 2 hours of facility until 8 weeks post-infusion. CAR-T patient is instructed not to operate motorized vehicle or heavy machinery for a period of 8 weeks.    Time  spent with patient: 120 minutes.  Specific Concerns: Yes - patient would like to discuss who his nurse coordinator will be. RNCC discussed how we can discuss this with Dr Cam Edward.      Again, thank you for allowing me to participate in the care of your patient.        Sincerely,        Enma Perera RN

## 2024-01-04 ENCOUNTER — ANCILLARY PROCEDURE (OUTPATIENT)
Dept: CT IMAGING | Facility: CLINIC | Age: 69
End: 2024-01-04
Attending: INTERNAL MEDICINE
Payer: COMMERCIAL

## 2024-01-04 ENCOUNTER — ANCILLARY PROCEDURE (OUTPATIENT)
Dept: GENERAL RADIOLOGY | Facility: CLINIC | Age: 69
End: 2024-01-04
Attending: INTERNAL MEDICINE
Payer: COMMERCIAL

## 2024-01-04 ENCOUNTER — ANCILLARY PROCEDURE (OUTPATIENT)
Dept: CARDIOLOGY | Facility: CLINIC | Age: 69
End: 2024-01-04
Attending: INTERNAL MEDICINE
Payer: COMMERCIAL

## 2024-01-04 ENCOUNTER — TELEPHONE (OUTPATIENT)
Dept: TRANSPLANT | Facility: CLINIC | Age: 69
End: 2024-01-04

## 2024-01-04 DIAGNOSIS — Z11.59 SCREENING FOR VIRAL DISEASE: ICD-10-CM

## 2024-01-04 DIAGNOSIS — C92.01 ACUTE MYELOID LEUKEMIA IN REMISSION (H): ICD-10-CM

## 2024-01-04 DIAGNOSIS — Z86.2 PERSONAL HISTORY OF DISEASES OF BLOOD AND BLOOD-FORMING ORGANS: ICD-10-CM

## 2024-01-04 DIAGNOSIS — Z01.818 PREOP EXAMINATION: ICD-10-CM

## 2024-01-04 LAB
ATRIAL RATE - MUSE: 83 BPM
CULTURE HARVEST COMPLETE DATE: NORMAL
DIASTOLIC BLOOD PRESSURE - MUSE: NORMAL MMHG
FLOWPRA1 CELL: NORMAL
FLOWPRA1 RESULT: NORMAL
FLOWPRA1 TEST METHOD: NORMAL
FLOWPRA2 CELL: NORMAL
FLOWPRA2 RESULT: NORMAL
FLOWPRA2 TEST METHOD: NORMAL
HTLV I+II AB SER QL IA: NEGATIVE
INTERPRETATION ECG - MUSE: NORMAL
INTERPRETATION: NORMAL
LVEF ECHO: NORMAL
P AXIS - MUSE: -4 DEGREES
PR INTERVAL - MUSE: 192 MS
QRS DURATION - MUSE: 104 MS
QT - MUSE: 388 MS
QTC - MUSE: 455 MS
R AXIS - MUSE: -28 DEGREES
SA 2 CELL: NORMAL
SA 2 TEST METHOD: NORMAL
SA2 HI RISK ABY: NORMAL
SA2 MOD RISK ABY: NORMAL
SYSTOLIC BLOOD PRESSURE - MUSE: NORMAL MMHG
T AXIS - MUSE: 4 DEGREES
VENTRICULAR RATE- MUSE: 83 BPM
ZZZFLOWPRA1 COMMENTS: NORMAL
ZZZFLOWPRA2 COMMENTS: NORMAL
ZZZSA 2 COMMENTS: NORMAL

## 2024-01-04 PROCEDURE — 93356 MYOCRD STRAIN IMG SPCKL TRCK: CPT | Performed by: INTERNAL MEDICINE

## 2024-01-04 PROCEDURE — 71250 CT THORAX DX C-: CPT | Mod: GC | Performed by: RADIOLOGY

## 2024-01-04 PROCEDURE — 71046 X-RAY EXAM CHEST 2 VIEWS: CPT | Mod: GC | Performed by: RADIOLOGY

## 2024-01-04 PROCEDURE — 93306 TTE W/DOPPLER COMPLETE: CPT | Performed by: INTERNAL MEDICINE

## 2024-01-04 NOTE — TELEPHONE ENCOUNTER
"RNCC notified this morning that Artie' donor did not clear and is not able to move forward with collection. RNCC spoke with Dr Cam Edward with update, and decision made to cancel the rest of his workup, including LP scheduled for later today.     RNCC called Artie' phone and his wife, Aundrea, answered. We discussed how we do not have a donor right now for Artie and that unfortunately we can't move to transplant right now. RNCC gave Aundrea her number for Artie to call when he was available.     Artie called and was very upset. He said it is unacceptable that he was put in this situation. He says nobody that he has talked with at the clinic or at the [facility] has heard of such a thing happening and that perhaps he needs a new care team since this is unacceptable. RNCC provided empathetic listening and agreed with him that this is unacceptable and apologized for everything he is going through. RNCC discussed how Dr Cam Edward would like him to get more therapy while we line up a new donor. He did not like this idea. He said he was \"beside himself and perhaps it is a good idea that we don't speak any longer.\" Then he hung up.    Message sent to Dr Cam Edward, Dr Garcia and Dr Benitez for awareness. Dr Cam Edward is planning on calling Artie this afternoon to discuss further.  "

## 2024-01-04 NOTE — PROGRESS NOTES
BMT Teaching Flowsheet    Artie Fine is a 68 year old male  The encounter diagnosis was Acute myeloid leukemia in remission (H).    Teaching Topic: Allo BMT Workup Binder     Person(s) involved in teaching: Patient and Spouse    Motivation Level  Asks Questions: Yes  Eager to Learn: Yes  Cooperative: Yes  Receptive (willing/able to accept information): Yes  Any cultural factors/Hindu beliefs that may influence understanding or compliance? No    Patient and Family demonstrates understanding of the following:   Reason for the appointment, diagnosis and treatment plan: Yes  Knowledge of proper use of medications and conditions for which they are ordered (with special attention to potential side effects or drug interactions): Yes  Which situations necessitate calling provider and whom to contact: Yes  Proper use and care of (medical equipment, care aids, etc.) Yes  Pain management techniques: Yes  How and/when to access community resources: Yes    Teaching/ learning concerns addressed: No concerns identified. Patient and spouse were engaged and asked questions throughout teach.     Infection Control:  Patient and Family instructed on hand hygiene: Yes  Signs and symptoms of infection taught: Yes    Instructional Materials Used/Given:   Allo BMT workup binder.    For CAR-T patient: Product specific wallet card was given. Patient instructed to remain within close proximity of facility (within 30-40 minutes per program standard) for at least four weeks post infusion; Must remain within 2 hours of facility until 8 weeks post-infusion. CAR-T patient is instructed not to operate motorized vehicle or heavy machinery for a period of 8 weeks.    Time spent with patient: 120 minutes.  Specific Concerns: Yes - patient would like to discuss who his nurse coordinator will be. RNCC discussed how we can discuss this with Dr Cam Edward.

## 2024-01-08 ENCOUNTER — PRE VISIT (OUTPATIENT)
Dept: RADIATION ONCOLOGY | Facility: CLINIC | Age: 69
End: 2024-01-08
Payer: COMMERCIAL

## 2024-01-08 ENCOUNTER — DOCUMENTATION ONLY (OUTPATIENT)
Dept: TRANSPLANT | Facility: CLINIC | Age: 69
End: 2024-01-08
Payer: COMMERCIAL

## 2024-01-08 NOTE — PROGRESS NOTES
I called Babatunde last Thursday and today again per his request to discuss next steps and moving forward with transplant.  Unfortunately 2 of the donors failed workup last week unexpectedly.  Since this event Mr. Fine has been rethinking transplant and moving forward with that altogether.  At this time he feels that all his questions are answered, he understands the risks and benefits of proceeding to a transplantation.  He had time to discuss with his family since our last discussion.  He confirmed today that he does not want to proceed forward with a transplantation today or at any point in the future at least at this time.  He also disclosed that he is unsure that he wants to move forward with any chemotherapy in the future as well.  I will defer this discussion further to his oncology team.    I expressed to Mr. Fine that it has been a pleasure to work with him.  I also emphasized that I am available in case he has any change in his mind or has any further questions or concerns.    Brodie Edward MD

## 2024-01-23 ENCOUNTER — TELEPHONE (OUTPATIENT)
Dept: TRANSPLANT | Facility: CLINIC | Age: 69
End: 2024-01-23
Payer: COMMERCIAL

## 2024-01-23 DIAGNOSIS — C92.01 ACUTE MYELOID LEUKEMIA IN REMISSION (H): Primary | ICD-10-CM

## 2024-01-23 NOTE — TELEPHONE ENCOUNTER
BMT CSW Telephone Encounter  Clinical Social Work  [facility]      Focus: Supportive Counseling /Resources    Data: Pt is a 68 year old male diagnosed with AML.     Interventions: BMT SW Kezia Lee received a voicemail from the [facility] requesting to confirm patients upcoming stay from 1/24-1/29. Covering SW called patient who reported he will still be needing this reservation as he has chemotherapy appointments. He will be accompanied by his wife Aundrea. Sw called the [place] to convey this information. I encouraged Pt to contact CSW for support, questions and/or resources. He had no additional needs at this time.     Assessment: Pt presented as happy and engaging.  Pt appears to be coping well at this time. Pt continues to be supported by his wife Aundrea.     Plan: CSW will continue to work with Pt and family to provide supportive counseling and assist with resources as needed. CSW will continue to collaborate with multidisciplinary team regarding Pt's plan of care.     POLO Dickinson, [facility]  SOT/BMT/CF Float      Covering for:   POLO Louis, [facility]  Adult Blood & Marrow Transplant   Phone: [phone]  Pager: [phone]

## 2024-01-24 ENCOUNTER — PATIENT OUTREACH (OUTPATIENT)
Dept: ONCOLOGY | Facility: CLINIC | Age: 69
End: 2024-01-24
Payer: COMMERCIAL

## 2024-01-24 NOTE — PROGRESS NOTES
"[facility]: Cancer Care                                                                                          Artie returned phone call to writer.   Visit with MD was canceled for today via my chart, was a preadmission visit for planned admission for Hidac chemotherapy tomorrow 1/25/24.  He stated that he wasn't going to make it. Writer asked him if he would like to reschedule. Artie stated that he discussed what \"he wanted and the university decided to do what the university wanted and that doesn't work for me\" He stated that  he thought about it and said he wasn't going to make it work for further treatment.    Writer asked Artie what his expectation was for scheduling his admission and what needed to be changed, to which Artie responded \"I have already discussed this with doctors and won't be discussing it anymore, have a good day\" and hung up the phone.     Dr. Garcia notified and stated he would call and speak with patient    Signature:  Susan Santos RN  "

## 2024-01-24 NOTE — PROGRESS NOTES
[facility]/Voicemail    Clinical Data: Care Coordinator Outreach    Outreach attempted x 1.  Left message on patient's voicemail with call back information and requested return call.    Plan: Care Coordinator will try to reach patient again in 1-2 business days.    Patient canceled via my chart his pre admit visit scheduled with the provider today. Called patient to follow up, requested return phone call.     Susan Santos, RN, BSN  RN Care Coordinator   [address] [place] [id number]   [phone]

## 2024-02-16 ENCOUNTER — PATIENT OUTREACH (OUTPATIENT)
Dept: ONCOLOGY | Facility: CLINIC | Age: 69
End: 2024-02-16
Payer: COMMERCIAL

## 2024-02-16 NOTE — PROGRESS NOTES
[facility]: Cancer Care                                                                                          Barbara MULTANI for Dr. Mota left voicemail for writer stating that Artie had decided to not pursue BMT. Barbara states Artie will continue with AML treatment in [place]. Dr. Mota would like to give Artie Azacitidine/Venetoclax and would like to touch base with Dr. Garcia.   Message sent to Dr. Garcia with Dr. Mota contact information.     Signature:  Susan Santos RN

## 2024-05-29 ENCOUNTER — PATIENT OUTREACH (OUTPATIENT)
Dept: ONCOLOGY | Facility: CLINIC | Age: 69
End: 2024-05-29
Payer: COMMERCIAL

## 2024-05-29 NOTE — PROGRESS NOTES
[facility]: Cancer Care                                                                                          Artie called writer today wondering if Dr. Garcia was able to certify him for medical cannabis program.     Patient last seen with BMT on 1/3/24.   Previous communication with writer on 1/24/24 Artie refused follow up with [facility].   Notified from Dr. Mota office on 2/16/24 that Artie would be continuing care with them in [place].     Dr. Garcia unable to certify Artie for the medical cannabis program and writer directed Artie to the [facility] website for further information on the program and how to get certified.     Signature:  Susan Santos RN

## 2024-09-13 NOTE — LETTER
11/15/2023         RE: Artie Fine  [address]  [place] [address]        Dear Colleague,    Thank you for referring your patient, Artie Fine, to the [facility] BLOOD AND MARROW TRANSPLANT PROGRAM [place]. Please see a copy of my visit note below.    BMT ONC Adult Bone Marrow Biopsy Procedure Note  November 15, 2023  /71   Pulse 83   Resp 20   Wt 90.6 kg (199 lb 11.2 oz)   SpO2 96%   BMI 30.82 kg/m       Learning needs assessment complete within 12 months? YES    DIAGNOSIS: AML     PROCEDURE: Unilateral Bone Marrow Biopsy and Unilateral Aspirate    LOCATION: [facility] 2nd Floor    Patient s identification was positively verified by verbal identification and invasive procedure safety checklist was completed. Informed consent was obtained. Following the administration of  no pre-medications per patient preference,  patient was placed in the prone position and prepped and draped in a sterile manner. Approximately 20 cc of 1% Lidocaine was used over the left posterior iliac spine. Following this a 3 mm incision was made. Trephine bone marrow core(s) was (were) obtained from the [facility]. Bone marrow aspirates were obtained from the [facility]. Aspirates were sent for morphology, immunophenotyping, cytogenetics, and molecular diagnostics RFLP. A total of approximately 20 ml of marrow was aspirated. Following this procedure a sterile dressing was applied to the bone marrow biopsy site(s). The patient was placed in the supine position to maintain pressure on the biopsy site. Post-procedure wound care instructions were given.     Complications: NO    Pre-procedural pain: 0 out of 10 on the numeric pain rating scale.     Procedural pain: minimal pain    Post-procedural pain assessment: 0 out of 10 on the numeric pain rating scale.     Interventions: NO    Length of procedure:20 minutes or less      Procedure performed by: Anne Degroot PA-C     Called pt regarding stereotactic biopsy and ultrasound biopsy on 9/12/24.  Per patient 's , patient is feeling fine, no real pain but sore. Took Tylenol. No bleeding and no problems.
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
## 2024-12-15 ENCOUNTER — HEALTH MAINTENANCE LETTER (OUTPATIENT)
Age: 69
End: 2024-12-15